# Patient Record
Sex: MALE | Race: WHITE | NOT HISPANIC OR LATINO | Employment: UNEMPLOYED | URBAN - METROPOLITAN AREA
[De-identification: names, ages, dates, MRNs, and addresses within clinical notes are randomized per-mention and may not be internally consistent; named-entity substitution may affect disease eponyms.]

---

## 2017-01-03 ENCOUNTER — HOSPITAL ENCOUNTER (OUTPATIENT)
Dept: RADIOLOGY | Facility: CLINIC | Age: 47
Discharge: HOME | End: 2017-01-03
Payer: COMMERCIAL

## 2017-01-03 ENCOUNTER — ALLSCRIPTS OFFICE VISIT (OUTPATIENT)
Dept: OTHER | Facility: OTHER | Age: 47
End: 2017-01-03

## 2017-01-03 DIAGNOSIS — S62.109A CLOSED FRACTURE OF CARPAL BONE: ICD-10-CM

## 2017-01-03 PROCEDURE — 73110 X-RAY EXAM OF WRIST: CPT

## 2017-02-14 ENCOUNTER — HOSPITAL ENCOUNTER (OUTPATIENT)
Dept: RADIOLOGY | Facility: CLINIC | Age: 47
Discharge: HOME | End: 2017-02-14
Payer: COMMERCIAL

## 2017-02-14 ENCOUNTER — APPOINTMENT (OUTPATIENT)
Dept: OCCUPATIONAL THERAPY | Facility: CLINIC | Age: 47
End: 2017-02-14
Payer: COMMERCIAL

## 2017-02-14 ENCOUNTER — ALLSCRIPTS OFFICE VISIT (OUTPATIENT)
Dept: OTHER | Facility: OTHER | Age: 47
End: 2017-02-14

## 2017-02-14 DIAGNOSIS — S52.513A: ICD-10-CM

## 2017-02-14 PROCEDURE — 73110 X-RAY EXAM OF WRIST: CPT

## 2017-02-14 PROCEDURE — L3906 WHO W/O JOINTS CF: HCPCS

## 2017-02-14 PROCEDURE — 97165 OT EVAL LOW COMPLEX 30 MIN: CPT

## 2017-02-16 ENCOUNTER — APPOINTMENT (OUTPATIENT)
Dept: OCCUPATIONAL THERAPY | Facility: CLINIC | Age: 47
End: 2017-02-16
Payer: COMMERCIAL

## 2017-02-16 DIAGNOSIS — S52.513A: ICD-10-CM

## 2017-02-16 PROCEDURE — 97140 MANUAL THERAPY 1/> REGIONS: CPT

## 2017-02-16 PROCEDURE — 97022 WHIRLPOOL THERAPY: CPT

## 2017-02-16 PROCEDURE — 97110 THERAPEUTIC EXERCISES: CPT

## 2017-02-20 ENCOUNTER — APPOINTMENT (OUTPATIENT)
Dept: OCCUPATIONAL THERAPY | Facility: CLINIC | Age: 47
End: 2017-02-20
Payer: COMMERCIAL

## 2017-02-20 PROCEDURE — 97022 WHIRLPOOL THERAPY: CPT

## 2017-02-20 PROCEDURE — 97140 MANUAL THERAPY 1/> REGIONS: CPT

## 2017-02-20 PROCEDURE — 97110 THERAPEUTIC EXERCISES: CPT

## 2017-02-22 ENCOUNTER — APPOINTMENT (OUTPATIENT)
Dept: OCCUPATIONAL THERAPY | Facility: CLINIC | Age: 47
End: 2017-02-22
Payer: COMMERCIAL

## 2017-02-22 PROCEDURE — 97110 THERAPEUTIC EXERCISES: CPT

## 2017-02-22 PROCEDURE — 97022 WHIRLPOOL THERAPY: CPT

## 2017-02-22 PROCEDURE — 97014 ELECTRIC STIMULATION THERAPY: CPT

## 2017-02-22 PROCEDURE — 97140 MANUAL THERAPY 1/> REGIONS: CPT

## 2017-02-27 ENCOUNTER — APPOINTMENT (OUTPATIENT)
Dept: OCCUPATIONAL THERAPY | Facility: CLINIC | Age: 47
End: 2017-02-27
Payer: COMMERCIAL

## 2017-02-27 PROCEDURE — 97014 ELECTRIC STIMULATION THERAPY: CPT

## 2017-02-27 PROCEDURE — 97022 WHIRLPOOL THERAPY: CPT

## 2017-02-27 PROCEDURE — 97110 THERAPEUTIC EXERCISES: CPT

## 2017-02-27 PROCEDURE — 97140 MANUAL THERAPY 1/> REGIONS: CPT

## 2017-03-01 ENCOUNTER — APPOINTMENT (OUTPATIENT)
Dept: OCCUPATIONAL THERAPY | Facility: CLINIC | Age: 47
End: 2017-03-01
Payer: COMMERCIAL

## 2017-03-01 PROCEDURE — 97014 ELECTRIC STIMULATION THERAPY: CPT

## 2017-03-01 PROCEDURE — 97110 THERAPEUTIC EXERCISES: CPT

## 2017-03-01 PROCEDURE — 97140 MANUAL THERAPY 1/> REGIONS: CPT

## 2017-03-06 ENCOUNTER — APPOINTMENT (OUTPATIENT)
Dept: OCCUPATIONAL THERAPY | Facility: CLINIC | Age: 47
End: 2017-03-06
Payer: COMMERCIAL

## 2017-03-06 PROCEDURE — 97140 MANUAL THERAPY 1/> REGIONS: CPT

## 2017-03-06 PROCEDURE — 97110 THERAPEUTIC EXERCISES: CPT

## 2017-03-08 ENCOUNTER — APPOINTMENT (OUTPATIENT)
Dept: OCCUPATIONAL THERAPY | Facility: CLINIC | Age: 47
End: 2017-03-08
Payer: COMMERCIAL

## 2017-03-08 PROCEDURE — 97140 MANUAL THERAPY 1/> REGIONS: CPT

## 2017-03-08 PROCEDURE — 97110 THERAPEUTIC EXERCISES: CPT

## 2017-03-08 PROCEDURE — 97014 ELECTRIC STIMULATION THERAPY: CPT

## 2017-03-13 ENCOUNTER — APPOINTMENT (OUTPATIENT)
Dept: OCCUPATIONAL THERAPY | Facility: CLINIC | Age: 47
End: 2017-03-13
Payer: COMMERCIAL

## 2017-03-14 ENCOUNTER — APPOINTMENT (OUTPATIENT)
Dept: OCCUPATIONAL THERAPY | Facility: CLINIC | Age: 47
End: 2017-03-14
Payer: COMMERCIAL

## 2017-03-15 ENCOUNTER — ALLSCRIPTS OFFICE VISIT (OUTPATIENT)
Dept: OTHER | Facility: OTHER | Age: 47
End: 2017-03-15

## 2017-03-15 ENCOUNTER — APPOINTMENT (OUTPATIENT)
Dept: OCCUPATIONAL THERAPY | Facility: CLINIC | Age: 47
End: 2017-03-15
Payer: COMMERCIAL

## 2017-03-15 ENCOUNTER — HOSPITAL ENCOUNTER (OUTPATIENT)
Dept: RADIOLOGY | Facility: CLINIC | Age: 47
Discharge: HOME | End: 2017-03-15
Payer: COMMERCIAL

## 2017-03-15 DIAGNOSIS — S52.513A: ICD-10-CM

## 2017-03-15 PROCEDURE — 97140 MANUAL THERAPY 1/> REGIONS: CPT

## 2017-03-15 PROCEDURE — 73110 X-RAY EXAM OF WRIST: CPT

## 2017-03-15 PROCEDURE — 97014 ELECTRIC STIMULATION THERAPY: CPT

## 2017-03-15 PROCEDURE — 97110 THERAPEUTIC EXERCISES: CPT

## 2017-03-20 ENCOUNTER — APPOINTMENT (OUTPATIENT)
Dept: OCCUPATIONAL THERAPY | Facility: CLINIC | Age: 47
End: 2017-03-20
Payer: COMMERCIAL

## 2017-03-20 PROCEDURE — 97140 MANUAL THERAPY 1/> REGIONS: CPT

## 2017-03-20 PROCEDURE — 97110 THERAPEUTIC EXERCISES: CPT

## 2017-03-20 PROCEDURE — 97035 APP MDLTY 1+ULTRASOUND EA 15: CPT

## 2017-03-22 ENCOUNTER — APPOINTMENT (OUTPATIENT)
Dept: OCCUPATIONAL THERAPY | Facility: CLINIC | Age: 47
End: 2017-03-22
Payer: COMMERCIAL

## 2017-03-22 PROCEDURE — 97140 MANUAL THERAPY 1/> REGIONS: CPT

## 2017-03-22 PROCEDURE — 97110 THERAPEUTIC EXERCISES: CPT

## 2017-03-27 ENCOUNTER — APPOINTMENT (OUTPATIENT)
Dept: OCCUPATIONAL THERAPY | Facility: CLINIC | Age: 47
End: 2017-03-27
Payer: COMMERCIAL

## 2018-01-12 VITALS
SYSTOLIC BLOOD PRESSURE: 117 MMHG | HEIGHT: 72 IN | BODY MASS INDEX: 23.43 KG/M2 | WEIGHT: 173 LBS | DIASTOLIC BLOOD PRESSURE: 76 MMHG | HEART RATE: 97 BPM

## 2018-01-14 VITALS
DIASTOLIC BLOOD PRESSURE: 84 MMHG | HEIGHT: 72 IN | SYSTOLIC BLOOD PRESSURE: 123 MMHG | WEIGHT: 173 LBS | BODY MASS INDEX: 23.43 KG/M2 | HEART RATE: 74 BPM

## 2018-01-15 VITALS
DIASTOLIC BLOOD PRESSURE: 94 MMHG | HEIGHT: 72 IN | SYSTOLIC BLOOD PRESSURE: 142 MMHG | BODY MASS INDEX: 23.43 KG/M2 | WEIGHT: 173 LBS

## 2018-01-16 ENCOUNTER — OFFICE VISIT (OUTPATIENT)
Dept: URGENT CARE | Facility: MEDICAL CENTER | Age: 48
End: 2018-01-16
Payer: COMMERCIAL

## 2018-01-16 PROCEDURE — G0382 LEV 3 HOSP TYPE B ED VISIT: HCPCS

## 2018-01-16 NOTE — RESULT NOTES
Verified Results  (1) COMPREHENSIVE METABOLIC PANEL 20QXT3214 01:51WC Mine Romo   REPORT COMMENT:  FASTING:YES     Test Name Result Flag Reference   GLUCOSE 90 mg/dL  65-99   Fasting reference interval   UREA NITROGEN (BUN) 15 mg/dL  7-25   CREATININE 1 11 mg/dL  0 60-1 35   eGFR NON-AFR  AMERICAN 81 mL/min/1 73m2  > OR = 60   eGFR AFRICAN AMERICAN 94 mL/min/1 73m2  > OR = 60   BUN/CREATININE RATIO   4-86   NOT APPLICABLE (calc)   SODIUM 142 mmol/L  135-146   POTASSIUM 4 0 mmol/L  3 5-5 3   CHLORIDE 107 mmol/L     CARBON DIOXIDE 25 mmol/L  20-31   CALCIUM 9 9 mg/dL  8 6-10 3   PROTEIN, TOTAL 7 3 g/dL  6 1-8 1   ALBUMIN 5 0 g/dL  3 6-5 1   GLOBULIN 2 3 g/dL (calc)  1 9-3 7   ALBUMIN/GLOBULIN RATIO 2 2 (calc)  1 0-2 5   BILIRUBIN, TOTAL 0 8 mg/dL  0 2-1 2   ALKALINE PHOSPHATASE 80 U/L     AST 23 U/L  10-40   ALT 36 U/L  9-46   GLUCOSE 90 mg/dL  65-99   Fasting reference interval   UREA NITROGEN (BUN) 15 mg/dL  7-25   CREATININE 1 11 mg/dL  0 60-1 35   eGFR NON-AFR   AMERICAN 81 mL/min/1 73m2  > OR = 60   eGFR AFRICAN AMERICAN 94 mL/min/1 73m2  > OR = 60   BUN/CREATININE RATIO   3-97   NOT APPLICABLE (calc)   SODIUM 142 mmol/L  135-146   POTASSIUM 4 0 mmol/L  3 5-5 3   CHLORIDE 107 mmol/L     CARBON DIOXIDE 25 mmol/L  20-31   CALCIUM 9 9 mg/dL  8 6-10 3   PROTEIN, TOTAL 7 3 g/dL  6 1-8 1   ALBUMIN 5 0 g/dL  3 6-5 1   GLOBULIN 2 3 g/dL (calc)  1 9-3 7   ALBUMIN/GLOBULIN RATIO 2 2 (calc)  1 0-2 5   BILIRUBIN, TOTAL 0 8 mg/dL  0 2-1 2   ALKALINE PHOSPHATASE 80 U/L     AST 23 U/L  10-40   ALT 36 U/L  9-46

## 2018-01-16 NOTE — PROGRESS NOTES
Assessment    1  Encounter for preventive health examination (V70 0) (Z00 00)    Discussion/Summary  Advice and education were given regarding nutrition, aerobic exercise and tobacco cessation  Chief Complaint  MM  Review Labs  Chantix      History of Present Illness  HM, Adult Male: The patient is being seen for a health maintenance evaluation  General Health: The patient's health since the last visit is described as good  Lifestyle:  He consumes a diverse and healthy diet  He does not have any weight concerns  He does not exercise regularly  He does not use tobacco    Reproductive health:  the patient is sexually active  Screening: cancer screening reviewed and current  metabolic screening reviewed and current  risk screening reviewed and current  Review of Systems    Constitutional: No fever or chills, feels well, no tiredness, no recent weight gain or weight loss  Cardiovascular: No complaints of slow heart rate, no fast heart rate, no chest pain, no palpitations, no leg claudication, no lower extremity  Respiratory: No complaints of shortness of breath, no wheezing, no cough, no SOB on exertion, no orthopnea or PND  Gastrointestinal: No complaints of abdominal pain, no constipation, no nausea or vomiting, no diarrhea or bloody stools  Musculoskeletal: No complaints of arthralgia, no myalgias, no joint swelling or stiffness, no limb pain or swelling  Active Problems    1  Need for lipid screening (V77 91) (Z13 220)   2  Smoking trying to quit (305 1) (Z72 0)    Family History  Mother    · Family history of Seasonal allergies    Social History    · Smoking trying to quit (305 1) (Z72 0)    Current Meds   1  Amoxicillin 500 MG Oral Capsule; Therapy: 70DCC1278 to (Last Rx:25Mar2011)  Requested for: 25Mar2011 Ordered   2  Chantix Continuing Month Jameson 1 MG Oral Tablet; TAKE 1 TABLET TWICE DAILY;    Therapy: 49EPL2362 to (01 72 64 30 83)  Requested for: 33BYC0245; Last Rx: 31AAU0980 Ordered   3  Chantix Starting Month Jameson 0 5 MG X 11 & 1 MG X 42 Oral Tablet; TAKE ONE 0 5MG   TABLET DAILY ON DAYS 1-3, THEN ONE 0 5MG TABLET TWICE DAILY ON DAYS 4-7,   THEN ONE 1MG TABLET TWICE DAILY THEREAFTER; Therapy: 09SPR9923 to (Last Rx:17Aug2016)  Requested for: 17Aug2016; Status:   ACTIVE - Retrospective By Protocol Authorization Ordered   4  Ciprofloxacin HCl - 500 MG Oral Tablet; Therapy: 74WTV4196 to (Last Rx:06Gsf2034)  Requested for: 50Coi3577 Ordered   5  MetroNIDAZOLE 500 MG Oral Tablet; Therapy: 85FIM0993 to (Last Rx:30Afe7116)  Requested for: 04Rgx5305 Ordered   6  Oxycodone-Acetaminophen 5-325 MG Oral Tablet; Therapy: 19WZL6605 to (Last Rx:24Hoz3814)  Requested for: 22Dfy6579 Ordered   7  Tamiflu 75 MG Oral Capsule; Therapy: 51Crw7177 to (Last Rx:40Vwy3631)  Requested for: 02Phk7376 Ordered    Allergies    1  No Known Drug Allergies    Vitals   Recorded: 91DYK4125 94:30GG   Systolic 107   Diastolic 78   Heart Rate 76   Respiration 16   Height 6 ft 0 5 in   Weight 182 lb    BMI Calculated 24 34   BSA Calculated 2 06     Physical Exam    Constitutional   General appearance: No acute distress, well appearing and well nourished  Neck   Neck: Supple, symmetric, trachea midline, no masses  Thyroid: Normal, no thyromegaly  Pulmonary   Auscultation of lungs: Clear to auscultation  Cardiovascular   Auscultation of heart: Normal rate and rhythm, normal S1 and S2, no murmurs  Results/Data  (1) LIPID PANEL, FASTING 03Oct2016 02:31PM Kiki Cade     Test Name Result Flag Reference   CHOLESTEROL, TOTAL 208 mg/dL H 125-200   HDL CHOLESTEROL 76 mg/dL  > OR = 40   TRIGLICERIDES 95 mg/dL  <019   LDL-CHOLESTEROL 113 mg/dL (calc)  <130   Desirable range <100 mg/dL for patients with CHD or  diabetes and <70 mg/dL for diabetic patients with  known heart disease     CHOL/HDLC RATIO 2 7 (calc)  < OR = 5 0   NON HDL CHOLESTEROL 132 mg/dL (calc)     Target for non-HDL cholesterol is 30 mg/dL higher than   LDL cholesterol target  (1) COMPREHENSIVE METABOLIC PANEL 40RKG7927 62:41HJ Lenka Collins   REPORT COMMENT:  FASTING:YES     Test Name Result Flag Reference   GLUCOSE 90 mg/dL  65-99   Fasting reference interval   UREA NITROGEN (BUN) 15 mg/dL  7-25   CREATININE 1 11 mg/dL  0 60-1 35   eGFR NON-AFR   AMERICAN 81 mL/min/1 73m2  > OR = 60   eGFR AFRICAN AMERICAN 94 mL/min/1 73m2  > OR = 60   BUN/CREATININE RATIO   6-70   NOT APPLICABLE (calc)   SODIUM 142 mmol/L  135-146   POTASSIUM 4 0 mmol/L  3 5-5 3   CHLORIDE 107 mmol/L     CARBON DIOXIDE 25 mmol/L  20-31   CALCIUM 9 9 mg/dL  8 6-10 3   PROTEIN, TOTAL 7 3 g/dL  6 1-8 1   ALBUMIN 5 0 g/dL  3 6-5 1   GLOBULIN 2 3 g/dL (calc)  1 9-3 7   ALBUMIN/GLOBULIN RATIO 2 2 (calc)  1 0-2 5   BILIRUBIN, TOTAL 0 8 mg/dL  0 2-1 2   ALKALINE PHOSPHATASE 80 U/L     AST 23 U/L  10-40   ALT 36 U/L  9-46       Signatures   Electronically signed by : Crow Ha MD; Oct 10 2016  7:43AM EST                       (Author)

## 2018-01-17 NOTE — PROGRESS NOTES
Assessment   1  Blepharitis, left eye (373 00) (H01 006)    Plan   Blepharitis, left eye    · Doxycycline Hyclate 100 MG Oral Tablet; TAKE 1 TABLET DAILY AS DIRECTED   · Erythromycin 5 MG/GM Ophthalmic Ointment; Apply a small amount of ointment to    the affected eye 3X a day for 1 week   · Apply warm moist compresses to the affected area 3 times a day for 5 minutes ;    Status:Complete;   Done: 24DXC3516   · Avoid exposure to the sun ; Status:Complete;   Done: 85VPS7749   · Avoid wearing contact lenses for 2 days ; Status:Complete;   Done: 30ECT6650   · Avoid wearing contact lenses until your condition is better ; Status:Complete;   Done:    72OFR2444   · Clean your eyelids often to remove scales and crusts ; Status:Complete;   Done:    55IDQ2980   · How to apply eye ointment to the eyelashes ; Status:Complete;   Done: 24ARS4361   · Use a sun block product with an SPF of 15 or more ; Status:Complete;   Done:    69OZO1013   · Call [phone] if: The symptoms are not better in 7 days ; Status:Complete;   Done:    47NIB9951   · Call [phone] if: You are having pain in your eye ; Status:Complete;   Done:    17JGD7248   · Call [phone] if: Your eyesight becomes blurry or you have difficulty seeing ;    Status:Complete;   Done: 85UMA8615    Discussion/Summary   Discussion Summary:    Take medication as prescribed  up with opthalmology or ER if any worsening of visual symptoms lid hygiene and warm compress instructions  Medication Side Effects Reviewed: Possible side effects of new medications were reviewed with the patient/guardian today  Understands and agrees with treatment plan: The treatment plan was reviewed with the patient/guardian  The patient/guardian understands and agrees with the treatment plan    Counseling Documentation With Imm: The patient was counseled regarding instructions for management  Follow Up Instructions: Follow Up with your Primary Care Provider in 3-5 days      If your symptoms worsen, go to the nearest [facility] Emergency Department  Chief Complaint   1  Eye Pain  Chief Complaint Free Text Note Form: Left eye pain and swelling since awoke this am       History of Present Illness   HPI: 72-year-old male presents for evaluation of left eye periorbital redness and swelling  Patient states that he is having a burning sensation on the left eye and rates it as 6/10  He denies any contact lens use  He denies any foreign body sensation in the left eye  Denies any loss of visual acuity in left eye  Denies any photophobia in left eye  The swelling is located mostly on his left upper eyelid, and he has noticed some pus-like discharge on the upper eyelid margin that goes on to his eye  Patient denies any pain with extraocular movements  Patient denies any recent upper respiratory infections or sinus infections  Hospital Based Practices Required Assessment:      Pain Assessment      the patient states they have pain  The patient describes the pain as burning  (on a scale of 0 to 10, the patient rates the pain at 6 ) Reason DV Screen not done: not alone       Review of Systems   Focused-Male:      Constitutional: as noted in HPI       ENT: as noted in HPI  Respiratory: as noted in HPI  Gastrointestinal: as noted in HPI  ROS Reviewed:    ROS reviewed  Active Problems   1  Closed fracture of radial styloid (813 42) (S52 513A)   2  Compression of right ulnar nerve at multiple levels (354 2) (G56 21)   3  Need for lipid screening (V77 91) (Z13 220)   4  Smoking trying to quit (305 1) (Z72 0)    Past Medical History   Active Problems And Past Medical History Reviewed: The active problems and past medical history were reviewed and updated today  Family History   Mother    1  Family history of Seasonal allergies  Family History Reviewed: The family history was reviewed and updated today         Social History    · Current every day smoker (305 1) (F17 200) · Smoking trying to quit (305 1) (Z72 0)  Social History Reviewed: The social history was reviewed and updated today  The social history was reviewed and is unchanged  Surgical History   Surgical History Reviewed: The surgical history was reviewed and updated today  Current Meds    1  No Reported Medications Recorded  Medication List Reviewed: The medication list was reviewed and updated today  Allergies   1  No Known Drug Allergies    Vitals   Signs   Recorded: 59XWH7792 07:45PM   Temperature: 97 9 F  Heart Rate: 80  Respiration: 18  Systolic: 045  Diastolic: 85  Height: 6 ft   Weight: 170 lb   BMI Calculated: 23 06  BSA Calculated: 1 99  O2 Saturation: 96  Pain Scale: 6    Physical Exam        Constitutional      General appearance: No acute distress, well appearing and well nourished  Eyes      Conjunctiva and lids: Abnormal   Conjunctiva Findings: purulent discharge on the left, but-- no subconjunctival hemorrhages  Eye Lids: left upper eyelid swelling-- and-- left upper eyelid blepharitis, but-- normal bilaterally,-- no lower eyelid swelling,-- no lower eyelid blepharitis,-- no ptosis on the right,-- no purulent discharge from the right lacrimal gland,-- no ptosis on the left-- and-- no purulent discharge from the left lacrimal gland  Pupils and irises: Equal, round and reactive to light  -- No pain with EOM b/l  Ears, Nose, Mouth, and Throat      External inspection of ears and nose: Normal        Otoscopic examination: Tympanic membrance translucent with normal light reflex  Canals patent without erythema  Nasal mucosa, septum, and turbinates: Normal without edema or erythema  Oropharynx: Normal with no erythema, edema, exudate or lesions  Pulmonary      Respiratory effort: No increased work of breathing or signs of respiratory distress  Auscultation of lungs: Clear to auscultation         Cardiovascular      Auscultation of heart: Normal rate and rhythm, normal S1 and S2, without murmurs  Additional Exam:  No ttp of maxillar/frontal sinus b/l  Message   Return to work or school:    Gabriel Benitez is under my professional care  He was seen in my office on 01/16/2018    He is able to return to work on  01/18/2018          Sol Ball PA-C        Signatures    Electronically signed by : EB Fraga; Jan 16 2018  8:28PM EST                       (Author)     Electronically signed by : YAN Bardales ; Jan 16 2018  8:39PM EST

## 2018-01-18 ENCOUNTER — ALLSCRIPTS OFFICE VISIT (OUTPATIENT)
Dept: OTHER | Facility: OTHER | Age: 48
End: 2018-01-18

## 2018-01-20 NOTE — PROGRESS NOTES
Assessment   1  Periorbital cellulitis of left eye (682 0) (L03 213)    Plan   Periorbital cellulitis of left eye    · Amoxicillin-Pot Clavulanate 875-125 MG Oral Tablet; TAKE 1 TABLET EVERY 12    HOURS DAILY   · Ashkan Phelan MD, Ijeoma Brito (Ophthalmology) Co-Management  *  Status: Hold For -    Scheduling  Requested for: 92SYX6434  Care Summary provided  : Yes    Discussion/Summary      Stop Doxycycline and Erythromycin, start Augmentin   work note given Dr Ashkan hPelan Monday, call or return if worse  Possible side effects of new medications were reviewed with the patient/guardian today  The treatment plan was reviewed with the patient/guardian  The patient/guardian understands and agrees with the treatment plan      Chief Complaint   right eye redness and swelling x two days    History of Present Illness   HPI: right eye redness and swelling x two days    seen at Urgent Care 2 days ago- given Doxycycline PO and Erythromycin ointment for blepharitis- symptoms now worse  crusted shut this morning  minimal pain- only with looking in later top corner  no vision changes  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 exposure      Review of Systems        ENT: as noted in HPI  Cardiovascular: no complaints of slow or fast heart rate, no chest pain, no palpitations, no leg claudication or lower extremity edema  Respiratory: no complaints of shortness of breath, no wheezing or cough, no dyspnea on exertion, no orthopnea or PND  Gastrointestinal: no complaints of abdominal pain, no constipation, no nausea or vomiting, no diarrhea or bloody stools  Genitourinary: no complaints of dysuria or incontinence, no hesitancy, no nocturia, no genital lesion, no inadequacy of penile erection  Musculoskeletal: no complaints of arthralgia, no myalgia, no joint swelling or stiffness, no limb pain or swelling        Integumentary: no complaints of skin rash or lesion, no itching or dry skin, no skin wounds  Neurological: no complaints of headache, no confusion, no numbness or tingling, no dizziness or fainting  Active Problems   1  Blepharitis, left eye (373 00) (H01 006)   2  Closed fracture of radial styloid (813 42) (S52 513A)   3  Compression of right ulnar nerve at multiple levels (354 2) (G56 21)   4  Need for lipid screening (V77 91) (Z13 220)   5  Smoking trying to quit (305 1) (Z72 0)    Past Medical History   Active Problems And Past Medical History Reviewed: The active problems and past medical history were reviewed and updated today  Family History   Mother    1  Family history of Seasonal allergies  Family History    2  Denied: Family history of substance abuse   3  No family history of mental disorder  Family History Reviewed: The family history was reviewed and updated today  Social History    · Current every day smoker (305 1) (F17 200)   · Smoking trying to quit (305 1) (Z72 0)  The social history was reviewed and updated today  Surgical History   Surgical History Reviewed: The surgical history was reviewed and updated today  Current Meds    1  Doxycycline Hyclate 100 MG Oral Tablet; TAKE 1 TABLET DAILY AS DIRECTED; Therapy: 42SSW4407 to [phone])  Requested for: 85RGS6386; Last     Rx:16Jan2018 Ordered   2  Erythromycin 5 MG/GM Ophthalmic Ointment; Apply a small amount of ointment to the     affected eye 3X a day for 1 week; Therapy: 45CMK7600 to (RTYDZFIT:[id number])  Requested for: 45CKD4667; Last     Rx:16Jan2018 Ordered     The medication list was reviewed and updated today  Allergies   1   No Known Drug Allergies    Vitals    Recorded: 45BYQ9152 08:30AM   Heart Rate 82   Respiration 16   Systolic 424   Diastolic 70   Height 6 ft    Weight 170 lb    BMI Calculated 23 06   BSA Calculated 1 99   O2 Saturation 95     Physical Exam        Constitutional      General appearance: No acute distress, well appearing and well nourished  Eyes      Conjunctiva and lids: Abnormal   Conjunctiva Findings: watery discharge on the left-- and-- increased tearing on the left, but-- no hyperemia,-- no purulent discharge,-- no subconjunctival hemorrhages,-- normal right conjunctiva-- and-- no increased tearing on the right  Eye Lids: left upper eyelid swelling, but-- no right upper eyelid swelling,-- no right lower eyelid swelling,-- no left lower eyelid swelling,-- no left upper eyelid blepharitis-- and-- no left lower eyelid blepharitis  -- swelling of upper left lid with surround above and below left eye swelling, redness, tenderness  stained left eye done by Dr Puneet Slater, no fb, no abrasions, no signs of lesions  Ears, Nose, Mouth, and Throat      External inspection of ears and nose: Normal        Pulmonary      Respiratory effort: No increased work of breathing or signs of respiratory distress  Auscultation of lungs: Clear to auscultation, equal breath sounds bilaterally, no wheezes, no rales, no rhonci  Cardiovascular      Auscultation of heart: Normal rate and rhythm, normal S1 and S2, without murmurs         Musculoskeletal      Gait and station: Normal        Psychiatric      Orientation to person, place and time: Normal        Mood and affect: Normal           Signatures    Electronically signed by : Cristi Church; Jan 18 2018  9:47AM EST                       (Author)     Electronically signed by : Armida Le MD; Jan 19 2018  6:39AM EST

## 2018-01-23 VITALS
OXYGEN SATURATION: 95 % | HEIGHT: 72 IN | WEIGHT: 170 LBS | HEART RATE: 82 BPM | DIASTOLIC BLOOD PRESSURE: 70 MMHG | RESPIRATION RATE: 16 BRPM | SYSTOLIC BLOOD PRESSURE: 120 MMHG | BODY MASS INDEX: 23.03 KG/M2

## 2018-01-23 VITALS
WEIGHT: 170 LBS | HEIGHT: 72 IN | HEART RATE: 80 BPM | OXYGEN SATURATION: 96 % | SYSTOLIC BLOOD PRESSURE: 123 MMHG | TEMPERATURE: 97.9 F | BODY MASS INDEX: 23.03 KG/M2 | RESPIRATION RATE: 18 BRPM | DIASTOLIC BLOOD PRESSURE: 85 MMHG

## 2018-01-24 NOTE — MISCELLANEOUS
Message  Return to work or school:   Gabriel Benitez is under my professional care  He was seen in my office on 01/16/2018   He is able to return to work on  01/18/2018       Sol Ball PA-C        Signatures   Electronically signed by : EB Farga; Jan 16 2018  8:28PM EST                       (Author)    Electronically signed by : Sol Ball, Fransico Mitchell; Jan 17 2018  9:40AM EST                       (Author)

## 2018-11-23 ENCOUNTER — HOSPITAL ENCOUNTER (EMERGENCY)
Facility: HOSPITAL | Age: 48
Discharge: HOME | End: 2018-11-23
Payer: COMMERCIAL

## 2018-11-23 VITALS
WEIGHT: 180 LBS | HEIGHT: 72 IN | BODY MASS INDEX: 24.38 KG/M2 | RESPIRATION RATE: 18 BRPM | TEMPERATURE: 99.1 F | HEART RATE: 91 BPM | DIASTOLIC BLOOD PRESSURE: 77 MMHG | OXYGEN SATURATION: 96 % | SYSTOLIC BLOOD PRESSURE: 141 MMHG

## 2018-11-23 DIAGNOSIS — L02.31 ABSCESS, GLUTEAL, RIGHT: Primary | ICD-10-CM

## 2018-11-23 PROCEDURE — 96374 THER/PROPH/DIAG INJ IV PUSH: CPT

## 2018-11-23 PROCEDURE — 99283 EMERGENCY DEPT VISIT LOW MDM: CPT

## 2018-11-23 RX ORDER — LIDOCAINE HYDROCHLORIDE AND EPINEPHRINE 10; 10 MG/ML; UG/ML
10 INJECTION, SOLUTION INFILTRATION; PERINEURAL ONCE
Status: COMPLETED | OUTPATIENT
Start: 2018-11-23 | End: 2018-11-23

## 2018-11-23 RX ORDER — DOXYCYCLINE HYCLATE 100 MG/1
100 CAPSULE ORAL 2 TIMES DAILY
Qty: 14 CAPSULE | Refills: 0 | Status: SHIPPED | OUTPATIENT
Start: 2018-11-23 | End: 2018-11-30

## 2018-11-23 RX ORDER — FENTANYL CITRATE 50 UG/ML
75 INJECTION, SOLUTION INTRAMUSCULAR; INTRAVENOUS ONCE
Status: COMPLETED | OUTPATIENT
Start: 2018-11-23 | End: 2018-11-23

## 2018-11-23 RX ORDER — DOXYCYCLINE HYCLATE 100 MG/1
100 CAPSULE ORAL ONCE
Status: COMPLETED | OUTPATIENT
Start: 2018-11-23 | End: 2018-11-23

## 2018-11-23 RX ADMIN — DOXYCYCLINE HYCLATE 100 MG: 100 CAPSULE ORAL at 19:55

## 2018-11-23 RX ADMIN — FENTANYL CITRATE 75 MCG: 50 INJECTION, SOLUTION INTRAMUSCULAR; INTRAVENOUS at 19:31

## 2018-11-23 RX ADMIN — LIDOCAINE HYDROCHLORIDE,EPINEPHRINE BITARTRATE 10 ML: 10; .01 INJECTION, SOLUTION INFILTRATION; PERINEURAL at 19:34

## 2018-11-24 NOTE — DISCHARGE INSTRUCTIONS
Abscess   WHAT YOU NEED TO KNOW:   A warm compress may help your abscess drain  Your healthcare provider may make a cut in the abscess so it can drain  You may need surgery to remove an abscess that is on your hands or buttocks  DISCHARGE INSTRUCTIONS:   Return to the emergency department if:   · The area around your abscess becomes very painful, warm, or has red streaks  · You have a fever and chills  · Your heart is beating faster than usual      · You feel faint or confused  Contact your healthcare provider if:   · Your abscess gets bigger or does not get better  · Your abscess returns  · You have questions or concerns about your condition or care  Medicines: You may  need any of the following:  · Antibiotics  help treat a bacterial infection  · Acetaminophen  decreases pain and fever  It is available without a doctor's order  Ask how much to take and how often to take it  Follow directions  Acetaminophen can cause liver damage if not taken correctly  · NSAIDs , such as ibuprofen, help decrease swelling, pain, and fever  This medicine is available with or without a doctor's order  NSAIDs can cause stomach bleeding or kidney problems in certain people  If you take blood thinner medicine, always ask your healthcare provider if NSAIDs are safe for you  Always read the medicine label and follow directions  · Take your medicine as directed  Contact your healthcare provider if you think your medicine is not helping or if you have side effects  Tell him or her if you are allergic to any medicine  Keep a list of the medicines, vitamins, and herbs you take  Include the amounts, and when and why you take them  Bring the list or the pill bottles to follow-up visits  Carry your medicine list with you in case of an emergency  Self-care:   · Apply a warm compress to your abscess  This will help it open and drain  Wet a washcloth in warm, but not hot, water  Apply the compress for 10 minutes  Repeat this 4 times each day  Do not  press on an abscess or try to open it with a needle  You may push the bacteria deeper or into your blood  · Do not share your clothes, towels, or sheets with anyone  This can spread the infection to others  · Wash your hands often  This can help prevent the spread of germs  Use soap and water or an alcohol-based hand rub  Care for your wound after it is drained:   · Care for your wound as directed  If your healthcare provider says it is okay, carefully remove the bandage and gauze packing  You may need to soak the gauze to get it out of your wound  Clean your wound and the area around it as directed  Dry the area and put on new, clean bandages  Change your bandages when they get wet or dirty  · Ask your healthcare provider how to change the gauze in your wound  Keep track of how many pieces of gauze are placed inside the wound  Do not put too much packing in the wound  Do not pack the gauze too tightly in your wound  Follow up with your healthcare provider in 1 to 3 days: You may need to have your packing removed or your bandage changed  Write down your questions so you remember to ask them during your visits  © 2017 [employer]  Information is for End User's use only and may not be sold, redistributed or otherwise used for commercial purposes  All illustrations and images included in CareNotes® are the copyrighted property of [employer]  or [employer]  The above information is an  only  It is not intended as medical advice for individual conditions or treatments  Talk to your doctor, nurse or pharmacist before following any medical regimen to see if it is safe and effective for you

## 2018-11-24 NOTE — ED PROVIDER NOTES
History  Chief Complaint   Patient presents with    Abscess     pt presents to ER stating he has an abscess that he noticed 4 days ago on his butt cheek  patient states it has gotten bigger and the pain has gotten worse  58 yo male presents to the Emergency Department for evaluation of R gluteal abscess x 4 days  He has a hx of this, requiring operative washout  He denies associated fever, chills, sweats, CP, SOB, N/V, BRBPR, LUTS or diarrhea  No recent trauma to site  Denies known hx of MRSA  Denies hx of IVDU  None       History reviewed  No pertinent past medical history  Past Surgical History:   Procedure Laterality Date    ABSCESS DRAINAGE         History reviewed  No pertinent family history  I have reviewed and agree with the history as documented  Social History   Substance Use Topics    Smoking status: Never Smoker    Smokeless tobacco: Never Used    Alcohol use Yes      Comment: occasionally         Review of Systems   Constitutional: Negative for chills, diaphoresis, fatigue and fever  Respiratory: Negative for shortness of breath  Cardiovascular: Negative for chest pain  Gastrointestinal: Negative for abdominal pain, diarrhea, nausea, rectal pain and vomiting  Skin: Positive for color change  Allergic/Immunologic: Negative for immunocompromised state  Neurological: Negative for dizziness and light-headedness  Psychiatric/Behavioral: Negative for confusion  Physical Exam  Physical Exam   Constitutional: He is oriented to person, place, and time  He appears well-developed and well-nourished  No distress  HENT:   Head: Normocephalic and atraumatic  Eyes: Pupils are equal, round, and reactive to light  No scleral icterus  Neck: No JVD present  Cardiovascular: Normal rate and regular rhythm  Exam reveals no gallop and no friction rub  No murmur heard  Pulmonary/Chest: No respiratory distress  He has no wheezes  He has no rales     Genitourinary: Genitourinary Comments: 3cm x 4cm fluctuant abscess to R central gluteal cleft, no anal involvement  No encroachment on external sphincter, LISSA performed w/o gross blood in vault, no anal induration or tenderness   Lymphadenopathy: No inguinal adenopathy noted on the right or left side  Neurological: He is alert and oriented to person, place, and time  Skin: Skin is warm and dry  He is not diaphoretic  Vitals reviewed  Vital Signs  ED Triage Vitals [11/23/18 1907]   Temperature Pulse Respirations Blood Pressure SpO2   99 1 °F (37 3 °C) 91 18 141/77 96 %      Temp Source Heart Rate Source Patient Position - Orthostatic VS BP Location FiO2 (%)   Temporal Monitor Lying Right arm --      Pain Score       9           Vitals:    11/23/18 1907   BP: 141/77   Pulse: 91   Patient Position - Orthostatic VS: Lying       Visual Acuity      ED Medications  Medications   fentanyl citrate (PF) 100 MCG/2ML 75 mcg (75 mcg Intravenous Given 11/23/18 1931)   lidocaine-epinephrine (XYLOCAINE/EPINEPHRINE) 1 %-1:100,000 injection 10 mL (10 mL Infiltration Given 11/23/18 1934)   doxycycline hyclate (VIBRAMYCIN) capsule 100 mg (100 mg Oral Given 11/23/18 1955)       Diagnostic Studies  Results Reviewed     None                 No orders to display              Procedures  Incision/Drainage  Date/Time: 11/23/2018 7:50 PM  Performed by: Freida Ortega by: Blake Wolff     Patient location:  ED  Other Assisting Provider: Yes (comment)    Consent:     Consent obtained:  Verbal    Consent given by:  Patient    Risks discussed:  Bleeding, pain and infection    Alternatives discussed:  No treatment  Universal protocol:     Procedure explained and questions answered to patient or proxy's satisfaction: yes      Relevant documents present and verified: yes      Test results available and properly labeled: yes      Radiology Images displayed and confirmed    If images not available, report reviewed: yes      Required blood products, implants, devices, and special equipment available: yes      Site marked: n/a  Immediately prior to procedure a time out was called: yes      Patient identity confirmed:  Arm band  Location:     Type:  Abscess    Size:  4cm x 3 cm    Location:  Anogenital    Anogenital location:  Gluteal cleft  Pre-procedure details:     Skin preparation:  Chloraprep  Anesthesia (see MAR for exact dosages): Anesthesia method:  None  Procedure details:     Complexity:  Intermediate    Needle aspiration: no      Incision types:  Single straight    Scalpel blade:  11    Approach:  Open    Incision depth:  Subcutaneous    Wound management:  Probed and deloculated, irrigated with saline and extensive cleaning    Drainage:  Purulent    Drainage amount:  Copious    Wound treatment:  Wound left open    Packing materials:  None  Post-procedure details:     Patient tolerance of procedure: Tolerated well, no immediate complications           Phone Contacts  ED Phone Contact    ED Course                               MDM  Number of Diagnoses or Management Options  Abscess, gluteal, right: new and does not require workup  Diagnosis management comments: 58 yo male presents w/ gluteal abscess  I&D performed without difficulty  There is no internal anal/rectal involvement on exam  Pt is overall well appearing and afebrile, no signs of sepsis  Will be treated for purulent infection w/ doxycycline; he has no known hx of MRSA  F/U in ED, Urgent Care or PCP in 3 days for wound check          Amount and/or Complexity of Data Reviewed  Review and summarize past medical records: yes      CritCare Time    Disposition  Final diagnoses:   Abscess, gluteal, right     Time reflects when diagnosis was documented in both MDM as applicable and the Disposition within this note     Time User Action Codes Description Comment    11/23/2018  7:45 PM Edmund Arreguin Add [L02 31] Abscess, gluteal, right       ED Disposition     ED Disposition Condition Comment    Discharge  [facility] discharge to home/self care  Condition at discharge: Good        Follow-up Information     Follow up With Specialties Details Why Contact Info Additional [address] [facility] Emergency Department Emergency Medicine  If symptoms worsen, if you develop a fever, or you begin to vomit [address] [facility]  [address] [facility]  [phone] [address] ED, [address], [address], [address] In 3 days  [address]  [address] [place] [address]  [address] [facility]  [phone]             Discharge Medication List as of 11/23/2018  7:49 PM      START taking these medications    Details   doxycycline hyclate (VIBRAMYCIN) 100 mg capsule Take 1 capsule (100 mg total) by mouth 2 (two) times a day for 7 days, Starting Fri 11/23/2018, Until Fri 11/30/2018, Print           No discharge procedures on file      ED Provider  Electronically Signed by           Nemo Arboleda PA-C  11/23/18 2034

## 2018-11-24 NOTE — ED NOTES
Patient abscess drained of yellow/brownish malodorous pus   Patient tolerated procedure well,      Mina Carrera, DALIA  11/23/18 2040

## 2018-11-27 ENCOUNTER — VBI (OUTPATIENT)
Dept: ADMINISTRATIVE | Facility: OTHER | Age: 48
End: 2018-11-27

## 2018-11-27 NOTE — TELEPHONE ENCOUNTER
Elias Valdez    ED Visit Information     Ed visit date: 11/23/2018  Diagnosis Description: Abscess, gluteal, right  In Network? Yes [address]  Discharge status: Home  Discharged with meds ? Yes  Number of ED visits to date: 1  ED Severity:4     Outreach Information    Outreach successful: No 3  Date letter mailed: 11/30/2018  Date Finalized: 11/30/2018    Care Coordination    Follow up appointment with pcp: no None scheduled  Transportation issues ? NA    [employer] Base Outreach    Outreach type:  7 Day Outreach  Emergent necessity warranted by diagnosis:  No  [facility] PCP:  Yes  Transportation:  Friend/Family Transport  11/27/2018 01:29 PM Phone ([employer]) Dannielle Diallo (Self) [phone] (H)  Left Message - Requesting a call back    11/29/2018 09:54 AM Phone ([employer]) Dannielle Diallo (Self) [phone] (H)   Left Message - Requesting a call back    11/30/2018 03:58 PM Phone ([employer]) Dannielle Diallo (Self) [phone] (H)   Left Message - Requesting a call back    Unable to reach patient in regard to recent ED visit on 11/23 for Abscess, gluteal, right  3 attempts made to try and contact patient  Letter mailed

## 2019-03-22 ENCOUNTER — APPOINTMENT (EMERGENCY)
Dept: RADIOLOGY | Facility: HOSPITAL | Age: 49
End: 2019-03-22
Payer: COMMERCIAL

## 2019-03-22 ENCOUNTER — HOSPITAL ENCOUNTER (EMERGENCY)
Facility: HOSPITAL | Age: 49
Discharge: HOME | End: 2019-03-22
Attending: EMERGENCY MEDICINE | Admitting: EMERGENCY MEDICINE
Payer: COMMERCIAL

## 2019-03-22 VITALS
TEMPERATURE: 98 F | RESPIRATION RATE: 18 BRPM | WEIGHT: 170 LBS | DIASTOLIC BLOOD PRESSURE: 98 MMHG | HEIGHT: 72 IN | SYSTOLIC BLOOD PRESSURE: 166 MMHG | OXYGEN SATURATION: 95 % | BODY MASS INDEX: 23.03 KG/M2 | HEART RATE: 85 BPM

## 2019-03-22 DIAGNOSIS — S82.831A CLOSED AVULSION FRACTURE OF DISTAL END OF RIGHT FIBULA, INITIAL ENCOUNTER: Primary | ICD-10-CM

## 2019-03-22 PROCEDURE — 73610 X-RAY EXAM OF ANKLE: CPT

## 2019-03-22 PROCEDURE — 99283 EMERGENCY DEPT VISIT LOW MDM: CPT

## 2019-03-22 RX ORDER — HYDROCODONE BITARTRATE AND ACETAMINOPHEN 5; 325 MG/1; MG/1
1 TABLET ORAL EVERY 6 HOURS PRN
Qty: 12 TABLET | Refills: 0 | Status: SHIPPED | OUTPATIENT
Start: 2019-03-22 | End: 2019-03-25

## 2019-03-22 RX ORDER — HYDROCODONE BITARTRATE AND ACETAMINOPHEN 5; 325 MG/1; MG/1
1 TABLET ORAL ONCE
Status: COMPLETED | OUTPATIENT
Start: 2019-03-22 | End: 2019-03-22

## 2019-03-22 RX ADMIN — HYDROCODONE BITARTRATE AND ACETAMINOPHEN 1 TABLET: 5; 325 TABLET ORAL at 23:16

## 2019-03-23 NOTE — ED PROVIDER NOTES
History  Chief Complaint   Patient presents with    Foot Injury     patient states that a was inside a dumster while it was being lifted and knees got stuck on a lip inside a dumpster and while trying to remove his knee he then twisted R foot  This 74-year-old male complains of right ankle pain  He states while at work he was crouched in a bucket of a frontloader attempting to  a dumpster  His thighs got pinned underneath the dumpster  He twisted the right ankle while escaping  He has pain in the lateral aspect of the right ankle and denies any other injury          None       History reviewed  No pertinent past medical history  Past Surgical History:   Procedure Laterality Date    ABSCESS DRAINAGE         History reviewed  No pertinent family history  I have reviewed and agree with the history as documented  Social History     Tobacco Use    Smoking status: Never Smoker    Smokeless tobacco: Never Used   Substance Use Topics    Alcohol use: Yes     Comment: occasionally     Drug use: No        Review of Systems   Constitutional: Negative  HENT: Negative  Eyes: Negative  Respiratory: Negative  Cardiovascular: Negative  Gastrointestinal: Negative  Endocrine: Negative  Genitourinary: Negative  Musculoskeletal: Negative  See HPI   Skin: Negative  Allergic/Immunologic: Negative  Neurological: Negative  Hematological: Negative  Psychiatric/Behavioral: Negative  All other systems reviewed and are negative  Physical Exam  Physical Exam   Constitutional: He is oriented to person, place, and time  He appears well-developed and well-nourished  HENT:   Head: Normocephalic and atraumatic  Right Ear: External ear normal    Left Ear: External ear normal    Mouth/Throat: Oropharynx is clear and moist    Eyes: Pupils are equal, round, and reactive to light  Conjunctivae and EOM are normal    Neck: Normal range of motion  Neck supple   No JVD present  Cardiovascular: Normal rate, regular rhythm, normal heart sounds and intact distal pulses  No murmur heard  Pulmonary/Chest: Effort normal and breath sounds normal    Abdominal: Soft  Bowel sounds are normal  He exhibits no mass  There is no tenderness  There is no rebound and no guarding  Musculoskeletal: Normal range of motion  He exhibits no edema or tenderness  Right ankle tender and swollen over the lateral malleolus  Ligaments are stable  There is point tenderness at the distal tip of the right fibula  No foot tenderness deformity or swelling  His sensation is intact  Capillary refill is brisk  Tendon function is intact   Lymphadenopathy:     He has no cervical adenopathy  Neurological: He is alert and oriented to person, place, and time  He has normal reflexes  No cranial nerve deficit  Coordination normal    Skin: Skin is warm and dry  Capillary refill takes less than 2 seconds  No rash noted  Psychiatric: He has a normal mood and affect  His behavior is normal    Nursing note and vitals reviewed        Vital Signs  ED Triage Vitals   Temperature Pulse Respirations Blood Pressure SpO2   03/22/19 2118 03/22/19 2118 03/22/19 2118 03/22/19 2122 03/22/19 2118   98 °F (36 7 °C) 81 18 (!) 195/98 93 %      Temp Source Heart Rate Source Patient Position - Orthostatic VS BP Location FiO2 (%)   03/22/19 2118 03/22/19 2118 03/22/19 2118 03/22/19 2118 --   Tympanic Monitor Sitting Right arm       Pain Score       03/22/19 2118       Worst Possible Pain           Vitals:    03/22/19 2118 03/22/19 2122 03/22/19 2315   BP:  (!) 195/98 166/98   Pulse: 81  85   Patient Position - Orthostatic VS: Sitting           Visual Acuity  Visual Acuity      Most Recent Value   L Pupil Size (mm)  3   R Pupil Size (mm)  3          ED Medications  Medications   HYDROcodone-acetaminophen (NORCO) 5-325 mg per tablet 1 tablet (1 tablet Oral Given 3/22/19 2316)       Diagnostic Studies  Results Reviewed     None XR ankle 3+ views RIGHT   ED Interpretation by Mee Davis DO (03/22 9521)   Tiny avulsion off distal fibula                 Procedures  Procedures       Phone Contacts  ED Phone Contact    ED Course                               MDM  Number of Diagnoses or Management Options  Closed avulsion fracture of distal end of right fibula, initial encounter: new and requires workup     Amount and/or Complexity of Data Reviewed  Tests in the radiology section of CPT®: ordered and reviewed        Disposition  Final diagnoses:   Closed avulsion fracture of distal end of right fibula, initial encounter     Time reflects when diagnosis was documented in both MDM as applicable and the Disposition within this note     Time User Action Codes Description Comment    3/22/2019 11:17 PM Fabricio Pascale Add [C79 490G] Closed avulsion fracture of distal end of right fibula, initial encounter       ED Disposition     ED Disposition Condition Date/Time Comment    Discharge Stable Fri Mar 22, 2019 11:17  [facility] discharge to home/self care  Follow-up Information     Follow up With Specialties Details Why Contact Info Additional [address] [facility] Orthopedic Surgery Call in 1 day For follow up on fibula avulsion fracture [address] [facility]  [phone]  [address] [facility], [address], [place], [place], [address]          Discharge Medication List as of 3/22/2019 11:20 PM      START taking these medications    Details   HYDROcodone-acetaminophen (NORCO) 5-325 mg per tablet Take 1 tablet by mouth every 6 (six) hours as needed for pain for up to 3 daysMax Daily Amount: 4 tablets, Starting Fri 3/22/2019, Until Mon 3/25/2019, Normal           No discharge procedures on file      ED Provider  Electronically Signed by           Mee Davis DO  03/23/19 0846

## 2019-03-26 ENCOUNTER — OFFICE VISIT (OUTPATIENT)
Dept: OBGYN CLINIC | Facility: CLINIC | Age: 49
End: 2019-03-26
Payer: COMMERCIAL

## 2019-03-26 VITALS
BODY MASS INDEX: 23.03 KG/M2 | SYSTOLIC BLOOD PRESSURE: 146 MMHG | HEIGHT: 72 IN | WEIGHT: 170 LBS | DIASTOLIC BLOOD PRESSURE: 80 MMHG

## 2019-03-26 DIAGNOSIS — S93.491A SPRAIN OF ANTERIOR TALOFIBULAR LIGAMENT OF RIGHT ANKLE, INITIAL ENCOUNTER: Primary | ICD-10-CM

## 2019-03-26 PROCEDURE — 99213 OFFICE O/P EST LOW 20 MIN: CPT | Performed by: ORTHOPAEDIC SURGERY

## 2019-03-26 NOTE — ASSESSMENT & PLAN NOTE
Right ankle sprain  Reviewed radiographs and discussed treatment with the patient  Recommended ASO  Weightbearing as tolerated  Discussed ice and elevation  Gentle progression of activities  Follow up in 3-4 weeks  I am happy to release him as soon as he feels ready to return to work

## 2019-03-26 NOTE — LETTER
March 26, 2019     Patient: Claudia Mckeon   YOB: 1970   Date of Visit: 3/26/2019       To Whom it May Concern:    Vikki Lora is under my professional care  He was seen in my office on 3/26/2019  He may not return to work until released  If you have any questions or concerns, please don't hesitate to call           Sincerely,          Augustine Mayer MD        CC: No Recipients

## 2019-03-26 NOTE — PROGRESS NOTES
Assessment:     1  Sprain of anterior talofibular ligament of right ankle, initial encounter          Plan:     Problem List Items Addressed This Visit        Musculoskeletal and Integument    Sprain of anterior talofibular ligament of right ankle - Primary     Right ankle sprain  Reviewed radiographs and discussed treatment with the patient  Recommended ASO  Weightbearing as tolerated  Discussed ice and elevation  Gentle progression of activities  Follow up in 3-4 weeks  I am happy to release him as soon as he feels ready to return to work  Relevant Orders    Ankle Cude ankle/Ankle Brace           Patient ID: Tim Fernando is a 57 y o  male  Chief Complaint:  Right ankle injury    Subjective:  29-year-old male who rolled his ankle on March 22, 2019  He had his caught underneath a large trash bin when his ankle buckled  He usually works as a  and is on his feet the majority of the day  He was seen in the emergency department and given an air cast and crutches  He has been avoiding walking on it  He notes swelling that comes and goes  Diffuse discomfort  Denies numbness and tingling  Medical intake reviewed  Allergy:  No Known Allergies    Medications:  all current active meds have been reviewed    ROS:  Review of Systems   Musculoskeletal: Positive for arthralgias, joint swelling and myalgias  All other systems reviewed and are negative  Objective:  BP Readings from Last 1 Encounters:   03/26/19 146/80      Wt Readings from Last 1 Encounters:   03/26/19 77 1 kg (170 lb)        Exam:   Physical Exam  Right Ankle Exam     Tenderness   The patient is experiencing tenderness in the ATF and CF    Swelling: mild    Range of Motion   Dorsiflexion: abnormal   Plantar flexion: abnormal   Eversion: abnormal   Inversion: abnormal     Tests   Anterior drawer: negative  Varus tilt: negative    Other   Erythema: absent  Sensation: normal  Pulse: present Radiographs:  I have personally reviewed pertinent films in PACS and my interpretation is X-rays are negative for any fractures or dislocations  Mortise is well reduced

## 2019-04-01 ENCOUNTER — TELEPHONE (OUTPATIENT)
Dept: OBGYN CLINIC | Facility: HOSPITAL | Age: 49
End: 2019-04-01

## 2019-04-17 ENCOUNTER — TELEPHONE (OUTPATIENT)
Dept: OBGYN CLINIC | Facility: HOSPITAL | Age: 49
End: 2019-04-17

## 2019-09-21 ENCOUNTER — HOSPITAL ENCOUNTER (EMERGENCY)
Facility: HOSPITAL | Age: 49
Discharge: HOME | End: 2019-09-21
Attending: EMERGENCY MEDICINE | Admitting: EMERGENCY MEDICINE
Payer: COMMERCIAL

## 2019-09-21 ENCOUNTER — APPOINTMENT (EMERGENCY)
Dept: CT IMAGING | Facility: HOSPITAL | Age: 49
End: 2019-09-21
Payer: COMMERCIAL

## 2019-09-21 VITALS
BODY MASS INDEX: 24.52 KG/M2 | HEIGHT: 72 IN | SYSTOLIC BLOOD PRESSURE: 139 MMHG | OXYGEN SATURATION: 98 % | HEART RATE: 90 BPM | DIASTOLIC BLOOD PRESSURE: 88 MMHG | TEMPERATURE: 98.4 F | RESPIRATION RATE: 16 BRPM | WEIGHT: 181 LBS

## 2019-09-21 DIAGNOSIS — K61.0 PERIANAL ABSCESS: Primary | ICD-10-CM

## 2019-09-21 LAB
ALBUMIN SERPL BCP-MCNC: 3.3 G/DL (ref 3.5–5)
ALP SERPL-CCNC: 88 U/L (ref 46–116)
ALT SERPL W P-5'-P-CCNC: 41 U/L (ref 12–78)
ANION GAP SERPL CALCULATED.3IONS-SCNC: 6 MMOL/L (ref 4–13)
AST SERPL W P-5'-P-CCNC: 20 U/L (ref 5–45)
BASOPHILS # BLD AUTO: 0.03 THOUSANDS/ΜL (ref 0–0.1)
BASOPHILS NFR BLD AUTO: 0 % (ref 0–1)
BILIRUB SERPL-MCNC: 0.6 MG/DL (ref 0.2–1)
BUN SERPL-MCNC: 11 MG/DL (ref 5–25)
CALCIUM SERPL-MCNC: 8.8 MG/DL (ref 8.3–10.1)
CHLORIDE SERPL-SCNC: 108 MMOL/L (ref 100–108)
CO2 SERPL-SCNC: 29 MMOL/L (ref 21–32)
CREAT SERPL-MCNC: 0.97 MG/DL (ref 0.6–1.3)
EOSINOPHIL # BLD AUTO: 0.11 THOUSAND/ΜL (ref 0–0.61)
EOSINOPHIL NFR BLD AUTO: 1 % (ref 0–6)
ERYTHROCYTE [DISTWIDTH] IN BLOOD BY AUTOMATED COUNT: 12.4 % (ref 11.6–15.1)
GFR SERPL CREATININE-BSD FRML MDRD: 93 ML/MIN/1.73SQ M
GLUCOSE SERPL-MCNC: 130 MG/DL (ref 65–140)
HCT VFR BLD AUTO: 42.1 % (ref 36.5–49.3)
HGB BLD-MCNC: 14.2 G/DL (ref 12–17)
IMM GRANULOCYTES # BLD AUTO: 0.03 THOUSAND/UL (ref 0–0.2)
IMM GRANULOCYTES NFR BLD AUTO: 0 % (ref 0–2)
LACTATE SERPL-SCNC: 1.2 MMOL/L (ref 0.5–2)
LYMPHOCYTES # BLD AUTO: 1.38 THOUSANDS/ΜL (ref 0.6–4.47)
LYMPHOCYTES NFR BLD AUTO: 14 % (ref 14–44)
MCH RBC QN AUTO: 32.9 PG (ref 26.8–34.3)
MCHC RBC AUTO-ENTMCNC: 33.7 G/DL (ref 31.4–37.4)
MCV RBC AUTO: 98 FL (ref 82–98)
MONOCYTES # BLD AUTO: 0.87 THOUSAND/ΜL (ref 0.17–1.22)
MONOCYTES NFR BLD AUTO: 9 % (ref 4–12)
NEUTROPHILS # BLD AUTO: 7.81 THOUSANDS/ΜL (ref 1.85–7.62)
NEUTS SEG NFR BLD AUTO: 76 % (ref 43–75)
NRBC BLD AUTO-RTO: 0 /100 WBCS
PLATELET # BLD AUTO: 165 THOUSANDS/UL (ref 149–390)
PMV BLD AUTO: 9.4 FL (ref 8.9–12.7)
POTASSIUM SERPL-SCNC: 3.6 MMOL/L (ref 3.5–5.3)
PROT SERPL-MCNC: 6.5 G/DL (ref 6.4–8.2)
RBC # BLD AUTO: 4.31 MILLION/UL (ref 3.88–5.62)
SODIUM SERPL-SCNC: 143 MMOL/L (ref 136–145)
WBC # BLD AUTO: 10.23 THOUSAND/UL (ref 4.31–10.16)

## 2019-09-21 PROCEDURE — 99284 EMERGENCY DEPT VISIT MOD MDM: CPT

## 2019-09-21 PROCEDURE — 99284 EMERGENCY DEPT VISIT MOD MDM: CPT | Performed by: PHYSICIAN ASSISTANT

## 2019-09-21 PROCEDURE — 87205 SMEAR GRAM STAIN: CPT | Performed by: PHYSICIAN ASSISTANT

## 2019-09-21 PROCEDURE — 96374 THER/PROPH/DIAG INJ IV PUSH: CPT

## 2019-09-21 PROCEDURE — 83605 ASSAY OF LACTIC ACID: CPT | Performed by: PHYSICIAN ASSISTANT

## 2019-09-21 PROCEDURE — 36415 COLL VENOUS BLD VENIPUNCTURE: CPT | Performed by: PHYSICIAN ASSISTANT

## 2019-09-21 PROCEDURE — 74177 CT ABD & PELVIS W/CONTRAST: CPT

## 2019-09-21 PROCEDURE — 96375 TX/PRO/DX INJ NEW DRUG ADDON: CPT

## 2019-09-21 PROCEDURE — 46040 I&D ISCHIORCT&/PERIRCT ABSC: CPT | Performed by: PHYSICIAN ASSISTANT

## 2019-09-21 PROCEDURE — 96361 HYDRATE IV INFUSION ADD-ON: CPT

## 2019-09-21 PROCEDURE — 87070 CULTURE OTHR SPECIMN AEROBIC: CPT | Performed by: PHYSICIAN ASSISTANT

## 2019-09-21 PROCEDURE — 85025 COMPLETE CBC W/AUTO DIFF WBC: CPT | Performed by: PHYSICIAN ASSISTANT

## 2019-09-21 PROCEDURE — 87040 BLOOD CULTURE FOR BACTERIA: CPT | Performed by: PHYSICIAN ASSISTANT

## 2019-09-21 PROCEDURE — 80053 COMPREHEN METABOLIC PANEL: CPT | Performed by: PHYSICIAN ASSISTANT

## 2019-09-21 RX ORDER — LORAZEPAM 2 MG/ML
1 INJECTION INTRAMUSCULAR ONCE
Status: COMPLETED | OUTPATIENT
Start: 2019-09-21 | End: 2019-09-21

## 2019-09-21 RX ORDER — SULFAMETHOXAZOLE AND TRIMETHOPRIM 800; 160 MG/1; MG/1
1 TABLET ORAL ONCE
Status: COMPLETED | OUTPATIENT
Start: 2019-09-21 | End: 2019-09-21

## 2019-09-21 RX ORDER — LIDOCAINE HYDROCHLORIDE 10 MG/ML
10 INJECTION, SOLUTION EPIDURAL; INFILTRATION; INTRACAUDAL; PERINEURAL ONCE
Status: COMPLETED | OUTPATIENT
Start: 2019-09-21 | End: 2019-09-21

## 2019-09-21 RX ORDER — TRAMADOL HYDROCHLORIDE 50 MG/1
50 TABLET ORAL EVERY 6 HOURS PRN
Qty: 15 TABLET | Refills: 0 | Status: SHIPPED | OUTPATIENT
Start: 2019-09-21 | End: 2020-05-26 | Stop reason: ALTCHOICE

## 2019-09-21 RX ORDER — CEPHALEXIN 500 MG/1
500 CAPSULE ORAL 4 TIMES DAILY
Qty: 28 CAPSULE | Refills: 0 | Status: SHIPPED | OUTPATIENT
Start: 2019-09-21 | End: 2019-09-28

## 2019-09-21 RX ORDER — TRAMADOL HYDROCHLORIDE 50 MG/1
50 TABLET ORAL ONCE
Status: COMPLETED | OUTPATIENT
Start: 2019-09-21 | End: 2019-09-21

## 2019-09-21 RX ORDER — SULFAMETHOXAZOLE AND TRIMETHOPRIM 800; 160 MG/1; MG/1
1 TABLET ORAL 2 TIMES DAILY
Qty: 20 TABLET | Refills: 0 | Status: SHIPPED | OUTPATIENT
Start: 2019-09-21 | End: 2019-10-01

## 2019-09-21 RX ORDER — CEPHALEXIN 250 MG/1
500 CAPSULE ORAL ONCE
Status: COMPLETED | OUTPATIENT
Start: 2019-09-21 | End: 2019-09-21

## 2019-09-21 RX ORDER — KETOROLAC TROMETHAMINE 30 MG/ML
15 INJECTION, SOLUTION INTRAMUSCULAR; INTRAVENOUS ONCE
Status: COMPLETED | OUTPATIENT
Start: 2019-09-21 | End: 2019-09-21

## 2019-09-21 RX ADMIN — SODIUM CHLORIDE 1000 ML: 0.9 INJECTION, SOLUTION INTRAVENOUS at 16:26

## 2019-09-21 RX ADMIN — LORAZEPAM 1 MG: 2 INJECTION, SOLUTION INTRAMUSCULAR; INTRAVENOUS at 17:56

## 2019-09-21 RX ADMIN — TRAMADOL HYDROCHLORIDE 50 MG: 50 TABLET, FILM COATED ORAL at 18:38

## 2019-09-21 RX ADMIN — CEPHALEXIN 500 MG: 250 CAPSULE ORAL at 18:38

## 2019-09-21 RX ADMIN — LIDOCAINE HYDROCHLORIDE 10 ML: 10 INJECTION, SOLUTION EPIDURAL; INFILTRATION; INTRACAUDAL; PERINEURAL at 17:46

## 2019-09-21 RX ADMIN — IOHEXOL 100 ML: 350 INJECTION, SOLUTION INTRAVENOUS at 17:14

## 2019-09-21 RX ADMIN — KETOROLAC TROMETHAMINE 15 MG: 30 INJECTION, SOLUTION INTRAMUSCULAR at 16:36

## 2019-09-21 RX ADMIN — SULFAMETHOXAZOLE AND TRIMETHOPRIM 1 TABLET: 800; 160 TABLET ORAL at 18:37

## 2019-09-21 NOTE — DISCHARGE INSTRUCTIONS
Keep area dry for next 2 days, then return or follow up with family doctor for removal of packing  Take antibiotics as directed  Warm soaks when the packing is removed  Use chlorhexadine soap to body once a month and apply bacitracin to nose and groin daily

## 2019-09-22 NOTE — ED PROVIDER NOTES
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 that presents to the ED with pain and swelling to right buttocks  He denies fevers, chills  He denies h/o MRSA  He states he noticed pain and swelling last night, but it worsened today  He normally has 3 bowel movements a day, but has not had one today  History provided by:  Patient  Abscess   Location:  Pelvis  Pelvic abscess location:  R buttock  Size:  3cm  Abscess quality: fluctuance, induration, painful and redness    Duration:  2 days  Progression:  Worsening  Pain details:     Quality:  Pressure    Severity:  Moderate    Duration:  2 days    Timing:  Constant    Progression:  Worsening  Chronicity:  New  Context: not diabetes, not immunosuppression and not skin injury    Relieved by:  Nothing  Worsened by:  Nothing  Ineffective treatments:  None tried  Associated symptoms: no fever, no nausea and no vomiting    Risk factors: prior abscess    Risk factors: no hx of MRSA        None       History reviewed  No pertinent past medical history  Past Surgical History:   Procedure Laterality Date    ABSCESS DRAINAGE         History reviewed  No pertinent family history  I have reviewed and agree with the history as documented  Social History     Tobacco Use    Smoking status: Never Smoker    Smokeless tobacco: Never Used   Substance Use Topics    Alcohol use: Yes     Comment: occasionally     Drug use: No        Review of Systems   Constitutional: Negative for chills and fever  Gastrointestinal: Negative for nausea and vomiting  Skin: Positive for color change  Neurological: Negative for dizziness, weakness and numbness  All other systems reviewed and are negative        Physical Exam  Physical Exam Constitutional: He appears well-developed and well-nourished  He is cooperative  He does not appear ill  No distress  HENT:   Head: Normocephalic and atraumatic  Nose: Nose normal    Mouth/Throat: Oropharynx is clear and moist    Eyes: Conjunctivae are normal    Neck: Normal range of motion  Cardiovascular: Regular rhythm and normal heart sounds  Tachycardia present  Pulmonary/Chest: Effort normal and breath sounds normal    Genitourinary: Rectal exam shows tenderness  Genitourinary Comments: Abscess extends into the anus upon exam     Neurological: He is alert  Skin: Skin is warm and dry  He is not diaphoretic  There is erythema (right buttocks  )  No pallor  Nursing note and vitals reviewed        Vital Signs  ED Triage Vitals   Temperature Pulse Respirations Blood Pressure SpO2   09/21/19 1602 09/21/19 1602 09/21/19 1602 09/21/19 1602 09/21/19 1602   98 4 °F (36 9 °C) 94 20 140/94 98 %      Temp src Heart Rate Source Patient Position - Orthostatic VS BP Location FiO2 (%)   -- 09/21/19 1846 09/21/19 1846 09/21/19 1846 --    Monitor Lying Right arm       Pain Score       09/21/19 1602       Worst Possible Pain           Vitals:    09/21/19 1602 09/21/19 1846   BP: 140/94 139/88   Pulse: 94 90   Patient Position - Orthostatic VS:  Lying         Visual Acuity      ED Medications  Medications   sodium chloride 0 9 % bolus 1,000 mL (0 mL Intravenous Stopped 9/21/19 1834)   ketorolac (TORADOL) injection 15 mg (15 mg Intravenous Given 9/21/19 1636)   iohexol (OMNIPAQUE) 350 MG/ML injection (MULTI-DOSE) 100 mL (100 mL Intravenous Given 9/21/19 1714)   lidocaine (PF) (XYLOCAINE-MPF) 1 % injection 10 mL (10 mL Infiltration Given by Other 9/21/19 1746)   LORazepam (ATIVAN) 2 mg/mL injection 1 mg (1 mg Intravenous Given 9/21/19 1756)   cephalexin (KEFLEX) capsule 500 mg (500 mg Oral Given 9/21/19 1838)   sulfamethoxazole-trimethoprim (BACTRIM DS) 800-160 mg per tablet 1 tablet (1 tablet Oral Given 9/21/19 1837)   traMADol (ULTRAM) tablet 50 mg (50 mg Oral Given 9/21/19 1838)       Diagnostic Studies  Results Reviewed     Procedure Component Value Units Date/Time    Wound culture and Gram stain [[id number]] Collected:  09/21/19 1828    Lab Status: In process Specimen:  Wound from Buttock Updated:  09/21/19 1932    Lactic acid, plasma [[id number]]  (Normal) Collected:  09/21/19 1625    Lab Status:  Final result Specimen:  Blood from Arm, Right Updated:  09/21/19 1655     LACTIC ACID 1 2 mmol/L     Narrative:       Result may be elevated if tourniquet was used during collection      Comprehensive metabolic panel [[id number]]  (Abnormal) Collected:  09/21/19 1625    Lab Status:  Final result Specimen:  Blood from Arm, Right Updated:  09/21/19 1654     Sodium 143 mmol/L      Potassium 3 6 mmol/L      Chloride 108 mmol/L      CO2 29 mmol/L      ANION GAP 6 mmol/L      BUN 11 mg/dL      Creatinine 0 97 mg/dL      Glucose 130 mg/dL      Calcium 8 8 mg/dL      AST 20 U/L      ALT 41 U/L      Alkaline Phosphatase 88 U/L      Total Protein 6 5 g/dL      Albumin 3 3 g/dL      Total Bilirubin 0 60 mg/dL      eGFR 93 ml/min/1 73sq m     Narrative:       [place] guidelines for Chronic Kidney Disease (CKD):     Stage 1 with normal or high GFR (GFR > 90 mL/min/1 73 square meters)    Stage 2 Mild CKD (GFR = 60-89 mL/min/1 73 square meters)    Stage 3A Moderate CKD (GFR = 45-59 mL/min/1 73 square meters)    Stage 3B Moderate CKD (GFR = 30-44 mL/min/1 73 square meters)    Stage 4 Severe CKD (GFR = 15-29 mL/min/1 73 square meters)    Stage 5 End Stage CKD (GFR <15 mL/min/1 73 square meters)  Note: GFR calculation is accurate only with a steady state creatinine    CBC and differential [[id number]]  (Abnormal) Collected:  09/21/19 1625    Lab Status:  Final result Specimen:  Blood from Arm, Right Updated:  09/21/19 1638     WBC 10 23 Thousand/uL      RBC 4 31 Million/uL      Hemoglobin 14 2 g/dL      Hematocrit 42 1 %      MCV 98 fL      MCH 32 9 pg      MCHC 33 7 g/dL      RDW 12 4 %      MPV 9 4 fL      Platelets 484 Thousands/uL      nRBC 0 /100 WBCs      Neutrophils Relative 76 %      Immat GRANS % 0 %      Lymphocytes Relative 14 %      Monocytes Relative 9 %      Eosinophils Relative 1 %      Basophils Relative 0 %      Neutrophils Absolute 7 81 Thousands/µL      Immature Grans Absolute 0 03 Thousand/uL      Lymphocytes Absolute 1 38 Thousands/µL      Monocytes Absolute 0 87 Thousand/µL      Eosinophils Absolute 0 11 Thousand/µL      Basophils Absolute 0 03 Thousands/µL     Blood culture #2 [[id number]] Collected:  09/21/19 1625    Lab Status: In process Specimen:  Blood from Arm, Right Updated:  09/21/19 1634    Blood culture #1 [[id number]] Collected:  09/21/19 1625    Lab Status: In process Specimen:  Blood from Hand, Right Updated:  09/21/19 1634                 CT abdomen pelvis with contrast   Final Result by Yessy Patel MD (09/21 1734)         1   2 7 cm right side perianal abscess with adjacent inflammatory changes  2   Mild hepatic steatosis  Workstation performed: [id number]                    Procedures  Incision and drain  Date/Time: 9/21/2019 6:10 PM  Performed by: Abigail Arthur PA-C  Authorized by: Abigail Arthur PA-C     Patient location:  ED  Other Assisting Provider: No    Consent:     Consent obtained:  Verbal    Consent given by:  Patient    Risks discussed:  Incomplete drainage, pain and bleeding    Alternatives discussed:  Delayed treatment  Universal protocol:     Patient identity confirmed:  Verbally with patient  Location:     Type:  Abscess    Size:  3cm    Location:  Anogenital    Anogenital location:  Perianal  Pre-procedure details:     Skin preparation:  Betadine  Sedation:     Sedation type: Anxiolysis  Anesthesia (see MAR for exact dosages):      Anesthesia method:  Local infiltration    Local anesthetic:  Lidocaine 1% w/o epi  Procedure details: Complexity:  Simple    Incision types:  Stab incision    Scalpel blade:  11    Wound management:  Probed and deloculated    Drainage:  Purulent    Drainage amount:  Copious    Wound treatment:  Packing placed    Packing materials:  1/4 in gauze    Amount 1/4":  4  Post-procedure details:     Patient tolerance of procedure: Tolerated well, no immediate complications           ED Course                               MDM  Number of Diagnoses or Management Options  Perianal abscess: new and requires workup  Diagnosis management comments: Patient with pain, redness, tenderness to buttocks, concern for perirectal abscess, will further evaluate with CT scan  Amount and/or Complexity of Data Reviewed  Clinical lab tests: ordered and reviewed  Tests in the radiology section of CPT®: ordered and reviewed    Patient Progress  Patient progress: stable      Disposition  Final diagnoses:   Perianal abscess     Time reflects when diagnosis was documented in both MDM as applicable and the Disposition within this note     Time User Action Codes Description Comment    9/21/2019  6:26 PM Otis Huggins Rod [K61 0] Perianal abscess       ED Disposition     ED Disposition Condition Date/Time Comment    Discharge Stable Sat Sep 21, 2019  6:26  [facility] discharge to home/self care              Follow-up Information     Follow up With Specialties Details Why Contact Info    Taylor Rico MD Family Medicine In 2 days for packing removal [id number] [facility] [address]  [phone]            Discharge Medication List as of 9/21/2019  6:29 PM      START taking these medications    Details   cephalexin (KEFLEX) 500 mg capsule Take 1 capsule (500 mg total) by mouth 4 (four) times a day for 7 days, Starting Sat 9/21/2019, Until Sat 9/28/2019, Normal      sulfamethoxazole-trimethoprim (BACTRIM DS) 800-160 mg per tablet Take 1 tablet by mouth 2 (two) times a day for 10 days smx-tmp DS (BACTRIM) 800-160 mg tabs (1tab q12 D10), Starting Sat 9/21/2019, Until Tue 10/1/2019, Normal      traMADol (ULTRAM) 50 mg tablet Take 1 tablet (50 mg total) by mouth every 6 (six) hours as needed for moderate pain, Starting Sat 9/21/2019, Normal           No discharge procedures on file      ED Provider  Electronically Signed by           Ethel Jacobo PA-C  09/21/19 8424

## 2019-09-23 ENCOUNTER — OFFICE VISIT (OUTPATIENT)
Dept: URGENT CARE | Facility: CLINIC | Age: 49
End: 2019-09-23
Payer: COMMERCIAL

## 2019-09-23 VITALS
TEMPERATURE: 99.6 F | BODY MASS INDEX: 25 KG/M2 | RESPIRATION RATE: 16 BRPM | HEIGHT: 72 IN | SYSTOLIC BLOOD PRESSURE: 137 MMHG | DIASTOLIC BLOOD PRESSURE: 76 MMHG | WEIGHT: 184.6 LBS | HEART RATE: 93 BPM

## 2019-09-23 DIAGNOSIS — Z51.89 WOUND CHECK, ABSCESS: Primary | ICD-10-CM

## 2019-09-23 LAB
BACTERIA WND AEROBE CULT: ABNORMAL
GRAM STN SPEC: ABNORMAL
GRAM STN SPEC: ABNORMAL

## 2019-09-23 PROCEDURE — 99203 OFFICE O/P NEW LOW 30 MIN: CPT | Performed by: PHYSICIAN ASSISTANT

## 2019-09-23 NOTE — PROGRESS NOTES
NAME: Deedee Brito is a 57 y o  male  : 1970    MRN: [id number]      Assessment and Plan   Wound check, abscess [Z51 89]  1  Wound check, abscess      Wound care  Advised patient to continue course of Keflex and Bactrim  Advised patient to continue use tramadol as needed for pain  May use OTC Tylenol for pain  Keep area clean and covered change dressing daily  Educated patient that wound needs to be repacked every 48 hours  Advised patient to follow up with General surgery for consult  Discussed plans and visit summary with patient  Patient verbalized understanding, all questions answered and patient in agreement  Educated patient that if signs and symptoms get worse go to ER  Stacey Corral was seen today for wound check  Diagnoses and all orders for this visit:    Wound check, abscess    Other orders  -     Incision and drain  -     Cancel: Strapping application  -     OMT        Patient Instructions   There are no Patient Instructions on file for this visit  Proceed to ER if symptoms worsen  Chief Complaint     Chief Complaint   Patient presents with    Wound Check     Cyst removal on Saturday from left buttocks area  Pt is here to have repacking  O2 is 100% RA         History of Present Illness     57Year old Pt presents wound check  Patient reports he was seen at the ER on Saturday for abscess on right buttocks that was drained and packed  Pt rates pain 3/10  Patient reports he was provided with Keflex, Bactrim and tramadol  Patient admits to taking Keflex and Bactrim as noted  Patient reports wife change wound daily  Admits to some drainage from the area  Denies any skin rashes  Denies itching  Denies any raised skin lesions or masses  Denies fever or chills  Denies any other redness or swelling  Denies streaking redness  Denies history of MRSA  Review of Systems   Review of Systems   Constitutional: Negative  Respiratory: Negative      Cardiovascular: Negative  Skin: Positive for wound (Right buttocks area)  Current Medications       Current Outpatient Medications:     traMADol (ULTRAM) 50 mg tablet, Take 1 tablet (50 mg total) by mouth every 6 (six) hours as needed for moderate pain, Disp: 15 tablet, Rfl: 0    Current Allergies     Allergies as of 09/23/2019    (No Known Allergies)              No past medical history on file  Past Surgical History:   Procedure Laterality Date    ABSCESS DRAINAGE         No family history on file  Medications have been verified  The following portions of the patient's history were reviewed and updated as appropriate: allergies, current medications, past family history, past medical history, past social history, past surgical history and problem list     Objective   /76 (BP Location: Left arm, Patient Position: Sitting, Cuff Size: Standard)   Pulse 93   Temp 99 6 °F (37 6 °C) (Tympanic)   Resp 16   Ht 6' (1 829 m)   Wt 83 7 kg (184 lb 9 6 oz)   BMI 25 04 kg/m²    Incision and drain  Date/Time: 9/23/2019 9:37 PM  Performed by: Tommie Uribe PA-C  Authorized by: Tommie Uribe PA-C     Patient location:  Bedside (Wound check/ Repacking)  Other Assisting Provider: No    Consent:     Consent obtained:  Verbal    Consent given by:  Patient    Risks discussed:  Bleeding, damage to other organs, infection, incomplete drainage and pain    Alternatives discussed:  Referral and alternative treatment  Universal protocol:     Procedure explained and questions answered to patient or proxy's satisfaction: yes      Site/side marked: yes      Immediately prior to procedure a time out was called: yes      Patient identity confirmed:  Verbally with patient  Location:     Type:  Abscess    Size:  2    Location:  Anogenital    Anogenital location:  Perirectal  Pre-procedure details:     Skin preparation:  Betadine  Anesthesia (see MAR for exact dosages):      Anesthesia method:  None  Procedure details: Complexity:  Intermediate    Wound management:  Irrigated with saline    Wound treatment:  Packing placed    Packing materials:  1/4 in iodoform gauze  Post-procedure details:     Patient tolerance of procedure: Tolerated well, no immediate complications  Comments:      Patient refuse anesthesia  20cm 1/4 Iodoform gauze removed, packed with  18cm 1/4 Iodoform gauze placed  Physical Exam     Physical Exam   Cardiovascular: Normal rate, regular rhythm, normal heart sounds and intact distal pulses  Exam reveals no gallop and no friction rub  No murmur heard  Pulmonary/Chest: Effort normal and breath sounds normal  No stridor  No respiratory distress  He has no wheezes  He has no rales  He exhibits no tenderness  Genitourinary: Rectum normal          Nursing note and vitals reviewed        Maty Andrew PA-C

## 2019-09-25 ENCOUNTER — CONSULT (OUTPATIENT)
Dept: SURGERY | Facility: CLINIC | Age: 49
End: 2019-09-25
Payer: COMMERCIAL

## 2019-09-25 VITALS
TEMPERATURE: 98.6 F | HEIGHT: 72 IN | WEIGHT: 181.4 LBS | BODY MASS INDEX: 24.57 KG/M2 | DIASTOLIC BLOOD PRESSURE: 84 MMHG | SYSTOLIC BLOOD PRESSURE: 130 MMHG

## 2019-09-25 DIAGNOSIS — K61.0 PERIANAL ABSCESS: Primary | ICD-10-CM

## 2019-09-25 PROCEDURE — 99202 OFFICE O/P NEW SF 15 MIN: CPT | Performed by: SURGERY

## 2019-09-25 NOTE — ASSESSMENT & PLAN NOTE
Currently everything appears to be healing well  He tells me this is his 3rd episode  I have asked that he follow up with a colorectal surgery to further evaluate this area  At this time, I told the keep a dressing on an as long as it is draining  Soap and water would be good to cleanse it every day  See us back as needed    Will put a referral in to Colorectal surgery

## 2019-09-25 NOTE — PROGRESS NOTES
Office Visit - General Surgery  Oracio Galvez MRN: [id number]  Encounter: [id number]    Assessment and Plan    Problem List Items Addressed This Visit        Other    Perianal abscess - Primary     Currently everything appears to be healing well  He tells me this is his 3rd episode  I have asked that he follow up with a colorectal surgery to further evaluate this area  At this time, I told the keep a dressing on an as long as it is draining  Soap and water would be good to cleanse it every day  See us back as needed  Will put a referral in to Colorectal surgery         Relevant Orders    Ambulatory referral to Colorectal Surgery          Chief Complaint:  Oracio Galvez is a 57 y o  male who presents for Advice Only (perianal abscess/ packing)    Subjective  70-year-old male who was recently seen in the emergency room and had an abscess drained  At turns out this is a perianal abscess and this is the 3rd time he has had 1 drained  The packing had been replaced in the fallen out yesterday  He is having a drainage no real pain at this time  Past Medical History  No past medical history on file  Past Surgical History  Past Surgical History:   Procedure Laterality Date    ABSCESS DRAINAGE         Family History  No family history on file      Social History  Social History     Socioeconomic History    Marital status: /Civil Union     Spouse name: None    Number of children: None    Years of education: None    Highest education level: None   Occupational History    None   Social Needs    Financial resource strain: None    Food insecurity:     Worry: None     Inability: None    Transportation needs:     Medical: None     Non-medical: None   Tobacco Use    Smoking status: Never Smoker    Smokeless tobacco: Never Used   Substance and Sexual Activity    Alcohol use: Yes     Comment: occasionally     Drug use: No    Sexual activity: None   Lifestyle    Physical activity:     Days per week: None     Minutes per session: None    Stress: None   Relationships    Social connections:     Talks on phone: None     Gets together: None     Attends Hinduism service: None     Active member of club or organization: None     Attends meetings of clubs or organizations: None     Relationship status: None    Intimate partner violence:     Fear of current or ex partner: None     Emotionally abused: None     Physically abused: None     Forced sexual activity: None   Other Topics Concern    None   Social History Narrative    None        Medications  Current Outpatient Medications on File Prior to Visit   Medication Sig Dispense Refill    cephalexin (KEFLEX) 500 mg capsule Take 1 capsule (500 mg total) by mouth 4 (four) times a day for 7 days 28 capsule 0    sulfamethoxazole-trimethoprim (BACTRIM DS) 800-160 mg per tablet Take 1 tablet by mouth 2 (two) times a day for 10 days smx-tmp DS (BACTRIM) 800-160 mg tabs (1tab q12 D10) 20 tablet 0    traMADol (ULTRAM) 50 mg tablet Take 1 tablet (50 mg total) by mouth every 6 (six) hours as needed for moderate pain 15 tablet 0     No current facility-administered medications on file prior to visit  Allergies  No Known Allergies    Review of Systems    Objective  Vitals:    09/25/19 1009   BP: 130/84   Temp: 98 6 °F (37 °C)       Physical Exam   Perianal on the left side is this small open wound about 0 3 by 0 9 cm  Minimal redness minimal firmness no drainage

## 2019-09-26 LAB
BACTERIA BLD CULT: NORMAL
BACTERIA BLD CULT: NORMAL

## 2019-10-24 ENCOUNTER — IMMUNIZATIONS (OUTPATIENT)
Dept: FAMILY MEDICINE CLINIC | Facility: CLINIC | Age: 49
End: 2019-10-24
Payer: COMMERCIAL

## 2019-10-24 DIAGNOSIS — Z23 NEED FOR VACCINATION: Primary | ICD-10-CM

## 2019-10-24 PROCEDURE — 90471 IMMUNIZATION ADMIN: CPT

## 2019-10-24 PROCEDURE — 90686 IIV4 VACC NO PRSV 0.5 ML IM: CPT

## 2020-03-04 ENCOUNTER — TELEPHONE (OUTPATIENT)
Dept: FAMILY MEDICINE CLINIC | Facility: CLINIC | Age: 50
End: 2020-03-04

## 2020-03-04 NOTE — LETTER
[address] [address] [place] [address]    Dear Dung Nicole  Records from Insurance indicate that you are a patient of Richard Chandler MD, with [facility], \[facility] [place] [facility]\"" [facility]  Please call the office to establish care and schedule your annual physical today at [phone]  If you are seeing a primary care provider other than the one assigned to you by your insurance, you can simply call the number on the back of your insurance card to change your primary care physician with your insurance company  We thank you for choosing [address] for your healthcare needs      Sincerely,     [facility]

## 2020-04-15 NOTE — TELEPHONE ENCOUNTER
04/15/20 5:40 PM     Thank you for your request  Your request has been received and reviewed  After careful review it was noted that the patient has been seen by the listed PCP and/or in the office within the past three years (saw provider in 2018)  Per protocol guidelines, this patient should remain on the patient panel  Please refer to [employer] tip sheet [id number] to review PCP Field Guidelines as defined by Leadership       Thank you  Rodrigue Barahona

## 2020-05-20 ENCOUNTER — TELEMEDICINE (OUTPATIENT)
Dept: FAMILY MEDICINE CLINIC | Facility: CLINIC | Age: 50
End: 2020-05-20
Payer: COMMERCIAL

## 2020-05-20 DIAGNOSIS — E53.8 B12 DEFICIENCY: ICD-10-CM

## 2020-05-20 DIAGNOSIS — E74.39 GLUCOSE INTOLERANCE: ICD-10-CM

## 2020-05-20 DIAGNOSIS — R60.9 EDEMA, UNSPECIFIED TYPE: Primary | ICD-10-CM

## 2020-05-20 DIAGNOSIS — M79.2 PERIPHERAL NEURALGIA: ICD-10-CM

## 2020-05-20 PROCEDURE — 99214 OFFICE O/P EST MOD 30 MIN: CPT | Performed by: FAMILY MEDICINE

## 2020-05-20 RX ORDER — FUROSEMIDE 20 MG/1
20 TABLET ORAL DAILY
Qty: 90 TABLET | Refills: 3 | Status: SHIPPED | OUTPATIENT
Start: 2020-05-20 | End: 2020-05-26 | Stop reason: ALTCHOICE

## 2020-05-21 LAB
ALBUMIN SERPL-MCNC: 4.3 G/DL (ref 3.6–5.1)
ALBUMIN/GLOB SERPL: 2 (CALC) (ref 1–2.5)
ALP SERPL-CCNC: 103 U/L (ref 36–130)
ALT SERPL-CCNC: 33 U/L (ref 9–46)
AST SERPL-CCNC: 20 U/L (ref 10–40)
BILIRUB SERPL-MCNC: 0.5 MG/DL (ref 0.2–1.2)
BUN SERPL-MCNC: 15 MG/DL (ref 7–25)
BUN/CREAT SERPL: ABNORMAL (CALC) (ref 6–22)
CALCIUM SERPL-MCNC: 9.3 MG/DL (ref 8.6–10.3)
CHLORIDE SERPL-SCNC: 108 MMOL/L (ref 98–110)
CO2 SERPL-SCNC: 28 MMOL/L (ref 20–32)
CREAT SERPL-MCNC: 0.89 MG/DL (ref 0.6–1.35)
EST. AVERAGE GLUCOSE BLD GHB EST-MCNC: 103 (CALC)
EST. AVERAGE GLUCOSE BLD GHB EST-SCNC: 5.7 (CALC)
GLOBULIN SER CALC-MCNC: 2.2 G/DL (CALC) (ref 1.9–3.7)
GLUCOSE SERPL-MCNC: 142 MG/DL (ref 65–99)
HBA1C MFR BLD: 5.2 % OF TOTAL HGB
POTASSIUM SERPL-SCNC: 4.1 MMOL/L (ref 3.5–5.3)
PROT SERPL-MCNC: 6.5 G/DL (ref 6.1–8.1)
SL AMB EGFR AFRICAN AMERICAN: 118 ML/MIN/1.73M2
SL AMB EGFR NON AFRICAN AMERICAN: 102 ML/MIN/1.73M2
SODIUM SERPL-SCNC: 142 MMOL/L (ref 135–146)
TSH SERPL-ACNC: 1.2 MIU/L (ref 0.4–4.5)
VIT B12 SERPL-MCNC: 440 PG/ML (ref 200–1100)

## 2020-05-26 ENCOUNTER — TELEPHONE (OUTPATIENT)
Dept: NEUROLOGY | Facility: CLINIC | Age: 50
End: 2020-05-26

## 2020-05-26 ENCOUNTER — TELEPHONE (OUTPATIENT)
Dept: FAMILY MEDICINE CLINIC | Facility: CLINIC | Age: 50
End: 2020-05-26

## 2020-05-26 DIAGNOSIS — M79.2 PERIPHERAL NEURALGIA: Primary | ICD-10-CM

## 2020-05-27 ENCOUNTER — TELEPHONE (OUTPATIENT)
Dept: NEUROLOGY | Facility: CLINIC | Age: 50
End: 2020-05-27

## 2020-05-27 ENCOUNTER — CONSULT (OUTPATIENT)
Dept: NEUROLOGY | Facility: CLINIC | Age: 50
End: 2020-05-27
Payer: COMMERCIAL

## 2020-05-27 VITALS
SYSTOLIC BLOOD PRESSURE: 156 MMHG | WEIGHT: 193 LBS | HEIGHT: 72 IN | DIASTOLIC BLOOD PRESSURE: 83 MMHG | HEART RATE: 94 BPM | BODY MASS INDEX: 26.14 KG/M2 | TEMPERATURE: 98.3 F

## 2020-05-27 DIAGNOSIS — M79.2 PERIPHERAL NEURALGIA: ICD-10-CM

## 2020-05-27 DIAGNOSIS — M54.42 ACUTE BILATERAL LOW BACK PAIN WITH BILATERAL SCIATICA: ICD-10-CM

## 2020-05-27 DIAGNOSIS — R60.0 LOWER EXTREMITY EDEMA: ICD-10-CM

## 2020-05-27 DIAGNOSIS — M54.41 ACUTE BILATERAL LOW BACK PAIN WITH BILATERAL SCIATICA: ICD-10-CM

## 2020-05-27 DIAGNOSIS — G62.9 NEUROPATHY: Primary | ICD-10-CM

## 2020-05-27 PROCEDURE — 3008F BODY MASS INDEX DOCD: CPT | Performed by: PSYCHIATRY & NEUROLOGY

## 2020-05-27 PROCEDURE — 1036F TOBACCO NON-USER: CPT | Performed by: PSYCHIATRY & NEUROLOGY

## 2020-05-27 PROCEDURE — 99244 OFF/OP CNSLTJ NEW/EST MOD 40: CPT | Performed by: PSYCHIATRY & NEUROLOGY

## 2020-05-29 ENCOUNTER — TELEMEDICINE (OUTPATIENT)
Dept: VASCULAR SURGERY | Facility: CLINIC | Age: 50
End: 2020-05-29
Payer: COMMERCIAL

## 2020-05-29 ENCOUNTER — TELEPHONE (OUTPATIENT)
Dept: PAIN MEDICINE | Facility: CLINIC | Age: 50
End: 2020-05-29

## 2020-05-29 VITALS — BODY MASS INDEX: 26.14 KG/M2 | TEMPERATURE: 98.5 F | WEIGHT: 193 LBS | HEIGHT: 72 IN

## 2020-05-29 DIAGNOSIS — G62.9 NEUROPATHY: Primary | ICD-10-CM

## 2020-05-29 DIAGNOSIS — R60.0 BILATERAL LOWER EXTREMITY EDEMA: ICD-10-CM

## 2020-05-29 DIAGNOSIS — R60.0 LOWER EXTREMITY EDEMA: ICD-10-CM

## 2020-05-29 DIAGNOSIS — M54.41 ACUTE BILATERAL LOW BACK PAIN WITH BILATERAL SCIATICA: ICD-10-CM

## 2020-05-29 DIAGNOSIS — M54.42 ACUTE BILATERAL LOW BACK PAIN WITH BILATERAL SCIATICA: ICD-10-CM

## 2020-05-29 PROCEDURE — 99242 OFF/OP CONSLTJ NEW/EST SF 20: CPT | Performed by: PHYSICIAN ASSISTANT

## 2020-06-01 ENCOUNTER — TELEPHONE (OUTPATIENT)
Dept: NEUROLOGY | Facility: CLINIC | Age: 50
End: 2020-06-01

## 2020-06-02 LAB
ALBUMIN SERPL ELPH-MCNC: 4 G/DL (ref 3.8–4.8)
ALBUMIN SERPL-MCNC: 4.1 G/DL (ref 3.6–5.1)
ALBUMIN/GLOB SERPL: 1.9 (CALC) (ref 1–2.5)
ALP SERPL-CCNC: 86 U/L (ref 36–130)
ALPHA1 GLOB SERPL ELPH-MCNC: 0.3 G/DL (ref 0.2–0.3)
ALPHA2 GLOB SERPL ELPH-MCNC: 0.6 G/DL (ref 0.5–0.9)
ALT SERPL-CCNC: 35 U/L (ref 9–46)
AST SERPL-CCNC: 23 U/L (ref 10–40)
B BURGDOR AB SER IA-ACNC: <0.9 INDEX
BETA1 GLOB SERPL ELPH-MCNC: 0.4 G/DL (ref 0.4–0.6)
BETA2 GLOB SERPL ELPH-MCNC: 0.3 G/DL (ref 0.2–0.5)
BILIRUB SERPL-MCNC: 0.6 MG/DL (ref 0.2–1.2)
BUN SERPL-MCNC: 13 MG/DL (ref 7–25)
BUN/CREAT SERPL: ABNORMAL (CALC) (ref 6–22)
CALCIUM SERPL-MCNC: 9.1 MG/DL (ref 8.6–10.3)
CHLORIDE SERPL-SCNC: 107 MMOL/L (ref 98–110)
CO2 SERPL-SCNC: 27 MMOL/L (ref 20–32)
CREAT SERPL-MCNC: 0.87 MG/DL (ref 0.6–1.35)
ENA SS-A AB SER IA-ACNC: NORMAL AI
ENA SS-B AB SER IA-ACNC: NORMAL AI
EST. AVERAGE GLUCOSE BLD GHB EST-MCNC: 100 (CALC)
EST. AVERAGE GLUCOSE BLD GHB EST-SCNC: 5.5 (CALC)
GAMMA GLOB SERPL ELPH-MCNC: 0.8 G/DL (ref 0.8–1.7)
GLOBULIN SER CALC-MCNC: 2.2 G/DL (CALC) (ref 1.9–3.7)
GLUCOSE SERPL-MCNC: 101 MG/DL (ref 65–99)
HBA1C MFR BLD: 5.1 % OF TOTAL HGB
METHYLMALONATE SERPL-SCNC: 152 NMOL/L (ref 87–318)
POTASSIUM SERPL-SCNC: 4.1 MMOL/L (ref 3.5–5.3)
PROT PATTERN SERPL ELPH-IMP: NORMAL
PROT SERPL-MCNC: 6.3 G/DL (ref 6.1–8.1)
PROT SERPL-MCNC: 6.3 G/DL (ref 6.1–8.1)
SL AMB EGFR AFRICAN AMERICAN: 119 ML/MIN/1.73M2
SL AMB EGFR NON AFRICAN AMERICAN: 103 ML/MIN/1.73M2
SODIUM SERPL-SCNC: 142 MMOL/L (ref 135–146)
VIT B6 SERPL-MCNC: 48.2 NG/ML (ref 2.1–21.7)

## 2020-06-03 ENCOUNTER — HOSPITAL ENCOUNTER (OUTPATIENT)
Dept: MRI IMAGING | Facility: HOSPITAL | Age: 50
Discharge: HOME | End: 2020-06-03
Attending: PSYCHIATRY & NEUROLOGY
Payer: COMMERCIAL

## 2020-06-03 ENCOUNTER — HOSPITAL ENCOUNTER (OUTPATIENT)
Dept: MRI IMAGING | Facility: HOSPITAL | Age: 50
End: 2020-06-03
Attending: PSYCHIATRY & NEUROLOGY
Payer: COMMERCIAL

## 2020-06-03 DIAGNOSIS — G62.9 NEUROPATHY: ICD-10-CM

## 2020-06-03 PROCEDURE — 72158 MRI LUMBAR SPINE W/O & W/DYE: CPT

## 2020-06-03 PROCEDURE — A9585 GADOBUTROL INJECTION: HCPCS | Performed by: PSYCHIATRY & NEUROLOGY

## 2020-06-03 RX ADMIN — GADOBUTROL 8 ML: 604.72 INJECTION INTRAVENOUS at 08:31

## 2020-06-05 ENCOUNTER — TELEPHONE (OUTPATIENT)
Dept: NEUROLOGY | Facility: CLINIC | Age: 50
End: 2020-06-05

## 2020-07-07 ENCOUNTER — CONSULT (OUTPATIENT)
Dept: PAIN MEDICINE | Facility: CLINIC | Age: 50
End: 2020-07-07
Payer: OTHER MISCELLANEOUS

## 2020-07-07 VITALS
WEIGHT: 195 LBS | HEIGHT: 72 IN | HEART RATE: 73 BPM | SYSTOLIC BLOOD PRESSURE: 128 MMHG | BODY MASS INDEX: 26.41 KG/M2 | TEMPERATURE: 98.2 F | DIASTOLIC BLOOD PRESSURE: 80 MMHG

## 2020-07-07 DIAGNOSIS — M79.671 FOOT PAIN, BILATERAL: ICD-10-CM

## 2020-07-07 DIAGNOSIS — M54.42 CHRONIC BILATERAL LOW BACK PAIN WITH BILATERAL SCIATICA: ICD-10-CM

## 2020-07-07 DIAGNOSIS — G62.9 NEUROPATHY: ICD-10-CM

## 2020-07-07 DIAGNOSIS — G89.29 CHRONIC BILATERAL LOW BACK PAIN WITH BILATERAL SCIATICA: ICD-10-CM

## 2020-07-07 DIAGNOSIS — M54.41 CHRONIC BILATERAL LOW BACK PAIN WITH BILATERAL SCIATICA: ICD-10-CM

## 2020-07-07 DIAGNOSIS — M47.816 LUMBAR SPONDYLOSIS: ICD-10-CM

## 2020-07-07 DIAGNOSIS — G89.4 CHRONIC PAIN SYNDROME: Primary | ICD-10-CM

## 2020-07-07 DIAGNOSIS — M79.672 FOOT PAIN, BILATERAL: ICD-10-CM

## 2020-07-07 DIAGNOSIS — M96.1 LUMBAR POST-LAMINECTOMY SYNDROME: ICD-10-CM

## 2020-07-07 PROCEDURE — 99243 OFF/OP CNSLTJ NEW/EST LOW 30: CPT | Performed by: NURSE PRACTITIONER

## 2020-07-07 RX ORDER — DULOXETIN HYDROCHLORIDE 30 MG/1
CAPSULE, DELAYED RELEASE ORAL
Qty: 60 CAPSULE | Refills: 1 | Status: SHIPPED | OUTPATIENT
Start: 2020-07-07 | End: 2020-08-12 | Stop reason: ALTCHOICE

## 2020-07-07 NOTE — PROGRESS NOTES
Assessment:  1  Chronic pain syndrome    2  Chronic bilateral low back pain with bilateral sciatica    3  Foot pain, bilateral    4  Neuropathy    5  Lumbar post-laminectomy syndrome    6  Lumbar spondylosis        Plan:  While the patient was in the office today, I did have a thorough conversation with the patient regarding their chronic pain syndrome, symptoms, medication regimen, and treatment plan  I discussed with the patient at this point time I do feel that the bilateral lower extremity EMG would be helpful in understanding the cause of his pain and hopefully he can get the EMG completed prior to October  The patient was agreeable and verbalized an understanding  With regards to treatment options, I explained to the patient at this point because I feel there is definitely significant neuropathic component to his pain symptoms and he has previously tried and failed gabapentin and Lyrica, I feel that a different neuropathic medications such as Cymbalta may be helpful  The patient denied being prescribed any anti-depressant and/or psychiatric medications  I reviewed with the patient that it may take 3-4 weeks for the medication's effects to be noticed and that it should never be abruptly stopped  Possible side effects include but are not limited to; vertigo, lethargy, nausea, worsening depression/anxiety, and confusion  I advised the patient to call our office if they experience any side effects  The patient verbalized an understanding  I also briefly discuss the possibility of considering spinal cord stimulation or DRG stimulation in the future, however, because the patient is not out of surgery even a year at this point and he would like to take some time to read about the stimulator, we will hold off on any prior authorization process and just allow the patient to review the material and think about this as a possible treatment option for now      The patient is schedule follow-up office visit in 6-7 weeks and at that point time will re-evaluate his medication regimen and treatment plan options  The patient was agreeable and verbalized an understanding  The above plan of care was reviewed and agreed upon by Dr Clayton Schreiber  History of Present Illness: The patient is a 54 y o  male who presents for consultation in regards to Back Pain and Foot Pain  Symptoms have been present for over a year  Symptoms began following an injury at work  The patient reports that he was working for a [employer] and was unloading his truck when he was pulling 1 of the cart out of the truck and the cart got away from him and twisted his upper body  He reports then there was a long process and still he could finally have his surgery as he tried multiple injections, months of physical therapy, multiple medications, until finally he had the L5 and S1 fusion on December 11, 2019 with Dr Gely Crow  The patient reports that unfortunately at this point he has not seen any significant improvement or relief in his back pain as a result of the surgery and since the injury he has also started with bilateral foot pain, numbness, burning, and edema  Most recently he has followed up with neurology and is scheduled for bilateral lower extremity EMG but could not get an appointment until October but is on the cancellation list     Pain is reported to be 8 on the numeric rating scale  Symptoms are felt constantly and worst in the morning, afternoon, evening, nighttime  Symptoms are characterized as burning, shooting, numbing and tingling  Symptoms are associated with bilateral leg weakness  Aggravating factors include walking and exercise  Relieving factors include: None  No change in symptoms with kneeling, lying down, standing, bending, leaning forward, leaning bckward, sitting, relaxation, coughing/sneezing and bowel movements  Treatments that have been helpful include: None   surgery , prior injections including epidural steroid and facet joint injections  , physical therapy, chiropractic manipulation, home exercise and heat/ice have provided no relief  Medications to relieve symptoms include: None curently  The patient reports that in the past he has been on at least 300-400 mg daily of Lyrica for at least 2 months as well as a therapeutic dose of gabapentin for several months, without any relief or improvement in his pain symptoms  He is also previously been prescribed tramadol, hydrocodone, and oxycodone with minimal to no relief as well  Review of Systems:    Review of Systems   Constitutional: Negative for fever and unexpected weight change  HENT: Negative for trouble swallowing  Eyes: Negative for visual disturbance  Respiratory: Negative for shortness of breath and wheezing  Cardiovascular: Negative for chest pain and palpitations  Gastrointestinal: Negative for constipation, diarrhea, nausea and vomiting  Endocrine: Negative for cold intolerance, heat intolerance and polydipsia  Genitourinary: Negative for difficulty urinating and frequency  Musculoskeletal: Negative for arthralgias, gait problem, joint swelling and myalgias  Skin: Negative for rash  Neurological: Negative for dizziness, seizures, syncope, weakness and headaches  Hematological: Does not bruise/bleed easily  Psychiatric/Behavioral: Negative for dysphoric mood  All other systems reviewed and are negative  History reviewed  No pertinent past medical history  Past Surgical History:   Procedure Laterality Date    ABSCESS DRAINAGE      BACK SURGERY  2019       History reviewed  No pertinent family history      Social History     Occupational History    Not on file   Tobacco Use    Smoking status: Former Smoker     Packs/day: 0 50     Start date: 2019    Smokeless tobacco: Never Used   Substance and Sexual Activity    Alcohol use: Yes     Comment: occasionally     Drug use: No    Sexual activity: Not on file         Current Outpatient Medications:     DULoxetine (CYMBALTA) 30 mg delayed release capsule, Take 1 PO HS every other night x 1 week, then 1 PO HS x 2 weeks, then 2 PO HS , Disp: 60 capsule, Rfl: 1    No Known Allergies    Physical Exam:    /80 (BP Location: Left arm, Patient Position: Sitting, Cuff Size: Standard)   Pulse 73   Temp 98 2 °F (36 8 °C)   Ht 6' (1 829 m)   Wt 88 5 kg (195 lb)   BMI 26 45 kg/m²     Constitutional: normal, well developed, well nourished, alert, in no distress and non-toxic and no overt pain behavior  Eyes: anicteric  HEENT: grossly intact  Neck: supple, symmetric, trachea midline and no masses   Pulmonary:even and unlabored  Cardiovascular:No edema or pitting edema present  Skin:Normal without rashes or lesions and well hydrated  Psychiatric:Mood and affect appropriate  Neurologic:Cranial Nerves II-XII grossly intact  Musculoskeletal:The patient's gait is slightly antalgic, painful, but steady without the use of any assistive devices       Lumbar Spine Exam    Appearance:  Normal lordosis  Palpation/Tenderness:  left lumbar paraspinal tenderness  right lumbar paraspinal tenderness  left sacroiliac joint tenderness  right sacroiliac joint tenderness  Sensory:  dimished light touch sensation, location: In the bilateral S1 dermatome distribution  Range of Motion:  Flexion:  Severely limited  with pain  Extension:  Severely limited  with pain  Rotation - Left:  Severely limited  with pain  Rotation - Right:  Severely limited  with pain  Motor Strength:  Left hip flexion:  4/5  Left hip extension:  4/5  Right hip flexion:  4/5  Right hip extension:  4/5  Left knee flexion:  4/5  Left knee extension:  4/5  Right knee flexion:  4/5  Right knee extension:  4/5  Left foot dorsiflexion:  4/5  Left foot plantar flexion:  4/5  Right foot dorsiflexion:  4/5  Right foot plantar flexion:  4/5  Reflexes:  Left Patellar:  1+   Right Patellar:  1+   Left Achilles:  absent   Right Achilles:  absent   Special Tests:  Left Straight Leg Test:  positive  Right Straight Leg Test:  positive  Left Eloy's Maneuver:  positive  Right Eloy's Maneuver:  positive  Left Gaenslen's Test:  positive  Right Gaenslen's Test:  positive      Imaging  MRI LUMBAR SPINE WITH AND WITHOUT CONTRAST 6/3/2020     INDICATION: G62 9: Polyneuropathy, unspecified  persistent low back pain and bilateral leg pain (primarily right-side)  s/p lumbar sx Dec 2019     COMPARISON:  7/25/2019 outside MRI     TECHNIQUE:  Sagittal T1, sagittal T2, sagittal inversion recovery, axial T1 and axial T2, coronal T2  Sagittal and axial T1 postcontrast      IV Contrast:  8 mL of Gadobutrol injection (SINGLE-DOSE)      IMAGE QUALITY:  Diagnostic     FINDINGS:     VERTEBRAL BODIES:  There are 5 lumbar type vertebral bodies  Normal alignment of the lumbar spine  No spondylolysis or spondylolisthesis  No scoliosis  No compression fracture  Interval artifact at the L5-S1 intervertebral disc space indicative of   interval surgery at this level from the prior MRI  Scattered spondylotic changes elsewhere noted  No compression abnormality or bone marrow edema  No focally cyst is marrow lesions     SACRUM:  Normal signal within the sacrum  No evidence of insufficiency or stress fracture      DISTAL CORD AND CONUS:  Normal size and signal within the distal cord and conus  Conus medullaris terminates at the L2 level      PARASPINAL SOFT TISSUES:  Paraspinal soft tissues are unremarkable      LOWER THORACIC DISC SPACES:  Normal disc height and signal   No disc herniation, canal stenosis or foraminal narrowing      LUMBAR DISC SPACES:     L1-L2:  Tiny left paracentral disc herniation, protrusion type  No significant central canal or neural foraminal narrowing  This level is similar to the prior study      L2-L3:  There is bilateral facet hypertrophy  There is mild right neural foraminal narrowing    Central canal and left neural foramen patent  This level is similar to the prior study      L3-L4:  There is a right neural foraminal disc herniation, protrusion type  Mild right neural foraminal narrowing  Central canal and left neural foramen patent  This level is similar to the prior study      L4-L5:  Small right neural foraminal disc herniation, protrusion type  Mild right neural foraminal narrowing  Central canal and left neural foramen patent  This level is similar to the prior study      L5-S1:  Intervertebral cage noted  No evidence of recurrent or residual disc herniation      POSTCONTRAST IMAGING:  No abnormal enhancement      IMPRESSION:        1  Interval postoperative changes status post discectomy at L5-S1 with intervertebral cage    No evidence of recurrent or residual disc herniation at L5-S1   2   Mild scattered spondylotic changes elsewhere are stable         Workstation performed: [id number]

## 2020-07-07 NOTE — PATIENT INSTRUCTIONS
Duloxetine (By mouth)   Duloxetine (doo-LOX-e-teen)  Treats depression, anxiety, diabetic peripheral neuropathy, fibromyalgia, and chronic muscle or bone pain  This medicine is an SSNRI  Brand Name(s): Cymbalta, DermacinRx Jadyn Valdivia   There may be other brand names for this medicine  When This Medicine Should Not Be Used: This medicine is not right for everyone  Do not use it if you had an allergic reaction to duloxetine  How to Use This Medicine:   Capsule, Delayed Release Capsule  · Take your medicine as directed  Your dose may need to be changed several times to find what works best for you  · Delayed-release capsule: Swallow the capsule whole  Do not crush, chew, break, or open it  · This medicine should come with a Medication Guide  Ask your pharmacist for a copy if you do not have one  · Missed dose: Take a dose as soon as you remember  If it is almost time for your next dose, wait until then and take a regular dose  Do not take extra medicine to make up for a missed dose  · Store the medicine in a closed container at room temperature, away from heat, moisture, and direct light  Drugs and Foods to Avoid:   Ask your doctor or pharmacist before using any other medicine, including over-the-counter medicines, vitamins, and herbal products  · Do not take duloxetine if you have used an MAO inhibitor (MAOI) within the past 14 days  Do not start taking an MAO inhibitor within 5 days of stopping duloxetine  · Some medicines can affect how duloxetine works   Tell your doctor if you are using any of the following:  ¨ Buspirone, cimetidine, ciprofloxacin, enoxacin, fentanyl, lithium, Marta's wort, theophylline, tramadol, tryptophan, or warfarin  ¨ Amphetamines  ¨ Blood pressure medicine  ¨ Diuretic (water pill)  ¨ Medicine for heart rhythm problems (including flecainide, propafenone, quinidine)  ¨ Medicine to treat migraine headaches (including triptans)  ¨ NSAID pain or arthritis medicine (including aspirin, celecoxib, diclofenac, ibuprofen, naproxen)  ¨ Other medicine to treat depression or mood disorders (including amitriptyline, desipramine, fluoxetine, imipramine, nortriptyline, paroxetine)  ¨ Phenothiazine medicine (including thioridazine)  · Tell your doctor if you use anything else that makes you sleepy  Some examples are allergy medicine, narcotic pain medicine, and alcohol  · Do not drink alcohol while you are using this medicine  Warnings While Using This Medicine:   · Tell your doctor if you are pregnant or breastfeeding, or if you have kidney disease, liver disease, diabetes, digestion problems, glaucoma, heart disease, high or low blood pressure, or problems with urination  Tell your doctor if you smoke or you have a history of seizures, or drug or alcohol addiction  · This medicine may cause the following problems:   ¨ Serious liver problems  ¨ Serotonin syndrome (more likely when used with certain other medicines)  ¨ Increased risk of bleeding problems  ¨ Serious skin reactions  ¨ Low sodium levels in the blood  · This medicine can increase thoughts of suicide  Tell your doctor right away if you start to feel depressed and have thoughts about hurting yourself  · This medicine can cause changes in your blood pressure  This may make you dizzy or drowsy  Do not drive or do anything that could be dangerous until you know how this medicine affects you  Stand up slowly to avoid falls  · Do not stop using this medicine suddenly  Your doctor will need to slowly decrease your dose before you stop it completely  · Your doctor will check your progress and the effects of this medicine at regular visits  Keep all appointments  · Keep all medicine out of the reach of children  Never share your medicine with anyone    Possible Side Effects While Using This Medicine:   Call your doctor right away if you notice any of these side effects:  · Allergic reaction: Itching or hives, swelling in your face or hands, swelling or tingling in your mouth or throat, chest tightness, trouble breathing  · Anxiety, restlessness, fever, fast heartbeat, sweating, muscle spasms, diarrhea, seeing or hearing things that are not there  · Blistering, peeling, red skin rash  · Confusion, weakness, muscle twitching  · Dark urine or pale stools, nausea, vomiting, loss of appetite, stomach pain, yellow skin or eyes  · Decrease in how much or how often you urinate  · Eye pain, vision changes, seeing halos around lights  · Feeling more energetic than usual  · Lightheadedness, dizziness, or fainting  · Unusual moods or behaviors, worsening depression, thoughts about hurting yourself, trouble sleeping  · Unusual bleeding or bruising  If you notice these less serious side effects, talk with your doctor:   · Decrease in appetite or weight  · Dry mouth, constipation, mild nausea  · Unusual drowsiness, sleepiness, or tiredness  If you notice other side effects that you think are caused by this medicine, tell your doctor  Call your doctor for medical advice about side effects  You may report side effects to FDA at 2-864-FDA-2666  © 2017 [employer] Information is for End User's use only and may not be sold, redistributed or otherwise used for commercial purposes  The above information is an  only  It is not intended as medical advice for individual conditions or treatments  Talk to your doctor, nurse or pharmacist before following any medical regimen to see if it is safe and effective for you

## 2020-07-10 ENCOUNTER — OFFICE VISIT (OUTPATIENT)
Dept: NEUROLOGY | Facility: CLINIC | Age: 50
End: 2020-07-10
Payer: OTHER MISCELLANEOUS

## 2020-07-10 ENCOUNTER — TELEPHONE (OUTPATIENT)
Dept: NEUROLOGY | Facility: CLINIC | Age: 50
End: 2020-07-10

## 2020-07-10 VITALS
TEMPERATURE: 97.5 F | HEIGHT: 72 IN | BODY MASS INDEX: 26.19 KG/M2 | WEIGHT: 193.4 LBS | HEART RATE: 101 BPM | SYSTOLIC BLOOD PRESSURE: 138 MMHG | DIASTOLIC BLOOD PRESSURE: 84 MMHG | RESPIRATION RATE: 16 BRPM

## 2020-07-10 DIAGNOSIS — G89.29 CHRONIC BILATERAL LOW BACK PAIN WITH BILATERAL SCIATICA: Primary | ICD-10-CM

## 2020-07-10 DIAGNOSIS — M54.42 CHRONIC BILATERAL LOW BACK PAIN WITH BILATERAL SCIATICA: Primary | ICD-10-CM

## 2020-07-10 DIAGNOSIS — M54.41 CHRONIC BILATERAL LOW BACK PAIN WITH BILATERAL SCIATICA: Primary | ICD-10-CM

## 2020-07-10 DIAGNOSIS — M96.1 LUMBAR POST-LAMINECTOMY SYNDROME: ICD-10-CM

## 2020-07-10 DIAGNOSIS — R60.0 BILATERAL LOWER EXTREMITY EDEMA: ICD-10-CM

## 2020-07-10 DIAGNOSIS — G62.9 NEUROPATHY: ICD-10-CM

## 2020-07-10 DIAGNOSIS — M47.816 LUMBAR SPONDYLOSIS: ICD-10-CM

## 2020-07-10 PROCEDURE — 3008F BODY MASS INDEX DOCD: CPT | Performed by: PSYCHIATRY & NEUROLOGY

## 2020-07-10 PROCEDURE — 1036F TOBACCO NON-USER: CPT | Performed by: PSYCHIATRY & NEUROLOGY

## 2020-07-10 PROCEDURE — 99214 OFFICE O/P EST MOD 30 MIN: CPT | Performed by: PSYCHIATRY & NEUROLOGY

## 2020-07-10 NOTE — ASSESSMENT & PLAN NOTE
Pt here for neuro follow up  Pt last seen in may  Pt notes still with numbness of feet  No falls or trips  Biggest limitations for adl is pain persistent in back  Pt had updated neuropathy labs and lyme and sjogrens neg  Vit 12 and folate normal  Pt with elevated vit b6, ok to take less supplement  awatiting emg of the lowers to be done as ordered at last appt but still pending  Pain mx also agrees with need for emg to help with localization of sxs  No new nocturnal sxs  Pt now on cymbalta from pain mx just started 2 days ago

## 2020-07-10 NOTE — PROGRESS NOTES
Patient ID: Marjan Multani is a 54 y o  male  Assessment/Plan:    Neuropathy  Pt here for neuro follow up  Pt last seen in may  Pt notes still with numbness of feet  No falls or trips  Biggest limitations for adl is pain persistent in back  Pt had updated neuropathy labs and lyme and sjogrens neg  Vit 12 and folate normal  Pt with elevated vit b6, ok to take less supplement  awatiting emg of the lowers to be done as ordered at last appt but still pending  Pain mx also agrees with need for emg to help with localization of sxs  No new nocturnal sxs  Pt now on cymbalta from pain mx just started 2 days ago    Lumbar spondylosis  Pt s/p L5/S1 back surgery in dec 2019  Pt notes ever since still with int low back pain  Pt is revisiting with ortho surgeon next week in person  Pt had updated mri lumbar spine done since our last visit  Mri lumbar done on 6/3/20 comp to 7/2018 study with interval post op changes s/p discetomy at L5/S1 with intervertebral cage  No evidence of recurrent or residual disc herniation at L5/S1  Pt with right mild neural foraminal narrowing at L2/3, L3/4, L4/5  Study rev with pt and pt will also review with pain mx and surgeon  Pt now on cymbalta  Pain mx also awaiting emg of the lowers    Bilateral lower extremity edema  Pt seen by vascular  Pt to follow up with pcp       Diagnoses and all orders for this visit:    Chronic bilateral low back pain with bilateral sciatica  -     MRI thoracic spine without contrast; Future    Neuropathy  -     MRI thoracic spine without contrast; Future    Lumbar post-laminectomy syndrome  -     MRI thoracic spine without contrast; Future    Lumbar spondylosis    Bilateral lower extremity edema           Subjective:    HPI    Pt is a 53 yo m with pmh of low back pain and recent lumbar surgery in dec 2019 with [facility] and dr Ruben Lopez    pt last seen by me on 5/27/20   per my last note "Pt notes prior work up included mri lumbar from July 2019 and emg of lowers from nov 2019  I was able to locate these studies and staff also helped with calling   emg of the lowers from nov 2019 reports findings indicative of trent[place] L5/S1 radiculopathies  The right and left peroneal conductions noted as normal, the right and left sural conductions noted as normal   The right and left tibial showed slowed conductions on right at 38 and left at 36   occ fibs in lower lumbar spine musculature  Pts mri l spine done on July 2019 diffuse mod ddd and focal right foraminal disc protrusion at L3/4 causing mild right foraminal stenosis  Mod facet arthropathy at L4/5 with mil trenton foraminal stenosis  Mod facet artrhopathy and annular disc bulge at L5/S1 causing moderate t[place]on foraminal stenosis  Small right paracentral dic protrusion is noted without nerve root compression  Rev with pt at appt  Pt had labs by pcp with normal vit b12 od 440  Pt notes pain in the back persists  Pt notes pain radiates into both ant thighs to the knees  He also has independent sxs including swelling of the lower ext and burning and numbness of the feet trenton  Pt notes these sxs about 3-4 weeks after surgery  Pt denies any feet sxs prior to surgery"    Kept above paragraph due to complexities of pt case  Overall pt about the same since last visit  No falls or trips  No fever or chills  No loc  No sz  No change in vision  No change in bowel or bladder  No change in vision  No diplopia  No change in speech or swallowing  Pt notes due to back pain, wife is helping him get to appts  Pt with limited time in car due to pain  Pt had updated mri lumbar spine in June 2020 and comp to July 25 2019  Study rev in detail with pt at appt  Pt with interval post op changes s/p discetomy at L5/S1  No recurrent or residual disc herniation at L5/S1  Pt with multi level spondylotic changes at L2/3 on right, L3/4 on right and L4/5 on right to mild degree  Pt to follow up with ortho next week   Pt notes paresthesias and burning of the lower ext  Pt also seen by pain mx  Pt now on cymbalta as of appt on 7/7/20  Pt tolerating med so far  Pt did follow up with vascular as well for swellling of feet  No intervention needed per pt  Pt had updated labs with normal vit b12, elevated vit B6, neg lyme  Neg sjogrens and normal spep  hba1c normal   Pt with nl vit b12 of 440  Pt has not gotten mri t spine done to date to ensure no cord pathology above level of surgery  Pt also has not gotten emg of the lower done due to diff with getting appt  Staff trying to also help assist with move up or cancellation appt  Pt denies any fever or chills  No cp or sob  Pt remains aware of cdc recomemdnations in setting of covid  The following portions of the patient's history were reviewed and updated as appropriate: allergies, current medications, past family history, past medical history, past social history, past surgical history and problem list and med rec and ros rev  Objective:    Blood pressure 138/84, pulse 101, temperature 97 5 °F (36 4 °C), temperature source Tympanic, resp  rate 16, height 6' (1 829 m), weight 87 7 kg (193 lb 6 4 oz)  Physical Exam   Constitutional: He appears well-developed and well-nourished  Eyes: Pupils are equal, round, and reactive to light  Lids are normal    Cardiovascular: Intact distal pulses  Neurological: He has normal strength  Reflex Scores:       Tricep reflexes are 2+ on the right side and 2+ on the left side  Bicep reflexes are 2+ on the right side and 2+ on the left side  Brachioradialis reflexes are 2+ on the right side and 2+ on the left side  Patellar reflexes are 2+ on the right side and 2+ on the left side  Achilles reflexes are 1+ on the right side and 1+ on the left side  Psychiatric: His speech is normal        Neurological Exam  Mental Status  Awake, alert and oriented to person, place and time  Recent and remote memory are intact   Speech is normal  Language is fluent with no aphasia  Attention and concentration are normal  Fund of knowledge is appropriate for level of education  Cranial Nerves  CN II: Visual acuity is normal  Visual fields full to confrontation  CN III, IV, VI: Extraocular movements intact bilaterally  Normal lids and orbits bilaterally  Pupils equal round and reactive to light bilaterally  CN V: Facial sensation is normal   CN VII: Full and symmetric facial movement  CN VIII: Hearing is normal   CN IX, X: Palate elevates symmetrically  Normal gag reflex  CN XI: Shoulder shrug strength is normal   CN XII: Tongue midline without atrophy or fasciculations  Motor  Normal muscle bulk throughout  Normal muscle tone  No abnormal involuntary movements  Strength is 5/5 throughout all four extremities  Limited lower ext strength testing due to pain in back  Sensory  Dec vib trenton lowers  Reflexes                                           Right                      Left  Brachioradialis                    2+                         2+  Biceps                                 2+                         2+  Triceps                                2+                         2+  Finger flex                           2+                         2+  Hamstring                            2+                         2+  Patellar                                2+                         2+  Achilles                                1+                         1+  Plantar                           Downgoing                Downgoing    Coordination  Right: Finger-to-nose normal  Rapid alternating movement normal   Left: Finger-to-nose normal  Rapid alternating movement normal     Gait  Casual gait: Wide stance  ROS:    Review of Systems   Constitutional: Negative  Negative for appetite change and fever  HENT: Negative  Negative for hearing loss, tinnitus, trouble swallowing and voice change  Eyes: Negative  Negative for photophobia and pain  Respiratory: Negative  Negative for shortness of breath  Cardiovascular: Negative  Negative for palpitations  Gastrointestinal: Negative  Negative for nausea and vomiting  Endocrine: Negative  Negative for cold intolerance  Genitourinary: Negative  Negative for dysuria, frequency and urgency  Musculoskeletal: Positive for back pain  Negative for myalgias and neck pain  Skin: Negative  Negative for rash  Allergic/Immunologic: Negative  Neurological: Positive for weakness (legs) and numbness (foot)  Negative for dizziness, tremors, seizures, syncope, facial asymmetry, speech difficulty, light-headedness and headaches  Right foot swelling, burning sensation on both feet   Hematological: Negative  Does not bruise/bleed easily  Psychiatric/Behavioral: Positive for sleep disturbance  Negative for confusion and hallucinations

## 2020-07-10 NOTE — ASSESSMENT & PLAN NOTE
Pt s/p L5/S1 back surgery in dec 2019  Pt notes ever since still with int low back pain  Pt is revisiting with ortho surgeon next week in person  Pt had updated mri lumbar spine done since our last visit  Mri lumbar done on 6/3/20 comp to 7/2018 study with interval post op changes s/p discetomy at L5/S1 with intervertebral cage  No evidence of recurrent or residual disc herniation at L5/S1  Pt with right mild neural foraminal narrowing at L2/3, L3/4, L4/5    Study rev with pt and pt will also review with pain mx and surgeon  Pt now on cymbalta  Pain mx also awaiting emg of the lowers

## 2020-07-10 NOTE — TELEPHONE ENCOUNTER
Patient is scheduled in October with you for an EMG in [facility]  He is willing to come here to [facility] or [facility] if you get any cancellations earlier  Dr Baez Pro would really like the EMG done earlier if at all possible

## 2020-07-27 ENCOUNTER — HOSPITAL ENCOUNTER (OUTPATIENT)
Dept: MRI IMAGING | Facility: HOSPITAL | Age: 50
Discharge: HOME | End: 2020-07-27
Attending: PSYCHIATRY & NEUROLOGY
Payer: OTHER MISCELLANEOUS

## 2020-07-27 DIAGNOSIS — M54.41 CHRONIC BILATERAL LOW BACK PAIN WITH BILATERAL SCIATICA: ICD-10-CM

## 2020-07-27 DIAGNOSIS — G62.9 NEUROPATHY: ICD-10-CM

## 2020-07-27 DIAGNOSIS — M54.42 CHRONIC BILATERAL LOW BACK PAIN WITH BILATERAL SCIATICA: ICD-10-CM

## 2020-07-27 DIAGNOSIS — G89.29 CHRONIC BILATERAL LOW BACK PAIN WITH BILATERAL SCIATICA: ICD-10-CM

## 2020-07-27 DIAGNOSIS — M96.1 LUMBAR POST-LAMINECTOMY SYNDROME: ICD-10-CM

## 2020-07-27 PROCEDURE — 72146 MRI CHEST SPINE W/O DYE: CPT

## 2020-08-12 ENCOUNTER — OFFICE VISIT (OUTPATIENT)
Dept: FAMILY MEDICINE CLINIC | Facility: CLINIC | Age: 50
End: 2020-08-12
Payer: COMMERCIAL

## 2020-08-12 VITALS
WEIGHT: 188.8 LBS | HEIGHT: 72 IN | HEART RATE: 116 BPM | DIASTOLIC BLOOD PRESSURE: 74 MMHG | SYSTOLIC BLOOD PRESSURE: 126 MMHG | OXYGEN SATURATION: 96 % | BODY MASS INDEX: 25.57 KG/M2 | TEMPERATURE: 97.4 F

## 2020-08-12 DIAGNOSIS — Z00.00 WELLNESS EXAMINATION: ICD-10-CM

## 2020-08-12 DIAGNOSIS — M54.42 CHRONIC BILATERAL LOW BACK PAIN WITH BILATERAL SCIATICA: Primary | ICD-10-CM

## 2020-08-12 DIAGNOSIS — M96.1 LUMBAR POST-LAMINECTOMY SYNDROME: ICD-10-CM

## 2020-08-12 DIAGNOSIS — G62.9 NEUROPATHY: ICD-10-CM

## 2020-08-12 DIAGNOSIS — G89.29 CHRONIC BILATERAL LOW BACK PAIN WITH BILATERAL SCIATICA: Primary | ICD-10-CM

## 2020-08-12 DIAGNOSIS — G89.4 CHRONIC PAIN SYNDROME: ICD-10-CM

## 2020-08-12 DIAGNOSIS — M54.41 CHRONIC BILATERAL LOW BACK PAIN WITH BILATERAL SCIATICA: Primary | ICD-10-CM

## 2020-08-12 PROCEDURE — 1036F TOBACCO NON-USER: CPT | Performed by: FAMILY MEDICINE

## 2020-08-12 PROCEDURE — 3008F BODY MASS INDEX DOCD: CPT | Performed by: FAMILY MEDICINE

## 2020-08-12 PROCEDURE — 3725F SCREEN DEPRESSION PERFORMED: CPT | Performed by: FAMILY MEDICINE

## 2020-08-12 PROCEDURE — 99396 PREV VISIT EST AGE 40-64: CPT | Performed by: FAMILY MEDICINE

## 2020-08-12 PROCEDURE — 99214 OFFICE O/P EST MOD 30 MIN: CPT | Performed by: FAMILY MEDICINE

## 2020-08-12 RX ORDER — DULOXETIN HYDROCHLORIDE 30 MG/1
30 CAPSULE, DELAYED RELEASE ORAL 2 TIMES DAILY
COMMUNITY
End: 2020-08-31

## 2020-08-12 RX ORDER — PREDNISONE 20 MG/1
TABLET ORAL
Qty: 15 TABLET | Refills: 0 | Status: ON HOLD | OUTPATIENT
Start: 2020-08-12 | End: 2020-08-25

## 2020-08-13 NOTE — PROGRESS NOTES
HPI:  Arik Seymour is a 54 y o  male here for his yearly health maintenance exam    Patient Active Problem List   Diagnosis    Sprain of anterior talofibular ligament of right ankle    Perianal abscess    Neuropathy    Chronic bilateral low back pain with bilateral sciatica    Bilateral lower extremity edema    Chronic pain syndrome    Lumbar post-laminectomy syndrome    Lumbar spondylosis     No past medical history on file  1  Advanced Directive: n     2  Durable Power of  for Healthcare: n     3  Social History:           Drug and alcohol History: n                  4  Immunizations up to date: y                 Lifestyle:                           Healthy Diet:y                          Alcohol Use:n                          Tobacco Use:n                          Regular exercise: y                          Weight concerns:n                               5  Over the past 2 weeks, how often have you been bothered by the following:              Little interest or pleasure in doing thingsn              Felling down, depressed or hopeless:n       Current Outpatient Medications   Medication Sig Dispense Refill    DULoxetine (CYMBALTA) 30 mg delayed release capsule Take 30 mg by mouth 2 (two) times a day      predniSONE 20 mg tablet TAKE 1 TABLET BID X 5 DAYS THEN TAKE 1 TABLET QD FOR 5 DAYS 15 tablet 0     No current facility-administered medications for this visit  No Known Allergies  Immunization History   Administered Date(s) Administered    Influenza, injectable, quadrivalent, preservative free 0 5 mL 10/24/2019       Patient Care Team:  Iman Clay MD as PCP - General (Family Medicine)  MD Estephanie Cornell PA-C as Nurse Practitioner (Vascular Surgery)    Review of Systems   Constitutional: Negative for fatigue, fever and unexpected weight change  HENT: Negative for congestion, sinus pressure and sore throat  Eyes: Negative for visual disturbance  Respiratory: Negative for shortness of breath and wheezing  Cardiovascular: Negative for chest pain and palpitations  Gastrointestinal: Negative for abdominal pain, diarrhea, nausea and vomiting  Physical Exam :  Physical Exam  Constitutional:       General: He is not in acute distress  Appearance: He is well-developed  He is not diaphoretic  HENT:      Right Ear: Tympanic membrane, ear canal and external ear normal  Tympanic membrane is not injected  Left Ear: Tympanic membrane, ear canal and external ear normal  Tympanic membrane is not injected  Nose: Nose normal       Mouth/Throat:      Pharynx: Uvula midline  Eyes:      Conjunctiva/sclera: Conjunctivae normal       Pupils: Pupils are equal, round, and reactive to light  Neck:      Musculoskeletal: Normal range of motion and neck supple  Thyroid: No thyromegaly  Cardiovascular:      Rate and Rhythm: Normal rate and regular rhythm  Heart sounds: Normal heart sounds  No murmur  Pulmonary:      Effort: Pulmonary effort is normal  No respiratory distress  Breath sounds: Normal breath sounds  No wheezing  Lymphadenopathy:      Cervical: No cervical adenopathy  Assessment and Plan:  1  Chronic bilateral low back pain with bilateral sciatica     2  Neuropathy     3  Chronic pain syndrome     4  Lumbar post-laminectomy syndrome  predniSONE 20 mg tablet   5   Wellness examination         Health Maintenance Due   Topic Date Due    BMI: Followup Plan  10/13/1990    Annual Physical  10/13/1990    DTaP,Tdap,and Td Vaccines (1 - Tdap) 10/13/1993    Influenza Vaccine  07/01/2020

## 2020-08-13 NOTE — PROGRESS NOTES
[facility]        NAME: Pippa Campos is a 54 y o  male  : 1970    MRN: [id number]  DATE: 2020  TIME: 7:19 AM    Assessment and Plan   Chronic bilateral low back pain with bilateral sciatica [M54 42, M54 41, G89 29]  1  Chronic bilateral low back pain with bilateral sciatica     2  Neuropathy     3  Chronic pain syndrome     4  Lumbar post-laminectomy syndrome  predniSONE 20 mg tablet   5  Wellness examination           Patient Instructions     Patient Instructions   Due to severe pain s/p surg  pt not cleared to work in any capacity--evlal by neuro and pain MD continues--25min disc          Chief Complaint     Chief Complaint   Patient presents with    Follow-up     back surgery    Annual Exam     HM         History of Present Illness       F/u failed back surg--daily pain cont--incapacitating--not able to work      Review of Systems   Review of Systems   Constitutional: Negative for fatigue, fever and unexpected weight change  HENT: Negative for congestion, sinus pain and sore throat  Eyes: Negative for visual disturbance  Respiratory: Negative for shortness of breath and wheezing  Cardiovascular: Negative for chest pain and palpitations  Gastrointestinal: Negative for abdominal pain, nausea and vomiting  Musculoskeletal: Positive for back pain and gait problem  Negative for arthralgias and myalgias  Neurological: Negative for syncope, weakness and numbness  Psychiatric/Behavioral: Negative  Negative for confusion, dysphoric mood and suicidal ideas           Current Medications       Current Outpatient Medications:     DULoxetine (CYMBALTA) 30 mg delayed release capsule, Take 30 mg by mouth 2 (two) times a day, Disp: , Rfl:     predniSONE 20 mg tablet, TAKE 1 TABLET BID X 5 DAYS THEN TAKE 1 TABLET QD FOR 5 DAYS, Disp: 15 tablet, Rfl: 0    Current Allergies     Allergies as of 2020    (No Known Allergies)            The following portions of the patient's history were reviewed and updated as appropriate: allergies, current medications, past family history, past medical history, past social history, past surgical history and problem list      No past medical history on file  Past Surgical History:   Procedure Laterality Date    ABSCESS DRAINAGE      BACK SURGERY  2019       No family history on file  Medications have been verified  Objective   /74   Pulse (!) 116   Temp (!) 97 4 °F (36 3 °C) (Temporal)   Ht 6' (1 829 m)   Wt 85 6 kg (188 lb 12 8 oz)   SpO2 96%   BMI 25 61 kg/m²        Physical Exam     Physical Exam  Constitutional:       Appearance: He is well-developed  HENT:      Right Ear: Ear canal normal  Tympanic membrane is not injected  Left Ear: Ear canal normal  Tympanic membrane is not injected  Nose: Nose normal    Eyes:      General:         Right eye: No discharge  Left eye: No discharge  Conjunctiva/sclera: Conjunctivae normal       Pupils: Pupils are equal, round, and reactive to light  Neck:      Musculoskeletal: Normal range of motion and neck supple  Thyroid: No thyromegaly  Cardiovascular:      Rate and Rhythm: Normal rate and regular rhythm  Heart sounds: Normal heart sounds  No murmur  Pulmonary:      Effort: Pulmonary effort is normal  No respiratory distress  Breath sounds: Normal breath sounds  No wheezing  Abdominal:      General: Bowel sounds are normal  There is no distension  Palpations: Abdomen is soft  Tenderness: There is no abdominal tenderness  Musculoskeletal: Normal range of motion  Lymphadenopathy:      Cervical: No cervical adenopathy  Skin:     General: Skin is warm and dry  Neurological:      Mental Status: He is alert and oriented to person, place, and time  He is not disoriented  Sensory: No sensory deficit  Gait: Gait normal       Deep Tendon Reflexes: Reflexes are normal and symmetric  Psychiatric:         Speech: Speech normal          Behavior: Behavior normal          Thought Content:  Thought content normal          Judgment: Judgment normal      lumbar exam deferred due to pain--unch from recent specialist exams

## 2020-08-13 NOTE — PATIENT INSTRUCTIONS
Due to severe pain s/p surg 12/19 pt not cleared to work in any capacity--evlal by neuro and pain MD continues--25min disc

## 2020-08-17 ENCOUNTER — HOSPITAL ENCOUNTER (OUTPATIENT)
Dept: NEUROLOGY | Facility: CLINIC | Age: 50
Discharge: HOME | End: 2020-08-17
Attending: PSYCHIATRY & NEUROLOGY
Payer: COMMERCIAL

## 2020-08-17 DIAGNOSIS — M54.42 ACUTE BILATERAL LOW BACK PAIN WITH BILATERAL SCIATICA: ICD-10-CM

## 2020-08-17 DIAGNOSIS — G62.9 NEUROPATHY: ICD-10-CM

## 2020-08-17 DIAGNOSIS — M54.41 ACUTE BILATERAL LOW BACK PAIN WITH BILATERAL SCIATICA: ICD-10-CM

## 2020-08-17 PROCEDURE — 95886 MUSC TEST DONE W/N TEST COMP: CPT | Performed by: PSYCHIATRY & NEUROLOGY

## 2020-08-17 PROCEDURE — 95910 NRV CNDJ TEST 7-8 STUDIES: CPT | Performed by: PSYCHIATRY & NEUROLOGY

## 2020-08-18 ENCOUNTER — TELEPHONE (OUTPATIENT)
Dept: NEUROLOGY | Facility: CLINIC | Age: 50
End: 2020-08-18

## 2020-08-18 NOTE — TELEPHONE ENCOUNTER
----- Message from Ren Self MD sent at 8/17/2020  5:17 PM EDT -----  Let pt know emg of the lowers with evidence of chronic left L5 and right S1 radiculopathy   Please see if pt interestd in pain mx

## 2020-08-18 NOTE — TELEPHONE ENCOUNTER
Patient came to [facility] office to sign ASTRID  Signed copy scanned into chart and EMG results printed for patient

## 2020-08-18 NOTE — TELEPHONE ENCOUNTER
Pt called back asking for a copy of emg results  Advised that he can call medical records or come to one of the offices to sign goran and we can print results for pt    Pt will go to [facility] office to sign goran

## 2020-08-24 ENCOUNTER — HOSPITAL ENCOUNTER (EMERGENCY)
Facility: HOSPITAL | Age: 50
Discharge: HOME | DRG: 392 | End: 2020-08-25
Attending: EMERGENCY MEDICINE | Admitting: EMERGENCY MEDICINE
Payer: COMMERCIAL

## 2020-08-24 VITALS
DIASTOLIC BLOOD PRESSURE: 78 MMHG | TEMPERATURE: 97.7 F | BODY MASS INDEX: 25.5 KG/M2 | OXYGEN SATURATION: 98 % | RESPIRATION RATE: 18 BRPM | HEART RATE: 100 BPM | WEIGHT: 188 LBS | SYSTOLIC BLOOD PRESSURE: 140 MMHG

## 2020-08-24 DIAGNOSIS — D72.829 LEUKOCYTOSIS: ICD-10-CM

## 2020-08-24 DIAGNOSIS — R11.0 NAUSEA: Primary | ICD-10-CM

## 2020-08-24 DIAGNOSIS — R19.7 DIARRHEA: ICD-10-CM

## 2020-08-24 LAB
ALBUMIN SERPL BCP-MCNC: 3.9 G/DL (ref 3.5–5)
ALP SERPL-CCNC: 108 U/L (ref 46–116)
ALT SERPL W P-5'-P-CCNC: 64 U/L (ref 12–78)
ANION GAP SERPL CALCULATED.3IONS-SCNC: 10 MMOL/L (ref 4–13)
AST SERPL W P-5'-P-CCNC: 19 U/L (ref 5–45)
BASOPHILS # BLD AUTO: 0.07 THOUSANDS/ΜL (ref 0–0.1)
BASOPHILS NFR BLD AUTO: 0 % (ref 0–1)
BILIRUB SERPL-MCNC: 0.8 MG/DL (ref 0.2–1)
BUN SERPL-MCNC: 13 MG/DL (ref 5–25)
CALCIUM SERPL-MCNC: 8.5 MG/DL (ref 8.3–10.1)
CHLORIDE SERPL-SCNC: 104 MMOL/L (ref 100–108)
CO2 SERPL-SCNC: 25 MMOL/L (ref 21–32)
CREAT SERPL-MCNC: 1.12 MG/DL (ref 0.6–1.3)
EOSINOPHIL # BLD AUTO: 0.25 THOUSAND/ΜL (ref 0–0.61)
EOSINOPHIL NFR BLD AUTO: 1 % (ref 0–6)
ERYTHROCYTE [DISTWIDTH] IN BLOOD BY AUTOMATED COUNT: 12.4 % (ref 11.6–15.1)
GFR SERPL CREATININE-BSD FRML MDRD: 78 ML/MIN/1.73SQ M
GLUCOSE SERPL-MCNC: 108 MG/DL (ref 65–140)
HCT VFR BLD AUTO: 53.7 % (ref 36.5–49.3)
HGB BLD-MCNC: 18.9 G/DL (ref 12–17)
IMM GRANULOCYTES # BLD AUTO: 0.09 THOUSAND/UL (ref 0–0.2)
IMM GRANULOCYTES NFR BLD AUTO: 1 % (ref 0–2)
LIPASE SERPL-CCNC: 98 U/L (ref 73–393)
LYMPHOCYTES # BLD AUTO: 2.13 THOUSANDS/ΜL (ref 0.6–4.47)
LYMPHOCYTES NFR BLD AUTO: 12 % (ref 14–44)
MCH RBC QN AUTO: 34.1 PG (ref 26.8–34.3)
MCHC RBC AUTO-ENTMCNC: 35.2 G/DL (ref 31.4–37.4)
MCV RBC AUTO: 97 FL (ref 82–98)
MONOCYTES # BLD AUTO: 1.3 THOUSAND/ΜL (ref 0.17–1.22)
MONOCYTES NFR BLD AUTO: 7 % (ref 4–12)
NEUTROPHILS # BLD AUTO: 13.61 THOUSANDS/ΜL (ref 1.85–7.62)
NEUTS SEG NFR BLD AUTO: 79 % (ref 43–75)
NRBC BLD AUTO-RTO: 0 /100 WBCS
PLATELET # BLD AUTO: 225 THOUSANDS/UL (ref 149–390)
PMV BLD AUTO: 9.4 FL (ref 8.9–12.7)
POTASSIUM SERPL-SCNC: 3.6 MMOL/L (ref 3.5–5.3)
PROT SERPL-MCNC: 7.4 G/DL (ref 6.4–8.2)
RBC # BLD AUTO: 5.54 MILLION/UL (ref 3.88–5.62)
SODIUM SERPL-SCNC: 139 MMOL/L (ref 136–145)
WBC # BLD AUTO: 17.45 THOUSAND/UL (ref 4.31–10.16)

## 2020-08-24 PROCEDURE — 96361 HYDRATE IV INFUSION ADD-ON: CPT

## 2020-08-24 PROCEDURE — 96360 HYDRATION IV INFUSION INIT: CPT

## 2020-08-24 PROCEDURE — 36415 COLL VENOUS BLD VENIPUNCTURE: CPT | Performed by: EMERGENCY MEDICINE

## 2020-08-24 PROCEDURE — 83690 ASSAY OF LIPASE: CPT | Performed by: EMERGENCY MEDICINE

## 2020-08-24 PROCEDURE — 99284 EMERGENCY DEPT VISIT MOD MDM: CPT | Performed by: EMERGENCY MEDICINE

## 2020-08-24 PROCEDURE — 80053 COMPREHEN METABOLIC PANEL: CPT | Performed by: EMERGENCY MEDICINE

## 2020-08-24 PROCEDURE — 85025 COMPLETE CBC W/AUTO DIFF WBC: CPT | Performed by: EMERGENCY MEDICINE

## 2020-08-24 PROCEDURE — 99284 EMERGENCY DEPT VISIT MOD MDM: CPT

## 2020-08-24 RX ADMIN — SODIUM CHLORIDE 2000 ML: 0.9 INJECTION, SOLUTION INTRAVENOUS at 22:07

## 2020-08-25 ENCOUNTER — APPOINTMENT (EMERGENCY)
Dept: CT IMAGING | Facility: HOSPITAL | Age: 50
DRG: 392 | End: 2020-08-25
Payer: COMMERCIAL

## 2020-08-25 ENCOUNTER — HOSPITAL ENCOUNTER (INPATIENT)
Facility: HOSPITAL | Age: 50
LOS: 2 days | Discharge: HOME | DRG: 392 | End: 2020-08-27
Attending: EMERGENCY MEDICINE | Admitting: INTERNAL MEDICINE
Payer: COMMERCIAL

## 2020-08-25 DIAGNOSIS — A08.4 VIRAL ENTERITIS: ICD-10-CM

## 2020-08-25 DIAGNOSIS — K52.9 ILEITIS: Primary | ICD-10-CM

## 2020-08-25 DIAGNOSIS — F17.200 SMOKING: ICD-10-CM

## 2020-08-25 DIAGNOSIS — K50.013 CROHN'S DISEASE OF ILEUM WITH FISTULA (HCC): ICD-10-CM

## 2020-08-25 DIAGNOSIS — K60.3 PERIANAL FISTULA: ICD-10-CM

## 2020-08-25 PROBLEM — Z72.0 TOBACCO USE: Chronic | Status: ACTIVE | Noted: 2020-08-25

## 2020-08-25 PROBLEM — K60.30 PERIANAL FISTULA DUE TO CROHN'S DISEASE (HCC): Status: ACTIVE | Noted: 2020-08-25

## 2020-08-25 PROBLEM — Q74.2: Status: ACTIVE | Noted: 2020-08-25

## 2020-08-25 PROBLEM — K50.913 PERIANAL FISTULA DUE TO CROHN'S DISEASE (HCC): Status: ACTIVE | Noted: 2020-08-25

## 2020-08-25 LAB
ALBUMIN SERPL BCP-MCNC: 3.6 G/DL (ref 3.5–5)
ALP SERPL-CCNC: 92 U/L (ref 46–116)
ALT SERPL W P-5'-P-CCNC: 48 U/L (ref 12–78)
ANION GAP SERPL CALCULATED.3IONS-SCNC: 9 MMOL/L (ref 4–13)
AST SERPL W P-5'-P-CCNC: 18 U/L (ref 5–45)
BASOPHILS # BLD AUTO: 0.05 THOUSANDS/ΜL (ref 0–0.1)
BASOPHILS NFR BLD AUTO: 0 % (ref 0–1)
BILIRUB SERPL-MCNC: 0.7 MG/DL (ref 0.2–1)
BUN SERPL-MCNC: 8 MG/DL (ref 5–25)
CALCIUM SERPL-MCNC: 8.5 MG/DL (ref 8.3–10.1)
CHLORIDE SERPL-SCNC: 103 MMOL/L (ref 100–108)
CO2 SERPL-SCNC: 27 MMOL/L (ref 21–32)
CREAT SERPL-MCNC: 1.05 MG/DL (ref 0.6–1.3)
EOSINOPHIL # BLD AUTO: 0.37 THOUSAND/ΜL (ref 0–0.61)
EOSINOPHIL NFR BLD AUTO: 2 % (ref 0–6)
ERYTHROCYTE [DISTWIDTH] IN BLOOD BY AUTOMATED COUNT: 12.6 % (ref 11.6–15.1)
GFR SERPL CREATININE-BSD FRML MDRD: 84 ML/MIN/1.73SQ M
GLUCOSE SERPL-MCNC: 114 MG/DL (ref 65–140)
HCT VFR BLD AUTO: 51.5 % (ref 36.5–49.3)
HGB BLD-MCNC: 17.8 G/DL (ref 12–17)
IMM GRANULOCYTES # BLD AUTO: 0.06 THOUSAND/UL (ref 0–0.2)
IMM GRANULOCYTES NFR BLD AUTO: 0 % (ref 0–2)
LIPASE SERPL-CCNC: 111 U/L (ref 73–393)
LYMPHOCYTES # BLD AUTO: 2.74 THOUSANDS/ΜL (ref 0.6–4.47)
LYMPHOCYTES NFR BLD AUTO: 18 % (ref 14–44)
MCH RBC QN AUTO: 33.8 PG (ref 26.8–34.3)
MCHC RBC AUTO-ENTMCNC: 34.6 G/DL (ref 31.4–37.4)
MCV RBC AUTO: 98 FL (ref 82–98)
MONOCYTES # BLD AUTO: 1.19 THOUSAND/ΜL (ref 0.17–1.22)
MONOCYTES NFR BLD AUTO: 8 % (ref 4–12)
NEUTROPHILS # BLD AUTO: 11.18 THOUSANDS/ΜL (ref 1.85–7.62)
NEUTS SEG NFR BLD AUTO: 72 % (ref 43–75)
NRBC BLD AUTO-RTO: 0 /100 WBCS
PLATELET # BLD AUTO: 198 THOUSANDS/UL (ref 149–390)
PMV BLD AUTO: 9.2 FL (ref 8.9–12.7)
POTASSIUM SERPL-SCNC: 3.4 MMOL/L (ref 3.5–5.3)
PROT SERPL-MCNC: 6.8 G/DL (ref 6.4–8.2)
RBC # BLD AUTO: 5.27 MILLION/UL (ref 3.88–5.62)
SARS-COV-2 RNA RESP QL NAA+PROBE: NEGATIVE
SODIUM SERPL-SCNC: 139 MMOL/L (ref 136–145)
WBC # BLD AUTO: 15.59 THOUSAND/UL (ref 4.31–10.16)

## 2020-08-25 PROCEDURE — 96374 THER/PROPH/DIAG INJ IV PUSH: CPT

## 2020-08-25 PROCEDURE — 96375 TX/PRO/DX INJ NEW DRUG ADDON: CPT

## 2020-08-25 PROCEDURE — 85025 COMPLETE CBC W/AUTO DIFF WBC: CPT | Performed by: PHYSICIAN ASSISTANT

## 2020-08-25 PROCEDURE — 87635 SARS-COV-2 COVID-19 AMP PRB: CPT | Performed by: INTERNAL MEDICINE

## 2020-08-25 PROCEDURE — 96361 HYDRATE IV INFUSION ADD-ON: CPT

## 2020-08-25 PROCEDURE — 36415 COLL VENOUS BLD VENIPUNCTURE: CPT | Performed by: PHYSICIAN ASSISTANT

## 2020-08-25 PROCEDURE — G1004 CDSM NDSC: HCPCS

## 2020-08-25 PROCEDURE — 99233 SBSQ HOSP IP/OBS HIGH 50: CPT | Performed by: INTERNAL MEDICINE

## 2020-08-25 PROCEDURE — 80053 COMPREHEN METABOLIC PANEL: CPT | Performed by: PHYSICIAN ASSISTANT

## 2020-08-25 PROCEDURE — 99254 IP/OBS CNSLTJ NEW/EST MOD 60: CPT | Performed by: INTERNAL MEDICINE

## 2020-08-25 PROCEDURE — 99285 EMERGENCY DEPT VISIT HI MDM: CPT

## 2020-08-25 PROCEDURE — 99285 EMERGENCY DEPT VISIT HI MDM: CPT | Performed by: PHYSICIAN ASSISTANT

## 2020-08-25 PROCEDURE — 99222 1ST HOSP IP/OBS MODERATE 55: CPT | Performed by: INTERNAL MEDICINE

## 2020-08-25 PROCEDURE — 83690 ASSAY OF LIPASE: CPT | Performed by: PHYSICIAN ASSISTANT

## 2020-08-25 PROCEDURE — 74177 CT ABD & PELVIS W/CONTRAST: CPT

## 2020-08-25 RX ORDER — CEFTRIAXONE 1 G/50ML
1000 INJECTION, SOLUTION INTRAVENOUS ONCE
Status: DISCONTINUED | OUTPATIENT
Start: 2020-08-25 | End: 2020-08-25 | Stop reason: SDUPTHER

## 2020-08-25 RX ORDER — NICOTINE 21 MG/24HR
14 PATCH, TRANSDERMAL 24 HOURS TRANSDERMAL DAILY
Status: DISCONTINUED | OUTPATIENT
Start: 2020-08-26 | End: 2020-08-27 | Stop reason: HOSPADM

## 2020-08-25 RX ORDER — FENTANYL CITRATE 50 UG/ML
50 INJECTION, SOLUTION INTRAMUSCULAR; INTRAVENOUS ONCE
Status: COMPLETED | OUTPATIENT
Start: 2020-08-25 | End: 2020-08-25

## 2020-08-25 RX ORDER — PANTOPRAZOLE SODIUM 40 MG/1
40 TABLET, DELAYED RELEASE ORAL DAILY
Status: DISCONTINUED | OUTPATIENT
Start: 2020-08-26 | End: 2020-08-27 | Stop reason: HOSPADM

## 2020-08-25 RX ORDER — KETOROLAC TROMETHAMINE 30 MG/ML
15 INJECTION, SOLUTION INTRAMUSCULAR; INTRAVENOUS EVERY 6 HOURS PRN
Status: DISCONTINUED | OUTPATIENT
Start: 2020-08-25 | End: 2020-08-27 | Stop reason: HOSPADM

## 2020-08-25 RX ORDER — CEFTRIAXONE 1 G/50ML
1000 INJECTION, SOLUTION INTRAVENOUS EVERY 24 HOURS
Status: DISCONTINUED | OUTPATIENT
Start: 2020-08-25 | End: 2020-08-27 | Stop reason: HOSPADM

## 2020-08-25 RX ORDER — KETOROLAC TROMETHAMINE 30 MG/ML
15 INJECTION, SOLUTION INTRAMUSCULAR; INTRAVENOUS ONCE
Status: COMPLETED | OUTPATIENT
Start: 2020-08-25 | End: 2020-08-25

## 2020-08-25 RX ORDER — MORPHINE SULFATE 4 MG/ML
4 INJECTION, SOLUTION INTRAMUSCULAR; INTRAVENOUS ONCE
Status: COMPLETED | OUTPATIENT
Start: 2020-08-25 | End: 2020-08-25

## 2020-08-25 RX ORDER — DULOXETIN HYDROCHLORIDE 30 MG/1
30 CAPSULE, DELAYED RELEASE ORAL 2 TIMES DAILY
Status: DISCONTINUED | OUTPATIENT
Start: 2020-08-25 | End: 2020-08-27 | Stop reason: HOSPADM

## 2020-08-25 RX ORDER — SODIUM CHLORIDE 9 MG/ML
75 INJECTION, SOLUTION INTRAVENOUS CONTINUOUS
Status: DISCONTINUED | OUTPATIENT
Start: 2020-08-25 | End: 2020-08-27 | Stop reason: HOSPADM

## 2020-08-25 RX ORDER — NICOTINE 21 MG/24HR
1 PATCH, TRANSDERMAL 24 HOURS TRANSDERMAL DAILY
Status: DISCONTINUED | OUTPATIENT
Start: 2020-08-26 | End: 2020-08-25 | Stop reason: SDUPTHER

## 2020-08-25 RX ORDER — HYDROMORPHONE HCL/PF 1 MG/ML
1 SYRINGE (ML) INJECTION ONCE
Status: DISCONTINUED | OUTPATIENT
Start: 2020-08-25 | End: 2020-08-25

## 2020-08-25 RX ADMIN — MORPHINE SULFATE 4 MG: 4 INJECTION INTRAVENOUS at 21:45

## 2020-08-25 RX ADMIN — CEFTRIAXONE 1000 MG: 1 INJECTION, SOLUTION INTRAVENOUS at 18:49

## 2020-08-25 RX ADMIN — KETOROLAC TROMETHAMINE 15 MG: 30 INJECTION, SOLUTION INTRAMUSCULAR at 16:35

## 2020-08-25 RX ADMIN — SODIUM CHLORIDE 125 ML/HR: 0.9 INJECTION, SOLUTION INTRAVENOUS at 18:49

## 2020-08-25 RX ADMIN — IOHEXOL 100 ML: 350 INJECTION, SOLUTION INTRAVENOUS at 15:51

## 2020-08-25 RX ADMIN — KETOROLAC TROMETHAMINE 15 MG: 30 INJECTION, SOLUTION INTRAMUSCULAR; INTRAVENOUS at 20:40

## 2020-08-25 RX ADMIN — FENTANYL CITRATE 50 MCG: 50 INJECTION, SOLUTION INTRAMUSCULAR; INTRAVENOUS at 15:28

## 2020-08-25 RX ADMIN — SODIUM CHLORIDE 1000 ML: 0.9 INJECTION, SOLUTION INTRAVENOUS at 15:28

## 2020-08-25 RX ADMIN — METRONIDAZOLE 500 MG: 500 SOLUTION INTRAVENOUS at 17:20

## 2020-08-25 RX ADMIN — DULOXETINE 30 MG: 30 CAPSULE, DELAYED RELEASE ORAL at 18:49

## 2020-08-25 NOTE — H&P (VIEW-ONLY)
Consultation - GI   Danielito Camejo 54 y o  male MRN: [id number]  Unit/Bed#: -01 Encounter: [id number]    Consults  ASSESSMENT and PLAN    1  Abdominal pain with diarrhea  CT scan with severe ileitis  Infectious enterocolitis? Crohn's disease? Atypical COVID infection? Ischemia? 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    Chief Complaint   Patient presents with    Abdominal Pain     patient presents to the ED with c/o generalized abdominal pain since yesterday, states was seen yesterday in the ED and returns today with worsening pain      Physician Requesting Consult: Lady Wright, DO    HPI Danielito Camejo is a 54y o  year old male who presents with severe abdominal pain and diarrhea for 3 days  He was relatively healthy from a GI standpoint although had issues with perirectal abscess requiring incision and drainage in 2019  He reports that he moves his bowels regularly until about 3 days ago when he started with diarrhea  He reports his stools are watery with small flecks  He reports moving his bowels 5 to 6 times a day  He denies any rectal bleeding or melena  He reports increasing diffuse severe abdominal pain  He reports nausea without vomiting  Denies any dysphagia, odynophagia, early satiety  He denies any obvious fevers or chills  He reports up to 3 days ago was feeling completely healthy  He denies any recent travel or exposure to anybody sick  He denies any antibiotic use    He denies ever being told he had inflammatory bowel disease  He denies any family history of IBD    Historical Information   History reviewed  No pertinent past medical history  Past Surgical History:   Procedure Laterality Date    ABSCESS DRAINAGE      BACK SURGERY  2019     Social History   Social History     Substance and Sexual Activity   Alcohol Use Yes    Frequency: Monthly or less    Drinks per session: 1 or 2    Comment: occasionally      Social History     Substance and Sexual Activity   Drug Use No     Social History     Tobacco Use   Smoking Status Current Every Day Smoker    Packs/day: 0 50    Start date: 2019   Smokeless Tobacco Never Used     History reviewed  No pertinent family history  Meds/Allergies   Current Facility-Administered Medications   Medication Dose Route Frequency    cefTRIAXone (ROCEPHIN) IVPB (premix) 1,000 mg 50 mL  1,000 mg Intravenous Q24H    DULoxetine (CYMBALTA) delayed release capsule 30 mg  30 mg Oral BID    [START ON 8/26/2020] enoxaparin (LOVENOX) subcutaneous injection 40 mg  40 mg Subcutaneous Daily    ketorolac (TORADOL) injection 15 mg  15 mg Intravenous Q6H PRN    [START ON 8/26/2020] metroNIDAZOLE (FLAGYL) IVPB (premix) 500 mg 100 mL  500 mg Intravenous Q8H    [START ON 8/26/2020] nicotine (NICODERM CQ) 14 mg/24hr TD 24 hr patch 14 mg  14 mg Transdermal Daily    [START ON 8/26/2020] pantoprazole (PROTONIX) EC tablet 40 mg  40 mg Oral Daily    sodium chloride 0 9 % infusion  125 mL/hr Intravenous Continuous     Medications Prior to Admission   Medication    DULoxetine (CYMBALTA) 30 mg delayed release capsule       No Known Allergies    PHYSICALEXAM  Blood pressure 140/82, pulse 78, temperature 98 5 °F (36 9 °C), temperature source Oral, resp  rate 18, height 6' (1 829 m), weight 86 5 kg (190 lb 12 8 oz), SpO2 96 %  Body mass index is 25 88 kg/m²  General Appearance: NAD, cooperative, alert  Eyes: Anicteric, PERRLA, EOMI  ENT:  Normocephalic, atraumatic, normal mucosa      Neck:  Supple, symmetrical, trachea midline  Resp:  Clear to auscultation bilaterally; no rales, rhonchi or wheezing; respirations unlabored   CV:  S1 S2, Regular rate and rhythm; no murmur, rub, or gallop  GI:  Soft, mildly distended, mild diffuse abdominal tenderness without rebound or guarding; normal bowel sounds; no masses, no organomegaly   Rectal:  Scarred right buttocks cheek but no obvious for draining fistulous  Musculoskeletal: No cyanosis, clubbing or edema  Normal ROM  Skin:  No jaundice, rashes, or lesions   Heme/Lymph: No palpable cervical lymphadenopathy  Psych: Normal affect, good eye contact  Neuro: No gross deficits, AAOx3    Lab Results   Component Value Date    GLUCOSE 110 02/20/2015    CALCIUM 8 5 08/25/2020     10/03/2016    K 3 4 (L) 08/25/2020    CO2 27 08/25/2020     08/25/2020    BUN 8 08/25/2020    CREATININE 1 05 08/25/2020     Lab Results   Component Value Date    WBC 15 59 (H) 08/25/2020    HGB 17 8 (H) 08/25/2020    HCT 51 5 (H) 08/25/2020    MCV 98 08/25/2020     08/25/2020     Lab Results   Component Value Date    ALT 48 08/25/2020    AST 18 08/25/2020    ALKPHOS 92 08/25/2020    BILITOT 0 8 10/03/2016     No results found for: AMYLASE  Lab Results   Component Value Date    LIPASE 111 08/25/2020     No results found for: IRON, TIBC, FERRITIN  Lab Results   Component Value Date    INR 0 96 02/20/2015       Imaging Studies: I have personally reviewed pertinent reports  EKG, Pathology, and Other Studies: I have personally reviewed pertinent reports  REVIEW OF SYSTEMS:    CONSTITUTIONAL: Denies any fever, chills, rigors, and weight loss  HEENT: No earache or tinnitus  Denies hearing loss or visual disturbances  CARDIOVASCULAR: No chest pain or palpitations  RESPIRATORY: Denies any cough, hemoptysis, shortness of breath or dyspnea on exertion  GASTROINTESTINAL: As noted in the History of Present Illness  GENITOURINARY: No problems with urination   Denies any hematuria or dysuria  NEUROLOGIC: No dizziness or vertigo, denies headaches  MUSCULOSKELETAL: Denies any muscle or joint pain  SKIN: Denies skin rashes or itching  ENDOCRINE: Denies excessive thirst  Denies intolerance to heat or cold  PSYCHOSOCIAL: Denies depression or anxiety  Denies any recent memory loss

## 2020-08-25 NOTE — ED PROVIDER NOTES
History  Chief Complaint   Patient presents with    Abdominal Pain     patient presents to the ED with c/o generalized abdominal pain since yesterday, states was seen yesterday in the ED and returns today with worsening pain      54year-old otherwise healthy male presents emergency department for evaluation of worsening generalized abdominal pain  The patient was evaluated in this emergency department yesterday for similar symptoms, states that since yesterday his diarrhea has improved, although his pain is now rated 10/10, poorly localized, nonradiating, worse when standing upright  Denies associated fever, chills, sweats, nausea, or vomiting  No associated chest pain, shortness of breath, or lower urinary tract symptoms  Yesterday he had basic labs which revealed mild leukocytosis, no imaging was conducted  Patient has not taken any medications today for pain control  Prior abdominal surgery:  Lumbar surgery via abdominal access    On exam, patient is visibly uncomfortable, mildly tachycardic, otherwise well appearing in no significant distress  He does have global wheezes on lung exam however this is likely related to his chronic smoking, denies shortness of breath  Abdomen is mildly distended and diffusely tender, nonrigid, non peritoneal   Pain is poorly localized  Radial pulses are 2+ and symmetric  No peripheral edema  A/P:  Generalized abdominal pain  Given worsening pain will plan to obtain CT for further assessment  Will obtain basic labs including lipase, IV fentanyl for pain          Prior to Admission Medications   Prescriptions Last Dose Informant Patient Reported? Taking? DULoxetine (CYMBALTA) 30 mg delayed release capsule   Yes No   Sig: Take 30 mg by mouth 2 (two) times a day   predniSONE 20 mg tablet   No No   Sig: TAKE 1 TABLET BID X 5 DAYS THEN TAKE 1 TABLET QD FOR 5 DAYS      Facility-Administered Medications: None       History reviewed   No pertinent past medical history  Past Surgical History:   Procedure Laterality Date    ABSCESS DRAINAGE      BACK SURGERY  2019       History reviewed  No pertinent family history  I have reviewed and agree with the history as documented  E-Cigarette/Vaping    E-Cigarette Use Never User      E-Cigarette/Vaping Substances    Nicotine No     THC No     CBD No     Flavoring No     Other No     Unknown No      Social History     Tobacco Use    Smoking status: Current Every Day Smoker     Packs/day: 0 50     Start date: 2019    Smokeless tobacco: Never Used   Substance Use Topics    Alcohol use: Yes     Comment: occasionally     Drug use: No       Review of Systems   Constitutional: Negative for chills, diaphoresis and fever  Eyes: Negative for visual disturbance  Respiratory: Negative for cough and shortness of breath  Cardiovascular: Negative for chest pain and palpitations  Gastrointestinal: Positive for abdominal pain and diarrhea (improved)  Negative for nausea and vomiting  Genitourinary: Negative for dysuria, flank pain and frequency  Musculoskeletal: Negative for arthralgias and myalgias  Skin: Negative for color change, rash and wound  Allergic/Immunologic: Negative for immunocompromised state  Neurological: Negative for dizziness and light-headedness  Hematological: Does not bruise/bleed easily  Psychiatric/Behavioral: Negative for confusion  The patient is not nervous/anxious  Physical Exam  Physical Exam  Vitals signs reviewed  Constitutional:       General: He is not in acute distress  Appearance: He is well-developed  He is not diaphoretic  HENT:      Head: Normocephalic and atraumatic  Mouth/Throat:      Mouth: Mucous membranes are moist    Eyes:      General: No scleral icterus  Pupils: Pupils are equal, round, and reactive to light  Neck:      Vascular: No JVD  Cardiovascular:      Rate and Rhythm: Normal rate and regular rhythm  Heart sounds:  No murmur  No friction rub  No gallop  Pulmonary:      Effort: No respiratory distress  Breath sounds: No wheezing or rales  Abdominal:      Tenderness: There is generalized abdominal tenderness  There is guarding (voluntary)  Negative signs include Johnson's sign and McBurney's sign  Hernia: A hernia is present  Hernia is present in the umbilical area (soft, non incarcerated)  Skin:     General: Skin is warm and dry  Capillary Refill: Capillary refill takes less than 2 seconds  Neurological:      Mental Status: He is alert and oriented to person, place, and time     Psychiatric:         Mood and Affect: Mood normal          Behavior: Behavior normal          Vital Signs  ED Triage Vitals [08/25/20 1456]   Temperature Pulse Respirations Blood Pressure SpO2   97 6 °F (36 4 °C) (!) 106 20 152/91 96 %      Temp Source Heart Rate Source Patient Position - Orthostatic VS BP Location FiO2 (%)   Temporal Monitor Sitting Left arm --      Pain Score       Worst Possible Pain           Vitals:    08/25/20 1456   BP: 152/91   Pulse: (!) 106   Patient Position - Orthostatic VS: Sitting         Visual Acuity      ED Medications  Medications   cefTRIAXone (ROCEPHIN) IVPB (premix) 1,000 mg 50 mL (has no administration in time range)   metroNIDAZOLE (FLAGYL) IVPB (premix) 500 mg 100 mL (500 mg Intravenous New Bag 8/25/20 1720)   sodium chloride 0 9 % bolus 1,000 mL (1,000 mL Intravenous New Bag 8/25/20 1528)   fentanyl citrate (PF) 100 MCG/2ML 50 mcg (50 mcg Intravenous Given 8/25/20 1528)   iohexol (OMNIPAQUE) 350 MG/ML injection (MULTI-DOSE) 100 mL (100 mL Intravenous Given 8/25/20 1551)   ketorolac (TORADOL) injection 15 mg (15 mg Intravenous Given 8/25/20 1635)       Diagnostic Studies  Results Reviewed     Procedure Component Value Units Date/Time    Comprehensive metabolic panel [[id number]]  (Abnormal) Collected:  08/25/20 1522    Lab Status:  Final result Specimen:  Blood from Arm, Right Updated:  08/25/20 1548     Sodium 139 mmol/L      Potassium 3 4 mmol/L      Chloride 103 mmol/L      CO2 27 mmol/L      ANION GAP 9 mmol/L      BUN 8 mg/dL      Creatinine 1 05 mg/dL      Glucose 114 mg/dL      Calcium 8 5 mg/dL      AST 18 U/L      ALT 48 U/L      Alkaline Phosphatase 92 U/L      Total Protein 6 8 g/dL      Albumin 3 6 g/dL      Total Bilirubin 0 70 mg/dL      eGFR 84 ml/min/1 73sq m     Narrative:       National Kidney Disease Foundation guidelines for Chronic Kidney Disease (CKD):     Stage 1 with normal or high GFR (GFR > 90 mL/min/1 73 square meters)    Stage 2 Mild CKD (GFR = 60-89 mL/min/1 73 square meters)    Stage 3A Moderate CKD (GFR = 45-59 mL/min/1 73 square meters)    Stage 3B Moderate CKD (GFR = 30-44 mL/min/1 73 square meters)    Stage 4 Severe CKD (GFR = 15-29 mL/min/1 73 square meters)    Stage 5 End Stage CKD (GFR <15 mL/min/1 73 square meters)  Note: GFR calculation is accurate only with a steady state creatinine    Lipase [[id number]]  (Normal) Collected:  08/25/20 1522    Lab Status:  Final result Specimen:  Blood from Arm, Right Updated:  08/25/20 1548     Lipase 111 u/L     CBC and differential [[id number]]  (Abnormal) Collected:  08/25/20 1522    Lab Status:  Final result Specimen:  Blood from Arm, Right Updated:  08/25/20 1527     WBC 15 59 Thousand/uL      RBC 5 27 Million/uL      Hemoglobin 17 8 g/dL      Hematocrit 51 5 %      MCV 98 fL      MCH 33 8 pg      MCHC 34 6 g/dL      RDW 12 6 %      MPV 9 2 fL      Platelets 355 Thousands/uL      nRBC 0 /100 WBCs      Neutrophils Relative 72 %      Immat GRANS % 0 %      Lymphocytes Relative 18 %      Monocytes Relative 8 %      Eosinophils Relative 2 %      Basophils Relative 0 %      Neutrophils Absolute 11 18 Thousands/µL      Immature Grans Absolute 0 06 Thousand/uL      Lymphocytes Absolute 2 74 Thousands/µL      Monocytes Absolute 1 19 Thousand/µL      Eosinophils Absolute 0 37 Thousand/µL      Basophils Absolute 0 05 Thousands/µL CT abdomen pelvis with contrast   Final Result by Malathi Domínguez MD (08/25 1617)      Severe long segment acute ileitis  Diagnostic considerations include a very long segment of active inflammatory bowel disease such as Crohn's disease or severe infectious ileitis  Linear tract of soft tissue density extending from the left renal margin towards the skin surface of the left buttocks suspicious for perianal fistula  Osseous fusion of sacroiliac joints  The presence of perianal fistula and fusion of sacroiliac    joints would support a diagnosis of chronic Crohn's disease  Hepatomegaly and hepatic steatosis  This examination was marked "immediate notification" in Epic in order to begin the standard process by which the radiology reading room liaison alerts the referring practitioner  Workstation performed: [id number]                    Procedures  Procedures         ED Course  ED Course as of Aug 25 1736   Tue Aug 25, 2020   1626 TigerText sent to Dr Laron Cannon with GI for consultation regarding medical management  Plan to admit for further management      1708 D/W Case with GI on call Dr Greg Mabry, advised that we hold steroids at this time due to presence of perianal fistula  Start IV abx and admit for consultation in the AM          US AUDIT      Most Recent Value   Initial Alcohol Screen: US AUDIT-C    1  How often do you have a drink containing alcohol? 1 Filed at: 08/25/2020 1457   2  How many drinks containing alcohol do you have on a typical day you are drinking? 0 Filed at: 08/25/2020 1457   3a  Male UNDER 65: How often do you have five or more drinks on one occasion? 0 Filed at: 08/25/2020 1457   3b  FEMALE Any Age, or MALE 65+: How often do you have 4 or more drinks on one occassion? 0 Filed at: 08/25/2020 1457   Audit-C Score  1 Filed at: 08/25/2020 1457                  MADHAVI/DAST-10      Most Recent Value   How many times in the past year have you       Used an illegal drug or used a prescription medication for non-medical reasons? 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  Diagnosis management comments: 61-year-old male presents with intractable abdominal pain found to have acute long segment ileitis associated with sacroiliitis and perianal fistula  The case was discussed with GI on-call who recommended IV antibiotics and withholding steroids at this time  Patient will be admitted to medicine for further management       Amount and/or Complexity of Data Reviewed  Clinical lab tests: ordered and reviewed  Tests in the radiology section of CPT®: ordered and reviewed  Tests in the medicine section of CPT®: ordered and reviewed  Review and summarize past medical records: yes  Discuss the patient with other providers: yes  Independent visualization of images, tracings, or specimens: yes          Disposition  Final diagnoses:   Ileitis   Perianal fistula     Time reflects when diagnosis was documented in both MDM as applicable and the Disposition within this note     Time User Action Codes Description Comment    8/25/2020  4:27 PM Lilian Leong Add [K52 9] Ileitis     8/25/2020  4:58 PM Lilian Leong Add [K60 3] Perianal fistula       ED Disposition     ED Disposition Condition Date/Time Comment    Admit Stable Tue Aug 25, 2020  4:58 PM Case was discussed with Dr Kathe Vogel and the patient's admission status was agreed to be Admission Status: inpatient status to the service of Dr Kathe Vogel   Follow-up Information    None         Patient's Medications   Discharge Prescriptions    No medications on file     No discharge procedures on file      PDMP Review     None          ED Provider  Electronically Signed by           Jhon Plunkett PA-C  08/25/20 6046

## 2020-08-25 NOTE — ASSESSMENT & PLAN NOTE
Had onset of pain in past 24 hours with worsening today prompting to come to ER- no prior known hx of crohns disease  CT shows perianal fistula and Ileitis    IV antibioitcs  Day #1 Started on rocephin/ flagyl in er  Consult GI   start on clear liquids  As tolerated

## 2020-08-25 NOTE — ED PROVIDER NOTES
History  Chief Complaint   Patient presents with    Abdominal Pain     pain since this morning  loose stools 6-7x since this morning, took pepto around 8m       72-year-old male presents for evaluation of numerous loose stools with abdominal cramping beginning this morning  The patient states that he has constant nonbloody diarrhea  The patient took Pepto-Bismol without improvement  The patient has had some mild nausea without vomiting  He denies fevers or chills  He denies any trouble with urination  Patient denies any sick contacts or questionable food ingestions  Patient denies any recent travel  The patient denies history of GI problems  None       History reviewed  No pertinent past medical history  Past Surgical History:   Procedure Laterality Date    ABSCESS DRAINAGE      BACK SURGERY  2019       History reviewed  No pertinent family history  I have reviewed and agree with the history as documented  E-Cigarette/Vaping    E-Cigarette Use Never User      E-Cigarette/Vaping Substances    Nicotine No     THC No     CBD No     Flavoring No     Other No     Unknown No      Social History     Tobacco Use    Smoking status: Current Every Day Smoker     Packs/day: 0 50     Start date: 2019    Smokeless tobacco: Never Used   Substance Use Topics    Alcohol use: Yes     Frequency: Monthly or less     Drinks per session: 1 or 2     Comment: occasionally     Drug use: No       Review of Systems   Constitutional: Negative for chills, fatigue and fever  HENT: Negative for sore throat  Respiratory: Negative for cough, chest tightness and shortness of breath  Cardiovascular: Negative for chest pain and palpitations  Gastrointestinal: Positive for abdominal pain, diarrhea and nausea  Negative for constipation and vomiting  Genitourinary: Negative for difficulty urinating and dysuria  Musculoskeletal: Negative for back pain  Skin: Negative for rash     Neurological: Negative for dizziness, seizures, syncope, weakness and headaches  All other systems reviewed and are negative  Physical Exam  Physical Exam  Vitals signs and nursing note reviewed  Constitutional:       General: He is not in acute distress  Appearance: He is well-developed  HENT:      Head: Normocephalic and atraumatic  Right Ear: External ear normal       Left Ear: External ear normal       Mouth/Throat:      Pharynx: No oropharyngeal exudate  Eyes:      General: No scleral icterus  Pupils: Pupils are equal, round, and reactive to light  Neck:      Musculoskeletal: Normal range of motion and neck supple  Cardiovascular:      Rate and Rhythm: Normal rate and regular rhythm  Heart sounds: Normal heart sounds  Pulmonary:      Effort: Pulmonary effort is normal  No respiratory distress  Breath sounds: Normal breath sounds  Abdominal:      General: Bowel sounds are normal       Palpations: Abdomen is soft  Tenderness: There is no abdominal tenderness  There is no guarding or rebound  Musculoskeletal: Normal range of motion  Skin:     General: Skin is warm and dry  Findings: No rash  Neurological:      Mental Status: He is alert and oriented to person, place, and time           Vital Signs  ED Triage Vitals [08/24/20 2149]   Temperature Pulse Respirations Blood Pressure SpO2   97 7 °F (36 5 °C) (!) 125 21 147/81 96 %      Temp Source Heart Rate Source Patient Position - Orthostatic VS BP Location FiO2 (%)   Temporal Monitor Lying Left arm --      Pain Score       Worst Possible Pain           Vitals:    08/24/20 2149 08/24/20 2207 08/24/20 2354   BP: 147/81  140/78   Pulse: (!) 125 (!) 110 100   Patient Position - Orthostatic VS: Lying  Lying         Visual Acuity      ED Medications  Medications   sodium chloride 0 9 % bolus 2,000 mL (0 mL Intravenous Stopped 8/24/20 2354)       Diagnostic Studies  Results Reviewed     Procedure Component Value Units Date/Time Comprehensive metabolic panel [[id number]] Collected:  08/24/20 2204    Lab Status:  Final result Specimen:  Blood from Arm, Right Updated:  08/24/20 2325     Sodium 139 mmol/L      Potassium 3 6 mmol/L      Chloride 104 mmol/L      CO2 25 mmol/L      ANION GAP 10 mmol/L      BUN 13 mg/dL      Creatinine 1 12 mg/dL      Glucose 108 mg/dL      Calcium 8 5 mg/dL      AST 19 U/L      ALT 64 U/L      Alkaline Phosphatase 108 U/L      Total Protein 7 4 g/dL      Albumin 3 9 g/dL      Total Bilirubin 0 80 mg/dL      eGFR 78 ml/min/1 73sq m     Narrative:       National Kidney Disease Foundation guidelines for Chronic Kidney Disease (CKD):     Stage 1 with normal or high GFR (GFR > 90 mL/min/1 73 square meters)    Stage 2 Mild CKD (GFR = 60-89 mL/min/1 73 square meters)    Stage 3A Moderate CKD (GFR = 45-59 mL/min/1 73 square meters)    Stage 3B Moderate CKD (GFR = 30-44 mL/min/1 73 square meters)    Stage 4 Severe CKD (GFR = 15-29 mL/min/1 73 square meters)    Stage 5 End Stage CKD (GFR <15 mL/min/1 73 square meters)  Note: GFR calculation is accurate only with a steady state creatinine    Lipase [[id number]]  (Normal) Collected:  08/24/20 2204    Lab Status:  Final result Specimen:  Blood from Arm, Right Updated:  08/24/20 2325     Lipase 98 u/L     CBC and differential [[id number]]  (Abnormal) Collected:  08/24/20 2204    Lab Status:  Final result Specimen:  Blood from Arm, Right Updated:  08/24/20 2305     WBC 17 45 Thousand/uL      RBC 5 54 Million/uL      Hemoglobin 18 9 g/dL      Hematocrit 53 7 %      MCV 97 fL      MCH 34 1 pg      MCHC 35 2 g/dL      RDW 12 4 %      MPV 9 4 fL      Platelets 479 Thousands/uL      nRBC 0 /100 WBCs      Neutrophils Relative 79 %      Immat GRANS % 1 %      Lymphocytes Relative 12 %      Monocytes Relative 7 %      Eosinophils Relative 1 %      Basophils Relative 0 %      Neutrophils Absolute 13 61 Thousands/µL      Immature Grans Absolute 0 09 Thousand/uL      Lymphocytes Absolute 2 13 Thousands/µL      Monocytes Absolute 1 30 Thousand/µL      Eosinophils Absolute 0 25 Thousand/µL      Basophils Absolute 0 07 Thousands/µL                  No orders to display              Procedures  Procedures         ED Course  ED Course as of Aug 31 1229   Mon Aug 24, 2020   2319 Patient improving upon re-evaluation  2347 Patient improved upon re-evaluation  The patient does not have any tenderness on exam   The plan is for clear liquid diet and advancing as tolerated  Return precautions including fevers, vomiting, worsening abdominal pain or bloody stools were discussed patient verbalized understanding of these discharge instructions and warnings  US AUDIT      Most Recent Value   Initial Alcohol Screen: US AUDIT-C    1  How often do you have a drink containing alcohol? 1 Filed at: 08/24/2020 2151   2  How many drinks containing alcohol do you have on a typical day you are drinking? 0 Filed at: 08/24/2020 2151   3a  Male UNDER 65: How often do you have five or more drinks on one occasion? 0 Filed at: 08/24/2020 2151   3b  FEMALE Any Age, or MALE 65+: How often do you have 4 or more drinks on one occassion? 0 Filed at: 08/24/2020 2151   Audit-C Score  1 Filed at: 08/24/2020 2151                  MADHAVI/DAST-10      Most Recent Value   How many times in the past year have you    Used an illegal drug or used a prescription medication for non-medical reasons?   Never Filed at: 08/24/2020 2151                                MDM  Number of Diagnoses or Management Options  Diarrhea:   Leukocytosis:   Nausea:   Diagnosis management comments: DDx: viral syndrome, diverticulitis, obstruction, enteritis, other       Amount and/or Complexity of Data Reviewed  Clinical lab tests: reviewed          Disposition  Final diagnoses:   Nausea   Diarrhea   Leukocytosis     Time reflects when diagnosis was documented in both MDM as applicable and the Disposition within this note     Time User Action Codes Description Comment    8/24/2020 11:50 PM Dianah Pellant Add [R11 0] Nausea     8/24/2020 11:50 PM Dianah Pellant Add [R19 7] Diarrhea     8/24/2020 11:50 PM Dianah Pellant Add [K17 426] Leukocytosis       ED Disposition     ED Disposition Condition Date/Time Comment    Discharge Stable Mon Aug 24, 2020 11:50  [facility] discharge to home/self care  Follow-up Information     Follow up With Specialties Details Why Contact Info Additional Information    Calli Pavon MD Family Medicine Schedule an appointment as soon as possible for a visit in 3 days For further evaluation, if not improved [id number] [facility] [id number]  [address] [facility] Emergency Department Emergency Medicine Go to  If symptoms worsen [address] [address]  [phone]  ED, [address] [facility], [address]          Discharge Medication List as of 8/24/2020 11:51 PM      CONTINUE these medications which have NOT CHANGED    Details   DULoxetine (CYMBALTA) 30 mg delayed release capsule Take 30 mg by mouth 2 (two) times a day, Historical Med      predniSONE 20 mg tablet TAKE 1 TABLET BID X 5 DAYS THEN TAKE 1 TABLET QD FOR 5 DAYS, Print           No discharge procedures on file      PDMP Review       Value Time User    PDMP Reviewed  Yes 8/27/2020 11:46 AM Ronna Sevilla DO          ED Provider  Electronically Signed by           Danielle Steen DO  08/31/20 7925

## 2020-08-25 NOTE — PLAN OF CARE
Problem: PAIN - ADULT  Goal: Verbalizes/displays adequate comfort level or baseline comfort level  Description: Interventions:  - Encourage patient to monitor pain and request assistance  - Assess pain using appropriate pain scale  - Administer analgesics based on type and severity of pain and evaluate response  - Implement non-pharmacological measures as appropriate and evaluate response  - Consider cultural and social influences on pain and pain management  - Notify physician/advanced practitioner if interventions unsuccessful or patient reports new pain  Outcome: Progressing     Problem: INFECTION - ADULT  Goal: Absence or prevention of progression during hospitalization  Description: INTERVENTIONS:  - Assess and monitor for signs and symptoms of infection  - Monitor lab/diagnostic results  - Monitor all insertion sites, i e  indwelling lines, tubes, and drains  - Monitor endotracheal if appropriate and nasal secretions for changes in amount and color  - [place] appropriate cooling/warming therapies per order  - Administer medications as ordered  - Instruct and encourage patient and family to use good hand hygiene technique  - Identify and instruct in appropriate isolation precautions for identified infection/condition  Outcome: Progressing  Goal: Absence of fever/infection during neutropenic period  Description: INTERVENTIONS:  - Monitor WBC    Outcome: Progressing     Problem: SAFETY ADULT  Goal: Patient will remain free of falls  Description: INTERVENTIONS:  - Assess patient frequently for physical needs  -  Identify cognitive and physical deficits and behaviors that affect risk of falls    -  [place] fall precautions as indicated by assessment   - Educate patient/family on patient safety including physical limitations  - Instruct patient to call for assistance with activity based on assessment  - Modify environment to reduce risk of injury  - Consider OT/PT consult to assist with strengthening/mobility  Outcome: Progressing  Goal: Maintain or return to baseline ADL function  Description: INTERVENTIONS:  -  Assess patient's ability to carry out ADLs; assess patient's baseline for ADL function and identify physical deficits which impact ability to perform ADLs (bathing, care of mouth/teeth, toileting, grooming, dressing, etc )  - Assess/evaluate cause of self-care deficits   - Assess range of motion  - Assess patient's mobility; develop plan if impaired  - Assess patient's need for assistive devices and provide as appropriate  - Encourage maximum independence but intervene and supervise when necessary  - Involve family in performance of ADLs  - Assess for home care needs following discharge   - Consider OT consult to assist with ADL evaluation and planning for discharge  - Provide patient education as appropriate  Outcome: Progressing  Goal: Maintain or return mobility status to optimal level  Description: INTERVENTIONS:  - Assess patient's baseline mobility status (ambulation, transfers, stairs, etc )    - Identify cognitive and physical deficits and behaviors that affect mobility  - Identify mobility aids required to assist with transfers and/or ambulation (gait belt, sit-to-stand, lift, walker, cane, etc )  - [place] fall precautions as indicated by assessment  - Record patient progress and toleration of activity level on Mobility SBAR; progress patient to next Phase/Stage  - Instruct patient to call for assistance with activity based on assessment  - Consider rehabilitation consult to assist with strengthening/weightbearing, etc   Outcome: Progressing     Problem: DISCHARGE PLANNING  Goal: Discharge to home or other facility with appropriate resources  Description: INTERVENTIONS:  - Identify barriers to discharge w/patient and caregiver  - Arrange for needed discharge resources and transportation as appropriate  - Identify discharge learning needs (meds, wound care, etc )  - Arrange for interpretive services to assist at discharge as needed  - Refer to Case Management Department for coordinating discharge planning if the patient needs post-hospital services based on physician/advanced practitioner order or complex needs related to functional status, cognitive ability, or social support system  Outcome: Progressing     Problem: Knowledge Deficit  Goal: Patient/family/caregiver demonstrates understanding of disease process, treatment plan, medications, and discharge instructions  Description: Complete learning assessment and assess knowledge base    Interventions:  - Provide teaching at level of understanding  - Provide teaching via preferred learning methods  Outcome: Progressing     Problem: GASTROINTESTINAL - ADULT  Goal: Minimal or absence of nausea and/or vomiting  Description: INTERVENTIONS:  - Administer IV fluids if ordered to ensure adequate hydration  - Maintain NPO status until nausea and vomiting are resolved  - Nasogastric tube if ordered  - Administer ordered antiemetic medications as needed  - Provide nonpharmacologic comfort measures as appropriate  - Advance diet as tolerated, if ordered  - Consider nutrition services referral to assist patient with adequate nutrition and appropriate food choices  Outcome: Progressing  Goal: Maintains or returns to baseline bowel function  Description: INTERVENTIONS:  - Assess bowel function  - Encourage oral fluids to ensure adequate hydration  - Administer IV fluids if ordered to ensure adequate hydration  - Administer ordered medications as needed  - Encourage mobilization and activity  - Consider nutritional services referral to assist patient with adequate nutrition and appropriate food choices  Outcome: Progressing  Goal: Maintains adequate nutritional intake  Description: INTERVENTIONS:  - Monitor percentage of each meal consumed  - Identify factors contributing to decreased intake, treat as appropriate  - Assist with meals as needed  - Monitor I&O, weight, and lab values if indicated  - Obtain nutrition services referral as needed  Outcome: Progressing

## 2020-08-25 NOTE — H&P
H&P- Claudia Pacheco 1970, 54 y o  male MRN: [id number]    Unit/Bed#: -01 Encounter: [id number]    Primary Care Provider: Bharath Muse MD   Date and time admitted to hospital: 2020  3:15 PM        Crohn's disease of ileum with fistula [facility])  Assessment & Plan  Had onset of pain in past 24 hours with worsening today prompting to come to ER- no prior known hx of crohns disease  CT shows perianal fistula and Ileitis  IV antibioitcs  Day #1 Started on rocephin/ flagyl in er  Consult GI   start on clear liquids  As tolerated    Lumbar post-laminectomy syndrome  Assessment & Plan  Patient had surgery  In 2019- still having significant distal   Lower ext pain    Tobacco use  Assessment & Plan  Will use nicotine patch prn   encouraged in cessation    Congenital fusion of sacroiliac joint  Assessment & Plan  Noted on ct    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 ([address] )  Assessment & Plan  Gi consult   will consult surgery nonurgently to follow        VTE Prophylaxis: ordered    Code Status: as above    Anticipated Length of Stay: as above    Justification for Hospital Stay: see assessment and plan        Chief Complaint:  Abdominal pain    History of Present Illness:    Claudia Pacheco is a 54 y o  male who presents with above chief compaint  Patient had approximately 24 hours of increasing pain the lower abdominal region  He has had loose stools and some nausea but no vomiting  He came to the ER due to the severity of his pain and CT scan shows evidence ileitis as well as probable perianal fistula  He also was noted to have sacroiliac fusion on the CT scan  He denies any melena or hematochezia  He denies a personal history of Crohn's disease or any family history of inflammatory bowel disease or rheumatologic disorders  Review of Systems:    Review of Systems   Constitutional: Negative for fatigue and fever  HENT: Negative for congestion, postnasal drip and sinus pressure  Respiratory: Negative for apnea and shortness of breath  Cardiovascular: Negative for chest pain and palpitations  Gastrointestinal: Positive for abdominal distention, abdominal pain and diarrhea  Negative for constipation  Genitourinary: Negative for difficulty urinating  Neurological: Negative for dizziness and light-headedness  All other systems reviewed and are negative  Past Medical and Surgical History:     History reviewed  No pertinent past medical history  Past Surgical History:   Procedure Laterality Date    ABSCESS DRAINAGE      BACK SURGERY  2019       Meds/Allergies:    Prior to Admission Medications   Prescriptions Last Dose Informant Patient Reported? Taking? DULoxetine (CYMBALTA) 30 mg delayed release capsule   Yes No   Sig: Take 30 mg by mouth 2 (two) times a day   predniSONE 20 mg tablet   No No   Sig: TAKE 1 TABLET BID X 5 DAYS THEN TAKE 1 TABLET QD FOR 5 DAYS      Facility-Administered Medications: None       Allergies: No Known Allergies    Social History:     Social History     Substance and Sexual Activity   Alcohol Use Yes    Comment: occasionally      Social History     Tobacco Use   Smoking Status Current Every Day Smoker    Packs/day: 0 50    Start date: 2019   Smokeless Tobacco Never Used     Social History     Substance and Sexual Activity   Drug Use No       Family History:    History reviewed  No pertinent family history  Physical Exam:     Vitals:   Blood Pressure: 134/98 (08/25/20 1720)  Pulse: 92 (08/25/20 1720)  Temperature: 98 5 °F (36 9 °C) (08/25/20 1720)  Temp Source: Oral (08/25/20 1720)  Respirations: 18 (08/25/20 1720)  Height: 6' (182 9 cm) (08/25/20 1456)  Weight - Scale: 85 3 kg (188 lb) (08/25/20 1456)  SpO2: 97 % (08/25/20 1720)    Physical Exam  Vitals signs and nursing note reviewed  Constitutional:       Appearance: Normal appearance  HENT:      Head: Normocephalic  Eyes:      General: No scleral icterus          Right eye: No discharge  Left eye: No discharge  Pupils: Pupils are equal, round, and reactive to light  Neck:      Musculoskeletal: No neck rigidity  Cardiovascular:      Rate and Rhythm: Normal rate and regular rhythm  Heart sounds: No murmur  Pulmonary:      Effort: No respiratory distress  Breath sounds: No stridor  No rhonchi  Abdominal:      Tenderness: There is abdominal tenderness  There is guarding  Comments: Mild generalized tenderness as well as increase tenderness in the lower quadrants  Some mild fullness with some guarding   Lymphadenopathy:      Cervical: No cervical adenopathy  Neurological:      General: No focal deficit present  Mental Status: He is alert  Additional Data:     Lab Results: I personally reviewed them    Results from last 7 days   Lab Units 08/25/20  1522   WBC Thousand/uL 15 59*   HEMOGLOBIN g/dL 17 8*   HEMATOCRIT % 51 5*   PLATELETS Thousands/uL 198   NEUTROS PCT % 72   LYMPHS PCT % 18   MONOS PCT % 8   EOS PCT % 2     Results from last 7 days   Lab Units 08/25/20  1522   POTASSIUM mmol/L 3 4*   CHLORIDE mmol/L 103   CO2 mmol/L 27   BUN mg/dL 8   CREATININE mg/dL 1 05   CALCIUM mg/dL 8 5   ALK PHOS U/L 92   ALT U/L 48   AST U/L 18           Imaging: I personally reviewed them    CT abdomen pelvis with contrast   Final Result by Ingrid Dillon MD (08/25 1617)      Severe long segment acute ileitis  Diagnostic considerations include a very long segment of active inflammatory bowel disease such as Crohn's disease or severe infectious ileitis  Linear tract of soft tissue density extending from the left renal margin towards the skin surface of the left buttocks suspicious for perianal fistula  Osseous fusion of sacroiliac joints  The presence of perianal fistula and fusion of sacroiliac    joints would support a diagnosis of chronic Crohn's disease  Hepatomegaly and hepatic steatosis        This examination was marked "immediate notification" in Epic in order to begin the standard process by which the radiology reading room liaison alerts the referring practitioner  Workstation performed: [id number]             EKG : I personally reviewed      Gus Silveira DO    ** Please Note: This note has been constructed using a voice recognition system   **

## 2020-08-25 NOTE — CONSULTS
Consultation - GI   Sedrick Huffman 54 y o  male MRN: [id number]  Unit/Bed#: -01 Encounter: [id number]    Consults  ASSESSMENT and PLAN    1  Abdominal pain with diarrhea  CT scan with severe ileitis  Infectious enterocolitis? Crohn's disease? Atypical COVID infection? Ischemia? 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    Chief Complaint   Patient presents with    Abdominal Pain     patient presents to the ED with c/o generalized abdominal pain since yesterday, states was seen yesterday in the ED and returns today with worsening pain      Physician Requesting Consult: DO Jerica    HPI Sedrick Huffman is a 54y o  year old male who presents with severe abdominal pain and diarrhea for 3 days  He was relatively healthy from a GI standpoint although had issues with perirectal abscess requiring incision and drainage in 2019  He reports that he moves his bowels regularly until about 3 days ago when he started with diarrhea  He reports his stools are watery with small flecks  He reports moving his bowels 5 to 6 times a day  He denies any rectal bleeding or melena  He reports increasing diffuse severe abdominal pain  He reports nausea without vomiting  Denies any dysphagia, odynophagia, early satiety  He denies any obvious fevers or chills  He reports up to 3 days ago was feeling completely healthy  He denies any recent travel or exposure to anybody sick  He denies any antibiotic use    He denies ever being told he had inflammatory bowel disease  He denies any family history of IBD    Historical Information   History reviewed  No pertinent past medical history  Past Surgical History:   Procedure Laterality Date    ABSCESS DRAINAGE      BACK SURGERY  2019     Social History   Social History     Substance and Sexual Activity   Alcohol Use Yes    Frequency: Monthly or less    Drinks per session: 1 or 2    Comment: occasionally      Social History     Substance and Sexual Activity   Drug Use No     Social History     Tobacco Use   Smoking Status Current Every Day Smoker    Packs/day: 0 50    Start date: 2019   Smokeless Tobacco Never Used     History reviewed  No pertinent family history  Meds/Allergies   Current Facility-Administered Medications   Medication Dose Route Frequency    cefTRIAXone (ROCEPHIN) IVPB (premix) 1,000 mg 50 mL  1,000 mg Intravenous Q24H    DULoxetine (CYMBALTA) delayed release capsule 30 mg  30 mg Oral BID    [START ON 8/26/2020] enoxaparin (LOVENOX) subcutaneous injection 40 mg  40 mg Subcutaneous Daily    ketorolac (TORADOL) injection 15 mg  15 mg Intravenous Q6H PRN    [START ON 8/26/2020] metroNIDAZOLE (FLAGYL) IVPB (premix) 500 mg 100 mL  500 mg Intravenous Q8H    [START ON 8/26/2020] nicotine (NICODERM CQ) 14 mg/24hr TD 24 hr patch 14 mg  14 mg Transdermal Daily    [START ON 8/26/2020] pantoprazole (PROTONIX) EC tablet 40 mg  40 mg Oral Daily    sodium chloride 0 9 % infusion  125 mL/hr Intravenous Continuous     Medications Prior to Admission   Medication    DULoxetine (CYMBALTA) 30 mg delayed release capsule       No Known Allergies    PHYSICALEXAM  Blood pressure 140/82, pulse 78, temperature 98 5 °F (36 9 °C), temperature source Oral, resp  rate 18, height 6' (1 829 m), weight 86 5 kg (190 lb 12 8 oz), SpO2 96 %  Body mass index is 25 88 kg/m²  General Appearance: NAD, cooperative, alert  Eyes: Anicteric, PERRLA, EOMI  ENT:  Normocephalic, atraumatic, normal mucosa      Neck:  Supple, symmetrical, trachea midline  Resp:  Clear to auscultation bilaterally; no rales, rhonchi or wheezing; respirations unlabored   CV:  S1 S2, Regular rate and rhythm; no murmur, rub, or gallop  GI:  Soft, mildly distended, mild diffuse abdominal tenderness without rebound or guarding; normal bowel sounds; no masses, no organomegaly   Rectal:  Scarred right buttocks cheek but no obvious for draining fistulous  Musculoskeletal: No cyanosis, clubbing or edema  Normal ROM  Skin:  No jaundice, rashes, or lesions   Heme/Lymph: No palpable cervical lymphadenopathy  Psych: Normal affect, good eye contact  Neuro: No gross deficits, AAOx3    Lab Results   Component Value Date    GLUCOSE 110 02/20/2015    CALCIUM 8 5 08/25/2020     10/03/2016    K 3 4 (L) 08/25/2020    CO2 27 08/25/2020     08/25/2020    BUN 8 08/25/2020    CREATININE 1 05 08/25/2020     Lab Results   Component Value Date    WBC 15 59 (H) 08/25/2020    HGB 17 8 (H) 08/25/2020    HCT 51 5 (H) 08/25/2020    MCV 98 08/25/2020     08/25/2020     Lab Results   Component Value Date    ALT 48 08/25/2020    AST 18 08/25/2020    ALKPHOS 92 08/25/2020    BILITOT 0 8 10/03/2016     No results found for: AMYLASE  Lab Results   Component Value Date    LIPASE 111 08/25/2020     No results found for: IRON, TIBC, FERRITIN  Lab Results   Component Value Date    INR 0 96 02/20/2015       Imaging Studies: I have personally reviewed pertinent reports  EKG, Pathology, and Other Studies: I have personally reviewed pertinent reports  REVIEW OF SYSTEMS:    CONSTITUTIONAL: Denies any fever, chills, rigors, and weight loss  HEENT: No earache or tinnitus  Denies hearing loss or visual disturbances  CARDIOVASCULAR: No chest pain or palpitations  RESPIRATORY: Denies any cough, hemoptysis, shortness of breath or dyspnea on exertion  GASTROINTESTINAL: As noted in the History of Present Illness  GENITOURINARY: No problems with urination   Denies any hematuria or dysuria  NEUROLOGIC: No dizziness or vertigo, denies headaches  MUSCULOSKELETAL: Denies any muscle or joint pain  SKIN: Denies skin rashes or itching  ENDOCRINE: Denies excessive thirst  Denies intolerance to heat or cold  PSYCHOSOCIAL: Denies depression or anxiety  Denies any recent memory loss

## 2020-08-26 ENCOUNTER — TELEPHONE (OUTPATIENT)
Dept: ADMINISTRATIVE | Facility: OTHER | Age: 50
End: 2020-08-26

## 2020-08-26 LAB
ALBUMIN SERPL BCP-MCNC: 2.9 G/DL (ref 3.5–5)
ALP SERPL-CCNC: 75 U/L (ref 46–116)
ALT SERPL W P-5'-P-CCNC: 40 U/L (ref 12–78)
ANION GAP SERPL CALCULATED.3IONS-SCNC: 6 MMOL/L (ref 4–13)
AST SERPL W P-5'-P-CCNC: 20 U/L (ref 5–45)
BILIRUB SERPL-MCNC: 0.6 MG/DL (ref 0.2–1)
BUN SERPL-MCNC: 6 MG/DL (ref 5–25)
CALCIUM SERPL-MCNC: 7.5 MG/DL (ref 8.3–10.1)
CHLORIDE SERPL-SCNC: 109 MMOL/L (ref 100–108)
CO2 SERPL-SCNC: 24 MMOL/L (ref 21–32)
CREAT SERPL-MCNC: 0.82 MG/DL (ref 0.6–1.3)
ERYTHROCYTE [DISTWIDTH] IN BLOOD BY AUTOMATED COUNT: 12.6 % (ref 11.6–15.1)
ERYTHROCYTE [SEDIMENTATION RATE] IN BLOOD: 3 MM/HOUR (ref 0–14)
GFR SERPL CREATININE-BSD FRML MDRD: 105 ML/MIN/1.73SQ M
GLUCOSE SERPL-MCNC: 101 MG/DL (ref 65–140)
HCT VFR BLD AUTO: 44.8 % (ref 36.5–49.3)
HGB BLD-MCNC: 15.5 G/DL (ref 12–17)
MCH RBC QN AUTO: 34.1 PG (ref 26.8–34.3)
MCHC RBC AUTO-ENTMCNC: 34.6 G/DL (ref 31.4–37.4)
MCV RBC AUTO: 99 FL (ref 82–98)
PLATELET # BLD AUTO: 146 THOUSANDS/UL (ref 149–390)
PMV BLD AUTO: 9.5 FL (ref 8.9–12.7)
POTASSIUM SERPL-SCNC: 3.8 MMOL/L (ref 3.5–5.3)
PROT SERPL-MCNC: 5.7 G/DL (ref 6.4–8.2)
RBC # BLD AUTO: 4.54 MILLION/UL (ref 3.88–5.62)
SODIUM SERPL-SCNC: 139 MMOL/L (ref 136–145)
WBC # BLD AUTO: 8.67 THOUSAND/UL (ref 4.31–10.16)

## 2020-08-26 PROCEDURE — 85652 RBC SED RATE AUTOMATED: CPT | Performed by: INTERNAL MEDICINE

## 2020-08-26 PROCEDURE — 0DBK8ZX EXCISION OF ASCENDING COLON, VIA NATURAL OR ARTIFICIAL OPENING ENDOSCOPIC, DIAGNOSTIC: ICD-10-PCS | Performed by: INTERNAL MEDICINE

## 2020-08-26 PROCEDURE — 87505 NFCT AGENT DETECTION GI: CPT | Performed by: INTERNAL MEDICINE

## 2020-08-26 PROCEDURE — 0DBP8ZX EXCISION OF RECTUM, VIA NATURAL OR ARTIFICIAL OPENING ENDOSCOPIC, DIAGNOSTIC: ICD-10-PCS | Performed by: INTERNAL MEDICINE

## 2020-08-26 PROCEDURE — 87046 STOOL CULTR AEROBIC BACT EA: CPT | Performed by: INTERNAL MEDICINE

## 2020-08-26 PROCEDURE — 85027 COMPLETE CBC AUTOMATED: CPT | Performed by: INTERNAL MEDICINE

## 2020-08-26 PROCEDURE — 99232 SBSQ HOSP IP/OBS MODERATE 35: CPT | Performed by: INTERNAL MEDICINE

## 2020-08-26 PROCEDURE — 99254 IP/OBS CNSLTJ NEW/EST MOD 60: CPT | Performed by: SURGERY

## 2020-08-26 PROCEDURE — 0DBB8ZX EXCISION OF ILEUM, VIA NATURAL OR ARTIFICIAL OPENING ENDOSCOPIC, DIAGNOSTIC: ICD-10-PCS | Performed by: INTERNAL MEDICINE

## 2020-08-26 PROCEDURE — 80053 COMPREHEN METABOLIC PANEL: CPT | Performed by: INTERNAL MEDICINE

## 2020-08-26 RX ADMIN — METRONIDAZOLE 500 MG: 500 INJECTION, SOLUTION INTRAVENOUS at 17:29

## 2020-08-26 RX ADMIN — DULOXETINE 30 MG: 30 CAPSULE, DELAYED RELEASE ORAL at 17:29

## 2020-08-26 RX ADMIN — METRONIDAZOLE 500 MG: 500 INJECTION, SOLUTION INTRAVENOUS at 02:36

## 2020-08-26 RX ADMIN — PANTOPRAZOLE SODIUM 40 MG: 40 TABLET, DELAYED RELEASE ORAL at 05:31

## 2020-08-26 RX ADMIN — KETOROLAC TROMETHAMINE 15 MG: 30 INJECTION, SOLUTION INTRAMUSCULAR; INTRAVENOUS at 20:08

## 2020-08-26 RX ADMIN — CEFTRIAXONE 1000 MG: 1 INJECTION, SOLUTION INTRAVENOUS at 18:11

## 2020-08-26 RX ADMIN — DULOXETINE 30 MG: 30 CAPSULE, DELAYED RELEASE ORAL at 08:23

## 2020-08-26 RX ADMIN — METRONIDAZOLE 500 MG: 500 INJECTION, SOLUTION INTRAVENOUS at 08:30

## 2020-08-26 RX ADMIN — ENOXAPARIN SODIUM 40 MG: 100 INJECTION SUBCUTANEOUS at 08:23

## 2020-08-26 RX ADMIN — KETOROLAC TROMETHAMINE 15 MG: 30 INJECTION, SOLUTION INTRAMUSCULAR; INTRAVENOUS at 13:27

## 2020-08-26 RX ADMIN — KETOROLAC TROMETHAMINE 15 MG: 30 INJECTION, SOLUTION INTRAMUSCULAR; INTRAVENOUS at 03:46

## 2020-08-26 RX ADMIN — SODIUM CHLORIDE 75 ML/HR: 0.9 INJECTION, SOLUTION INTRAVENOUS at 13:22

## 2020-08-26 RX ADMIN — POLYETHYLENE GLYCOL 3350, SODIUM SULFATE ANHYDROUS, SODIUM BICARBONATE, SODIUM CHLORIDE, POTASSIUM CHLORIDE 4000 ML: 236; 22.74; 6.74; 5.86; 2.97 POWDER, FOR SOLUTION ORAL at 16:25

## 2020-08-26 NOTE — QUICK NOTE
Discussed with surgery and patient's wife  Upon further questioning the patient actually moves his bowels 5 to 6 times a day  Therefore he probably has some component of chronic GI issues  Discussed with patient  Will proceed with colonoscopy tomorrow to completely exclude inflammatory bowel disease    The patient is agreeable

## 2020-08-26 NOTE — UTILIZATION REVIEW
Initial Clinical Review    Admission: Date/Time/Statement:   Admission Orders (From admission, onward)     Ordered        08/25/20 1657  Inpatient Admission (expected length of stay for this patient Order details is greater than two midnights)  Once                   Orders Placed This Encounter   Procedures    Inpatient Admission (expected length of stay for this patient Order details is greater than two midnights)     Standing Status:   Standing     Number of Occurrences:   1     Order Specific Question:   Admitting Physician     Answer:   Job Shields [[id number]]     Order Specific Question:   Level of Care     Answer:   Med Surg [[id number]]     Order Specific Question:   Estimated length of stay     Answer:   More than 2 Midnights     Order Specific Question:   Certification     Answer:   I certify that inpatient services are medically necessary for this patient for a duration of greater than two midnights  See H&P and MD Progress Notes for additional information about the patient's course of treatment  ED Arrival Information     Expected Arrival Acuity Means of Arrival Escorted By Service Admission Type    - 8/25/2020 14:12 Urgent Walk-In Self General Medicine Urgent    Arrival Complaint    abd pain        Chief Complaint   Patient presents with    Abdominal Pain     patient presents to the ED with c/o generalized abdominal pain since yesterday, states was seen yesterday in the ED and returns today with worsening pain      Assessment/Plan: 54year old male, presented to the ED @ [facility] from home via walk in  Admitted as Inpatient due to Abdominal Pain  Patient had approximately 24 hours of increasing pain the lower abdominal region  He has had loose stools and some nausea but no vomiting  He came to the ER due to the severity of his pain and CT scan shows evidence ileitis as well as probable perianal fistula  He also was noted to have sacroiliac fusion on the CT scan   + for abd distention  Diarrhea  Consult GI  IV Abx  Start on clear liquids, advance diet as tolerated  08/25/2020  Consult GI:  Abd pain with diarrhea  Clear liquid diet  IV abx  Check stool studies  If no improvement and stool studies negative will need colonoscopy to evaluate for inflammatory bowel disease  08/26/2020  Consult General Surgery:  No indication for surgical intervention at this time  ED Triage Vitals [08/25/20 1456]   Temperature Pulse Respirations Blood Pressure SpO2   97 6 °F (36 4 °C) (!) 106 20 152/91 96 %      Temp Source Heart Rate Source Patient Position - Orthostatic VS BP Location FiO2 (%)   Temporal Monitor Sitting Left arm --      Pain Score       Worst Possible Pain          Wt Readings from Last 1 Encounters:   08/26/20 86 5 kg (190 lb 11 2 oz)     Additional Vital Signs:   Date/Time   Temp   Pulse   Resp   BP   MAP (mmHg)   SpO2   O2 Device   Patient Position - Orthostatic VS    08/26/20 0733   97 5 °F (36 4 °C)   83   19   147/82      96 %   None (Room air)   Sitting    08/25/20 2204   98 9 °F (37 2 °C)   90   20   137/79   100   100 %   None (Room air)   Lying    08/25/20 1856      78      140/82      96 %   None (Room air)   Lying - Orthostatic VS    08/25/20 1855      103      144/87      96 %   None (Room air)   Sitting - Orthostatic VS    08/25/20 1854      94      137/88      97 %   None (Room air)   Standing - Orthostatic VS    08/25/20 1720   98 5 °F (36 9 °C)   92   18   134/98   113   97 %   None (Room air)   Sitting     08/25/2020 @ 1607  CT abd/pel:  Severe long segment acute ileitis   Diagnostic considerations include a very long segment of active inflammatory bowel disease such as Crohn's disease or severe infectious ileitis     Linear tract of soft tissue density extending from the left renal margin towards the skin surface of the left buttocks suspicious for perianal fistula   Osseous fusion of sacroiliac joints   The presence of perianal fistula and fusion of sacroiliac joints would support a diagnosis of chronic Crohn's disease  Hepatomegaly and hepatic steatosis       Pertinent Labs/Diagnostic Test Results:   Results from last 7 days   Lab Units 08/25/20  1940   SARS-COV-2  Negative     Results from last 7 days   Lab Units 08/26/20  0528 08/25/20  1522 08/24/20  2204   WBC Thousand/uL 8 67 15 59* 17 45*   HEMOGLOBIN g/dL 15 5 17 8* 18 9*   HEMATOCRIT % 44 8 51 5* 53 7*   PLATELETS Thousands/uL 146* 198 225   NEUTROS ABS Thousands/µL  --  11 18* 13 61*     Results from last 7 days   Lab Units 08/26/20  0528 08/25/20  1522 08/24/20  2204   SODIUM mmol/L 139 139 139   POTASSIUM mmol/L 3 8 3 4* 3 6   CHLORIDE mmol/L 109* 103 104   CO2 mmol/L 24 27 25   ANION GAP mmol/L 6 9 10   BUN mg/dL 6 8 13   CREATININE mg/dL 0 82 1 05 1 12   EGFR ml/min/1 73sq m 105 84 78   CALCIUM mg/dL 7 5* 8 5 8 5     Results from last 7 days   Lab Units 08/26/20  0528 08/25/20  1522 08/24/20  2204   AST U/L 20 18 19   ALT U/L 40 48 64   ALK PHOS U/L 75 92 108   TOTAL PROTEIN g/dL 5 7* 6 8 7 4   ALBUMIN g/dL 2 9* 3 6 3 9   TOTAL BILIRUBIN mg/dL 0 60 0 70 0 80     Results from last 7 days   Lab Units 08/26/20  0528 08/25/20  1522 08/24/20  2204   GLUCOSE RANDOM mg/dL 101 114 108     Results from last 7 days   Lab Units 08/25/20  1522 08/24/20  2204   LIPASE u/L 111 98     Results from last 7 days   Lab Units 08/26/20  0758   SED RATE mm/hour 3     ED Treatment:   Medication Administration from 08/25/2020 1412 to 08/25/2020 1742       Date/Time Order Dose Route Action     08/25/2020 1528 sodium chloride 0 9 % bolus 1,000 mL 1,000 mL Intravenous New Bag     08/25/2020 1528 fentanyl citrate (PF) 100 MCG/2ML 50 mcg 50 mcg Intravenous Given     08/25/2020 1551 iohexol (OMNIPAQUE) 350 MG/ML injection (MULTI-DOSE) 100 mL 100 mL Intravenous Given     08/25/2020 1635 ketorolac (TORADOL) injection 15 mg 15 mg Intravenous Given     08/25/2020 1720 metroNIDAZOLE (FLAGYL) IVPB (premix) 500 mg 100 mL 500 mg Intravenous New Bag        History reviewed  No pertinent past medical history  Present on Admission:   Crohn's disease of ileum with fistula ([facility] [address] )   Lumbar post-laminectomy syndrome   Tobacco use   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    Admitting Diagnosis: 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]  Ileitis [K52 9]  Abdominal pain [R10 9]  Age/Sex: 54 y o  male  Admission Orders:  Scheduled Medications:  cefTRIAXone, 1,000 mg, Intravenous, Q24H  DULoxetine, 30 mg, Oral, BID  enoxaparin, 40 mg, Subcutaneous, Daily  metroNIDAZOLE, 500 mg, Intravenous, Q8H  nicotine, 14 mg, Transdermal, Daily  pantoprazole, 40 mg, Oral, Daily    Continuous IV Infusions:  sodium chloride, 75 mL/hr, Intravenous, Continuous    PRN Meds:  ketorolac, 15 mg, Intravenous, Q6H PRN    Daily weight / I&O  IP CONSULT TO ACUTE CARE SURGERY  IP CONSULT TO GASTROENTEROLOGY    Network Utilization Review Department  [email or web address]  ATTENTION: Please call with any questions or concerns to [phone] and carefully listen to the prompts so that you are directed to the right person  All voicemails are confidential   [place] all requests for admission clinical reviews, approved or denied determinations and any other requests to dedicated fax number below belonging to the campus where the patient is receiving treatment   List of dedicated fax numbers for the Facilities:  FACILITY NAME UR FAX NUMBER   ADMISSION DENIALS (Administrative/Medical Necessity) [phone]   [address] (Maternity/NICU/Pediatrics) [phone]   [facility]ines [phone]   [facility] [phone]   [facility] [phone]   [address] [phone]   [facility] [address] [facility] [address]  [place] [phone]

## 2020-08-26 NOTE — TELEPHONE ENCOUNTER
[facility]    ED Visit Information     Ed visit date: 8/24/20  Diagnosis Description: nausea & diarrhea  In Network? Yes [place]  Discharge status: Admitted  Discharged with meds ? NA  Number of ED visits to date: 2  ED Severity:NA     Outreach Information    Outreach successful: N/A  Date letter mailed:NA  Date Finalized:8/26/20    Care Coordination      Transportation issues ?  No    Value Consolidated [place]

## 2020-08-26 NOTE — CONSULTS
Consultation - General Surgery   Marjan Multani 54 y o  male MRN: [id number]  Unit/Bed#: -01 Encounter: [id number]    Assessment/Plan   Acute Ileitis:  -CTAP shows long acute segment ileitis, perianal fistula, osseous fusion of sacroiliac joint  -CT findings suggest Crohn's disease  -denies any known previous history of Crohn's disease or IBD, does admit to chronic diarrhea  -stool studies pending, follow-up  -patient remains afebrile without leukocytosis, no elevated ESR  -patient feeling better today  Much less abdominal pain  Passing flatus and having loose bowel movements, no suggestion of bowel obstruction  -okay to advance diet  -GI on board  Continue workup  R/O crohn's  Will need colonoscopy  -continue IV antibiotics  -No indication for surgical intervention    Partial small-bowel obstruction  -likely related to inflammation from above  -patient's abdomen is benign, he is passing flatus and having bowel movements  -no indication of bowel obstruction, okay to advance diet  -no surgical intervention    Anal fistula:  -History of perirectal abscess in 2019 s/p I&D   -CT shows perianal fistula extending from left renal margin to left buttocks  -Patient denies symptoms related to the fistula at this time  -Will defer to [place]-rectal, patient has seen colorectal surgery in the past for I&D    Tobacco abuse:  -Encourage cessation  -Nicotine patch in place      History of Present Illness     HPI:  Marjan Multani is a 54 y o  male with a history of perianal abscess and chronic diarrhea who presented to the ED complaining of generalized abdominal pain and diarrhea for three days  Patient was feeling completely healthy up until 72 hours ago at which point he had increasing pain, frequent loose stools, and nausea  He denies any fever, chills, weight changes, vomiting, chest pain, shortness of breath, recent sick contacts, recent travel, recent antibiotic use, or urinary symptoms   Patient has a history of perirectal abscess in 2019 that resolved following I&D  He did see colorectal surgery at that time  He has not been seen by GI as an outpatient and has never received a diagnosis of IBD  Inpatient consult to Acute Care Surgery  Consult performed by: Kenney Salas PA-C  Consult ordered by: Geovani Ríos DO        Review of Systems   Constitutional: Negative  Negative for appetite change, chills, fever and unexpected weight change  HENT: Negative  Eyes: Negative  Respiratory: Negative  Negative for cough, chest tightness and shortness of breath  Cardiovascular: Negative  Negative for chest pain and leg swelling  Gastrointestinal: Positive for abdominal pain and diarrhea  Negative for blood in stool, nausea, rectal pain and vomiting  Endocrine: Negative  Genitourinary: Negative  Negative for difficulty urinating, dysuria, flank pain and frequency  Musculoskeletal: Negative  Negative for myalgias  Skin: Negative  Negative for rash  Allergic/Immunologic: Negative  Neurological: Negative for dizziness, syncope and light-headedness  Hematological: Negative  Does not bruise/bleed easily  Psychiatric/Behavioral: Negative  All other systems reviewed and are negative  Historical Information   History reviewed  No pertinent past medical history    Past Surgical History:   Procedure Laterality Date    ABSCESS DRAINAGE      BACK SURGERY  2019     Social History   Social History     Substance and Sexual Activity   Alcohol Use Yes    Frequency: Monthly or less    Drinks per session: 1 or 2    Comment: occasionally      Social History     Substance and Sexual Activity   Drug Use No     E-Cigarette/Vaping    E-Cigarette Use Never User      E-Cigarette/Vaping Substances    Nicotine No     THC No     CBD No     Flavoring No     Other No     Unknown No      Social History     Tobacco Use   Smoking Status Current Every Day Smoker    Packs/day: 0 50    Start date: 2019 Smokeless Tobacco Never Used     Family History: non-contributory    Meds/Allergies   all current active meds have been reviewed  No Known Allergies    Objective   First Vitals:   Blood Pressure: 152/91 (08/25/20 1456)  Pulse: (!) 106 (08/25/20 1456)  Temperature: 97 6 °F (36 4 °C) (08/25/20 1456)  Temp Source: Temporal (08/25/20 1456)  Respirations: 20 (08/25/20 1456)  Height: 6' (182 9 cm) (08/25/20 1456)  Weight - Scale: 85 3 kg (188 lb) (08/25/20 1456)  SpO2: 96 % (08/25/20 1456)    Current Vitals:   Blood Pressure: 147/82 (08/26/20 0733)  Pulse: 83 (08/26/20 0733)  Temperature: 97 5 °F (36 4 °C) (08/26/20 0733)  Temp Source: Oral (08/26/20 0733)  Respirations: 19 (08/26/20 0733)  Height: 6' (182 9 cm) (08/25/20 1720)  Weight - Scale: 86 5 kg (190 lb 11 2 oz) (08/26/20 0600)  SpO2: 96 % (08/26/20 0733)    No intake or output data in the 24 hours ending 08/26/20 0923    Invasive Devices     Peripheral Intravenous Line            Peripheral IV 08/25/20 Right Antecubital less than 1 day                Physical Exam  Constitutional:       General: He is not in acute distress  Appearance: He is well-developed  He is not diaphoretic  HENT:      Head: Normocephalic and atraumatic  Mouth/Throat:      Pharynx: No oropharyngeal exudate  Eyes:      General: No scleral icterus  Right eye: No discharge  Left eye: No discharge  Neck:      Musculoskeletal: Normal range of motion and neck supple  Thyroid: No thyromegaly  Vascular: No JVD  Trachea: No tracheal deviation  Comments: Trachea midline    Cardiovascular:      Rate and Rhythm: Normal rate and regular rhythm  Heart sounds: Normal heart sounds  No murmur  Pulmonary:      Effort: Pulmonary effort is normal  No respiratory distress  Breath sounds: Wheezing present  Comments: Expiratory wheezing  Abdominal:      General: Bowel sounds are increased  Palpations: Abdomen is soft        Comments: Mild tenderness right abdomen, mild distension, no rebound or guarding, active bowel sounds  Well-healed transverse surgical scar in the hypogastric region  Musculoskeletal: Normal range of motion  General: No deformity  Skin:     General: Skin is warm and dry  Findings: No rash  Neurological:      Mental Status: He is alert and oriented to person, place, and time  Comments: No focal deficits   Psychiatric:         Behavior: Behavior normal          Lab Results:   CBC:   Lab Results   Component Value Date    WBC 8 67 08/26/2020    HGB 15 5 08/26/2020    HCT 44 8 08/26/2020    MCV 99 (H) 08/26/2020     (L) 08/26/2020    MCH 34 1 08/26/2020    MCHC 34 6 08/26/2020    RDW 12 6 08/26/2020    MPV 9 5 08/26/2020    NRBC 0 08/25/2020   , CMP:   Lab Results   Component Value Date    SODIUM 139 08/26/2020    K 3 8 08/26/2020     (H) 08/26/2020    CO2 24 08/26/2020    BUN 6 08/26/2020    CREATININE 0 82 08/26/2020    CALCIUM 7 5 (L) 08/26/2020    AST 20 08/26/2020    ALT 40 08/26/2020    ALKPHOS 75 08/26/2020    EGFR 105 08/26/2020   , Urinalysis: No results found for: Ferd Hurry, SPECGRAV, PHUR, LEUKOCYTESUR, NITRITE, PROTEINUA, GLUCOSEU, KETONESU, BILIRUBINUR, BLOODU  Imaging: I have personally reviewed pertinent reports  CT A/P:  IMPRESSION:     Severe long segment acute ileitis  Diagnostic considerations include a very long segment of active inflammatory bowel disease such as Crohn's disease or severe infectious ileitis      Linear tract of soft tissue density extending from the left renal margin towards the skin surface of the left buttocks suspicious for perianal fistula  Osseous fusion of sacroiliac joints    The presence of perianal fistula and fusion of sacroiliac   joints would support a diagnosis of chronic Crohn's disease      Hepatomegaly and hepatic steatosis      This examination was marked "immediate notification" in Epic in order to begin the standard process by which the radiology reading room liaison alerts the referring practitioner  EKG, Pathology, and Other Studies: I have personally reviewed pertinent reports        oTrito Duncan PA-C

## 2020-08-26 NOTE — PROGRESS NOTES
Progress note - Gastroenterology   Nik Greenberg 54 y o  male MRN: [id number]  Unit/Bed#: -01 Encounter: [id number]    ASSESSMENT and PLAN       1  Abdominal pain with diarrhea  CT scan with severe ileitis  Infectious enterocolitis? Crohn's disease? Atypical COVID infection? Ischemia? Although the history of perianal fistula and ileitis put Crohn's disease in the differential, the time course of only having GI symptoms for 3 days would be a little atypical   CBC/ESR normal   COVID negative  Stool studies pending    - continue Rocephin/Flagyl  - stool study sent this morning  - advanced diet to low residue  -  follow exam  - hopefully discharge tomorrow if continues to improve  - if no improvement and stool studies negative will need colonoscopy to evaluate for inflammatory bowel disease  Should have outpatient colonoscopy in 2 months if this acute illness resolves     2  History of perirectal abscess  S/P I&D 2019  Questionable perianal fistula seen on CT scan    Chief Complaint   Patient presents with    Abdominal Pain     patient presents to the ED with c/o generalized abdominal pain since yesterday, states was seen yesterday in the ED and returns today with worsening pain        SUBJECTIVE/HPI   Took a dose of morphine last night but has not had any abdominal pain since  Diarrhea improving    Hungry    /82 (BP Location: Left arm)   Pulse 83   Temp 97 5 °F (36 4 °C) (Oral)   Resp 19   Ht 6' (1 829 m)   Wt 86 5 kg (190 lb 11 2 oz)   SpO2 96%   BMI 25 86 kg/m²     PHYSICALEXAM  General appearance: alert, appears stated age and cooperative  Eyes: PERLLA, EOMI, no icterus   Head: Normocephalic, without obvious abnormality, atraumatic  Lungs: clear to auscultation bilaterally  Heart: regular rate and rhythm, S1, S2 normal, no murmur, click, rub or gallop  Abdomen: soft, non-tender; slightly distended, bowel sounds normal; no masses,  no organomegaly  Extremities: extremities normal, atraumatic, no cyanosis or edema  Neurologic: Grossly normal    Lab Results   Component Value Date    GLUCOSE 110 02/20/2015    CALCIUM 7 5 (L) 08/26/2020     10/03/2016    K 3 8 08/26/2020    CO2 24 08/26/2020     (H) 08/26/2020    BUN 6 08/26/2020    CREATININE 0 82 08/26/2020     Lab Results   Component Value Date    WBC 8 67 08/26/2020    HGB 15 5 08/26/2020    HCT 44 8 08/26/2020    MCV 99 (H) 08/26/2020     (L) 08/26/2020     Lab Results   Component Value Date    ALT 40 08/26/2020    AST 20 08/26/2020    ALKPHOS 75 08/26/2020    BILITOT 0 8 10/03/2016     No results found for: AMYLASE  Lab Results   Component Value Date    LIPASE 111 08/25/2020     No results found for: IRON, TIBC, FERRITIN  Lab Results   Component Value Date    INR 0 96 02/20/2015

## 2020-08-26 NOTE — PLAN OF CARE
Problem: PAIN - ADULT  Goal: Verbalizes/displays adequate comfort level or baseline comfort level  Description: Interventions:  - Encourage patient to monitor pain and request assistance  - Assess pain using appropriate pain scale  - Administer analgesics based on type and severity of pain and evaluate response  - Implement non-pharmacological measures as appropriate and evaluate response  - Consider cultural and social influences on pain and pain management  - Notify physician/advanced practitioner if interventions unsuccessful or patient reports new pain  Outcome: Progressing     Problem: INFECTION - ADULT  Goal: Absence or prevention of progression during hospitalization  Description: INTERVENTIONS:  - Assess and monitor for signs and symptoms of infection  - Monitor lab/diagnostic results  - Monitor all insertion sites, i e  indwelling lines, tubes, and drains  - Monitor endotracheal if appropriate and nasal secretions for changes in amount and color  - [place] appropriate cooling/warming therapies per order  - Administer medications as ordered  - Instruct and encourage patient and family to use good hand hygiene technique  - Identify and instruct in appropriate isolation precautions for identified infection/condition  Outcome: Progressing  Goal: Absence of fever/infection during neutropenic period  Description: INTERVENTIONS:  - Monitor WBC    Outcome: Progressing     Problem: SAFETY ADULT  Goal: Patient will remain free of falls  Description: INTERVENTIONS:  - Assess patient frequently for physical needs  -  Identify cognitive and physical deficits and behaviors that affect risk of falls    -  [place] fall precautions as indicated by assessment   - Educate patient/family on patient safety including physical limitations  - Instruct patient to call for assistance with activity based on assessment  - Modify environment to reduce risk of injury  - Consider OT/PT consult to assist with strengthening/mobility  Outcome: Progressing  Goal: Maintain or return to baseline ADL function  Description: INTERVENTIONS:  -  Assess patient's ability to carry out ADLs; assess patient's baseline for ADL function and identify physical deficits which impact ability to perform ADLs (bathing, care of mouth/teeth, toileting, grooming, dressing, etc )  - Assess/evaluate cause of self-care deficits   - Assess range of motion  - Assess patient's mobility; develop plan if impaired  - Assess patient's need for assistive devices and provide as appropriate  - Encourage maximum independence but intervene and supervise when necessary  - Involve family in performance of ADLs  - Assess for home care needs following discharge   - Consider OT consult to assist with ADL evaluation and planning for discharge  - Provide patient education as appropriate  Outcome: Progressing  Goal: Maintain or return mobility status to optimal level  Description: INTERVENTIONS:  - Assess patient's baseline mobility status (ambulation, transfers, stairs, etc )    - Identify cognitive and physical deficits and behaviors that affect mobility  - Identify mobility aids required to assist with transfers and/or ambulation (gait belt, sit-to-stand, lift, walker, cane, etc )  - [place] fall precautions as indicated by assessment  - Record patient progress and toleration of activity level on Mobility SBAR; progress patient to next Phase/Stage  - Instruct patient to call for assistance with activity based on assessment  - Consider rehabilitation consult to assist with strengthening/weightbearing, etc   Outcome: Progressing     Problem: DISCHARGE PLANNING  Goal: Discharge to home or other facility with appropriate resources  Description: INTERVENTIONS:  - Identify barriers to discharge w/patient and caregiver  - Arrange for needed discharge resources and transportation as appropriate  - Identify discharge learning needs (meds, wound care, etc )  - Arrange for interpretive services to assist at discharge as needed  - Refer to Case Management Department for coordinating discharge planning if the patient needs post-hospital services based on physician/advanced practitioner order or complex needs related to functional status, cognitive ability, or social support system  Outcome: Progressing     Problem: Knowledge Deficit  Goal: Patient/family/caregiver demonstrates understanding of disease process, treatment plan, medications, and discharge instructions  Description: Complete learning assessment and assess knowledge base    Interventions:  - Provide teaching at level of understanding  - Provide teaching via preferred learning methods  Outcome: Progressing     Problem: GASTROINTESTINAL - ADULT  Goal: Minimal or absence of nausea and/or vomiting  Description: INTERVENTIONS:  - Administer IV fluids if ordered to ensure adequate hydration  - Maintain NPO status until nausea and vomiting are resolved  - Nasogastric tube if ordered  - Administer ordered antiemetic medications as needed  - Provide nonpharmacologic comfort measures as appropriate  - Advance diet as tolerated, if ordered  - Consider nutrition services referral to assist patient with adequate nutrition and appropriate food choices  Outcome: Progressing  Goal: Maintains or returns to baseline bowel function  Description: INTERVENTIONS:  - Assess bowel function  - Encourage oral fluids to ensure adequate hydration  - Administer IV fluids if ordered to ensure adequate hydration  - Administer ordered medications as needed  - Encourage mobilization and activity  - Consider nutritional services referral to assist patient with adequate nutrition and appropriate food choices  Outcome: Progressing  Goal: Maintains adequate nutritional intake  Description: INTERVENTIONS:  - Monitor percentage of each meal consumed  - Identify factors contributing to decreased intake, treat as appropriate  - Assist with meals as needed  - Monitor I&O, weight, and lab values if indicated  - Obtain nutrition services referral as needed  Outcome: Progressing

## 2020-08-26 NOTE — ASSESSMENT & PLAN NOTE
Presumed Crohn's disease-GI input appreciated in differential is infectious etiologies versus inflammatory   Had abdominal pain and loose stools in the 24 hours prior to admission with worsening on August 25, 2020 prompting to come to ER- no prior known hx of crohns disease  CT shows perianal fistula and Ileitis    IV antibioitcs  Day #2- Started on rocephin/ flagyl in er  GI follow   staredt on clear liquids-will decrease IV fluid rate and diet as per GI team

## 2020-08-26 NOTE — PROGRESS NOTES
Progress Note - Kiet Leyva 1970, 54 y o  male MRN: [id number]    Unit/Bed#: -01 Encounter: [id number]    Primary Care Provider: Dhruv Borden MD   Date and time admitted to hospital: 2020  3:15 PM        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 [facility])  Assessment & Plan  Gi consult   will consult surgery nonurgently to follow    * Crohn's disease of ileum with fistula [facility])  Assessment & Plan  Had onset of pain in past 24 hours with worsening on 2020 prompting to come to ER- no prior known hx of crohns disease  CT shows perianal fistula and Ileitis  IV antibioitcs  Day #2- Started on rocephin/ flagyl in er  GI follow   staredt on clear liquids-will decrease IV fluid rate    Lumbar post-laminectomy syndrome  Assessment & Plan  Patient had surgery  In - having ongoing lower extremity pain    Tobacco use  Assessment & Plan  Will use nicotine patch prn   encouraged in cessation        VTE Prophylaxis: in place    Patient Centered Rounds: I rounded with patient's nurse    Current Length of Stay: 1 day(s)    Current Patient Status: Inpatient    Certification Statement: Pt requires additional inpatient hospital stay due to: see assessment and plan    Diet is being increased and hopefully if more stable possible discharge tomorrow with outpatient colonoscopy in 2 months    Subjective:   Less pain but still has some bloating today in the lower abdomen  Denies any bloody stools    All other ROS are negative    Objective:     Vitals:   Temp (24hrs), Av 1 °F (36 7 °C), Min:97 5 °F (36 4 °C), Max:98 9 °F (37 2 °C)    Temp:  [97 5 °F (36 4 °C)-98 9 °F (37 2 °C)] 97 5 °F (36 4 °C)  HR:  [] 83  Resp:  [18-20] 19  BP: (134-152)/(79-98) 147/82  SpO2:  [96 %-100 %] 96 %  Body mass index is 25 86 kg/m²       Input and Output Summary (last 24 hours):     No intake or output data in the 24 hours ending 20 1045    Physical Exam:     Physical Exam  Vitals signs and nursing note reviewed  Constitutional:       General: He is not in acute distress  Appearance: He is ill-appearing  HENT:      Nose: Nose normal  No congestion or rhinorrhea  Eyes:      General:         Right eye: No discharge  Left eye: No discharge  Pupils: Pupils are equal, round, and reactive to light  Cardiovascular:      Rate and Rhythm: Normal rate and regular rhythm  Pulmonary:      Effort: No respiratory distress  Breath sounds: No wheezing  Abdominal:      General: There is distension  Tenderness: There is no guarding or rebound  Musculoskeletal:         General: No swelling or deformity  Skin:     Coloration: Skin is not jaundiced  Findings: No erythema or rash  Neurological:      Mental Status: He is alert  Psychiatric:         Mood and Affect: Mood normal              I personally reviewed labs and imaging reports for today  Last 24 Hours Medication List:   Current Facility-Administered Medications   Medication Dose Route Frequency Provider Last Rate    cefTRIAXone  1,000 mg Intravenous Q24H Cammie Fly, DO 1,000 mg (08/25/20 1849)    DULoxetine  30 mg Oral BID Pratik Del Valle DO      enoxaparin  40 mg Subcutaneous Daily Cammie Fly, DO      ketorolac  15 mg Intravenous Q6H PRN Pratik Del Valle DO      metroNIDAZOLE  500 mg Intravenous Q8H Cammie Fly,  mg (08/26/20 0830)    nicotine  14 mg Transdermal Daily Cammie Fly, DO      pantoprazole  40 mg Oral Daily Pratik Del Valle DO      sodium chloride  75 mL/hr Intravenous Continuous Fabricio Elizabeth MD 75 mL/hr (08/26/20 0857)          Today, Patient Was Seen By: Pratik Del Valle DO    ** Please Note: Dictation voice to text software may have been used in the creation of this document   **

## 2020-08-26 NOTE — CASE MANAGEMENT
LOS: 1 day  Pt is not a documented bundle  Pt is not a 30 day readmission  Unplanned readmission score is 8 and green  Met with Pt  Pt presents AA&Ox3  Pt's wife at bedside  Discussed role of , discharge planning, identifying help at home and discharge preference  Pt reports he lives [place] his wife and 2 children in [place], 1 de[place]  Pt reports he is independent with adls and ambulation  Pt reports he uses [facility] in [facility], has prescription plan and is able to afford medications  Pt reports his wife is POA but does not have living will  Pt denies hx of VNA and SNF  Pt denies hx of mental health and drug and alcohol treatment  Pt reports his wife will help him at home if needed  Pt reports his wife or son will transport him home and declines discharge services  CM to follow

## 2020-08-27 ENCOUNTER — ANESTHESIA (INPATIENT)
Dept: GASTROENTEROLOGY | Facility: HOSPITAL | Age: 50
DRG: 392 | End: 2020-08-27
Payer: COMMERCIAL

## 2020-08-27 ENCOUNTER — APPOINTMENT (INPATIENT)
Dept: GASTROENTEROLOGY | Facility: HOSPITAL | Age: 50
DRG: 392 | End: 2020-08-27
Attending: INTERNAL MEDICINE
Payer: COMMERCIAL

## 2020-08-27 ENCOUNTER — ANESTHESIA EVENT (INPATIENT)
Dept: GASTROENTEROLOGY | Facility: HOSPITAL | Age: 50
DRG: 392 | End: 2020-08-27
Payer: COMMERCIAL

## 2020-08-27 VITALS
DIASTOLIC BLOOD PRESSURE: 89 MMHG | HEIGHT: 72 IN | SYSTOLIC BLOOD PRESSURE: 169 MMHG | TEMPERATURE: 97.5 F | BODY MASS INDEX: 25.83 KG/M2 | WEIGHT: 190.7 LBS | RESPIRATION RATE: 18 BRPM | HEART RATE: 64 BPM | OXYGEN SATURATION: 95 %

## 2020-08-27 PROBLEM — IMO0001 SMOKING: Status: ACTIVE | Noted: 2020-08-25

## 2020-08-27 PROBLEM — K52.9 ILEITIS: Status: ACTIVE | Noted: 2020-08-27

## 2020-08-27 PROBLEM — A08.4 VIRAL ENTERITIS: Status: ACTIVE | Noted: 2020-08-27

## 2020-08-27 PROBLEM — F17.200 SMOKING: Status: ACTIVE | Noted: 2020-08-25

## 2020-08-27 LAB
CAMPYLOBACTER DNA SPEC NAA+PROBE: NORMAL
SALMONELLA DNA SPEC QL NAA+PROBE: NORMAL
SHIGA TOXIN STX GENE SPEC NAA+PROBE: NORMAL
SHIGELLA DNA SPEC QL NAA+PROBE: NORMAL

## 2020-08-27 PROCEDURE — 88305 TISSUE EXAM BY PATHOLOGIST: CPT | Performed by: PATHOLOGY

## 2020-08-27 PROCEDURE — 45380 COLONOSCOPY AND BIOPSY: CPT | Performed by: INTERNAL MEDICINE

## 2020-08-27 PROCEDURE — 45385 COLONOSCOPY W/LESION REMOVAL: CPT | Performed by: INTERNAL MEDICINE

## 2020-08-27 RX ORDER — MEPERIDINE HYDROCHLORIDE 25 MG/ML
12.5 INJECTION INTRAMUSCULAR; INTRAVENOUS; SUBCUTANEOUS
Status: DISCONTINUED | OUTPATIENT
Start: 2020-08-27 | End: 2020-08-27 | Stop reason: HOSPADM

## 2020-08-27 RX ORDER — METOCLOPRAMIDE HYDROCHLORIDE 5 MG/ML
10 INJECTION INTRAMUSCULAR; INTRAVENOUS ONCE AS NEEDED
Status: DISCONTINUED | OUTPATIENT
Start: 2020-08-27 | End: 2020-08-27 | Stop reason: HOSPADM

## 2020-08-27 RX ORDER — CEFUROXIME AXETIL 500 MG/1
500 TABLET ORAL EVERY 12 HOURS SCHEDULED
Qty: 10 TABLET | Refills: 0 | Status: SHIPPED | OUTPATIENT
Start: 2020-08-27 | End: 2020-09-01

## 2020-08-27 RX ORDER — HYDRALAZINE HYDROCHLORIDE 20 MG/ML
5 INJECTION INTRAMUSCULAR; INTRAVENOUS
Status: DISCONTINUED | OUTPATIENT
Start: 2020-08-27 | End: 2020-08-27 | Stop reason: HOSPADM

## 2020-08-27 RX ORDER — PROPOFOL 10 MG/ML
INJECTION, EMULSION INTRAVENOUS AS NEEDED
Status: DISCONTINUED | OUTPATIENT
Start: 2020-08-27 | End: 2020-08-27

## 2020-08-27 RX ORDER — LABETALOL 20 MG/4 ML (5 MG/ML) INTRAVENOUS SYRINGE
5
Status: DISCONTINUED | OUTPATIENT
Start: 2020-08-27 | End: 2020-08-27 | Stop reason: HOSPADM

## 2020-08-27 RX ORDER — FENTANYL CITRATE/PF 50 MCG/ML
12.5 SYRINGE (ML) INJECTION
Status: DISCONTINUED | OUTPATIENT
Start: 2020-08-27 | End: 2020-08-27 | Stop reason: HOSPADM

## 2020-08-27 RX ORDER — METRONIDAZOLE 500 MG/1
500 TABLET ORAL EVERY 8 HOURS SCHEDULED
Qty: 15 TABLET | Refills: 0 | Status: SHIPPED | OUTPATIENT
Start: 2020-08-27 | End: 2020-09-01

## 2020-08-27 RX ORDER — NICOTINE 21 MG/24HR
1 PATCH, TRANSDERMAL 24 HOURS TRANSDERMAL DAILY
Qty: 28 PATCH | Refills: 0 | Status: SHIPPED | OUTPATIENT
Start: 2020-08-28 | End: 2020-08-31

## 2020-08-27 RX ORDER — PROMETHAZINE HYDROCHLORIDE 25 MG/ML
6.25 INJECTION, SOLUTION INTRAMUSCULAR; INTRAVENOUS ONCE AS NEEDED
Status: DISCONTINUED | OUTPATIENT
Start: 2020-08-27 | End: 2020-08-27 | Stop reason: HOSPADM

## 2020-08-27 RX ORDER — SODIUM CHLORIDE 9 MG/ML
INJECTION, SOLUTION INTRAVENOUS CONTINUOUS PRN
Status: DISCONTINUED | OUTPATIENT
Start: 2020-08-27 | End: 2020-08-27

## 2020-08-27 RX ORDER — PANTOPRAZOLE SODIUM 40 MG/1
40 TABLET, DELAYED RELEASE ORAL DAILY
Qty: 30 TABLET | Refills: 1 | Status: SHIPPED | OUTPATIENT
Start: 2020-08-28 | End: 2020-09-29

## 2020-08-27 RX ORDER — ONDANSETRON 2 MG/ML
4 INJECTION INTRAMUSCULAR; INTRAVENOUS ONCE AS NEEDED
Status: DISCONTINUED | OUTPATIENT
Start: 2020-08-27 | End: 2020-08-27 | Stop reason: HOSPADM

## 2020-08-27 RX ADMIN — METRONIDAZOLE 500 MG: 500 INJECTION, SOLUTION INTRAVENOUS at 02:01

## 2020-08-27 RX ADMIN — PROPOFOL 30 MG: 10 INJECTION, EMULSION INTRAVENOUS at 09:05

## 2020-08-27 RX ADMIN — PROPOFOL 30 MG: 10 INJECTION, EMULSION INTRAVENOUS at 09:04

## 2020-08-27 RX ADMIN — PROPOFOL 25 MG: 10 INJECTION, EMULSION INTRAVENOUS at 08:57

## 2020-08-27 RX ADMIN — PROPOFOL 20 MG: 10 INJECTION, EMULSION INTRAVENOUS at 09:11

## 2020-08-27 RX ADMIN — DULOXETINE 30 MG: 30 CAPSULE, DELAYED RELEASE ORAL at 10:11

## 2020-08-27 RX ADMIN — METRONIDAZOLE 500 MG: 500 INJECTION, SOLUTION INTRAVENOUS at 10:08

## 2020-08-27 RX ADMIN — PROPOFOL 30 MG: 10 INJECTION, EMULSION INTRAVENOUS at 09:02

## 2020-08-27 RX ADMIN — PROPOFOL 25 MG: 10 INJECTION, EMULSION INTRAVENOUS at 09:00

## 2020-08-27 RX ADMIN — SODIUM CHLORIDE 75 ML/HR: 0.9 INJECTION, SOLUTION INTRAVENOUS at 04:58

## 2020-08-27 RX ADMIN — PROPOFOL 30 MG: 10 INJECTION, EMULSION INTRAVENOUS at 09:08

## 2020-08-27 RX ADMIN — PROPOFOL 20 MG: 10 INJECTION, EMULSION INTRAVENOUS at 09:12

## 2020-08-27 RX ADMIN — PROPOFOL 30 MG: 10 INJECTION, EMULSION INTRAVENOUS at 09:10

## 2020-08-27 RX ADMIN — PROPOFOL 25 MG: 10 INJECTION, EMULSION INTRAVENOUS at 09:01

## 2020-08-27 RX ADMIN — PROPOFOL 40 MG: 10 INJECTION, EMULSION INTRAVENOUS at 09:14

## 2020-08-27 RX ADMIN — PROPOFOL 100 MG: 10 INJECTION, EMULSION INTRAVENOUS at 08:54

## 2020-08-27 NOTE — ANESTHESIA PREPROCEDURE EVALUATION
Procedure:  COLONOSCOPY    Relevant Problems   ANESTHESIA (within normal limits)      CARDIO (within normal limits)      ENDO (within normal limits)      GI/HEPATIC (within normal limits)      /RENAL (within normal limits)      GYN (within normal limits)      HEMATOLOGY (within normal limits)      MUSCULOSKELETAL   (+) Acute bilateral low back pain with bilateral sciatica   (+) Chronic bilateral low back pain with bilateral sciatica   (+) Lumbar spondylosis      NEURO/PSYCH   (+) Chronic pain syndrome      PULMONARY   (+) Smoking      Other   (+) Crohn's disease of ileum with fistula (HCC)   (+) 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 (HCC)        Physical Exam    Airway    Mallampati score: II  TM Distance: >3 FB  Neck ROM: full     Dental   No notable dental hx     Cardiovascular  Rhythm: regular, Rate: normal, Cardiovascular exam normal    Pulmonary  Pulmonary exam normal Breath sounds clear to auscultation,     Other Findings        Anesthesia Plan  ASA Score- 2     Anesthesia Type- IV sedation with anesthesia with ASA Monitors  Additional Monitors:   Airway Plan:           Plan Factors-Exercise tolerance (METS): >4 METS  Chart reviewed  Existing labs reviewed  Patient summary reviewed  Patient is a current smoker  Patient instructed to abstain from smoking on day of procedure  Patient did not smoke on day of surgery  Induction-     Postoperative Plan-     Informed Consent- Anesthetic plan and risks discussed with patient  I personally reviewed this patient with the CRNA  Discussed and agreed on the Anesthesia Plan with the CRNA  César Hunter

## 2020-08-27 NOTE — NURSING NOTE
Per MD orders patient discharged to home with self care  Discharge instructions reviewed with pt, pt verbalized understanding  BP slightly elevated, and MD Fly was informed  Mostly likely a result from anxiety as the pt wants to be discharged

## 2020-08-27 NOTE — INTERVAL H&P NOTE
H&P reviewed  After examining the patient I find no changes in the patients condition since the H&P had been written      Vitals:    08/27/20 0757   BP: 149/81   Pulse: 61   Resp: 18   Temp: (!) 97 2 °F (36 2 °C)   SpO2: 97%

## 2020-08-27 NOTE — PLAN OF CARE
Problem: PAIN - ADULT  Goal: Verbalizes/displays adequate comfort level or baseline comfort level  Description: Interventions:  - Encourage patient to monitor pain and request assistance  - Assess pain using appropriate pain scale  - Administer analgesics based on type and severity of pain and evaluate response  - Implement non-pharmacological measures as appropriate and evaluate response  - Consider cultural and social influences on pain and pain management  - Notify physician/advanced practitioner if interventions unsuccessful or patient reports new pain  Outcome: Adequate for Discharge     Problem: INFECTION - ADULT  Goal: Absence or prevention of progression during hospitalization  Description: INTERVENTIONS:  - Assess and monitor for signs and symptoms of infection  - Monitor lab/diagnostic results  - Monitor all insertion sites, i e  indwelling lines, tubes, and drains  - Monitor endotracheal if appropriate and nasal secretions for changes in amount and color  - [place] appropriate cooling/warming therapies per order  - Administer medications as ordered  - Instruct and encourage patient and family to use good hand hygiene technique  - Identify and instruct in appropriate isolation precautions for identified infection/condition  Outcome: Adequate for Discharge  Goal: Absence of fever/infection during neutropenic period  Description: INTERVENTIONS:  - Monitor WBC    Outcome: Adequate for Discharge     Problem: SAFETY ADULT  Goal: Patient will remain free of falls  Description: INTERVENTIONS:  - Assess patient frequently for physical needs  -  Identify cognitive and physical deficits and behaviors that affect risk of falls    -  [place] fall precautions as indicated by assessment   - Educate patient/family on patient safety including physical limitations  - Instruct patient to call for assistance with activity based on assessment  - Modify environment to reduce risk of injury  - Consider OT/PT consult to assist with strengthening/mobility  Outcome: Adequate for Discharge  Goal: Maintain or return to baseline ADL function  Description: INTERVENTIONS:  -  Assess patient's ability to carry out ADLs; assess patient's baseline for ADL function and identify physical deficits which impact ability to perform ADLs (bathing, care of mouth/teeth, toileting, grooming, dressing, etc )  - Assess/evaluate cause of self-care deficits   - Assess range of motion  - Assess patient's mobility; develop plan if impaired  - Assess patient's need for assistive devices and provide as appropriate  - Encourage maximum independence but intervene and supervise when necessary  - Involve family in performance of ADLs  - Assess for home care needs following discharge   - Consider OT consult to assist with ADL evaluation and planning for discharge  - Provide patient education as appropriate  Outcome: Adequate for Discharge  Goal: Maintain or return mobility status to optimal level  Description: INTERVENTIONS:  - Assess patient's baseline mobility status (ambulation, transfers, stairs, etc )    - Identify cognitive and physical deficits and behaviors that affect mobility  - Identify mobility aids required to assist with transfers and/or ambulation (gait belt, sit-to-stand, lift, walker, cane, etc )  - [place] fall precautions as indicated by assessment  - Record patient progress and toleration of activity level on Mobility SBAR; progress patient to next Phase/Stage  - Instruct patient to call for assistance with activity based on assessment  - Consider rehabilitation consult to assist with strengthening/weightbearing, etc   Outcome: Adequate for Discharge     Problem: DISCHARGE PLANNING  Goal: Discharge to home or other facility with appropriate resources  Description: INTERVENTIONS:  - Identify barriers to discharge w/patient and caregiver  - Arrange for needed discharge resources and transportation as appropriate  - Identify discharge learning needs (meds, wound care, etc )  - Arrange for interpretive services to assist at discharge as needed  - Refer to Case Management Department for coordinating discharge planning if the patient needs post-hospital services based on physician/advanced practitioner order or complex needs related to functional status, cognitive ability, or social support system  Outcome: Adequate for Discharge     Problem: Knowledge Deficit  Goal: Patient/family/caregiver demonstrates understanding of disease process, treatment plan, medications, and discharge instructions  Description: Complete learning assessment and assess knowledge base    Interventions:  - Provide teaching at level of understanding  - Provide teaching via preferred learning methods  Outcome: Adequate for Discharge     Problem: GASTROINTESTINAL - ADULT  Goal: Minimal or absence of nausea and/or vomiting  Description: INTERVENTIONS:  - Administer IV fluids if ordered to ensure adequate hydration  - Maintain NPO status until nausea and vomiting are resolved  - Nasogastric tube if ordered  - Administer ordered antiemetic medications as needed  - Provide nonpharmacologic comfort measures as appropriate  - Advance diet as tolerated, if ordered  - Consider nutrition services referral to assist patient with adequate nutrition and appropriate food choices  Outcome: Adequate for Discharge  Goal: Maintains or returns to baseline bowel function  Description: INTERVENTIONS:  - Assess bowel function  - Encourage oral fluids to ensure adequate hydration  - Administer IV fluids if ordered to ensure adequate hydration  - Administer ordered medications as needed  - Encourage mobilization and activity  - Consider nutritional services referral to assist patient with adequate nutrition and appropriate food choices  Outcome: Adequate for Discharge  Goal: Maintains adequate nutritional intake  Description: INTERVENTIONS:  - Monitor percentage of each meal consumed  - Identify factors contributing to decreased intake, treat as appropriate  - Assist with meals as needed  - Monitor I&O, weight, and lab values if indicated  - Obtain nutrition services referral as needed  Outcome: Adequate for Discharge

## 2020-08-27 NOTE — QUICK NOTE
Results of colonoscopy reviewed with Dr Rohan Gomez and patient  Okay with discharge today  Complete 5 days of antibiotics  Dr Rohan Gomez will call with biopsy results  Follow up in the office with me in 1 month

## 2020-08-27 NOTE — ANESTHESIA POSTPROCEDURE EVALUATION
Post-Op Assessment Note    CV Status:  Stable  Pain Score: 0    Pain management: adequate     Mental Status:  Alert and awake   Hydration Status:  Euvolemic   PONV Controlled:  Controlled   Airway Patency:  Patent      Post Op Vitals Reviewed: Yes      Staff: CRNA         No complications documented      /86 (08/27/20 0922)    Temp     Pulse 78 (08/27/20 0922)   Resp 16 (08/27/20 0922)    SpO2 94 % (08/27/20 0922)

## 2020-08-28 LAB — YERSINIA STL CULT: NORMAL

## 2020-08-28 NOTE — UTILIZATION REVIEW
Notification of Discharge  This is a Notification of Discharge from our facility [address]  Please be advised that this patient has been discharge from our facility  Below you will find the admission and discharge date and time including the patients disposition  PRESENTATION DATE: 8/25/2020  3:15 PM  OBS ADMISSION DATE:   IP ADMISSION DATE: 8/25/20 1658   DISCHARGE DATE: 8/27/2020 12:09 PM  DISPOSITION: Home/Self Care Home/Self Care   Admission Orders listed below:  Admission Orders (From admission, onward)     Ordered        08/25/20 1657  Inpatient Admission (expected length of stay for this patient Order details is greater than two midnights)  Once                   Please contact the UR Department if additional information is required to close this patient's authorization/case  [facility]  Main: [phone] x carefully listen to the prompts  All voicemails are confidential   [email or web address]  Send all requests for admission clinical reviews, approved or denied determinations and any other requests to dedicated fax number below belonging to the campus where the patient is receiving treatment   List of dedicated fax numbers:  [address] DENIALS (Administrative/Medical Necessity) [phone]   [address] [place] [address] (Maternity/NICU/Pediatrics) [phone]   Blank Flatten [phone]   Yaakov Weldon [phone]   Hilaria Lua [phone]   [address] [phone]   [facility]  [phone]   [address] [facility] [address] [facility]  [address] [facility] [address] [facility] [phone]

## 2020-08-31 ENCOUNTER — OFFICE VISIT (OUTPATIENT)
Dept: PAIN MEDICINE | Facility: CLINIC | Age: 50
End: 2020-08-31
Payer: COMMERCIAL

## 2020-08-31 VITALS
WEIGHT: 192 LBS | HEART RATE: 87 BPM | TEMPERATURE: 98.7 F | BODY MASS INDEX: 26.01 KG/M2 | SYSTOLIC BLOOD PRESSURE: 142 MMHG | HEIGHT: 72 IN | DIASTOLIC BLOOD PRESSURE: 90 MMHG

## 2020-08-31 DIAGNOSIS — M47.816 LUMBAR SPONDYLOSIS: ICD-10-CM

## 2020-08-31 DIAGNOSIS — G89.4 CHRONIC PAIN SYNDROME: Primary | ICD-10-CM

## 2020-08-31 DIAGNOSIS — M96.1 LUMBAR POST-LAMINECTOMY SYNDROME: ICD-10-CM

## 2020-08-31 DIAGNOSIS — G89.29 CHRONIC BILATERAL LOW BACK PAIN WITH BILATERAL SCIATICA: ICD-10-CM

## 2020-08-31 DIAGNOSIS — M79.673 CHRONIC FOOT PAIN, UNSPECIFIED LATERALITY: ICD-10-CM

## 2020-08-31 DIAGNOSIS — M79.18 MYOFASCIAL PAIN SYNDROME: ICD-10-CM

## 2020-08-31 DIAGNOSIS — G62.9 NEUROPATHY: ICD-10-CM

## 2020-08-31 DIAGNOSIS — M54.16 LUMBAR RADICULOPATHY: ICD-10-CM

## 2020-08-31 DIAGNOSIS — M54.41 CHRONIC BILATERAL LOW BACK PAIN WITH BILATERAL SCIATICA: ICD-10-CM

## 2020-08-31 DIAGNOSIS — G89.29 CHRONIC FOOT PAIN, UNSPECIFIED LATERALITY: ICD-10-CM

## 2020-08-31 DIAGNOSIS — M54.42 CHRONIC BILATERAL LOW BACK PAIN WITH BILATERAL SCIATICA: ICD-10-CM

## 2020-08-31 PROCEDURE — 99214 OFFICE O/P EST MOD 30 MIN: CPT | Performed by: NURSE PRACTITIONER

## 2020-08-31 PROCEDURE — 3008F BODY MASS INDEX DOCD: CPT | Performed by: FAMILY MEDICINE

## 2020-08-31 PROCEDURE — 1111F DSCHRG MED/CURRENT MED MERGE: CPT | Performed by: NURSE PRACTITIONER

## 2020-08-31 RX ORDER — NORTRIPTYLINE HYDROCHLORIDE 25 MG/1
CAPSULE ORAL
Qty: 90 CAPSULE | Refills: 1 | Status: SHIPPED | OUTPATIENT
Start: 2020-08-31 | End: 2020-10-26

## 2020-08-31 RX ORDER — CYCLOBENZAPRINE HCL 10 MG
10 TABLET ORAL 3 TIMES DAILY PRN
Qty: 50 TABLET | Refills: 1 | Status: SHIPPED | OUTPATIENT
Start: 2020-08-31 | End: 2020-10-26

## 2020-08-31 NOTE — PROGRESS NOTES
Assessment:  1  Chronic pain syndrome    2  Chronic bilateral low back pain with bilateral sciatica    3  Chronic foot pain, unspecified laterality    4  Lumbar spondylosis    5  Myofascial pain syndrome    6  Neuropathy    7  Lumbar post-laminectomy syndrome        Plan:  While the patient was in the office today, I did have a thorough conversation with the patient regarding their chronic pain syndrome, symptoms, medication regimen, and treatment plan  I discussed with the patient that at this point time since he has previously had injections and surgery without any significant relief or improvement in his pain symptoms, for now, we will continue to try to find a medication regimen that provide at least manageable and tolerable relief of his pain  In the meantime, we will have him titrate off of the Cymbalta as discussed and on to nortriptyline since he is on a therapeutic dose without even minimal improvement  Will start him on nortriptyline 25 mg and work him up to 75 mg over a few weeks and see how he does  I advised the patient that if they experience any side effects or issues with the changes in their medication regiment, they should give our office a call to discuss  I also advised the patient not to drive or operate machinery until they see how the changes in the medication regimen affects them  The patient was agreeable and verbalized an understanding  To try to help his pain at nighttime and to help him sleep better we will put him on cyclobenzaprine 10 mg, 1-2 pills at bedtime since he reports trying melatonin, Benadryl, and other over-the-counter sleep aids without relief  I advised the patient that if they experience any side effects or issues with the changes in their medication regiment, they should give our office a call to discuss  I also advised the patient not to drive or operate machinery until they see how the changes in the medication regimen affects them   The patient was agreeable and verbalized an understanding  I also discussed with the patient that I still feel that spinal cord stimulation may be a treatment option that we need to consider in the future and encouraged him to look through the [employer] stimulator booklet between now and his next office visit and if he decides he would like to start the PRE authorization process, he should call our office so we can discuss that at his next office visit  The patient was agreeable and verbalized an understanding  The patient will follow-up in 8 weeks for medication prescription refill and reevaluation  The patient was advised to contact the office should their symptoms worsen in the interim  The patient was agreeable and verbalized an understanding  History of Present Illness: The patient is a 54 y o  male last seen on 7/7/2020 who presents for a follow up office visit in regards to chronic pain syndrome secondary to lumbar post-laminectomy syndrome with radiculopathy as well as lumbar stenosis and myofascial pain  The patient currently reports that since his last office visit he has not seen any change or improvement in his pain symptoms despite the fact that he did start the Cymbalta and is currently on 60 mg a day  The patient reports that although he does not note any side effects from the Cymbalta, he has not noted even minimal improvement or relief  The patient reports he is still having difficulty sleeping at nighttime and just feels that he needs to get a good night's sleep  Since his last office visit he did proceed with the bilateral lower extremity EMG as we ordered and it did show evidence of a chronic L5 and S1, mild lower extremity radiculopathy  However, was negative for any significant peripheral neuropathy  He presents today to discuss his medication regimen and treatment plan options  Current pain medications includes:  Cymbalta 60 mg daily    The patient reports that this regimen is providing no pain relief  The patient is reporting no side effects from this pain medication regimen  I have personally reviewed and/or updated the patient's past medical history, past surgical history, family history, social history, current medications, allergies, and vital signs today  Review of Systems:    Review of Systems   Respiratory: Negative for shortness of breath  Cardiovascular: Negative for chest pain  Gastrointestinal: Negative for constipation, diarrhea, nausea and vomiting  Musculoskeletal: Positive for gait problem and joint swelling (joint stiffness)  Negative for arthralgias and myalgias  Skin: Negative for rash  Neurological: Positive for weakness  Negative for dizziness and seizures  All other systems reviewed and are negative  History reviewed  No pertinent past medical history  Past Surgical History:   Procedure Laterality Date    ABSCESS DRAINAGE      BACK SURGERY  2019       History reviewed  No pertinent family history      Social History     Occupational History    Not on file   Tobacco Use    Smoking status: Current Every Day Smoker     Packs/day: 0 50     Start date: 2019    Smokeless tobacco: Never Used   Substance and Sexual Activity    Alcohol use: Yes     Frequency: Monthly or less     Drinks per session: 1 or 2     Comment: occasionally     Drug use: No    Sexual activity: Not on file         Current Outpatient Medications:     cefuroxime (CEFTIN) 500 mg tablet, Take 1 tablet (500 mg total) by mouth every 12 (twelve) hours for 5 days, Disp: 10 tablet, Rfl: 0    metroNIDAZOLE (FLAGYL) 500 mg tablet, Take 1 tablet (500 mg total) by mouth every 8 (eight) hours for 5 days, Disp: 15 tablet, Rfl: 0    pantoprazole (PROTONIX) 40 mg tablet, Take 1 tablet (40 mg total) by mouth daily, Disp: 30 tablet, Rfl: 1    cyclobenzaprine (FLEXERIL) 10 mg tablet, Take 1 tablet (10 mg total) by mouth 3 (three) times a day as needed for muscle spasms (Take 1-2 HS PRN ), Disp: 50 tablet, Rfl: 1    nortriptyline (PAMELOR) 25 mg capsule, Take 1 PO HS x 10 days, then 2 PO HS x 10 days, then 3 PO HS , Disp: 90 capsule, Rfl: 1    No Known Allergies    Physical Exam:    /90 (BP Location: Left arm, Patient Position: Sitting, Cuff Size: Standard)   Pulse 87   Temp 98 7 °F (37 1 °C)   Ht 6' (1 829 m)   Wt 87 1 kg (192 lb)   BMI 26 04 kg/m²     Constitutional:normal, well developed, well nourished, alert, in no distress and non-toxic and no overt pain behavior  Eyes:anicteric  HEENT:grossly intact  Neck:supple, symmetric, trachea midline and no masses   Pulmonary:even and unlabored  Cardiovascular:No edema or pitting edema present  Skin:Normal without rashes or lesions and well hydrated  Psychiatric:Mood and affect appropriate  Neurologic:Cranial Nerves II-XII grossly intact  Musculoskeletal:The patient's gait is slightly antalgic, painful, but steady without the use of any assistive devices  Imaging  No orders to display         No orders of the defined types were placed in this encounter

## 2020-08-31 NOTE — PATIENT INSTRUCTIONS
Cymbalta: Take 1 pill daily x 5 days, then 1 pill every other day x 6 days, then STOP  Then START Nortriptyline  Nortriptyline (By mouth)   Nortriptyline (nor-TRIP-ti-vickey)  Treats depression  This medicine is a TCA  Brand Name(s): Pamelor   There may be other brand names for this medicine  When This Medicine Should Not Be Used: This medicine is not right for everyone  Do not use it if you had an allergic reaction to nortriptyline or similar medicines, or you had a recent heart attack  How to Use This Medicine:   Capsule, Liquid  · Take your medicine as directed  Your dose may need to be changed several times to find what works best for you  · Measure the oral liquid medicine with a marked measuring spoon, oral syringe, or medicine cup  · This medicine should come with a Medication Guide  Ask your pharmacist for a copy if you do not have one  · Missed dose: Take a dose as soon as you remember  If it is almost time for your next dose, wait until then and take a regular dose  Do not take extra medicine to make up for a missed dose  · Store the medicine in a closed container at room temperature, away from heat, moisture, and direct light  Drugs and Foods to Avoid:   Ask your doctor or pharmacist before using any other medicine, including over-the-counter medicines, vitamins, and herbal products  · Do not use this medicine and an MAO inhibitor (MAOI) within 14 days of each other    · Tell your doctor if you are using the following:   ¨ Buspirone, chlorpropamide, cimetidine, fentanyl, guanethidine, lithium, reserpine, Marta's wort, tramadol, tryptophan  ¨ Thyroid medicine, a phenothiazine medicine (such as chlorpromazine, perphenazine, promethazine, prochlorperazine, thioridazine), medicine for heart rhythm problems (propafenone, flecainide), triptan medicine to treat migraine headaches  · Alcohol, narcotic pain relievers, or sleeping pills may cause you to feel more lightheaded, dizzy, or faint when used with this medicine  Tell your doctor if you drink alcohol or use pain relievers or sleeping pills  Warnings While Using This Medicine:   · Tell your doctor if you are pregnant or breastfeeding, or if you have heart disease, heart rhythm problems, glaucoma, depression, an overactive thyroid, trouble urinating, or a history of seizures  · For some children, teenagers, and young adults, this medicine may increase mental or emotional problems  This may lead to thoughts of suicide and violence  Talk with your doctor right away if you have any thoughts or behavior changes that concern you  Tell your doctor if you or anyone in your family has a history of bipolar disorder or suicide attempts  · This medicine may cause serotonin syndrome, especially if you take it with certain other medicines  · This medicine may make you dizzy or drowsy  Do not drive or do anything that could be dangerous until you know how this medicine affects you  · Do not stop using this medicine suddenly  Your doctor will need to slowly decrease your dose before you stop it completely  · Tell any doctor or dentist who treats you that you are using this medicine  You may need to stop using this medicine several days before you have surgery or medical tests  · Keep all medicine out of the reach of children  Never share your medicine with anyone    Possible Side Effects While Using This Medicine:   Call your doctor right away if you notice any of these side effects:  · Allergic reaction: Itching or hives, swelling in your face or hands, swelling or tingling in your mouth or throat, chest tightness, trouble breathing  · Anxiety, restlessness, fever, sweating, muscle spasms, twitching, nausea, vomiting, diarrhea, seeing or hearing things that are not there  · Change in how much or how often you urinate, problems urinating  · Chest pain or fast, pounding, or uneven heartbeat  · Eye pain, vision changes, seeing halos around lights  · Seizures or tremors  · Thoughts of hurting yourself or others, unusual behavior  If you notice other side effects that you think are caused by this medicine, tell your doctor  Call your doctor for medical advice about side effects  You may report side effects to FDA at 7-414-FDA-6616  © 2017 [employer] Information is for End User's use only and may not be sold, redistributed or otherwise used for commercial purposes  The above information is an  only  It is not intended as medical advice for individual conditions or treatments  Talk to your doctor, nurse or pharmacist before following any medical regimen to see if it is safe and effective for you

## 2020-09-01 PROBLEM — M54.16 LUMBAR RADICULOPATHY: Status: ACTIVE | Noted: 2020-09-01

## 2020-09-08 ENCOUNTER — TELEPHONE (OUTPATIENT)
Dept: GASTROENTEROLOGY | Facility: CLINIC | Age: 50
End: 2020-09-08

## 2020-09-09 ENCOUNTER — IMMUNIZATIONS (OUTPATIENT)
Dept: FAMILY MEDICINE CLINIC | Facility: CLINIC | Age: 50
End: 2020-09-09
Payer: COMMERCIAL

## 2020-09-09 DIAGNOSIS — Z23 NEED FOR VACCINATION: Primary | ICD-10-CM

## 2020-09-09 PROCEDURE — 90686 IIV4 VACC NO PRSV 0.5 ML IM: CPT

## 2020-09-09 PROCEDURE — 90471 IMMUNIZATION ADMIN: CPT

## 2020-09-09 NOTE — TELEPHONE ENCOUNTER
Pt called for path results  I noted Dr Kings Khanna will call when reviewing pathology/confirmed CB # above

## 2020-09-22 ENCOUNTER — OFFICE VISIT (OUTPATIENT)
Dept: URGENT CARE | Facility: CLINIC | Age: 50
End: 2020-09-22
Payer: COMMERCIAL

## 2020-09-22 VITALS
RESPIRATION RATE: 20 BRPM | TEMPERATURE: 101.3 F | OXYGEN SATURATION: 94 % | HEART RATE: 113 BPM | WEIGHT: 192 LBS | HEIGHT: 72 IN | BODY MASS INDEX: 26.01 KG/M2

## 2020-09-22 DIAGNOSIS — J01.00 ACUTE NON-RECURRENT MAXILLARY SINUSITIS: Primary | ICD-10-CM

## 2020-09-22 DIAGNOSIS — Z11.59 SCREENING EXAMINATION FOR OTHER ARTHROPOD-BORNE VIRAL DISEASES: ICD-10-CM

## 2020-09-22 PROCEDURE — U0003 INFECTIOUS AGENT DETECTION BY NUCLEIC ACID (DNA OR RNA); SEVERE ACUTE RESPIRATORY SYNDROME CORONAVIRUS 2 (SARS-COV-2) (CORONAVIRUS DISEASE [COVID-19]), AMPLIFIED PROBE TECHNIQUE, MAKING USE OF HIGH THROUGHPUT TECHNOLOGIES AS DESCRIBED BY CMS-2020-01-R: HCPCS | Performed by: FAMILY MEDICINE

## 2020-09-22 PROCEDURE — 99213 OFFICE O/P EST LOW 20 MIN: CPT | Performed by: FAMILY MEDICINE

## 2020-09-22 RX ORDER — AZITHROMYCIN 250 MG/1
TABLET, FILM COATED ORAL
Qty: 6 TABLET | Refills: 0 | Status: SHIPPED | OUTPATIENT
Start: 2020-09-22 | End: 2020-09-26

## 2020-09-22 NOTE — PROGRESS NOTES
[facility]        NAME: Tristian Flores is a 54 y o  male  : 1970    MRN: [id number]  DATE: 2020  TIME: 2:16 PM    Assessment and Plan   Acute non-recurrent maxillary sinusitis [J01 00]  1  Acute non-recurrent maxillary sinusitis  azithromycin (ZITHROMAX) 250 mg tablet   2  Screening examination for other arthropod-borne viral diseases  Novel Coronavirus (COVID-19), PCR [facility]    azithromycin (ZITHROMAX) 250 mg tablet         Patient Instructions       Follow up with PCP in 3-5 days  Proceed to  ER if symptoms worsen  Chief Complaint     Chief Complaint   Patient presents with    Headache     started today    Sinusitis     started today         History of Present Illness       Patient is a 61-year-old male presenting today for evaluation congestion and sinus pressure  Reports symptoms began this morning  He also reports no onset of fevers and chills  Denies any chest pain or shortness of breath  Denies any nausea or vomiting  Review of Systems   Review of Systems   Constitutional: Positive for chills, fatigue and fever  HENT: Positive for congestion and rhinorrhea  Eyes: Negative  Respiratory: Negative for cough and shortness of breath  Cardiovascular: Negative for chest pain and palpitations  Gastrointestinal: Negative for diarrhea, nausea and vomiting  Genitourinary: Negative  Musculoskeletal: Positive for arthralgias  Neurological: Negative for headaches           Current Medications       Current Outpatient Medications:     cyclobenzaprine (FLEXERIL) 10 mg tablet, Take 1 tablet (10 mg total) by mouth 3 (three) times a day as needed for muscle spasms (Take 1-2 HS PRN ), Disp: 50 tablet, Rfl: 1    nortriptyline (PAMELOR) 25 mg capsule, Take 1 PO HS x 10 days, then 2 PO HS x 10 days, then 3 PO HS , Disp: 90 capsule, Rfl: 1    pantoprazole (PROTONIX) 40 mg tablet, Take 1 tablet (40 mg total) by mouth daily, Disp: 30 tablet, Rfl: 1   azithromycin (ZITHROMAX) 250 mg tablet, Take 2 tablets today then 1 tablet daily x 4 days, Disp: 6 tablet, Rfl: 0    Current Allergies     Allergies as of 09/22/2020    (No Known Allergies)            The following portions of the patient's history were reviewed and updated as appropriate: allergies, current medications, past family history, past medical history, past social history, past surgical history and problem list      No past medical history on file  Past Surgical History:   Procedure Laterality Date    ABSCESS DRAINAGE      BACK SURGERY  2019       No family history on file  Medications have been verified  Objective   Pulse (!) 113   Temp (!) 101 3 °F (38 5 °C)   Resp 20   Ht 6' (1 829 m)   Wt 87 1 kg (192 lb)   SpO2 94%   BMI 26 04 kg/m²        Physical Exam     Physical Exam  HENT:      Nose: Congestion and rhinorrhea present  Eyes:      Pupils: Pupils are equal, round, and reactive to light  Neck:      Musculoskeletal: Normal range of motion  Cardiovascular:      Rate and Rhythm: Tachycardia present  Pulmonary:      Effort: Pulmonary effort is normal    Abdominal:      General: Abdomen is flat  Palpations: Abdomen is soft  Musculoskeletal: Normal range of motion

## 2020-09-23 LAB — SARS-COV-2 RNA SPEC QL NAA+PROBE: NOT DETECTED

## 2020-09-29 ENCOUNTER — OFFICE VISIT (OUTPATIENT)
Dept: GASTROENTEROLOGY | Facility: CLINIC | Age: 50
End: 2020-09-29
Payer: COMMERCIAL

## 2020-09-29 VITALS
BODY MASS INDEX: 26.01 KG/M2 | HEIGHT: 72 IN | SYSTOLIC BLOOD PRESSURE: 140 MMHG | DIASTOLIC BLOOD PRESSURE: 96 MMHG | WEIGHT: 192 LBS

## 2020-09-29 DIAGNOSIS — K60.3 PERIANAL FISTULA: ICD-10-CM

## 2020-09-29 DIAGNOSIS — K21.9 GASTROESOPHAGEAL REFLUX DISEASE WITHOUT ESOPHAGITIS: ICD-10-CM

## 2020-09-29 DIAGNOSIS — K50.018 TERMINAL ILEITIS WITH OTHER COMPLICATION (HCC): Primary | ICD-10-CM

## 2020-09-29 DIAGNOSIS — Z86.010 HISTORY OF COLON POLYPS: ICD-10-CM

## 2020-09-29 DIAGNOSIS — K52.9 ILEITIS: ICD-10-CM

## 2020-09-29 DIAGNOSIS — A08.4 VIRAL ENTERITIS: ICD-10-CM

## 2020-09-29 DIAGNOSIS — K50.913 PERIANAL FISTULA DUE TO CROHN'S DISEASE (HCC): ICD-10-CM

## 2020-09-29 PROBLEM — Z86.0100 HISTORY OF COLON POLYPS: Status: ACTIVE | Noted: 2020-09-29

## 2020-09-29 PROBLEM — K50.00 TERMINAL ILEITIS (HCC): Status: ACTIVE | Noted: 2020-08-27

## 2020-09-29 PROCEDURE — 99214 OFFICE O/P EST MOD 30 MIN: CPT | Performed by: INTERNAL MEDICINE

## 2020-09-29 NOTE — PROGRESS NOTES
[facility] - Outpatient Follow-up Note  Pippa Campos 54 y o  male MRN: [id number]  Encounter: [id number]    ASSESSMENT AND PLAN:      1  Terminal ileitis with other complication [facility])  [id number] male here today for hospital follow-up  Was admitted for abdominal pain  CT showed long segment of terminal ileitis  Colonoscopy with evidence of inflammation in the TI, although the biopsies were overall unremarkable  Colon biopsies negative for microscopic colitis  Given his history of perianal fistulas, and long history of frequent diarrhea, concerning for Crohn's disease     - MRI enterography w wo; Future  - if MRE is completely normal, consider trying something like Questran or Imodium, in trying something like dairy free diet  - if MR CORY is consistent with Crohn's, we will need to start some biologics  2  Perianal fistula    3  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 ([address] )    4  Gastroesophageal reflux disease without esophagitis  Had some reflux during the hospitalization and was placed some pantoprazole  Currently feeling fine  Would like to come off  Stop pantoprazole  Monitor symptoms clinically  5  History of colon polyps  Colonoscopy during the hospitalization did show 15 mm adenoma  It was poor prep  We will need to repeat the colonoscopy within the year  Patient and wife made aware to alert first-degree family members to get colonoscopies starting at age 40  Followup Appointment:  6 weeks  ______________________________________________________________________    Chief Complaint   Patient presents with    Follow-up     scope     HPI:  82-year-old male here today for hospital follow-up  Was admitted with abdominal pain  CT showed long segment terminal ileitis  Overall, the pain is better  However, patient with chronic diarrhea  This was going on for years  About 6-7 loose bowel movements a day  Never any bleeding  No weight loss    No fevers or chills  No nausea or vomiting  Worse with eating  Historical Information   Past Medical History:   Diagnosis Date    Back pain     Colon polyp     GERD (gastroesophageal reflux disease)     Perianal fistula     Terminal ileitis ([address] )      Past Surgical History:   Procedure Laterality Date    ABSCESS DRAINAGE      BACK SURGERY  2019    COLONOSCOPY      HERNIA REPAIR       Social History     Substance and Sexual Activity   Alcohol Use Yes    Frequency: Monthly or less    Drinks per session: 1 or 2    Comment: occasionally      Social History     Substance and Sexual Activity   Drug Use No     Social History     Tobacco Use   Smoking Status Current Every Day Smoker    Packs/day: 0 50    Types: Cigarettes    Start date: 2019   Smokeless Tobacco Never Used     Family History   Problem Relation Age of Onset    Colon cancer Neg Hx     Colon polyps Neg Hx     Inflammatory bowel disease Neg Hx          Current Outpatient Medications:     cyclobenzaprine (FLEXERIL) 10 mg tablet    nortriptyline (PAMELOR) 25 mg capsule  No Known Allergies  Reviewed medications and allergies and updated as indicated    PHYSICAL EXAM:    Blood pressure 140/96, height 6' (1 829 m), weight 87 1 kg (192 lb)  Body mass index is 26 04 kg/m²  General Appearance: NAD, cooperative, alert  Eyes: Anicteric, PERRLA, EOMI  ENT:  Normocephalic, atraumatic, normal mucosa  Neck:  Supple, symmetrical, trachea midline  Resp:  Clear to auscultation bilaterally; no rales, rhonchi or wheezing; respirations unlabored   CV:  S1 S2, Regular rate and rhythm; no murmur, rub, or gallop  GI:  Soft, non-tender, non-distended; normal bowel sounds; no masses, no organomegaly   Rectal: Deferred  Musculoskeletal: No cyanosis, clubbing or edema  Normal ROM    Skin:  No jaundice, rashes, or lesions   Heme/Lymph: No palpable cervical lymphadenopathy  Psych: Normal affect, good eye contact  Neuro: No gross deficits, AAOx3    Lab Results:   Lab Results   Component Value Date    WBC 8 67 08/26/2020    HGB 15 5 08/26/2020    HCT 44 8 08/26/2020    MCV 99 (H) 08/26/2020     (L) 08/26/2020     Lab Results   Component Value Date     10/03/2016    K 3 8 08/26/2020     (H) 08/26/2020    CO2 24 08/26/2020    ANIONGAP 23 (H) 02/20/2015    BUN 6 08/26/2020    CREATININE 0 82 08/26/2020    GLUCOSE 110 02/20/2015    CALCIUM 7 5 (L) 08/26/2020    AST 20 08/26/2020    ALT 40 08/26/2020    ALKPHOS 75 08/26/2020    PROT 7 3 10/03/2016    BILITOT 0 8 10/03/2016    EGFR 105 08/26/2020     No results found for: IRON, TIBC, FERRITIN  Lab Results   Component Value Date    LIPASE 111 08/25/2020       Radiology Results:   No results found

## 2020-09-29 NOTE — LETTER
September 29, 2020     Jhoan Johnson MD  [facility]    Patient: Nik Greenberg   YOB: 1970   Date of Visit: 9/29/2020       Dear Dr Lindsay Del Rosario: Thank you for referring Jose Manuel Garzon to me for evaluation  Below are my notes for this consultation  If you have questions, please do not hesitate to call me  I look forward to following your patient along with you  Sincerely,        Nicholas Kovacs MD        CC: No Recipients  Nicholas Kovacs MD  9/29/2020  2:49 PM  Sign when Signing Visit  [facility] - Outpatient Follow-up Note  Nik Greenberg 54 y o  male MRN: [id number]  Encounter: [id number]    ASSESSMENT AND PLAN:      1  Terminal ileitis with other complication [facility])  [id number] male here today for hospital follow-up  Was admitted for abdominal pain  CT showed long segment of terminal ileitis  Colonoscopy with evidence of inflammation in the TI, although the biopsies were overall unremarkable  Colon biopsies negative for microscopic colitis  Given his history of perianal fistulas, and long history of frequent diarrhea, concerning for Crohn's disease     - MRI enterography w wo; Future  - if MRE is completely normal, consider trying something like Questran or Imodium, in trying something like dairy free diet  - if MR RAY is consistent with Crohn's, we will need to start some biologics  2  Perianal fistula    3  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 ([facility] [address] )    4  Gastroesophageal reflux disease without esophagitis  Had some reflux during the hospitalization and was placed some pantoprazole  Currently feeling fine  Would like to come off  Stop pantoprazole  Monitor symptoms clinically  5  History of colon polyps  Colonoscopy during the hospitalization did show 15 mm adenoma  It was poor prep  We will need to repeat the colonoscopy within the year    Patient and wife made aware to alert first-degree family members to get colonoscopies starting at age 40  Followup Appointment:  6 weeks  ______________________________________________________________________    Chief Complaint   Patient presents with    Follow-up     scope     HPI:  68-year-old male here today for hospital follow-up  Was admitted with abdominal pain  CT showed long segment terminal ileitis  Overall, the pain is better  However, patient with chronic diarrhea  This was going on for years  About 6-7 loose bowel movements a day  Never any bleeding  No weight loss  No fevers or chills  No nausea or vomiting  Worse with eating  Historical Information   Past Medical History:   Diagnosis Date    Back pain     Colon polyp     GERD (gastroesophageal reflux disease)     Perianal fistula     Terminal ileitis ([address] )      Past Surgical History:   Procedure Laterality Date    ABSCESS DRAINAGE      BACK SURGERY  2019    COLONOSCOPY      HERNIA REPAIR       Social History     Substance and Sexual Activity   Alcohol Use Yes    Frequency: Monthly or less    Drinks per session: 1 or 2    Comment: occasionally      Social History     Substance and Sexual Activity   Drug Use No     Social History     Tobacco Use   Smoking Status Current Every Day Smoker    Packs/day: 0 50    Types: Cigarettes    Start date: 2019   Smokeless Tobacco Never Used     Family History   Problem Relation Age of Onset    Colon cancer Neg Hx     Colon polyps Neg Hx     Inflammatory bowel disease Neg Hx          Current Outpatient Medications:     cyclobenzaprine (FLEXERIL) 10 mg tablet    nortriptyline (PAMELOR) 25 mg capsule  No Known Allergies  Reviewed medications and allergies and updated as indicated    PHYSICAL EXAM:    Blood pressure 140/96, height 6' (1 829 m), weight 87 1 kg (192 lb)  Body mass index is 26 04 kg/m²  General Appearance: NAD, cooperative, alert  Eyes: Anicteric, PERRLA, EOMI  ENT:  Normocephalic, atraumatic, normal mucosa      Neck: Supple, symmetrical, trachea midline  Resp:  Clear to auscultation bilaterally; no rales, rhonchi or wheezing; respirations unlabored   CV:  S1 S2, Regular rate and rhythm; no murmur, rub, or gallop  GI:  Soft, non-tender, non-distended; normal bowel sounds; no masses, no organomegaly   Rectal: Deferred  Musculoskeletal: No cyanosis, clubbing or edema  Normal ROM  Skin:  No jaundice, rashes, or lesions   Heme/Lymph: No palpable cervical lymphadenopathy  Psych: Normal affect, good eye contact  Neuro: No gross deficits, AAOx3    Lab Results:   Lab Results   Component Value Date    WBC 8 67 08/26/2020    HGB 15 5 08/26/2020    HCT 44 8 08/26/2020    MCV 99 (H) 08/26/2020     (L) 08/26/2020     Lab Results   Component Value Date     10/03/2016    K 3 8 08/26/2020     (H) 08/26/2020    CO2 24 08/26/2020    ANIONGAP 23 (H) 02/20/2015    BUN 6 08/26/2020    CREATININE 0 82 08/26/2020    GLUCOSE 110 02/20/2015    CALCIUM 7 5 (L) 08/26/2020    AST 20 08/26/2020    ALT 40 08/26/2020    ALKPHOS 75 08/26/2020    PROT 7 3 10/03/2016    BILITOT 0 8 10/03/2016    EGFR 105 08/26/2020     No results found for: IRON, TIBC, FERRITIN  Lab Results   Component Value Date    LIPASE 111 08/25/2020       Radiology Results:   No results found

## 2020-10-09 ENCOUNTER — HOSPITAL ENCOUNTER (OUTPATIENT)
Dept: MRI IMAGING | Facility: HOSPITAL | Age: 50
Discharge: HOME | End: 2020-10-09
Attending: INTERNAL MEDICINE
Payer: COMMERCIAL

## 2020-10-09 DIAGNOSIS — K50.913 PERIANAL FISTULA DUE TO CROHN'S DISEASE (HCC): ICD-10-CM

## 2020-10-09 DIAGNOSIS — K50.018 TERMINAL ILEITIS WITH OTHER COMPLICATION (HCC): ICD-10-CM

## 2020-10-09 PROCEDURE — A9585 GADOBUTROL INJECTION: HCPCS | Performed by: FAMILY MEDICINE

## 2020-10-09 PROCEDURE — 72197 MRI PELVIS W/O & W/DYE: CPT

## 2020-10-09 PROCEDURE — 74183 MRI ABD W/O CNTR FLWD CNTR: CPT

## 2020-10-09 PROCEDURE — G1004 CDSM NDSC: HCPCS

## 2020-10-09 RX ADMIN — GLUCAGON HYDROCHLORIDE 1 MG: KIT at 09:29

## 2020-10-09 RX ADMIN — GADOBUTROL 8.5 ML: 604.72 INJECTION INTRAVENOUS at 09:20

## 2020-10-12 ENCOUNTER — TELEPHONE (OUTPATIENT)
Dept: GASTROENTEROLOGY | Facility: CLINIC | Age: 50
End: 2020-10-12

## 2020-10-12 ENCOUNTER — TELEPHONE (OUTPATIENT)
Dept: PAIN MEDICINE | Facility: CLINIC | Age: 50
End: 2020-10-12

## 2020-10-26 ENCOUNTER — OFFICE VISIT (OUTPATIENT)
Dept: PAIN MEDICINE | Facility: CLINIC | Age: 50
End: 2020-10-26
Payer: OTHER MISCELLANEOUS

## 2020-10-26 VITALS
BODY MASS INDEX: 26.68 KG/M2 | SYSTOLIC BLOOD PRESSURE: 142 MMHG | HEIGHT: 72 IN | DIASTOLIC BLOOD PRESSURE: 90 MMHG | HEART RATE: 86 BPM | TEMPERATURE: 98.4 F | WEIGHT: 197 LBS

## 2020-10-26 DIAGNOSIS — M54.16 LUMBAR RADICULOPATHY: ICD-10-CM

## 2020-10-26 DIAGNOSIS — G89.29 CHRONIC FOOT PAIN, UNSPECIFIED LATERALITY: ICD-10-CM

## 2020-10-26 DIAGNOSIS — M54.42 CHRONIC BILATERAL LOW BACK PAIN WITH BILATERAL SCIATICA: ICD-10-CM

## 2020-10-26 DIAGNOSIS — M96.1 LUMBAR POST-LAMINECTOMY SYNDROME: ICD-10-CM

## 2020-10-26 DIAGNOSIS — G89.29 CHRONIC BILATERAL LOW BACK PAIN WITH BILATERAL SCIATICA: ICD-10-CM

## 2020-10-26 DIAGNOSIS — M54.41 CHRONIC BILATERAL LOW BACK PAIN WITH BILATERAL SCIATICA: ICD-10-CM

## 2020-10-26 DIAGNOSIS — M79.18 MYOFASCIAL PAIN SYNDROME: ICD-10-CM

## 2020-10-26 DIAGNOSIS — G89.4 CHRONIC PAIN SYNDROME: Primary | ICD-10-CM

## 2020-10-26 DIAGNOSIS — M47.816 LUMBAR SPONDYLOSIS: ICD-10-CM

## 2020-10-26 DIAGNOSIS — M79.673 CHRONIC FOOT PAIN, UNSPECIFIED LATERALITY: ICD-10-CM

## 2020-10-26 PROCEDURE — 99214 OFFICE O/P EST MOD 30 MIN: CPT | Performed by: NURSE PRACTITIONER

## 2020-10-26 RX ORDER — TIZANIDINE 4 MG/1
TABLET ORAL
Qty: 45 TABLET | Refills: 1 | Status: SHIPPED | OUTPATIENT
Start: 2020-10-26 | End: 2020-11-24 | Stop reason: SDUPTHER

## 2020-10-27 ENCOUNTER — TELEPHONE (OUTPATIENT)
Dept: RADIOLOGY | Facility: CLINIC | Age: 50
End: 2020-10-27

## 2020-11-10 ENCOUNTER — TELEMEDICINE (OUTPATIENT)
Dept: GASTROENTEROLOGY | Facility: CLINIC | Age: 50
End: 2020-11-10
Payer: COMMERCIAL

## 2020-11-10 VITALS — BODY MASS INDEX: 25.73 KG/M2 | HEIGHT: 72 IN | WEIGHT: 190 LBS

## 2020-11-10 DIAGNOSIS — K59.1 FUNCTIONAL DIARRHEA: ICD-10-CM

## 2020-11-10 DIAGNOSIS — Z86.010 HISTORY OF COLON POLYPS: ICD-10-CM

## 2020-11-10 DIAGNOSIS — K60.3 PERIANAL FISTULA: ICD-10-CM

## 2020-11-10 DIAGNOSIS — K50.913 PERIANAL FISTULA DUE TO CROHN'S DISEASE (HCC): Primary | ICD-10-CM

## 2020-11-10 PROCEDURE — 99214 OFFICE O/P EST MOD 30 MIN: CPT | Performed by: INTERNAL MEDICINE

## 2020-11-10 PROCEDURE — 3008F BODY MASS INDEX DOCD: CPT | Performed by: INTERNAL MEDICINE

## 2020-11-10 RX ORDER — CHOLESTYRAMINE 4 G/9G
1 POWDER, FOR SUSPENSION ORAL
Qty: 90 PACKET | Refills: 2 | Status: SHIPPED | OUTPATIENT
Start: 2020-11-10 | End: 2021-01-05 | Stop reason: SDUPTHER

## 2020-11-12 ENCOUNTER — CLINICAL SUPPORT (OUTPATIENT)
Dept: FAMILY MEDICINE CLINIC | Facility: CLINIC | Age: 50
End: 2020-11-12
Payer: COMMERCIAL

## 2020-11-12 DIAGNOSIS — Z23 NEED FOR SHINGLES VACCINE: Primary | ICD-10-CM

## 2020-11-12 DIAGNOSIS — Z23 NEED FOR VACCINATION AGAINST STREPTOCOCCUS PNEUMONIAE USING PNEUMOCOCCAL CONJUGATE VACCINE 13: ICD-10-CM

## 2020-11-12 DIAGNOSIS — Z23 NEED FOR PNEUMOCOCCAL VACCINE: ICD-10-CM

## 2020-11-12 LAB
25(OH)D3 SERPL-MCNC: 14 NG/ML (ref 30–100)
ALBUMIN SERPL-MCNC: 4.4 G/DL (ref 3.6–5.1)
ALBUMIN/GLOB SERPL: 2.1 (CALC) (ref 1–2.5)
ALP SERPL-CCNC: 74 U/L (ref 36–130)
ALT SERPL-CCNC: 41 U/L (ref 9–46)
AST SERPL-CCNC: 25 U/L (ref 10–40)
BASOPHILS # BLD AUTO: 23 CELLS/UL (ref 0–200)
BASOPHILS NFR BLD AUTO: 0.3 %
BILIRUB SERPL-MCNC: 0.6 MG/DL (ref 0.2–1.2)
BUN SERPL-MCNC: 10 MG/DL (ref 7–25)
BUN/CREAT SERPL: NORMAL (CALC) (ref 6–22)
CALCIUM SERPL-MCNC: 9.6 MG/DL (ref 8.6–10.3)
CHLORIDE SERPL-SCNC: 108 MMOL/L (ref 98–110)
CO2 SERPL-SCNC: 27 MMOL/L (ref 20–32)
CREAT SERPL-MCNC: 0.81 MG/DL (ref 0.6–1.35)
CRP SERPL-MCNC: 4.7 MG/L
EOSINOPHIL # BLD AUTO: 233 CELLS/UL (ref 15–500)
EOSINOPHIL NFR BLD AUTO: 3.1 %
ERYTHROCYTE [DISTWIDTH] IN BLOOD BY AUTOMATED COUNT: 12.1 % (ref 11–15)
ERYTHROCYTE [SEDIMENTATION RATE] IN BLOOD BY WESTERGREN METHOD: 6 MM/H
GLOBULIN SER CALC-MCNC: 2.1 G/DL (CALC) (ref 1.9–3.7)
GLUCOSE SERPL-MCNC: 95 MG/DL (ref 65–99)
HAV AB SER QL IA: NORMAL
HBV CORE AB SERPL QL IA: NORMAL
HBV SURFACE AB SERPL IA-ACNC: <5 MIU/ML
HBV SURFACE AG SERPL QL IA: NORMAL
HCT VFR BLD AUTO: 49.6 % (ref 38.5–50)
HGB BLD-MCNC: 16.7 G/DL (ref 13.2–17.1)
LYMPHOCYTES # BLD AUTO: 1800 CELLS/UL (ref 850–3900)
LYMPHOCYTES NFR BLD AUTO: 24 %
MCH RBC QN AUTO: 33.1 PG (ref 27–33)
MCHC RBC AUTO-ENTMCNC: 33.7 G/DL (ref 32–36)
MCV RBC AUTO: 98.2 FL (ref 80–100)
MEV IGG SER IA-ACNC: <13.5 AU/ML
MONOCYTES # BLD AUTO: 480 CELLS/UL (ref 200–950)
MONOCYTES NFR BLD AUTO: 6.4 %
MUV IGG SER IA-ACNC: <9 AU/ML
NEUTROPHILS # BLD AUTO: 4965 CELLS/UL (ref 1500–7800)
NEUTROPHILS NFR BLD AUTO: 66.2 %
PLATELET # BLD AUTO: 206 THOUSAND/UL (ref 140–400)
PMV BLD REES-ECKER: 9.7 FL (ref 7.5–12.5)
POTASSIUM SERPL-SCNC: 3.9 MMOL/L (ref 3.5–5.3)
PROT SERPL-MCNC: 6.5 G/DL (ref 6.1–8.1)
RBC # BLD AUTO: 5.05 MILLION/UL (ref 4.2–5.8)
RUBV IGG SERPL IA-ACNC: 0.94 INDEX
SL AMB EGFR AFRICAN AMERICAN: 122 ML/MIN/1.73M2
SL AMB EGFR NON AFRICAN AMERICAN: 105 ML/MIN/1.73M2
SODIUM SERPL-SCNC: 142 MMOL/L (ref 135–146)
VZV IGG SER IA-ACNC: 595.3 INDEX
WBC # BLD AUTO: 7.5 THOUSAND/UL (ref 3.8–10.8)

## 2020-11-12 PROCEDURE — 90750 HZV VACC RECOMBINANT IM: CPT

## 2020-11-12 PROCEDURE — 90472 IMMUNIZATION ADMIN EACH ADD: CPT

## 2020-11-12 PROCEDURE — 96372 THER/PROPH/DIAG INJ SC/IM: CPT

## 2020-11-12 PROCEDURE — 90670 PCV13 VACCINE IM: CPT

## 2020-11-12 PROCEDURE — 90471 IMMUNIZATION ADMIN: CPT

## 2020-11-13 LAB
M TB IFN-G CD4+ BCKGRND COR BLD-ACNC: 0.01 IU/ML
M TB IFN-G CD4+ BCKGRND COR BLD-ACNC: 0.01 IU/ML
M TB IFN-G CD4+ T-CELLS BLD-ACNC: 0.04 IU/ML
M TB TUBERC IFN-G BLD QL: NEGATIVE
M TB TUBERC IGNF/MITOGEN IGNF CONTROL: >10 IU/ML

## 2020-11-17 ENCOUNTER — TELEPHONE (OUTPATIENT)
Dept: GASTROENTEROLOGY | Facility: CLINIC | Age: 50
End: 2020-11-17

## 2020-11-17 DIAGNOSIS — K50.913 PERIANAL FISTULA DUE TO CROHN'S DISEASE (HCC): Primary | ICD-10-CM

## 2020-11-18 ENCOUNTER — CLINICAL SUPPORT (OUTPATIENT)
Dept: FAMILY MEDICINE CLINIC | Facility: CLINIC | Age: 50
End: 2020-11-18
Payer: COMMERCIAL

## 2020-11-18 DIAGNOSIS — Z23 NEED FOR MMR VACCINE: Primary | ICD-10-CM

## 2020-11-18 DIAGNOSIS — Z23 NEED FOR HEPATITIS B VACCINATION: ICD-10-CM

## 2020-11-18 LAB — CALPROTECTIN STL-MCNT: 18 MCG/G

## 2020-11-18 PROCEDURE — 90707 MMR VACCINE SC: CPT

## 2020-11-18 PROCEDURE — 90746 HEPB VACCINE 3 DOSE ADULT IM: CPT

## 2020-11-18 PROCEDURE — 90472 IMMUNIZATION ADMIN EACH ADD: CPT

## 2020-11-18 PROCEDURE — 90471 IMMUNIZATION ADMIN: CPT

## 2020-11-22 ENCOUNTER — NURSE TRIAGE (OUTPATIENT)
Dept: OTHER | Facility: OTHER | Age: 50
End: 2020-11-22

## 2020-11-22 DIAGNOSIS — Z20.828 EXPOSURE TO SARS-ASSOCIATED CORONAVIRUS: ICD-10-CM

## 2020-11-22 DIAGNOSIS — Z20.828 EXPOSURE TO SARS-ASSOCIATED CORONAVIRUS: Primary | ICD-10-CM

## 2020-11-22 PROCEDURE — U0003 INFECTIOUS AGENT DETECTION BY NUCLEIC ACID (DNA OR RNA); SEVERE ACUTE RESPIRATORY SYNDROME CORONAVIRUS 2 (SARS-COV-2) (CORONAVIRUS DISEASE [COVID-19]), AMPLIFIED PROBE TECHNIQUE, MAKING USE OF HIGH THROUGHPUT TECHNOLOGIES AS DESCRIBED BY CMS-2020-01-R: HCPCS | Performed by: FAMILY MEDICINE

## 2020-11-23 ENCOUNTER — TELEPHONE (OUTPATIENT)
Dept: GASTROENTEROLOGY | Facility: CLINIC | Age: 50
End: 2020-11-23

## 2020-11-24 ENCOUNTER — TELEPHONE (OUTPATIENT)
Dept: PAIN MEDICINE | Facility: CLINIC | Age: 50
End: 2020-11-24

## 2020-11-24 DIAGNOSIS — G89.4 CHRONIC PAIN SYNDROME: ICD-10-CM

## 2020-11-24 DIAGNOSIS — M79.18 MYOFASCIAL PAIN SYNDROME: ICD-10-CM

## 2020-11-24 LAB — SARS-COV-2 RNA SPEC QL NAA+PROBE: NOT DETECTED

## 2020-11-24 RX ORDER — TIZANIDINE 4 MG/1
TABLET ORAL
Qty: 105 TABLET | Refills: 2 | Status: SHIPPED | OUTPATIENT
Start: 2020-11-24 | End: 2021-02-04 | Stop reason: SDUPTHER

## 2020-11-25 ENCOUNTER — TELEPHONE (OUTPATIENT)
Dept: FAMILY MEDICINE CLINIC | Facility: CLINIC | Age: 50
End: 2020-11-25

## 2020-12-01 ENCOUNTER — TELEPHONE (OUTPATIENT)
Dept: PAIN MEDICINE | Facility: CLINIC | Age: 50
End: 2020-12-01

## 2020-12-01 ENCOUNTER — APPOINTMENT (OUTPATIENT)
Dept: RADIOLOGY | Facility: CLINIC | Age: 50
End: 2020-12-01
Payer: COMMERCIAL

## 2020-12-01 DIAGNOSIS — G89.4 CHRONIC PAIN SYNDROME: ICD-10-CM

## 2020-12-01 DIAGNOSIS — G89.4 CHRONIC PAIN SYNDROME: Primary | ICD-10-CM

## 2020-12-01 PROCEDURE — 72100 X-RAY EXAM L-S SPINE 2/3 VWS: CPT

## 2020-12-01 PROCEDURE — 72070 X-RAY EXAM THORAC SPINE 2VWS: CPT

## 2020-12-22 ENCOUNTER — TELEPHONE (OUTPATIENT)
Dept: RADIOLOGY | Facility: CLINIC | Age: 50
End: 2020-12-22

## 2020-12-22 DIAGNOSIS — Z79.899 ENCOUNTER FOR LONG-TERM (CURRENT) USE OF OTHER MEDICATIONS: ICD-10-CM

## 2020-12-22 DIAGNOSIS — M96.1 POST LAMINECTOMY SYNDROME: ICD-10-CM

## 2020-12-22 DIAGNOSIS — M54.42 LEFT-SIDED LOW BACK PAIN WITH SCIATICA, SCIATICA LATERALITY UNSPECIFIED, UNSPECIFIED CHRONICITY: ICD-10-CM

## 2020-12-22 DIAGNOSIS — M54.41 LOW BACK PAIN WITH RIGHT-SIDED SCIATICA, UNSPECIFIED BACK PAIN LATERALITY, UNSPECIFIED CHRONICITY: ICD-10-CM

## 2020-12-22 DIAGNOSIS — M47.816 LUMBAR SPONDYLOSIS: ICD-10-CM

## 2020-12-22 DIAGNOSIS — G89.4 CHRONIC PAIN SYNDROME: Primary | ICD-10-CM

## 2020-12-22 DIAGNOSIS — M54.16 LUMBAR RADICULOPATHY: ICD-10-CM

## 2020-12-22 DIAGNOSIS — Z79.01 CHRONIC ANTICOAGULATION: ICD-10-CM

## 2020-12-22 NOTE — TELEPHONE ENCOUNTER
*please try to get me on phone [phone]*  Pt is scheduled for SCS trial on 01/04/21  He did not think his insurance was changing in January so we scheduled with [place] authorization  He is now saying that he will have [address]  His wife is the subscriber and called the ins company, they are supposed to call to speak with me  If they are able to verify whether [facility] required or not, or process the auth before his plan is active, we can keep the appt  Pt is aware that [employer] needs new ins info as well before proceeding and that we may have to postpone the procedure if we dont have auth in time

## 2020-12-23 ENCOUNTER — OFFICE VISIT (OUTPATIENT)
Dept: PAIN MEDICINE | Facility: CLINIC | Age: 50
End: 2020-12-23
Payer: COMMERCIAL

## 2020-12-23 ENCOUNTER — LAB (OUTPATIENT)
Dept: LAB | Facility: HOSPITAL | Age: 50
End: 2020-12-23
Attending: ANESTHESIOLOGY
Payer: COMMERCIAL

## 2020-12-23 ENCOUNTER — TRANSCRIBE ORDERS (OUTPATIENT)
Dept: ADMINISTRATIVE | Facility: HOSPITAL | Age: 50
End: 2020-12-23

## 2020-12-23 ENCOUNTER — APPOINTMENT (OUTPATIENT)
Dept: RADIOLOGY | Facility: CLINIC | Age: 50
End: 2020-12-23
Payer: COMMERCIAL

## 2020-12-23 VITALS
WEIGHT: 194.6 LBS | HEART RATE: 92 BPM | HEIGHT: 72 IN | TEMPERATURE: 97.5 F | SYSTOLIC BLOOD PRESSURE: 138 MMHG | DIASTOLIC BLOOD PRESSURE: 90 MMHG | BODY MASS INDEX: 26.36 KG/M2

## 2020-12-23 DIAGNOSIS — Z79.01 CHRONIC ANTICOAGULATION: ICD-10-CM

## 2020-12-23 DIAGNOSIS — M47.816 LUMBAR SPONDYLOSIS: ICD-10-CM

## 2020-12-23 DIAGNOSIS — G89.4 CHRONIC PAIN SYNDROME: Primary | ICD-10-CM

## 2020-12-23 DIAGNOSIS — M54.42 LEFT-SIDED LOW BACK PAIN WITH SCIATICA, SCIATICA LATERALITY UNSPECIFIED, UNSPECIFIED CHRONICITY: ICD-10-CM

## 2020-12-23 DIAGNOSIS — G89.4 CHRONIC PAIN SYNDROME: ICD-10-CM

## 2020-12-23 DIAGNOSIS — G89.29 CHRONIC RIGHT SHOULDER PAIN: Primary | ICD-10-CM

## 2020-12-23 DIAGNOSIS — M25.511 CHRONIC RIGHT SHOULDER PAIN: ICD-10-CM

## 2020-12-23 DIAGNOSIS — M54.16 LUMBAR RADICULOPATHY: ICD-10-CM

## 2020-12-23 DIAGNOSIS — M25.511 CHRONIC RIGHT SHOULDER PAIN: Primary | ICD-10-CM

## 2020-12-23 DIAGNOSIS — Z79.899 ENCOUNTER FOR LONG-TERM (CURRENT) USE OF OTHER MEDICATIONS: ICD-10-CM

## 2020-12-23 DIAGNOSIS — M96.1 POST LAMINECTOMY SYNDROME: ICD-10-CM

## 2020-12-23 DIAGNOSIS — M96.1 LUMBAR POST-LAMINECTOMY SYNDROME: ICD-10-CM

## 2020-12-23 DIAGNOSIS — M54.41 LOW BACK PAIN WITH RIGHT-SIDED SCIATICA, UNSPECIFIED BACK PAIN LATERALITY, UNSPECIFIED CHRONICITY: ICD-10-CM

## 2020-12-23 DIAGNOSIS — G89.29 CHRONIC RIGHT SHOULDER PAIN: ICD-10-CM

## 2020-12-23 PROBLEM — Z72.0 TOBACCO USE: Status: ACTIVE | Noted: 2019-10-31

## 2020-12-23 PROBLEM — M79.89 RIGHT LEG SWELLING: Status: ACTIVE | Noted: 2020-07-24

## 2020-12-23 PROBLEM — G56.21: Status: ACTIVE | Noted: 2017-03-15

## 2020-12-23 PROBLEM — R20.0 NUMBNESS AND TINGLING OF BOTH LOWER EXTREMITIES: Status: ACTIVE | Noted: 2020-01-23

## 2020-12-23 PROBLEM — M51.379 DEGENERATIVE DISC DISEASE AT L5-S1 LEVEL: Status: ACTIVE | Noted: 2019-11-26

## 2020-12-23 PROBLEM — L03.213 PERIORBITAL CELLULITIS OF LEFT EYE: Status: ACTIVE | Noted: 2018-01-18

## 2020-12-23 PROBLEM — Z98.1 HISTORY OF LUMBAR FUSION: Status: ACTIVE | Noted: 2019-12-26

## 2020-12-23 PROBLEM — S52.513A: Status: ACTIVE | Noted: 2017-01-03

## 2020-12-23 PROBLEM — R20.2 NUMBNESS AND TINGLING OF BOTH LOWER EXTREMITIES: Status: ACTIVE | Noted: 2020-01-23

## 2020-12-23 PROBLEM — M51.37 DEGENERATIVE DISC DISEASE AT L5-S1 LEVEL: Status: ACTIVE | Noted: 2019-11-26

## 2020-12-23 PROBLEM — H01.006 BLEPHARITIS, LEFT EYE: Status: ACTIVE | Noted: 2018-01-16

## 2020-12-23 LAB
ALBUMIN SERPL BCP-MCNC: 4 G/DL (ref 3.5–5)
ALP SERPL-CCNC: 88 U/L (ref 46–116)
ALT SERPL W P-5'-P-CCNC: 64 U/L (ref 12–78)
ANION GAP SERPL CALCULATED.3IONS-SCNC: 4 MMOL/L (ref 4–13)
APTT PPP: 34 SECONDS (ref 23–37)
AST SERPL W P-5'-P-CCNC: 30 U/L (ref 5–45)
BASOPHILS # BLD AUTO: 0.02 THOUSANDS/ΜL (ref 0–0.1)
BASOPHILS NFR BLD AUTO: 0 % (ref 0–1)
BILIRUB SERPL-MCNC: 0.54 MG/DL (ref 0.2–1)
BUN SERPL-MCNC: 11 MG/DL (ref 5–25)
CALCIUM SERPL-MCNC: 9.6 MG/DL (ref 8.3–10.1)
CHLORIDE SERPL-SCNC: 107 MMOL/L (ref 100–108)
CO2 SERPL-SCNC: 28 MMOL/L (ref 21–32)
CREAT SERPL-MCNC: 0.98 MG/DL (ref 0.6–1.3)
EOSINOPHIL # BLD AUTO: 0.13 THOUSAND/ΜL (ref 0–0.61)
EOSINOPHIL NFR BLD AUTO: 2 % (ref 0–6)
ERYTHROCYTE [DISTWIDTH] IN BLOOD BY AUTOMATED COUNT: 12.3 % (ref 11.6–15.1)
EST. AVERAGE GLUCOSE BLD GHB EST-MCNC: 103 MG/DL
GFR SERPL CREATININE-BSD FRML MDRD: 91 ML/MIN/1.73SQ M
GLUCOSE SERPL-MCNC: 130 MG/DL (ref 65–140)
HBA1C MFR BLD: 5.2 %
HCT VFR BLD AUTO: 49.2 % (ref 36.5–49.3)
HGB BLD-MCNC: 16.7 G/DL (ref 12–17)
IMM GRANULOCYTES # BLD AUTO: 0.03 THOUSAND/UL (ref 0–0.2)
IMM GRANULOCYTES NFR BLD AUTO: 0 % (ref 0–2)
INR PPP: 0.94 (ref 0.84–1.19)
LYMPHOCYTES # BLD AUTO: 1.94 THOUSANDS/ΜL (ref 0.6–4.47)
LYMPHOCYTES NFR BLD AUTO: 26 % (ref 14–44)
MCH RBC QN AUTO: 33.3 PG (ref 26.8–34.3)
MCHC RBC AUTO-ENTMCNC: 33.9 G/DL (ref 31.4–37.4)
MCV RBC AUTO: 98 FL (ref 82–98)
MONOCYTES # BLD AUTO: 0.58 THOUSAND/ΜL (ref 0.17–1.22)
MONOCYTES NFR BLD AUTO: 8 % (ref 4–12)
NEUTROPHILS # BLD AUTO: 4.78 THOUSANDS/ΜL (ref 1.85–7.62)
NEUTS SEG NFR BLD AUTO: 64 % (ref 43–75)
NRBC BLD AUTO-RTO: 0 /100 WBCS
PLATELET # BLD AUTO: 168 THOUSANDS/UL (ref 149–390)
PMV BLD AUTO: 9.8 FL (ref 8.9–12.7)
POTASSIUM SERPL-SCNC: 4.1 MMOL/L (ref 3.5–5.3)
PROT SERPL-MCNC: 7.2 G/DL (ref 6.4–8.2)
PROTHROMBIN TIME: 12.6 SECONDS (ref 11.6–14.5)
RBC # BLD AUTO: 5.01 MILLION/UL (ref 3.88–5.62)
SODIUM SERPL-SCNC: 139 MMOL/L (ref 136–145)
WBC # BLD AUTO: 7.48 THOUSAND/UL (ref 4.31–10.16)

## 2020-12-23 PROCEDURE — 73030 X-RAY EXAM OF SHOULDER: CPT

## 2020-12-23 PROCEDURE — 85610 PROTHROMBIN TIME: CPT

## 2020-12-23 PROCEDURE — 99214 OFFICE O/P EST MOD 30 MIN: CPT | Performed by: ANESTHESIOLOGY

## 2020-12-23 PROCEDURE — 80053 COMPREHEN METABOLIC PANEL: CPT

## 2020-12-23 PROCEDURE — 85025 COMPLETE CBC W/AUTO DIFF WBC: CPT

## 2020-12-23 PROCEDURE — 85730 THROMBOPLASTIN TIME PARTIAL: CPT

## 2020-12-23 PROCEDURE — 83036 HEMOGLOBIN GLYCOSYLATED A1C: CPT

## 2020-12-23 PROCEDURE — 36415 COLL VENOUS BLD VENIPUNCTURE: CPT

## 2020-12-23 PROCEDURE — 3008F BODY MASS INDEX DOCD: CPT | Performed by: ANESTHESIOLOGY

## 2020-12-27 ENCOUNTER — HOSPITAL ENCOUNTER (EMERGENCY)
Facility: HOSPITAL | Age: 50
Discharge: HOME | End: 2020-12-27
Attending: EMERGENCY MEDICINE | Admitting: EMERGENCY MEDICINE
Payer: COMMERCIAL

## 2020-12-27 ENCOUNTER — APPOINTMENT (EMERGENCY)
Dept: RADIOLOGY | Facility: HOSPITAL | Age: 50
End: 2020-12-27
Payer: COMMERCIAL

## 2020-12-27 VITALS
OXYGEN SATURATION: 95 % | RESPIRATION RATE: 18 BRPM | HEART RATE: 89 BPM | BODY MASS INDEX: 26.01 KG/M2 | SYSTOLIC BLOOD PRESSURE: 145 MMHG | HEIGHT: 72 IN | WEIGHT: 192 LBS | DIASTOLIC BLOOD PRESSURE: 89 MMHG | TEMPERATURE: 97.8 F

## 2020-12-27 DIAGNOSIS — L03.032 CELLULITIS OF GREAT TOE, LEFT: Primary | ICD-10-CM

## 2020-12-27 PROCEDURE — 99284 EMERGENCY DEPT VISIT MOD MDM: CPT | Performed by: PHYSICIAN ASSISTANT

## 2020-12-27 PROCEDURE — 73630 X-RAY EXAM OF FOOT: CPT

## 2020-12-27 PROCEDURE — 99283 EMERGENCY DEPT VISIT LOW MDM: CPT

## 2020-12-27 RX ORDER — TRAMADOL HYDROCHLORIDE 50 MG/1
50 TABLET ORAL ONCE
Status: COMPLETED | OUTPATIENT
Start: 2020-12-27 | End: 2020-12-27

## 2020-12-27 RX ORDER — CEPHALEXIN 500 MG/1
500 CAPSULE ORAL EVERY 6 HOURS SCHEDULED
Qty: 28 CAPSULE | Refills: 0 | Status: SHIPPED | OUTPATIENT
Start: 2020-12-27 | End: 2021-01-03

## 2020-12-27 RX ORDER — SULFAMETHOXAZOLE AND TRIMETHOPRIM 800; 160 MG/1; MG/1
1 TABLET ORAL ONCE
Status: COMPLETED | OUTPATIENT
Start: 2020-12-27 | End: 2020-12-27

## 2020-12-27 RX ORDER — SULFAMETHOXAZOLE AND TRIMETHOPRIM 800; 160 MG/1; MG/1
1 TABLET ORAL 2 TIMES DAILY
Qty: 20 TABLET | Refills: 0 | Status: SHIPPED | OUTPATIENT
Start: 2020-12-27 | End: 2020-12-27 | Stop reason: SDUPTHER

## 2020-12-27 RX ORDER — TRAMADOL HYDROCHLORIDE 50 MG/1
50 TABLET ORAL EVERY 6 HOURS PRN
Qty: 15 TABLET | Refills: 0 | Status: SHIPPED | OUTPATIENT
Start: 2020-12-27 | End: 2021-01-05

## 2020-12-27 RX ORDER — CEPHALEXIN 250 MG/1
500 CAPSULE ORAL ONCE
Status: COMPLETED | OUTPATIENT
Start: 2020-12-27 | End: 2020-12-27

## 2020-12-27 RX ORDER — SULFAMETHOXAZOLE AND TRIMETHOPRIM 800; 160 MG/1; MG/1
1 TABLET ORAL 2 TIMES DAILY
Qty: 20 TABLET | Refills: 0 | Status: SHIPPED | OUTPATIENT
Start: 2020-12-27 | End: 2021-01-06

## 2020-12-27 RX ADMIN — SULFAMETHOXAZOLE AND TRIMETHOPRIM 1 TABLET: 800; 160 TABLET ORAL at 15:25

## 2020-12-27 RX ADMIN — CEPHALEXIN 500 MG: 250 CAPSULE ORAL at 15:24

## 2020-12-27 RX ADMIN — TRAMADOL HYDROCHLORIDE 50 MG: 50 TABLET, FILM COATED ORAL at 15:24

## 2020-12-28 ENCOUNTER — TELEPHONE (OUTPATIENT)
Dept: PAIN MEDICINE | Facility: CLINIC | Age: 50
End: 2020-12-28

## 2020-12-31 ENCOUNTER — TELEPHONE (OUTPATIENT)
Dept: GASTROENTEROLOGY | Facility: CLINIC | Age: 50
End: 2020-12-31

## 2020-12-31 DIAGNOSIS — K50.913 PERIANAL FISTULA DUE TO CROHN'S DISEASE (HCC): Primary | ICD-10-CM

## 2021-01-05 ENCOUNTER — TELEMEDICINE (OUTPATIENT)
Dept: GASTROENTEROLOGY | Facility: CLINIC | Age: 51
End: 2021-01-05
Payer: COMMERCIAL

## 2021-01-05 VITALS — BODY MASS INDEX: 25.73 KG/M2 | HEIGHT: 72 IN | WEIGHT: 190 LBS

## 2021-01-05 DIAGNOSIS — K50.913 PERIANAL FISTULA DUE TO CROHN'S DISEASE (HCC): Primary | ICD-10-CM

## 2021-01-05 DIAGNOSIS — E55.9 VITAMIN D DEFICIENCY: ICD-10-CM

## 2021-01-05 DIAGNOSIS — Z86.010 HISTORY OF COLON POLYPS: ICD-10-CM

## 2021-01-05 DIAGNOSIS — K59.1 FUNCTIONAL DIARRHEA: ICD-10-CM

## 2021-01-05 DIAGNOSIS — Z79.899 MEDICATION MANAGEMENT: ICD-10-CM

## 2021-01-05 PROCEDURE — 99214 OFFICE O/P EST MOD 30 MIN: CPT | Performed by: INTERNAL MEDICINE

## 2021-01-05 RX ORDER — CHOLESTYRAMINE 4 G/9G
1 POWDER, FOR SUSPENSION ORAL
Qty: 90 PACKET | Refills: 2 | Status: SHIPPED | OUTPATIENT
Start: 2021-01-05 | End: 2021-02-25

## 2021-01-05 RX ORDER — MELATONIN
2000 DAILY
Qty: 60 TABLET | Refills: 2 | Status: SHIPPED | OUTPATIENT
Start: 2021-01-05 | End: 2021-06-08 | Stop reason: SDUPTHER

## 2021-01-05 RX ORDER — CEPHALEXIN 500 MG/1
500 CAPSULE ORAL EVERY 12 HOURS SCHEDULED
COMMUNITY
End: 2021-01-25 | Stop reason: ALTCHOICE

## 2021-01-05 NOTE — PROGRESS NOTES
Virtual Regular Visit    Problem List Items Addressed This Visit        Digestive    Functional diarrhea    Relevant Medications    cholestyramine (QUESTRAN) 4 g packet       Other    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 ([address] ) - Primary    Relevant Medications    Adalimumab 80 MG/0 8ML PNKT    Other Relevant Orders    ADALIMUMAB CONCENTRATION AND ANTI-ADALIMUMAB AB    Comprehensive metabolic panel    CBC and differential    C-reactive protein    Sedimentation rate, automated    History of colon polyps    Vitamin D deficiency    Relevant Medications    cholecalciferol (VITAMIN D3) 1,000 units tablet    Other Relevant Orders    Vitamin D 25 hydroxy            Chief Complaint   Patient presents with    Folow up-ulcerative colitis    Vomiting, nausea, on and off diarrhea    Virtual Regular Visit        Encounter provider Yesy Camacho MD    Provider located at [address] [place] [address]  [address] [place] [id number] [phone]      Recent Visits  Date Type Provider Dept   12/31/20 Telephone Joycelyn Crockett   Showing recent visits within past 7 days and meeting all other requirements     Today's Visits  Date Type Provider Dept   01/05/21 Roni White MD [facility]   Showing today's visits and meeting all other requirements     Future Appointments  No visits were found meeting these conditions  Showing future appointments within next 150 days and meeting all other requirements        The patient was identified by name and date of birth  Diana Phillip was informed that this is a telemedicine visit and that the visit is being conducted through [facility] [place] and patient was informed that this is not a secure, HIPAA-compliant platform  He agrees to proceed  My office door was closed  No one else was in the room  He acknowledged consent and understanding of privacy and security of the video platform   The patient has agreed to participate and understands they can discontinue the visit at any time  Patient is aware this is a billable service  Lucas Ac is a 52 y o  male with newly diagnosed Crohn's here today for follow-up  Humira was started on November 17, 2020  No significant side effects  Overall diarrhea is getting better  Denies any rashes or joint pains  No fever chills  Moving his bowels about 1 to 3 times a day  No significant abdominal pains, nausea vomiting  Did have 1 episode of vomiting after eating eggs but no further issues since  Recently was placed on antibiotics for a foot infection  ASSESSMENT AND PLAN:      1  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 ([facility] [address] )  Some small-bowel disease, normal colon noted on colonoscopy over the summer 2020  Started on Humira  - continue Adalimumab 80 MG/0 8ML PNKT; Citrate Free Starter Kit: Inject (2) 80 mg/0 8ml Pens (160 mg) under the skin on Day 1, then (1) 80mg/0 8ml PEN on day 15  Dispense: 1 each; Refill: 0    - check labs in 5 months prior to office visit in 6 months:   - ADALIMUMAB CONCENTRATION AND  ANTI-ADALIMUMAB AB; Future   - Comprehensive metabolic panel; Future   - CBC and differential; Future   - C-reactive protein; Future   - Sedimentation rate, automated; Future    - If symptoms are improving at this point, would recommend getting an EGD and a small bowel capsule to assess for any other sites of Crohns   - Repeat colonoscopy and ileoscopy in summer of 2021  - Dermatology annual exam recommended    2  Vitamin D deficiency  Low vit D level  Start some supplementation  - cholecalciferol (VITAMIN D3) 1,000 units tablet; Take 2 tablets (2,000 Units total) by mouth daily  Dispense: 60 tablet; Refill: 2  - Recheck Vitamin D 25 hydroxy levels in 5 months    3  Functional diarrhea    - cholestyramine (QUESTRAN) 4 g packet;  Take 1 packet (4 g total) by mouth 3 (three) times a day with meals  Dispense: 90 packet; Refill: 2    4  History of colon polyps  Repeat in 8/2021      Followup Appointment: 6 months  ______________________________________________________________________      Historical Information   Past Medical History:   Diagnosis Date    Back pain     Colon polyp     GERD (gastroesophageal reflux disease)     Perianal fistula     Terminal ileitis ([address] )      Past Surgical History:   Procedure Laterality Date    ABSCESS DRAINAGE      BACK SURGERY  2019    COLONOSCOPY      HERNIA REPAIR       Social History     Substance and Sexual Activity   Alcohol Use Yes    Frequency: Monthly or less    Drinks per session: 1 or 2    Comment: occasionally      Social History     Substance and Sexual Activity   Drug Use No     Social History     Tobacco Use   Smoking Status Current Every Day Smoker    Packs/day: 0 50    Types: Cigarettes    Start date: 2019   Smokeless Tobacco Never Used     Family History   Problem Relation Age of Onset    Colon cancer Neg Hx     Colon polyps Neg Hx     Inflammatory bowel disease Neg Hx        Meds/Allergies       Current Outpatient Medications:     Adalimumab 80 MG/0 8ML PNKT    cephalexin (KEFLEX) 500 mg capsule    cholestyramine (QUESTRAN) 4 g packet    sulfamethoxazole-trimethoprim (BACTRIM DS) 800-160 mg per tablet    tiZANidine (ZANAFLEX) 4 mg tablet    cholecalciferol (VITAMIN D3) 1,000 units tablet    No Known Allergies    PHYSICAL EXAM:    Height 6' (1 829 m), weight 86 2 kg (190 lb)  Body mass index is 25 77 kg/m²  Appearance and vitals taken from home devices    General Appearance:   Alert, cooperative, no distress   HEENT:  Normocephalic, atraumatic, anicteric  Neck supple, symmetrical, trachea midline  Lungs:   Equal chest rise and unlabored breathing, normal effort, no coughing  Cardiovascular:   No visualized JVD or lower extremity edema  Abdomen:   No abdominal distension  Skin:   No jaundice, rashes, or lesions      Musculoskeletal:   Normal range of motion visualized  10 feet gait without difficulty and without assistive device  Psych:  Normal affect and normal insight  Neuro:  Alert and appropriate  Ox3         Lab Results:   Lab Results   Component Value Date    WBC 7 48 12/23/2020    HGB 16 7 12/23/2020    HCT 49 2 12/23/2020    MCV 98 12/23/2020     12/23/2020     Lab Results   Component Value Date     10/03/2016    K 4 1 12/23/2020     12/23/2020    CO2 28 12/23/2020    ANIONGAP 23 (H) 02/20/2015    BUN 11 12/23/2020    CREATININE 0 98 12/23/2020    GLUCOSE 110 02/20/2015    CALCIUM 9 6 12/23/2020    AST 30 12/23/2020    ALT 64 12/23/2020    ALKPHOS 88 12/23/2020    PROT 7 3 10/03/2016    BILITOT 0 8 10/03/2016    EGFR 91 12/23/2020     No results found for: IRON, TIBC, FERRITIN  Lab Results   Component Value Date    LIPASE 111 08/25/2020       REVIEW OF SYSTEMS:    CONSTITUTIONAL: Denies any fever, chills, rigors, and weight loss  HEENT: No earache or tinnitus  Denies hearing loss or visual disturbances  CARDIOVASCULAR: No chest pain or palpitations  RESPIRATORY: Denies any cough, hemoptysis, shortness of breath or dyspnea on exertion  GASTROINTESTINAL: As noted in the History of Present Illness  GENITOURINARY: No problems with urination  Denies any hematuria or dysuria  NEUROLOGIC: No dizziness or vertigo, denies headaches  MUSCULOSKELETAL: Denies any muscle or joint pain  SKIN: Denies skin rashes or itching  ENDOCRINE: Denies excessive thirst  Denies intolerance to heat or cold  PSYCHOSOCIAL: Denies depression or anxiety  Denies any recent memory loss  I spent 30 minutes directly with the patient during this visit      Nayan Earl acknowledges that he has consented to an online visit or consultation   He understands that the online visit is based solely on information provided by him, and that, in the absence of a face-to-face physical evaluation by the physician, the diagnosis he receives is both limited and provisional in terms of accuracy and completeness  This is not intended to replace a full medical face-to-face evaluation by the physician  Josie Car understands and accepts these terms

## 2021-01-05 NOTE — LETTER
January 5, 2021     Betzy Nguyen MD  [facility]    Patient: Josie Car   YOB: 1970   Date of Visit: 1/5/2021       Dear Dr Merlin Collier: Thank you for referring César Zayas to me for evaluation  Below are my notes for this consultation  If you have questions, please do not hesitate to call me  I look forward to following your patient along with you           Sincerely,        Danna Up MD        CC: No Recipients  Danna Up MD  1/5/2021  4:11 PM  Sign when Signing Visit    Virtual Regular Visit    Problem List Items Addressed This Visit        Digestive    Functional diarrhea    Relevant Medications    cholestyramine (QUESTRAN) 4 g packet       Other    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 ([address] ) - Primary    Relevant Medications    Adalimumab 80 MG/0 8ML PNKT    Other Relevant Orders    ADALIMUMAB CONCENTRATION AND ANTI-ADALIMUMAB AB    Comprehensive metabolic panel    CBC and differential    C-reactive protein    Sedimentation rate, automated    History of colon polyps    Vitamin D deficiency    Relevant Medications    cholecalciferol (VITAMIN D3) 1,000 units tablet    Other Relevant Orders    Vitamin D 25 hydroxy            Chief Complaint   Patient presents with    Folow up-ulcerative colitis    Vomiting, nausea, on and off diarrhea    Virtual Regular Visit        Encounter provider Danna Up MD    Provider located at [address] [place] [address]  [address] [place], [place] [id number] [phone]      Recent Visits  Date Type Provider Dept   12/31/20 Telephone Joycelyn Herring   Showing recent visits within past 7 days and meeting all other requirements     Today's Visits  Date Type Provider Dept   01/05/21 Paola Langley MD [facility]   Showing today's visits and meeting all other requirements     Future Appointments  No visits were found meeting these conditions  Showing future appointments within next 150 days and meeting all other requirements        The patient was identified by name and date of birth  Grace Desouza was informed that this is a telemedicine visit and that the visit is being conducted through [facility] [place] and patient was informed that this is not a secure, HIPAA-compliant platform  He agrees to proceed  My office door was closed  No one else was in the room  He acknowledged consent and understanding of privacy and security of the video platform  The patient has agreed to participate and understands they can discontinue the visit at any time  Patient is aware this is a billable service  Walter Wallace is a 52 y o  male with newly diagnosed Crohn's here today for follow-up  Humira was started on November 17, 2020  No significant side effects  Overall diarrhea is getting better  Denies any rashes or joint pains  No fever chills  Moving his bowels about 1 to 3 times a day  No significant abdominal pains, nausea vomiting  Did have 1 episode of vomiting after eating eggs but no further issues since  Recently was placed on antibiotics for a foot infection  ASSESSMENT AND PLAN:      1  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 ([facility] [address] )  Some small-bowel disease, normal colon noted on colonoscopy over the summer 2020  Started on Humira  - continue Adalimumab 80 MG/0 8ML PNKT; Citrate Free Starter Kit: Inject (2) 80 mg/0 8ml Pens (160 mg) under the skin on Day 1, then (1) 80mg/0 8ml PEN on day 15  Dispense: 1 each; Refill: 0    - check labs in 5 months prior to office visit in 6 months:   - ADALIMUMAB CONCENTRATION AND  ANTI-ADALIMUMAB AB; Future   - Comprehensive metabolic panel; Future   - CBC and differential; Future   - C-reactive protein; Future   - Sedimentation rate, automated;  Future    - If symptoms are improving at this point, would recommend getting an EGD and a small bowel capsule to assess for any other sites of Crohns   - Repeat colonoscopy and ileoscopy in summer of 2021  - Dermatology annual exam recommended    2  Vitamin D deficiency  Low vit D level  Start some supplementation  - cholecalciferol (VITAMIN D3) 1,000 units tablet; Take 2 tablets (2,000 Units total) by mouth daily  Dispense: 60 tablet; Refill: 2  - Recheck Vitamin D 25 hydroxy levels in 5 months    3  Functional diarrhea    - cholestyramine (QUESTRAN) 4 g packet; Take 1 packet (4 g total) by mouth 3 (three) times a day with meals  Dispense: 90 packet; Refill: 2    4   History of colon polyps  Repeat in 8/2021      Followup Appointment: 6 months  ______________________________________________________________________      Historical Information   Past Medical History:   Diagnosis Date    Back pain     Colon polyp     GERD (gastroesophageal reflux disease)     Perianal fistula     Terminal ileitis ([address] )      Past Surgical History:   Procedure Laterality Date    ABSCESS DRAINAGE      BACK SURGERY  2019    COLONOSCOPY      HERNIA REPAIR       Social History     Substance and Sexual Activity   Alcohol Use Yes    Frequency: Monthly or less    Drinks per session: 1 or 2    Comment: occasionally      Social History     Substance and Sexual Activity   Drug Use No     Social History     Tobacco Use   Smoking Status Current Every Day Smoker    Packs/day: 0 50    Types: Cigarettes    Start date: 2019   Smokeless Tobacco Never Used     Family History   Problem Relation Age of Onset    Colon cancer Neg Hx     Colon polyps Neg Hx     Inflammatory bowel disease Neg Hx        Meds/Allergies       Current Outpatient Medications:     Adalimumab 80 MG/0 8ML PNKT    cephalexin (KEFLEX) 500 mg capsule    cholestyramine (QUESTRAN) 4 g packet    sulfamethoxazole-trimethoprim (BACTRIM DS) 800-160 mg per tablet    tiZANidine (ZANAFLEX) 4 mg tablet    cholecalciferol (VITAMIN D3) 1,000 units tablet    No Known Allergies    PHYSICAL EXAM:    Height 6' (1 829 m), weight 86 2 kg (190 lb)  Body mass index is 25 77 kg/m²  Appearance and vitals taken from home devices    General Appearance:   Alert, cooperative, no distress   HEENT:  Normocephalic, atraumatic, anicteric  Neck supple, symmetrical, trachea midline  Lungs:   Equal chest rise and unlabored breathing, normal effort, no coughing  Cardiovascular:   No visualized JVD or lower extremity edema  Abdomen:   No abdominal distension  Skin:   No jaundice, rashes, or lesions  Musculoskeletal:   Normal range of motion visualized  10 feet gait without difficulty and without assistive device  Psych:  Normal affect and normal insight  Neuro:  Alert and appropriate  Ox3         Lab Results:   Lab Results   Component Value Date    WBC 7 48 12/23/2020    HGB 16 7 12/23/2020    HCT 49 2 12/23/2020    MCV 98 12/23/2020     12/23/2020     Lab Results   Component Value Date     10/03/2016    K 4 1 12/23/2020     12/23/2020    CO2 28 12/23/2020    ANIONGAP 23 (H) 02/20/2015    BUN 11 12/23/2020    CREATININE 0 98 12/23/2020    GLUCOSE 110 02/20/2015    CALCIUM 9 6 12/23/2020    AST 30 12/23/2020    ALT 64 12/23/2020    ALKPHOS 88 12/23/2020    PROT 7 3 10/03/2016    BILITOT 0 8 10/03/2016    EGFR 91 12/23/2020     No results found for: IRON, TIBC, FERRITIN  Lab Results   Component Value Date    LIPASE 111 08/25/2020       REVIEW OF SYSTEMS:    CONSTITUTIONAL: Denies any fever, chills, rigors, and weight loss  HEENT: No earache or tinnitus  Denies hearing loss or visual disturbances  CARDIOVASCULAR: No chest pain or palpitations  RESPIRATORY: Denies any cough, hemoptysis, shortness of breath or dyspnea on exertion  GASTROINTESTINAL: As noted in the History of Present Illness  GENITOURINARY: No problems with urination  Denies any hematuria or dysuria  NEUROLOGIC: No dizziness or vertigo, denies headaches     MUSCULOSKELETAL: Denies any muscle or joint pain  SKIN: Denies skin rashes or itching  ENDOCRINE: Denies excessive thirst  Denies intolerance to heat or cold  PSYCHOSOCIAL: Denies depression or anxiety  Denies any recent memory loss  I spent 30 minutes directly with the patient during this visit      Nayan Earl acknowledges that he has consented to an online visit or consultation  He understands that the online visit is based solely on information provided by him, and that, in the absence of a face-to-face physical evaluation by the physician, the diagnosis he receives is both limited and provisional in terms of accuracy and completeness  This is not intended to replace a full medical face-to-face evaluation by the physician  Randy Ken understands and accepts these terms

## 2021-01-07 ENCOUNTER — TRANSCRIBE ORDERS (OUTPATIENT)
Dept: ADMINISTRATIVE | Facility: HOSPITAL | Age: 51
End: 2021-01-07

## 2021-01-07 ENCOUNTER — LAB (OUTPATIENT)
Dept: LAB | Facility: HOSPITAL | Age: 51
End: 2021-01-07
Attending: ANESTHESIOLOGY
Payer: COMMERCIAL

## 2021-01-07 DIAGNOSIS — Z79.01 CHRONIC ANTICOAGULATION: ICD-10-CM

## 2021-01-07 DIAGNOSIS — Z79.899 ENCOUNTER FOR LONG-TERM (CURRENT) USE OF OTHER MEDICATIONS: ICD-10-CM

## 2021-01-07 DIAGNOSIS — Z79.899 PHARMACOLOGIC THERAPY: ICD-10-CM

## 2021-01-07 DIAGNOSIS — G89.4 PAIN SYNDROME, CHRONIC: ICD-10-CM

## 2021-01-07 DIAGNOSIS — G89.4 PAIN SYNDROME, CHRONIC: Primary | ICD-10-CM

## 2021-01-07 DIAGNOSIS — G89.4 CHRONIC PAIN SYNDROME: Primary | ICD-10-CM

## 2021-01-07 LAB
BASOPHILS # BLD AUTO: 0.03 THOUSANDS/ΜL (ref 0–0.1)
BASOPHILS NFR BLD AUTO: 0 % (ref 0–1)
EOSINOPHIL # BLD AUTO: 0.18 THOUSAND/ΜL (ref 0–0.61)
EOSINOPHIL NFR BLD AUTO: 3 % (ref 0–6)
ERYTHROCYTE [DISTWIDTH] IN BLOOD BY AUTOMATED COUNT: 12.3 % (ref 11.6–15.1)
HCT VFR BLD AUTO: 49.1 % (ref 36.5–49.3)
HGB BLD-MCNC: 16.2 G/DL (ref 12–17)
IMM GRANULOCYTES # BLD AUTO: 0.02 THOUSAND/UL (ref 0–0.2)
IMM GRANULOCYTES NFR BLD AUTO: 0 % (ref 0–2)
LYMPHOCYTES # BLD AUTO: 1.73 THOUSANDS/ΜL (ref 0.6–4.47)
LYMPHOCYTES NFR BLD AUTO: 25 % (ref 14–44)
MCH RBC QN AUTO: 32.7 PG (ref 26.8–34.3)
MCHC RBC AUTO-ENTMCNC: 33 G/DL (ref 31.4–37.4)
MCV RBC AUTO: 99 FL (ref 82–98)
MONOCYTES # BLD AUTO: 0.54 THOUSAND/ΜL (ref 0.17–1.22)
MONOCYTES NFR BLD AUTO: 8 % (ref 4–12)
NEUTROPHILS # BLD AUTO: 4.55 THOUSANDS/ΜL (ref 1.85–7.62)
NEUTS SEG NFR BLD AUTO: 64 % (ref 43–75)
NRBC BLD AUTO-RTO: 0 /100 WBCS
PLATELET # BLD AUTO: 190 THOUSANDS/UL (ref 149–390)
PMV BLD AUTO: 10.2 FL (ref 8.9–12.7)
RBC # BLD AUTO: 4.95 MILLION/UL (ref 3.88–5.62)
WBC # BLD AUTO: 7.05 THOUSAND/UL (ref 4.31–10.16)

## 2021-01-07 PROCEDURE — 85025 COMPLETE CBC W/AUTO DIFF WBC: CPT

## 2021-01-07 PROCEDURE — 36415 COLL VENOUS BLD VENIPUNCTURE: CPT

## 2021-01-08 RX ORDER — ADALIMUMAB 40MG/0.4ML
KIT SUBCUTANEOUS
Qty: 2 EACH | Refills: 12 | Status: SHIPPED | OUTPATIENT
Start: 2021-01-08 | End: 2021-08-23

## 2021-01-13 ENCOUNTER — TELEPHONE (OUTPATIENT)
Dept: GASTROENTEROLOGY | Facility: CLINIC | Age: 51
End: 2021-01-13

## 2021-01-13 NOTE — TELEPHONE ENCOUNTER
Robinson Dewitt from [employer] needs dx info for DOX 11/12/2020 for calprotectin stool  # [phone]  No form to complete/requests call back to review ICD10 info

## 2021-01-13 NOTE — TELEPHONE ENCOUNTER
I called Eufemia Kramer at number listed, no identifying information  I left message that I was returning a call from [employer] and to call back  I reviewed order and there was no dx listed   Use functional diarrhea K59 1 for coverage and perianal fistual due to Crohn's disease K50 422

## 2021-01-20 ENCOUNTER — TELEPHONE (OUTPATIENT)
Dept: GASTROENTEROLOGY | Facility: CLINIC | Age: 51
End: 2021-01-20

## 2021-01-20 NOTE — TELEPHONE ENCOUNTER
Pt states [facility] wanted him to get Covid shot  Pt called [facility] and they won't give it to him yet; has ques how we can expedite his getting the vaccine? CB# [phone]

## 2021-01-20 NOTE — TELEPHONE ENCOUNTER
I contacted patient and instructed him to go to [place] [address]  site and he can preregister for vaccine  We cannot expedite it for him  I did tel him if they needed information from our office we could provide it

## 2021-01-22 ENCOUNTER — TELEPHONE (OUTPATIENT)
Dept: FAMILY MEDICINE CLINIC | Facility: CLINIC | Age: 51
End: 2021-01-22

## 2021-01-25 ENCOUNTER — HOSPITAL ENCOUNTER (OUTPATIENT)
Dept: RADIOLOGY | Facility: HOSPITAL | Age: 51
Discharge: HOME | End: 2021-01-25

## 2021-01-25 ENCOUNTER — APPOINTMENT (OUTPATIENT)
Dept: RADIOLOGY | Facility: CLINIC | Age: 51
End: 2021-01-25
Payer: COMMERCIAL

## 2021-01-25 ENCOUNTER — HOSPITAL ENCOUNTER (OUTPATIENT)
Dept: RADIOLOGY | Facility: CLINIC | Age: 51
Discharge: HOME | End: 2021-01-25
Attending: ANESTHESIOLOGY
Payer: COMMERCIAL

## 2021-01-25 ENCOUNTER — TELEPHONE (OUTPATIENT)
Dept: RADIOLOGY | Facility: CLINIC | Age: 51
End: 2021-01-25

## 2021-01-25 VITALS
SYSTOLIC BLOOD PRESSURE: 128 MMHG | TEMPERATURE: 97.7 F | OXYGEN SATURATION: 94 % | RESPIRATION RATE: 20 BRPM | DIASTOLIC BLOOD PRESSURE: 85 MMHG | HEART RATE: 93 BPM

## 2021-01-25 DIAGNOSIS — M25.512 CHRONIC LEFT SHOULDER PAIN: ICD-10-CM

## 2021-01-25 DIAGNOSIS — M54.16 LUMBAR RADICULOPATHY: ICD-10-CM

## 2021-01-25 DIAGNOSIS — G89.4 CHRONIC PAIN SYNDROME: Primary | ICD-10-CM

## 2021-01-25 DIAGNOSIS — M54.41 LOW BACK PAIN WITH RIGHT-SIDED SCIATICA: ICD-10-CM

## 2021-01-25 DIAGNOSIS — G89.29 CHRONIC LEFT SHOULDER PAIN: ICD-10-CM

## 2021-01-25 DIAGNOSIS — M47.816 LUMBAR SPONDYLOSIS: ICD-10-CM

## 2021-01-25 DIAGNOSIS — G89.4 CHRONIC PAIN SYNDROME: ICD-10-CM

## 2021-01-25 DIAGNOSIS — M54.42 LOW BACK PAIN WITH LEFT-SIDED SCIATICA: ICD-10-CM

## 2021-01-25 DIAGNOSIS — M96.1 POST LAMINECTOMY SYNDROME: ICD-10-CM

## 2021-01-25 PROCEDURE — C1897 LEAD, NEUROSTIM TEST KIT: HCPCS

## 2021-01-25 PROCEDURE — 72072 X-RAY EXAM THORAC SPINE 3VWS: CPT

## 2021-01-25 PROCEDURE — C1787 PATIENT PROGR, NEUROSTIM: HCPCS

## 2021-01-25 PROCEDURE — 73030 X-RAY EXAM OF SHOULDER: CPT

## 2021-01-25 PROCEDURE — 63650 IMPLANT NEUROELECTRODES: CPT | Performed by: ANESTHESIOLOGY

## 2021-01-25 PROCEDURE — 96374 THER/PROPH/DIAG INJ IV PUSH: CPT

## 2021-01-25 RX ORDER — CEPHALEXIN 500 MG/1
500 CAPSULE ORAL EVERY 6 HOURS SCHEDULED
Qty: 28 CAPSULE | Refills: 0 | Status: SHIPPED | OUTPATIENT
Start: 2021-01-25 | End: 2021-02-01

## 2021-01-25 RX ADMIN — LIDOCAINE HYDROCHLORIDE 5 ML: 10; .005 INJECTION, SOLUTION EPIDURAL; INFILTRATION; INTRACAUDAL; PERINEURAL at 11:02

## 2021-01-25 NOTE — H&P
History of Present Illness: The patient is a 52 y o  male who presents with complaints of low back and leg pain      Patient Active Problem List   Diagnosis    Sprain of anterior talofibular ligament of right ankle    Perianal abscess    Neuropathy    Chronic bilateral low back pain with bilateral sciatica    Bilateral lower extremity edema    Chronic pain syndrome    Lumbar post-laminectomy syndrome    Lumbar spondylosis    Smoking    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 ([facility] [address] [facility]    Congenital fusion of sacroiliac joint    Terminal ileitis ([facility] [facility]    Viral enteritis    Chronic foot pain    Lumbar radiculopathy    Acute non-recurrent maxillary sinusitis    Screening examination for other arthropod-borne viral diseases    GERD (gastroesophageal reflux disease)    History of colon polyps    Perianal fistula    Functional diarrhea    Blepharitis, left eye    Closed fracture of radial styloid    Compression of right ulnar nerve at multiple levels    Degenerative disc disease at L5-S1 level    History of lumbar fusion    Numbness and tingling of both lower extremities    Periorbital cellulitis of left eye    Right leg swelling    Tobacco use    Vitamin D deficiency       Past Medical History:   Diagnosis Date    Back pain     Colon polyp     GERD (gastroesophageal reflux disease)     Perianal fistula     Terminal ileitis (HCC)        Past Surgical History:   Procedure Laterality Date    ABSCESS DRAINAGE      BACK SURGERY  2019    COLONOSCOPY      HERNIA REPAIR           Current Outpatient Medications:     Adalimumab (Humira Pen) 40 MG/0 4ML PNKT, Citrate Free Maintenance Dose: Inject (1) 40mg/0 4ml under the skin every other week, Disp: 2 each, Rfl: 12    cephalexin (KEFLEX) 500 mg capsule, Take 500 mg by mouth every 12 (twelve) hours, Disp: , Rfl:     cholecalciferol (VITAMIN D3) 1,000 units tablet, Take 2 tablets (2,000 Units total) by mouth daily, Disp: 60 tablet, Rfl: 2   cholestyramine (QUESTRAN) 4 g packet, Take 1 packet (4 g total) by mouth 3 (three) times a day with meals, Disp: 90 packet, Rfl: 2    tiZANidine (ZANAFLEX) 4 mg tablet, Take 1 PO BID PRN for pain/spasms during the day and 2 PO HS , Disp: 105 tablet, Rfl: 2    No Known Allergies    Physical Exam:   General: Awake, Alert, Oriented x 3, Mood and affect appropriate  Respiratory: Respirations even and unlabored  Cardiovascular: Peripheral pulses intact; no edema  Musculoskeletal Exam:  Decreased range of motion lumbar spine    ASA Score: II         Assessment:   1  Chronic pain syndrome    2  Lumbar radiculopathy    3  Low back pain with right-sided sciatica    4  Low back pain with left-sided sciatica    5  Lumbar spondylosis    6   Post laminectomy syndrome        Plan: [employer]BBOTT SPINAL CORD STIMULATOR

## 2021-01-25 NOTE — DISCHARGE INSTRUCTIONS
SPINE AND PAIN: SPINAL CORD STIMULATION/PERIPHERAL NERVE STIMULATION TRIAL/ PERMANENT IMPLANT DISCHARGE INSTRUCTIONS    ACTIVITY RESTRICTIONS DURING TRIAL PERIOD  · Do not drive with the SCS "on"  · Do not bend or twist at the waist   · Do not lift anything that weighs over 5 pounds  · When you lie down, "log roll" (keep spine aligned) to change positions  Do not sleep on your stomach  · Limit stair climbing and strenuous activity  CARE OF THE INJECTION SITE  · CHECK THE DRESSING DAILY  It should intact  · Notify [facility] if you have any of the following: redness, drainage, swelling, chills or fever over 100°F   · Do not change dressing, only reinforce edges if necessary  · Keep the dressing dry  Do not shower, (sponge baths only) until evaluated in office  SPECIAL INSTRUCTIONS  · Take your temperature daily  · Follow-up for lead removal scheduled for ***  · The  box is expensive  DO NOT drop or get wet  · Do not pull on the cable or leads  Do not disconnect the cable and lead  · Do activities that normally cause you to have pain and try different  settings  · Obtain post procedure x-ray shortly after procedure if ordered by physician  MEDICATIONS  · Continue to take all routine medications  · Our office may have instructed you to hold some medications  · You may resume _______________________________________________  · Take antiobiotic as prescribed:_____________________________________  If you have any problems specifically related to your procedure, please call our office at [phone]  Problems not related to your procedure should be directed at your primary care physician  Contact the company representative with any questions regarding settings or operation of the external  box

## 2021-01-25 NOTE — TELEPHONE ENCOUNTER
Patient is S/P a ABBOTT SCS trial c/ SL on 1/25/21  Lead pull scheduled for 1/29/21  Please call on 1/26/21 post OPRO   Thanks

## 2021-01-26 ENCOUNTER — OFFICE VISIT (OUTPATIENT)
Dept: FAMILY MEDICINE CLINIC | Facility: CLINIC | Age: 51
End: 2021-01-26
Payer: COMMERCIAL

## 2021-01-26 VITALS
HEIGHT: 72 IN | HEART RATE: 93 BPM | SYSTOLIC BLOOD PRESSURE: 126 MMHG | OXYGEN SATURATION: 95 % | DIASTOLIC BLOOD PRESSURE: 84 MMHG | WEIGHT: 197 LBS | TEMPERATURE: 96.7 F | BODY MASS INDEX: 26.68 KG/M2

## 2021-01-26 DIAGNOSIS — M96.1 LUMBAR POST-LAMINECTOMY SYNDROME: Primary | ICD-10-CM

## 2021-01-26 DIAGNOSIS — K50.018 TERMINAL ILEITIS WITH OTHER COMPLICATION (HCC): ICD-10-CM

## 2021-01-26 PROCEDURE — 99214 OFFICE O/P EST MOD 30 MIN: CPT | Performed by: FAMILY MEDICINE

## 2021-01-26 NOTE — TELEPHONE ENCOUNTER
Patient called returning the nurses call  Please be advise thank you        Please call patient back @ [phone]

## 2021-01-26 NOTE — PROGRESS NOTES
[facility]        NAME: Maricruz Monroy is a 52 y o  male  : 1970    MRN: [id number]  DATE: 2021  TIME: 8:17 AM    Assessment and Plan   Lumbar post-laminectomy syndrome [M96 1]  1  Lumbar post-laminectomy syndrome     2  Terminal ileitis with other complication [facility])           Patient Instructions     Patient Instructions   Good candidate Med Marij for Crohns/failed back surg          Chief Complaint   No chief complaint on file  History of Present Illness       F/ assessed med cond      Review of Systems   Review of Systems   Constitutional: Negative for fatigue, fever and unexpected weight change  HENT: Negative for congestion, sinus pain and sore throat  Eyes: Negative for visual disturbance  Respiratory: Negative for shortness of breath and wheezing  Cardiovascular: Negative for chest pain and palpitations  Gastrointestinal: Negative for abdominal pain, nausea and vomiting  Musculoskeletal: Negative  Negative for arthralgias and myalgias  Neurological: Negative for syncope, weakness and numbness  Psychiatric/Behavioral: Negative  Negative for confusion, dysphoric mood and suicidal ideas           Current Medications       Current Outpatient Medications:     Adalimumab (Humira Pen) 40 MG/0 4ML PNKT, Citrate Free Maintenance Dose: Inject (1) 40mg/0 4ml under the skin every other week, Disp: 2 each, Rfl: 12    cephalexin (KEFLEX) 500 mg capsule, Take 1 capsule (500 mg total) by mouth every 6 (six) hours for 7 days, Disp: 28 capsule, Rfl: 0    cholecalciferol (VITAMIN D3) 1,000 units tablet, Take 2 tablets (2,000 Units total) by mouth daily, Disp: 60 tablet, Rfl: 2    cholestyramine (QUESTRAN) 4 g packet, Take 1 packet (4 g total) by mouth 3 (three) times a day with meals, Disp: 90 packet, Rfl: 2    tiZANidine (ZANAFLEX) 4 mg tablet, Take 1 PO BID PRN for pain/spasms during the day and 2 PO HS , Disp: 105 tablet, Rfl: 2  No current facility-administered medications for this visit  Current Allergies     Allergies as of 01/26/2021    (No Known Allergies)            The following portions of the patient's history were reviewed and updated as appropriate: allergies, current medications, past family history, past medical history, past social history, past surgical history and problem list      Past Medical History:   Diagnosis Date    Back pain     Colon polyp     GERD (gastroesophageal reflux disease)     Perianal fistula     Terminal ileitis ([address] )        Past Surgical History:   Procedure Laterality Date    ABSCESS DRAINAGE      BACK SURGERY  2019    COLONOSCOPY      HERNIA REPAIR         Family History   Problem Relation Age of Onset    Colon cancer Neg Hx     Colon polyps Neg Hx     Inflammatory bowel disease Neg Hx          Medications have been verified  Objective   /84   Pulse 93   Temp (!) 96 7 °F (35 9 °C) (Tympanic)   Ht 6' (1 829 m)   Wt 89 4 kg (197 lb)   SpO2 95%   BMI 26 72 kg/m²        Physical Exam     Physical Exam  Constitutional:       Appearance: He is well-developed  HENT:      Right Ear: Ear canal normal  Tympanic membrane is not injected  Left Ear: Ear canal normal  Tympanic membrane is not injected  Nose: Nose normal    Eyes:      General:         Right eye: No discharge  Left eye: No discharge  Conjunctiva/sclera: Conjunctivae normal       Pupils: Pupils are equal, round, and reactive to light  Neck:      Musculoskeletal: Normal range of motion and neck supple  Thyroid: No thyromegaly  Cardiovascular:      Rate and Rhythm: Normal rate and regular rhythm  Heart sounds: Normal heart sounds  No murmur  Pulmonary:      Effort: Pulmonary effort is normal  No respiratory distress  Breath sounds: Normal breath sounds  No wheezing  Abdominal:      General: Bowel sounds are normal  There is no distension  Palpations: Abdomen is soft  Tenderness: There is no abdominal tenderness  Musculoskeletal: Normal range of motion  Lymphadenopathy:      Cervical: No cervical adenopathy  Skin:     General: Skin is warm and dry  Neurological:      Mental Status: He is alert and oriented to person, place, and time  He is not disoriented  Sensory: No sensory deficit  Gait: Gait normal       Deep Tendon Reflexes: Reflexes are normal and symmetric  Psychiatric:         Speech: Speech normal          Behavior: Behavior normal          Thought Content:  Thought content normal          Judgment: Judgment normal

## 2021-01-26 NOTE — TELEPHONE ENCOUNTER
S/w pt, stated that he has pain covering his entire back - pt stated that his pain is not focused at the procedure site  No call yet from [employer]  Pt stated that he did see Dr Delvin Flores this morning who said that the dressing / site appears to be "fine"  Pt stated that he felt good when he left [facility] yesterday, confirmed no strenuous activity; bending / lifting / twisting  Pain came on gradually  No relief w/ muscle relaxers  Pt is unable to sleep  Pt confirmed abx, has started them and will take them as directed  Advised pt, limit bending / lifting / twisting to preserve the integrity of the scs lead as well as the dressing  Do not try to replace the dressing, ok to reinforce if necessary  Please call the office if the dressing becomes loose, soiled or if there is any pooling of fluid or bright red blood  Advised pt to take his temp daily and cb if s/s infection present; redness, drainage, swelling at the site or fever 100+  Please contact [employer] rep w/ questions re: the device / pain, call this office will questions re: dressing / procedure site or medication  Advised pt to fu w/ [employer] representative  This office will reach out as well  Will make SL aware and cb  Pt verbalized understanding and appreciation

## 2021-01-29 ENCOUNTER — TELEPHONE (OUTPATIENT)
Dept: PAIN MEDICINE | Facility: CLINIC | Age: 51
End: 2021-01-29

## 2021-01-29 ENCOUNTER — OFFICE VISIT (OUTPATIENT)
Dept: PAIN MEDICINE | Facility: CLINIC | Age: 51
End: 2021-01-29

## 2021-01-29 VITALS
TEMPERATURE: 98.6 F | HEART RATE: 80 BPM | DIASTOLIC BLOOD PRESSURE: 88 MMHG | WEIGHT: 196.4 LBS | HEIGHT: 72 IN | SYSTOLIC BLOOD PRESSURE: 132 MMHG | BODY MASS INDEX: 26.6 KG/M2

## 2021-01-29 DIAGNOSIS — G89.29 CHRONIC FOOT PAIN, UNSPECIFIED LATERALITY: ICD-10-CM

## 2021-01-29 DIAGNOSIS — G89.4 CHRONIC PAIN SYNDROME: Primary | ICD-10-CM

## 2021-01-29 DIAGNOSIS — M79.673 CHRONIC FOOT PAIN, UNSPECIFIED LATERALITY: ICD-10-CM

## 2021-01-29 DIAGNOSIS — M54.16 LUMBAR RADICULOPATHY: ICD-10-CM

## 2021-01-29 PROCEDURE — 3008F BODY MASS INDEX DOCD: CPT | Performed by: FAMILY MEDICINE

## 2021-01-29 PROCEDURE — 99024 POSTOP FOLLOW-UP VISIT: CPT | Performed by: ANESTHESIOLOGY

## 2021-01-29 NOTE — TELEPHONE ENCOUNTER
----- Message from Grisel Hall DO sent at 1/29/2021 10:07 AM EST -----  Left shoulder x-ray: Moderate acromioclavicular and mild glenohumeral joint osteoarthritis is present        If interested can not have orthopedic evaluation or we can schedule a shoulder injection under fluoroscopic guidance

## 2021-01-29 NOTE — TELEPHONE ENCOUNTER
S/w pt, advised of above  Pt stated that he would like to proceed w/ an injection  Advised pt, will make SL aware  Anticipate a cb from spa surg coord to schedule the injection as discussed  Pt verbalized understanding and appreciation

## 2021-01-29 NOTE — PROGRESS NOTES
Assessment  1  Chronic pain syndrome    2  Lumbar radiculopathy    3  Chronic foot pain, unspecified laterality        Plan     patient will reports approximately 45% reduction symptoms  However he feels that this is enough to make a significant difference in his life as it has increased his daily living activities,   Improved function, decreased pain  He would like for an evaluation with Neurosurgery to see if it is appropriate to move forward with a permanent spinal cord stimulator I stated he should give it time and will set him up for neuro surgical evaluation  My impressions and treatment recommendations were discussed in detail with the patient who verbalized understanding and had no further questions  Discharge instructions were provided  I personally saw and examined the patient and I agree with the above discussed plan of care  This note is created using dictation transcription  It may contain typographical errors, grammatical errors, improperly dictated words, background noise and other errors  Orders Placed This Encounter   Procedures    Ambulatory referral to Neurosurgery     Standing Status:   Future     Standing Expiration Date:   1/29/2022     Referral Priority:   Routine     Referral Type:   Consult - AMB     Referral Reason:   Specialty Services Required     Referred to Provider:   Emilie Pimentel MD     Requested Specialty:   Neurosurgery     Number of Visits Requested:   1     Expiration Date:   1/29/2022         History of Present Illness    Earle Curling is a 52 y o  male   Who presents for spinal cord stimulator remover after trial   Overall he feels that he has a successful trial looking for discussing permanent implantation  He presented with bilateral foot pain he was able to map his area of pain overall he reports reports 45% reduction symptoms  He states he slept better than he has in years and has walked better than he has in years      I have personally reviewed and/or updated the patient's past medical history, past surgical history, family history, social history, current medications, allergies, and vital signs today  Review of Systems   Constitutional: Negative for chills and fever  HENT: Negative for ear pain and sore throat  Eyes: Negative for pain and visual disturbance  Respiratory: Negative for cough and shortness of breath  Cardiovascular: Negative for chest pain and palpitations  Gastrointestinal: Negative for abdominal pain and vomiting  Genitourinary: Negative for dysuria and hematuria  Musculoskeletal: Positive for gait problem  Negative for arthralgias and back pain  Skin: Negative for color change and rash  Neurological: Negative for seizures and syncope  All other systems reviewed and are negative           Pain Assessment Measures   Numeric Rating Scale    Oswestry Disability Index Score/Neck Disability Index Score 58   PROMIS-29   - Physical Function 9   - Anxiety 4   - Depression 4   - Fatigue 7   - Sleep Disturbance 12   - Ability to Participate in Social Roles And Activities 8   - Pain Interference 16       Patient Active Problem List   Diagnosis    Sprain of anterior talofibular ligament of right ankle    Perianal abscess    Neuropathy    Chronic bilateral low back pain with bilateral sciatica    Bilateral lower extremity edema    Chronic pain syndrome    Lumbar post-laminectomy syndrome    Lumbar spondylosis    Smoking    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 (HCC)    Congenital fusion of sacroiliac joint    Terminal ileitis (HCC)    Viral enteritis    Chronic foot pain    Lumbar radiculopathy    Acute non-recurrent maxillary sinusitis    Screening examination for other arthropod-borne viral diseases    GERD (gastroesophageal reflux disease)    History of colon polyps    Perianal fistula    Functional diarrhea    Blepharitis, left eye    Closed fracture of radial styloid    Compression of right ulnar nerve at multiple levels    Degenerative disc disease at L5-S1 level    History of lumbar fusion    Numbness and tingling of both lower extremities    Periorbital cellulitis of left eye    Right leg swelling    Tobacco use    Vitamin D deficiency       Past Medical History:   Diagnosis Date    Back pain     Colon polyp     GERD (gastroesophageal reflux disease)     Perianal fistula     Terminal ileitis ([address] )        Past Surgical History:   Procedure Laterality Date    ABSCESS DRAINAGE      BACK SURGERY  2019    COLONOSCOPY      HERNIA REPAIR         Family History   Problem Relation Age of Onset    Colon cancer Neg Hx     Colon polyps Neg Hx     Inflammatory bowel disease Neg Hx        Social History     Occupational History    Not on file   Tobacco Use    Smoking status: Current Every Day Smoker     Packs/day: 0 50     Types: Cigarettes     Start date: 2019    Smokeless tobacco: Never Used   Substance and Sexual Activity    Alcohol use: Yes     Frequency: Monthly or less     Drinks per session: 1 or 2     Comment: occasionally     Drug use: No    Sexual activity: Not on file       Current Outpatient Medications on File Prior to Visit   Medication Sig    Adalimumab (Humira Pen) 40 MG/0 4ML PNKT Citrate Free Maintenance Dose: Inject (1) 40mg/0 4ml under the skin every other week    cephalexin (KEFLEX) 500 mg capsule Take 1 capsule (500 mg total) by mouth every 6 (six) hours for 7 days    cholecalciferol (VITAMIN D3) 1,000 units tablet Take 2 tablets (2,000 Units total) by mouth daily    cholestyramine (QUESTRAN) 4 g packet Take 1 packet (4 g total) by mouth 3 (three) times a day with meals    tiZANidine (ZANAFLEX) 4 mg tablet Take 1 PO BID PRN for pain/spasms during the day and 2 PO HS  No current facility-administered medications on file prior to visit          No Known Allergies    Physical Exam    /88 (BP Location: Left arm, Patient Position: Standing, Cuff Size: Standard)   Pulse 80   Temp 98 6 °F (37 °C)   Ht 6' (1 829 m)   Wt 89 1 kg (196 lb 6 4 oz)   BMI 26 64 kg/m²     Constitutional: normal, well developed, well nourished, alert, in no distress and non-toxic and no overt pain behavior  Eyes: anicteric  HEENT: grossly intact  Neck: supple, symmetric, trachea midline and no masses   Pulmonary:even and unlabored  Cardiovascular:No edema or pitting edema present  Skin:Normal without rashes or lesions and well hydrated,   No signs of infection the lumbar sacral spine  Psychiatric:Mood and affect appropriate  Neurologic:Cranial Nerves II-XII grossly intact  Musculoskeletal:normal      Removal Percutaneous Epidural Neurostimulator Electrode    Procedure: Removal Percutaneous Epidural Neurostimulator Electrode      Medical Necessity: The stimulator that was placed was a trial lead and must be removed prior to permanent implantation  Findings and Procedure: The risks of the procedure were explained to the patient  The stimulator device was set to "off"  The dressing over the entrance site for the percutaneous neurostimulator electrode was carefully removed  Steri-strips securing the catheter were removed from the patients back  The exit sites were carefully inspected for evidence of infection  No evidence of cutaneous infection was found  The catheter was then slowly withdrawn holding the end most proximal to skin  The catheter was inspected and was removed intact  The skin area at the exit site was cleaned with alcohol and a small dressing applied  Instructions were given to maintain the dressing for an additional 48 hours  He was instructed finished his course of antibiotics      Condition on discharge: stable

## 2021-02-04 DIAGNOSIS — M19.011 ARTHRITIS OF RIGHT SHOULDER REGION: ICD-10-CM

## 2021-02-04 DIAGNOSIS — G89.4 CHRONIC PAIN SYNDROME: ICD-10-CM

## 2021-02-04 DIAGNOSIS — M25.511 RIGHT SHOULDER PAIN: Primary | ICD-10-CM

## 2021-02-04 DIAGNOSIS — M25.512 LEFT SHOULDER PAIN: ICD-10-CM

## 2021-02-04 DIAGNOSIS — M19.012 ARTHRITIS OF SHOULDER REGION, LEFT: ICD-10-CM

## 2021-02-04 DIAGNOSIS — M79.18 MYOFASCIAL PAIN SYNDROME: ICD-10-CM

## 2021-02-04 RX ORDER — TIZANIDINE 4 MG/1
TABLET ORAL
Qty: 105 TABLET | Refills: 2 | Status: SHIPPED | OUTPATIENT
Start: 2021-02-04 | End: 2021-02-23

## 2021-02-04 NOTE — TELEPHONE ENCOUNTER
Okay to schedule right with diagnosis codes right shoulder arthritis and right shoulder pain and then two weeks later the left

## 2021-02-04 NOTE — TELEPHONE ENCOUNTER
Spoke with pt to schedule for injection and he states that the right side hurts more and would lije to get that side done first and then get he would like to do the left side if possible    While on the phone pt asked if you would send in some tizanidine to the pharm on file        Pt is scheduled for shoulder injection 2/5/21

## 2021-02-05 ENCOUNTER — HOSPITAL ENCOUNTER (OUTPATIENT)
Dept: RADIOLOGY | Facility: CLINIC | Age: 51
Discharge: HOME | End: 2021-02-05
Attending: ANESTHESIOLOGY
Payer: COMMERCIAL

## 2021-02-05 VITALS
HEART RATE: 90 BPM | RESPIRATION RATE: 18 BRPM | DIASTOLIC BLOOD PRESSURE: 82 MMHG | SYSTOLIC BLOOD PRESSURE: 113 MMHG | TEMPERATURE: 97.9 F | OXYGEN SATURATION: 92 %

## 2021-02-05 DIAGNOSIS — M25.511 RIGHT SHOULDER PAIN: ICD-10-CM

## 2021-02-05 DIAGNOSIS — M19.011 ARTHRITIS OF RIGHT SHOULDER REGION: ICD-10-CM

## 2021-02-05 PROCEDURE — 77002 NEEDLE LOCALIZATION BY XRAY: CPT

## 2021-02-05 PROCEDURE — 77002 NEEDLE LOCALIZATION BY XRAY: CPT | Performed by: ANESTHESIOLOGY

## 2021-02-05 PROCEDURE — 20610 DRAIN/INJ JOINT/BURSA W/O US: CPT | Performed by: ANESTHESIOLOGY

## 2021-02-05 RX ORDER — METHYLPREDNISOLONE ACETATE 80 MG/ML
80 INJECTION, SUSPENSION INTRA-ARTICULAR; INTRALESIONAL; INTRAMUSCULAR; PARENTERAL; SOFT TISSUE ONCE
Status: COMPLETED | OUTPATIENT
Start: 2021-02-05 | End: 2021-02-05

## 2021-02-05 RX ORDER — LIDOCAINE HYDROCHLORIDE 10 MG/ML
5 INJECTION, SOLUTION EPIDURAL; INFILTRATION; INTRACAUDAL; PERINEURAL ONCE
Status: COMPLETED | OUTPATIENT
Start: 2021-02-05 | End: 2021-02-05

## 2021-02-05 RX ADMIN — LIDOCAINE HYDROCHLORIDE 3 ML: 20 INJECTION, SOLUTION EPIDURAL; INFILTRATION; INTRACAUDAL at 14:41

## 2021-02-05 RX ADMIN — LIDOCAINE HYDROCHLORIDE 3 ML: 10 INJECTION, SOLUTION EPIDURAL; INFILTRATION; INTRACAUDAL; PERINEURAL at 14:40

## 2021-02-05 RX ADMIN — METHYLPREDNISOLONE ACETATE 80 MG: 80 INJECTION, SUSPENSION INTRA-ARTICULAR; INTRALESIONAL; INTRAMUSCULAR; SOFT TISSUE at 14:41

## 2021-02-05 RX ADMIN — IOHEXOL 1 ML: 300 INJECTION, SOLUTION INTRAVENOUS at 14:40

## 2021-02-05 NOTE — DISCHARGE INSTRUCTIONS
Steroid Joint Injection   WHAT YOU NEED TO KNOW:   A steroid joint injection is a procedure to inject steroid medicine into a joint  Steroid medicine decreases pain and inflammation  The injection may also contain an anesthetic (numbing medicine) to decrease pain  It may be done to treat conditions such as arthritis, gout, or carpal tunnel syndrome  The injections may be given in your knee, ankle, shoulder, elbow, wrist, ankle or sacroiliac joint  1  Do not apply heat to any area that is numb  If you have discomfort or soreness at the injection site, you may apply ice today, 20 minutes on and 20 minutes off  Tomorrow you may use ice or warm, moist heat  Do not apply ice or heat directly to the skin  2  You may have an increase or change in the discomfort for 36-48 hours after your treatment  Apply ice and continue with any pain medicine you have been prescribed  3  Do not do anything strenuous today  You may shower, but no tub baths or hot tubs today  You may resume your normal activities tomorrow, but do not overdo it  Resume normal activities slowly when you are feeling better  4  If you experience redness, drainage or swelling at the injection site, or if you develop a fever above 100 degrees, please call [facility] at [phone] or go to the Emergency Room  5  Continue to take all routine medicines prescribed by your primary care physician unless otherwise instructed by our staff  Most blood thinners should be started again according to your regularly scheduled dosing  If you have any questions, please give our office a call  If you have a problem specifically related to your procedure, please call our office at [phone]  Problems not related to your procedure should be directed to your primary care physician

## 2021-02-05 NOTE — H&P
History of Present Illness: The patient is a 52 y o  male who presents with complaints of  Shoulder pain      Patient Active Problem List   Diagnosis    Sprain of anterior talofibular ligament of right ankle    Perianal abscess    Neuropathy    Chronic bilateral low back pain with bilateral sciatica    Bilateral lower extremity edema    Chronic pain syndrome    Lumbar post-laminectomy syndrome    Lumbar spondylosis    Smoking    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 ([facility] [address] [facility]    Congenital fusion of sacroiliac joint    Terminal ileitis ([facility] [address] [facility]    Viral enteritis    Chronic foot pain    Lumbar radiculopathy    Acute non-recurrent maxillary sinusitis    Screening examination for other arthropod-borne viral diseases    GERD (gastroesophageal reflux disease)    History of colon polyps    Perianal fistula    Functional diarrhea    Blepharitis, left eye    Closed fracture of radial styloid    Compression of right ulnar nerve at multiple levels    Degenerative disc disease at L5-S1 level    History of lumbar fusion    Numbness and tingling of both lower extremities    Periorbital cellulitis of left eye    Right leg swelling    Tobacco use    Vitamin D deficiency    Arthritis of right shoulder region    Chronic right shoulder pain       Past Medical History:   Diagnosis Date    Back pain     Colon polyp     GERD (gastroesophageal reflux disease)     Perianal fistula     Terminal ileitis (HCC)        Past Surgical History:   Procedure Laterality Date    ABSCESS DRAINAGE      BACK SURGERY  2019    COLONOSCOPY      HERNIA REPAIR           Current Outpatient Medications:     Adalimumab (Humira Pen) 40 MG/0 4ML PNKT, Citrate Free Maintenance Dose: Inject (1) 40mg/0 4ml under the skin every other week, Disp: 2 each, Rfl: 12    cholecalciferol (VITAMIN D3) 1,000 units tablet, Take 2 tablets (2,000 Units total) by mouth daily, Disp: 60 tablet, Rfl: 2    cholestyramine (QUESTRAN) 4 g packet, Take 1 packet (4 g total) by mouth 3 (three) times a day with meals, Disp: 90 packet, Rfl: 2    tiZANidine (ZANAFLEX) 4 mg tablet, Take 1 PO BID PRN for pain/spasms during the day and 2 PO HS , Disp: 105 tablet, Rfl: 2    Current Facility-Administered Medications:     iohexol (OMNIPAQUE) 300 mg/mL injection 50 mL, 50 mL, Intra-articular, Once, Aldo Butt DO    lidocaine (PF) (XYLOCAINE-MPF) 1 % injection 5 mL, 5 mL, Other, Once, Aldo Butt DO    lidocaine (PF) (XYLOCAINE-MPF) 2 % injection 5 mL, 5 mL, Intra-articular, Once, Aldo Butt DO    methylPREDNISolone acetate (DEPO-MEDROL) injection 80 mg, 80 mg, Intra-articular, Once, Aldo Butt DO    No Known Allergies    Physical Exam:   General: Awake, Alert, Oriented x 3, Mood and affect appropriate  Respiratory: Respirations even and unlabored  Cardiovascular: Peripheral pulses intact; no edema  Musculoskeletal Exam:   Decreased range of motion right shoulder    ASA Score: II         Assessment:   1  Right shoulder pain    2   Arthritis of right shoulder region        Plan: Right Shoulder Injection

## 2021-02-08 ENCOUNTER — TELEPHONE (OUTPATIENT)
Dept: PAIN MEDICINE | Facility: CLINIC | Age: 51
End: 2021-02-08

## 2021-02-08 ENCOUNTER — APPOINTMENT (OUTPATIENT)
Dept: RADIOLOGY | Facility: CLINIC | Age: 51
End: 2021-02-08
Payer: COMMERCIAL

## 2021-02-08 ENCOUNTER — OFFICE VISIT (OUTPATIENT)
Dept: URGENT CARE | Facility: CLINIC | Age: 51
End: 2021-02-08
Payer: COMMERCIAL

## 2021-02-08 VITALS
HEART RATE: 91 BPM | DIASTOLIC BLOOD PRESSURE: 88 MMHG | RESPIRATION RATE: 16 BRPM | OXYGEN SATURATION: 98 % | BODY MASS INDEX: 27.09 KG/M2 | WEIGHT: 200 LBS | TEMPERATURE: 97.3 F | SYSTOLIC BLOOD PRESSURE: 138 MMHG | HEIGHT: 72 IN

## 2021-02-08 DIAGNOSIS — M25.511 ACUTE PAIN OF RIGHT SHOULDER: ICD-10-CM

## 2021-02-08 DIAGNOSIS — M25.511 ACUTE PAIN OF RIGHT SHOULDER: Primary | ICD-10-CM

## 2021-02-08 PROCEDURE — 99213 OFFICE O/P EST LOW 20 MIN: CPT | Performed by: PHYSICIAN ASSISTANT

## 2021-02-08 PROCEDURE — 73030 X-RAY EXAM OF SHOULDER: CPT

## 2021-02-08 NOTE — TELEPHONE ENCOUNTER
S/w pt, stated that he fell on ice 2x on Friday after his R shoulder injection, landing on his R shoulder  Pt stated that he cannot lift his R arm straight up or out  Advised pt to to go to urgent care for evaluation  Pt verbalized understanding and appreciation       SL Aware

## 2021-02-08 NOTE — TELEPHONE ENCOUNTER
Pt called stating he fell twice over the weekend and landed on the same side he had his injection on with Dr Suzanne Damon on Friday (right shoulder)    Pt stated his pain level is 10/10 when he moves his arm     Please advise  Pt can be reached at [phone]

## 2021-02-08 NOTE — PROGRESS NOTES
NAME: Leora Gonzalez is a 52 y o  male  : 1970    MRN: [id number]      Assessment and Plan   Acute pain of right shoulder [M25 511]  1  Acute pain of right shoulder  XR shoulder 2+ vw right       X-ray right shoulder:  No acute fractures     discussed with patient he should follow up with his pain doctor as we are unable to provide MRIs here  Could have a ligament tear or injury  Ice, heat and ibuprofen and follow-up  He acknowledges      Patient Instructions     Patient Instructions   Ice to the area  Light stretching  Ibuprofen  F/u with pain doctor      Proceed to ER if symptoms worsen  Chief Complaint     Chief Complaint   Patient presents with    Shoulder Pain     Dwight Grain friday twice on ice  Landed right on shoulder  Decreased ROM  Had burning sensation which went away  Dr Delroy Saunders recommended xray at urgent care  History of Present Illness    Patient with history of arthritis to his right shoulder presents complaining of right shoulder pain x3 days  States on Friday he received a cortisone injection to his right shoulder and later in the day fell twice landing both times on his right shoulder  Denies hitting head or loss of consciousness  States since then he has been having increased pain to the area and difficulty moving his right shoulder  Denies any numbness or tingling to the fingers  States he spoke to his pain doctor who suggested an x-ray  Took ibuprofen 2 times but has not applied any ice or heat  Review of Systems   Review of Systems   Constitutional: Negative for chills and fever     Musculoskeletal:        Right shoulder pain         Current Medications       Current Outpatient Medications:     Adalimumab (Humira Pen) 40 MG/0 4ML PNKT, Citrate Free Maintenance Dose: Inject (1) 40mg/0 4ml under the skin every other week, Disp: 2 each, Rfl: 12    cholecalciferol (VITAMIN D3) 1,000 units tablet, Take 2 tablets (2,000 Units total) by mouth daily, Disp: 60 tablet, Rfl: 2    cholestyramine (QUESTRAN) 4 g packet, Take 1 packet (4 g total) by mouth 3 (three) times a day with meals, Disp: 90 packet, Rfl: 2    tiZANidine (ZANAFLEX) 4 mg tablet, Take 1 PO BID PRN for pain/spasms during the day and 2 PO HS , Disp: 105 tablet, Rfl: 2    Current Allergies     Allergies as of 02/08/2021    (No Known Allergies)              Past Medical History:   Diagnosis Date    Back pain     Colon polyp     GERD (gastroesophageal reflux disease)     Perianal fistula     Terminal ileitis ([address] )        Past Surgical History:   Procedure Laterality Date    ABSCESS DRAINAGE      BACK SURGERY  2019    COLONOSCOPY      HERNIA REPAIR         Family History   Problem Relation Age of Onset    Colon cancer Neg Hx     Colon polyps Neg Hx     Inflammatory bowel disease Neg Hx          Medications have been verified  The following portions of the patient's history were reviewed and updated as appropriate: allergies, current medications, past family history, past medical history, past social history, past surgical history and problem list     Objective   /88   Pulse 91   Temp (!) 97 3 °F (36 3 °C)   Resp 16   Ht 6' (1 829 m)   Wt 90 7 kg (200 lb)   SpO2 98%   BMI 27 12 kg/m²      Physical Exam     Physical Exam  Vitals signs and nursing note reviewed  Constitutional:       General: He is not in acute distress  Appearance: Normal appearance  He is not ill-appearing, toxic-appearing or diaphoretic  Musculoskeletal:      Comments: Right shoulder:   No tenderness to palpation anywhere in the shoulder or upper arm  Decreased range of motion in all directions due to pain  4/5 strength against resistance in all directions with some pain, worst on flexion  And abduction  5/5  strength bilaterally without pain  Radial pulses 2+ bilaterally  Sensation is intact to entire  Upper extremity  Neurological:      Mental Status: He is alert

## 2021-02-12 ENCOUNTER — TELEPHONE (OUTPATIENT)
Dept: PAIN MEDICINE | Facility: CLINIC | Age: 51
End: 2021-02-12

## 2021-02-22 NOTE — TELEPHONE ENCOUNTER
S/w pt, confirmed R shoulder injection on 2/5  Pt did fall days later, x-rays at urgent care confirmed no fx  Pt stated that the pain has been increasing, no relief w/ injection, unable to sleep at this point as the pain is constant  Pt stated that he has not seen ortho and would need a referral if SL feels that is the next step  Advised pt, will d/w Dr Antonieta Aldridge and cb to advise

## 2021-02-23 ENCOUNTER — OFFICE VISIT (OUTPATIENT)
Dept: PAIN MEDICINE | Facility: CLINIC | Age: 51
End: 2021-02-23
Payer: COMMERCIAL

## 2021-02-23 ENCOUNTER — TELEPHONE (OUTPATIENT)
Dept: PAIN MEDICINE | Facility: CLINIC | Age: 51
End: 2021-02-23

## 2021-02-23 VITALS
TEMPERATURE: 97.9 F | HEART RATE: 68 BPM | HEIGHT: 72 IN | DIASTOLIC BLOOD PRESSURE: 80 MMHG | WEIGHT: 198 LBS | SYSTOLIC BLOOD PRESSURE: 134 MMHG | BODY MASS INDEX: 26.82 KG/M2

## 2021-02-23 DIAGNOSIS — G89.29 CHRONIC RIGHT SHOULDER PAIN: ICD-10-CM

## 2021-02-23 DIAGNOSIS — M25.511 CHRONIC RIGHT SHOULDER PAIN: ICD-10-CM

## 2021-02-23 DIAGNOSIS — M19.011 PRIMARY OSTEOARTHRITIS OF RIGHT SHOULDER: ICD-10-CM

## 2021-02-23 DIAGNOSIS — M54.16 LUMBAR RADICULOPATHY: ICD-10-CM

## 2021-02-23 DIAGNOSIS — G89.29 CHRONIC BILATERAL LOW BACK PAIN WITH BILATERAL SCIATICA: ICD-10-CM

## 2021-02-23 DIAGNOSIS — M51.37 DEGENERATIVE DISC DISEASE AT L5-S1 LEVEL: ICD-10-CM

## 2021-02-23 DIAGNOSIS — M54.42 CHRONIC BILATERAL LOW BACK PAIN WITH BILATERAL SCIATICA: ICD-10-CM

## 2021-02-23 DIAGNOSIS — M96.1 LUMBAR POST-LAMINECTOMY SYNDROME: ICD-10-CM

## 2021-02-23 DIAGNOSIS — M47.816 LUMBAR SPONDYLOSIS: ICD-10-CM

## 2021-02-23 DIAGNOSIS — M54.41 CHRONIC BILATERAL LOW BACK PAIN WITH BILATERAL SCIATICA: ICD-10-CM

## 2021-02-23 DIAGNOSIS — M79.18 MYOFASCIAL PAIN SYNDROME: ICD-10-CM

## 2021-02-23 DIAGNOSIS — G89.4 CHRONIC PAIN SYNDROME: Primary | ICD-10-CM

## 2021-02-23 PROCEDURE — 99214 OFFICE O/P EST MOD 30 MIN: CPT | Performed by: NURSE PRACTITIONER

## 2021-02-23 RX ORDER — BACLOFEN 10 MG/1
TABLET ORAL
Qty: 45 TABLET | Refills: 1 | Status: SHIPPED | OUTPATIENT
Start: 2021-02-23 | End: 2021-03-29 | Stop reason: ALTCHOICE

## 2021-02-23 NOTE — TELEPHONE ENCOUNTER
Patient   [phone]  AMALIA Mcdowell    Please follow back up with patient  He said that he spoke with his , and confirmed that they never received anything   From our office, pt will wait to hear back from you thank you

## 2021-02-23 NOTE — PATIENT INSTRUCTIONS
Baclofen (By mouth)   Baclofen (MARIAH-arley-fen)  Treats muscle spasms  Brand Name(s): Ozobax   There may be other brand names for this medicine  When This Medicine Should Not Be Used: This medicine is not right for everyone  Do not use it if you had an allergic reaction to baclofen  How to Use This Medicine:   Liquid, Tablet  · Take your medicine as directed  Your dose may need to be changed several times to find what works best for you  · Oral liquid: Measure the oral liquid medicine with a marked measuring spoon, oral syringe, or medicine cup  · Missed dose: Take a dose as soon as you remember  If it is almost time for your next dose, wait until then and take a regular dose  Do not take extra medicine to make up for a missed dose  ·   ? Oral liquid: Store in the refrigerator  Do not freeze  ? Tablets: Store the medicine in a closed container at room temperature, away from heat, moisture, and direct light  Drugs and Foods to Avoid:   Ask your doctor or pharmacist before using any other medicine, including over-the-counter medicines, vitamins, and herbal products  · Do not drink alcohol while you are using this medicine  · Tell your doctor if you use anything else that makes you sleepy  Some examples are allergy medicine, narcotic pain medicine, and alcohol  Warnings While Using This Medicine:   · Tell your doctor if you are pregnant or breastfeeding, or if you have kidney disease, diabetes, epilepsy, mental illness, ovarian cyst, posture or balance problems, a recent stroke, or history of autonomic dysreflexia  · This medicine may make you dizzy or drowsy  Avoid driving, using machines, or doing anything else that could be dangerous if you are not alert  · Do not stop using this medicine suddenly  Your doctor will need to slowly decrease your dose before you stop it completely  · Your doctor will check your progress and the effects of this medicine at regular visits  Keep all appointments    · Keep all medicine out of the reach of children  Never share your medicine with anyone  Possible Side Effects While Using This Medicine:   Call your doctor right away if you notice any of these side effects:  · Allergic reaction: Itching or hives, swelling in your face or hands, swelling or tingling in your mouth or throat, chest tightness, trouble breathing  · Lightheadedness, dizziness, fainting  · Seizures  · Muscles weakness, trouble breathing, trouble seeing  · Increase in how much or how often you urinate  If you notice these less serious side effects, talk with your doctor:   · Confusion, headache, trouble sleeping  · Constipation, nausea  · Drowsiness, dizziness, or weakness  If you notice other side effects that you think are caused by this medicine, tell your doctor  Call your doctor for medical advice about side effects  You may report side effects to FDA at 9-840-FDA-1462  © Copyright [address] Information is for End User's use only and may not be sold, redistributed or otherwise used for commercial purposes  The above information is an  only  It is not intended as medical advice for individual conditions or treatments  Talk to your doctor, nurse or pharmacist before following any medical regimen to see if it is safe and effective for you

## 2021-02-23 NOTE — PROGRESS NOTES
Assessment:  1  Chronic pain syndrome    2  Chronic right shoulder pain    3  Chronic bilateral low back pain with bilateral sciatica    4  Lumbar radiculopathy    5  Primary osteoarthritis of right shoulder    6  Myofascial pain syndrome    7  Lumbar post-laminectomy syndrome    8  Lumbar spondylosis    9  Degenerative disc disease at L5-S1 level        Plan:    While the patient was in the office today, I did have a thorough conversation with the patient regarding their chronic pain syndrome, symptoms, medication regimen, and treatment plan  I discussed with the patient with regards to his right shoulder pain, I feel it is in his best interest to follow-up with Dr Joan Da Silva for further evaluation and any other treatment plan options  The patient was agreeable and verbalized an understanding  I discussed with the patient that with regards to chronic low back and leg pain, he should proceed with the spinal cord stimulator consultation with Dr Majo Sterling as scheduled later on this week  The patient was agreeable and verbalized an understanding  With regards to medication options and trying to give him something to help him sleep better at nighttime, we will have him discontinue the tizanidine and we can try baclofen 10 mg, 1-2 pills at bedtime  I advised the patient that if they experience any side effects or issues with the changes in their medication regiment, they should give our office a call to discuss  I also advised the patient not to drive or operate machinery until they see how the changes in the medication regimen affects them  The patient was agreeable and verbalized an understanding  I discussed with the patient that at this point time he can followup with our office on an as-needed basis  I did review the patient that if his pain symptoms should change, worsen, and/or if he would experience any new symptoms he would like to be evaluated for, he should give our office a call  The patient was agreeable and verbalized an understanding  History of Present Illness: The patient is a 52 y o  male last seen on 2/5/2021 who presents for a follow up office visit in regards to Chronic pain syndrome secondary to  Lumbar post-laminectomy syndrome with spondylosis, degenerative disc disease as well as primary osteoarthritis of the right shoulder  The patient currently reports   That since his last office visit he has noted continued right shoulder pain despite proceeding with a right shoulder injection on February 5, 2021 with Dr Sandeep Monroy  The patient does not feel there has been any kind of improvement or relief of his shoulder pain as result of the injection  He also continues with his chronic low back and leg pain and did proceed with the spinal cord stimulator trial where the patient noted 45% relief and improvement of his overall back and leg pain and at this point is proceeding with a consultation with Dr Melvin Rodriguez on February 25, 2021, to discuss the possibility of a permanent thoracic lead spinal cord stimulator implantation  Patient reports that because of the shoulder pain and his chronic low back and lidocaine he continues to have issues sleeping at nighttime despite trying the tizanidine as he has also previously tried and failed cyclobenzaprine  He presents today to discuss his medication regimen and treatment plan  Current pain medications includes:   Tizanidine 4 mg b i d  p r n  for pain and spasms  The patient reports that this regimen is providing  no pain relief  The patient is reporting no side effects from this pain medication regimen  I have personally reviewed and/or updated the patient's past medical history, past surgical history, family history, social history, current medications, allergies, and vital signs today  Review of Systems:    Review of Systems   Respiratory: Negative for shortness of breath  Cardiovascular: Negative for chest pain  Gastrointestinal: Negative for constipation, diarrhea, nausea and vomiting  Musculoskeletal: Negative for arthralgias, gait problem, joint swelling and myalgias  Skin: Negative for rash  Neurological: Negative for dizziness, seizures and weakness  All other systems reviewed and are negative  Past Medical History:   Diagnosis Date    Back pain     Colon polyp     GERD (gastroesophageal reflux disease)     Perianal fistula     Terminal ileitis ([address] )        Past Surgical History:   Procedure Laterality Date    ABSCESS DRAINAGE      BACK SURGERY  2019    COLONOSCOPY      HERNIA REPAIR         Family History   Problem Relation Age of Onset    Colon cancer Neg Hx     Colon polyps Neg Hx     Inflammatory bowel disease Neg Hx        Social History     Occupational History    Not on file   Tobacco Use    Smoking status: Current Every Day Smoker     Packs/day: 0 50     Types: Cigarettes     Start date: 2019    Smokeless tobacco: Never Used   Substance and Sexual Activity    Alcohol use: Yes     Frequency: Monthly or less     Drinks per session: 1 or 2     Comment: occasionally     Drug use: No    Sexual activity: Not on file         Current Outpatient Medications:     Adalimumab (Humira Pen) 40 MG/0 4ML PNKT, Citrate Free Maintenance Dose: Inject (1) 40mg/0 4ml under the skin every other week, Disp: 2 each, Rfl: 12    cholecalciferol (VITAMIN D3) 1,000 units tablet, Take 2 tablets (2,000 Units total) by mouth daily, Disp: 60 tablet, Rfl: 2    cholestyramine (QUESTRAN) 4 g packet, Take 1 packet (4 g total) by mouth 3 (three) times a day with meals, Disp: 90 packet, Rfl: 2    baclofen 10 mg tablet, Take 1-2 PO HS PRN for pain/spasms  , Disp: 45 tablet, Rfl: 1    No Known Allergies    Physical Exam:    /80 (BP Location: Left arm, Patient Position: Sitting, Cuff Size: Standard)   Pulse 68   Temp 97 9 °F (36 6 °C)   Ht 6' (1 829 m)   Wt 89 8 kg (198 lb)   BMI 26 85 kg/m² Constitutional:normal, well developed, well nourished, alert, in no distress and non-toxic and no overt pain behavior  Eyes:anicteric  HEENT:grossly intact  Neck:supple, symmetric, trachea midline and no masses   Pulmonary:even and unlabored  Cardiovascular:No edema or pitting edema present  Skin:Normal without rashes or lesions and well hydrated  Psychiatric:Mood and affect appropriate  Neurologic:Cranial Nerves II-XII grossly intact  Musculoskeletal:The patient's gait is slightly antalgic, but steady without the use of any assistive devices        Imaging  No orders to display         Orders Placed This Encounter   Procedures    Ambulatory referral to Orthopedic Surgery

## 2021-02-24 ENCOUNTER — TRANSCRIBE ORDERS (OUTPATIENT)
Dept: RADIOLOGY | Facility: CLINIC | Age: 51
End: 2021-02-24

## 2021-02-25 ENCOUNTER — CONSULT (OUTPATIENT)
Dept: NEUROSURGERY | Facility: CLINIC | Age: 51
End: 2021-02-25
Payer: COMMERCIAL

## 2021-02-25 VITALS
WEIGHT: 198 LBS | RESPIRATION RATE: 16 BRPM | BODY MASS INDEX: 26.82 KG/M2 | HEIGHT: 72 IN | SYSTOLIC BLOOD PRESSURE: 140 MMHG | TEMPERATURE: 99.1 F | HEART RATE: 89 BPM | DIASTOLIC BLOOD PRESSURE: 88 MMHG

## 2021-02-25 DIAGNOSIS — G89.29 CHRONIC FOOT PAIN, UNSPECIFIED LATERALITY: ICD-10-CM

## 2021-02-25 DIAGNOSIS — M54.16 LUMBAR RADICULOPATHY: ICD-10-CM

## 2021-02-25 DIAGNOSIS — M79.673 CHRONIC FOOT PAIN, UNSPECIFIED LATERALITY: ICD-10-CM

## 2021-02-25 DIAGNOSIS — G89.4 CHRONIC PAIN SYNDROME: Primary | ICD-10-CM

## 2021-02-25 PROCEDURE — 99204 OFFICE O/P NEW MOD 45 MIN: CPT | Performed by: NEUROLOGICAL SURGERY

## 2021-02-25 RX ORDER — CHLORHEXIDINE GLUCONATE 0.12 MG/ML
15 RINSE ORAL ONCE
Status: CANCELLED | OUTPATIENT
Start: 2021-03-26 | End: 2021-02-25

## 2021-02-25 RX ORDER — CEFAZOLIN SODIUM 2 G/50ML
2000 SOLUTION INTRAVENOUS ONCE
Status: CANCELLED | OUTPATIENT
Start: 2021-03-26 | End: 2021-02-25

## 2021-02-25 NOTE — PROGRESS NOTES
Assessment/Plan:    No problem-specific Assessment & Plan notes found for this encounter  Patient is stable  Symptoms, as detailed in HPI, continue to significantly impact of patient's quality of life in daily activities  After carefully considering presentation, investigations, functional status and co-morbidities, the risk/benefit profile of surgical intervention is favorable  History, physical examination and diagnostic tests were reviewed and questions answered  Diagnosis, care plan and treatment options were discussed  The patient understand instructions and will follow up as directed  Patient with low back and bilateral feet pain  He had an abbott stimulator trial with between 45% relief of pain but much improvement in sleep and mobility  I recommended a percutaneous implantation  Expected postoperative course, including activity restrictions, expected pain and postoperative medication were reviewed  Patient provided verbal consent to surgical procedure and signed consent form: Yes    We also discussed the risks and benefits of the procedure the risks being including but not limited too: infection (~2%), neurologic injury (<1%), new pain, revisions surgery, failure to relieve pain, hardware isues  The benefits including relief of pain as in trial  The patient stated understanding of the risks and benefits and agreed to proceed          Diagnoses and all orders for this visit:    Chronic pain syndrome  -     Ambulatory referral to Neurosurgery  -     Case request operating room: [address] [place] [address], [facility]; Standing  -     Case request operating room: INSERTION THORACIC DORSAL COLUMN SPINAL CORD STIMULATOR PERCUTANEOUS W IMPLANTABLE PULSE GENERATOR, RIGHT    Lumbar radiculopathy  -     Ambulatory referral to Neurosurgery  -     Case request operating room: [address] PERCUTANEOUS W IMPLANTABLE PULSE GENERATOR, RIGHT; Standing  -     Case request operating room: INSERTION THORACIC DORSAL COLUMN SPINAL CORD STIMULATOR PERCUTANEOUS W IMPLANTABLE PULSE GENERATOR, RIGHT    Chronic foot pain, unspecified laterality  -     Ambulatory referral to Neurosurgery    Other orders  -     Diet NPO; Sips with meds; Standing  -     Nursing Communication [facility] Interventions Implemented; Standing  -     Nursing Communication CHG bath, have staff wash entire body (neck down) per pre-op bathing protocol  Routine, evening prior to, and day of surgery ; Standing  -     Nursing Communication Swab both nares with Povidone-Iodine solution, EXCLUDE if patient has shellfish/Iodine allergy  Routine, day of surgery, on call to OR; Standing  -     chlorhexidine (PERIDEX) 0 12 % oral rinse 15 mL  -     Void on call to OR; Standing  -     Insert peripheral IV; Standing  -     ceFAZolin (ANCEF) IVPB (premix in dextrose) 2,000 mg 50 mL        I have spent 60 minutes with Patient  today in which greater than 50% of this time was spent in counseling/coordination of care regarding Diagnostic results, Prognosis, Risks and benefits of tx options, Intructions for management, Patient and family education, Importance of tx compliance, Risk factor reductions and Impressions  Subjective:      Patient ID: Yusef Christine is a 52 y o  male  Patient is a 52year old male with symptoms of low back and bilateral feel pain  Pain is severe, and burning in quality  Pain distribution is low back and bilateral feet  Pain is worse with standing and sleep  Pain is improved by recent stimulator  The pain has been present for several years  The patient underwent a successful abbott trial with greater than 45 % relief of pain  They reported improvement in sleep  They are ready to proceed with surgery             The following portions of the patient's history were reviewed and updated as appropriate:   He  has a past medical history of Back pain, Colon polyp, GERD (gastroesophageal reflux disease), Perianal fistula, and Terminal ileitis ([facility] [address] )  He   Patient Active Problem List    Diagnosis Date Noted    Myofascial pain syndrome 02/23/2021    Arthritis of right shoulder region     Chronic right shoulder pain     Vitamin D deficiency 01/05/2021    Functional diarrhea 11/10/2020    History of colon polyps 09/29/2020    GERD (gastroesophageal reflux disease)     Perianal fistula     Acute non-recurrent maxillary sinusitis 09/22/2020    Screening examination for other arthropod-borne viral diseases 09/22/2020    Lumbar radiculopathy 09/01/2020    Chronic foot pain 08/31/2020    Terminal ileitis ([facility] [address] ) 08/27/2020    Viral enteritis 08/27/2020    Smoking 08/25/2020    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 ([facility] [address] ) 08/25/2020    Congenital fusion of sacroiliac joint 08/25/2020    Right leg swelling 07/24/2020    Chronic pain syndrome 07/07/2020    Lumbar post-laminectomy syndrome 07/07/2020    Lumbar spondylosis 07/07/2020    Neuropathy 05/27/2020    Chronic bilateral low back pain with bilateral sciatica 05/27/2020    Bilateral lower extremity edema 05/27/2020    Numbness and tingling of both lower extremities 01/23/2020    History of lumbar fusion 12/26/2019    Degenerative disc disease at L5-S1 level 11/26/2019    Tobacco use 10/31/2019    Perianal abscess 09/25/2019    Sprain of anterior talofibular ligament of right ankle 03/26/2019    Periorbital cellulitis of left eye 01/18/2018    Blepharitis, left eye 01/16/2018    Compression of right ulnar nerve at multiple levels 03/15/2017    Closed fracture of radial styloid 01/03/2017     He  has a past surgical history that includes Abscess drainage; Back surgery (2019); Colonoscopy; and Hernia repair  His family history is not on file  He  reports that he quit smoking about 3 weeks ago  His smoking use included cigarettes  He started smoking about 2 years ago  He smoked 0 50 packs per day  He has never used smokeless tobacco  He reports current alcohol use  He reports that he does not use drugs  Current Outpatient Medications   Medication Sig Dispense Refill    Adalimumab (Humira Pen) 40 MG/0 4ML PNKT Citrate Free Maintenance Dose: Inject (1) 40mg/0 4ml under the skin every other week 2 each 12    baclofen 10 mg tablet Take 1-2 PO HS PRN for pain/spasms  45 tablet 1    cholecalciferol (VITAMIN D3) 1,000 units tablet Take 2 tablets (2,000 Units total) by mouth daily 60 tablet 2     No current facility-administered medications for this visit  Current Outpatient Medications on File Prior to Visit   Medication Sig    Adalimumab (Humira Pen) 40 MG/0 4ML PNKT Citrate Free Maintenance Dose: Inject (1) 40mg/0 4ml under the skin every other week    baclofen 10 mg tablet Take 1-2 PO HS PRN for pain/spasms   cholecalciferol (VITAMIN D3) 1,000 units tablet Take 2 tablets (2,000 Units total) by mouth daily    [DISCONTINUED] cholestyramine (QUESTRAN) 4 g packet Take 1 packet (4 g total) by mouth 3 (three) times a day with meals     No current facility-administered medications on file prior to visit  He has No Known Allergies       Review of Systems   Constitutional: Negative  HENT: Negative  Eyes: Negative  Respiratory: Negative  Cardiovascular: Negative  Gastrointestinal: Negative  Endocrine: Negative  Genitourinary: Negative  Musculoskeletal: Positive for back pain (LBP across hips, neurapothy in feet)  Hx of lumbar fusion   Skin: Negative  Allergic/Immunologic: Negative  Neurological: Positive for numbness (bi/feet, left is worse)  Hematological: Negative  Psychiatric/Behavioral: Negative          I have personally reviewed all aspects of the review of systems as documented    Objective:      /88 (BP Location: Right arm)   Pulse 89   Temp 99 1 °F (37 3 °C) (Tympanic)   Resp 16   Ht 6' (1 829 m)   Wt 89 8 kg (198 lb)   BMI 26 85 kg/m²          Physical Exam  Constitutional:       General: He is not in acute distress  Appearance: Normal appearance  He is normal weight  He is not ill-appearing  HENT:      Head: Normocephalic and atraumatic  Eyes:      General:         Right eye: No discharge  Left eye: No discharge  Extraocular Movements: Extraocular movements intact  Conjunctiva/sclera: Conjunctivae normal       Pupils: Pupils are equal, round, and reactive to light  Cardiovascular:      Rate and Rhythm: Normal rate and regular rhythm  Pulmonary:      Effort: Pulmonary effort is normal  No respiratory distress  Musculoskeletal: Normal range of motion  Neurological:      General: No focal deficit present  Mental Status: He is alert and oriented to person, place, and time  Mental status is at baseline  Cranial Nerves: Cranial nerves are intact  Sensory: Sensation is intact  Motor: Motor function is intact  Psychiatric:         Mood and Affect: Mood normal          Thought Content:  Thought content normal

## 2021-03-03 ENCOUNTER — TELEPHONE (OUTPATIENT)
Dept: NEUROSURGERY | Facility: CLINIC | Age: 51
End: 2021-03-03

## 2021-03-03 NOTE — TELEPHONE ENCOUNTER
Returned call to patient he questions if he can take a trip he has planned starting April 1-9 2021   Explained in detail activity restrictions and rational comply with the BLTs, no repetitive movement of trunk or upper extremities , pushing or pulling movements  Activity restrictions in effect 6 weeks after surgery  3/26/2020 Procedure: INSERTION THORACIC DORSAL COLUMN SPINAL CORD STIMULATOR PERCUTANEOUS W IMPLANTABLE PULSE GENERATOR, RIGHT (Right Spine Thoracic)    Record review reveals patient treating with and immunomodulator, Humira , for treatment of Chron's disease he is advised to contact Dr Joe Bunch, GI prescribing practitioner , inform he is for surgery, the recommendation is to hold medication for 7 days preoperatively and 14 days after --do not restart until 2 week postop visit  Although literature review recommends hold 14/14 pre and post op    Per patient Humira dose due 3/13 and 3/27    He agrees to call Dr Joe Bunch and is appreciative of call

## 2021-03-03 NOTE — TELEPHONE ENCOUNTER
Patient called nurseline questioning if he can hold off on getting his pre-surgery bloodwork d/t his surgery not being approved by insurance yet  Returned call to patient and explained he can hold off on the bloodwork until his surgery is approved  Patient appreciative of call back

## 2021-03-10 DIAGNOSIS — Z23 ENCOUNTER FOR IMMUNIZATION: ICD-10-CM

## 2021-03-12 ENCOUNTER — APPOINTMENT (OUTPATIENT)
Dept: LAB | Facility: HOSPITAL | Age: 51
End: 2021-03-12
Attending: NEUROLOGICAL SURGERY
Payer: COMMERCIAL

## 2021-03-12 ENCOUNTER — OFFICE VISIT (OUTPATIENT)
Dept: OBGYN CLINIC | Facility: CLINIC | Age: 51
End: 2021-03-12
Payer: COMMERCIAL

## 2021-03-12 ENCOUNTER — TELEPHONE (OUTPATIENT)
Dept: GASTROENTEROLOGY | Facility: CLINIC | Age: 51
End: 2021-03-12

## 2021-03-12 ENCOUNTER — TRANSCRIBE ORDERS (OUTPATIENT)
Dept: ADMINISTRATIVE | Facility: HOSPITAL | Age: 51
End: 2021-03-12

## 2021-03-12 VITALS
HEIGHT: 72 IN | WEIGHT: 198 LBS | BODY MASS INDEX: 26.82 KG/M2 | SYSTOLIC BLOOD PRESSURE: 118 MMHG | TEMPERATURE: 98.7 F | DIASTOLIC BLOOD PRESSURE: 82 MMHG

## 2021-03-12 DIAGNOSIS — G89.29 CHRONIC RIGHT SHOULDER PAIN: ICD-10-CM

## 2021-03-12 DIAGNOSIS — M54.16 LUMBAR RADICULOPATHY: ICD-10-CM

## 2021-03-12 DIAGNOSIS — M19.011 PRIMARY OSTEOARTHRITIS OF RIGHT SHOULDER: ICD-10-CM

## 2021-03-12 DIAGNOSIS — M25.511 CHRONIC RIGHT SHOULDER PAIN: ICD-10-CM

## 2021-03-12 DIAGNOSIS — M79.673 CHRONIC FOOT PAIN, UNSPECIFIED LATERALITY: ICD-10-CM

## 2021-03-12 DIAGNOSIS — G89.4 CHRONIC PAIN SYNDROME: ICD-10-CM

## 2021-03-12 DIAGNOSIS — M75.01 ADHESIVE CAPSULITIS OF RIGHT SHOULDER: Primary | ICD-10-CM

## 2021-03-12 DIAGNOSIS — M75.02 ADHESIVE CAPSULITIS OF LEFT SHOULDER: ICD-10-CM

## 2021-03-12 DIAGNOSIS — Z01.818 PREOPERATIVE TESTING: ICD-10-CM

## 2021-03-12 DIAGNOSIS — G89.29 CHRONIC FOOT PAIN, UNSPECIFIED LATERALITY: ICD-10-CM

## 2021-03-12 DIAGNOSIS — Z01.818 PREOPERATIVE TESTING: Primary | ICD-10-CM

## 2021-03-12 LAB
ALBUMIN SERPL BCP-MCNC: 4.4 G/DL (ref 3.5–5)
ALP SERPL-CCNC: 92 U/L (ref 46–116)
ALT SERPL W P-5'-P-CCNC: 56 U/L (ref 12–78)
ANION GAP SERPL CALCULATED.3IONS-SCNC: 5 MMOL/L (ref 4–13)
APTT PPP: 33 SECONDS (ref 23–37)
AST SERPL W P-5'-P-CCNC: 27 U/L (ref 5–45)
BASOPHILS # BLD AUTO: 0.01 THOUSANDS/ΜL (ref 0–0.1)
BASOPHILS NFR BLD AUTO: 0 % (ref 0–1)
BILIRUB SERPL-MCNC: 0.88 MG/DL (ref 0.2–1)
BILIRUB UR QL STRIP: NEGATIVE
BUN SERPL-MCNC: 15 MG/DL (ref 5–25)
CALCIUM SERPL-MCNC: 9.2 MG/DL (ref 8.3–10.1)
CHLORIDE SERPL-SCNC: 110 MMOL/L (ref 100–108)
CLARITY UR: CLEAR
CO2 SERPL-SCNC: 27 MMOL/L (ref 21–32)
COLOR UR: YELLOW
CREAT SERPL-MCNC: 1.01 MG/DL (ref 0.6–1.3)
EOSINOPHIL # BLD AUTO: 0.21 THOUSAND/ΜL (ref 0–0.61)
EOSINOPHIL NFR BLD AUTO: 3 % (ref 0–6)
ERYTHROCYTE [DISTWIDTH] IN BLOOD BY AUTOMATED COUNT: 12.4 % (ref 11.6–15.1)
EST. AVERAGE GLUCOSE BLD GHB EST-MCNC: 103 MG/DL
GFR SERPL CREATININE-BSD FRML MDRD: 88 ML/MIN/1.73SQ M
GLUCOSE SERPL-MCNC: 110 MG/DL (ref 65–140)
GLUCOSE UR STRIP-MCNC: NEGATIVE MG/DL
HBA1C MFR BLD: 5.2 %
HCT VFR BLD AUTO: 49 % (ref 36.5–49.3)
HGB BLD-MCNC: 16.8 G/DL (ref 12–17)
HGB UR QL STRIP.AUTO: NEGATIVE
IMM GRANULOCYTES # BLD AUTO: 0.04 THOUSAND/UL (ref 0–0.2)
IMM GRANULOCYTES NFR BLD AUTO: 1 % (ref 0–2)
INR PPP: 0.92 (ref 0.84–1.19)
KETONES UR STRIP-MCNC: NEGATIVE MG/DL
LEUKOCYTE ESTERASE UR QL STRIP: NEGATIVE
LYMPHOCYTES # BLD AUTO: 1.72 THOUSANDS/ΜL (ref 0.6–4.47)
LYMPHOCYTES NFR BLD AUTO: 27 % (ref 14–44)
MCH RBC QN AUTO: 33.4 PG (ref 26.8–34.3)
MCHC RBC AUTO-ENTMCNC: 34.3 G/DL (ref 31.4–37.4)
MCV RBC AUTO: 97 FL (ref 82–98)
MONOCYTES # BLD AUTO: 0.51 THOUSAND/ΜL (ref 0.17–1.22)
MONOCYTES NFR BLD AUTO: 8 % (ref 4–12)
NEUTROPHILS # BLD AUTO: 3.96 THOUSANDS/ΜL (ref 1.85–7.62)
NEUTS SEG NFR BLD AUTO: 61 % (ref 43–75)
NITRITE UR QL STRIP: NEGATIVE
NRBC BLD AUTO-RTO: 0 /100 WBCS
PH UR STRIP.AUTO: 6 [PH]
PLATELET # BLD AUTO: 191 THOUSANDS/UL (ref 149–390)
PMV BLD AUTO: 10 FL (ref 8.9–12.7)
POTASSIUM SERPL-SCNC: 4.2 MMOL/L (ref 3.5–5.3)
PROT SERPL-MCNC: 7.4 G/DL (ref 6.4–8.2)
PROT UR STRIP-MCNC: NEGATIVE MG/DL
PROTHROMBIN TIME: 12.4 SECONDS (ref 11.6–14.5)
RBC # BLD AUTO: 5.03 MILLION/UL (ref 3.88–5.62)
SODIUM SERPL-SCNC: 142 MMOL/L (ref 136–145)
SP GR UR STRIP.AUTO: 1.02 (ref 1–1.03)
UROBILINOGEN UR QL STRIP.AUTO: 0.2 E.U./DL
WBC # BLD AUTO: 6.45 THOUSAND/UL (ref 4.31–10.16)

## 2021-03-12 PROCEDURE — 85025 COMPLETE CBC W/AUTO DIFF WBC: CPT

## 2021-03-12 PROCEDURE — 99214 OFFICE O/P EST MOD 30 MIN: CPT | Performed by: ORTHOPAEDIC SURGERY

## 2021-03-12 PROCEDURE — 81003 URINALYSIS AUTO W/O SCOPE: CPT | Performed by: NEUROLOGICAL SURGERY

## 2021-03-12 PROCEDURE — 85730 THROMBOPLASTIN TIME PARTIAL: CPT

## 2021-03-12 PROCEDURE — 36415 COLL VENOUS BLD VENIPUNCTURE: CPT

## 2021-03-12 PROCEDURE — 80053 COMPREHEN METABOLIC PANEL: CPT

## 2021-03-12 PROCEDURE — 83036 HEMOGLOBIN GLYCOSYLATED A1C: CPT

## 2021-03-12 PROCEDURE — 85610 PROTHROMBIN TIME: CPT

## 2021-03-12 PROCEDURE — 20610 DRAIN/INJ JOINT/BURSA W/O US: CPT | Performed by: ORTHOPAEDIC SURGERY

## 2021-03-12 RX ORDER — METHYLPREDNISOLONE ACETATE 40 MG/ML
1 INJECTION, SUSPENSION INTRA-ARTICULAR; INTRALESIONAL; INTRAMUSCULAR; SOFT TISSUE
Status: COMPLETED | OUTPATIENT
Start: 2021-03-12 | End: 2021-03-12

## 2021-03-12 RX ORDER — LIDOCAINE HYDROCHLORIDE 10 MG/ML
3 INJECTION, SOLUTION INFILTRATION; PERINEURAL
Status: COMPLETED | OUTPATIENT
Start: 2021-03-12 | End: 2021-03-12

## 2021-03-12 RX ADMIN — METHYLPREDNISOLONE ACETATE 1 ML: 40 INJECTION, SUSPENSION INTRA-ARTICULAR; INTRALESIONAL; INTRAMUSCULAR; SOFT TISSUE at 09:27

## 2021-03-12 RX ADMIN — LIDOCAINE HYDROCHLORIDE 3 ML: 10 INJECTION, SOLUTION INFILTRATION; PERINEURAL at 09:27

## 2021-03-12 NOTE — PROGRESS NOTES
Ortho Sports Medicine Shoulder New Patient Visit     Assesment:   52 y o  male bilateral shoulder ahesive capsulitis with right worse than left    Plan:    Conservative treatment:    Ice to shoulder 1-2 times daily, for 20 minutes at a time  PT for ROM and strengthening to shoulder, rotator cuff, scapular stabilizers  Imaging: All imaging from today was reviewed by myself and explained to the patient  Injection:    The risks and benefits of the injection (which include but are not limited to: infection, bleeding,damage to nerve/artery, need for further intervention), as well as the risks and benefits of all alternative treatments were explained and understood  The patient elected to proceed with injection  The procedure was done with aseptic technique, and the patient tolerated the procedure well with no complications  A corticosteroid injection of the glenohumeral joint was performed  The patient should take 1-2 days off of activity, with gradual return to activity as tolerated  Ice to the shoulder 1-2 times daily for 20 minutes, for next 24-48 hrs  Surgery:     No surgery is recommended at this point, continue with conservative treatment plan as noted  Follow up:    Return in about 6 weeks (around 4/23/2021) for Recheck  Chief Complaint   Patient presents with    Right Shoulder - Pain       History of Present Illness: The patient is a 52 y o , left hand dominant male whose occupation is unknown, referred to me by Dr Romulo Chaudhari , seen in clinic for consultation of right shoulder pain  The patient denies a history of diabetes  The patient denies a history of thyroid disorder  Pain is located anterior, posterior, lateral   The patient rates the pain as a 7/10  The pain has been present for 1 months  The patient sustained an injury 1 month ago  The mechanism of injury was a fall onto ice  The pain is characterized as dull, achy  The pain is present at all times        Pain is improved by rest, ice, NSAIDS and injection  Pain is aggravated by overhead activity, reaching back and rotation  Symptoms include clicking, catching and popping  The patient denies weakness  The patient denies numbness and tingling  The patient has tried rest, ice, NSAIDS and injection  Patient has had 2 injections from Dr Cadence Cunningham for the shoulder  Shoulder Surgical History:  None    Past Medical, Social and Family History:  Past Medical History:   Diagnosis Date    Back pain     Colon polyp     GERD (gastroesophageal reflux disease)     Perianal fistula     Terminal ileitis ([facility] [address] )      Past Surgical History:   Procedure Laterality Date    ABSCESS DRAINAGE      BACK SURGERY  2019    COLONOSCOPY      HERNIA REPAIR       No Known Allergies  Current Outpatient Medications on File Prior to Visit   Medication Sig Dispense Refill    Adalimumab (Humira Pen) 40 MG/0 4ML PNKT Citrate Free Maintenance Dose: Inject (1) 40mg/0 4ml under the skin every other week 2 each 12    baclofen 10 mg tablet Take 1-2 PO HS PRN for pain/spasms  45 tablet 1    cholecalciferol (VITAMIN D3) 1,000 units tablet Take 2 tablets (2,000 Units total) by mouth daily 60 tablet 2     No current facility-administered medications on file prior to visit        Social History     Socioeconomic History    Marital status: /Civil Union     Spouse name: Not on file    Number of children: Not on file    Years of education: Not on file    Highest education level: Not on file   Occupational History    Not on file   Social Needs    Financial resource strain: Not on file    Food insecurity     Worry: Not on file     Inability: Not on file    Transportation needs     Medical: Not on file     Non-medical: Not on file   Tobacco Use    Smoking status: Former Smoker     Packs/day: 0 50     Types: Cigarettes     Start date:      Quit date: 2021     Years since quittin 0    Smokeless tobacco: Never Used   Substance and Sexual Activity    Alcohol use: Yes     Frequency: Monthly or less     Drinks per session: 1 or 2     Comment: occasionally     Drug use: No    Sexual activity: Not on file   Lifestyle    Physical activity     Days per week: Not on file     Minutes per session: Not on file    Stress: Not on file   Relationships    Social connections     Talks on phone: Not on file     Gets together: Not on file     Attends Restorationism service: Not on file     Active member of club or organization: Not on file     Attends meetings of clubs or organizations: Not on file     Relationship status: Not on file    Intimate partner violence     Fear of current or ex partner: Not on file     Emotionally abused: Not on file     Physically abused: Not on file     Forced sexual activity: Not on file   Other Topics Concern    Not on file   Social History Narrative    Not on file         I have reviewed the past medical, surgical, social and family history, medications and allergies as documented in the EMR  Review of systems: ROS is negative other than that noted in the HPI  Constitutional: Negative for fatigue and fever  HENT: Negative for sore throat  Respiratory: Negative for shortness of breath  Cardiovascular: Negative for chest pain  Gastrointestinal: Negative for abdominal pain  Endocrine: Negative for cold intolerance and heat intolerance  Genitourinary: Negative for flank pain  Musculoskeletal: Negative for back pain  Skin: Negative for rash  Allergic/Immunologic: Negative for immunocompromised state  Neurological: Negative for dizziness  Psychiatric/Behavioral: Negative for agitation  Physical Exam:    Blood pressure 118/82, temperature 98 7 °F (37 1 °C), height 6' (1 829 m), weight 89 8 kg (198 lb)      General/Constitutional: NAD, well developed, well nourished  HENT: Normocephalic, atraumatic  CV: Intact distal pulses, regular rate  Resp: No respiratory distress or labored breathing  Lymphatic: No lymphadenopathy palpated  Neuro: Alert and Oriented x 3, no focal deficits  Psych: Normal mood, normal affect, normal judgement, normal behavior  Skin: Warm, dry, no rashes, no erythema      Shoulder focused exam:       RIGHT LEFT    Scapula Atrophy Negative Negative     Winging Negative Negative     Protraction Negative Negative    Rotator cuff SS 5/5 5/5     IS 5/5 5/5     SubS 5/5 5/5    ROM FF 90 active and passive    115 active & passive     ER0 30 active and passive 20 active and passive     IRb Iliac crest    Iliac crest    TTP: AC Negative Negative     Biceps Negative Negative     Coracoid Negative Negative    Special Tests: O'Briens Negative Negative     Polo-shear Negative Negative     Cross body Adduction Negative Negative     Speeds  Negative Negative     Henrique's Negative Negative     Whipple Negative Negative       Neer Negative Negative     Carr Negative Negative    Instability: Apprehension & relocation not tested not tested     Load & shift not tested not tested    Other: Crank Negative Negative             Large joint arthrocentesis: R glenohumeral  Universal Protocol:  Consent given by: patient and parent  Time out: Immediately prior to procedure a "time out" was called to verify the correct patient, procedure, equipment, support staff and site/side marked as required  Patient understanding: patient states understanding of the procedure being performed  Patient consent: the patient's understanding of the procedure matches consent given  Procedure consent: procedure consent matches procedure scheduled  Site marked: the operative site was marked  Radiology Images displayed and confirmed   If images not available, report reviewed: imaging studies available    Supporting Documentation  Indications: pain and joint swelling   Procedure Details  Location: shoulder - R glenohumeral  Needle size: 22 G  Ultrasound guidance: no  Approach: posterolateral  Medications administered: 3 mL lidocaine 1 %; 1 mL methylPREDNISolone acetate 40 mg/mL    Patient tolerance: patient tolerated the procedure well with no immediate complications  Dressing:  Sterile dressing applied            UE NV Exam: +2 Radial pulses bilaterally  Sensation intact to light touch C5-T1 bilaterally, Radial/median/ulnar nerve motor intact      Bilateral elbow, wrist, and and forearm ROM full, painless with passive ROM, no ttp or crepitance throughout extremities below shoulder joint    Cervical ROM is full without pain, numbness or tingling      Shoulder Imaging    X-rays of the right shoulder were reviewed, which demonstrate Mild AC and GH joint osteoarthritis  I have reviewed the radiology report and agree with their impression        Scribe Attestation    I,:  Ky Washington am acting as a scribe while in the presence of the attending physician :       I,:  Darnell Castanon, DO personally performed the services described in this documentation    as scribed in my presence :

## 2021-03-12 NOTE — TELEPHONE ENCOUNTER
Pt states he has surg on the 26th for stimulator  He is due for Humira tomorrow then on the 27th; cannot take for 14 days after surgery; needs direction  # [phone]

## 2021-03-12 NOTE — TELEPHONE ENCOUNTER
Patient called questioning if he can take his Humira shot today  Returned call to patient and explained per DESTINY MCKEON note patient is able to take his shot up to 7 days prior to surgery but must wait 14 days after surgery      Patient appreciative of call back

## 2021-03-15 NOTE — TELEPHONE ENCOUNTER
Dr Inés Jarrett,  Patient was told to contact our office regarding his Humira dosing  He is scheduled for a Procedure 3/26/21 and it is advised that he HOLD his HUMIRA  For 7 days pre-op and for 14 days post-op  Procedure Scheduled: INSERTION THORACIC DORSAL COLUMN SPINAL CORD STIMULATOR PERCUTANEOUS W IMPLANTABLE PULSE GENERATOR, RIGHT (Right Spine Thoracic)      I spoke with Daria Alvarez to confirm that he did administer his Humira this past Saturday 3/13/21  That is technically 13 days prior to procedure  DO you have any further recommendations in regards to the hold?  He will be able to administer his Humira either Friday April 9th or Saturday April 10th (if he wants to stick to a Saturday schedule)

## 2021-03-16 ENCOUNTER — OFFICE VISIT (OUTPATIENT)
Dept: FAMILY MEDICINE CLINIC | Facility: CLINIC | Age: 51
End: 2021-03-16
Payer: COMMERCIAL

## 2021-03-16 VITALS
TEMPERATURE: 99.1 F | SYSTOLIC BLOOD PRESSURE: 132 MMHG | OXYGEN SATURATION: 93 % | HEART RATE: 88 BPM | DIASTOLIC BLOOD PRESSURE: 84 MMHG | BODY MASS INDEX: 26.71 KG/M2 | HEIGHT: 72 IN | WEIGHT: 197.2 LBS

## 2021-03-16 DIAGNOSIS — Z01.818 PRE-OP EXAMINATION: ICD-10-CM

## 2021-03-16 DIAGNOSIS — M54.16 LUMBAR RADICULOPATHY: ICD-10-CM

## 2021-03-16 DIAGNOSIS — K50.018 TERMINAL ILEITIS WITH OTHER COMPLICATION (HCC): ICD-10-CM

## 2021-03-16 DIAGNOSIS — M51.37 DEGENERATIVE DISC DISEASE AT L5-S1 LEVEL: Primary | ICD-10-CM

## 2021-03-16 DIAGNOSIS — F17.200 SMOKER: ICD-10-CM

## 2021-03-16 PROCEDURE — 99214 OFFICE O/P EST MOD 30 MIN: CPT | Performed by: FAMILY MEDICINE

## 2021-03-16 PROCEDURE — 3008F BODY MASS INDEX DOCD: CPT | Performed by: FAMILY MEDICINE

## 2021-03-16 PROCEDURE — 3725F SCREEN DEPRESSION PERFORMED: CPT | Performed by: FAMILY MEDICINE

## 2021-03-16 PROCEDURE — 1036F TOBACCO NON-USER: CPT | Performed by: FAMILY MEDICINE

## 2021-03-16 RX ORDER — NICOTINE 21 MG/24HR
1 PATCH, TRANSDERMAL 24 HOURS TRANSDERMAL EVERY 24 HOURS
Qty: 28 PATCH | Refills: 5 | Status: SHIPPED | OUTPATIENT
Start: 2021-03-16 | End: 2021-08-02

## 2021-03-16 NOTE — PROGRESS NOTES
[facility]        NAME: Guillermina Johnson is a 52 y o  male  : 1970    MRN: [id number]  DATE: 2021  TIME: 1:41 PM    Assessment and Plan   Degenerative disc disease at L5-S1 level [M51 36]  1  Degenerative disc disease at L5-S1 level     2  Pre-op examination     3  Lumbar radiculopathy     4  Terminal ileitis with other complication [facility])           Patient Instructions     Patient Instructions   Clear for stated procedure--labs NL          Chief Complaint     Chief Complaint   Patient presents with    Pre-op Exam     back         History of Present Illness       Pre-op lumbar stimulator      Review of Systems   Review of Systems   Constitutional: Negative for fatigue, fever and unexpected weight change  HENT: Negative for congestion, sinus pain and sore throat  Eyes: Negative for visual disturbance  Respiratory: Negative for shortness of breath and wheezing  Cardiovascular: Negative for chest pain and palpitations  Gastrointestinal: Negative for abdominal pain, nausea and vomiting  Musculoskeletal: Negative  Negative for arthralgias and myalgias  Neurological: Negative for syncope, weakness and numbness  Psychiatric/Behavioral: Negative  Negative for confusion, dysphoric mood and suicidal ideas  Current Medications       Current Outpatient Medications:     Adalimumab (Humira Pen) 40 MG/0 4ML PNKT, Citrate Free Maintenance Dose: Inject (1) 40mg/0 4ml under the skin every other week, Disp: 2 each, Rfl: 12    baclofen 10 mg tablet, Take 1-2 PO HS PRN for pain/spasms  , Disp: 45 tablet, Rfl: 1    cholecalciferol (VITAMIN D3) 1,000 units tablet, Take 2 tablets (2,000 Units total) by mouth daily, Disp: 60 tablet, Rfl: 2    Current Allergies     Allergies as of 2021    (No Known Allergies)            The following portions of the patient's history were reviewed and updated as appropriate: allergies, current medications, past family history, past medical history, past social history, past surgical history and problem list      Past Medical History:   Diagnosis Date    Back pain     Colon polyp     GERD (gastroesophageal reflux disease)     Perianal fistula     Terminal ileitis ([address] )        Past Surgical History:   Procedure Laterality Date    ABSCESS DRAINAGE      BACK SURGERY  2019    COLONOSCOPY      HERNIA REPAIR         Family History   Problem Relation Age of Onset    Colon cancer Neg Hx     Colon polyps Neg Hx     Inflammatory bowel disease Neg Hx          Medications have been verified          Objective   /84   Pulse 88   Temp 99 1 °F (37 3 °C) (Temporal)   Ht 6' (1 829 m)   Wt 89 4 kg (197 lb 3 2 oz)   SpO2 93%   BMI 26 75 kg/m²        Physical Exam     Physical Exam

## 2021-03-23 ENCOUNTER — ANESTHESIA EVENT (OUTPATIENT)
Dept: PERIOP | Facility: HOSPITAL | Age: 51
End: 2021-03-23
Payer: COMMERCIAL

## 2021-03-25 ENCOUNTER — DOCUMENTATION (OUTPATIENT)
Dept: NEUROSURGERY | Facility: CLINIC | Age: 51
End: 2021-03-25

## 2021-03-25 ENCOUNTER — TELEPHONE (OUTPATIENT)
Dept: NEUROSURGERY | Facility: CLINIC | Age: 51
End: 2021-03-25

## 2021-03-25 DIAGNOSIS — Z79.2 PROPHYLACTIC ANTIBIOTIC: ICD-10-CM

## 2021-03-25 DIAGNOSIS — G89.18 POSTOPERATIVE PAIN: ICD-10-CM

## 2021-03-25 DIAGNOSIS — Z98.890 POSTOPERATIVE STATE: Primary | ICD-10-CM

## 2021-03-25 RX ORDER — CEPHALEXIN 500 MG/1
500 CAPSULE ORAL EVERY 6 HOURS SCHEDULED
Qty: 12 CAPSULE | Refills: 0 | Status: SHIPPED | OUTPATIENT
Start: 2021-03-25 | End: 2021-03-28

## 2021-03-25 RX ORDER — OXYCODONE HYDROCHLORIDE 5 MG/1
5 TABLET ORAL EVERY 4 HOURS PRN
Qty: 30 TABLET | Refills: 0 | Status: SHIPPED | OUTPATIENT
Start: 2021-03-25 | End: 2021-03-29 | Stop reason: SDUPTHER

## 2021-03-25 NOTE — TELEPHONE ENCOUNTER
Pre operative call day prior surgery scheduled in the AM w/ Dr Hector Coronel, RIGHT (Right Spine Thoracic)    Discussion/Review       Allergies ---Reviewed      Hold medications --- Reviewed  Humira continue hold for 14 days after sx     NPO after MN, night prior surgery ---Reviewed as instructed by ASU nurse     Medication (s) instructed by healthcare provider to take the morning of surgery w/ sip of water 4 OZ discussed: as instructed by ASU nurse     Post operative antibiotic electronic transmission to pharmacy:  Cephalexin     PDMP site reviewed accessed and reviewed scheduled drug list printed and scanned into record     Pain management script: oxycodone add acetaminophen 500 mg po on every 4 or 2 every 8 hours MDD 3000 mg   Has order or Baclofen --may need to take 10 mg po every 8 hours prn spasm     Pre- operative shower protocol reviewed; Clarify instructions as per protocol, third chlorhexidine shower tonight before surgery, then use KEVIN wipes as per packaging instructions, Use a clean towel and wash cloth starting tonight and continue nightly until seen 2 weeks post-operative visit for incision check removal  Change bed linens tonight and continue at least 1-2 times weekly  Informed will receive a telephone call tonight from a hospital representative with time to report on surgery day:  0615    Informed will receive a f/u call within in 24 -48 hours post-op to assess recovery reinforce instructions, and to answer any questions      Monday    Follow-up appointments reviewed: documented in discharge paper work 2 week      6 week     4/12/2021 8:00 AM (Arrive by 7:45 AM) Nita Kapadia, [facility]     5/10/2021 8:00 AM (Arrive by 7:45 AM) [facility]       Patient verbalized understanding information provided /discussed         Call product rep 1-2 days weeks prior postop appointment with reminder of your appointment

## 2021-03-26 ENCOUNTER — APPOINTMENT (OUTPATIENT)
Dept: RADIOLOGY | Facility: HOSPITAL | Age: 51
End: 2021-03-26
Payer: COMMERCIAL

## 2021-03-26 ENCOUNTER — HOSPITAL ENCOUNTER (OUTPATIENT)
Facility: HOSPITAL | Age: 51
Setting detail: OUTPATIENT SURGERY
Discharge: HOME | End: 2021-03-26
Attending: NEUROLOGICAL SURGERY | Admitting: NEUROLOGICAL SURGERY
Payer: COMMERCIAL

## 2021-03-26 ENCOUNTER — ANESTHESIA (OUTPATIENT)
Dept: PERIOP | Facility: HOSPITAL | Age: 51
End: 2021-03-26
Payer: COMMERCIAL

## 2021-03-26 VITALS
HEART RATE: 70 BPM | OXYGEN SATURATION: 95 % | RESPIRATION RATE: 16 BRPM | TEMPERATURE: 97.6 F | DIASTOLIC BLOOD PRESSURE: 74 MMHG | SYSTOLIC BLOOD PRESSURE: 111 MMHG

## 2021-03-26 PROCEDURE — C1778 LEAD, NEUROSTIMULATOR: HCPCS | Performed by: NEUROLOGICAL SURGERY

## 2021-03-26 PROCEDURE — 63685 INS/RPLC SPI NPG/RCVR POCKET: CPT | Performed by: NEUROLOGICAL SURGERY

## 2021-03-26 PROCEDURE — 95972 ALYS CPLX SP/PN NPGT W/PRGRM: CPT | Performed by: NEUROLOGICAL SURGERY

## 2021-03-26 PROCEDURE — C1787 PATIENT PROGR, NEUROSTIM: HCPCS | Performed by: NEUROLOGICAL SURGERY

## 2021-03-26 PROCEDURE — C1767 GENERATOR, NEURO NON-RECHARG: HCPCS | Performed by: NEUROLOGICAL SURGERY

## 2021-03-26 PROCEDURE — 63650 IMPLANT NEUROELECTRODES: CPT | Performed by: NEUROLOGICAL SURGERY

## 2021-03-26 PROCEDURE — 72070 X-RAY EXAM THORAC SPINE 2VWS: CPT

## 2021-03-26 PROCEDURE — 63685 INS/RPLC SPI NPG/RCVR POCKET: CPT | Performed by: PHYSICIAN ASSISTANT

## 2021-03-26 DEVICE — IMPLANTABLE DEVICE
Type: IMPLANTABLE DEVICE | Site: SPINE THORACIC | Status: FUNCTIONAL
Brand: SWIFT-LOCK™

## 2021-03-26 DEVICE — LEAD KIT, 60CM LENGTH
Type: IMPLANTABLE DEVICE | Site: SPINE THORACIC | Status: FUNCTIONAL
Brand: OCTRODE™

## 2021-03-26 DEVICE — IMPLANTABLE PULSE GENERATOR
Type: IMPLANTABLE DEVICE | Site: SPINE THORACIC | Status: FUNCTIONAL
Brand: PROCLAIM™

## 2021-03-26 RX ORDER — OXYCODONE HYDROCHLORIDE 5 MG/1
5 TABLET ORAL EVERY 4 HOURS PRN
Status: COMPLETED | OUTPATIENT
Start: 2021-03-26 | End: 2021-03-26

## 2021-03-26 RX ORDER — FENTANYL CITRATE 50 UG/ML
INJECTION, SOLUTION INTRAMUSCULAR; INTRAVENOUS AS NEEDED
Status: DISCONTINUED | OUTPATIENT
Start: 2021-03-26 | End: 2021-03-26

## 2021-03-26 RX ORDER — HYDROMORPHONE HCL/PF 1 MG/ML
0.5 SYRINGE (ML) INJECTION
Status: DISCONTINUED | OUTPATIENT
Start: 2021-03-26 | End: 2021-03-26 | Stop reason: HOSPADM

## 2021-03-26 RX ORDER — CEFAZOLIN SODIUM 2 G/50ML
2000 SOLUTION INTRAVENOUS ONCE
Status: COMPLETED | OUTPATIENT
Start: 2021-03-26 | End: 2021-03-26

## 2021-03-26 RX ORDER — LIDOCAINE HYDROCHLORIDE AND EPINEPHRINE 10; 10 MG/ML; UG/ML
INJECTION, SOLUTION INFILTRATION; PERINEURAL AS NEEDED
Status: DISCONTINUED | OUTPATIENT
Start: 2021-03-26 | End: 2021-03-26 | Stop reason: HOSPADM

## 2021-03-26 RX ORDER — DEXAMETHASONE SODIUM PHOSPHATE 10 MG/ML
INJECTION, SOLUTION INTRAMUSCULAR; INTRAVENOUS AS NEEDED
Status: DISCONTINUED | OUTPATIENT
Start: 2021-03-26 | End: 2021-03-26

## 2021-03-26 RX ORDER — PROPOFOL 10 MG/ML
INJECTION, EMULSION INTRAVENOUS AS NEEDED
Status: DISCONTINUED | OUTPATIENT
Start: 2021-03-26 | End: 2021-03-26

## 2021-03-26 RX ORDER — CHLORHEXIDINE GLUCONATE 0.12 MG/ML
15 RINSE ORAL ONCE
Status: COMPLETED | OUTPATIENT
Start: 2021-03-26 | End: 2021-03-26

## 2021-03-26 RX ORDER — SODIUM CHLORIDE, SODIUM LACTATE, POTASSIUM CHLORIDE, CALCIUM CHLORIDE 600; 310; 30; 20 MG/100ML; MG/100ML; MG/100ML; MG/100ML
INJECTION, SOLUTION INTRAVENOUS CONTINUOUS PRN
Status: DISCONTINUED | OUTPATIENT
Start: 2021-03-26 | End: 2021-03-26

## 2021-03-26 RX ORDER — ONDANSETRON 2 MG/ML
INJECTION INTRAMUSCULAR; INTRAVENOUS AS NEEDED
Status: DISCONTINUED | OUTPATIENT
Start: 2021-03-26 | End: 2021-03-26

## 2021-03-26 RX ORDER — MIDAZOLAM HYDROCHLORIDE 2 MG/2ML
INJECTION, SOLUTION INTRAMUSCULAR; INTRAVENOUS AS NEEDED
Status: DISCONTINUED | OUTPATIENT
Start: 2021-03-26 | End: 2021-03-26

## 2021-03-26 RX ORDER — GLYCOPYRROLATE 0.2 MG/ML
INJECTION INTRAMUSCULAR; INTRAVENOUS AS NEEDED
Status: DISCONTINUED | OUTPATIENT
Start: 2021-03-26 | End: 2021-03-26

## 2021-03-26 RX ORDER — FENTANYL CITRATE/PF 50 MCG/ML
25 SYRINGE (ML) INJECTION
Status: DISCONTINUED | OUTPATIENT
Start: 2021-03-26 | End: 2021-03-26 | Stop reason: HOSPADM

## 2021-03-26 RX ORDER — BUPIVACAINE HYDROCHLORIDE 2.5 MG/ML
INJECTION, SOLUTION EPIDURAL; INFILTRATION; INTRACAUDAL AS NEEDED
Status: DISCONTINUED | OUTPATIENT
Start: 2021-03-26 | End: 2021-03-26 | Stop reason: HOSPADM

## 2021-03-26 RX ORDER — PROPOFOL 10 MG/ML
INJECTION, EMULSION INTRAVENOUS CONTINUOUS PRN
Status: DISCONTINUED | OUTPATIENT
Start: 2021-03-26 | End: 2021-03-26

## 2021-03-26 RX ORDER — ONDANSETRON 2 MG/ML
4 INJECTION INTRAMUSCULAR; INTRAVENOUS ONCE
Status: DISCONTINUED | OUTPATIENT
Start: 2021-03-26 | End: 2021-03-26 | Stop reason: HOSPADM

## 2021-03-26 RX ORDER — LABETALOL 20 MG/4 ML (5 MG/ML) INTRAVENOUS SYRINGE
AS NEEDED
Status: DISCONTINUED | OUTPATIENT
Start: 2021-03-26 | End: 2021-03-26

## 2021-03-26 RX ADMIN — LABETALOL 20 MG/4 ML (5 MG/ML) INTRAVENOUS SYRINGE 5 MG: at 08:28

## 2021-03-26 RX ADMIN — FENTANYL CITRATE 100 MCG: 50 INJECTION, SOLUTION INTRAMUSCULAR; INTRAVENOUS at 08:06

## 2021-03-26 RX ADMIN — CHLORHEXIDINE GLUCONATE 0.12% ORAL RINSE 15 ML: 1.2 LIQUID ORAL at 06:36

## 2021-03-26 RX ADMIN — PROPOFOL 200 MG: 10 INJECTION, EMULSION INTRAVENOUS at 08:06

## 2021-03-26 RX ADMIN — FENTANYL CITRATE 50 MCG: 50 INJECTION, SOLUTION INTRAMUSCULAR; INTRAVENOUS at 08:24

## 2021-03-26 RX ADMIN — GLYCOPYRROLATE 0.2 MG: 0.2 INJECTION, SOLUTION INTRAMUSCULAR; INTRAVENOUS at 08:01

## 2021-03-26 RX ADMIN — PROPOFOL 100 MCG/KG/MIN: 10 INJECTION, EMULSION INTRAVENOUS at 08:11

## 2021-03-26 RX ADMIN — CEFAZOLIN SODIUM 2000 MG: 2 SOLUTION INTRAVENOUS at 08:03

## 2021-03-26 RX ADMIN — FENTANYL CITRATE 25 MCG: 50 INJECTION, SOLUTION INTRAMUSCULAR; INTRAVENOUS at 09:39

## 2021-03-26 RX ADMIN — FENTANYL CITRATE 25 MCG: 50 INJECTION, SOLUTION INTRAMUSCULAR; INTRAVENOUS at 09:45

## 2021-03-26 RX ADMIN — LABETALOL 20 MG/4 ML (5 MG/ML) INTRAVENOUS SYRINGE 5 MG: at 08:10

## 2021-03-26 RX ADMIN — OXYCODONE HYDROCHLORIDE 5 MG: 5 TABLET ORAL at 10:16

## 2021-03-26 RX ADMIN — DEXAMETHASONE SODIUM PHOSPHATE 8 MG: 10 INJECTION, SOLUTION INTRAMUSCULAR; INTRAVENOUS at 08:40

## 2021-03-26 RX ADMIN — MIDAZOLAM 2 MG: 1 INJECTION INTRAMUSCULAR; INTRAVENOUS at 08:01

## 2021-03-26 RX ADMIN — FENTANYL CITRATE 50 MCG: 50 INJECTION, SOLUTION INTRAMUSCULAR; INTRAVENOUS at 08:13

## 2021-03-26 RX ADMIN — SODIUM CHLORIDE, SODIUM LACTATE, POTASSIUM CHLORIDE, AND CALCIUM CHLORIDE: .6; .31; .03; .02 INJECTION, SOLUTION INTRAVENOUS at 06:51

## 2021-03-26 RX ADMIN — ONDANSETRON 4 MG: 2 INJECTION INTRAMUSCULAR; INTRAVENOUS at 09:00

## 2021-03-26 NOTE — ANESTHESIA PREPROCEDURE EVALUATION
Procedure:  INSERTION THORACIC DORSAL COLUMN SPINAL CORD STIMULATOR PERCUTANEOUS W IMPLANTABLE PULSE GENERATOR, RIGHT (Right Spine Thoracic)    Relevant Problems   GI/HEPATIC   (+) GERD (gastroesophageal reflux disease)      MUSCULOSKELETAL   (+) Arthritis of right shoulder region   (+) Chronic bilateral low back pain with bilateral sciatica   (+) Degenerative disc disease at L5-S1 level   (+) Lumbar spondylosis   (+) Myofascial pain syndrome      NEURO/PSYCH   (+) Chronic pain syndrome   (+) Chronic right shoulder pain   (+) History of colon polyps   (+) Myofascial pain syndrome   (+) Numbness and tingling of both lower extremities      PULMONARY   (+) Smoking        Physical Exam    Airway    Mallampati score: II  TM Distance: >3 FB       Dental       Cardiovascular  Cardiovascular exam normal    Pulmonary  Pulmonary exam normal     Other Findings        Anesthesia Plan  ASA Score- 2     Anesthesia Type- general with ASA Monitors  Additional Monitors:   Airway Plan: ETT  Plan Factors-    Chart reviewed  Patient summary reviewed  Patient is not a current smoker  Induction- intravenous  Postoperative Plan- Plan for postoperative opioid use  Informed Consent- Anesthetic plan and risks discussed with patient  I personally reviewed this patient with the CRNA  Discussed and agreed on the Anesthesia Plan with the CRNA  Fabrice Argueta

## 2021-03-26 NOTE — ANESTHESIA POSTPROCEDURE EVALUATION
Post-Op Assessment Note    CV Status:  Stable  Pain Score: 0    Pain management: adequate     Mental Status:  Alert and awake   Hydration Status:  Euvolemic   PONV Controlled:  Controlled   Airway Patency:  Patent      Post Op Vitals Reviewed: Yes      Staff: with CRNAs, Anesthesiologist         No complications documented      BP (P) 117/80 (03/26/21 0919)    Temp (P) 97 7 °F (36 5 °C) (03/26/21 0919)    Pulse (P) 83 (03/26/21 0919)   Resp (P) 18 (03/26/21 0919)    SpO2 (P) 98 % (03/26/21 0919)

## 2021-03-27 ENCOUNTER — TELEPHONE (OUTPATIENT)
Dept: OTHER | Facility: OTHER | Age: 51
End: 2021-03-27

## 2021-03-27 ENCOUNTER — TELEPHONE (OUTPATIENT)
Dept: OTHER | Facility: HOSPITAL | Age: 51
End: 2021-03-27

## 2021-03-28 NOTE — TELEPHONE ENCOUNTER
Received Tiger text at 8:39 p m  patient just had surgery yesterday and his horizontal incision is red, tender, bleeding and patient is in a lot of pain  Return call and spoke with wife and patient  Patient's wife was very concerned about his horizontal incision  Wife sent me a picture of his incision it appears clean, dry, intact with ecchymosis  Wife does report it was bleeding a small amount  Incision is tender to touch  Patient is a lot of pain  He denies any radiation of his back pain but states it is sharp/throbbing in nature and comes and goes back and forth interval increase and decrease  Patient has no new numbness or tingling but reports chronic neuropathy  He denies any weakness or bowel or bladder issues  Patient states he is taking oxycodone, Tyleno, and baclofen for his pain  As well as cephalexin  Pain was manageable until 2-3 hours ago  Patient states he takes baclofen at night  Spoke with patient he is able to take baclofen 10 mg every 8 hours as needed for muscle spasms  He denies any headaches, dizziness, blurry vision, chest pain, shortness of breath, abdominal pain, nausea, vomiting, diarrhea, no problems with bowel or bladder, no new weakness or numbness/ tingling  Patient made aware if he develops severe worsening back pain, leg pain, paresthesias, bowel or bladder issues to seek care sooner  Patient made aware Shameka will be in contact with you on Monday to see how you are doing  Patient made aware to contact Neurosurgery with any further questions or concerns

## 2021-03-28 NOTE — TELEPHONE ENCOUNTER
Pt had surgery yesterday 3/26/2021 and his horizontal incision is now red, tender, bleeding and the pt is in extreme pain  Told pt to call back in 20-30 mins if on call provider does not call back

## 2021-03-29 ENCOUNTER — TELEPHONE (OUTPATIENT)
Dept: NEUROSURGERY | Facility: CLINIC | Age: 51
End: 2021-03-29

## 2021-03-29 ENCOUNTER — TELEMEDICINE (OUTPATIENT)
Dept: NEUROSURGERY | Facility: CLINIC | Age: 51
End: 2021-03-29

## 2021-03-29 DIAGNOSIS — Z98.890 POSTOPERATIVE STATE: Primary | ICD-10-CM

## 2021-03-29 DIAGNOSIS — K59.03 DRUG-INDUCED CONSTIPATION: ICD-10-CM

## 2021-03-29 DIAGNOSIS — Z98.890 STATUS POST SURGERY: Primary | ICD-10-CM

## 2021-03-29 DIAGNOSIS — M62.830 SPASM OF THORACIC BACK MUSCLE: ICD-10-CM

## 2021-03-29 DIAGNOSIS — G89.18 POSTOPERATIVE PAIN: ICD-10-CM

## 2021-03-29 PROCEDURE — 99024 POSTOP FOLLOW-UP VISIT: CPT

## 2021-03-29 RX ORDER — DOCUSATE SODIUM 250 MG
250 CAPSULE ORAL 2 TIMES DAILY
Qty: 60 CAPSULE | Refills: 2 | Status: SHIPPED | OUTPATIENT
Start: 2021-03-29 | End: 2021-06-08

## 2021-03-29 RX ORDER — TIZANIDINE 4 MG/1
6 TABLET ORAL EVERY 8 HOURS PRN
Qty: 60 TABLET | Refills: 2 | Status: SHIPPED | OUTPATIENT
Start: 2021-03-29 | End: 2021-05-26

## 2021-03-29 RX ORDER — SENNA PLUS 8.6 MG/1
2 TABLET ORAL 2 TIMES DAILY
Qty: 60 TABLET | Refills: 2 | Status: SHIPPED | OUTPATIENT
Start: 2021-03-29 | End: 2021-06-08

## 2021-03-29 RX ORDER — OXYCODONE HYDROCHLORIDE 5 MG/1
TABLET ORAL
Qty: 30 TABLET | Refills: 0 | Status: SHIPPED | OUTPATIENT
Start: 2021-03-29 | End: 2021-04-12 | Stop reason: SDUPTHER

## 2021-03-29 NOTE — TELEPHONE ENCOUNTER
Reports he was taking baclofen 2 10 mg tablets every 6 hour with no relief of thoracic muscle spasms wrapping around into his chest    Incorrect use explained risk ---  Had severe muscle spasms described Saturday while playing X box  Today he is out riding in care with a friend  Taking oxycodone 2 tabs every 6 hours adding 1000 mg of acetaminophen with each dose total of 4000 mg per day of acetaminophen ---explained MDD is 300 mg 6 tablets , can medicate with one tylenol every 4  Hours or 2 every 8 hours      Severity of muscle spasm and postoperative pain in operative sites remain 10/10   Has a known history of muscle spasm preoperatively  Bowels finally moved today  PLAN;  · Dc baclofen order tizanidine thoracic muscle spasm  · Reorder opiate every 6 hours  Add acetaminophen 2 tabs with 3 doses per day MDD 3000 mg  · Bowel hygiene program reviewed in detail  Senokot and docusate ordered    · reorder opiate

## 2021-03-29 NOTE — PROGRESS NOTES
Virtual Brief Visit    Assessment/Plan:    Problem List Items Addressed This Visit     None      Visit Diagnoses     Status post surgery    -  Primary                Reason for visit is   Chief Complaint   Patient presents with    Virtual Brief Visit        Encounter provider Ly Shabazz RN    Provider located at [address] [place] [address] [address] [place] [address]    Recent Visits  No visits were found meeting these conditions  Showing recent visits within past 7 days and meeting all other requirements     Today's Visits  Date Type Provider Dept   03/29/21 Telemedicine Ly Shabazz RN [facility] [place]   Showing today's visits and meeting all other requirements     Future Appointments  No visits were found meeting these conditions  Showing future appointments within next 150 days and meeting all other requirements        After connecting through telephone, the patient was identified by name and date of birth  Rhezan Fret was informed that this is a telemedicine visit and that the visit is being conducted through telephone  My office door was closed  No one else was in the room  He acknowledged consent and understanding of privacy and security of the platform  The patient has agreed to participate and understands he can discontinue the visit at any time  Patient is aware this is a billable service  Nick Morales is a 52 y o  male s/p: INSERTION THORACIC DORSAL COLUMN SPINAL CORD STIMULATOR PERCUTANEOUS W IMPLANTABLE PULSE GENERATOR, RIGHT  Spoke with patient to see how he is doing after surgery  Patient reports he is doing okay overall and denies any fevers  He does report that his wife noted some redness around the incisions but she sent a photo to Jono Samuels over the weekend who stated that it was healing nicely    Patient is able to ambulate around the house and complete ADLs with assistance secondary to pain  Educated the patient about the importance of preventing blood clots and provided measures how to prevent them  He said that he has needed to "double up on all of his medications" because the pain has been so bad  He said it started on Saturday and he was just playing x-box when it came on  Describes it as pain across his back but down into his right hip as well  Patient has moved his bowels since the surgery  Encouraged patient to take an over the counter stool softener, if he is taking narcotic pain medication  Encouraged fiber intake and fluids  Reviewed incision care with the patient  Advised that  he may take a shower and gently wash the surgical sites with soap and water  Use clean wash cloth, towels, and clothing  Do not submerge in water until cleared by the surgeon  Do not apply any creams, ointments, or lotions to the site  Patient is aware to call the office if any redness, swelling, drainage, dehiscence of incision, or fever >100 F occurs  Patient is aware to call the office if any concerns or questions may arise  Reminded patient of his upcoming appointments with the date/time/location  He is aware that [facility] will be in touch with him regarding his pain medication regimen  Patient was appreciative for the call  Past Medical History:   Diagnosis Date    Back pain     Colon polyp     GERD (gastroesophageal reflux disease)     Perianal fistula     Terminal ileitis ([address] )        Past Surgical History:   Procedure Laterality Date    ABSCESS DRAINAGE      BACK SURGERY  2019    COLONOSCOPY      HERNIA REPAIR      umbilical hernia       Current Outpatient Medications   Medication Sig Dispense Refill    Adalimumab (Humira Pen) 40 MG/0 4ML PNKT Citrate Free Maintenance Dose: Inject (1) 40mg/0 4ml under the skin every other week 2 each 12    baclofen 10 mg tablet Take 1-2 PO HS PRN for pain/spasms  (Patient taking differently: Take 1-2 PO HS PRN for pain/spasms   Takes at bedtime) 45 tablet 1    cholecalciferol (VITAMIN D3) 1,000 units tablet Take 2 tablets (2,000 Units total) by mouth daily 60 tablet 2    nicotine (NICODERM CQ) 14 mg/24hr TD 24 hr patch Place 1 patch on the skin every 24 hours 28 patch 5    oxyCODONE (ROXICODONE) 5 mg immediate release tablet Take 1 tablet (5 mg total) by mouth every 4 (four) hours as needed for moderate pain or 2 tablets every 6 hours as needed severe pain, start 3/26 after surgeryMax Daily Amount: 30 mg 30 tablet 0     No current facility-administered medications for this visit  No Known Allergies    Review of Systems    There were no vitals filed for this visit  VIRTUAL VISIT DISCLAIMER    Yusef Christine acknowledges that he has consented to an online visit or consultation  He understands that the online visit is based solely on information provided by him, and that, in the absence of a face-to-face physical evaluation by the physician, the diagnosis he receives is both limited and provisional in terms of accuracy and completeness  This is not intended to replace a full medical face-to-face evaluation by the physician  Yusef Crhistine understands and accepts these terms

## 2021-03-29 NOTE — Clinical Note
I am sure you're already aware from Dinora Renner, but wanted to let you know that I spoke with patient as well

## 2021-03-30 ENCOUNTER — TELEPHONE (OUTPATIENT)
Dept: GASTROENTEROLOGY | Facility: CLINIC | Age: 51
End: 2021-03-30

## 2021-03-30 NOTE — TELEPHONE ENCOUNTER
Lab orders cancelled for [facility] and re-entered for [facility]  Left a message for this pt that was changed and would recommend he have the labs drawn 2-3 weeks prior to his visit as one of the tests can take a longer amount of time

## 2021-03-30 NOTE — TELEPHONE ENCOUNTER
Pt called to question when his appt was  I did not see appt sched'd/transf'd call to   Pt sched'd appt 6/08; said he had lab work ordered to [address] but cannot go there/will either go to [facility] or  - prefers ; requests orders changed  He will go for labs 1st of June unless nurse thinks he should go sooner, in that case pls call Pt [phone]

## 2021-03-31 ENCOUNTER — TELEPHONE (OUTPATIENT)
Dept: FAMILY MEDICINE CLINIC | Facility: CLINIC | Age: 51
End: 2021-03-31

## 2021-03-31 DIAGNOSIS — M19.90 ARTHRITIS: Primary | ICD-10-CM

## 2021-03-31 NOTE — TELEPHONE ENCOUNTER
Dr Wilda Friedman, Rheum is requiring labs and xray on hands prior to visit to see if its ortho or rheum problem  [facility]    TW to review

## 2021-04-01 ENCOUNTER — HOSPITAL ENCOUNTER (OUTPATIENT)
Dept: RADIOLOGY | Facility: HOSPITAL | Age: 51
Discharge: HOME | End: 2021-04-01
Payer: COMMERCIAL

## 2021-04-01 ENCOUNTER — LAB (OUTPATIENT)
Dept: LAB | Facility: HOSPITAL | Age: 51
End: 2021-04-01
Payer: COMMERCIAL

## 2021-04-01 DIAGNOSIS — M19.90 ARTHRITIS: ICD-10-CM

## 2021-04-01 LAB
BASOPHILS # BLD AUTO: 0.01 THOUSANDS/ΜL (ref 0–0.1)
BASOPHILS NFR BLD AUTO: 0 % (ref 0–1)
CRP SERPL QL: 13.7 MG/L
EOSINOPHIL # BLD AUTO: 0.18 THOUSAND/ΜL (ref 0–0.61)
EOSINOPHIL NFR BLD AUTO: 2 % (ref 0–6)
ERYTHROCYTE [DISTWIDTH] IN BLOOD BY AUTOMATED COUNT: 12.3 % (ref 11.6–15.1)
HCT VFR BLD AUTO: 48.9 % (ref 36.5–49.3)
HGB BLD-MCNC: 16.4 G/DL (ref 12–17)
IMM GRANULOCYTES # BLD AUTO: 0.02 THOUSAND/UL (ref 0–0.2)
IMM GRANULOCYTES NFR BLD AUTO: 0 % (ref 0–2)
LYMPHOCYTES # BLD AUTO: 1.51 THOUSANDS/ΜL (ref 0.6–4.47)
LYMPHOCYTES NFR BLD AUTO: 18 % (ref 14–44)
MCH RBC QN AUTO: 33.4 PG (ref 26.8–34.3)
MCHC RBC AUTO-ENTMCNC: 33.5 G/DL (ref 31.4–37.4)
MCV RBC AUTO: 100 FL (ref 82–98)
MONOCYTES # BLD AUTO: 0.71 THOUSAND/ΜL (ref 0.17–1.22)
MONOCYTES NFR BLD AUTO: 9 % (ref 4–12)
NEUTROPHILS # BLD AUTO: 5.82 THOUSANDS/ΜL (ref 1.85–7.62)
NEUTS SEG NFR BLD AUTO: 71 % (ref 43–75)
NRBC BLD AUTO-RTO: 0 /100 WBCS
PLATELET # BLD AUTO: 205 THOUSANDS/UL (ref 149–390)
PMV BLD AUTO: 9.9 FL (ref 8.9–12.7)
RBC # BLD AUTO: 4.91 MILLION/UL (ref 3.88–5.62)
RHEUMATOID FACT SER QL LA: NEGATIVE
WBC # BLD AUTO: 8.25 THOUSAND/UL (ref 4.31–10.16)

## 2021-04-01 PROCEDURE — 36415 COLL VENOUS BLD VENIPUNCTURE: CPT

## 2021-04-01 PROCEDURE — 86430 RHEUMATOID FACTOR TEST QUAL: CPT

## 2021-04-01 PROCEDURE — 86038 ANTINUCLEAR ANTIBODIES: CPT

## 2021-04-01 PROCEDURE — 86200 CCP ANTIBODY: CPT

## 2021-04-01 PROCEDURE — 86140 C-REACTIVE PROTEIN: CPT

## 2021-04-01 PROCEDURE — 73130 X-RAY EXAM OF HAND: CPT

## 2021-04-01 PROCEDURE — 85025 COMPLETE CBC W/AUTO DIFF WBC: CPT

## 2021-04-02 ENCOUNTER — TELEPHONE (OUTPATIENT)
Dept: FAMILY MEDICINE CLINIC | Facility: CLINIC | Age: 51
End: 2021-04-02

## 2021-04-02 DIAGNOSIS — M19.90 ARTHRITIS: Primary | ICD-10-CM

## 2021-04-02 LAB — RYE IGE QN: NEGATIVE

## 2021-04-05 LAB — CCP AB SER IA-ACNC: 0.6

## 2021-04-06 ENCOUNTER — TELEPHONE (OUTPATIENT)
Dept: FAMILY MEDICINE CLINIC | Facility: CLINIC | Age: 51
End: 2021-04-06

## 2021-04-06 NOTE — TELEPHONE ENCOUNTER
Pc pt would like recent BW & xray results faxed to Dr Rosemary Sotelo for upcoming appt      Fax# [phone]      --Printed and faxed

## 2021-04-12 ENCOUNTER — TELEPHONE (OUTPATIENT)
Dept: NEUROSURGERY | Facility: CLINIC | Age: 51
End: 2021-04-12

## 2021-04-12 ENCOUNTER — OFFICE VISIT (OUTPATIENT)
Dept: NEUROSURGERY | Facility: CLINIC | Age: 51
End: 2021-04-12

## 2021-04-12 VITALS
HEIGHT: 72 IN | BODY MASS INDEX: 25.73 KG/M2 | SYSTOLIC BLOOD PRESSURE: 121 MMHG | DIASTOLIC BLOOD PRESSURE: 84 MMHG | TEMPERATURE: 97.3 F | WEIGHT: 190 LBS | RESPIRATION RATE: 16 BRPM | HEART RATE: 80 BPM

## 2021-04-12 DIAGNOSIS — Z98.890 POSTOPERATIVE STATE: ICD-10-CM

## 2021-04-12 DIAGNOSIS — G89.4 CHRONIC PAIN SYNDROME: Primary | ICD-10-CM

## 2021-04-12 DIAGNOSIS — Z96.89 STATUS POST INSERTION OF SPINAL CORD STIMULATOR: ICD-10-CM

## 2021-04-12 DIAGNOSIS — G89.18 POSTOPERATIVE PAIN AFTER SPINAL SURGERY: Primary | ICD-10-CM

## 2021-04-12 DIAGNOSIS — G89.18 POSTOPERATIVE PAIN: ICD-10-CM

## 2021-04-12 PROCEDURE — 3008F BODY MASS INDEX DOCD: CPT | Performed by: FAMILY MEDICINE

## 2021-04-12 PROCEDURE — 99024 POSTOP FOLLOW-UP VISIT: CPT | Performed by: NURSE PRACTITIONER

## 2021-04-12 RX ORDER — OXYCODONE HYDROCHLORIDE 5 MG/1
5 TABLET ORAL EVERY 8 HOURS PRN
Qty: 12 TABLET | Refills: 0 | Status: SHIPPED | OUTPATIENT
Start: 2021-04-13 | End: 2021-05-06 | Stop reason: ALTCHOICE

## 2021-04-12 NOTE — TELEPHONE ENCOUNTER
F/u patient has a prescription question request oxycodone refill need to talk with patient and explain opiate prescribing  Reports having upper back pain around incision when ever he is sitting down or standing up has sensation of something pushing out against his back trying to get out  8-9/10  Also complains of right buttock pain , has significant bruising and swelling assessed during visit earlier today  8-9/10  Complained ofg area during visit       Explained opioid prescribing policy  strict adherence not to prescribe opiate after 2 weeks postop  Explained will prescribe small amount low dosage , he can add acetaminophen 500 mg or 1000 with each dose  After this script need to transition to acetaminophen  He verbalized understanding and agreement with [rishabh

## 2021-04-12 NOTE — PATIENT INSTRUCTIONS
Instructions for continued care at 2 weeks postop thru 6 weeks postop  · At 2 weeks postop can resume restricted medications such as ASA, products containing ASA, NSAID, fish oils, and OTC products or as previously directed  · Resume driving 2 week postoperatively  · Continue to observe incisions for redness, drainage, swelling, dehiscence, increased pain, fever >/= 101, warmth to touch incision or skin surrounding, if occur call or report to office immediately for reassessment  · Explained monocryl reasonable sutures can take up to 90 days to reabsorb  · Do not remove glue adherent to incision lines covering scabs will eventually disappear  · Continue showers using clean towel and wash cloth with OTC antibacterial/liquid body wash, pat dry after showering continue protocol for an additional 2 weeks  · Do not apply lotions, creams, powder, or ointments to surgical incisions  · Activity as tolerated, no bending, lifting  greater than 10 lbs, turning, stretching, no pulling, pushing  ambulation as tolerated  · Refrain from strenuous activity, bending, and  twisting back  · No Immersion in water such as swimming, hot tub, or tub bath  · If  there is any significant change condition  call and/or return to the office immediately for reassessment  · Met with product rep for device programing and teaching, refer to attached session report  · Explained chronic pain relief may not be immediate after reprogramming can take  Up to 72 hours to feel effect   · Contact Rep immediately if there is any change in efficacy or concern over SCS system     · Contact office if unable to reach Rep or if the reps   intervention does  not resolve issue  ·

## 2021-04-12 NOTE — PROGRESS NOTES
Assessment/Plan:    Status post insertion of spinal cord stimulator  · As addressed in HPI  · 2 weeks status post surgery presents for after care Has lumbar and right flank incisions with Monocryl suture closure for skin, continues with overlying surgical glue  Incisions are c/d/i without erythema (lumbar), dehiscence, drainage or s/s of infection, healing well with approximated wound edges  Cut knots on lateral ends of Monocryl sutures, she tolerated procedure well  · Right flank incision is very edematous as well as surrounding tissue, remains tender to touch with resolving ecchymosis  · Has ABBOTT SCS with a non-rechargable generator , SCS activated on surgery day at a low setting, is not releiving pain  · Met with [facility] for programing and teaching , pending attachment of report settings  Frequency: 40 Hz R L  Pulse Width: 200 ?s  Perception: 6 40 mA  Comfort: 7 20 mA  Maximum: 8 00 mA  Step Size: 0 10 mA  Total Steps: 82  Impedance: - -    · Continues with muscle spasm low back bilateral and buttocks treating with tizanidine   · Restarted Humira this past Saturday      Plan  · Instructions for continued care documented in AVS and reviewed in detail     Chronic pain syndrome  · As addressed in HPI  · As addressed in status post spinal cord stimulator implant      PLAN  · As addressed in status post spinal cord stimulator implant         Subjective: 2 week postop visit     Patient ID: Jazmine Stokes is a 52 y o  male   HPI     Has a longstanding history of chronic pain affecting his low back and bilateral lower extremity,  Left greater than right  Has history of lumbar surgery now with postlaminectomy syndrome, has spondylosis , and lumbar degenerative disc disease  Has bilateral neuropathic pain  associated with burning and tingling in feet     He failed conservative treatment of injections and therapy  He underwent a spinal cord stimulator trial with DIPESH Nascimento, achieved about 45 % - 50 % efficacy  He consulted with Dr Doug Parikh 3/25/2021  after assessment and review decision made to proceeded with surgery  Surgery was scheduled for 3/26/2021 INSERTION THORACIC DORSAL COLUMN SPINAL CORD STIMULATOR PERCUTANEOUS W IMPLANTABLE PULSE GENERATOR, RIGHT (Right Spine Thoracic)  Impressions and treatment recommendations were discussed in detail with the patient who verbalized understanding, all questions were answered and contact information was given in the event future questions arise  REVIEW OF SYSTEMS  Review of Systems   Constitutional: Negative  HENT: Negative  Eyes: Negative  Respiratory: Negative  Cardiovascular: Negative  Gastrointestinal: Negative  Endocrine: Negative  Genitourinary: Negative  Musculoskeletal: Positive for back pain (LBP across hips, neurapothy in feet ) and gait problem  Hx of lumbar fusion   Skin: Positive for wound (surgical incisions)  Allergic/Immunologic: Negative  Neurological: Positive for numbness (bi/feet, left is worse  )  Hematological: Negative  Psychiatric/Behavioral: Positive for sleep disturbance           Meds/Allergies     Current Outpatient Medications   Medication Sig Dispense Refill    Adalimumab (Humira Pen) 40 MG/0 4ML PNKT Citrate Free Maintenance Dose: Inject (1) 40mg/0 4ml under the skin every other week 2 each 12    cholecalciferol (VITAMIN D3) 1,000 units tablet Take 2 tablets (2,000 Units total) by mouth daily 60 tablet 2    nicotine (NICODERM CQ) 14 mg/24hr TD 24 hr patch Place 1 patch on the skin every 24 hours 28 patch 5    tiZANidine (ZANAFLEX) 4 mg tablet Take 1 5 tablets (6 mg total) by mouth every 8 (eight) hours as needed for muscle spasms 60 tablet 2    docusate sodium (COLACE) 250 MG capsule Take 1 capsule (250 mg total) by mouth 2 (two) times a day (Patient not taking: Reported on 4/12/2021) 60 capsule 2    oxyCODONE (ROXICODONE) 5 mg immediate release tablet Take 1 tablet (5 mg total) by mouth every 8 (eight) hours as needed for moderate painMax Daily Amount: 15 mg 12 tablet 0    senna (SENOKOT) 8 6 MG tablet Take 2 tablets (17 2 mg total) by mouth 2 (two) times a day (Patient not taking: Reported on 2021) 60 tablet 2     No current facility-administered medications for this visit          No Known Allergies    PAST HISTORY    Past Medical History:   Diagnosis Date    Back pain     Colon polyp     [address]'s [address] [address]     GERD (gastroesophageal reflux disease)     Perianal fistula     Terminal ileitis ([facility] [address] )        Past Surgical History:   Procedure Laterality Date    ABSCESS DRAINAGE      BACK SURGERY      COLONOSCOPY      HERNIA REPAIR      umbilical hernia    MO PERCUT IMPLNT NEUROELECT,EPIDURAL Right 3/26/2021    Procedure: INSERTION THORACIC DORSAL COLUMN SPINAL CORD STIMULATOR PERCUTANEOUS W IMPLANTABLE PULSE GENERATOR, RIGHT;  Surgeon: Trevon Barahona MD;  Location:  MAIN OR;  Service: Neurosurgery       Social History     Tobacco Use    Smoking status: Former Smoker     Packs/day: 0 50     Types: Cigarettes     Start date:      Quit date: 2021     Years since quittin 1    Smokeless tobacco: Never Used    Tobacco comment: Currently is using nicotine patches - quit 21   Substance Use Topics    Alcohol use: Yes     Frequency: 2-3 times a week     Drinks per session: 1 or 2     Comment: "Couple of beers within the week"    Drug use: No     Comment: Denies       Family History   Problem Relation Age of Onset    Heart disease Father     Heart attack Father     Colon cancer Neg Hx     Colon polyps Neg Hx     Inflammatory bowel disease Neg Hx        The following portions of the patient's history were reviewed and updated as appropriate: allergies, current medications, past family history, past medical history, past social history, past surgical history and problem list       EXAM    Vitals:Blood pressure 121/84, pulse 80, temperature (!) 97 3 °F (36 3 °C), temperature source Tympanic, resp  rate 16, height 6' (1 829 m), weight 86 2 kg (190 lb)  ,Body mass index is 25 77 kg/m²  Physical Exam  Vitals signs and nursing note reviewed  Exam conducted with a chaperone present (wife)  Constitutional:       General: He is not in acute distress  Appearance: Normal appearance  He is normal weight  He is not ill-appearing or toxic-appearing  Eyes:      General: No scleral icterus  Right eye: No discharge  Left eye: No discharge  Conjunctiva/sclera: Conjunctivae normal    Pulmonary:      Effort: Pulmonary effort is normal  No respiratory distress  Musculoskeletal:      Comments: Swelling right buttock IPG site , painful erythemic     Neurological:      General: No focal deficit present  Mental Status: He is alert and oriented to person, place, and time  Gait: Gait is intact  Psychiatric:         Mood and Affect: Mood normal          Behavior: Behavior normal          Neurologic Exam     Mental Status   Oriented to person, place, and time  Level of consciousness: alert    Motor Exam     Strength   Right quadriceps: 5/5  Left quadriceps: 5/5  Right hamstrin/5  Left hamstrin/5  Right anterior tibial: 5/5  Left anterior tibial: 5/5  Right gastroc: 5/5  Left gastroc: 5/5    Gait, Coordination, and Reflexes     Gait  Gait: normal      Imaging Studies  Xr Spine Thoracic 2 Vw    Result Date: 3/31/2021  Narrative: C-ARM - thoracic spine INDICATION: insertion thoracic spinal cord stimulator   Procedure guidance  COMPARISON:  2021 TECHNIQUE: FLUOROSCOPY TIME:   56 4 seconds 2 FLUOROSCOPIC IMAGES FINDINGS: Fluoroscopic guidance provided during  placement of thoracic dorsal column stimulator  Osseous and soft tissue detail limited by technique  Impression: Fluoroscopic guidance provided for procedure guidance  Please refer to the separate procedure notes for additional details    Workstation performed: [id number] I have personally reviewed pertinent reports     and I have personally reviewed pertinent films in PACS

## 2021-04-13 PROBLEM — Z96.89 STATUS POST INSERTION OF SPINAL CORD STIMULATOR: Status: ACTIVE | Noted: 2021-04-13

## 2021-04-13 NOTE — ASSESSMENT & PLAN NOTE
· As addressed in HPI  · As addressed in status post spinal cord stimulator implant      PLAN  · As addressed in status post spinal cord stimulator implant

## 2021-04-13 NOTE — ASSESSMENT & PLAN NOTE
· As addressed in HPI  · 2 weeks status post surgery presents for after care Has lumbar and right flank incisions with Monocryl suture closure for skin, continues with overlying surgical glue  Incisions are c/d/i without erythema (lumbar), dehiscence, drainage or s/s of infection, healing well with approximated wound edges  Cut knots on lateral ends of Monocryl sutures, she tolerated procedure well  · Right flank incision is very edematous as well as surrounding tissue, remains tender to touch with resolving ecchymosis  · Has ABBOTT SCS with a non-rechargable generator , SCS activated on surgery day at a low setting, is not releiving pain  · Met with [facility] for programing and teaching , pending attachment of report settings     Frequency: 40 Hz R L  Pulse Width: 200 ?s  Perception: 6 40 mA  Comfort: 7 20 mA  Maximum: 8 00 mA  Step Size: 0 10 mA  Total Steps: 82  Impedance: - -    · Continues with muscle spasm low back bilateral and buttocks treating with tizanidine   · Restarted Humira this past Saturday      Plan  · Instructions for continued care documented in AVS and reviewed in detail

## 2021-05-06 ENCOUNTER — OFFICE VISIT (OUTPATIENT)
Dept: NEUROSURGERY | Facility: CLINIC | Age: 51
End: 2021-05-06

## 2021-05-06 VITALS
HEIGHT: 72 IN | SYSTOLIC BLOOD PRESSURE: 130 MMHG | RESPIRATION RATE: 16 BRPM | WEIGHT: 190 LBS | BODY MASS INDEX: 25.73 KG/M2 | TEMPERATURE: 97.8 F | DIASTOLIC BLOOD PRESSURE: 88 MMHG | HEART RATE: 87 BPM

## 2021-05-06 DIAGNOSIS — G89.4 CHRONIC PAIN SYNDROME: Primary | ICD-10-CM

## 2021-05-06 PROCEDURE — 99024 POSTOP FOLLOW-UP VISIT: CPT | Performed by: NEUROLOGICAL SURGERY

## 2021-05-06 NOTE — PROGRESS NOTES
Assessment/Plan:    No problem-specific Assessment & Plan notes found for this encounter  6 weeks postop from scs implantation, still has complaints  Diagnoses and all orders for this visit:    Chronic pain syndrome          Subjective:      Patient ID: Kip Zuleta is a 52 y o  male  HPI    6 weeks postop from scs implantation has complaints still  But otherwise no major issues with the stimulator    The following portions of the patient's history were reviewed and updated as appropriate: allergies, current medications, past family history, past medical history, past social history, past surgical history and problem list     Review of Systems   Constitutional: Negative  HENT: Negative  Eyes: Negative  Respiratory: Negative  Cardiovascular: Negative  Gastrointestinal: Negative  Endocrine: Negative  Genitourinary: Negative  Musculoskeletal: Positive for back pain (LBP across hips, neurapothy in feet ) and gait problem  Hx of lumbar fusion   Skin: Positive for wound  Allergic/Immunologic: Negative  Neurological: Positive for numbness (bi/feet, left is worse  )  Hematological: Negative  Psychiatric/Behavioral: Positive for sleep disturbance  I have personally reviewed all aspects of the review of systems as documented    Objective:      /88 (BP Location: Left arm)   Pulse 87   Temp 97 8 °F (36 6 °C) (Tympanic)   Resp 16   Ht 6' (1 829 m)   Wt 86 2 kg (190 lb)   BMI 25 77 kg/m²          Physical Exam  Constitutional:       Appearance: Normal appearance  He is normal weight  Cardiovascular:      Rate and Rhythm: Normal rate  Neurological:      General: No focal deficit present  Mental Status: He is alert and oriented to person, place, and time  Mental status is at baseline         incision well healed

## 2021-05-11 ENCOUNTER — EVALUATION (OUTPATIENT)
Dept: PHYSICAL THERAPY | Facility: CLINIC | Age: 51
End: 2021-05-11
Payer: COMMERCIAL

## 2021-05-11 DIAGNOSIS — M19.011 PRIMARY OSTEOARTHRITIS OF RIGHT SHOULDER: ICD-10-CM

## 2021-05-11 DIAGNOSIS — M75.01 ADHESIVE CAPSULITIS OF RIGHT SHOULDER: Primary | ICD-10-CM

## 2021-05-11 DIAGNOSIS — M75.02 ADHESIVE CAPSULITIS OF LEFT SHOULDER: ICD-10-CM

## 2021-05-11 PROCEDURE — 97110 THERAPEUTIC EXERCISES: CPT | Performed by: PHYSICAL THERAPIST

## 2021-05-11 PROCEDURE — 97162 PT EVAL MOD COMPLEX 30 MIN: CPT | Performed by: PHYSICAL THERAPIST

## 2021-05-11 NOTE — PROGRESS NOTES
PT Evaluation     Today's date: 2021  Patient name: Francine Iraheta  : 1970  MRN: [id number]  Referring provider: Jaleesa Lora DO  Dx: No diagnosis found  Assessment  Assessment details: Francine Iraheta is a 52 y o  male presenting to outpatient physical therapy with chief complaints of chronic (B) shoulder pain  Patient describes insidious onset of (B) shoulder pain with recent falls landing on (R) side  Patient displays with abnormal posture, (B) shoulder AROM restrictions, (R) shoulder PROM = AROM, (B) shoulder strength deficits, (+) RC testing, and functional restrictions  Patient's symptoms are multifactorial in nature with a primary movement diagnosis of (B) shoulder hypomobility resulting in pathoanatomical signs and symptoms consistent with Adhesive capsulitis of right shoulder  (primary encounter diagnosis), Adhesive capsulitis of left shoulder, Primary osteoarthritis of right shoulder resulting in limitations in his ability to reach overhead, exercise as desired, and perform housework  Based upon examination results it has been recommended that the patient follow up with a hand surgeon to evaluate for Dupuytrens contracture  Please contact me if you have any questions ([phone])  Thank you for the opportunity to share in the care of this patient  Impairments: abnormal coordination, abnormal muscle tone, abnormal or restricted ROM, abnormal movement, activity intolerance, impaired physical strength, lacks appropriate home exercise program, pain with function and poor posture     Symptom irritability: highUnderstanding of Dx/Px/POC: good   Prognosis: fair  Prognosis details: Positive prognostic indicators include positive attitude toward recovery, motivated to improve    Negative prognostic indicators include chronicity of symptoms, high symptom irritability, fear avoidance, catastrophizing, low self-efficacy, anxiety, poor understanding of condition, poor expectations  Goals  STG to be met in 3 weeks:  - Increase (R) Shoulder AROM: Flexion to 110 degrees, ABD to 100 degrees, Functional ER to C5, Functional IR to L5   - Increase (B) UE strength by 1/2 MMT grade  - I with HEP   - Patient will be able to reach overhead  LTG to be met in 6 weeks:  - Increase (R) Shoulder AROM: Flexion to 120 degrees, ABD to 120 degrees, Functional ER to C7, Functional IR to L2   - Increase (B) UE strength to 4-4+/5 all planes  - I with updated HEP   - Patient will be able to perform basic housework  - Patient will be able to perform regular exercise program         Plan  Plan details: Prognosis above is given PT services 2x/week tapering to 1x/week over the next 6 weeks and home program adherence  Patient would benefit from: skilled physical therapy  Planned modality interventions: cryotherapy and thermotherapy: hydrocollator packs  Planned therapy interventions: joint mobilization, activity modification, manual therapy, motor coordination training, neuromuscular re-education, body mechanics training, patient education, postural training, strengthening, stretching, therapeutic activities, therapeutic exercise, functional ROM exercises and home exercise program  Plan of Care beginning date: 5/11/2021  Plan of Care expiration date: 6/22/2021  Treatment plan discussed with: patient and PTA        Subjective Evaluation    History of Present Illness  Mechanism of injury: At Evaluation (May 11, 2021): Patient reports insidious onset of (R) shoulder pain in March  He saw Dr Brooke Juarez - had an x-ray and cortisone injection was performed  He reports later that day he fell twice slipping on ice  He returned to see Dr Brooke Juarez - repeat x-ray was performed  PT was recommended - delayed secondary to spinal surgery for stimulator        Red Flag Screen:  Patient denies recent fever, headache, dizziness, nausea, vomiting, or vision changes   Patient reports weakness with gripping and (B) hand numbness and tingling       Greatest concern: lack of use of (R) arm    Quality of life: good    Pain  Current pain ratin  At best pain ratin  At worst pain rating: 10  Location: (R) Shoulder   Quality: dull ache, sharp and radiating    Social Support  Lives with: spouse    Employment status: not working  Hand dominance: left      Diagnostic Tests  X-ray: normal  Treatments  Previous treatment: injection treatment  Patient Goals  Patient goal: Patient Specific Functional Scale: reach overhead without increased pain 0/10, return to basic housework 3/10, return to regular exercise program 0/10; Total 3/30        Objective     Static Posture     Head  Forward  Shoulders  Rounded  Comments  Apparent flexion contracture (B) 5th and 4th flexor tendons - consistent with Dupuytren's contracture      Postural Observations  Seated posture: fair  Standing posture: fair        Palpation     Additional Palpation Details  TTP (R)>(L) anterior shoulder     Cervical/Thoracic Screen   Cervical range of motion within normal limits with the following exceptions: WNL - no reproduction of shoulder pain     Neurological Testing     Reflexes   Left   Biceps (C5/C6): normal (2+)  Brachioradialis (C6): normal (2+)  Triceps (C7): normal (2+)  Storm's reflex: negative    Right   Biceps (C5/C6): normal (2+)  Brachioradialis (C6): normal (2+)  Triceps (C7): normal (2+)  Storm's reflex: negative    Active Range of Motion   Left Shoulder   Flexion: 120 degrees   Extension: 50 degrees   Abduction: 80 degrees   External rotation BTH: T2   Internal rotation BTB: L1     Right Shoulder   Flexion: 85 degrees with pain  Extension: 50 degrees with pain  Abduction: 70 degrees with pain  External rotation BTH: Active external rotation behind the head: Unable to reach ear   with pain  Internal rotation BTB: sacrum with pain    Passive Range of Motion   Left Shoulder   Flexion: 125 degrees   Abduction: 95 degrees   External rotation 0°: 25 degrees   External rotation 45°: 45 degrees     Right Shoulder   Flexion: 85 degrees with pain  Abduction: 70 degrees with pain  External rotation 0°: 10 degrees with pain  External rotation 45°: 15 degrees with pain    Joint Play     Right Shoulder  Hypomobile in the anterior capsule, posterior capsule and inferior capsule  Strength/Myotome Testing     Left Shoulder     Planes of Motion   Flexion: 4   Extension: 4+   Abduction: 4   External rotation at 0°: 4+   Internal rotation at 0°: 4+     Isolated Muscles   Biceps: 4+   Triceps: 4+     Right Shoulder     Planes of Motion   Flexion: 4-   Extension: 4+   Abduction: 3+   External rotation at 0°: 4+   Internal rotation at 0°: 4+     Isolated Muscles   Biceps: 4+   Triceps: 4+     Additional Strength Details  Resistance applied in available range     Tests     Left Shoulder   Negative empty can, full can and Hawkin's  Right Shoulder   Positive empty can, full can and Hawkin's  Daily Treatment Diary     DX: (R) shoulder adhesive capsulitis, (R) Shoulder OA, (L) shoulder adhesive capsulitis  EPOC: 6/22/21  Precautions: NA  CO-MORBIDITIES: L5/S1 fusion, spinal cord stimulator   HEP ACCESS CODE: [id number]    Stage: chronic   Stability of Symptoms:  At Evaluation: stable - unchanged   Global Rating of Change:   Symptom Irritability Level: high   Primary Movement Diagnosis: (B) shoulder hypomobility   Patient Specific Functional Scale:   5/11/21: reach overhead without increased pain 0/10, return to basic housework 3/10, return to regular exercise program 0/10;  Total 3/30  FOTO Prediction: 69 by visit 13  FOTO Progress: 43 at evaluation   Greatest Concern: lack of use of (R) arm  Current Activity Recommendations: added to HEP (5/11)  Current Educational Needs: progressions      Manual          (B) Shoulder Mobs         (B) Shoulder PROM                                        Exercise Diary  HEP         Therapeutic Exercise Patricia Stretches  Flex  Scaption  ABD                    Table Slides  Flex  Scaption 5/11         Cane ER Stretch 5/11         Pendulums  5/11                   Shoulder Isometrics                    Standing Shoulder Flex           Standing Shoulder Scaption          Standing Shoulder ABD                    Neuromuscular Reeducation                    TB Sh' Row          TB Sh' Ext          TB Triceps          TB Sh' ER          TB Sh' IR                                                            Therapeutic Activity                              Gait Training                        Modalities

## 2021-05-17 ENCOUNTER — OFFICE VISIT (OUTPATIENT)
Dept: PHYSICAL THERAPY | Facility: CLINIC | Age: 51
End: 2021-05-17
Payer: COMMERCIAL

## 2021-05-17 DIAGNOSIS — M75.02 ADHESIVE CAPSULITIS OF LEFT SHOULDER: ICD-10-CM

## 2021-05-17 DIAGNOSIS — M75.01 ADHESIVE CAPSULITIS OF RIGHT SHOULDER: Primary | ICD-10-CM

## 2021-05-17 PROCEDURE — 97112 NEUROMUSCULAR REEDUCATION: CPT

## 2021-05-17 PROCEDURE — 97140 MANUAL THERAPY 1/> REGIONS: CPT

## 2021-05-17 PROCEDURE — 97110 THERAPEUTIC EXERCISES: CPT

## 2021-05-17 NOTE — PROGRESS NOTES
Daily Note     Today's date: 2021  Patient name: Sedrick Huffman  : 1970  MRN: [id number]  Referring provider: Phoebe Trammell DO  Dx:   Encounter Diagnosis     ICD-10-CM    1  Adhesive capsulitis of right shoulder  M75 01    2  Adhesive capsulitis of left shoulder  M75 02                   Subjective: Pt  Reports his back has been really sore lately and hasn't been doing much so he didn't get to his shoulder exercises as much as he'd like  He reports no changed in symptoms admittedly due to not performing the exercises given much  Objective: See treatment diary below      Assessment:   Stage: chronic   Stability of Symptoms:  At Evaluation: stable - unchanged   Global Rating of Change:   Symptom Irritability Level: high   Primary Movement Diagnosis: (B) shoulder hypomobility   Patient Specific Functional Scale:   21: reach overhead without increased pain 010, return to basic housework 3/10, return to regular exercise program 0/10; Total 3/30  FOTO Prediction: 69 by visit 13  FOTO Progress: 43 at evaluation   Greatest Concern: lack of use of (R) arm  Current Activity Recommendations: added to HEP ()  Current Educational Needs: progressions    Initiated PT POC today  Pt  Is significantly restricted in all planes of motion and very painful  He was able to tolerate the TE's but very sore post  With manually therapy minimal motion was gained due to empty end feel and pain prior to tightness felt  JA PT performed shoulder mobs  Tolerated treatment fair  Patient demonstrated fatigue post treatment and would benefit from continued PT  Encouraged pt  To be doing his stretches several times per day and mostly important 2x more times today after therapy  Plan: Continue per plan of care  Progress treatment as tolerated         Daily Treatment Diary     DX: (R) shoulder adhesive capsulitis, (R) Shoulder OA, (L) shoulder adhesive capsulitis  EPOC: 21  Precautions: NA  CO-MORBIDITIES: L5/S1 fusion, spinal cord stimulator   HEP ACCESS CODE: [id number]      Manual  5/17        (B) Shoulder Mobs JA PT        (B) Shoulder PROM 8 min                                       Exercise Diary  HEP 5/17        Therapeutic Exercise                     Patricia Stretches  Flex  Scaption  ABD  2'/2'/2'                  Table Slides  Flex  Scaption 5/11 x20 ea        Cane ER Stretch 5/11 20"x3        Pendulums  5/11 x20 ea                  Shoulder Isometrics                    Standing Shoulder Flex   x10 ea        Standing Shoulder Scaption  x10 ea        Standing Shoulder ABD                    Neuromuscular Reeducation                    TB Sh' Row  OTB  x10        TB Sh' Ext  OTB  x10        TB Triceps          TB Sh' ER          TB Sh' IR                                                            Therapeutic Activity                              Gait Training                        Modalities

## 2021-05-18 ENCOUNTER — LAB (OUTPATIENT)
Dept: LAB | Facility: HOSPITAL | Age: 51
End: 2021-05-18
Attending: INTERNAL MEDICINE
Payer: COMMERCIAL

## 2021-05-18 DIAGNOSIS — K50.913 PERIANAL FISTULA DUE TO CROHN'S DISEASE (HCC): ICD-10-CM

## 2021-05-18 DIAGNOSIS — Z86.010 HISTORY OF COLON POLYPS: ICD-10-CM

## 2021-05-18 DIAGNOSIS — E55.9 VITAMIN D DEFICIENCY: ICD-10-CM

## 2021-05-18 DIAGNOSIS — K59.1 FUNCTIONAL DIARRHEA: ICD-10-CM

## 2021-05-18 LAB
25(OH)D3 SERPL-MCNC: 29.5 NG/ML (ref 30–100)
ALBUMIN SERPL BCP-MCNC: 4.2 G/DL (ref 3.5–5)
ALP SERPL-CCNC: 95 U/L (ref 46–116)
ALT SERPL W P-5'-P-CCNC: 41 U/L (ref 12–78)
ANION GAP SERPL CALCULATED.3IONS-SCNC: 5 MMOL/L (ref 4–13)
AST SERPL W P-5'-P-CCNC: 20 U/L (ref 5–45)
BASOPHILS # BLD AUTO: 0.03 THOUSANDS/ΜL (ref 0–0.1)
BASOPHILS NFR BLD AUTO: 0 % (ref 0–1)
BILIRUB SERPL-MCNC: 0.64 MG/DL (ref 0.2–1)
BUN SERPL-MCNC: 9 MG/DL (ref 5–25)
CALCIUM SERPL-MCNC: 9.7 MG/DL (ref 8.3–10.1)
CHLORIDE SERPL-SCNC: 108 MMOL/L (ref 100–108)
CO2 SERPL-SCNC: 28 MMOL/L (ref 21–32)
CREAT SERPL-MCNC: 0.89 MG/DL (ref 0.6–1.3)
CRP SERPL QL: 3.6 MG/L
EOSINOPHIL # BLD AUTO: 0.31 THOUSAND/ΜL (ref 0–0.61)
EOSINOPHIL NFR BLD AUTO: 4 % (ref 0–6)
ERYTHROCYTE [DISTWIDTH] IN BLOOD BY AUTOMATED COUNT: 12.4 % (ref 11.6–15.1)
ERYTHROCYTE [SEDIMENTATION RATE] IN BLOOD: 5 MM/HOUR (ref 0–14)
GFR SERPL CREATININE-BSD FRML MDRD: 101 ML/MIN/1.73SQ M
GLUCOSE P FAST SERPL-MCNC: 85 MG/DL (ref 65–99)
HCT VFR BLD AUTO: 53.6 % (ref 36.5–49.3)
HGB BLD-MCNC: 17.9 G/DL (ref 12–17)
IMM GRANULOCYTES # BLD AUTO: 0.01 THOUSAND/UL (ref 0–0.2)
IMM GRANULOCYTES NFR BLD AUTO: 0 % (ref 0–2)
LYMPHOCYTES # BLD AUTO: 2.2 THOUSANDS/ΜL (ref 0.6–4.47)
LYMPHOCYTES NFR BLD AUTO: 26 % (ref 14–44)
MCH RBC QN AUTO: 32.8 PG (ref 26.8–34.3)
MCHC RBC AUTO-ENTMCNC: 33.4 G/DL (ref 31.4–37.4)
MCV RBC AUTO: 98 FL (ref 82–98)
MONOCYTES # BLD AUTO: 0.64 THOUSAND/ΜL (ref 0.17–1.22)
MONOCYTES NFR BLD AUTO: 8 % (ref 4–12)
NEUTROPHILS # BLD AUTO: 5.2 THOUSANDS/ΜL (ref 1.85–7.62)
NEUTS SEG NFR BLD AUTO: 62 % (ref 43–75)
NRBC BLD AUTO-RTO: 0 /100 WBCS
PLATELET # BLD AUTO: 194 THOUSANDS/UL (ref 149–390)
PMV BLD AUTO: 10 FL (ref 8.9–12.7)
POTASSIUM SERPL-SCNC: 4 MMOL/L (ref 3.5–5.3)
PROT SERPL-MCNC: 7.5 G/DL (ref 6.4–8.2)
RBC # BLD AUTO: 5.46 MILLION/UL (ref 3.88–5.62)
SODIUM SERPL-SCNC: 141 MMOL/L (ref 136–145)
WBC # BLD AUTO: 8.39 THOUSAND/UL (ref 4.31–10.16)

## 2021-05-18 PROCEDURE — 80053 COMPREHEN METABOLIC PANEL: CPT

## 2021-05-18 PROCEDURE — 85652 RBC SED RATE AUTOMATED: CPT

## 2021-05-18 PROCEDURE — 85025 COMPLETE CBC W/AUTO DIFF WBC: CPT

## 2021-05-18 PROCEDURE — 86140 C-REACTIVE PROTEIN: CPT

## 2021-05-18 PROCEDURE — 36415 COLL VENOUS BLD VENIPUNCTURE: CPT

## 2021-05-18 PROCEDURE — 82306 VITAMIN D 25 HYDROXY: CPT

## 2021-05-19 ENCOUNTER — OFFICE VISIT (OUTPATIENT)
Dept: PHYSICAL THERAPY | Facility: CLINIC | Age: 51
End: 2021-05-19
Payer: COMMERCIAL

## 2021-05-19 DIAGNOSIS — M75.02 ADHESIVE CAPSULITIS OF LEFT SHOULDER: ICD-10-CM

## 2021-05-19 DIAGNOSIS — M19.011 PRIMARY OSTEOARTHRITIS OF RIGHT SHOULDER: ICD-10-CM

## 2021-05-19 DIAGNOSIS — M75.01 ADHESIVE CAPSULITIS OF RIGHT SHOULDER: Primary | ICD-10-CM

## 2021-05-19 PROCEDURE — 97110 THERAPEUTIC EXERCISES: CPT | Performed by: PHYSICAL THERAPIST

## 2021-05-19 PROCEDURE — 97140 MANUAL THERAPY 1/> REGIONS: CPT | Performed by: PHYSICAL THERAPIST

## 2021-05-19 PROCEDURE — 97112 NEUROMUSCULAR REEDUCATION: CPT | Performed by: PHYSICAL THERAPIST

## 2021-05-19 NOTE — PROGRESS NOTES
Daily Note     Today's date: 2021  Patient name: Tim Fernando  : 1970  MRN: [id number]  Referring provider: Tracey Sabillon DO  Dx:   Encounter Diagnosis     ICD-10-CM    1  Adhesive capsulitis of right shoulder  M75 01    2  Adhesive capsulitis of left shoulder  M75 02    3  Primary osteoarthritis of right shoulder  M19 011                   Subjective: Patient reports slight increase in pain following his LV  He reports increased pain was not long lasting  He reports performing his HEP yesterday 1x secondary to increased pain  Objective: See treatment diary below      Assessment:   Stage: chronic   Stability of Symptoms:  At Evaluation: stable - unchanged   Global Rating of Change:   Symptom Irritability Level: high   Primary Movement Diagnosis: (B) shoulder hypomobility   Patient Specific Functional Scale:   21: reach overhead without increased pain 010, return to basic housework 3/10, return to regular exercise program 0/10; Total 3/30  FOTO Prediction: 69 by visit 13  FOTO Progress: 43 at evaluation   Greatest Concern: lack of use of (R) arm  Current Activity Recommendations: added to HEP (); purchase shoulder pulleys, perform HEP 3x a day - explore if symptoms improve if painful at the start ()  Current Educational Needs: progressions    Patient continues to have significant (B) shoulder mobility restrictions - (L) shoulder is progressing faster than (R)  He had good understanding of exercise rationale and has a good plan to perform HEP more frequently when painful  He will continue to benefit from skilled PT to guide progression of ROM and strength gains to improve tolerance to daily housework and exercise  Plan: Continue with POC - monitor tolerance to treatment  Monitor HEP performance and adjustments suggested            Daily Treatment Diary     DX: (R) shoulder adhesive capsulitis, (R) Shoulder OA, (L) shoulder adhesive capsulitis  EPOC: 6/22/21  Precautions: NA  CO-MORBIDITIES: L5/S1 fusion, spinal cord stimulator   HEP ACCESS CODE: [id number]      Manual  5/17 5/19       (B) Shoulder Mobs JA PT Post, Inf  Gr 2/3  7 min       (B) Shoulder PROM 8 min 8 min                                      Exercise Diary  HEP 5/17 5/19       Therapeutic Exercise                     Patricia Stretches  Flex  Scaption  ABD  2'/2'/2' 2'/2'/2'                 Table Slides  Flex  Scaption 5/11 x20 ea 20x ea       Cane ER Stretch 5/11 20"x3 30"x3       Pendulums  5/11 x20 ea 20x ea                 Shoulder Isometrics                    Standing Shoulder Flex   x10 ea        Standing Shoulder Scaption  x10 ea        Standing Shoulder ABD                    Neuromuscular Reeducation                    TB Sh' Row  OTB  x10 GTB  20x        TB Sh' Ext  OTB  x10 GTB  20x       TB Triceps          TB Sh' ER          TB Sh' IR                                                            Therapeutic Activity                              Gait Training                        Modalities

## 2021-05-24 ENCOUNTER — OFFICE VISIT (OUTPATIENT)
Dept: PHYSICAL THERAPY | Facility: CLINIC | Age: 51
End: 2021-05-24
Payer: COMMERCIAL

## 2021-05-24 DIAGNOSIS — M75.01 ADHESIVE CAPSULITIS OF RIGHT SHOULDER: Primary | ICD-10-CM

## 2021-05-24 DIAGNOSIS — M19.011 PRIMARY OSTEOARTHRITIS OF RIGHT SHOULDER: ICD-10-CM

## 2021-05-24 DIAGNOSIS — M75.02 ADHESIVE CAPSULITIS OF LEFT SHOULDER: ICD-10-CM

## 2021-05-24 PROCEDURE — 97110 THERAPEUTIC EXERCISES: CPT | Performed by: PHYSICAL THERAPIST

## 2021-05-24 PROCEDURE — 97140 MANUAL THERAPY 1/> REGIONS: CPT | Performed by: PHYSICAL THERAPIST

## 2021-05-24 PROCEDURE — 97112 NEUROMUSCULAR REEDUCATION: CPT | Performed by: PHYSICAL THERAPIST

## 2021-05-24 NOTE — PROGRESS NOTES
Daily Note     Today's date: 2021  Patient name: Sedrick Huffman  : 1970  MRN: [id number]  Referring provider: Phoebe Trammell DO  Dx:   Encounter Diagnosis     ICD-10-CM    1  Adhesive capsulitis of right shoulder  M75 01    2  Adhesive capsulitis of left shoulder  M75 02    3  Primary osteoarthritis of right shoulder  M19 011                   Subjective:  Patient reports continued pain in R>L shoulder with inability to reach [facility] or behind back with R arm  Objective:  See treatment diary below      Assessment:   Stage: chronic   Stability of Symptoms:  At Evaluation: stable - unchanged   Global Rating of Change:   Symptom Irritability Level: high   Primary Movement Diagnosis: (B) shoulder hypomobility   Patient Specific Functional Scale:   21: reach overhead without increased pain 010, return to basic housework 3/10, return to regular exercise program 0/10; Total 3/30  FOTO Prediction: 69 by visit 13  FOTO Progress: 43 at evaluation   Greatest Concern: lack of use of (R) arm  Current Activity Recommendations: added to HEP (); purchase shoulder pulleys, perform HEP 3x a day - explore if symptoms improve if painful at the start ()  Added strap stretch behind back for R shoulder IR on 21  Current Educational Needs: progressions    Patient continues to have significant (B) shoulder mobility restrictions - (L) shoulder is progressing faster than (R)  He had good understanding of exercise rationale and has a good plan to perform HEP more frequently when painful  He will continue to benefit from skilled PT to guide progression of ROM and strength gains to improve tolerance to daily housework and exercise  Pt had significant pain with R GH jt mobs today, but min increases in ROM post manual tx  He needed some cues for proper form with TB rows  Plan:  Continue to add jt mobs / stretching to increase functional R shoulder ROM           Daily Treatment Diary     DX: (R) shoulder adhesive capsulitis, (R) Shoulder OA, (L) shoulder adhesive capsulitis  EPOC: 6/22/21  Precautions: NA  CO-MORBIDITIES: L5/S1 fusion, spinal cord stimulator   HEP ACCESS CODE: [id number]      Manual  5/17 5/19 5/24      (B) Shoulder Mobs JA PT Post, Inf  Gr 2/3  7 min Grade 3 6'R, 2' L      (B) Shoulder PROM 8 min 8 min 8'                                     Exercise Diary  HEP 5/17 5/19 5/24      Therapeutic Exercise                     Patricia Stretches  Flex  Scaption  ABD  2'/2'/2' 2'/2'/2' 2'/2'/2'                Table Slides  Flex  Scaption 5/11 x20 ea 20x ea 20 ea      Cane ER Stretch 5/11 20"x3 30"x3 30" 3      Pendulums  5/11 x20 ea 20x ea 2# 20 ea      Strap stretch into R shoulder IR behind back 5/24   10" 5      Shoulder Isometrics                    Standing Shoulder Flex   x10 ea        Standing Shoulder Scaption  x10 ea        Standing Shoulder ABD                    Neuromuscular Reeducation                    TB Sh' Row  OTB  x10 GTB  20x  BTB 20      TB Sh' Ext  OTB  x10 GTB  20x BTB 20      TB Triceps          TB Sh' ER    GTB 20      TB Sh' IR                                                            Therapeutic Activity                              Gait Training                        Modalities

## 2021-05-26 ENCOUNTER — OFFICE VISIT (OUTPATIENT)
Dept: PHYSICAL THERAPY | Facility: CLINIC | Age: 51
End: 2021-05-26
Payer: COMMERCIAL

## 2021-05-26 ENCOUNTER — OFFICE VISIT (OUTPATIENT)
Dept: FAMILY MEDICINE CLINIC | Facility: CLINIC | Age: 51
End: 2021-05-26
Payer: COMMERCIAL

## 2021-05-26 VITALS
HEIGHT: 72 IN | BODY MASS INDEX: 25.65 KG/M2 | OXYGEN SATURATION: 96 % | HEART RATE: 80 BPM | WEIGHT: 189.4 LBS | SYSTOLIC BLOOD PRESSURE: 156 MMHG | DIASTOLIC BLOOD PRESSURE: 93 MMHG

## 2021-05-26 DIAGNOSIS — M75.02 ADHESIVE CAPSULITIS OF LEFT SHOULDER: ICD-10-CM

## 2021-05-26 DIAGNOSIS — M19.011 PRIMARY OSTEOARTHRITIS OF RIGHT SHOULDER: ICD-10-CM

## 2021-05-26 DIAGNOSIS — M54.50 CHRONIC BILATERAL LOW BACK PAIN WITHOUT SCIATICA: Primary | ICD-10-CM

## 2021-05-26 DIAGNOSIS — M75.01 ADHESIVE CAPSULITIS OF RIGHT SHOULDER: Primary | ICD-10-CM

## 2021-05-26 DIAGNOSIS — G89.29 CHRONIC BILATERAL LOW BACK PAIN WITHOUT SCIATICA: Primary | ICD-10-CM

## 2021-05-26 PROCEDURE — 97110 THERAPEUTIC EXERCISES: CPT | Performed by: PHYSICAL THERAPIST

## 2021-05-26 PROCEDURE — 97112 NEUROMUSCULAR REEDUCATION: CPT | Performed by: PHYSICAL THERAPIST

## 2021-05-26 PROCEDURE — 99212 OFFICE O/P EST SF 10 MIN: CPT | Performed by: NURSE PRACTITIONER

## 2021-05-26 PROCEDURE — 3725F SCREEN DEPRESSION PERFORMED: CPT | Performed by: NURSE PRACTITIONER

## 2021-05-26 PROCEDURE — 97140 MANUAL THERAPY 1/> REGIONS: CPT | Performed by: PHYSICAL THERAPIST

## 2021-05-26 RX ORDER — CYCLOBENZAPRINE HCL 10 MG
10 TABLET ORAL 3 TIMES DAILY PRN
Qty: 30 TABLET | Refills: 0 | Status: SHIPPED | OUTPATIENT
Start: 2021-05-26 | End: 2021-08-02

## 2021-05-26 RX ORDER — NAPROXEN 500 MG/1
500 TABLET ORAL 2 TIMES DAILY WITH MEALS
Qty: 60 TABLET | Refills: 0 | Status: SHIPPED | OUTPATIENT
Start: 2021-05-26 | End: 2021-08-02 | Stop reason: SDUPTHER

## 2021-05-26 NOTE — PROGRESS NOTES
Daily Note     Today's date: 2021  Patient name: Kalyan Chan  : 1970  MRN: [id number]  Referring provider: Sonia Newton DO  Dx:   Encounter Diagnosis     ICD-10-CM    1  Adhesive capsulitis of right shoulder  M75 01    2  Adhesive capsulitis of left shoulder  M75 02    3  Primary osteoarthritis of right shoulder  M19 011                   Subjective:  Patient reports he has been performing his stretching HEP regularly - pulley stretches are going well  He reports having increased pain lasting longer than 2 hours following functional IR stretch  He reports receiving EMG report - normal nerve function  Objective:  See treatment diary below      Assessment:   Stage: chronic   Stability of Symptoms:  At Evaluation: stable - unchanged   Global Rating of Change:   Symptom Irritability Level: high   Primary Movement Diagnosis: (B) shoulder hypomobility   Patient Specific Functional Scale:   21: reach overhead without increased pain 0/10, return to basic housework 3/10, return to regular exercise program 0/10; Total 3/30  FOTO Prediction: 69 by visit 13  FOTO Progress: 43 at evaluation   Greatest Concern: lack of use of (R) arm  Current Activity Recommendations: added to HEP (); purchase shoulder pulleys, perform HEP 3x a day - explore if symptoms improve if painful at the start ()  Added strap stretch behind back for R shoulder IR on 21  Current Educational Needs: progressions    Patient continues to have significant (B) shoulder ROM restrictions - improved (R) shoulder flexion was noted today however abduction, ER, and IR remain very limited and minimal progress has been seen  He demonstrated good form with pulley stretches and has been performing regularly at home  Functional IR stretch form was modified to improve tolerance  He continues to require cuing for TB exercise form    He will continue to benefit from skilled PT to guide progression of ROM and strength gains to improve tolerance to daily housework and exercise  Plan:  Continue with POC - continue joint mobilization and manual stretching to improve functional mobility            Daily Treatment Diary     DX: (R) shoulder adhesive capsulitis, (R) Shoulder OA, (L) shoulder adhesive capsulitis  EPOC: 6/22/21  Precautions: NA  CO-MORBIDITIES: L5/S1 fusion, spinal cord stimulator   HEP ACCESS CODE: [id number]      Manual  5/17 5/19 5/24 5/26     (B) Shoulder Mobs JA PT Post, Inf  Gr 2/3  7 min Grade 3 6'R, 2' L Post, Inf  Gr 3  8 min     (B) Shoulder PROM 8 min 8 min 8' 7 min                                    Exercise Diary  HEP 5/17 5/19 5/24 5/26     Therapeutic Exercise                     Patricia Stretches  Flex  Scaption  ABD  2'/2'/2' 2'/2'/2' 2'/2'/2' 2'/2'/2'               Table Slides  Flex  Scaption 5/11 x20 ea 20x ea 20 ea 20x ea     Cane ER Stretch 5/11 20"x3 30"x3 30" 3 30"x3     Pendulums  5/11 x20 ea 20x ea 2# 20 ea 3# 20x ea     Strap stretch into R shoulder IR behind back 5/24   10" 5 15"x5     Shoulder Isometrics                    Standing Shoulder Flex   x10 ea        Standing Shoulder Scaption  x10 ea        Standing Shoulder ABD                    Neuromuscular Reeducation                    TB Sh' Row  OTB  x10 GTB  20x  BTB 20 BTB  20x     TB Sh' Ext  OTB  x10 GTB  20x BTB 20 BTB  20x     TB Triceps          TB Sh' ER    GTB 20 GTB  20x     TB Sh' IR                                                            Therapeutic Activity                              Gait Training                        Modalities

## 2021-05-26 NOTE — PROGRESS NOTES
[facility]        NAME: Tristian Flores is a 52 y o  male  : 1970    MRN: [id number]  DATE: May 26, 2021  TIME: 11:24 AM    Assessment and Plan   Chronic bilateral low back pain without sciatica [M54 5, G89 29]  1  Chronic bilateral low back pain without sciatica  cyclobenzaprine (FLEXERIL) 10 mg tablet    naproxen (NAPROSYN) 500 mg tablet         Patient Instructions     Patient Instructions   Flexeril as ordered for back pain/spasm  Naproxen as ordered for back pain  Call and schedule appointment with Neurosurgery regarding back stimulator/pain  Schedule appt  For annual physical which is due 2021  Call with any questions/concerns  Chief Complaint     Chief Complaint   Patient presents with    Back Pain    Shoulder Pain         History of Present Illness       C/o chronic low back pain  Had surgery on L5 S1   stimulator place in March, having no relief  Takes Ibuprofen occasionally for pain with little to no relief  Does not have a f/up scheduled with neurosurgery  Has b/l numbness in feet  Review of Systems   Review of Systems   Constitutional: Positive for activity change (due to pain) and fatigue  Negative for fever  Respiratory: Negative for cough, chest tightness and shortness of breath  Cardiovascular: Negative for chest pain, palpitations and leg swelling  Gastrointestinal: Negative for abdominal pain and nausea  Musculoskeletal: Positive for arthralgias, back pain and myalgias  Neurological: Positive for weakness (low back) and numbness (b/l feet)  Psychiatric/Behavioral: Negative  Negative for dysphoric mood           Current Medications       Current Outpatient Medications:     Adalimumab (Humira Pen) 40 MG/0 4ML PNKT, Citrate Free Maintenance Dose: Inject (1) 40mg/0 4ml under the skin every other week, Disp: 2 each, Rfl: 12    cholecalciferol (VITAMIN D3) 1,000 units tablet, Take 2 tablets (2,000 Units total) by mouth daily, Disp: 60 tablet, Rfl: 2    nicotine (NICODERM CQ) 14 mg/24hr TD 24 hr patch, Place 1 patch on the skin every 24 hours, Disp: 28 patch, Rfl: 5    cyclobenzaprine (FLEXERIL) 10 mg tablet, Take 1 tablet (10 mg total) by mouth 3 (three) times a day as needed for muscle spasms, Disp: 30 tablet, Rfl: 0    docusate sodium (COLACE) 250 MG capsule, Take 1 capsule (250 mg total) by mouth 2 (two) times a day (Patient not taking: Reported on 4/12/2021), Disp: 60 capsule, Rfl: 2    naproxen (NAPROSYN) 500 mg tablet, Take 1 tablet (500 mg total) by mouth 2 (two) times a day with meals, Disp: 60 tablet, Rfl: 0    senna (SENOKOT) 8 6 MG tablet, Take 2 tablets (17 2 mg total) by mouth 2 (two) times a day (Patient not taking: Reported on 4/12/2021), Disp: 60 tablet, Rfl: 2    Current Allergies     Allergies as of 05/26/2021    (No Known Allergies)            The following portions of the patient's history were reviewed and updated as appropriate: allergies, current medications, past family history, past medical history, past social history, past surgical history and problem list      Past Medical History:   Diagnosis Date    Back pain     Colon polyp     Crohn's disease ([address] )     GERD (gastroesophageal reflux disease)     Perianal fistula     Terminal ileitis ([address] )        Past Surgical History:   Procedure Laterality Date    ABSCESS DRAINAGE      BACK SURGERY  2019    COLONOSCOPY      HERNIA REPAIR      umbilical hernia    [place] PERCUT IMPLNT NEUROELECT,EPIDURAL Right 3/26/2021    Procedure: INSERTION THORACIC DORSAL COLUMN SPINAL CORD STIMULATOR PERCUTANEOUS W IMPLANTABLE PULSE GENERATOR, RIGHT;  Surgeon: Allison Kehr, MD;  Location:  MAIN OR;  Service: Neurosurgery       Family History   Problem Relation Age of Onset    Heart disease Father     Heart attack Father     Colon cancer Neg Hx     Colon polyps Neg Hx     Inflammatory bowel disease Neg Hx          Medications have been verified          Objective   BP 156/93 (BP Location: Left arm, Patient Position: Sitting, Cuff Size: Large)   Pulse 80   Ht 6' (1 829 m)   Wt 85 9 kg (189 lb 6 4 oz)   SpO2 96%   BMI 25 69 kg/m²        Physical Exam     Physical Exam  Vitals signs and nursing note reviewed  Constitutional:       General: He is in acute distress (due to back pain)  Appearance: He is well-developed  He is not ill-appearing or diaphoretic  Neck:      Musculoskeletal: Normal range of motion  Thyroid: No thyromegaly  Trachea: No tracheal deviation  Cardiovascular:      Rate and Rhythm: Normal rate and regular rhythm  Heart sounds: Normal heart sounds  No murmur  No gallop  Pulmonary:      Effort: Pulmonary effort is normal  No respiratory distress  Breath sounds: Normal breath sounds  No wheezing  Musculoskeletal: Normal range of motion  General: Tenderness (low back) present  No deformity  Skin:     General: Skin is warm and dry  Coloration: Skin is not pale  Findings: No erythema  Neurological:      Mental Status: He is alert and oriented to person, place, and time  Psychiatric:         Mood and Affect: Mood normal          Speech: Speech normal          Behavior: Behavior normal          Thought Content: Thought content normal          Judgment: Judgment normal          BMI Counseling: Body mass index is 25 69 kg/m²  The BMI is above normal  Nutrition recommendations include decreasing portion sizes             PHQ-9 Depression Screening    PHQ-9:   Frequency of the following problems over the past two weeks:      Little interest or pleasure in doing things: 0 - not at all  Feeling down, depressed, or hopeless: 0 - not at all  PHQ-2 Score: 0

## 2021-05-26 NOTE — PATIENT INSTRUCTIONS
Flexeril as ordered for back pain/spasm  Naproxen as ordered for back pain  Call and schedule appointment with Neurosurgery regarding back stimulator/pain  Schedule appt  For annual physical which is due 8/12/2021  Call with any questions/concerns

## 2021-06-01 ENCOUNTER — OFFICE VISIT (OUTPATIENT)
Dept: PHYSICAL THERAPY | Facility: CLINIC | Age: 51
End: 2021-06-01
Payer: COMMERCIAL

## 2021-06-01 DIAGNOSIS — M75.02 ADHESIVE CAPSULITIS OF LEFT SHOULDER: ICD-10-CM

## 2021-06-01 DIAGNOSIS — M75.01 ADHESIVE CAPSULITIS OF RIGHT SHOULDER: Primary | ICD-10-CM

## 2021-06-01 DIAGNOSIS — M19.011 PRIMARY OSTEOARTHRITIS OF RIGHT SHOULDER: ICD-10-CM

## 2021-06-01 PROCEDURE — 97140 MANUAL THERAPY 1/> REGIONS: CPT | Performed by: PHYSICAL THERAPIST

## 2021-06-01 PROCEDURE — 97110 THERAPEUTIC EXERCISES: CPT | Performed by: PHYSICAL THERAPIST

## 2021-06-01 PROCEDURE — 97112 NEUROMUSCULAR REEDUCATION: CPT | Performed by: PHYSICAL THERAPIST

## 2021-06-01 NOTE — PROGRESS NOTES
Daily Note     Today's date: 2021  Patient name: Kip Zuleta  : 1970  MRN: [id number]  Referring provider: Mendel Garrison, DO  Dx:   Encounter Diagnosis     ICD-10-CM    1  Adhesive capsulitis of right shoulder  M75 01    2  Adhesive capsulitis of left shoulder  M75 02    3  Primary osteoarthritis of right shoulder  M19 011                   Subjective:  Patient reports he continues to have significant shoulder mobility restrictions  He has been stretching 2x per day  He reports no adverse reaction to his LV  Objective:  See treatment diary below      Assessment:   Stage: chronic   Stability of Symptoms:  At Evaluation: stable - unchanged   Global Rating of Change:   Symptom Irritability Level: high   Primary Movement Diagnosis: (B) shoulder hypomobility   Patient Specific Functional Scale:   21: reach overhead without increased pain 010, return to basic housework 3/10, return to regular exercise program 0/10; Total 3/30  FOTO Prediction: 69 by visit 13  FOTO Progress: 43 at evaluation   Greatest Concern: lack of use of (R) arm  Current Activity Recommendations: added to HEP (); purchase shoulder pulleys, perform HEP 3x a day - explore if symptoms improve if painful at the start ()  Added strap stretch behind back for R shoulder IR (); Current Educational Needs: progressions    Patient demonstrated slight (R) shoulder ROM improvement following manual treatment and significant (L) shoulder ROM improvement  He continues to have very slow progress with the (R) shoulder  He had good form with all exercises today  He will continue to benefit from skilled PT to guide progression of ROM and strength gains to improve tolerance to daily housework and exercise  Plan:  Continue with POC - continue joint mobilization and manual stretching to improve functional mobility            Daily Treatment Diary     DX: (R) shoulder adhesive capsulitis, (R) Shoulder OA, (L) shoulder adhesive capsulitis  EPOC: 6/22/21  Precautions: NA  CO-MORBIDITIES: L5/S1 fusion, spinal cord stimulator   HEP ACCESS CODE: [id number]      Manual  5/19 5/24 5/26 6/1     (B) Shoulder Mobs Post, Inf  Gr 2/3  7 min Grade 3 6'R, 2' L Post, Inf  Gr 3  8 min Post, Inf  Gr 3   8 min     (B) Shoulder PROM 8 min 8' 7 min 7 min                                    Exercise Diary  HEP 5/19 5/24 5/26 6/1     Therapeutic Exercise                     Patricia Stretches  Flex  Scaption  ABD  2'/2'/2' 2'/2'/2' 2'/2'/2' 2'/2'/2'               Table Slides  Flex  Scaption 5/11 20x ea 20 ea 20x ea 20x ea     Cane ER Stretch 5/11 30"x3 30" 3 30"x3 30"x3     Pendulums  5/11 20x ea 2# 20 ea 3# 20x ea 3# 20x ea     Strap stretch into R shoulder IR behind back 5/24  10" 5 15"x5 15"x5     Shoulder Isometrics                    Standing Shoulder Flex           Standing Shoulder Scaption          Standing Shoulder ABD                    Neuromuscular Reeducation                    TB Sh' Row  GTB  20x  BTB 20 BTB  20x BTB  20x     TB Sh' Ext  GTB  20x BTB 20 BTB  20x BTB  20x     TB Triceps          TB Sh' ER   GTB 20 GTB  20x GTB  20x     TB Sh' IR                                                            Therapeutic Activity                              Gait Training                        Modalities

## 2021-06-03 ENCOUNTER — OFFICE VISIT (OUTPATIENT)
Dept: PHYSICAL THERAPY | Facility: CLINIC | Age: 51
End: 2021-06-03
Payer: COMMERCIAL

## 2021-06-03 DIAGNOSIS — M75.01 ADHESIVE CAPSULITIS OF RIGHT SHOULDER: Primary | ICD-10-CM

## 2021-06-03 DIAGNOSIS — M75.02 ADHESIVE CAPSULITIS OF LEFT SHOULDER: ICD-10-CM

## 2021-06-03 PROCEDURE — 97140 MANUAL THERAPY 1/> REGIONS: CPT

## 2021-06-03 PROCEDURE — 97110 THERAPEUTIC EXERCISES: CPT

## 2021-06-03 NOTE — PROGRESS NOTES
Daily Note     Today's date: 6/3/2021  Patient name: Adonis Churchill  : 1970  MRN: [id number]  Referring provider: Kenzie Argueta DO  Dx:   Encounter Diagnosis     ICD-10-CM    1  Adhesive capsulitis of right shoulder  M75 01    2  Adhesive capsulitis of left shoulder  M75 02                   Subjective:  Patient reports he has been sore after each visit and his back has been more sore  He also reports he has increased to doing his HEP 3x per day and has seen some slight improvement with that  Objective:  See treatment diary below      Assessment:   Stage: chronic   Stability of Symptoms:  At Evaluation: stable - unchanged   Global Rating of Change:   Symptom Irritability Level: high   Primary Movement Diagnosis: (B) shoulder hypomobility   Patient Specific Functional Scale:   21: reach overhead without increased pain 010, return to basic housework 3/10, return to regular exercise program 0/10; Total 3/30  FOTO Prediction: 69 by visit 13  FOTO Progress: 43 at evaluation   Greatest Concern: lack of use of (R) arm  Current Activity Recommendations: added to HEP (); purchase shoulder pulleys, perform HEP 3x a day - explore if symptoms improve if painful at the start ()  Added strap stretch behind back for R shoulder IR (); Current Educational Needs: progressions    Patient continues to benefit from extensive manual therapy with PT mobs and stretching to continue to improve his ROM  He shows slight improvement on R side while L side is beginning to move well  R side still very painful with stretching, L minimal discomfort  Continues to tolerate the strengthening TE's well  Would continue to benefit from skilled therapy  Plan:  Continue with POC - continue joint mobilization and manual stretching to improve functional mobility            Daily Treatment Diary     DX: (R) shoulder adhesive capsulitis, (R) Shoulder OA, (L) shoulder adhesive capsulitis  EPOC: 21  Precautions: NA  CO-MORBIDITIES: L5/S1 fusion, spinal cord stimulator   HEP ACCESS CODE: [id number]      Manual  5/19 5/24 5/26 6/1 6/3    (B) Shoulder Mobs Post, Inf  Gr 2/3  7 min Grade 3 6'R, 2' L Post, Inf  Gr 3  8 min Post, Inf  Gr 3   8 min Post, Inf  Gr 3   ME PT    (B) Shoulder PROM 8 min 8' 7 min 7 min 7 min                                   Exercise Diary  HEP 5/19 5/24 5/26 6/1 6/3    Therapeutic Exercise                     Patricia Stretches  Flex  Scaption  ABD  2'/2'/2' 2'/2'/2' 2'/2'/2' 2'/2'/2' 2'/2'/2'              Table Slides  Flex  Scaption 5/11 20x ea 20 ea 20x ea 20x ea 20x ea    Cane ER Stretch 5/11 30"x3 30" 3 30"x3 30"x3 30"x3 ea    Pendulums  5/11 20x ea 2# 20 ea 3# 20x ea 3# 20x ea 3# 20x ea    Strap stretch into R shoulder IR behind back 5/24  10" 5 15"x5 15"x5     Shoulder Isometrics                    Standing Shoulder Flex           Standing Shoulder Scaption          Standing Shoulder ABD                    Neuromuscular Reeducation                    TB Sh' Row  GTB  20x  BTB 20 BTB  20x BTB  20x BTB  20x    TB Sh' Ext  GTB  20x BTB 20 BTB  20x BTB  20x BTB  20x    TB Triceps          TB Sh' ER   GTB 20 GTB  20x GTB  20x GTB  20x    TB Sh' IR                                                            Therapeutic Activity                              Gait Training                        Modalities

## 2021-06-07 ENCOUNTER — APPOINTMENT (OUTPATIENT)
Dept: PHYSICAL THERAPY | Facility: CLINIC | Age: 51
End: 2021-06-07
Payer: COMMERCIAL

## 2021-06-08 ENCOUNTER — TELEMEDICINE (OUTPATIENT)
Dept: GASTROENTEROLOGY | Facility: CLINIC | Age: 51
End: 2021-06-08
Payer: COMMERCIAL

## 2021-06-08 VITALS — HEIGHT: 72 IN | WEIGHT: 190 LBS | BODY MASS INDEX: 25.73 KG/M2

## 2021-06-08 DIAGNOSIS — K59.1 FUNCTIONAL DIARRHEA: ICD-10-CM

## 2021-06-08 DIAGNOSIS — K50.913 PERIANAL FISTULA DUE TO CROHN'S DISEASE (HCC): Primary | ICD-10-CM

## 2021-06-08 DIAGNOSIS — E55.9 VITAMIN D DEFICIENCY: ICD-10-CM

## 2021-06-08 DIAGNOSIS — Z86.010 HISTORY OF COLON POLYPS: ICD-10-CM

## 2021-06-08 PROCEDURE — 99214 OFFICE O/P EST MOD 30 MIN: CPT | Performed by: INTERNAL MEDICINE

## 2021-06-08 PROCEDURE — 3008F BODY MASS INDEX DOCD: CPT | Performed by: INTERNAL MEDICINE

## 2021-06-08 PROCEDURE — 1036F TOBACCO NON-USER: CPT | Performed by: INTERNAL MEDICINE

## 2021-06-08 RX ORDER — MELATONIN
2000 DAILY
Qty: 60 TABLET | Refills: 2 | Status: SHIPPED | OUTPATIENT
Start: 2021-06-08 | End: 2021-10-25

## 2021-06-08 RX ORDER — CHOLESTYRAMINE 4 G/9G
1 POWDER, FOR SUSPENSION ORAL 2 TIMES DAILY WITH MEALS
COMMUNITY
End: 2022-01-13 | Stop reason: SDUPTHER

## 2021-06-08 NOTE — PROGRESS NOTES
Virtual Regular Visit      Assessment/Plan:    Problem List Items Addressed This Visit        Digestive    Functional diarrhea       Other    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 ([facility] [address] [address] ) - Primary    Relevant Orders    ADALIMUMAB CONCENTRATION AND ANTI-ADALIMUMAB AB    Ambulatory referral to Dermatology    History of colon polyps    Vitamin D deficiency    Relevant Medications    cholecalciferol (VITAMIN D3) 1,000 units tablet               Reason for visit is   Chief Complaint   Patient presents with    Follow-up     Crohns; lab results    Virtual Regular Visit        Encounter provider Nicholas Kovacs MD    Provider located at [address] [address] [phone]      Recent Visits  No visits were found meeting these conditions  Showing recent visits within past 7 days and meeting all other requirements     Today's Visits  Date Type Provider Dept   06/08/21 Telemedicine Nicholas Kovacs MD [facility]   Showing today's visits and meeting all other requirements     Future Appointments  No visits were found meeting these conditions  Showing future appointments within next 150 days and meeting all other requirements        The patient was identified by name and date of birth  Nik Greenberg was informed that this is a telemedicine visit and that the visit is being conducted through [address] [place] [address] Now and patient was informed that this is a secure, HIPAA-compliant platform  He agrees to proceed     My office door was closed  No one else was in the room  He acknowledged consent and understanding of privacy and security of the video platform  The patient has agreed to participate and understands they can discontinue the visit at any time  Patient is aware this is a billable service  Emilie Ramirez is a 52 y o  male who presents today for follow-up    Patient was started on Humira November 17, 2020   No significant side effects  Overall diarrhea is better, although he is continuing to take the Questran 1 to 2 times a day  He admits that his bowel movements can be looser when he has dietary indiscretion, which his wife who is also by his side today admits to  Denies any rashes or joint pains  No fevers or chills  Moves his bowels 1-3 sometimes 4 times a day  Currently on vacation in [place] so admits he has had better bowel movements  No abdominal pain, nausea vomiting  No significant secretions from his perianal fistula  ASSESSMENT AND PLAN:      1  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 ([facility] [address] )  Crohn's disease, mainly ileal disease with perianal fistula  Colon looks okay  Currently on Humira and doing well  Will check drug levels  - recent labs with improvement on his inflammatory panel  Will repeat blood work in 6 months, December 2021     - ADALIMUMAB CONCENTRATION AND ANTI-ADALIMUMAB AB; Future    - Ambulatory referral to Dermatology; Future  - repeat colonoscopy with terminal ileum intubation in August of 2021, pending Humira drug levels  If the drug levels are still low, would advise increasing the Humira prior to rechecking the colonoscopy  2  Functional diarrhea  Continue Questran b i d , monitor dietary stimulants like dairy and gluten    3  Vitamin D deficiency  Continue vitamin-D supplementation  Recent blood work shows improvement in vitamin-D levels  - cholecalciferol (VITAMIN D3) 1,000 units tablet; Take 2 tablets (2,000 Units total) by mouth daily  Dispense: 60 tablet; Refill: 2    4   History of colon polyps  Repeat August 2021      Followup Appointment:  2 months for colonoscopy  ______________________________________________________________________      Historical Information   Past Medical History:   Diagnosis Date    Back pain     Colon polyp     Crohn's disease ([facility] )     GERD (gastroesophageal reflux disease)     Perianal fistula     Terminal ileitis [facility])      Past Surgical History:   Procedure Laterality Date    ABSCESS DRAINAGE      BACK SURGERY      COLONOSCOPY      HERNIA REPAIR      umbilical hernia    [place] PERCUT IMPLNT NEUROELECT,EPIDURAL Right 3/26/2021    Procedure: INSERTION THORACIC DORSAL COLUMN SPINAL CORD STIMULATOR PERCUTANEOUS W IMPLANTABLE PULSE GENERATOR, RIGHT;  Surgeon: Eyal Cat MD;  Location:  MAIN OR;  Service: Neurosurgery    SPINAL STIMULATOR PLACEMENT  2021     Social History     Substance and Sexual Activity   Alcohol Use Yes    Frequency: 2-3 times a week    Drinks per session: 1 or 2    Comment: "Couple of beers within the week"     Social History     Substance and Sexual Activity   Drug Use No    Comment: Denies     Social History     Tobacco Use   Smoking Status Former Smoker    Packs/day: 0 50    Types: Cigarettes    Start date:     Quit date: 2021    Years since quittin 3   Smokeless Tobacco Never Used   Tobacco Comment    Currently is using nicotine patches - quit 21     Family History   Problem Relation Age of Onset    Heart disease Father     Heart attack Father     Colon cancer Neg Hx     Colon polyps Neg Hx     Inflammatory bowel disease Neg Hx        Meds/Allergies       Current Outpatient Medications:     Adalimumab (Humira Pen) 40 MG/0 4ML PNKT    cholestyramine (QUESTRAN) 4 g packet    cyclobenzaprine (FLEXERIL) 10 mg tablet    naproxen (NAPROSYN) 500 mg tablet    nicotine (NICODERM CQ) 14 mg/24hr TD 24 hr patch    cholecalciferol (VITAMIN D3) 1,000 units tablet    No Known Allergies    PHYSICAL EXAM:    Height 6' (1 829 m), weight 86 2 kg (190 lb)  Body mass index is 25 77 kg/m²  Appearance and vitals taken from home devices    General Appearance:   Alert, cooperative, no distress   HEENT:  Normocephalic, atraumatic, anicteric  Neck supple, symmetrical, trachea midline  Lungs:   Equal chest rise and unlabored breathing, normal effort, no coughing     Cardiovascular: No visualized JVD or lower extremity edema  Abdomen:   No abdominal distension  Skin:   No jaundice, rashes, or lesions  Musculoskeletal:   Normal range of motion visualized  10 feet gait without difficulty and without assistive device  Psych:  Normal affect and normal insight  Neuro:  Alert and appropriate  Ox3         Lab Results:   Lab Results   Component Value Date    WBC 8 39 05/18/2021    HGB 17 9 (H) 05/18/2021    HCT 53 6 (H) 05/18/2021    MCV 98 05/18/2021     05/18/2021     Lab Results   Component Value Date     10/03/2016    K 4 0 05/18/2021     05/18/2021    CO2 28 05/18/2021    ANIONGAP 23 (H) 02/20/2015    BUN 9 05/18/2021    CREATININE 0 89 05/18/2021    GLUCOSE 110 02/20/2015    GLUF 85 05/18/2021    CALCIUM 9 7 05/18/2021    AST 20 05/18/2021    ALT 41 05/18/2021    ALKPHOS 95 05/18/2021    PROT 7 3 10/03/2016    BILITOT 0 8 10/03/2016    EGFR 101 05/18/2021     No results found for: IRON, TIBC, FERRITIN  Lab Results   Component Value Date    LIPASE 111 08/25/2020       Radiology Results:   No results found  REVIEW OF SYSTEMS:    CONSTITUTIONAL: Denies any fever, chills, rigors, and weight loss  HEENT: No earache or tinnitus  Denies hearing loss or visual disturbances  CARDIOVASCULAR: No chest pain or palpitations  RESPIRATORY: Denies any cough, hemoptysis, shortness of breath or dyspnea on exertion  GASTROINTESTINAL: As noted in the History of Present Illness  GENITOURINARY: No problems with urination  Denies any hematuria or dysuria  NEUROLOGIC: No dizziness or vertigo, denies headaches  MUSCULOSKELETAL: Denies any muscle or joint pain  SKIN: Denies skin rashes or itching  ENDOCRINE: Denies excessive thirst  Denies intolerance to heat or cold  PSYCHOSOCIAL: Denies depression or anxiety  Denies any recent memory loss         I spent 25 minutes directly with the patient during this visit      Nayan Earl acknowledges that he has consented to an online visit or consultation  He understands that the online visit is based solely on information provided by him, and that, in the absence of a face-to-face physical evaluation by the physician, the diagnosis he receives is both limited and provisional in terms of accuracy and completeness  This is not intended to replace a full medical face-to-face evaluation by the physician  Miesha Vieyra understands and accepts these terms

## 2021-06-08 NOTE — Clinical Note
Please forward me the Humira drug levels once available  Patient will get it drawn this week  Pending this, will schedule the colonoscopy

## 2021-06-10 ENCOUNTER — LAB (OUTPATIENT)
Dept: LAB | Facility: HOSPITAL | Age: 51
End: 2021-06-10
Attending: INTERNAL MEDICINE
Payer: COMMERCIAL

## 2021-06-10 ENCOUNTER — TELEPHONE (OUTPATIENT)
Dept: GASTROENTEROLOGY | Facility: CLINIC | Age: 51
End: 2021-06-10

## 2021-06-10 ENCOUNTER — OFFICE VISIT (OUTPATIENT)
Dept: PHYSICAL THERAPY | Facility: CLINIC | Age: 51
End: 2021-06-10
Payer: COMMERCIAL

## 2021-06-10 DIAGNOSIS — M19.011 PRIMARY OSTEOARTHRITIS OF RIGHT SHOULDER: ICD-10-CM

## 2021-06-10 DIAGNOSIS — K50.913 PERIANAL FISTULA DUE TO CROHN'S DISEASE (HCC): ICD-10-CM

## 2021-06-10 DIAGNOSIS — M75.02 ADHESIVE CAPSULITIS OF LEFT SHOULDER: ICD-10-CM

## 2021-06-10 DIAGNOSIS — M75.01 ADHESIVE CAPSULITIS OF RIGHT SHOULDER: Primary | ICD-10-CM

## 2021-06-10 PROCEDURE — 82397 CHEMILUMINESCENT ASSAY: CPT

## 2021-06-10 PROCEDURE — 97110 THERAPEUTIC EXERCISES: CPT

## 2021-06-10 PROCEDURE — 97112 NEUROMUSCULAR REEDUCATION: CPT

## 2021-06-10 PROCEDURE — 80145 DRUG ASSAY ADALIMUMAB: CPT

## 2021-06-10 PROCEDURE — 36415 COLL VENOUS BLD VENIPUNCTURE: CPT

## 2021-06-10 NOTE — TELEPHONE ENCOUNTER
----- Message from Becca Bansal MD sent at 6/8/2021  5:00 PM EDT -----  Please forward me the Humira drug levels once available  Patient will get it drawn this week  Pending this, will schedule the colonoscopy

## 2021-06-10 NOTE — PROGRESS NOTES
Daily Note     Today's date: 6/10/2021  Patient name: Elias Valdez  : 1970  MRN: [id number]  Referring provider: Abebe Fox DO  Dx:   Encounter Diagnosis     ICD-10-CM    1  Adhesive capsulitis of right shoulder  M75 01    2  Adhesive capsulitis of left shoulder  M75 02    3  Primary osteoarthritis of right shoulder  M19 011                   Subjective: Pt reports his L shldr has been "doing fine" and continues to have most pain/discomfort with his R shldr   Notes symptoms of R shldr have not got any worse, but have not improved much either  Admits that while he was in [place] for the past week, that he only performed is HEP 1x/day  Objective: See treatment diary below      Assessment:   Stage: chronic   Stability of Symptoms:  At Evaluation: stable - unchanged   Global Rating of Change:   Symptom Irritability Level: high   Primary Movement Diagnosis: (B) shoulder hypomobility   Patient Specific Functional Scale:   21: reach overhead without increased pain 0/10, return to basic housework 3/10, return to regular exercise program 0/10; Total 3/30  FOTO Prediction: 69 by visit 13  FOTO Progress: 43 at evaluation   Greatest Concern: lack of use of (R) arm  Current Activity Recommendations: added to HEP (); purchase shoulder pulleys, perform HEP 3x a day - explore if symptoms improve if painful at the start ()  Added strap stretch behind back for R shoulder IR (); Current Educational Needs: progressions      Tolerated treatment well  Continued with program as outlined below  Visible limitations in available PROM of both shldr; R more restricted than L  Pain/discomfort at end range R shldr flex/abd/ER with during manual stretching  Occasional verbal cues required for proper form/technique with assigned exercise throughout treatment    Patient would benefit from continued PT to further improve strength, decrease pain, an increase available ROM, in effort to maximize overall function  Plan: Continue per plan of care  Continue joint mobilization and manual stretching to improve functional mobility         Daily Treatment Diary     DX: (R) shoulder adhesive capsulitis, (R) Shoulder OA, (L) shoulder adhesive capsulitis  EPOC: 6/22/21  Precautions: NA  CO-MORBIDITIES: L5/S1 fusion, spinal cord stimulator   HEP ACCESS CODE: [id number]      Manual  5/19 5/24 5/26 6/1 6/3 6/10   (B) Shoulder Mobs Post, Inf  Gr 2/3  7 min Grade 3 6'R, 2' L Post, Inf  Gr 3  8 min Post, Inf  Gr 3   8 min Post, Inf  Gr 3   ME PT R shldr Post, Inf  Gr 3   5 min  ME PT   (B) Shoulder PROM 8 min 8' 7 min 7 min 7 min 8 min                                  Exercise Diary  HEP 5/19 5/24 5/26 6/1 6/3 6/10   Therapeutic Exercise                     Patricia Stretches  Flex  Scaption  ABD  2'/2'/2' 2'/2'/2' 2'/2'/2' 2'/2'/2' 2'/2'/2' 2'/2'/2'             Table Slides  Flex  Scaption 5/11 20x ea 20 ea 20x ea 20x ea 20x ea +abd  20x ea   Cane ER Stretch 5/11 30"x3 30" 3 30"x3 30"x3 30"x3 ea 30"x3 ea   Pendulums  5/11 20x ea 2# 20 ea 3# 20x ea 3# 20x ea 3# 20x ea 3# 20x ea   Strap stretch into R shoulder IR behind back 5/24  10" 5 15"x5 15"x5  15"x5   Shoulder Isometrics                    Standing Shoulder Flex           Standing Shoulder Scaption          Standing Shoulder ABD                    Neuromuscular Reeducation                    TB Sh' Row  GTB  20x  BTB 20 BTB  20x BTB  20x BTB  20x BTB  20x   TB Sh' Ext  GTB  20x BTB 20 BTB  20x BTB  20x BTB  20x BTB  20x   TB Triceps          TB Sh' ER   GTB 20 GTB  20x GTB  20x GTB  20x GTB  20x   TB Sh' IR                                                            Therapeutic Activity                              Gait Training                        Modalities

## 2021-06-14 ENCOUNTER — EVALUATION (OUTPATIENT)
Dept: PHYSICAL THERAPY | Facility: CLINIC | Age: 51
End: 2021-06-14
Payer: COMMERCIAL

## 2021-06-14 DIAGNOSIS — M19.011 PRIMARY OSTEOARTHRITIS OF RIGHT SHOULDER: ICD-10-CM

## 2021-06-14 DIAGNOSIS — M75.01 ADHESIVE CAPSULITIS OF RIGHT SHOULDER: Primary | ICD-10-CM

## 2021-06-14 DIAGNOSIS — M75.02 ADHESIVE CAPSULITIS OF LEFT SHOULDER: ICD-10-CM

## 2021-06-14 PROCEDURE — 97140 MANUAL THERAPY 1/> REGIONS: CPT | Performed by: PHYSICAL THERAPIST

## 2021-06-14 PROCEDURE — 97110 THERAPEUTIC EXERCISES: CPT | Performed by: PHYSICAL THERAPIST

## 2021-06-14 NOTE — PROGRESS NOTES
PT Evaluation     Today's date: 2021  Patient name: Tim Fernando  : 1970  MRN: [id number]  Referring provider: Tracey Sabillon DO  Dx:   Encounter Diagnosis     ICD-10-CM    1  Adhesive capsulitis of right shoulder  M75 01    2  Adhesive capsulitis of left shoulder  M75 02    3  Primary osteoarthritis of right shoulder  M19 011                   Assessment  Assessment details: Patient has attended 9 PT treatment sessions from 21 to 21  Since initial evaluation patient displays with objective improvements with (L) shoulder pain levels, strength, ROM, and function and slight improvements with (R) shoulder ROM  Patient has achieved a 1 point increase on the Global Rating of Change scale  Patient demonstrates a 2 point improvement on the Patient Specific Functional Scale and a 19 point improvement on FOTO indicating slight progress towards his goals  Patient continues to have significant ROM restrictions although there are some objective improvements  At this time he has requested to transition to an I HEP and check back in with PT in 6 weeks  Please contact me if you have any questions ([phone])  Thank you for the opportunity to share in the care of this patient       Impairments: abnormal coordination, abnormal muscle tone, abnormal or restricted ROM, abnormal movement, activity intolerance, impaired physical strength, lacks appropriate home exercise program, pain with function and poor posture     Symptom irritability: highUnderstanding of Dx/Px/POC: good   Prognosis: fair  Prognosis details: Positive prognostic indicators include positive attitude toward recovery, motivated to improve  Negative prognostic indicators include chronicity of symptoms, high symptom irritability, fear avoidance, catastrophizing, low self-efficacy, anxiety, poor understanding of condition, poor expectations       Goals  STG to be met in 3 weeks:  - Increase (R) Shoulder AROM: Flexion to 110 degrees, ABD to 100 degrees, Functional ER to C5, Functional IR to L5  - in progress  - Increase (B) UE strength by 1/2 MMT grade  - in progress  - I with HEP  - met  - Patient will be able to reach overhead  - in progress  LTG to be met in 6 weeks:  - Increase (R) Shoulder AROM: Flexion to 120 degrees, ABD to 120 degrees, Functional ER to C7, Functional IR to L2   - Increase (B) UE strength to 4-4+/5 all planes  - I with updated HEP  - met  - Patient will be able to perform basic housework  - Patient will be able to perform regular exercise program         Plan  Plan details: DC to I HEP at this time    Patient would benefit from: skilled physical therapy  Planned modality interventions: cryotherapy and thermotherapy: hydrocollator packs  Planned therapy interventions: joint mobilization, activity modification, manual therapy, motor coordination training, neuromuscular re-education, body mechanics training, patient education, postural training, strengthening, stretching, therapeutic activities, therapeutic exercise, functional ROM exercises and home exercise program  Treatment plan discussed with: patient and PTA        Subjective Evaluation    History of Present Illness  Mechanism of injury: At Evaluation (May 11, 2021): Patient reports insidious onset of (R) shoulder pain in March  He saw Dr Suzi Gamino - had an x-ray and cortisone injection was performed  He reports later that day he fell twice slipping on ice  He returned to see Dr Suzi Gamino - repeat x-ray was performed  PT was recommended - delayed secondary to spinal surgery for stimulator        Red Flag Screen:  Patient denies recent fever, headache, dizziness, nausea, vomiting, or vision changes   Patient reports weakness with gripping and (B) hand numbness and tingling       Greatest concern: lack of use of (R) arm    (6/14/21): Patient reports since starting PT patient sees slight improvement in (R) shoulder mobility, significant improvement with (L) shoulder strength, mobility, and function  Global Rating of Change: +1  He reports his (L) shoulder is back to normal   He reports his (R) shoulder continues to be very painful and limited with mobility  He continues to perform his HEP regularly  Quality of life: good    Pain  Current pain ratin  At best pain ratin  At worst pain rating: 10  Location: (R) Shoulder   Quality: dull ache, sharp and radiating    Social Support  Lives with: spouse    Employment status: not working  Hand dominance: left      Diagnostic Tests  X-ray: normal  Treatments  Previous treatment: injection treatment  Patient Goals  Patient goal: Patient Specific Functional Scale: reach overhead without increased pain 2/10, return to basic housework 3/10, return to regular exercise program 0/10; Total         Objective     Static Posture     Head  Forward  Shoulders  Rounded  Comments  Apparent flexion contracture (B) 5th and 4th flexor tendons - consistent with Dupuytren's contracture      Postural Observations  Seated posture: fair  Standing posture: fair        Palpation     Additional Palpation Details  TTP (R)>(L) anterior shoulder     Cervical/Thoracic Screen   Cervical range of motion within normal limits with the following exceptions:  WNL - no reproduction of shoulder pain     Neurological Testing     Reflexes   Left   Biceps (C5/C6): normal (2+)  Brachioradialis (C6): normal (2+)  Triceps (C7): normal (2+)  Storm's reflex: negative    Right   Biceps (C5/C6): normal (2+)  Brachioradialis (C6): normal (2+)  Triceps (C7): normal (2+)  Storm's reflex: negative    Active Range of Motion   Left Shoulder   Flexion: 135 degrees   Extension: 50 degrees   Abduction: 105 degrees   External rotation BTH: T2   Internal rotation BTB: L1     Right Shoulder   Flexion: 95 degrees with pain  Extension: 50 degrees with pain  Abduction: 85 degrees with pain  External rotation BTH: C5 with pain  Internal rotation BTB: sacrum with pain    Passive Range of Motion     Right Shoulder   Flexion: 105 degrees with pain  Abduction: 95 degrees with pain  External rotation 0°: 25 degrees with pain  External rotation 45°: 45 degrees with pain    Joint Play     Right Shoulder  Hypomobile in the anterior capsule, posterior capsule and inferior capsule  Strength/Myotome Testing     Left Shoulder     Planes of Motion   Flexion: 4   Extension: 4+   Abduction: 4   External rotation at 0°: 4+   Internal rotation at 0°: 4+     Isolated Muscles   Biceps: 4+   Triceps: 4+     Right Shoulder     Planes of Motion   Flexion: 4-   Extension: 4+   Abduction: 4-   External rotation at 0°: 4+   Internal rotation at 0°: 4+     Isolated Muscles   Biceps: 4+   Triceps: 4+     Additional Strength Details  Resistance applied in available range     Tests     Left Shoulder   Negative empty can, full can and Hawkin's  Right Shoulder   Positive empty can, full can and Hawkin's  Daily Treatment Diary     DX: (R) shoulder adhesive capsulitis, (R) Shoulder OA, (L) shoulder adhesive capsulitis  EPOC: 6/22/21  Precautions: NA  CO-MORBIDITIES: L5/S1 fusion, spinal cord stimulator   HEP ACCESS CODE: [id number]    Stage: chronic   Stability of Symptoms:  At Evaluation: stable - unchanged   Global Rating of Change:   Symptom Irritability Level: high   Primary Movement Diagnosis: (B) shoulder hypomobility   Patient Specific Functional Scale:   5/11/21: reach overhead without increased pain 0/10, return to basic housework 3/10, return to regular exercise program 0/10; Total 3/30  6/14/21: reach overhead without increased pain 2/10, return to basic housework 3/10, return to regular exercise program 0/10;  Total 5/30  FOTO Prediction: 69 by visit 13  FOTO Progress: 43 at evaluation; 6/14/21:   Greatest Concern: lack of use of (R) arm  Current Activity Recommendations: added to HEP (5/11); purchase shoulder pulleys, perform HEP 3x a day - explore if symptoms improve if painful at the start (5/19)  Added strap stretch behind back for R shoulder IR (5/24);    Current Educational Needs: progressions      Manual  6/1 6/3 6/10 6/14     (B) Shoulder Mobs Post, Inf  Gr 3   8 min Post, Inf  Gr 3   ME PT R shldr Post, Inf  Gr 3   5 min  ME PT (R) Sh' Post, Inf  Gr 3  5 min     (B) Shoulder PROM 7 min 7 min 8 min 3 min                                    Exercise Diary  HEP 6/1 6/3 6/10 6/14     Therapeutic Exercise                     Patricia Stretches  Flex  Scaption  ABD  2'/2'/2' 2'/2'/2' 2'/2'/2'                Table Slides  Flex  Scaption 5/11 20x ea 20x ea +abd  20x ea      Cane ER Stretch 5/11 30"x3 30"x3 ea 30"x3 ea      Pendulums  5/11 3# 20x ea 3# 20x ea 3# 20x ea      Strap stretch into R shoulder IR behind back 5/24 15"x5  15"x5      Shoulder Isometrics                    Standing Shoulder Flex           Standing Shoulder Scaption          Standing Shoulder ABD                    Neuromuscular Reeducation                    TB Sh' Row  BTB  20x BTB  20x BTB  20x      TB Sh' Ext  BTB  20x BTB  20x BTB  20x      TB Triceps          TB Sh' ER  GTB  20x GTB  20x GTB  20x      TB Sh' IR                                                            Therapeutic Activity                              Gait Training                        Modalities

## 2021-06-18 LAB
ADALIMUMAB AB SERPL-MCNC: 658 NG/ML
ADALIMUMAB SERPL-MCNC: 0.7 UG/ML

## 2021-06-18 NOTE — TELEPHONE ENCOUNTER
If low, I wanted to increase the Humira dosing  However, at this point, I can address when I get back

## 2021-06-18 NOTE — TELEPHONE ENCOUNTER
Dr Willi Carey - if this comes back while you are gone - any recommendations or just tell the patient you will advise upon your return?

## 2021-06-22 ENCOUNTER — TELEPHONE (OUTPATIENT)
Dept: OBGYN CLINIC | Facility: HOSPITAL | Age: 51
End: 2021-06-22

## 2021-06-22 NOTE — TELEPHONE ENCOUNTER
Please add this patient on to Jessi's schedule this Friday, what ever time slot the patient would like   Thank you

## 2021-06-22 NOTE — TELEPHONE ENCOUNTER
Patient sees Dr Laurita Claudio    Patient called stating that after 6 weeks of PT, his right shoulder is still in a lot of pain  Patient is calling to find out if the doctor would consider giving the patient a cortisone injection      Call back # [phone]

## 2021-06-22 NOTE — TELEPHONE ENCOUNTER
Called patient to schedule  Dr Candie Zhou soonest appointment is for 7/15  Patient is looking for a sooner appointment than that  Would it be okay to schedule with Dr Saul Lambert in [place]?     Call back # [phone]

## 2021-06-23 NOTE — TELEPHONE ENCOUNTER
Pt advised results are here and Dr Rosetta Calvo is away but he should hear something next week on her return

## 2021-06-25 ENCOUNTER — OFFICE VISIT (OUTPATIENT)
Dept: OBGYN CLINIC | Facility: CLINIC | Age: 51
End: 2021-06-25
Payer: COMMERCIAL

## 2021-06-25 VITALS
WEIGHT: 190 LBS | HEART RATE: 93 BPM | DIASTOLIC BLOOD PRESSURE: 81 MMHG | SYSTOLIC BLOOD PRESSURE: 132 MMHG | BODY MASS INDEX: 25.73 KG/M2 | HEIGHT: 72 IN

## 2021-06-25 DIAGNOSIS — M75.01 ADHESIVE CAPSULITIS OF RIGHT SHOULDER: Primary | ICD-10-CM

## 2021-06-25 PROCEDURE — 20610 DRAIN/INJ JOINT/BURSA W/O US: CPT | Performed by: ORTHOPAEDIC SURGERY

## 2021-06-25 PROCEDURE — 99213 OFFICE O/P EST LOW 20 MIN: CPT | Performed by: ORTHOPAEDIC SURGERY

## 2021-06-25 RX ORDER — METHYLPREDNISOLONE ACETATE 40 MG/ML
1 INJECTION, SUSPENSION INTRA-ARTICULAR; INTRALESIONAL; INTRAMUSCULAR; SOFT TISSUE
Status: COMPLETED | OUTPATIENT
Start: 2021-06-25 | End: 2021-06-25

## 2021-06-25 RX ORDER — LIDOCAINE HYDROCHLORIDE 10 MG/ML
3 INJECTION, SOLUTION INFILTRATION; PERINEURAL
Status: COMPLETED | OUTPATIENT
Start: 2021-06-25 | End: 2021-06-25

## 2021-06-25 RX ADMIN — METHYLPREDNISOLONE ACETATE 1 ML: 40 INJECTION, SUSPENSION INTRA-ARTICULAR; INTRALESIONAL; INTRAMUSCULAR; SOFT TISSUE at 11:24

## 2021-06-25 RX ADMIN — LIDOCAINE HYDROCHLORIDE 3 ML: 10 INJECTION, SOLUTION INFILTRATION; PERINEURAL at 11:24

## 2021-06-25 NOTE — PROGRESS NOTES
Ortho Sports Medicine Shoulder Follow Up Visit     Assesment:    52 y o  male right shoulder ahesive capsulitis    Plan:    Conservative treatment:    Ice to shoulder 1-2 times daily, for 20 minutes at a time  PT for ROM and strengthening to shoulder, rotator cuff, scapular stabilizers  Imaging: All imaging from today was reviewed by myself and explained to the patient  Injection:    A corticosteroid injection of the glenohumeral joint was performed  The patient should take 1-2 days off of activity, with gradual return to activity as tolerated  Ice to the shoulder 1-2 times daily for 20 minutes, for next 24-48 hrs  Surgery:     No surgery is recommended at this point, continue with conservative treatment plan as noted  Follow up:    Return if symptoms worsen or fail to improve  No improvement then CT arthrogram to r/o rotator cuff pathology      Chief Complaint   Patient presents with    Right Shoulder - Pain         History of Present Illness: The patient is returns for follow up of  Right shoulder  Since the prior visit, He reports  minimalimprovement  he notes lack of improvement of range of motion   pain is 4/10  Worse at endpoints of range of motion  He denies any numbness or tingling  Denies other areas complaints       Shoulder Surgical History:  None    Past Medical, Social and Family History:  Past Medical History:   Diagnosis Date    Back pain     Colon polyp     Crohn's disease ([facility] [address] [facility]     GERD (gastroesophageal reflux disease)     Perianal fistula     Terminal ileitis ([facility] [address] )      Past Surgical History:   Procedure Laterality Date    ABSCESS DRAINAGE      BACK SURGERY  2019    COLONOSCOPY      HERNIA REPAIR      umbilical hernia    ID PERCUT IMPLNT NEUROELECT,EPIDURAL Right 3/26/2021    Procedure: INSERTION THORACIC DORSAL COLUMN SPINAL CORD STIMULATOR PERCUTANEOUS W IMPLANTABLE PULSE GENERATOR, RIGHT;  Surgeon: Saintclair Sports, MD;  Location:  MAIN OR;  Service: Neurosurgery    SPINAL STIMULATOR PLACEMENT  2021     No Known Allergies  Current Outpatient Medications on File Prior to Visit   Medication Sig Dispense Refill    Adalimumab (Humira Pen) 40 MG/0 4ML PNKT Citrate Free Maintenance Dose: Inject (1) 40mg/0 4ml under the skin every other week 2 each 12    cholecalciferol (VITAMIN D3) 1,000 units tablet Take 2 tablets (2,000 Units total) by mouth daily 60 tablet 2    cholestyramine (QUESTRAN) 4 g packet Take 1 packet by mouth 2 (two) times a day with meals      naproxen (NAPROSYN) 500 mg tablet Take 1 tablet (500 mg total) by mouth 2 (two) times a day with meals 60 tablet 0    cyclobenzaprine (FLEXERIL) 10 mg tablet Take 1 tablet (10 mg total) by mouth 3 (three) times a day as needed for muscle spasms (Patient not taking: Reported on 2021) 30 tablet 0    nicotine (NICODERM CQ) 14 mg/24hr TD 24 hr patch Place 1 patch on the skin every 24 hours (Patient not taking: Reported on 2021) 28 patch 5     No current facility-administered medications on file prior to visit       Social History     Socioeconomic History    Marital status: /Civil Union     Spouse name: Not on file    Number of children: Not on file    Years of education: Not on file    Highest education level: Not on file   Occupational History    Not on file   Tobacco Use    Smoking status: Former Smoker     Packs/day: 0 50     Types: Cigarettes     Start date:      Quit date: 2021     Years since quittin 3    Smokeless tobacco: Never Used    Tobacco comment: Currently is using nicotine patches - quit 21   Vaping Use    Vaping Use: Never used   Substance and Sexual Activity    Alcohol use: Yes     Comment: "Couple of beers within the week"    Drug use: No     Comment: Denies    Sexual activity: Not on file   Other Topics Concern    Not on file   Social History Narrative    Not on file     Social Determinants of Health     Financial Resource Strain:    Marcelina Thornton Difficulty of Paying Living Expenses:    Food Insecurity:     Worried About Running Out of Food in the Last Year:     [address] in the Last Year:    Transportation Needs:     Lack of Transportation (Medical):  Lack of Transportation (Non-Medical):    Physical Activity:     Days of Exercise per Week:     Minutes of Exercise per Session:    Stress:     Feeling of Stress :    Social Connections:     Frequency of Communication with Friends and Family:     Frequency of Social Gatherings with Friends and Family:     Attends Religion Services:     Active Member of Clubs or Organizations:     Attends Club or Organization Meetings:     Marital Status:    Intimate Partner Violence:     Fear of Current or Ex-Partner:     Emotionally Abused:     Physically Abused:     Sexually Abused:        I have reviewed the past medical, surgical, social and family history, medications and allergies as documented in the EMR  Review of systems: ROS is negative other than that noted in the HPI  Constitutional: Negative for fatigue and fever  Physical Exam:    Blood pressure 132/81, pulse 93, height 6' (1 829 m), weight 86 2 kg (190 lb)      General/Constitutional: NAD, well developed, well nourished  HENT: Normocephalic, atraumatic  CV: Intact distal pulses, regular rate  Resp: No respiratory distress or labored breathing  Lymphatic: No lymphadenopathy palpated  Neuro: Alert and Oriented x 3, no focal deficits  Psych: Normal mood, normal affect, normal judgement, normal behavior  Skin: Warm, dry, no rashes, no erythema      Shoulder focused exam:       RIGHT LEFT    Scapula Atrophy Negative Negative     Winging Negative Negative     Protraction Negative Negative    Rotator cuff SS 5/5 5/5     IS 5/5 5/5     SubS 5/5 5/5    ROM FF   Ninety active and passive    170     ER0  45  Active and passive 60                         TTP: AC Negative Negative     Biceps Negative Negative     Coracoid Negative Negative Special Tests: O'Briens Negative Negative     Polo-shear Negative Negative     Cross body Adduction Negative Negative     Speeds  Negative Negative     Henrique's Negative Negative     Whipple Negative Negative       Neer Negative Negative     Carr Negative Negative    Instability: Apprehension & relocation not tested not tested     Load & shift not tested not tested    Other: Crank Negative Negative               UE NV Exam: +2 Radial pulses bilaterally  Sensation intact to light touch C5-T1 bilaterally, Radial/median/ulnar nerve motor intact    Cervical ROM is full without pain, numbness or tingling      Shoulder Imaging    No imaging was performed today      Large joint arthrocentesis: R glenohumeral  Universal Protocol:  Consent given by: patient  Time out: Immediately prior to procedure a "time out" was called to verify the correct patient, procedure, equipment, support staff and site/side marked as required    Timeout called at: 6/25/2021 11:23 AM   Patient understanding: patient states understanding of the procedure being performed  Site marked: the operative site was marked  Patient identity confirmed: verbally with patient    Supporting Documentation  Indications: pain   Procedure Details  Location: shoulder - R glenohumeral  Preparation: Patient was prepped and draped in the usual sterile fashion  Needle size: 22 G  Ultrasound guidance: no  Approach: anterior  Medications administered: 3 mL lidocaine 1 %; 1 mL methylPREDNISolone acetate 40 mg/mL    Patient tolerance: patient tolerated the procedure well with no immediate complications  Dressing:  Sterile dressing applied

## 2021-06-28 NOTE — TELEPHONE ENCOUNTER
Pt left message he needs to order his Humira and needs to know if his dosing will change based on his labs?

## 2021-06-29 ENCOUNTER — TELEPHONE (OUTPATIENT)
Dept: GASTROENTEROLOGY | Facility: CLINIC | Age: 51
End: 2021-06-29

## 2021-06-29 NOTE — TELEPHONE ENCOUNTER
Drug level is low and antibody level is high  This is concerning that his body is not optimally utilizing the Humira  However, since he is clinically improved otherwise, I'd like for him to continue on the current dose for now, and schedule the colonoscopy  Pending colonoscopy findings, I may switch the Humira to Stelara with azathioprine 50mg daily  But he'll need an OFV and a follow up at that point to review everything  Thanks

## 2021-06-30 NOTE — TELEPHONE ENCOUNTER
Patient advised as per Dr Kulwinder Mcclain  Will route message to procedure scheduling to get colonoscopy set up

## 2021-07-02 ENCOUNTER — OFFICE VISIT (OUTPATIENT)
Dept: OBGYN CLINIC | Facility: HOSPITAL | Age: 51
End: 2021-07-02
Payer: COMMERCIAL

## 2021-07-02 ENCOUNTER — NURSE TRIAGE (OUTPATIENT)
Dept: PHYSICAL THERAPY | Facility: OTHER | Age: 51
End: 2021-07-02

## 2021-07-02 VITALS
SYSTOLIC BLOOD PRESSURE: 135 MMHG | HEART RATE: 89 BPM | DIASTOLIC BLOOD PRESSURE: 89 MMHG | HEIGHT: 73 IN | WEIGHT: 189.2 LBS | BODY MASS INDEX: 25.08 KG/M2

## 2021-07-02 DIAGNOSIS — M54.2 ACUTE NECK PAIN: Primary | ICD-10-CM

## 2021-07-02 DIAGNOSIS — M54.12 CERVICAL RADICULOPATHY: ICD-10-CM

## 2021-07-02 DIAGNOSIS — M72.0 DUPUYTREN'S CONTRACTURE OF BOTH HANDS: Primary | ICD-10-CM

## 2021-07-02 PROCEDURE — 1036F TOBACCO NON-USER: CPT | Performed by: ORTHOPAEDIC SURGERY

## 2021-07-02 PROCEDURE — 99204 OFFICE O/P NEW MOD 45 MIN: CPT | Performed by: ORTHOPAEDIC SURGERY

## 2021-07-02 PROCEDURE — 3008F BODY MASS INDEX DOCD: CPT | Performed by: ORTHOPAEDIC SURGERY

## 2021-07-02 NOTE — PROGRESS NOTES
ASSESSMENT/PLAN:    Assessment:   Dupuytrens Disease of the  bilateral  hand with right hand long, ring and small finger contractures and left hand ring and small finger contractures  Cervical radiculopathy     Plan: It was discussed with the patient that he is not a candidate for Xiaflex at this time  Surgical intervention would be recommended  However we did discuss prior tries in his cervical radiculopathy at this time  A referral was placed in the patient's chart for [facility]  We will see him back as needed for his bilateral hand Dupuytren's  Follow Up:  PRN    To Do Next Visit:       General Discussions:     Dupuytren's Disease: The anatomy and physiology of Dupuytren's disease were discussed with the patient today in the office  Increased scar formation within the interval between the skin volarly and the flexor tendons dorsally can result in pit formation, nodular formation, or eventual cord formation  These pathologic cords can cause contracture at either the metacarpophalangeal joint, proximal interphalangeal joint, or both  As the cords progress towards the proximal interphalangeal joint, the neurovascular structures of the finger may become involved within the disease process  While this is a genetic condition, variable penetrance occurs within family trees  Conservative treatment options including therapy to maintain joint mobility and a tabletop test were discussed  Other treatments include Xiaflex and possible surgical intervention  Operative Discussions:       _____________________________________________________  CHIEF COMPLAINT:  Chief Complaint   Patient presents with    Left Hand - Numbness    Right Hand - Numbness, Tingling         SUBJECTIVE:  Rose Posada is a 52 y o  male who presents with Pain  Severe  Intermittant  Burning and Numbness to the bilateral hand  This started  2 year(s) ago as Insidious    Patient is unable to tie his shoes or drawstrings on his shorts  He is also unable to grab objects such as cups and bowls and plates  Patient states that his right hand is worse than left  The pain is waking him from sleep at night  Patient describes a positive flick sign and reports rubbing his hands for relief  Patient also states that he has periscapular spasms  He did go through 6 weeks of physical therapy for a right frozen shoulder without relief of his symptoms  Radiation: Yes to the  bilateral hands  Previous Treatments: bracing with only partial relief  Associated symptoms: No Complaints  Handedness: left  Work status: currently not working  Patient was a        PAST MEDICAL HISTORY:  Past Medical History:   Diagnosis Date    Back pain     Colon polyp     [facility] [address]     GERD (gastroesophageal reflux disease)     Perianal fistula     Terminal ileitis ([facility] [address]        PAST SURGICAL HISTORY:  Past Surgical History:   Procedure Laterality Date    ABSCESS DRAINAGE      BACK SURGERY      COLONOSCOPY      HERNIA REPAIR      umbilical hernia    [place] PERCUT IMPLNT NEUROELECT,EPIDURAL Right 3/26/2021    Procedure: INSERTION THORACIC DORSAL COLUMN SPINAL CORD STIMULATOR PERCUTANEOUS W IMPLANTABLE PULSE GENERATOR, RIGHT;  Surgeon: Jose Lopez MD;  Location:  MAIN OR;  Service: Neurosurgery    SPINAL STIMULATOR PLACEMENT  2021       FAMILY HISTORY:  Family History   Problem Relation Age of Onset    Heart disease Father     Heart attack Father     Colon cancer Neg Hx     Colon polyps Neg Hx     Inflammatory bowel disease Neg Hx        SOCIAL HISTORY:  Social History     Tobacco Use    Smoking status: Former Smoker     Packs/day: 0 50     Types: Cigarettes     Start date:      Quit date: 2021     Years since quittin 4    Smokeless tobacco: Never Used    Tobacco comment: Currently is using nicotine patches - quit 21   Vaping Use    Vaping Use: Never used   Substance Use Topics    Alcohol use: Yes     Comment: "Couple of beers within the week"    Drug use: No     Comment: Denies       MEDICATIONS:    Current Outpatient Medications:     Adalimumab (Humira Pen) 40 MG/0 4ML PNKT, Citrate Free Maintenance Dose: Inject (1) 40mg/0 4ml under the skin every other week, Disp: 2 each, Rfl: 12    cholecalciferol (VITAMIN D3) 1,000 units tablet, Take 2 tablets (2,000 Units total) by mouth daily, Disp: 60 tablet, Rfl: 2    cholestyramine (QUESTRAN) 4 g packet, Take 1 packet by mouth 2 (two) times a day with meals, Disp: , Rfl:     naproxen (NAPROSYN) 500 mg tablet, Take 1 tablet (500 mg total) by mouth 2 (two) times a day with meals, Disp: 60 tablet, Rfl: 0    cyclobenzaprine (FLEXERIL) 10 mg tablet, Take 1 tablet (10 mg total) by mouth 3 (three) times a day as needed for muscle spasms (Patient not taking: Reported on 6/25/2021), Disp: 30 tablet, Rfl: 0    nicotine (NICODERM CQ) 14 mg/24hr TD 24 hr patch, Place 1 patch on the skin every 24 hours (Patient not taking: Reported on 6/25/2021), Disp: 28 patch, Rfl: 5    ALLERGIES:  No Known Allergies    REVIEW OF SYSTEMS:  Pertinent items are noted in HPI      LABS:  HgA1c:   Lab Results   Component Value Date    HGBA1C 5 2 03/12/2021     BMP:   Lab Results   Component Value Date    GLUCOSE 110 02/20/2015    CALCIUM 9 7 05/18/2021     10/03/2016    K 4 0 05/18/2021    CO2 28 05/18/2021     05/18/2021    BUN 9 05/18/2021    CREATININE 0 89 05/18/2021         _____________________________________________________  PHYSICAL EXAMINATION:  Vital signs: /89   Pulse 89   Ht 6' 0 75" (1 848 m)   Wt 85 8 kg (189 lb 3 2 oz)   BMI 25 13 kg/m²   General: well developed and well nourished, alert, oriented times 3 and appears comfortable  Psychiatric: Normal  HEENT: Trachea Midline, No torticollis  Cardiovascular: No discernable arrhythmia  Pulmonary: No wheezing or stridor  Abdomen: No rebound or guarding  Extremities: No peripheral edema  Skin: No Erythema  Neurovascular: Pulses Intact    MUSCULOSKELETAL EXAMINATION:  LEFT SIDE:  Cervical Spine:  Negative Spurling's and Negative Lhermitte's deltoid 5/5, biceps 4+/5, triceps 5/5, wrist flexion 5/5, wrist extension 5/5, full elbow and wrist ROM, AIN 4+/5, intrinsic 2/5, apb 4/5, negative hoffmans, no hyperreflexia, no rigidity or cogwheeling   Dupuytrens to ring and small finger  Central cords to ring and small finger with MP jt contracture to ring finger of 10 degrees, small finger MP jt contracture of 30 degrees     RIGHT SIDE:  Negative Spurling's and Negative Lhermitte's  deltoid 5/5, biceps 4/5, triceps 4+/5, wrist flexion 4+/5, wrist extension 5/5, full elbow and wrist ROM, AIN 4+/5, intrinsic 2/5, apb 4/5, biceps reflex normal, negative hoffmans, no hyperreflexia,no rigidity or cogwheeling, periscapular pain  Dupuytrens to long, ring and small finger  Central cords to long ring and small finger with MP jt contracture to long finger of 10 degrees, ring 30 degrees and small finger 30 degrees  There is also a radial spiral cord to small finger with a PIP jt contracture of 45 degrees       _____________________________________________________  STUDIES REVIEWED:  EMG: done on 5/25/2921 demonstrates a normal exam        PROCEDURES PERFORMED:  Procedures  No Procedures performed today   Scribe Attestation    I,:  Ayad Bourne am acting as a scribe while in the presence of the attending physician :       I,:  Leonardo Lowe MD personally performed the services described in this documentation    as scribed in my presence :

## 2021-07-06 ENCOUNTER — TELEPHONE (OUTPATIENT)
Dept: PHYSICAL THERAPY | Facility: OTHER | Age: 51
End: 2021-07-06

## 2021-07-06 ENCOUNTER — TELEPHONE (OUTPATIENT)
Dept: OBGYN CLINIC | Facility: HOSPITAL | Age: 51
End: 2021-07-06

## 2021-07-06 NOTE — TELEPHONE ENCOUNTER
Spoke to patient  He stated he needs to be seen for his neck and shoulder  Advised that he should see [facility] for that  Advised we can see him back for Dupuytren's  He stated he will call back when he is ready to see us again for that

## 2021-07-06 NOTE — TELEPHONE ENCOUNTER
We are happy to see him for the Dupuytren's disease  It looks like he does not want to see spine and pain for the neck at this time

## 2021-07-06 NOTE — TELEPHONE ENCOUNTER
Patient has been to PT and been to Pain management before  Patient was referred to [facility] by Dr Ronna Newman  He isn't understanding why they're sending him back when Dr Ronna Newman wanted him to see a spine specialist   Aurelio Newman is out of the office this week      Callback

## 2021-07-06 NOTE — TELEPHONE ENCOUNTER
Nurse returned pts call from earlier today  Discussed pts current needs and he does not wish to schedule with [facility] for his neck  Triage began last Friday was finished/addended  Referral entered for [facility] and contact info given to him as well    Please see Triage note for [facility] program

## 2021-07-22 ENCOUNTER — TELEPHONE (OUTPATIENT)
Dept: OBGYN CLINIC | Facility: CLINIC | Age: 51
End: 2021-07-22

## 2021-07-22 NOTE — TELEPHONE ENCOUNTER
Spoke to patient  He stated he got no relief form the CSI given at his last appointment  He reports that he is taking tylenol currently but it is not helping  Patient is scheduled on 8/12 with [facility] for his neck and shoulder pain  Advised that if he is being seen by that physician this may be who he needs to wait and see for further eval as the injection did not help  He is seeing them on 8/12  Patient advised that he should limit tylenol to 1000 mg Q8h, no more than 3000 mg in 24 hrs  May take advil OR aleve as directed on the bottle as long as he is able, denies taking naproxen currently  Advised ice/heat 20 mins on 20 mins off and rest when needed  Please advise if you would like to see him in [facility] before he sees [facility]   Thanks

## 2021-07-22 NOTE — TELEPHONE ENCOUNTER
I agree with your assessment  Please advise that we would like the patient to follow-up with spine and pain in that if he does not have improvement we would like to see him back in the office   Thank you

## 2021-07-22 NOTE — TELEPHONE ENCOUNTER
Patient sees Dr Bowen for his R shoulder  He is calling in requesting a call back  He states he received a CSI at his last visit and is still experiencing a lot of pain  He would like to know what can he do?        Please advise,     Flako#: [phone]

## 2021-08-02 ENCOUNTER — OFFICE VISIT (OUTPATIENT)
Dept: FAMILY MEDICINE CLINIC | Facility: CLINIC | Age: 51
End: 2021-08-02
Payer: COMMERCIAL

## 2021-08-02 VITALS
DIASTOLIC BLOOD PRESSURE: 78 MMHG | BODY MASS INDEX: 25.45 KG/M2 | SYSTOLIC BLOOD PRESSURE: 128 MMHG | OXYGEN SATURATION: 99 % | HEIGHT: 73 IN | HEART RATE: 69 BPM | RESPIRATION RATE: 16 BRPM | WEIGHT: 192 LBS

## 2021-08-02 DIAGNOSIS — M51.37 DEGENERATIVE DISC DISEASE AT L5-S1 LEVEL: Primary | ICD-10-CM

## 2021-08-02 DIAGNOSIS — M54.50 CHRONIC BILATERAL LOW BACK PAIN WITHOUT SCIATICA: ICD-10-CM

## 2021-08-02 DIAGNOSIS — G89.4 CHRONIC PAIN SYNDROME: ICD-10-CM

## 2021-08-02 DIAGNOSIS — F51.01 PRIMARY INSOMNIA: ICD-10-CM

## 2021-08-02 DIAGNOSIS — G89.29 CHRONIC BILATERAL LOW BACK PAIN WITHOUT SCIATICA: ICD-10-CM

## 2021-08-02 PROCEDURE — 99214 OFFICE O/P EST MOD 30 MIN: CPT | Performed by: FAMILY MEDICINE

## 2021-08-02 PROCEDURE — 3008F BODY MASS INDEX DOCD: CPT | Performed by: FAMILY MEDICINE

## 2021-08-02 PROCEDURE — 1036F TOBACCO NON-USER: CPT | Performed by: FAMILY MEDICINE

## 2021-08-02 RX ORDER — NAPROXEN 500 MG/1
500 TABLET ORAL 2 TIMES DAILY WITH MEALS
Qty: 60 TABLET | Refills: 5 | Status: SHIPPED | OUTPATIENT
Start: 2021-08-02 | End: 2022-06-06 | Stop reason: SDUPTHER

## 2021-08-02 RX ORDER — AMITRIPTYLINE HYDROCHLORIDE 25 MG/1
TABLET, FILM COATED ORAL
Qty: 60 TABLET | Refills: 5 | Status: SHIPPED | OUTPATIENT
Start: 2021-08-02 | End: 2021-11-04 | Stop reason: SDUPTHER

## 2021-08-02 NOTE — PROGRESS NOTES
[facility]        NAME: Erendira Caldera is a 52 y o  male  : 1970    MRN: [id number]  DATE: 2021  TIME: 8:07 AM    Assessment and Plan   Degenerative disc disease at L5-S1 level [M51 36]  1  Degenerative disc disease at L5-S1 level     2  Chronic pain syndrome     3  Chronic bilateral low back pain without sciatica           Patient Instructions     Patient Instructions   Trial seroquel for sleep if elavil not effective          Chief Complaint     Chief Complaint   Patient presents with    Follow-up     back/foot pain         History of Present Illness       C/o incr lumbar pain--see work restrictions      Review of Systems   Review of Systems   Constitutional: Negative for fatigue, fever and unexpected weight change  HENT: Negative for congestion, sinus pain and sore throat  Eyes: Negative for visual disturbance  Respiratory: Negative for shortness of breath and wheezing  Cardiovascular: Negative for chest pain and palpitations  Gastrointestinal: Negative for abdominal pain, nausea and vomiting  Musculoskeletal: Negative  Negative for arthralgias and myalgias  Neurological: Negative for syncope, weakness and numbness  Psychiatric/Behavioral: Negative  Negative for confusion, dysphoric mood and suicidal ideas           Current Medications       Current Outpatient Medications:     Adalimumab (Humira Pen) 40 MG/0 4ML PNKT, Citrate Free Maintenance Dose: Inject (1) 40mg/0 4ml under the skin every other week, Disp: 2 each, Rfl: 12    cholecalciferol (VITAMIN D3) 1,000 units tablet, Take 2 tablets (2,000 Units total) by mouth daily, Disp: 60 tablet, Rfl: 2    cholestyramine (QUESTRAN) 4 g packet, Take 1 packet by mouth 2 (two) times a day with meals, Disp: , Rfl:     naproxen (NAPROSYN) 500 mg tablet, Take 1 tablet (500 mg total) by mouth 2 (two) times a day with meals, Disp: 60 tablet, Rfl: 0    Current Allergies     Allergies as of 2021    (No Known Allergies)            The following portions of the patient's history were reviewed and updated as appropriate: allergies, current medications, past family history, past medical history, past social history, past surgical history and problem list      Past Medical History:   Diagnosis Date    Back pain     Colon polyp     Crohn's disease ([address]     GERD (gastroesophageal reflux disease)     Perianal fistula     Terminal ileitis ([address]        Past Surgical History:   Procedure Laterality Date    ABSCESS DRAINAGE      BACK SURGERY  2019    COLONOSCOPY      HERNIA REPAIR      umbilical hernia    SD PERCUT IMPLNT NEUROELECT,EPIDURAL Right 3/26/2021    Procedure: INSERTION THORACIC DORSAL COLUMN SPINAL CORD STIMULATOR PERCUTANEOUS W IMPLANTABLE PULSE GENERATOR, RIGHT;  Surgeon: Faheem Orourke MD;  Location:  MAIN OR;  Service: Neurosurgery    SPINAL STIMULATOR PLACEMENT  03/2021       Family History   Problem Relation Age of Onset    Heart disease Father     Heart attack Father     Colon cancer Neg Hx     Colon polyps Neg Hx     Inflammatory bowel disease Neg Hx          Medications have been verified  Objective   /78   Pulse 69   Resp 16   Ht 6' 0 75" (1 848 m)   Wt 87 1 kg (192 lb)   SpO2 99%   BMI 25 51 kg/m²        Physical Exam     Physical Exam  Constitutional:       Appearance: He is well-developed  HENT:      Right Ear: Ear canal normal  Tympanic membrane is not injected  Left Ear: Ear canal normal  Tympanic membrane is not injected  Nose: Nose normal    Eyes:      General:         Right eye: No discharge  Left eye: No discharge  Conjunctiva/sclera: Conjunctivae normal       Pupils: Pupils are equal, round, and reactive to light  Neck:      Thyroid: No thyromegaly  Cardiovascular:      Rate and Rhythm: Normal rate and regular rhythm  Heart sounds: Normal heart sounds  No murmur heard       Pulmonary:      Effort: Pulmonary effort is normal  No respiratory distress  Breath sounds: Normal breath sounds  No wheezing  Abdominal:      General: Bowel sounds are normal  There is no distension  Palpations: Abdomen is soft  Tenderness: There is no abdominal tenderness  Musculoskeletal:         General: Normal range of motion  Cervical back: Normal range of motion and neck supple  Lymphadenopathy:      Cervical: No cervical adenopathy  Skin:     General: Skin is warm and dry  Neurological:      Mental Status: He is alert and oriented to person, place, and time  He is not disoriented  Sensory: No sensory deficit  Gait: Gait normal       Deep Tendon Reflexes: Reflexes are normal and symmetric  Psychiatric:         Speech: Speech normal          Behavior: Behavior normal          Thought Content:  Thought content normal          Judgment: Judgment normal

## 2021-08-04 ENCOUNTER — TELEPHONE (OUTPATIENT)
Dept: GASTROENTEROLOGY | Facility: CLINIC | Age: 51
End: 2021-08-04

## 2021-08-04 VITALS — WEIGHT: 192 LBS | BODY MASS INDEX: 26.01 KG/M2 | HEIGHT: 72 IN

## 2021-08-12 ENCOUNTER — OFFICE VISIT (OUTPATIENT)
Dept: PAIN MEDICINE | Facility: CLINIC | Age: 51
End: 2021-08-12
Payer: COMMERCIAL

## 2021-08-12 ENCOUNTER — APPOINTMENT (OUTPATIENT)
Dept: RADIOLOGY | Facility: CLINIC | Age: 51
End: 2021-08-12
Payer: COMMERCIAL

## 2021-08-12 VITALS
HEART RATE: 88 BPM | DIASTOLIC BLOOD PRESSURE: 78 MMHG | WEIGHT: 191 LBS | SYSTOLIC BLOOD PRESSURE: 132 MMHG | HEIGHT: 72 IN | BODY MASS INDEX: 25.87 KG/M2 | TEMPERATURE: 98.6 F

## 2021-08-12 DIAGNOSIS — M79.18 MYOFASCIAL PAIN SYNDROME: ICD-10-CM

## 2021-08-12 DIAGNOSIS — G89.29 CHRONIC RIGHT SHOULDER PAIN: ICD-10-CM

## 2021-08-12 DIAGNOSIS — G89.4 CHRONIC PAIN SYNDROME: Primary | ICD-10-CM

## 2021-08-12 DIAGNOSIS — R20.2 NUMBNESS AND TINGLING IN RIGHT HAND: ICD-10-CM

## 2021-08-12 DIAGNOSIS — R20.0 NUMBNESS AND TINGLING IN RIGHT HAND: ICD-10-CM

## 2021-08-12 DIAGNOSIS — M25.511 CHRONIC RIGHT SHOULDER PAIN: ICD-10-CM

## 2021-08-12 DIAGNOSIS — G89.4 CHRONIC PAIN SYNDROME: ICD-10-CM

## 2021-08-12 DIAGNOSIS — M54.2 NECK PAIN: ICD-10-CM

## 2021-08-12 PROCEDURE — 99214 OFFICE O/P EST MOD 30 MIN: CPT | Performed by: NURSE PRACTITIONER

## 2021-08-12 PROCEDURE — 72050 X-RAY EXAM NECK SPINE 4/5VWS: CPT

## 2021-08-12 NOTE — PROGRESS NOTES
Assessment:  1  Chronic pain syndrome    2  Neck pain    3  Chronic right shoulder pain    4  Numbness and tingling in right hand    5  Myofascial pain syndrome        Plan:    While the patient was in the office today, I did have a thorough conversation with the patient regarding their chronic pain syndrome, symptoms, medication regimen, and treatment plan  I discussed with the patient that at this point in  Time since he has tried and failed several weeks of physical therapy for his neck and right shoulder, I do feel that it would be beneficial to proceed with cervical spine x-rays and an CT scan without contrast  because of his stimulator implant, to evaluate for any possible cervical etiology that could explain his neck pain and upper extremity radicular symptoms and weakness  I advised the patient once we have the results of the x-rays and CT Scan, our office will give him a call to review results and discuss the next step in his treatment plan which may include a cervical epidural steroid injection with Dr Torito Lopes  The patient was agreeable and verbalized an understanding  I encouraged the patient continue to work with the thoracic lead spinal cord stimulator and the [employer] medical team for reprogramming and remind the patient that it may take a full year after the implantation for him to get the programming to a point where he feels it is consistently managing his pain symptoms and that he is already noting improvement  The patient was agreeable and verbalized an understanding  I discussed with the patient that at this point time he can followup with our office on an as-needed basis  I did review the patient that if his pain symptoms should change, worsen, and/or if he would experience any new symptoms he would like to be evaluated for, he should give our office a call  The patient was agreeable and verbalized an understanding  History of Present Illness: The patient is a 52 y o  male last seen on 2/23/2021 who presents for a follow up office visit in regards to Chronic pain syndrome secondary to myofascial pain with numbness and tingling in the right arm and hand and continued right shoulder pain  The patient currently reports that since his last office visit he did proceed with his thoracic lead permanent [employer] spinal cord stimulator implantation and although he does feel there is improvement, he continues to work with the [employer] with regards to reprogramming as he has not been able to capture any relief in his foot pain but has noted improvement in his back and lower extremity radicular symptoms  However, the patient reports that since the back and leg pain has improved, he has noticed worsening neck, right shoulder, and right upper extremity radicular pain and symptoms which was originally thought to be adhesive capsulitis, however, after physical therapy from May 11, 2021 to June 14, 2021, there has not been any improvement in his pain symptoms and he has even had a right shoulder injection without any improvement  At this point he has been referred back to our office as they feel his pain is most likely coming from his cervical spine and he presents today for re-evaluation and treatment plan options  The patient denies any recent trauma or injury that brought on the pain  I have personally reviewed and/or updated the patient's past medical history, past surgical history, family history, social history, current medications, allergies, and vital signs today  Review of Systems:    Review of Systems   Respiratory: Negative for shortness of breath  Cardiovascular: Negative for chest pain  Gastrointestinal: Negative for constipation, diarrhea, nausea and vomiting  Musculoskeletal: Negative for arthralgias, gait problem, joint swelling and myalgias  Skin: Negative for rash  Neurological: Negative for dizziness, seizures and weakness     All other systems reviewed and are negative          Past Medical History:   Diagnosis Date    Back pain     Colon polyp     [facility] [address]     GERD (gastroesophageal reflux disease)     Perianal fistula     Terminal ileitis ([facility] [address] [facility]        Past Surgical History:   Procedure Laterality Date    ABSCESS DRAINAGE      BACK SURGERY      COLONOSCOPY      HERNIA REPAIR      umbilical hernia    [place] PERCUT IMPLNT NEUROELECT,EPIDURAL Right 3/26/2021    Procedure: INSERTION THORACIC DORSAL COLUMN SPINAL CORD STIMULATOR PERCUTANEOUS W IMPLANTABLE PULSE GENERATOR, RIGHT;  Surgeon: Wolfgang López MD;  Location: [facility];  Service: Neurosurgery    SPINAL STIMULATOR PLACEMENT  2021       Family History   Problem Relation Age of Onset    Heart disease Father     Heart attack Father     Colon cancer Neg Hx     Colon polyps Neg Hx     Inflammatory bowel disease Neg Hx        Social History     Occupational History    Not on file   Tobacco Use    Smoking status: Former Smoker     Packs/day: 0 50     Types: Cigarettes     Start date:      Quit date: 2021     Years since quittin 5    Smokeless tobacco: Never Used    Tobacco comment: Currently is using nicotine patches - quit 21   Vaping Use    Vaping Use: Never used   Substance and Sexual Activity    Alcohol use: Yes     Comment: "Couple of beers within the week"    Drug use: No     Comment: Denies    Sexual activity: Not on file         Current Outpatient Medications:     Adalimumab (Humira Pen) 40 MG/0 4ML PNKT, Citrate Free Maintenance Dose: Inject (1) 40mg/0 4ml under the skin every other week, Disp: 2 each, Rfl: 12    amitriptyline (ELAVIL) 25 mg tablet, 1-2 HS, Disp: 60 tablet, Rfl: 5    cholecalciferol (VITAMIN D3) 1,000 units tablet, Take 2 tablets (2,000 Units total) by mouth daily, Disp: 60 tablet, Rfl: 2    cholestyramine (QUESTRAN) 4 g packet, Take 1 packet by mouth 2 (two) times a day with meals, Disp: , Rfl:     naproxen (NAPROSYN) 500 mg tablet, Take 1 tablet (500 mg total) by mouth 2 (two) times a day with meals, Disp: 60 tablet, Rfl: 5    No Known Allergies    Physical Exam:    /78 (BP Location: Left arm, Patient Position: Sitting, Cuff Size: Standard)   Pulse 88   Temp 98 6 °F (37 °C)   Ht 6' (1 829 m)   Wt 86 6 kg (191 lb)   BMI 25 90 kg/m²     Constitutional:normal, well developed, well nourished, alert, in no distress and non-toxic and no overt pain behavior  Eyes:anicteric  HEENT:grossly intact  Neck:supple, symmetric, trachea midline and no masses   Pulmonary:even and unlabored  Cardiovascular:No edema or pitting edema present  Skin:Normal without rashes or lesions and well hydrated  Psychiatric:Mood and affect appropriate  Neurologic:Cranial Nerves II-XII grossly intact  Musculoskeletal:The patient's gait is slightly antalgic, but steady without the use of any assistive devices  Cervical Spine Exam    Appearance:  Normal lordosis  Palpation/Tenderness:  right cervical paraspinal tenderness  right trapezium tenderness  Sensory:  no sensory deficits noted  Range of Motion:  Flexion:   Moderately limited  with pain  Extension:  Minimally limited  without pain  Rotation - Left:  Minimally limited  without pain  Rotation - Right:  Minimally limited  without pain  Motor Strength:  Left Arm Flexion  5/5  Left Arm Extension  5/5  Right Arm Flexion  4/5  Right Arm Extension  4/5  Left Wrist Flexion  5/5  Left Wrist Extension  5/5  Left Finger Abduction  5/5  Right Finger Abduction  4/5  Left Pincer Grasp  5/5  Right Pincer Grasp  4/5  Left    5/5  Right   4/5  Reflexes:  Left Biceps:  1+   Right Biceps:  absent   Left Brachioradialis:  absent   Right Brachioradialis:  absent   Left Triceps:  1+   Right Triceps:  absent           Imaging  CT cervical spine without contrast    (Results Pending)         Orders Placed This Encounter   Procedures    X-ray cervical spine complete 4 or 5 vw    CT cervical spine without contrast

## 2021-08-13 ENCOUNTER — ANESTHESIA EVENT (OUTPATIENT)
Dept: GASTROENTEROLOGY | Facility: AMBULATORY SURGERY CENTER | Age: 51
End: 2021-08-13

## 2021-08-13 ENCOUNTER — HOSPITAL ENCOUNTER (OUTPATIENT)
Dept: GASTROENTEROLOGY | Facility: AMBULATORY SURGERY CENTER | Age: 51
Discharge: HOME | End: 2021-08-13
Payer: COMMERCIAL

## 2021-08-13 ENCOUNTER — ANESTHESIA (OUTPATIENT)
Dept: GASTROENTEROLOGY | Facility: AMBULATORY SURGERY CENTER | Age: 51
End: 2021-08-13

## 2021-08-13 VITALS
TEMPERATURE: 98.9 F | SYSTOLIC BLOOD PRESSURE: 134 MMHG | DIASTOLIC BLOOD PRESSURE: 90 MMHG | HEART RATE: 86 BPM | RESPIRATION RATE: 18 BRPM | OXYGEN SATURATION: 96 %

## 2021-08-13 DIAGNOSIS — K50.919 CROHN'S DISEASE WITH COMPLICATION, UNSPECIFIED GASTROINTESTINAL TRACT LOCATION (HCC): ICD-10-CM

## 2021-08-13 PROBLEM — F17.200 SMOKING: Status: RESOLVED | Noted: 2020-08-25 | Resolved: 2021-08-13

## 2021-08-13 PROBLEM — IMO0001 SMOKING: Status: RESOLVED | Noted: 2020-08-25 | Resolved: 2021-08-13

## 2021-08-13 PROCEDURE — 88305 TISSUE EXAM BY PATHOLOGIST: CPT | Performed by: PATHOLOGY

## 2021-08-13 PROCEDURE — 45380 COLONOSCOPY AND BIOPSY: CPT | Performed by: INTERNAL MEDICINE

## 2021-08-13 RX ORDER — LIDOCAINE HYDROCHLORIDE 10 MG/ML
INJECTION, SOLUTION EPIDURAL; INFILTRATION; INTRACAUDAL; PERINEURAL AS NEEDED
Status: DISCONTINUED | OUTPATIENT
Start: 2021-08-13 | End: 2021-08-13

## 2021-08-13 RX ORDER — PROPOFOL 10 MG/ML
INJECTION, EMULSION INTRAVENOUS AS NEEDED
Status: DISCONTINUED | OUTPATIENT
Start: 2021-08-13 | End: 2021-08-13

## 2021-08-13 RX ORDER — SODIUM CHLORIDE, SODIUM LACTATE, POTASSIUM CHLORIDE, CALCIUM CHLORIDE 600; 310; 30; 20 MG/100ML; MG/100ML; MG/100ML; MG/100ML
50 INJECTION, SOLUTION INTRAVENOUS CONTINUOUS
Status: DISCONTINUED | OUTPATIENT
Start: 2021-08-13 | End: 2021-08-17 | Stop reason: HOSPADM

## 2021-08-13 RX ADMIN — PROPOFOL 20 MG: 10 INJECTION, EMULSION INTRAVENOUS at 10:58

## 2021-08-13 RX ADMIN — LIDOCAINE HYDROCHLORIDE 50 MG: 10 INJECTION, SOLUTION EPIDURAL; INFILTRATION; INTRACAUDAL; PERINEURAL at 10:45

## 2021-08-13 RX ADMIN — PROPOFOL 20 MG: 10 INJECTION, EMULSION INTRAVENOUS at 10:51

## 2021-08-13 RX ADMIN — PROPOFOL 20 MG: 10 INJECTION, EMULSION INTRAVENOUS at 11:00

## 2021-08-13 RX ADMIN — PROPOFOL 100 MG: 10 INJECTION, EMULSION INTRAVENOUS at 10:45

## 2021-08-13 RX ADMIN — SODIUM CHLORIDE, SODIUM LACTATE, POTASSIUM CHLORIDE, CALCIUM CHLORIDE 50 ML/HR: 600; 310; 30; 20 INJECTION, SOLUTION INTRAVENOUS at 10:10

## 2021-08-13 RX ADMIN — PROPOFOL 20 MG: 10 INJECTION, EMULSION INTRAVENOUS at 10:55

## 2021-08-13 RX ADMIN — PROPOFOL 50 MG: 10 INJECTION, EMULSION INTRAVENOUS at 10:48

## 2021-08-13 NOTE — ANESTHESIA PREPROCEDURE EVALUATION
Procedure:  COLONOSCOPY    Relevant Problems   ANESTHESIA (within normal limits)      CARDIO (within normal limits)      GI/HEPATIC   (+) GERD (gastroesophageal reflux disease)      NEURO/PSYCH   (+) Chronic pain syndrome (s/p spinal cord stimulator)      PULMONARY   (-) Sleep apnea   (-) Smoking (quit 2/21)   (-) URI (upper respiratory infection)      Other   (+) Crohn's disease ([address] )        Physical Exam    Airway    Mallampati score: I  TM Distance: >3 FB  Neck ROM: full     Dental   No notable dental hx     Cardiovascular      Pulmonary      Other Findings        Anesthesia Plan  ASA Score- 2     Anesthesia Type- IV sedation with anesthesia with ASA Monitors  Additional Monitors:   Airway Plan:           Plan Factors-Exercise tolerance (METS): >4 METS  Chart reviewed  Existing labs reviewed  Patient summary reviewed  Patient is not a current smoker  Induction- intravenous  Postoperative Plan-     Informed Consent- Anesthetic plan and risks discussed with patient  I personally reviewed this patient with the CRNA  Discussed and agreed on the Anesthesia Plan with the CRNA  Kami Montilla

## 2021-08-13 NOTE — DISCHARGE INSTRUCTIONS
Colonoscopy   WHAT YOU NEED TO KNOW:   A colonoscopy is a procedure to examine the inside of your colon (intestine) with a scope  Polyps or tissue growths may have been removed during your colonoscopy  It is normal to feel bloated and to have some abdominal discomfort  You should be passing gas  If you have hemorrhoids or you had polyps removed, you may have a small amount of bleeding  DISCHARGE INSTRUCTIONS:   Seek care immediately if:    You have sudden, severe abdominal pain   You have problems swallowing   You have a large amount of black, sticky bowel movements or blood in your bowel movements   You have sudden trouble breathing   You feel weak, lightheaded, or faint or your heart beats faster than normal for you  Contact your healthcare provider if:    You have a fever and chills   You have nausea or are vomiting   Your abdomen is bloated or feels full and hard   You have abdominal pain   You have black, sticky bowel movements or blood in your bowel movements   You have not had a bowel movement for 3 days after your procedure   You have rash or hives   You have questions or concerns about your procedure  Activity:    Do not lift, strain, or run for 24 hours after your procedure   Rest after your procedure  You have been given medicine to relax you  Do not drive or make important decisions until the day after your procedure  Return to your normal activity as directed   Relieve gas and discomfort from bloating by lying on your right side with a heating pad on your abdomen  You may need to take short walks to help the gas move out  Eat small meals until bloating is relieved  Follow up with your healthcare provider as directed: Write down your questions so you remember to ask them during your visits  If you take a blood thinner, please review the specific instructions from your endoscopist about when you should resume it   These can be found in the Recommendation and Your Medication list sections of this After Visit Summary  Hemorrhoids   WHAT YOU NEED TO KNOW:   What are hemorrhoids? Hemorrhoids are swollen blood vessels inside your rectum (internal hemorrhoids) or on your anus (external hemorrhoids)  Sometimes a hemorrhoid may prolapse  This means it extends out of your anus  What increases my risk for hemorrhoids? · Pregnancy or obesity    · Straining or sitting for a long time during bowel movements    · Liver disease    · Weak muscles around the anus caused by older age, rectal surgery, or anal intercourse    · A lack of physical activity    · Chronic diarrhea or constipation    · A low-fiber diet    What are the signs and symptoms of hemorrhoids? · Pain or itching around your anus or inside your rectum    · Swelling or bumps around your anus    · Bright red blood in your bowel movement, on the toilet paper, or in the toilet bowl    · Tissue bulging out of your anus (prolapsed hemorrhoids)    · Incontinence (poor control over urine or bowel movements)    How are hemorrhoids diagnosed? Your healthcare provider will ask about your symptoms, the foods you eat, and your bowel movements  He or she will examine your anus for external hemorrhoids  You may need the following:  · A digital rectal exam  is a test to check for hemorrhoids  Your healthcare provider will put a gloved finger inside your anus to feel for the hemorrhoids  · An anoscopy  is a test that uses a scope (small tube with a light and camera on the end) to look at your hemorrhoids  How are hemorrhoids treated? Treatment will depend on your symptoms  You may need any of the following:  · Medicines  can help decrease pain and swelling, and soften your bowel movement  The medicine may be a pill, pad, cream, or ointment  · Procedures  may be used to shrink or remove your hemorrhoid  Examples include rubber-band ligation, sclerotherapy, and photocoagulation   These procedures may be done in your healthcare provider's office  Ask your healthcare provider for more information about these procedures  · Surgery  may be needed to shrink or remove your hemorrhoids  How can I manage my symptoms? · Apply ice on your anus for 15 to 20 minutes every hour or as directed  Use an ice pack, or put crushed ice in a plastic bag  Cover it with a towel before you apply it to your anus  Ice helps prevent tissue damage and decreases swelling and pain  · Take a sitz bath  Fill a bathtub with 4 to 6 inches of warm water  You may also use a sitz bath pan that fits inside a toilet bowl  Sit in the sitz bath for 15 minutes  Do this 3 times a day, and after each bowel movement  The warm water can help decrease pain and swelling  · Keep your anal area clean  Gently wash the area with warm water daily  Soap may irritate the area  After a bowel movement, wipe with moist towelettes or wet toilet paper  Dry toilet paper can irritate the area  How can I help prevent hemorrhoids? · Do not strain to have a bowel movement  Do not sit on the toilet too long  These actions can increase pressure on the tissues in your rectum and anus  · Drink plenty of liquids  Liquids can help prevent constipation  Ask how much liquid to drink each day and which liquids are best for you  · Eat a variety of high-fiber foods  Examples include fruits, vegetables, and whole grains  Ask your healthcare provider how much fiber you need each day  You may need to take a fiber supplement  · Exercise as directed  Exercise, such as walking, may make it easier to have a bowel movement  Ask your healthcare provider to help you create an exercise plan  · Do not have anal sex  Anal sex can weaken the skin around your rectum and anus  · Avoid heavy lifting  This can cause straining and increase your risk for another hemorrhoid  When should I seek immediate care?    · You have severe pain in your rectum or around your anus  · You have severe pain in your abdomen and you are vomiting  · You have bleeding from your anus that soaks through your underwear  When should I contact my healthcare provider? · You have frequent and painful bowel movements  · Your hemorrhoid looks or feels more swollen than usual      · You do not have a bowel movement for 2 days or more  · You see or feel tissue coming through your anus  · You have questions or concerns about your condition or care  CARE AGREEMENT:   You have the right to help plan your care  Learn about your health condition and how it may be treated  Discuss treatment options with your healthcare providers to decide what care you want to receive  You always have the right to refuse treatment  The above information is an  only  It is not intended as medical advice for individual conditions or treatments  Talk to your doctor, nurse or pharmacist before following any medical regimen to see if it is safe and effective for you  © Copyright [employer] 2021 Information is for End User's use only and may not be sold, redistributed or otherwise used for commercial purposes  All illustrations and images included in CareNotes® are the copyrighted property of [employer] M [employer]  or Kim Velázquez   Diverticulosis   WHAT YOU NEED TO KNOW:   What is diverticulosis? Diverticulosis is a condition that causes small pockets called diverticula to form in your intestine  These pockets make it difficult for bowel movements to pass through your digestive system  What causes diverticulosis? Diverticula form when muscles have to work hard to move bowel movements through the intestine  The force causes bulges to form at weak areas in the intestine  This may happen if you eat foods that are low in fiber  Fiber helps give your bowel movements more bulk so they are larger and easier to move through your colon   The following may increase your risk of diverticulosis:  · A history of constipation    · Age 36 or older    · Obesity    · Lack of exercise    What are the signs and symptoms of diverticulosis? Diverticulosis usually does not cause any signs or symptoms  It may cause any of the following in some people:  · Pain or discomfort in your lower abdomen    · Abdominal bloating    · Constipation or diarrhea    How is diverticulosis diagnosed? Your healthcare provider will examine you and ask about your bowel movements, diet, and symptoms  He or she will also ask about any medical conditions you have or medicines you take  You may need any of the following:  · Blood tests  may be done to check for signs of inflammation  · A barium enema  is an x-ray of your colon that may show diverticula  A tube is put into your anus, and a liquid called barium is put through the tube  Barium is used so that healthcare providers can see your colon more clearly  · Flexible sigmoidoscopy  is a test to look for any changes in your lower intestines and rectum  It may also show the cause of any bleeding or pain  A soft, bendable tube with a light on the end will be put into your anus  It will then be moved forward into your intestine  · A colonoscopy  is used to look at your whole colon  A scope (long bendable tube with a light on the end) is used to take pictures  This test may show diverticula  · A CT scan , or CAT scan, may show diverticula  You may be given contrast liquid before the scan  Tell the healthcare provider if you have ever had an allergic reaction to contrast liquid  How is diverticulosis managed? The goal of treatment is to manage any symptoms you have and prevent other problems such as diverticulitis  Diverticulitis is swelling or infection of the diverticula  Your healthcare provider may recommend any of the following:  · Eat a variety of high-fiber foods  High-fiber foods help you have regular bowel movements   High-fiber foods include cooked beans, fruits, vegetables, and some cereals  Most adults need 25 to 35 grams of fiber each day  Your healthcare provider may recommend that you have more  Ask your healthcare provider how much fiber you need  Increase fiber slowly  You may have abdominal discomfort, bloating, and gas if you add fiber to your diet too quickly  You may need to take a fiber supplement if you are not getting enough fiber from food  · Medicines  to soften your bowel movements may be given  You may also need medicines to treat symptoms such as bloating and pain  · Drink liquids as directed  You may need to drink 2 to 3 liters (8 to 12 cups) of liquids every day  Ask your healthcare provider how much liquid to drink each day and which liquids are best for you  · Apply heat  on your abdomen for 20 to 30 minutes every 2 hours for as many days as directed  Heat helps decrease pain and muscle spasms  How can I help prevent diverticulitis or other symptoms? The following may help decrease your risk for diverticulitis or symptoms, such as bleeding  Talk to your provider about these or other things you can do to prevent problems that may occur with diverticulosis  · Exercise regularly  Ask your healthcare provider about the best exercise plan for you  Exercise can help you have regular bowel movements  Get 30 minutes of exercise on most days of the week  · Maintain a healthy weight  Ask your healthcare provider how much you should weigh  Ask him or her to help you create a weight loss plan if you are overweight  · Do not smoke  Nicotine and other chemicals in cigarettes increase your risk for diverticulitis  Ask your healthcare provider for information if you currently smoke and need help to quit  E-cigarettes or smokeless tobacco still contain nicotine  Talk to your healthcare provider before you use these products  · Ask your healthcare provider if it is safe to take NSAIDs    NSAIDs may increase your risk of diverticulitis  When should I seek immediate care? · You have severe pain on the left side of your lower abdomen  · Your bowel movements are bright or dark red  When should I contact my healthcare provider? · You have a fever and chills  · You feel dizzy or lightheaded  · You have nausea, or you are vomiting  · You have a change in your bowel movements  · You have questions or concerns about your condition or care  CARE AGREEMENT:   You have the right to help plan your care  Learn about your health condition and how it may be treated  Discuss treatment options with your healthcare providers to decide what care you want to receive  You always have the right to refuse treatment  The above information is an  only  It is not intended as medical advice for individual conditions or treatments  Talk to your doctor, nurse or pharmacist before following any medical regimen to see if it is safe and effective for you  © Copyright [employer] 2021 Information is for End User's use only and may not be sold, redistributed or otherwise used for commercial purposes   All illustrations and images included in CareNotes® are the copyrighted property of A D A M , Inc  or [address] [place] [address]

## 2021-08-13 NOTE — H&P
History and Physical - [facility]  Keo Pollard 52 y o  male MRN: [id number]      HPI: Keo Pollard is a 52y o  year old male who presents for Crohn's disease  REVIEW OF SYSTEMS: Per the HPI, and otherwise unremarkable      Historical Information   Past Medical History:   Diagnosis Date    Back pain     Colon polyp     [facility]'s [facility] ([facility] [address] [facility]     GERD (gastroesophageal reflux disease)     Perianal fistula     Terminal ileitis ([facility] [address] )      Past Surgical History:   Procedure Laterality Date    ABSCESS DRAINAGE      BACK SURGERY      COLONOSCOPY      HERNIA REPAIR      umbilical hernia    DC PERCUT IMPLNT NEUROELECT,EPIDURAL Right 3/26/2021    Procedure: INSERTION THORACIC DORSAL COLUMN SPINAL CORD STIMULATOR PERCUTANEOUS W IMPLANTABLE PULSE GENERATOR, RIGHT;  Surgeon: Choco Gutiérrez MD;  Location:  MAIN OR;  Service: Neurosurgery    SPINAL STIMULATOR PLACEMENT  2021     Social History   Social History     Substance and Sexual Activity   Alcohol Use Yes    Comment: "Couple of beers within the week"     Social History     Substance and Sexual Activity   Drug Use No    Comment: Denies     Social History     Tobacco Use   Smoking Status Former Smoker    Packs/day: 0 50    Types: Cigarettes    Start date:     Quit date: 2021    Years since quittin 5   Smokeless Tobacco Never Used   Tobacco Comment    Currently is using nicotine patches - quit 21     Family History   Problem Relation Age of Onset    Heart disease Father     Heart attack Father     Colon cancer Neg Hx     Colon polyps Neg Hx     Inflammatory bowel disease Neg Hx        Meds/Allergies       Current Outpatient Medications:     Adalimumab (Humira Pen) 40 MG/0 4ML PNKT    amitriptyline (ELAVIL) 25 mg tablet    cholecalciferol (VITAMIN D3) 1,000 units tablet    cholestyramine (QUESTRAN) 4 g packet    naproxen (NAPROSYN) 500 mg tablet    Current Facility-Administered Medications:     lactated ringers infusion, 50 mL/hr, Intravenous, Continuous, 50 mL/hr at 08/13/21 1010    No Known Allergies    Objective     /93   Pulse 84   Temp 98 9 °F (37 2 °C) (Temporal)   Resp 15   SpO2 94%       PHYSICAL EXAM    Gen: NAD AAOx3  Head: Normocephalic, Atraumatic  CV: S1S2 RRR no m/r/g  CHEST: Clear b/l no c/r/w  ABD: soft, +BS NT/ND no masses  EXT: no edema      ASSESSMENT/PLAN:  This is a 52y o  year old male here for colonoscopy, and he is stable and optimized for his procedure

## 2021-08-18 NOTE — RESULT ENCOUNTER NOTE
Terminal ileum and left colon with inflammation consistent with crohns  Pt has follow up with me next week and we will review then  COLON RECALL 2 years

## 2021-08-19 ENCOUNTER — HOSPITAL ENCOUNTER (OUTPATIENT)
Dept: CT IMAGING | Facility: HOSPITAL | Age: 51
Discharge: HOME | End: 2021-08-19
Payer: COMMERCIAL

## 2021-08-19 ENCOUNTER — CONSULT (OUTPATIENT)
Dept: DERMATOLOGY | Facility: CLINIC | Age: 51
End: 2021-08-19
Payer: COMMERCIAL

## 2021-08-19 VITALS — BODY MASS INDEX: 25.87 KG/M2 | HEIGHT: 72 IN | TEMPERATURE: 97.7 F | WEIGHT: 191 LBS

## 2021-08-19 DIAGNOSIS — L70.0 ACNE VULGARIS: ICD-10-CM

## 2021-08-19 DIAGNOSIS — D22.60 MULTIPLE BENIGN NEVI OF UPPER EXTREMITY, LOWER EXTREMITY, AND TRUNK: Primary | ICD-10-CM

## 2021-08-19 DIAGNOSIS — R20.0 NUMBNESS AND TINGLING IN RIGHT HAND: ICD-10-CM

## 2021-08-19 DIAGNOSIS — G89.29 CHRONIC RIGHT SHOULDER PAIN: ICD-10-CM

## 2021-08-19 DIAGNOSIS — R20.2 NUMBNESS AND TINGLING IN RIGHT HAND: ICD-10-CM

## 2021-08-19 DIAGNOSIS — M79.18 MYOFASCIAL PAIN SYNDROME: ICD-10-CM

## 2021-08-19 DIAGNOSIS — M54.2 NECK PAIN: ICD-10-CM

## 2021-08-19 DIAGNOSIS — G89.4 CHRONIC PAIN SYNDROME: ICD-10-CM

## 2021-08-19 DIAGNOSIS — D22.5 MULTIPLE BENIGN NEVI OF UPPER EXTREMITY, LOWER EXTREMITY, AND TRUNK: Primary | ICD-10-CM

## 2021-08-19 DIAGNOSIS — M25.511 CHRONIC RIGHT SHOULDER PAIN: ICD-10-CM

## 2021-08-19 DIAGNOSIS — D22.70 MULTIPLE BENIGN NEVI OF UPPER EXTREMITY, LOWER EXTREMITY, AND TRUNK: Primary | ICD-10-CM

## 2021-08-19 PROCEDURE — 72125 CT NECK SPINE W/O DYE: CPT

## 2021-08-19 PROCEDURE — G1004 CDSM NDSC: HCPCS

## 2021-08-19 PROCEDURE — 99204 OFFICE O/P NEW MOD 45 MIN: CPT | Performed by: STUDENT IN AN ORGANIZED HEALTH CARE EDUCATION/TRAINING PROGRAM

## 2021-08-19 PROCEDURE — 3008F BODY MASS INDEX DOCD: CPT | Performed by: INTERNAL MEDICINE

## 2021-08-19 NOTE — PROGRESS NOTES
[facility] Note     Patient Name: Luis Naik  Encounter Date: 08/19/2021     Have you been cared for by a [facility] Dermatologist in the last 3 years and, if so, which one? No    · Have you traveled outside of the [address] [place] [address] in the past 3 months or outside of the [facility] area in the last 2 weeks? No     May we call your Preferred Phone number to discuss your specific medical information? Yes     May we leave a detailed message that includes your specific medical information? Yes      Today's Chief Concerns:   Concern #1:  Skin exam, patient on huirma for Crohn's   Concern #2:  Pigmentation on both lower legs    Past Medical History:  Have you personally ever had or currently have any of the following? · Skin cancer (such as Melanoma, Basal Cell Carcinoma, Squamous Cell Carcinoma? (If Yes, please provide more detail)- No  · Eczema: No  · Psoriasis: No  · HIV/AIDS: No  · Hepatitis B or C: No  · Tuberculosis: No  · Systemic Immunosuppression such as Diabetes, Biologic or Immunotherapy, Chemotherapy, Organ Transplantation, Bone Marrow Transplantation (If YES, please provide more detail): YES, patient on Humira  · Radiation Treatment (If YES, please provide more detail): No  · Any other major medical conditions/concerns? (If Yes, which types)- YES, Crohn's disease diagnosed about 1 year ago    Social History:     What is/was your primary occupation?  What are your hobbies/past-times? Family History:  Have any of your "first degree relatives" (parent, brother, sister, or child) had any of the following       · Skin cancer such as Melanoma or Merkel Cell Carcinoma or Pancreatic Cancer? No  · Eczema, Asthma, Hay Fever or Seasonal Allergies: No  · Psoriasis or Psoriatic Arthritis: No  · Do any other medical conditions seem to run in your family? If Yes, what condition and which relatives?   No    Current Medications:   (please update all dermatological medications before printing patient's AVS!)      Current Outpatient Medications:     amitriptyline (ELAVIL) 25 mg tablet, 1-2 HS, Disp: 60 tablet, Rfl: 5    cholecalciferol (VITAMIN D3) 1,000 units tablet, Take 2 tablets (2,000 Units total) by mouth daily, Disp: 60 tablet, Rfl: 2    cholestyramine (QUESTRAN) 4 g packet, Take 1 packet by mouth 2 (two) times a day with meals, Disp: , Rfl:     naproxen (NAPROSYN) 500 mg tablet, Take 1 tablet (500 mg total) by mouth 2 (two) times a day with meals, Disp: 60 tablet, Rfl: 5    Adalimumab (Humira Pen) 40 MG/0 4ML PNKT, Citrate Free Maintenance Dose: Inject (1) 40mg/0 4ml under the skin every other week, Disp: 2 each, Rfl: 12      Review of Systems:  Have you recently had or currently have any of the following? If YES, what are you doing for the problem? · Fever, chills or unintended weight loss: No  · Sudden loss or change in your vision: No  · Nausea, vomiting or blood in your stool: No  · Painful or swollen joints: No  · Wheezing or cough: No  · Changing mole or non-healing wound: No  · Nosebleeds: No  · Excessive sweating: No  · Easy or prolonged bleeding? No  · Over the last 2 weeks, how often have you been bothered by the following problems? · Taking little interest or pleasure in doing things: 1 - Not at All  · Feeling down, depressed, or hopeless: 1 - Not at All  · Rapid heartbeat with epinephrine:  No    · FEMALES ONLY:    · Are you pregnant or planning to become pregnant? N/A  · Are you currently or planning to be nursing or breast feeding? N/A    · Any known allergies? · No Known Allergies      Physical Exam:     Was a chaperone (Derm Clinical Assistant) present throughout the entire Physical Exam? Yes     Did the Dermatology Team specifically  the patient on the importance of a Full Skin Exam to be sure that nothing is missed clinically?  Yes}  o Did the patient ultimately request or accept a Full Skin Exam?  Yes  o Did the patient specifically refuse to have the areas "under-the-underwear" examined by the Dermatologist? No    CONSTITUTIONAL:   Vitals:    08/19/21 1041   Temp: 97 7 °F (36 5 °C)   TempSrc: Tympanic   Weight: 86 6 kg (191 lb)   Height: 6' (1 829 m)       PSYCH: Normal mood and affect  EYES: Normal conjunctiva  ENT: Normal lips and oral mucosa  CARDIOVASCULAR: No edema  RESPIRATORY: Normal respirations  HEME/LYMPH/IMMUNO:  No regional lymphadenopathy except as noted below in "ASSESSMENT AND PLAN BY DIAGNOSIS"    SKIN:  FULL ORGAN SYSTEM EXAM   Hair, Scalp, Ears, Face Normal except as noted below in Assessment   Neck, Cervical Chain Nodes Normal except as noted below in Assessment   Right Arm/Hand/Fingers Normal except as noted below in Assessment   Left Arm/Hand/Fingers Normal except as noted below in Assessment   Chest/Breasts/Axillae Viewed areas Normal except as noted below in Assessment   Abdomen, Umbilicus Normal except as noted below in Assessment   Back/Spine Normal except as noted below in Assessment   Groin/Genitalia/Buttocks Normal except as noted below in Assessment   Right Leg, Foot, Toes Normal except as noted below in Assessment   Left Leg, Foot, Toes Normal except as noted below in Assessment        Assessment and Plan by Diagnosis:    History of Present Condition:     Duration:  How long has this been an issue for you?    o  Years   Location Affected:  Where on the body is this affecting you?    o  Bilateral lower legs   Quality:  Is there any bleeding, pain, itch, burning/irritation, or redness associated with the skin lesion?    o  Denies   Severity:  Describe any bleeding, pain, itch, burning/irritation, or redness on a scale of 1 to 10 (with 10 being the worst)    o  1   Timing:  Does this condition seem to be there pretty constantly or do you notice it more at specific times throughout the day?    o  Constantly   Context:  Have you ever noticed that this condition seems to be associated with specific activities you do?    o  Denies   Modifying Factors:    o Anything that seems to make the condition worse?    -  Denies  o What have you tried to do to make the condition better? -  Denies   Associated Signs and Symptoms:  Does this skin lesion seem to be associated with any of the following:  o  SL AMB DERM SIGNS AND SYMPTOMS: Skin color changes         1  ACNE    Physical Exam:   Anatomic Location Affected:  Chest and back   Morphological Description:  Acne scars   Pertinent Positives:   Pertinent Negatives: Additional History of Present Condition:  Present for years    Assessment and Plan:  Based on a thorough discussion of this condition and the management approach to it (including a comprehensive discussion of the known risks, side effects and potential benefits of treatment), the patient (family) agrees to implement the following specific plan:   Recommend Neutragena clear pore wash in shower      2  MELANOCYTIC NEVI ("Moles")    Physical Exam:   Anatomic Location Affected:   Mostly on sun-exposed areas of the trunk and extremities   Morphological Description:  Scattered, 1-4mm round to ovoid, symmetrical-appearing, even bordered, skin colored to dark brown macules/papules, mostly in sun-exposed areas   Pertinent Positives:   Pertinent Negatives:      Assessment and Plan:  Based on a thorough discussion of this condition and the management approach to it (including a comprehensive discussion of the known risks, side effects and potential benefits of treatment), the patient (family) agrees to implement the following specific plan:   Observe for changes     Melanocytic Nevi  Melanocytic nevi ("moles") are tan or brown, raised or flat areas of the skin which have an increased number of melanocytes  Melanocytes are the cells in our body which make pigment and account for skin color      Some moles are present at birth (I e , "congenital nevi"), while others come up later in life (i e , "acquired nevi")  The sun can stimulate the body to make more moles  Sunburns are not the only thing that triggers more moles  Chronic sun exposure can do it too  Clinically distinguishing a healthy mole from melanoma may be difficult, even for experienced dermatologists  The "ABCDE's" of moles have been suggested as a means of helping to alert a person to a suspicious mole and the possible increased risk of melanoma  The suggestions for raising alert are as follows:    Asymmetry: Healthy moles tend to be symmetric, while melanomas are often asymmetric  Asymmetry means if you draw a line through the mole, the two halves do not match in color, size, shape, or surface texture  Asymmetry can be a result of rapid enlargement of a mole, the development of a raised area on a previously flat lesion, scaling, ulceration, bleeding or scabbing within the mole  Any mole that starts to demonstrate "asymmetry" should be examined promptly by a board certified dermatologist      Border: Healthy moles tend to have discrete, even borders  The border of a melanoma often blends into the normal skin and does not sharply delineate the mole from normal skin  Any mole that starts to demonstrate "uneven borders" should be examined promptly by a board certified dermatologist      Color: Healthy moles tend to be one color throughout  Melanomas tend to be made up of different colors ranging from dark black, blue, white, or red  Any mole that demonstrates a color change should be examined promptly by a board certified dermatologist      Diameter: Healthy moles tend to be smaller than 0 6 cm in size; an exception are "congenital nevi" that can be larger  Melanomas tend to grow and can often be greater than 0 6 cm (1/4 of an inch, or the size of a pencil eraser)  This is only a guideline, and many normal moles may be larger than 0 6 cm without being unhealthy    Any mole that starts to change in size (small to bigger or bigger to smaller) should be examined promptly by a board certified dermatologist      Evolving: Healthy moles tend to "stay the same "  Melanomas may often show signs of change or evolution such as a change in size, shape, color, or elevation  Any mole that starts to itch, bleed, crust, burn, hurt, or ulcerate or demonstrate a change or evolution should be examined promptly by a board certified dermatologist       Dysplastic Nevi  Dysplastic moles are moles that fit the ABCDE rules of melanoma but are not identified as melanomas when examined under the microscope  They may indicate an increased risk of melanoma in that person  If there is a family history of melanoma, most experts agree that the person may be at an increased risk for developing a melanoma  Experts still do not agree on what dysplastic moles mean in patients without a personal or family history of melanoma  Dysplastic moles are usually larger than common moles and have different colors within it with irregular borders  The appearance can be very similar to a melanoma  Biopsies of dysplastic moles may show abnormalities which are different from a regular mole  Melanoma  Malignant melanoma is a type of skin cancer that can be deadly if it spreads throughout the body  The incidence of melanoma in the [place] is growing faster than any other cancer  Melanoma usually grows near the surface of the skin for a period of time, and then begins to grow deeper into the skin  Once it grows deeper into the skin, the risk of spread to other organs greatly increases  Therefore, early detection and removal of a malignant melanoma may result in a better chance at a complete cure; removal after the tumor has spread may not be as effective, leading to worse clinical outcomes such as death  The true rate of nevus transformation into a melanoma is unknown   It has been estimated that the lifetime risk for any acquired melanocytic nevus on any 21year-old individual transforming into melanoma by age [de-identified] is 0 03% (1 in 3,164) for men and 0 009% (1 in 10,800) for women  The appearance of a "new mole" remains one of the most reliable methods for identifying a malignant melanoma  Occasionally, melanomas appear as rapidly growing, blue-black, dome-shaped bumps within a previous mole or previous area of normal skin  Other times, melanomas are suspected when a mole suddenly appears or changes  Itching, burning, or pain in a pigmented lesion should increase suspicion, but most patients with early melanoma have no skin discomfort whatsoever  Melanoma can occur anywhere on the skin, including areas that are difficult for self-examination  Many melanomas are first noticed by other family members  Suspicious-looking moles may be removed for microscopic examination  You may be able to prevent death from melanoma by doing two simple things:    1  Try to avoid unnecessary sun exposure and protect your skin when it is exposed to the sun  People who live near the equator, people who have intermittent exposures to large amounts of sun, and people who have had sunburns in childhood or adolescence have an increased risk for melanoma  Sun sense and vigilant sun protection may be keys to helping to prevent melanoma  We recommend wearing UPF-rated sun protective clothing and sunglasses whenever possible and applying a moisturizer-sunscreen combination product (SPF 50+) such as Neutrogena Daily Defense to sun exposed areas of skin at least three times a day  2  Have your moles regularly examined by a board certified dermatologist AND by yourself or a family member/friend at home  We recommend that you have your moles examined at least once a year by a board certified dermatologist   Use your birthday as an annual reminder to have your "Birthday Suit" (I e , your skin) examined; it is a nice birthday gift to yourself to know that your skin is healthy appearing! Additionally, at-home self examinations may be helpful for detecting a possible melanoma  Use the ABCDEs we discussed and check your moles once a month at home  Scribe Attestation    I,:  Prasad Toure MA am acting as a scribe while in the presence of the attending physician :       I,:  Deepali Juarez MD personally performed the services described in this documentation    as scribed in my presence  :

## 2021-08-19 NOTE — PATIENT INSTRUCTIONS
1 ACNE    Physical Exam:   Anatomic Location Affected:  Chest and back      Assessment and Plan:  Based on a thorough discussion of this condition and the management approach to it (including a comprehensive discussion of the known risks, side effects and potential benefits of treatment), the patient (family) agrees to implement the following specific plan:   Recommend Neutragena clear pore      2  MELANOCYTIC NEVI ("Moles")    Physical Exam:   Anatomic Location Affected:   Mostly on sun-exposed areas of the trunk and extremities   Morphological Description:  Scattered, 1-4mm round to ovoid, symmetrical-appearing, even bordered, skin colored to dark brown macules/papules, mostly in sun-exposed area    Assessment and Plan:  Based on a thorough discussion of this condition and the management approach to it (including a comprehensive discussion of the known risks, side effects and potential benefits of treatment), the patient (family) agrees to implement the following specific plan:   Observe for changes     Melanocytic Nevi  Melanocytic nevi ("moles") are tan or brown, raised or flat areas of the skin which have an increased number of melanocytes  Melanocytes are the cells in our body which make pigment and account for skin color  Some moles are present at birth (I e , "congenital nevi"), while others come up later in life (i e , "acquired nevi")  The sun can stimulate the body to make more moles  Sunburns are not the only thing that triggers more moles  Chronic sun exposure can do it too  Clinically distinguishing a healthy mole from melanoma may be difficult, even for experienced dermatologists  The "ABCDE's" of moles have been suggested as a means of helping to alert a person to a suspicious mole and the possible increased risk of melanoma  The suggestions for raising alert are as follows:    Asymmetry: Healthy moles tend to be symmetric, while melanomas are often asymmetric    Asymmetry means if you draw a line through the mole, the two halves do not match in color, size, shape, or surface texture  Asymmetry can be a result of rapid enlargement of a mole, the development of a raised area on a previously flat lesion, scaling, ulceration, bleeding or scabbing within the mole  Any mole that starts to demonstrate "asymmetry" should be examined promptly by a board certified dermatologist      Border: Healthy moles tend to have discrete, even borders  The border of a melanoma often blends into the normal skin and does not sharply delineate the mole from normal skin  Any mole that starts to demonstrate "uneven borders" should be examined promptly by a board certified dermatologist      Color: Healthy moles tend to be one color throughout  Melanomas tend to be made up of different colors ranging from dark black, blue, white, or red  Any mole that demonstrates a color change should be examined promptly by a board certified dermatologist      Diameter: Healthy moles tend to be smaller than 0 6 cm in size; an exception are "congenital nevi" that can be larger  Melanomas tend to grow and can often be greater than 0 6 cm (1/4 of an inch, or the size of a pencil eraser)  This is only a guideline, and many normal moles may be larger than 0 6 cm without being unhealthy  Any mole that starts to change in size (small to bigger or bigger to smaller) should be examined promptly by a board certified dermatologist      Evolving: Healthy moles tend to "stay the same "  Melanomas may often show signs of change or evolution such as a change in size, shape, color, or elevation  Any mole that starts to itch, bleed, crust, burn, hurt, or ulcerate or demonstrate a change or evolution should be examined promptly by a board certified dermatologist       Dysplastic Nevi  Dysplastic moles are moles that fit the ABCDE rules of melanoma but are not identified as melanomas when examined under the microscope    They may indicate an increased risk of melanoma in that person  If there is a family history of melanoma, most experts agree that the person may be at an increased risk for developing a melanoma  Experts still do not agree on what dysplastic moles mean in patients without a personal or family history of melanoma  Dysplastic moles are usually larger than common moles and have different colors within it with irregular borders  The appearance can be very similar to a melanoma  Biopsies of dysplastic moles may show abnormalities which are different from a regular mole  Melanoma  Malignant melanoma is a type of skin cancer that can be deadly if it spreads throughout the body  The incidence of melanoma in the [place] is growing faster than any other cancer  Melanoma usually grows near the surface of the skin for a period of time, and then begins to grow deeper into the skin  Once it grows deeper into the skin, the risk of spread to other organs greatly increases  Therefore, early detection and removal of a malignant melanoma may result in a better chance at a complete cure; removal after the tumor has spread may not be as effective, leading to worse clinical outcomes such as death  The true rate of nevus transformation into a melanoma is unknown  It has been estimated that the lifetime risk for any acquired melanocytic nevus on any 21year-old individual transforming into melanoma by age [de-identified] is 0 03% (1 in 3,164) for men and 0 009% (1 in 10,800) for women  The appearance of a "new mole" remains one of the most reliable methods for identifying a malignant melanoma  Occasionally, melanomas appear as rapidly growing, blue-black, dome-shaped bumps within a previous mole or previous area of normal skin  Other times, melanomas are suspected when a mole suddenly appears or changes  Itching, burning, or pain in a pigmented lesion should increase suspicion, but most patients with early melanoma have no skin discomfort whatsoever  Melanoma can occur anywhere on the skin, including areas that are difficult for self-examination  Many melanomas are first noticed by other family members  Suspicious-looking moles may be removed for microscopic examination  You may be able to prevent death from melanoma by doing two simple things:    1  Try to avoid unnecessary sun exposure and protect your skin when it is exposed to the sun  People who live near the equator, people who have intermittent exposures to large amounts of sun, and people who have had sunburns in childhood or adolescence have an increased risk for melanoma  Sun sense and vigilant sun protection may be keys to helping to prevent melanoma  We recommend wearing UPF-rated sun protective clothing and sunglasses whenever possible and applying a moisturizer-sunscreen combination product (SPF 50+) such as Neutrogena Daily Defense to sun exposed areas of skin at least three times a day  2  Have your moles regularly examined by a board certified dermatologist AND by yourself or a family member/friend at home  We recommend that you have your moles examined at least once a year by a board certified dermatologist   Use your birthday as an annual reminder to have your "Birthday Suit" (I e , your skin) examined; it is a nice birthday gift to yourself to know that your skin is healthy appearing! Additionally, at-home self examinations may be helpful for detecting a possible melanoma  Use the ABCDEs we discussed and check your moles once a month at home

## 2021-08-23 ENCOUNTER — TELEMEDICINE (OUTPATIENT)
Dept: GASTROENTEROLOGY | Facility: CLINIC | Age: 51
End: 2021-08-23
Payer: COMMERCIAL

## 2021-08-23 DIAGNOSIS — K50.919 CROHN'S DISEASE WITH COMPLICATION, UNSPECIFIED GASTROINTESTINAL TRACT LOCATION (HCC): ICD-10-CM

## 2021-08-23 DIAGNOSIS — E55.9 VITAMIN D DEFICIENCY: ICD-10-CM

## 2021-08-23 DIAGNOSIS — K50.913 PERIANAL FISTULA DUE TO CROHN'S DISEASE (HCC): Primary | ICD-10-CM

## 2021-08-23 DIAGNOSIS — K21.9 GASTROESOPHAGEAL REFLUX DISEASE WITHOUT ESOPHAGITIS: ICD-10-CM

## 2021-08-23 DIAGNOSIS — Z86.010 HISTORY OF COLON POLYPS: ICD-10-CM

## 2021-08-23 PROCEDURE — 1036F TOBACCO NON-USER: CPT | Performed by: INTERNAL MEDICINE

## 2021-08-23 PROCEDURE — 99214 OFFICE O/P EST MOD 30 MIN: CPT | Performed by: INTERNAL MEDICINE

## 2021-08-23 NOTE — PROGRESS NOTES
Virtual Regular Visit    Verification of patient location:    Patient is located in the following state in which I hold an active license [place]      Assessment/Plan:    Problem List Items Addressed This Visit        Digestive    GERD (gastroesophageal reflux disease)    Crohn's disease ([facility] )       Other    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 ([facility] ) - Primary    Relevant Orders    TPMT ENZYME ACTIVITY,BLOOD    Comprehensive metabolic panel    Quantiferon TB Gold Plus    CBC and differential    History of colon polyps    Vitamin D deficiency               Reason for visit is   Chief Complaint   Patient presents with    Follow up to colonoscopy    Virtual Regular Visit        Encounter provider Anatoly Nina MD    Provider located at [address] [address] [phone]      Recent Visits  No visits were found meeting these conditions  Showing recent visits within past 7 days and meeting all other requirements  Today's Visits  Date Type Provider Dept   08/23/21 Telemedicine Anatoly Nina MD [facility]   Showing today's visits and meeting all other requirements  Future Appointments  No visits were found meeting these conditions  Showing future appointments within next 150 days and meeting all other requirements       The patient was identified by name and date of birth  Martin Recinos was informed that this is a telemedicine visit and that the visit is being conducted through [address] [place] [address] Now and patient was informed that this is a secure, HIPAA-compliant platform  He agrees to proceed     My office door was closed  No one else was in the room  He acknowledged consent and understanding of privacy and security of the video platform  The patient has agreed to participate and understands they can discontinue the visit at any time  Patient is aware this is a billable service       Subjective  Martin Recinos is a 52 y o  male who presents today for follow-up  Biopsies reviewed  Still with terminal active ileitis and left-sided colitis  Antibodies high for Humira with limited drug level noted  Still clinically having multiple bowel movements a day, 5-6 loose explosive bowel movements  This is despite Questran b i d  No fevers or joint pains  No rashes  No bleeding  ASSESSMENT AND PLAN:      1  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 [facility])  57-year-old male with Crohn's here today for follow-up  Looks like he is no longer responding to Humira  Positive antibody levels, limited drug levels, positive biopsies with inflammation and clinically having symptoms  - stop Humira   - switch to Stelara with azathioprine 50mg daily after blood work   - continue Questran b i d  for now    - TPMT ENZYME ACTIVITY,BLOOD; Future  - Comprehensive metabolic panel; Future  - Quantiferon TB Gold Plus; Future  - CBC and differential; Future    2  Crohn's disease with complication, unspecified gastrointestinal tract location ([place] [address] )    3  Vitamin D deficiency  Continue vitamin-D supplementation, will recheck levels December 2021    4  Gastroesophageal reflux disease without esophagitis  Continue GERD lifestyle modification    5   History of colon polyps  Last colonoscopy in 08/13/2021, recall August 2023      Followup Appointment:  3 months  ______________________________________________________________________      Historical Information   Past Medical History:   Diagnosis Date    Back pain     Colon polyp     Crohn's disease ([facility] )     GERD (gastroesophageal reflux disease)     Perianal fistula     Terminal ileitis ([facility] )      Past Surgical History:   Procedure Laterality Date    ABSCESS DRAINAGE      BACK SURGERY  2019    COLONOSCOPY      HERNIA REPAIR      umbilical hernia    WA PERCUT IMPLNT NEUROELECT,EPIDURAL Right 3/26/2021    Procedure: INSERTION THORACIC DORSAL COLUMN SPINAL CORD STIMULATOR PERCUTANEOUS W IMPLANTABLE PULSE GENERATOR, RIGHT;  Surgeon: Wolfgang López MD;  Location: [facility] MAIN OR;  Service: Neurosurgery    SPINAL STIMULATOR PLACEMENT  2021     Social History     Substance and Sexual Activity   Alcohol Use Yes    Comment: "Couple of beers within the week"     Social History     Substance and Sexual Activity   Drug Use No    Comment: Denies     Social History     Tobacco Use   Smoking Status Former Smoker    Packs/day: 0 50    Types: Cigarettes    Start date:     Quit date: 2021    Years since quittin 5   Smokeless Tobacco Never Used   Tobacco Comment    Currently is using nicotine patches - quit 21     Family History   Problem Relation Age of Onset    Heart disease Father     Heart attack Father     Colon cancer Neg Hx     Colon polyps Neg Hx     Inflammatory bowel disease Neg Hx        Meds/Allergies       Current Outpatient Medications:     amitriptyline (ELAVIL) 25 mg tablet    cholecalciferol (VITAMIN D3) 1,000 units tablet    cholestyramine (QUESTRAN) 4 g packet    naproxen (NAPROSYN) 500 mg tablet    No Known Allergies    PHYSICAL EXAM:    There were no vitals taken for this visit  There is no height or weight on file to calculate BMI  Appearance and vitals taken from home devices    General Appearance:   Alert, cooperative, no distress   HEENT:  Normocephalic, atraumatic, anicteric  Neck supple, symmetrical, trachea midline  Lungs:   Equal chest rise and unlabored breathing, normal effort, no coughing  Cardiovascular:   No visualized JVD or lower extremity edema  Abdomen:   No abdominal distension  Skin:   No jaundice, rashes, or lesions  Musculoskeletal:   Normal range of motion visualized  10 feet gait without difficulty and without assistive device  Psych:  Normal affect and normal insight  Neuro:  Alert and appropriate   Ox3         Lab Results:   Lab Results   Component Value Date    WBC 8 39 2021    HGB 17 9 (H) 2021    HCT 53 6 (H) 05/18/2021    MCV 98 05/18/2021     05/18/2021     Lab Results   Component Value Date     10/03/2016    K 4 0 05/18/2021     05/18/2021    CO2 28 05/18/2021    ANIONGAP 23 (H) 02/20/2015    BUN 9 05/18/2021    CREATININE 0 89 05/18/2021    GLUCOSE 110 02/20/2015    GLUF 85 05/18/2021    CALCIUM 9 7 05/18/2021    AST 20 05/18/2021    ALT 41 05/18/2021    ALKPHOS 95 05/18/2021    PROT 7 3 10/03/2016    BILITOT 0 8 10/03/2016    EGFR 101 05/18/2021     No results found for: IRON, TIBC, FERRITIN  Lab Results   Component Value Date    LIPASE 111 08/25/2020       Radiology Results:   Colonoscopy    Result Date: 8/13/2021  Narrative: [address] [address] [phone] DATE OF SERVICE: 8/13/21 PHYSICIAN(S): Louretta Olszewski, MD - Attending Physician INDICATION: Crohn's disease with complication, unspecified gastrointestinal tract location [facility]) Colonoscopy performed for a diagnostic indication  POST-OP DIAGNOSIS: See the impression below  HISTORY: Prior colonoscopy: Less than 3 years ago  It is being repeated at an interval of less than 3 years because: This colonoscopy is being performed for a diagnostic indication BOWEL PREPARATION: Miralax/Dulcolax PREPROCEDURE: Informed consent was obtained for the procedure, including sedation  Risks including but not limited to bleeding, infection, perforation, adverse drug reaction and aspiration were explained in detail  Also explained about less than 100% sensitivity with the exam and other alternatives  The patient was placed in the left lateral decubitus position  DETAILS OF PROCEDURE: Patient was taken to the procedure room where a time out was performed to confirm correct patient and correct procedure   The patient underwent monitored anesthesia care, which was administered by an anesthesia professional  The patient's blood pressure, heart rate, level of consciousness, oxygen and respirations were monitored throughout the procedure  A digital rectal exam was performed  The scope was introduced through the anus and advanced to the terminal ileum  Retroflexion was performed in the rectum  The quality of bowel preparation was evaluated using the [facility] Bowel Preparation Scale with scores of: right colon = 2, transverse colon = 2, left colon = 2  The total BBPS score was 6  Bowel prep was adequate  The patient experienced no blood loss  The procedure was not difficult  The patient tolerated the procedure well  There were no apparent complications  ANESTHESIA INFORMATION: ASA: II Anesthesia Type: IV Sedation with Anesthesia MEDICATIONS: No administrations occurring from 1043 to 1103 on 08/13/21 FINDINGS: The terminal ileum and entire colon appeared normal  Performed random biopsy  Biopsies taken throughout to rule out chronic inflammation and flat dysplasia  Multiple mild scattered pancolonic diverticula Small, internal hemorrhoids EVENTS: Procedure Events Event Event Time ENDO CECUM REACHED 8/13/2021 10:50 AM ENDO SCOPE OUT TIME 8/13/2021 11:02 AM SPECIMENS: ID Type Source Tests Collected by Time Destination 1 : terminal ileum bx hx of crohn's Tissue Small Bowel, NOS TISSUE EXAM Royal Moser MD 8/13/2021 11:02 AM  2 : right colon bxs hx of crohn's r/o dysplasia Tissue Colon TISSUE EXAM Royal Moser MD 8/13/2021 11:03 AM  3 : transverse colon bxs hx of crohn's r/o dysplasia Tissue Large Intestine, Transverse Colon TISSUE EXAM Royal Moser MD 8/13/2021 11:04 AM  4 : left colon bxs hx of crohn's r/o dysplasia Tissue Colon TISSUE EXAM Royal Moser MD 8/13/2021 11:04 AM  EQUIPMENT: Colonoscope -     Impression: Normal terminal ileum, biopsies taken  Normal colon mucosa, biopsies taken  Scattered diverticulosis throughout  Small internal hemorrhoids  RECOMMENDATION: Repeat colonoscopy in 2 years due to inflammatory bowel disease  Resume home meds  Resume previous diet   Follow up with your primary care provider as previously scheduled  Follow up with me in the office in the next 4-6 weeks  We will call you with the biopsy results in 2 weeks  Neva Retana MD     CT cervical spine without contrast    Result Date: 8/20/2021  Narrative: CT CERVICAL SPINE - WITHOUT CONTRAST INDICATION:   Worsening neck and right arm pain, myofascial pain syndrome  COMPARISON:  X-ray cervical spine 8/12/2021 TECHNIQUE:  CT examination of the cervical spine was performed without intravenous contrast   Contiguous axial images were obtained  Sagittal and coronal reconstructions were performed  Radiation dose length product (DLP) for this visit:  428 34 mGy-cm   This examination, like all CT scans performed in the [facility], was performed utilizing techniques to minimize radiation dose exposure, including the use of iterative  reconstruction and automated exposure control  IMAGE QUALITY:  Diagnostic  FINDINGS: ALIGNMENT:  Normal alignment of the cervical spine  No subluxation  VERTEBRAL BODIES:  No fracture  There is a well-defined 6 mm focal sclerosis in the left lateral mass of C1 likely a bone island  DEGENERATIVE CHANGES: C2-3: Minimal central disc protrusion  No canal or foraminal stenosis  C3-4: Minimal central/left paracentral disc protrusion  Mild uncovertebral spurring and facet arthropathy  Mild canal narrowing  Mild left foraminal stenosis  C4-5: Mild disc bulge  Mild right uncovertebral spurring  Mild canal narrowing  Minimal right foraminal narrowing  C5-6: Mild disc bulge  Right ureteral left uncovertebral spurring  Mild canal narrowing  Moderate right foraminal stenosis  C6-7: Minimal disc bulge  No canal or foraminal stenosis  C7-T1: No significant disc bulge  No canal or foraminal stenosis  PREVERTEBRAL AND PARASPINAL SOFT TISSUES:  Unremarkable  PARTIALLY IMAGED SINUSES: Focal mucosal thickening versus retention cyst in the floor of left maxillary sinus    Mucosal thickening of right maxillary sinus  THORACIC INLET:  Pulmonary emphysema  Biapical scarring  Impression: 1  No acute osseous abnormality  2   Multilevel mild degenerative canal narrowing  Moderate right foraminal stenosis at C5-6  3   Pulmonary emphysema  Workstation performed: [id number]       REVIEW OF SYSTEMS:    CONSTITUTIONAL: Denies any fever, chills, rigors, and weight loss  HEENT: No earache or tinnitus  Denies hearing loss or visual disturbances  CARDIOVASCULAR: No chest pain or palpitations  RESPIRATORY: Denies any cough, hemoptysis, shortness of breath or dyspnea on exertion  GASTROINTESTINAL: As noted in the History of Present Illness  GENITOURINARY: No problems with urination  Denies any hematuria or dysuria  NEUROLOGIC: No dizziness or vertigo, denies headaches  MUSCULOSKELETAL: Denies any muscle or joint pain  SKIN: Denies skin rashes or itching  ENDOCRINE: Denies excessive thirst  Denies intolerance to heat or cold  PSYCHOSOCIAL: Denies depression or anxiety  Denies any recent memory loss  I spent 25 minutes directly with the patient during this visit    [address] [place] [address] Osteopathy verbally agrees to participate in [employer]  Pt is aware that [employer] could be limited without vital signs or the ability to perform a full hands-on physical exam  Benito Shetty understands he or the provider may request at any time to terminate the video visit and request the patient to seek care or treatment in person

## 2021-08-24 ENCOUNTER — LAB (OUTPATIENT)
Dept: LAB | Facility: HOSPITAL | Age: 51
End: 2021-08-24
Attending: INTERNAL MEDICINE
Payer: COMMERCIAL

## 2021-08-24 ENCOUNTER — TELEPHONE (OUTPATIENT)
Dept: GASTROENTEROLOGY | Facility: CLINIC | Age: 51
End: 2021-08-24

## 2021-08-24 DIAGNOSIS — K50.913 PERIANAL FISTULA DUE TO CROHN'S DISEASE (HCC): ICD-10-CM

## 2021-08-24 LAB
BASOPHILS # BLD AUTO: 0.04 THOUSANDS/ΜL (ref 0–0.1)
BASOPHILS NFR BLD AUTO: 0 % (ref 0–1)
EOSINOPHIL # BLD AUTO: 0.57 THOUSAND/ΜL (ref 0–0.61)
EOSINOPHIL NFR BLD AUTO: 6 % (ref 0–6)
ERYTHROCYTE [DISTWIDTH] IN BLOOD BY AUTOMATED COUNT: 12.9 % (ref 11.6–15.1)
HCT VFR BLD AUTO: 51.2 % (ref 36.5–49.3)
HGB BLD-MCNC: 17.5 G/DL (ref 12–17)
IMM GRANULOCYTES # BLD AUTO: 0.02 THOUSAND/UL (ref 0–0.2)
IMM GRANULOCYTES NFR BLD AUTO: 0 % (ref 0–2)
LYMPHOCYTES # BLD AUTO: 2.39 THOUSANDS/ΜL (ref 0.6–4.47)
LYMPHOCYTES NFR BLD AUTO: 25 % (ref 14–44)
MCH RBC QN AUTO: 33.5 PG (ref 26.8–34.3)
MCHC RBC AUTO-ENTMCNC: 34.2 G/DL (ref 31.4–37.4)
MCV RBC AUTO: 98 FL (ref 82–98)
MONOCYTES # BLD AUTO: 0.69 THOUSAND/ΜL (ref 0.17–1.22)
MONOCYTES NFR BLD AUTO: 7 % (ref 4–12)
NEUTROPHILS # BLD AUTO: 5.95 THOUSANDS/ΜL (ref 1.85–7.62)
NEUTS SEG NFR BLD AUTO: 62 % (ref 43–75)
NRBC BLD AUTO-RTO: 0 /100 WBCS
PLATELET # BLD AUTO: 213 THOUSANDS/UL (ref 149–390)
PMV BLD AUTO: 10 FL (ref 8.9–12.7)
RBC # BLD AUTO: 5.22 MILLION/UL (ref 3.88–5.62)
WBC # BLD AUTO: 9.66 THOUSAND/UL (ref 4.31–10.16)

## 2021-08-24 PROCEDURE — 36415 COLL VENOUS BLD VENIPUNCTURE: CPT

## 2021-08-24 PROCEDURE — 85025 COMPLETE CBC W/AUTO DIFF WBC: CPT

## 2021-09-02 ENCOUNTER — TELEPHONE (OUTPATIENT)
Dept: GASTROENTEROLOGY | Facility: CLINIC | Age: 51
End: 2021-09-02

## 2021-09-02 NOTE — TELEPHONE ENCOUNTER
Pt states he had blood work a few wks ago at [facility] like to review results  # [phone]

## 2021-09-02 NOTE — TELEPHONE ENCOUNTER
Called pt back regarding lab results (CBC)  He then inquired about starting Stelera and possibly another medication  Spoke with Ana Maria RN, she notes will investigate with his insurance and call pt back  Pt is aware    [phone]

## 2021-09-03 ENCOUNTER — HOSPITAL ENCOUNTER (OUTPATIENT)
Dept: RADIOLOGY | Facility: CLINIC | Age: 51
Discharge: HOME | End: 2021-09-03
Attending: ANESTHESIOLOGY | Admitting: ANESTHESIOLOGY
Payer: COMMERCIAL

## 2021-09-03 ENCOUNTER — LAB (OUTPATIENT)
Dept: LAB | Facility: HOSPITAL | Age: 51
End: 2021-09-03
Attending: INTERNAL MEDICINE
Payer: COMMERCIAL

## 2021-09-03 VITALS
OXYGEN SATURATION: 94 % | SYSTOLIC BLOOD PRESSURE: 161 MMHG | HEART RATE: 79 BPM | TEMPERATURE: 97.9 F | RESPIRATION RATE: 20 BRPM | DIASTOLIC BLOOD PRESSURE: 83 MMHG

## 2021-09-03 DIAGNOSIS — K50.913 PERIANAL FISTULA DUE TO CROHN'S DISEASE (HCC): ICD-10-CM

## 2021-09-03 DIAGNOSIS — M54.2 NECK PAIN: ICD-10-CM

## 2021-09-03 DIAGNOSIS — M54.12 CERVICAL RADICULOPATHY: ICD-10-CM

## 2021-09-03 DIAGNOSIS — M50.21 HERNIATED NUCLEUS PULPOSUS, C3-4: ICD-10-CM

## 2021-09-03 LAB
ALBUMIN SERPL BCP-MCNC: 3.8 G/DL (ref 3.5–5)
ALP SERPL-CCNC: 99 U/L (ref 46–116)
ALT SERPL W P-5'-P-CCNC: 41 U/L (ref 12–78)
ANION GAP SERPL CALCULATED.3IONS-SCNC: 7 MMOL/L (ref 4–13)
AST SERPL W P-5'-P-CCNC: 19 U/L (ref 5–45)
BILIRUB SERPL-MCNC: 0.55 MG/DL (ref 0.2–1)
BUN SERPL-MCNC: 16 MG/DL (ref 5–25)
CALCIUM SERPL-MCNC: 9 MG/DL (ref 8.3–10.1)
CHLORIDE SERPL-SCNC: 110 MMOL/L (ref 100–108)
CO2 SERPL-SCNC: 24 MMOL/L (ref 21–32)
CREAT SERPL-MCNC: 0.82 MG/DL (ref 0.6–1.3)
GFR SERPL CREATININE-BSD FRML MDRD: 105 ML/MIN/1.73SQ M
GLUCOSE P FAST SERPL-MCNC: 85 MG/DL (ref 65–99)
POTASSIUM SERPL-SCNC: 4 MMOL/L (ref 3.5–5.3)
PROT SERPL-MCNC: 7.1 G/DL (ref 6.4–8.2)
SODIUM SERPL-SCNC: 141 MMOL/L (ref 136–145)

## 2021-09-03 PROCEDURE — 82542 COL CHROMOTOGRAPHY QUAL/QUAN: CPT

## 2021-09-03 PROCEDURE — 80053 COMPREHEN METABOLIC PANEL: CPT

## 2021-09-03 PROCEDURE — 86480 TB TEST CELL IMMUN MEASURE: CPT

## 2021-09-03 PROCEDURE — 62321 NJX INTERLAMINAR CRV/THRC: CPT | Performed by: ANESTHESIOLOGY

## 2021-09-03 PROCEDURE — 36415 COLL VENOUS BLD VENIPUNCTURE: CPT

## 2021-09-03 RX ORDER — METHYLPREDNISOLONE ACETATE 80 MG/ML
80 INJECTION, SUSPENSION INTRA-ARTICULAR; INTRALESIONAL; INTRAMUSCULAR; PARENTERAL; SOFT TISSUE ONCE
Status: COMPLETED | OUTPATIENT
Start: 2021-09-03 | End: 2021-09-03

## 2021-09-03 RX ADMIN — IOHEXOL 1 ML: 300 INJECTION, SOLUTION INTRAVENOUS at 10:04

## 2021-09-03 RX ADMIN — METHYLPREDNISOLONE ACETATE 80 MG: 80 INJECTION, SUSPENSION INTRA-ARTICULAR; INTRALESIONAL; INTRAMUSCULAR; SOFT TISSUE at 10:04

## 2021-09-03 NOTE — DISCHARGE INSTRUCTIONS
Epidural Steroid Injection   WHAT YOU NEED TO KNOW:   An epidural steroid injection (BRAEDEN) is a procedure to inject steroid medicine into the epidural space  The epidural space is between your spinal cord and vertebrae  Steroids reduce inflammation and fluid buildup in your spine that may be causing pain  You may be given pain medicine along with the steroids  ACTIVITY  · Do not drive or operate machinery today  · No strenuous activity today - bending, lifting, etc   · You may resume normal activites starting tomorrow - start slowly and as tolerated  · You may shower today, but no tub baths or hot tubs  · You may have numbness for several hours from the local anesthetic  Please use caution and common sense, especially with weight-bearing activities  CARE OF THE INJECTION SITE  · If you have soreness or pain, apply ice to the area today (20 minutes on/20 minutes off)  · Starting tomorrow, you may use warm, moist heat or ice if needed  · You may have an increase or change in your discomfort for 36-48 hours after your treatment  · Apply ice and continue with any pain medication you have been prescribed  · Notify the [facility] if you have any of the following: redness, drainage, swelling, headache, stiff neck or fever above 100°F     SPECIAL INSTRUCTIONS  · Our office will contact you in approximately 7 days for a progress report  MEDICATIONS  · Continue to take all routine medications  · Our office may have instructed you to hold some medications  As no general anesthesia was used in today's procedure, you should not experience any side effects related to anesthesia  If you have a problem specifically related to your procedure, please call our office at [phone]  Problems not related to your procedure should be directed to your primary care physician

## 2021-09-03 NOTE — H&P
History of Present Illness: The patient is a 52 y o  male who presents with complaints of neck and arm pain      Patient Active Problem List   Diagnosis    Sprain of anterior talofibular ligament of right ankle    Perianal abscess    Neuropathy    Chronic bilateral low back pain with bilateral sciatica    Bilateral lower extremity edema    Chronic pain syndrome    Lumbar post-laminectomy syndrome    Lumbar spondylosis    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 ([address] )    Congenital fusion of sacroiliac joint    Terminal ileitis ([address]    Viral enteritis    Chronic foot pain    Lumbar radiculopathy    Acute non-recurrent maxillary sinusitis    Screening examination for other arthropod-borne viral diseases    GERD (gastroesophageal reflux disease)    History of colon polyps    Perianal fistula    Functional diarrhea    Blepharitis, left eye    Closed fracture of radial styloid    Compression of right ulnar nerve at multiple levels    Degenerative disc disease at L5-S1 level    History of lumbar fusion    Numbness and tingling of both lower extremities    Periorbital cellulitis of left eye    Right leg swelling    Tobacco use    Vitamin D deficiency    Arthritis of right shoulder region    Chronic right shoulder pain    Myofascial pain syndrome    Status post insertion of spinal cord stimulator    Crohn's disease ([address] )    Numbness and tingling in right hand    Neck pain    Cervical radiculopathy    Herniated nucleus pulposus, C3-4       Past Medical History:   Diagnosis Date    Back pain     Colon polyp     Crohn's disease ([address] )     GERD (gastroesophageal reflux disease)     Perianal fistula     Terminal ileitis ([address] )        Past Surgical History:   Procedure Laterality Date    ABSCESS DRAINAGE      BACK SURGERY  2019    COLONOSCOPY      HERNIA REPAIR      umbilical hernia    SC PERCUT IMPLNT NEUROELECT,EPIDURAL Right 3/26/2021    Procedure: INSERTION THORACIC DORSAL COLUMN SPINAL CORD STIMULATOR PERCUTANEOUS W IMPLANTABLE PULSE GENERATOR, RIGHT;  Surgeon: Lina Huggins MD;  Location:  MAIN OR;  Service: Neurosurgery    SPINAL STIMULATOR PLACEMENT  03/2021         Current Outpatient Medications:     amitriptyline (ELAVIL) 25 mg tablet, 1-2 HS, Disp: 60 tablet, Rfl: 5    cholecalciferol (VITAMIN D3) 1,000 units tablet, Take 2 tablets (2,000 Units total) by mouth daily, Disp: 60 tablet, Rfl: 2    cholestyramine (QUESTRAN) 4 g packet, Take 1 packet by mouth 2 (two) times a day with meals, Disp: , Rfl:     naproxen (NAPROSYN) 500 mg tablet, Take 1 tablet (500 mg total) by mouth 2 (two) times a day with meals, Disp: 60 tablet, Rfl: 5    Current Facility-Administered Medications:     iohexol (OMNIPAQUE) 300 mg/mL injection 50 mL, 50 mL, Epidural, Once, Aldo Butt,     methylPREDNISolone acetate (DEPO-MEDROL) injection 80 mg, 80 mg, Epidural, Once, Aldo Butt DO    No Known Allergies    Physical Exam:   General: Awake, Alert, Oriented x 3, Mood and affect appropriate  Respiratory: Respirations even and unlabored  Cardiovascular: Peripheral pulses intact; no edema  Musculoskeletal Exam:  Decreased range of motion cervical spine    ASA Score: II         Assessment:   1  Neck pain    2  Cervical radiculopathy    3   Herniated nucleus pulposus, C3-4        Plan: RENATO

## 2021-09-06 NOTE — RESULT NOTES
Verified Results  (1) LIPID PANEL, FASTING 03Oct2016 02:31PM Yoshi Lunch     Test Name Result Flag Reference   CHOLESTEROL, TOTAL 208 mg/dL H 125-200   HDL CHOLESTEROL 76 mg/dL  > OR = 40   TRIGLICERIDES 95 mg/dL  <490   LDL-CHOLESTEROL 113 mg/dL (calc)  <130   Desirable range <100 mg/dL for patients with CHD or  diabetes and <70 mg/dL for diabetic patients with  known heart disease  CHOL/HDLC RATIO 2 7 (calc)  < OR = 5 0   NON HDL CHOLESTEROL 132 mg/dL (calc)     Target for non-HDL cholesterol is 30 mg/dL higher than   LDL cholesterol target  CHOLESTEROL, TOTAL 208 mg/dL H 125-200   HDL CHOLESTEROL 76 mg/dL  > OR = 40   TRIGLICERIDES 95 mg/dL  <769   LDL-CHOLESTEROL 113 mg/dL (calc)  <130   Desirable range <100 mg/dL for patients with CHD or  diabetes and <70 mg/dL for diabetic patients with  known heart disease  CHOL/HDLC RATIO 2 7 (calc)  < OR = 5 0   NON HDL CHOLESTEROL 132 mg/dL (calc)     Target for non-HDL cholesterol is 30 mg/dL higher than   LDL cholesterol target 
Current every day smoker

## 2021-09-07 LAB
GAMMA INTERFERON BACKGROUND BLD IA-ACNC: 0.02 IU/ML
M TB IFN-G BLD-IMP: NEGATIVE
M TB IFN-G CD4+ BCKGRND COR BLD-ACNC: -0.01 IU/ML
M TB IFN-G CD4+ BCKGRND COR BLD-ACNC: 0 IU/ML
MITOGEN IGNF BCKGRD COR BLD-ACNC: >10 IU/ML

## 2021-09-07 NOTE — TELEPHONE ENCOUNTER
I spoke with patient 9/2/21  He went for labs ( Quant Tb Gold, TPMT & CMP 9/3/21) 2/3 labs still pending  One Time IV Stelara referral submitted to [facility] 9/7/21, awaiting approval from insurance for One time Stelara at [facility]  He prefers to have wife administer the self injection at home, I will submit another PA through [facility] once he has his One Time Infusion  Pending TPMT results he will be starting azathioprine 50 mg daily

## 2021-09-10 ENCOUNTER — TELEPHONE (OUTPATIENT)
Dept: PAIN MEDICINE | Facility: CLINIC | Age: 51
End: 2021-09-10

## 2021-09-10 NOTE — TELEPHONE ENCOUNTER
Pt reports 40% improvement and 3/10 pain level  I did advise that it can take 2 weeks for the full effects

## 2021-09-13 LAB
REF LAB TEST METHOD: NORMAL
TEST INTERPRETATION: NORMAL
TPMT RBC-CCNC: 11.2 UNITS/ML RBC

## 2021-09-17 NOTE — TELEPHONE ENCOUNTER
This case is still pending review  If we do not have a determination by today 9/17/2021  Paola Phelps from [employer] will reach out to her contact @ [facility] to see if she can get an update  This was submitted on 9/7 they have a 14 day turnaround time

## 2021-09-20 ENCOUNTER — TELEPHONE (OUTPATIENT)
Dept: GASTROENTEROLOGY | Facility: CLINIC | Age: 51
End: 2021-09-20

## 2021-09-20 DIAGNOSIS — Z79.899 LONG TERM CURRENT USE OF THERAPEUTIC DRUG: Primary | ICD-10-CM

## 2021-09-20 DIAGNOSIS — K50.919 CROHN'S DISEASE WITH COMPLICATION, UNSPECIFIED GASTROINTESTINAL TRACT LOCATION (HCC): ICD-10-CM

## 2021-09-20 NOTE — TELEPHONE ENCOUNTER
Ref Range & Units 9/3/21 10:34 AM   TPMT ACTIVITY Units/mL RBC 11 2    Comment: Reference Range:   Normal: 15 1 - 26 4   Heterozygous for low TPMT variant: 6 3 - 15 0   Homozygous for low TPMT variant: <6 3     Dr RHOADES [facility] please review lab results  What is your plan?

## 2021-09-20 NOTE — TELEPHONE ENCOUNTER
----- Message from Jeff Cabrera WooWho sent at 9/20/2021  3:51 PM EDT -----  Regarding: Prescription Question  Contact: [phone]  Wicho Olivas I got the approval could u tell me what other pill dr valero was going to put me on and the milligrams my insurance is going to change in December going to be medicare need to [facility] for my new insurance        Thanks [facility]

## 2021-09-20 NOTE — TELEPHONE ENCOUNTER
TPMT level is slightly low, so ok to proceed w plan    - stop Humira   - switch to Stelara with azathioprine 50mg daily  - continue Questran b i d  for now    We will check CMP/CBC weekly for first month after starting azathioprine

## 2021-09-21 RX ORDER — AZATHIOPRINE 50 MG/1
50 TABLET ORAL DAILY
Qty: 30 TABLET | Refills: 11 | Status: SHIPPED | OUTPATIENT
Start: 2021-09-21 | End: 2021-10-21

## 2021-09-21 NOTE — TELEPHONE ENCOUNTER
Stelara IV & SC dosing approved  Patient to have administered through [facility]  I emailed patient patient education regarding azathioprine, standing lab orders, locations/new times

## 2021-09-22 ENCOUNTER — LAB (OUTPATIENT)
Dept: LAB | Facility: HOSPITAL | Age: 51
End: 2021-09-22
Attending: INTERNAL MEDICINE
Payer: COMMERCIAL

## 2021-09-22 LAB
ALBUMIN SERPL BCP-MCNC: 3.8 G/DL (ref 3.5–5)
ALP SERPL-CCNC: 95 U/L (ref 46–116)
ALT SERPL W P-5'-P-CCNC: 60 U/L (ref 12–78)
ANION GAP SERPL CALCULATED.3IONS-SCNC: 5 MMOL/L (ref 4–13)
AST SERPL W P-5'-P-CCNC: 26 U/L (ref 5–45)
BASOPHILS # BLD AUTO: 0.03 THOUSANDS/ΜL (ref 0–0.1)
BASOPHILS NFR BLD AUTO: 0 % (ref 0–1)
BILIRUB SERPL-MCNC: 0.68 MG/DL (ref 0.2–1)
BUN SERPL-MCNC: 13 MG/DL (ref 5–25)
CALCIUM SERPL-MCNC: 9.3 MG/DL (ref 8.3–10.1)
CHLORIDE SERPL-SCNC: 110 MMOL/L (ref 100–108)
CO2 SERPL-SCNC: 27 MMOL/L (ref 21–32)
CREAT SERPL-MCNC: 0.9 MG/DL (ref 0.6–1.3)
EOSINOPHIL # BLD AUTO: 0.34 THOUSAND/ΜL (ref 0–0.61)
EOSINOPHIL NFR BLD AUTO: 4 % (ref 0–6)
ERYTHROCYTE [DISTWIDTH] IN BLOOD BY AUTOMATED COUNT: 12.8 % (ref 11.6–15.1)
GFR SERPL CREATININE-BSD FRML MDRD: 101 ML/MIN/1.73SQ M
GLUCOSE SERPL-MCNC: 112 MG/DL (ref 65–140)
HCT VFR BLD AUTO: 51.6 % (ref 36.5–49.3)
HGB BLD-MCNC: 17.5 G/DL (ref 12–17)
IMM GRANULOCYTES # BLD AUTO: 0.03 THOUSAND/UL (ref 0–0.2)
IMM GRANULOCYTES NFR BLD AUTO: 0 % (ref 0–2)
LYMPHOCYTES # BLD AUTO: 2.45 THOUSANDS/ΜL (ref 0.6–4.47)
LYMPHOCYTES NFR BLD AUTO: 26 % (ref 14–44)
MCH RBC QN AUTO: 33.6 PG (ref 26.8–34.3)
MCHC RBC AUTO-ENTMCNC: 33.9 G/DL (ref 31.4–37.4)
MCV RBC AUTO: 99 FL (ref 82–98)
MONOCYTES # BLD AUTO: 0.64 THOUSAND/ΜL (ref 0.17–1.22)
MONOCYTES NFR BLD AUTO: 7 % (ref 4–12)
NEUTROPHILS # BLD AUTO: 6.08 THOUSANDS/ΜL (ref 1.85–7.62)
NEUTS SEG NFR BLD AUTO: 63 % (ref 43–75)
NRBC BLD AUTO-RTO: 0 /100 WBCS
PLATELET # BLD AUTO: 186 THOUSANDS/UL (ref 149–390)
PMV BLD AUTO: 9.9 FL (ref 8.9–12.7)
POTASSIUM SERPL-SCNC: 3.7 MMOL/L (ref 3.5–5.3)
PROT SERPL-MCNC: 7.2 G/DL (ref 6.4–8.2)
RBC # BLD AUTO: 5.21 MILLION/UL (ref 3.88–5.62)
SODIUM SERPL-SCNC: 142 MMOL/L (ref 136–145)
WBC # BLD AUTO: 9.57 THOUSAND/UL (ref 4.31–10.16)

## 2021-09-22 PROCEDURE — 36415 COLL VENOUS BLD VENIPUNCTURE: CPT

## 2021-09-22 PROCEDURE — 85025 COMPLETE CBC W/AUTO DIFF WBC: CPT

## 2021-09-22 PROCEDURE — 80053 COMPREHEN METABOLIC PANEL: CPT

## 2021-09-22 NOTE — TELEPHONE ENCOUNTER
Stelara IV and SQ dose have been approved  He will be scheduling his first Infusion with [facility]

## 2021-09-27 ENCOUNTER — HOSPITAL ENCOUNTER (OUTPATIENT)
Dept: RADIOLOGY | Facility: CLINIC | Age: 51
Discharge: HOME | End: 2021-09-27
Attending: ANESTHESIOLOGY
Payer: COMMERCIAL

## 2021-09-27 VITALS
RESPIRATION RATE: 20 BRPM | TEMPERATURE: 97 F | OXYGEN SATURATION: 94 % | DIASTOLIC BLOOD PRESSURE: 101 MMHG | HEART RATE: 91 BPM | SYSTOLIC BLOOD PRESSURE: 153 MMHG

## 2021-09-27 DIAGNOSIS — M54.12 CERVICAL RADICULOPATHY: ICD-10-CM

## 2021-09-27 DIAGNOSIS — M50.21 HERNIATED NUCLEUS PULPOSUS, C3-4: ICD-10-CM

## 2021-09-27 DIAGNOSIS — M54.2 NECK PAIN: ICD-10-CM

## 2021-09-27 PROCEDURE — 62321 NJX INTERLAMINAR CRV/THRC: CPT | Performed by: ANESTHESIOLOGY

## 2021-09-27 RX ORDER — METHYLPREDNISOLONE ACETATE 80 MG/ML
80 INJECTION, SUSPENSION INTRA-ARTICULAR; INTRALESIONAL; INTRAMUSCULAR; PARENTERAL; SOFT TISSUE ONCE
Status: COMPLETED | OUTPATIENT
Start: 2021-09-27 | End: 2021-09-27

## 2021-09-27 RX ADMIN — IOHEXOL 1 ML: 300 INJECTION, SOLUTION INTRAVENOUS at 09:11

## 2021-09-27 RX ADMIN — METHYLPREDNISOLONE ACETATE 80 MG: 80 INJECTION, SUSPENSION INTRA-ARTICULAR; INTRALESIONAL; INTRAMUSCULAR; SOFT TISSUE at 09:11

## 2021-09-27 NOTE — DISCHARGE INSTRUCTIONS
Epidural Steroid Injection   WHAT YOU NEED TO KNOW:   An epidural steroid injection (BRAEDEN) is a procedure to inject steroid medicine into the epidural space  The epidural space is between your spinal cord and vertebrae  Steroids reduce inflammation and fluid buildup in your spine that may be causing pain  You may be given pain medicine along with the steroids  ACTIVITY  · Do not drive or operate machinery today  · No strenuous activity today - bending, lifting, etc   · You may resume normal activites starting tomorrow - start slowly and as tolerated  · You may shower today, but no tub baths or hot tubs  · You may have numbness for several hours from the local anesthetic  Please use caution and common sense, especially with weight-bearing activities  CARE OF THE INJECTION SITE  · If you have soreness or pain, apply ice to the area today (20 minutes on/20 minutes off)  · Starting tomorrow, you may use warm, moist heat or ice if needed  · You may have an increase or change in your discomfort for 36-48 hours after your treatment  · Apply ice and continue with any pain medication you have been prescribed  · Notify [facility] if you have any of the following: redness, drainage, swelling, headache, stiff neck or fever above 100°F     SPECIAL INSTRUCTIONS  · Our office will contact you in approximately 7 days for a progress report  MEDICATIONS  · Continue to take all routine medications  · Our office may have instructed you to hold some medications  As no general anesthesia was used in today's procedure, you should not experience any side effects related to anesthesia  If you have a problem specifically related to your procedure, please call our office at [phone]  Problems not related to your procedure should be directed to your primary care physician

## 2021-09-27 NOTE — H&P
History of Present Illness: The patient is a 52 y o  male who presents with complaints of neck and arm pain      Patient Active Problem List   Diagnosis    Sprain of anterior talofibular ligament of right ankle    Perianal abscess    Neuropathy    Chronic bilateral low back pain with bilateral sciatica    Bilateral lower extremity edema    Chronic pain syndrome    Lumbar post-laminectomy syndrome    Lumbar spondylosis    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 ([address] )    Congenital fusion of sacroiliac joint    Terminal ileitis ([address]    Viral enteritis    Chronic foot pain    Lumbar radiculopathy    Acute non-recurrent maxillary sinusitis    Screening examination for other arthropod-borne viral diseases    GERD (gastroesophageal reflux disease)    History of colon polyps    Perianal fistula    Functional diarrhea    Blepharitis, left eye    Closed fracture of radial styloid    Compression of right ulnar nerve at multiple levels    Degenerative disc disease at L5-S1 level    History of lumbar fusion    Numbness and tingling of both lower extremities    Periorbital cellulitis of left eye    Right leg swelling    Tobacco use    Vitamin D deficiency    Arthritis of right shoulder region    Chronic right shoulder pain    Myofascial pain syndrome    Status post insertion of spinal cord stimulator    Crohn's disease ([address] )    Numbness and tingling in right hand    Neck pain    Cervical radiculopathy    Herniated nucleus pulposus, C3-4       Past Medical History:   Diagnosis Date    Back pain     Colon polyp     Crohn's disease ([address] )     GERD (gastroesophageal reflux disease)     Perianal fistula     Terminal ileitis ([address] )        Past Surgical History:   Procedure Laterality Date    ABSCESS DRAINAGE      BACK SURGERY  2019    COLONOSCOPY      HERNIA REPAIR      umbilical hernia    WA PERCUT IMPLNT NEUROELECT,EPIDURAL Right 3/26/2021    Procedure: INSERTION THORACIC DORSAL COLUMN SPINAL CORD STIMULATOR PERCUTANEOUS W IMPLANTABLE PULSE GENERATOR, RIGHT;  Surgeon: Stefanie Fernandez MD;  Location:  MAIN OR;  Service: Neurosurgery    SPINAL STIMULATOR PLACEMENT  03/2021         Current Outpatient Medications:     amitriptyline (ELAVIL) 25 mg tablet, 1-2 HS, Disp: 60 tablet, Rfl: 5    azaTHIOprine (IMURAN) 50 mg tablet, Take 1 tablet (50 mg total) by mouth daily, Disp: 30 tablet, Rfl: 11    cholecalciferol (VITAMIN D3) 1,000 units tablet, Take 2 tablets (2,000 Units total) by mouth daily, Disp: 60 tablet, Rfl: 2    cholestyramine (QUESTRAN) 4 g packet, Take 1 packet by mouth 2 (two) times a day with meals, Disp: , Rfl:     naproxen (NAPROSYN) 500 mg tablet, Take 1 tablet (500 mg total) by mouth 2 (two) times a day with meals, Disp: 60 tablet, Rfl: 5    Current Facility-Administered Medications:     iohexol (OMNIPAQUE) 300 mg/mL injection 50 mL, 50 mL, Epidural, Once, Aldo Butt DO    methylPREDNISolone acetate (DEPO-MEDROL) injection 80 mg, 80 mg, Epidural, Once, Aldo Butt DO    No Known Allergies    Physical Exam:   General: Awake, Alert, Oriented x 3, Mood and affect appropriate  Respiratory: Respirations even and unlabored  Cardiovascular: Peripheral pulses intact; no edema  Musculoskeletal Exam:  Decreased range of motion cervical spine    ASA Score: II         Assessment:   1  Neck pain    2  Cervical radiculopathy    3   Herniated nucleus pulposus, C3-4        Plan: RENATO #2

## 2021-10-04 ENCOUNTER — TELEPHONE (OUTPATIENT)
Dept: PAIN MEDICINE | Facility: CLINIC | Age: 51
End: 2021-10-04

## 2021-10-13 ENCOUNTER — OFFICE VISIT (OUTPATIENT)
Dept: FAMILY MEDICINE CLINIC | Facility: CLINIC | Age: 51
End: 2021-10-13
Payer: COMMERCIAL

## 2021-10-13 VITALS
SYSTOLIC BLOOD PRESSURE: 138 MMHG | TEMPERATURE: 98.4 F | WEIGHT: 196 LBS | DIASTOLIC BLOOD PRESSURE: 88 MMHG | HEIGHT: 72 IN | BODY MASS INDEX: 26.55 KG/M2 | OXYGEN SATURATION: 94 % | HEART RATE: 103 BPM

## 2021-10-13 DIAGNOSIS — I10 PRIMARY HYPERTENSION: ICD-10-CM

## 2021-10-13 DIAGNOSIS — Z00.00 WELLNESS EXAMINATION: ICD-10-CM

## 2021-10-13 DIAGNOSIS — Z23 NEED FOR INFLUENZA VACCINATION: Primary | ICD-10-CM

## 2021-10-13 DIAGNOSIS — K50.919 CROHN'S DISEASE WITH COMPLICATION, UNSPECIFIED GASTROINTESTINAL TRACT LOCATION (HCC): ICD-10-CM

## 2021-10-13 PROCEDURE — 99214 OFFICE O/P EST MOD 30 MIN: CPT | Performed by: FAMILY MEDICINE

## 2021-10-13 PROCEDURE — 3725F SCREEN DEPRESSION PERFORMED: CPT | Performed by: FAMILY MEDICINE

## 2021-10-13 PROCEDURE — 99396 PREV VISIT EST AGE 40-64: CPT | Performed by: FAMILY MEDICINE

## 2021-10-13 PROCEDURE — 3008F BODY MASS INDEX DOCD: CPT | Performed by: FAMILY MEDICINE

## 2021-10-13 PROCEDURE — 90686 IIV4 VACC NO PRSV 0.5 ML IM: CPT

## 2021-10-13 PROCEDURE — 1036F TOBACCO NON-USER: CPT | Performed by: FAMILY MEDICINE

## 2021-10-13 PROCEDURE — 90471 IMMUNIZATION ADMIN: CPT

## 2021-10-13 RX ORDER — LISINOPRIL 20 MG/1
20 TABLET ORAL DAILY
Qty: 90 TABLET | Refills: 3 | Status: SHIPPED | OUTPATIENT
Start: 2021-10-13 | End: 2022-06-06 | Stop reason: SDUPTHER

## 2021-10-19 ENCOUNTER — TELEPHONE (OUTPATIENT)
Dept: FAMILY MEDICINE CLINIC | Facility: CLINIC | Age: 51
End: 2021-10-19

## 2021-10-23 ENCOUNTER — HOSPITAL ENCOUNTER (OUTPATIENT)
Facility: HOSPITAL | Age: 51
Setting detail: OBSERVATION
Discharge: HOME | End: 2021-10-25
Attending: EMERGENCY MEDICINE | Admitting: SURGERY
Payer: COMMERCIAL

## 2021-10-23 ENCOUNTER — APPOINTMENT (EMERGENCY)
Dept: CT IMAGING | Facility: HOSPITAL | Age: 51
End: 2021-10-23
Payer: COMMERCIAL

## 2021-10-23 DIAGNOSIS — K61.0 PERIANAL ABSCESS: Primary | ICD-10-CM

## 2021-10-23 LAB
ALBUMIN SERPL BCP-MCNC: 3.6 G/DL (ref 3.5–5)
ALP SERPL-CCNC: 110 U/L (ref 46–116)
ALT SERPL W P-5'-P-CCNC: 33 U/L (ref 12–78)
ANION GAP SERPL CALCULATED.3IONS-SCNC: 10 MMOL/L (ref 4–13)
AST SERPL W P-5'-P-CCNC: 14 U/L (ref 5–45)
BASOPHILS # BLD AUTO: 0.01 THOUSANDS/ΜL (ref 0–0.1)
BASOPHILS NFR BLD AUTO: 0 % (ref 0–1)
BILIRUB SERPL-MCNC: 0.4 MG/DL (ref 0.2–1)
BUN SERPL-MCNC: 11 MG/DL (ref 5–25)
CALCIUM SERPL-MCNC: 8.4 MG/DL (ref 8.3–10.1)
CHLORIDE SERPL-SCNC: 107 MMOL/L (ref 100–108)
CO2 SERPL-SCNC: 24 MMOL/L (ref 21–32)
CREAT SERPL-MCNC: 0.9 MG/DL (ref 0.6–1.3)
EOSINOPHIL # BLD AUTO: 0.26 THOUSAND/ΜL (ref 0–0.61)
EOSINOPHIL NFR BLD AUTO: 3 % (ref 0–6)
ERYTHROCYTE [DISTWIDTH] IN BLOOD BY AUTOMATED COUNT: 13 % (ref 11.6–15.1)
GFR SERPL CREATININE-BSD FRML MDRD: 100 ML/MIN/1.73SQ M
GLUCOSE SERPL-MCNC: 134 MG/DL (ref 65–140)
HCT VFR BLD AUTO: 44.5 % (ref 36.5–49.3)
HGB BLD-MCNC: 15.6 G/DL (ref 12–17)
IMM GRANULOCYTES # BLD AUTO: 0.01 THOUSAND/UL (ref 0–0.2)
IMM GRANULOCYTES NFR BLD AUTO: 0 % (ref 0–2)
LIPASE SERPL-CCNC: 100 U/L (ref 73–393)
LYMPHOCYTES # BLD AUTO: 1.73 THOUSANDS/ΜL (ref 0.6–4.47)
LYMPHOCYTES NFR BLD AUTO: 18 % (ref 14–44)
MCH RBC QN AUTO: 34.2 PG (ref 26.8–34.3)
MCHC RBC AUTO-ENTMCNC: 35.1 G/DL (ref 31.4–37.4)
MCV RBC AUTO: 98 FL (ref 82–98)
MONOCYTES # BLD AUTO: 0.73 THOUSAND/ΜL (ref 0.17–1.22)
MONOCYTES NFR BLD AUTO: 8 % (ref 4–12)
NEUTROPHILS # BLD AUTO: 6.87 THOUSANDS/ΜL (ref 1.85–7.62)
NEUTS SEG NFR BLD AUTO: 71 % (ref 43–75)
PLATELET # BLD AUTO: 186 THOUSANDS/UL (ref 149–390)
PMV BLD AUTO: 9.5 FL (ref 8.9–12.7)
POTASSIUM SERPL-SCNC: 3.9 MMOL/L (ref 3.5–5.3)
PROT SERPL-MCNC: 7 G/DL (ref 6.4–8.2)
RBC # BLD AUTO: 4.56 MILLION/UL (ref 3.88–5.62)
SODIUM SERPL-SCNC: 141 MMOL/L (ref 136–145)
WBC # BLD AUTO: 9.61 THOUSAND/UL (ref 4.31–10.16)

## 2021-10-23 PROCEDURE — 85025 COMPLETE CBC W/AUTO DIFF WBC: CPT | Performed by: EMERGENCY MEDICINE

## 2021-10-23 PROCEDURE — NC001 PR NO CHARGE: Performed by: SURGERY

## 2021-10-23 PROCEDURE — 99285 EMERGENCY DEPT VISIT HI MDM: CPT | Performed by: EMERGENCY MEDICINE

## 2021-10-23 PROCEDURE — 36415 COLL VENOUS BLD VENIPUNCTURE: CPT

## 2021-10-23 PROCEDURE — 74177 CT ABD & PELVIS W/CONTRAST: CPT

## 2021-10-23 PROCEDURE — 99222 1ST HOSP IP/OBS MODERATE 55: CPT | Performed by: SURGERY

## 2021-10-23 PROCEDURE — 80053 COMPREHEN METABOLIC PANEL: CPT | Performed by: EMERGENCY MEDICINE

## 2021-10-23 PROCEDURE — 83690 ASSAY OF LIPASE: CPT | Performed by: EMERGENCY MEDICINE

## 2021-10-23 PROCEDURE — 99285 EMERGENCY DEPT VISIT HI MDM: CPT

## 2021-10-23 RX ORDER — ONDANSETRON 2 MG/ML
4 INJECTION INTRAMUSCULAR; INTRAVENOUS EVERY 6 HOURS PRN
Status: DISCONTINUED | OUTPATIENT
Start: 2021-10-23 | End: 2021-10-25 | Stop reason: HOSPADM

## 2021-10-23 RX ORDER — MORPHINE SULFATE 4 MG/ML
4 INJECTION, SOLUTION INTRAMUSCULAR; INTRAVENOUS EVERY 4 HOURS PRN
Status: DISCONTINUED | OUTPATIENT
Start: 2021-10-23 | End: 2021-10-24

## 2021-10-23 RX ORDER — OXYCODONE HYDROCHLORIDE AND ACETAMINOPHEN 5; 325 MG/1; MG/1
1 TABLET ORAL EVERY 4 HOURS PRN
Status: DISCONTINUED | OUTPATIENT
Start: 2021-10-23 | End: 2021-10-24

## 2021-10-23 RX ORDER — LISINOPRIL 20 MG/1
20 TABLET ORAL DAILY
Status: DISCONTINUED | OUTPATIENT
Start: 2021-10-24 | End: 2021-10-25 | Stop reason: HOSPADM

## 2021-10-23 RX ORDER — HEPARIN SODIUM 5000 [USP'U]/ML
5000 INJECTION, SOLUTION INTRAVENOUS; SUBCUTANEOUS EVERY 8 HOURS SCHEDULED
Status: DISCONTINUED | OUTPATIENT
Start: 2021-10-23 | End: 2021-10-25 | Stop reason: HOSPADM

## 2021-10-23 RX ORDER — ACETAMINOPHEN 325 MG/1
975 TABLET ORAL ONCE
Status: COMPLETED | OUTPATIENT
Start: 2021-10-23 | End: 2021-10-23

## 2021-10-23 RX ORDER — MELATONIN
2000 DAILY
Status: DISCONTINUED | OUTPATIENT
Start: 2021-10-24 | End: 2021-10-25 | Stop reason: HOSPADM

## 2021-10-23 RX ORDER — LIDOCAINE HYDROCHLORIDE 20 MG/ML
1 JELLY TOPICAL ONCE
Status: COMPLETED | OUTPATIENT
Start: 2021-10-23 | End: 2021-10-23

## 2021-10-23 RX ORDER — DEXTROSE AND SODIUM CHLORIDE 5; .45 G/100ML; G/100ML
75 INJECTION, SOLUTION INTRAVENOUS CONTINUOUS
Status: DISCONTINUED | OUTPATIENT
Start: 2021-10-23 | End: 2021-10-25

## 2021-10-23 RX ADMIN — HEPARIN SODIUM 5000 UNITS: 5000 INJECTION INTRAVENOUS; SUBCUTANEOUS at 23:45

## 2021-10-23 RX ADMIN — ACETAMINOPHEN 975 MG: 325 TABLET, FILM COATED ORAL at 19:50

## 2021-10-23 RX ADMIN — LIDOCAINE HYDROCHLORIDE 1 APPLICATION: 20 JELLY TOPICAL at 19:56

## 2021-10-23 RX ADMIN — Medication 3.38 G: at 23:41

## 2021-10-23 RX ADMIN — IOHEXOL 100 ML: 350 INJECTION, SOLUTION INTRAVENOUS at 20:52

## 2021-10-24 ENCOUNTER — ANESTHESIA EVENT (OUTPATIENT)
Dept: PERIOP | Facility: HOSPITAL | Age: 51
End: 2021-10-24
Payer: COMMERCIAL

## 2021-10-24 ENCOUNTER — ANESTHESIA (OUTPATIENT)
Dept: PERIOP | Facility: HOSPITAL | Age: 51
End: 2021-10-24
Payer: COMMERCIAL

## 2021-10-24 LAB
ANION GAP SERPL CALCULATED.3IONS-SCNC: 5 MMOL/L (ref 4–13)
BUN SERPL-MCNC: 11 MG/DL (ref 5–25)
CALCIUM SERPL-MCNC: 7.8 MG/DL (ref 8.3–10.1)
CHLORIDE SERPL-SCNC: 110 MMOL/L (ref 100–108)
CO2 SERPL-SCNC: 29 MMOL/L (ref 21–32)
CREAT SERPL-MCNC: 0.84 MG/DL (ref 0.6–1.3)
ERYTHROCYTE [DISTWIDTH] IN BLOOD BY AUTOMATED COUNT: 12.6 % (ref 11.6–15.1)
GFR SERPL CREATININE-BSD FRML MDRD: 103 ML/MIN/1.73SQ M
GLUCOSE SERPL-MCNC: 117 MG/DL (ref 65–140)
HCT VFR BLD AUTO: 42.6 % (ref 36.5–49.3)
HGB BLD-MCNC: 14.2 G/DL (ref 12–17)
MCH RBC QN AUTO: 33.3 PG (ref 26.8–34.3)
MCHC RBC AUTO-ENTMCNC: 33.3 G/DL (ref 31.4–37.4)
MCV RBC AUTO: 100 FL (ref 82–98)
PLATELET # BLD AUTO: 173 THOUSANDS/UL (ref 149–390)
PMV BLD AUTO: 9.7 FL (ref 8.9–12.7)
POTASSIUM SERPL-SCNC: 3.9 MMOL/L (ref 3.5–5.3)
RBC # BLD AUTO: 4.27 MILLION/UL (ref 3.88–5.62)
SODIUM SERPL-SCNC: 144 MMOL/L (ref 136–145)
WBC # BLD AUTO: 8.56 THOUSAND/UL (ref 4.31–10.16)

## 2021-10-24 PROCEDURE — 88304 TISSUE EXAM BY PATHOLOGIST: CPT | Performed by: PATHOLOGY

## 2021-10-24 PROCEDURE — 80048 BASIC METABOLIC PNL TOTAL CA: CPT | Performed by: PHYSICIAN ASSISTANT

## 2021-10-24 PROCEDURE — 85027 COMPLETE CBC AUTOMATED: CPT | Performed by: PHYSICIAN ASSISTANT

## 2021-10-24 PROCEDURE — 87205 SMEAR GRAM STAIN: CPT | Performed by: SURGERY

## 2021-10-24 PROCEDURE — 36415 COLL VENOUS BLD VENIPUNCTURE: CPT | Performed by: PHYSICIAN ASSISTANT

## 2021-10-24 PROCEDURE — 11042 DBRDMT SUBQ TIS 1ST 20SQCM/<: CPT | Performed by: PHYSICIAN ASSISTANT

## 2021-10-24 PROCEDURE — 87185 SC STD ENZYME DETCJ PER NZM: CPT | Performed by: SURGERY

## 2021-10-24 PROCEDURE — 87076 CULTURE ANAEROBE IDENT EACH: CPT | Performed by: SURGERY

## 2021-10-24 PROCEDURE — 87070 CULTURE OTHR SPECIMN AEROBIC: CPT | Performed by: SURGERY

## 2021-10-24 PROCEDURE — 87075 CULTR BACTERIA EXCEPT BLOOD: CPT | Performed by: SURGERY

## 2021-10-24 PROCEDURE — 11042 DBRDMT SUBQ TIS 1ST 20SQCM/<: CPT | Performed by: SURGERY

## 2021-10-24 RX ORDER — DEXAMETHASONE SODIUM PHOSPHATE 10 MG/ML
INJECTION, SOLUTION INTRAMUSCULAR; INTRAVENOUS AS NEEDED
Status: DISCONTINUED | OUTPATIENT
Start: 2021-10-24 | End: 2021-10-24

## 2021-10-24 RX ORDER — HYDROMORPHONE HCL/PF 1 MG/ML
0.5 SYRINGE (ML) INJECTION
Status: DISCONTINUED | OUTPATIENT
Start: 2021-10-24 | End: 2021-10-24

## 2021-10-24 RX ORDER — MORPHINE SULFATE 4 MG/ML
4 INJECTION, SOLUTION INTRAMUSCULAR; INTRAVENOUS EVERY 4 HOURS PRN
Status: DISCONTINUED | OUTPATIENT
Start: 2021-10-24 | End: 2021-10-25 | Stop reason: HOSPADM

## 2021-10-24 RX ORDER — FENTANYL CITRATE 50 UG/ML
INJECTION, SOLUTION INTRAMUSCULAR; INTRAVENOUS AS NEEDED
Status: DISCONTINUED | OUTPATIENT
Start: 2021-10-24 | End: 2021-10-24

## 2021-10-24 RX ORDER — OXYCODONE HYDROCHLORIDE 5 MG/1
10 TABLET ORAL EVERY 4 HOURS PRN
Status: DISCONTINUED | OUTPATIENT
Start: 2021-10-24 | End: 2021-10-25 | Stop reason: HOSPADM

## 2021-10-24 RX ORDER — KETAMINE HYDROCHLORIDE 50 MG/ML
INJECTION, SOLUTION, CONCENTRATE INTRAMUSCULAR; INTRAVENOUS AS NEEDED
Status: DISCONTINUED | OUTPATIENT
Start: 2021-10-24 | End: 2021-10-24

## 2021-10-24 RX ORDER — LIDOCAINE HYDROCHLORIDE 10 MG/ML
INJECTION, SOLUTION EPIDURAL; INFILTRATION; INTRACAUDAL; PERINEURAL AS NEEDED
Status: DISCONTINUED | OUTPATIENT
Start: 2021-10-24 | End: 2021-10-24

## 2021-10-24 RX ORDER — FENTANYL CITRATE/PF 50 MCG/ML
25 SYRINGE (ML) INJECTION
Status: DISCONTINUED | OUTPATIENT
Start: 2021-10-24 | End: 2021-10-24

## 2021-10-24 RX ORDER — OXYCODONE HYDROCHLORIDE 5 MG/1
5 TABLET ORAL EVERY 4 HOURS PRN
Status: DISCONTINUED | OUTPATIENT
Start: 2021-10-24 | End: 2021-10-25 | Stop reason: HOSPADM

## 2021-10-24 RX ORDER — ONDANSETRON 2 MG/ML
INJECTION INTRAMUSCULAR; INTRAVENOUS AS NEEDED
Status: DISCONTINUED | OUTPATIENT
Start: 2021-10-24 | End: 2021-10-24

## 2021-10-24 RX ORDER — AZATHIOPRINE 50 MG/1
50 TABLET ORAL DAILY
COMMUNITY

## 2021-10-24 RX ORDER — MIDAZOLAM HYDROCHLORIDE 2 MG/2ML
INJECTION, SOLUTION INTRAMUSCULAR; INTRAVENOUS AS NEEDED
Status: DISCONTINUED | OUTPATIENT
Start: 2021-10-24 | End: 2021-10-24

## 2021-10-24 RX ORDER — PROPOFOL 10 MG/ML
INJECTION, EMULSION INTRAVENOUS AS NEEDED
Status: DISCONTINUED | OUTPATIENT
Start: 2021-10-24 | End: 2021-10-24

## 2021-10-24 RX ORDER — SODIUM CHLORIDE, SODIUM LACTATE, POTASSIUM CHLORIDE, CALCIUM CHLORIDE 600; 310; 30; 20 MG/100ML; MG/100ML; MG/100ML; MG/100ML
125 INJECTION, SOLUTION INTRAVENOUS CONTINUOUS
Status: DISCONTINUED | OUTPATIENT
Start: 2021-10-24 | End: 2021-10-25

## 2021-10-24 RX ORDER — LIDOCAINE HYDROCHLORIDE AND EPINEPHRINE 20; 5 MG/ML; UG/ML
INJECTION, SOLUTION EPIDURAL; INFILTRATION; INTRACAUDAL; PERINEURAL AS NEEDED
Status: DISCONTINUED | OUTPATIENT
Start: 2021-10-24 | End: 2021-10-24 | Stop reason: HOSPADM

## 2021-10-24 RX ORDER — ACETAMINOPHEN 325 MG/1
975 TABLET ORAL EVERY 6 HOURS SCHEDULED
Status: DISCONTINUED | OUTPATIENT
Start: 2021-10-24 | End: 2021-10-25 | Stop reason: HOSPADM

## 2021-10-24 RX ORDER — SODIUM CHLORIDE, SODIUM LACTATE, POTASSIUM CHLORIDE, CALCIUM CHLORIDE 600; 310; 30; 20 MG/100ML; MG/100ML; MG/100ML; MG/100ML
INJECTION, SOLUTION INTRAVENOUS CONTINUOUS PRN
Status: DISCONTINUED | OUTPATIENT
Start: 2021-10-24 | End: 2021-10-24

## 2021-10-24 RX ORDER — MAGNESIUM HYDROXIDE 1200 MG/15ML
LIQUID ORAL AS NEEDED
Status: DISCONTINUED | OUTPATIENT
Start: 2021-10-24 | End: 2021-10-24 | Stop reason: HOSPADM

## 2021-10-24 RX ADMIN — Medication 3.38 G: at 16:48

## 2021-10-24 RX ADMIN — ACETAMINOPHEN 975 MG: 325 TABLET, FILM COATED ORAL at 18:24

## 2021-10-24 RX ADMIN — SODIUM CHLORIDE, SODIUM LACTATE, POTASSIUM CHLORIDE, AND CALCIUM CHLORIDE 125 ML/HR: .6; .31; .03; .02 INJECTION, SOLUTION INTRAVENOUS at 18:10

## 2021-10-24 RX ADMIN — KETAMINE HYDROCHLORIDE 30 MG: 50 INJECTION, SOLUTION INTRAMUSCULAR; INTRAVENOUS at 16:48

## 2021-10-24 RX ADMIN — Medication 3.38 G: at 23:34

## 2021-10-24 RX ADMIN — HEPARIN SODIUM 5000 UNITS: 5000 INJECTION INTRAVENOUS; SUBCUTANEOUS at 06:05

## 2021-10-24 RX ADMIN — ONDANSETRON 4 MG: 2 INJECTION INTRAMUSCULAR; INTRAVENOUS at 17:23

## 2021-10-24 RX ADMIN — LIDOCAINE HYDROCHLORIDE 50 MG: 10 INJECTION, SOLUTION EPIDURAL; INFILTRATION; INTRACAUDAL; PERINEURAL at 16:48

## 2021-10-24 RX ADMIN — SODIUM CHLORIDE, SODIUM LACTATE, POTASSIUM CHLORIDE, AND CALCIUM CHLORIDE: .6; .31; .03; .02 INJECTION, SOLUTION INTRAVENOUS at 16:37

## 2021-10-24 RX ADMIN — Medication 3.38 G: at 11:48

## 2021-10-24 RX ADMIN — FENTANYL CITRATE 50 MCG: 50 INJECTION, SOLUTION INTRAMUSCULAR; INTRAVENOUS at 17:10

## 2021-10-24 RX ADMIN — FENTANYL CITRATE 50 MCG: 50 INJECTION, SOLUTION INTRAMUSCULAR; INTRAVENOUS at 17:05

## 2021-10-24 RX ADMIN — KETAMINE HYDROCHLORIDE 20 MG: 50 INJECTION, SOLUTION INTRAMUSCULAR; INTRAVENOUS at 17:02

## 2021-10-24 RX ADMIN — HEPARIN SODIUM 5000 UNITS: 5000 INJECTION INTRAVENOUS; SUBCUTANEOUS at 22:01

## 2021-10-24 RX ADMIN — Medication 2000 UNITS: at 08:33

## 2021-10-24 RX ADMIN — DEXTROSE AND SODIUM CHLORIDE 75 ML/HR: 5; .45 INJECTION, SOLUTION INTRAVENOUS at 00:56

## 2021-10-24 RX ADMIN — OXYCODONE HYDROCHLORIDE 10 MG: 5 TABLET ORAL at 22:02

## 2021-10-24 RX ADMIN — OXYCODONE HYDROCHLORIDE AND ACETAMINOPHEN 1 TABLET: 5; 325 TABLET ORAL at 00:54

## 2021-10-24 RX ADMIN — Medication 3.38 G: at 06:05

## 2021-10-24 RX ADMIN — DEXTROSE AND SODIUM CHLORIDE 75 ML/HR: 5; .45 INJECTION, SOLUTION INTRAVENOUS at 14:37

## 2021-10-24 RX ADMIN — MIDAZOLAM 2 MG: 1 INJECTION INTRAMUSCULAR; INTRAVENOUS at 16:48

## 2021-10-24 RX ADMIN — DEXAMETHASONE SODIUM PHOSPHATE 8 MG: 10 INJECTION, SOLUTION INTRAMUSCULAR; INTRAVENOUS at 16:48

## 2021-10-24 RX ADMIN — PROPOFOL 200 MG: 10 INJECTION, EMULSION INTRAVENOUS at 16:48

## 2021-10-24 RX ADMIN — LISINOPRIL 20 MG: 20 TABLET ORAL at 08:33

## 2021-10-25 ENCOUNTER — TELEPHONE (OUTPATIENT)
Dept: SURGERY | Facility: CLINIC | Age: 51
End: 2021-10-25

## 2021-10-25 ENCOUNTER — TELEPHONE (OUTPATIENT)
Dept: PAIN MEDICINE | Facility: CLINIC | Age: 51
End: 2021-10-25

## 2021-10-25 ENCOUNTER — TRANSITIONAL CARE MANAGEMENT (OUTPATIENT)
Dept: FAMILY MEDICINE CLINIC | Facility: CLINIC | Age: 51
End: 2021-10-25

## 2021-10-25 VITALS
WEIGHT: 196 LBS | RESPIRATION RATE: 18 BRPM | TEMPERATURE: 98 F | HEART RATE: 101 BPM | HEIGHT: 72 IN | OXYGEN SATURATION: 96 % | DIASTOLIC BLOOD PRESSURE: 78 MMHG | BODY MASS INDEX: 26.55 KG/M2 | SYSTOLIC BLOOD PRESSURE: 134 MMHG

## 2021-10-25 DIAGNOSIS — E55.9 VITAMIN D DEFICIENCY: ICD-10-CM

## 2021-10-25 LAB
ANION GAP SERPL CALCULATED.3IONS-SCNC: 8 MMOL/L (ref 4–13)
BASOPHILS # BLD AUTO: 0.01 THOUSANDS/ΜL (ref 0–0.1)
BASOPHILS NFR BLD AUTO: 0 % (ref 0–1)
BUN SERPL-MCNC: 8 MG/DL (ref 5–25)
CALCIUM SERPL-MCNC: 8.3 MG/DL (ref 8.3–10.1)
CHLORIDE SERPL-SCNC: 107 MMOL/L (ref 100–108)
CO2 SERPL-SCNC: 26 MMOL/L (ref 21–32)
CREAT SERPL-MCNC: 0.77 MG/DL (ref 0.6–1.3)
EOSINOPHIL # BLD AUTO: 0 THOUSAND/ΜL (ref 0–0.61)
EOSINOPHIL NFR BLD AUTO: 0 % (ref 0–6)
ERYTHROCYTE [DISTWIDTH] IN BLOOD BY AUTOMATED COUNT: 12.2 % (ref 11.6–15.1)
GFR SERPL CREATININE-BSD FRML MDRD: 107 ML/MIN/1.73SQ M
GLUCOSE P FAST SERPL-MCNC: 132 MG/DL (ref 65–99)
GLUCOSE SERPL-MCNC: 132 MG/DL (ref 65–140)
HCT VFR BLD AUTO: 42.8 % (ref 36.5–49.3)
HGB BLD-MCNC: 14.5 G/DL (ref 12–17)
IMM GRANULOCYTES # BLD AUTO: 0.04 THOUSAND/UL (ref 0–0.2)
IMM GRANULOCYTES NFR BLD AUTO: 0 % (ref 0–2)
LYMPHOCYTES # BLD AUTO: 0.9 THOUSANDS/ΜL (ref 0.6–4.47)
LYMPHOCYTES NFR BLD AUTO: 10 % (ref 14–44)
MCH RBC QN AUTO: 33 PG (ref 26.8–34.3)
MCHC RBC AUTO-ENTMCNC: 33.9 G/DL (ref 31.4–37.4)
MCV RBC AUTO: 97 FL (ref 82–98)
MONOCYTES # BLD AUTO: 0.28 THOUSAND/ΜL (ref 0.17–1.22)
MONOCYTES NFR BLD AUTO: 3 % (ref 4–12)
NEUTROPHILS # BLD AUTO: 8.19 THOUSANDS/ΜL (ref 1.85–7.62)
NEUTS SEG NFR BLD AUTO: 87 % (ref 43–75)
NRBC BLD AUTO-RTO: 0 /100 WBCS
PLATELET # BLD AUTO: 195 THOUSANDS/UL (ref 149–390)
PMV BLD AUTO: 9.5 FL (ref 8.9–12.7)
POTASSIUM SERPL-SCNC: 4.2 MMOL/L (ref 3.5–5.3)
RBC # BLD AUTO: 4.4 MILLION/UL (ref 3.88–5.62)
SODIUM SERPL-SCNC: 141 MMOL/L (ref 136–145)
WBC # BLD AUTO: 9.42 THOUSAND/UL (ref 4.31–10.16)

## 2021-10-25 PROCEDURE — 80048 BASIC METABOLIC PNL TOTAL CA: CPT | Performed by: PHYSICIAN ASSISTANT

## 2021-10-25 PROCEDURE — 85025 COMPLETE CBC W/AUTO DIFF WBC: CPT | Performed by: PHYSICIAN ASSISTANT

## 2021-10-25 PROCEDURE — 99024 POSTOP FOLLOW-UP VISIT: CPT | Performed by: SURGERY

## 2021-10-25 PROCEDURE — NC001 PR NO CHARGE: Performed by: SURGERY

## 2021-10-25 RX ORDER — AMOXICILLIN AND CLAVULANATE POTASSIUM 875; 125 MG/1; MG/1
1 TABLET, FILM COATED ORAL EVERY 12 HOURS SCHEDULED
Qty: 14 TABLET | Refills: 0 | Status: SHIPPED | OUTPATIENT
Start: 2021-10-25 | End: 2021-11-01

## 2021-10-25 RX ORDER — AVOBENZONE, HOMOSALATE, OCTISALATE, OCTOCRYLENE 30; 100; 50; 25 MG/ML; MG/ML; MG/ML; MG/ML
SPRAY TOPICAL
Qty: 60 TABLET | Refills: 2 | Status: SHIPPED | OUTPATIENT
Start: 2021-10-25

## 2021-10-25 RX ORDER — OXYCODONE HYDROCHLORIDE 5 MG/1
5 TABLET ORAL EVERY 4 HOURS PRN
Qty: 15 TABLET | Refills: 0 | Status: SHIPPED | OUTPATIENT
Start: 2021-10-25 | End: 2021-10-28

## 2021-10-25 RX ADMIN — MORPHINE SULFATE 4 MG: 4 INJECTION INTRAVENOUS at 09:55

## 2021-10-25 RX ADMIN — LISINOPRIL 20 MG: 20 TABLET ORAL at 09:55

## 2021-10-25 RX ADMIN — HEPARIN SODIUM 5000 UNITS: 5000 INJECTION INTRAVENOUS; SUBCUTANEOUS at 05:13

## 2021-10-25 RX ADMIN — SODIUM CHLORIDE, SODIUM LACTATE, POTASSIUM CHLORIDE, AND CALCIUM CHLORIDE 125 ML/HR: .6; .31; .03; .02 INJECTION, SOLUTION INTRAVENOUS at 03:04

## 2021-10-25 RX ADMIN — Medication 2000 UNITS: at 09:55

## 2021-10-25 RX ADMIN — Medication 3.38 G: at 05:13

## 2021-10-28 LAB
BACTERIA SPEC ANAEROBE CULT: ABNORMAL
BACTERIA WND AEROBE CULT: NO GROWTH
GRAM STN SPEC: NORMAL

## 2021-11-01 ENCOUNTER — OFFICE VISIT (OUTPATIENT)
Dept: PAIN MEDICINE | Facility: CLINIC | Age: 51
End: 2021-11-01
Payer: COMMERCIAL

## 2021-11-01 VITALS
SYSTOLIC BLOOD PRESSURE: 136 MMHG | DIASTOLIC BLOOD PRESSURE: 82 MMHG | HEART RATE: 92 BPM | BODY MASS INDEX: 26.55 KG/M2 | HEIGHT: 72 IN | WEIGHT: 196 LBS | TEMPERATURE: 98.3 F

## 2021-11-01 DIAGNOSIS — R20.2 NUMBNESS AND TINGLING IN RIGHT HAND: ICD-10-CM

## 2021-11-01 DIAGNOSIS — R20.0 NUMBNESS AND TINGLING IN RIGHT HAND: ICD-10-CM

## 2021-11-01 DIAGNOSIS — M54.12 CERVICAL RADICULOPATHY: ICD-10-CM

## 2021-11-01 DIAGNOSIS — M50.21 HERNIATED NUCLEUS PULPOSUS, C3-4: ICD-10-CM

## 2021-11-01 DIAGNOSIS — M54.2 NECK PAIN: Primary | ICD-10-CM

## 2021-11-01 PROCEDURE — 1111F DSCHRG MED/CURRENT MED MERGE: CPT | Performed by: NURSE PRACTITIONER

## 2021-11-01 PROCEDURE — 99213 OFFICE O/P EST LOW 20 MIN: CPT | Performed by: NURSE PRACTITIONER

## 2021-11-04 ENCOUNTER — OFFICE VISIT (OUTPATIENT)
Dept: FAMILY MEDICINE CLINIC | Facility: CLINIC | Age: 51
End: 2021-11-04
Payer: COMMERCIAL

## 2021-11-04 VITALS
HEART RATE: 86 BPM | WEIGHT: 197 LBS | SYSTOLIC BLOOD PRESSURE: 126 MMHG | RESPIRATION RATE: 16 BRPM | HEIGHT: 72 IN | OXYGEN SATURATION: 94 % | DIASTOLIC BLOOD PRESSURE: 78 MMHG | BODY MASS INDEX: 26.68 KG/M2

## 2021-11-04 DIAGNOSIS — K61.0 PERIANAL ABSCESS: Primary | ICD-10-CM

## 2021-11-04 DIAGNOSIS — K50.114 CROHN'S DISEASE OF LARGE INTESTINE WITH ABSCESS (HCC): ICD-10-CM

## 2021-11-04 DIAGNOSIS — F51.01 PRIMARY INSOMNIA: ICD-10-CM

## 2021-11-04 PROCEDURE — 1111F DSCHRG MED/CURRENT MED MERGE: CPT | Performed by: FAMILY MEDICINE

## 2021-11-04 PROCEDURE — 99495 TRANSJ CARE MGMT MOD F2F 14D: CPT | Performed by: FAMILY MEDICINE

## 2021-11-04 RX ORDER — AMITRIPTYLINE HYDROCHLORIDE 100 MG/1
TABLET, FILM COATED ORAL
Qty: 60 TABLET | Refills: 5 | Status: SHIPPED | OUTPATIENT
Start: 2021-11-04

## 2021-11-09 ENCOUNTER — TELEMEDICINE (OUTPATIENT)
Dept: GASTROENTEROLOGY | Facility: CLINIC | Age: 51
End: 2021-11-09
Payer: COMMERCIAL

## 2021-11-09 VITALS — BODY MASS INDEX: 26.55 KG/M2 | WEIGHT: 196 LBS | HEIGHT: 72 IN

## 2021-11-09 DIAGNOSIS — Z86.010 HISTORY OF COLON POLYPS: ICD-10-CM

## 2021-11-09 DIAGNOSIS — K50.814 CROHN'S DISEASE OF BOTH SMALL AND LARGE INTESTINE WITH ABSCESS (HCC): ICD-10-CM

## 2021-11-09 DIAGNOSIS — K50.913 PERIANAL FISTULA DUE TO CROHN'S DISEASE (HCC): Primary | ICD-10-CM

## 2021-11-09 DIAGNOSIS — E55.9 VITAMIN D DEFICIENCY: ICD-10-CM

## 2021-11-09 PROCEDURE — 3008F BODY MASS INDEX DOCD: CPT | Performed by: INTERNAL MEDICINE

## 2021-11-09 PROCEDURE — 99214 OFFICE O/P EST MOD 30 MIN: CPT | Performed by: INTERNAL MEDICINE

## 2021-11-09 PROCEDURE — 1036F TOBACCO NON-USER: CPT | Performed by: INTERNAL MEDICINE

## 2021-11-09 PROCEDURE — 1111F DSCHRG MED/CURRENT MED MERGE: CPT | Performed by: INTERNAL MEDICINE

## 2021-11-17 ENCOUNTER — HOSPITAL ENCOUNTER (OUTPATIENT)
Facility: HOSPITAL | Age: 51
Setting detail: OUTPATIENT SURGERY
Discharge: HOME | End: 2021-11-17
Attending: COLON & RECTAL SURGERY | Admitting: COLON & RECTAL SURGERY
Payer: COMMERCIAL

## 2021-11-17 ENCOUNTER — ANESTHESIA (OUTPATIENT)
Dept: PERIOP | Facility: HOSPITAL | Age: 51
End: 2021-11-17
Payer: COMMERCIAL

## 2021-11-17 ENCOUNTER — ANESTHESIA EVENT (OUTPATIENT)
Dept: PERIOP | Facility: HOSPITAL | Age: 51
End: 2021-11-17
Payer: COMMERCIAL

## 2021-11-17 VITALS
RESPIRATION RATE: 16 BRPM | OXYGEN SATURATION: 97 % | WEIGHT: 196 LBS | HEIGHT: 72 IN | BODY MASS INDEX: 26.55 KG/M2 | TEMPERATURE: 98.1 F | DIASTOLIC BLOOD PRESSURE: 68 MMHG | HEART RATE: 90 BPM | SYSTOLIC BLOOD PRESSURE: 102 MMHG

## 2021-11-17 DIAGNOSIS — K50.814 CROHN'S DISEASE OF BOTH SMALL AND LARGE INTESTINE WITH ABSCESS (HCC): Primary | ICD-10-CM

## 2021-11-17 PROCEDURE — C9290 INJ, BUPIVACAINE LIPOSOME: HCPCS | Performed by: COLON & RECTAL SURGERY

## 2021-11-17 PROCEDURE — 46020 PLACEMENT OF SETON: CPT | Performed by: COLON & RECTAL SURGERY

## 2021-11-17 RX ORDER — LIDOCAINE HYDROCHLORIDE 10 MG/ML
INJECTION, SOLUTION EPIDURAL; INFILTRATION; INTRACAUDAL; PERINEURAL AS NEEDED
Status: DISCONTINUED | OUTPATIENT
Start: 2021-11-17 | End: 2021-11-17

## 2021-11-17 RX ORDER — SUCCINYLCHOLINE/SOD CL,ISO/PF 100 MG/5ML
SYRINGE (ML) INTRAVENOUS AS NEEDED
Status: DISCONTINUED | OUTPATIENT
Start: 2021-11-17 | End: 2021-11-17

## 2021-11-17 RX ORDER — BUPIVACAINE HYDROCHLORIDE AND EPINEPHRINE 2.5; 5 MG/ML; UG/ML
INJECTION, SOLUTION EPIDURAL; INFILTRATION; INTRACAUDAL; PERINEURAL AS NEEDED
Status: DISCONTINUED | OUTPATIENT
Start: 2021-11-17 | End: 2021-11-17 | Stop reason: HOSPADM

## 2021-11-17 RX ORDER — MIDAZOLAM HYDROCHLORIDE 2 MG/2ML
INJECTION, SOLUTION INTRAMUSCULAR; INTRAVENOUS AS NEEDED
Status: DISCONTINUED | OUTPATIENT
Start: 2021-11-17 | End: 2021-11-17

## 2021-11-17 RX ORDER — FENTANYL CITRATE 50 UG/ML
INJECTION, SOLUTION INTRAMUSCULAR; INTRAVENOUS AS NEEDED
Status: DISCONTINUED | OUTPATIENT
Start: 2021-11-17 | End: 2021-11-17

## 2021-11-17 RX ORDER — OXYCODONE HYDROCHLORIDE 5 MG/1
5 TABLET ORAL EVERY 4 HOURS PRN
Status: DISCONTINUED | OUTPATIENT
Start: 2021-11-17 | End: 2021-11-17 | Stop reason: HOSPADM

## 2021-11-17 RX ORDER — SODIUM CHLORIDE, SODIUM LACTATE, POTASSIUM CHLORIDE, CALCIUM CHLORIDE 600; 310; 30; 20 MG/100ML; MG/100ML; MG/100ML; MG/100ML
100 INJECTION, SOLUTION INTRAVENOUS CONTINUOUS
Status: CANCELLED | OUTPATIENT
Start: 2021-11-17

## 2021-11-17 RX ORDER — KETAMINE HCL IN NACL, ISO-OSM 100MG/10ML
SYRINGE (ML) INJECTION AS NEEDED
Status: DISCONTINUED | OUTPATIENT
Start: 2021-11-17 | End: 2021-11-17

## 2021-11-17 RX ORDER — SODIUM CHLORIDE, SODIUM LACTATE, POTASSIUM CHLORIDE, CALCIUM CHLORIDE 600; 310; 30; 20 MG/100ML; MG/100ML; MG/100ML; MG/100ML
INJECTION, SOLUTION INTRAVENOUS CONTINUOUS PRN
Status: DISCONTINUED | OUTPATIENT
Start: 2021-11-17 | End: 2021-11-17

## 2021-11-17 RX ORDER — PROPOFOL 10 MG/ML
INJECTION, EMULSION INTRAVENOUS AS NEEDED
Status: DISCONTINUED | OUTPATIENT
Start: 2021-11-17 | End: 2021-11-17

## 2021-11-17 RX ORDER — EPINEPHRINE 1 MG/ML
INJECTION, SOLUTION, CONCENTRATE INTRAVENOUS AS NEEDED
Status: DISCONTINUED | OUTPATIENT
Start: 2021-11-17 | End: 2021-11-17

## 2021-11-17 RX ORDER — OXYCODONE HYDROCHLORIDE 5 MG/1
5 TABLET ORAL EVERY 4 HOURS PRN
Qty: 20 TABLET | Refills: 0 | Status: SHIPPED | OUTPATIENT
Start: 2021-11-17 | End: 2021-11-27

## 2021-11-17 RX ORDER — MORPHINE SULFATE 4 MG/ML
4 INJECTION, SOLUTION INTRAMUSCULAR; INTRAVENOUS
Status: DISCONTINUED | OUTPATIENT
Start: 2021-11-17 | End: 2021-11-17 | Stop reason: HOSPADM

## 2021-11-17 RX ORDER — ALBUTEROL SULFATE 90 UG/1
AEROSOL, METERED RESPIRATORY (INHALATION) AS NEEDED
Status: DISCONTINUED | OUTPATIENT
Start: 2021-11-17 | End: 2021-11-17

## 2021-11-17 RX ORDER — ONDANSETRON 2 MG/ML
INJECTION INTRAMUSCULAR; INTRAVENOUS AS NEEDED
Status: DISCONTINUED | OUTPATIENT
Start: 2021-11-17 | End: 2021-11-17

## 2021-11-17 RX ORDER — DEXAMETHASONE SODIUM PHOSPHATE 10 MG/ML
INJECTION, SOLUTION INTRAMUSCULAR; INTRAVENOUS AS NEEDED
Status: DISCONTINUED | OUTPATIENT
Start: 2021-11-17 | End: 2021-11-17

## 2021-11-17 RX ADMIN — ALBUTEROL SULFATE 6 PUFF: 90 AEROSOL, METERED RESPIRATORY (INHALATION) at 11:29

## 2021-11-17 RX ADMIN — ALBUTEROL SULFATE 6 PUFF: 90 AEROSOL, METERED RESPIRATORY (INHALATION) at 11:36

## 2021-11-17 RX ADMIN — Medication 30 MG: at 11:29

## 2021-11-17 RX ADMIN — SODIUM CHLORIDE, SODIUM LACTATE, POTASSIUM CHLORIDE, AND CALCIUM CHLORIDE: .6; .31; .03; .02 INJECTION, SOLUTION INTRAVENOUS at 11:15

## 2021-11-17 RX ADMIN — EPINEPHRINE 0.1 MG: 1 INJECTION, SOLUTION, CONCENTRATE INTRAVENOUS at 11:44

## 2021-11-17 RX ADMIN — ONDANSETRON 4 MG: 2 INJECTION INTRAMUSCULAR; INTRAVENOUS at 11:17

## 2021-11-17 RX ADMIN — MIDAZOLAM 2 MG: 1 INJECTION INTRAMUSCULAR; INTRAVENOUS at 11:15

## 2021-11-17 RX ADMIN — Medication 100 MG: at 11:18

## 2021-11-17 RX ADMIN — ALBUTEROL SULFATE 6 PUFF: 90 AEROSOL, METERED RESPIRATORY (INHALATION) at 11:22

## 2021-11-17 RX ADMIN — PROPOFOL 50 MG: 10 INJECTION, EMULSION INTRAVENOUS at 11:30

## 2021-11-17 RX ADMIN — LIDOCAINE HYDROCHLORIDE 50 MG: 10 INJECTION, SOLUTION EPIDURAL; INFILTRATION; INTRACAUDAL; PERINEURAL at 11:18

## 2021-11-17 RX ADMIN — FENTANYL CITRATE 100 MCG: 50 INJECTION INTRAMUSCULAR; INTRAVENOUS at 11:18

## 2021-11-17 RX ADMIN — DEXAMETHASONE SODIUM PHOSPHATE 10 MG: 10 INJECTION, SOLUTION INTRAMUSCULAR; INTRAVENOUS at 11:41

## 2022-01-13 ENCOUNTER — OFFICE VISIT (OUTPATIENT)
Dept: GASTROENTEROLOGY | Facility: CLINIC | Age: 52
End: 2022-01-13
Payer: MEDICARE

## 2022-01-13 VITALS
HEART RATE: 3 BPM | HEIGHT: 72 IN | WEIGHT: 196 LBS | SYSTOLIC BLOOD PRESSURE: 138 MMHG | BODY MASS INDEX: 26.55 KG/M2 | DIASTOLIC BLOOD PRESSURE: 82 MMHG

## 2022-01-13 DIAGNOSIS — Z86.010 HISTORY OF COLON POLYPS: ICD-10-CM

## 2022-01-13 DIAGNOSIS — K50.913 PERIANAL FISTULA DUE TO CROHN'S DISEASE (HCC): ICD-10-CM

## 2022-01-13 DIAGNOSIS — K50.814 CROHN'S DISEASE OF BOTH SMALL AND LARGE INTESTINE WITH ABSCESS (HCC): Primary | ICD-10-CM

## 2022-01-13 DIAGNOSIS — Z79.899 LONG TERM CURRENT USE OF THERAPEUTIC DRUG: ICD-10-CM

## 2022-01-13 PROCEDURE — 99214 OFFICE O/P EST MOD 30 MIN: CPT | Performed by: INTERNAL MEDICINE

## 2022-01-13 RX ORDER — LOPERAMIDE HYDROCHLORIDE 2 MG/1
2 TABLET ORAL 4 TIMES DAILY PRN
Qty: 30 TABLET | Refills: 5 | Status: SHIPPED | OUTPATIENT
Start: 2022-01-13

## 2022-01-13 RX ORDER — CHOLESTYRAMINE 4 G/9G
1 POWDER, FOR SUSPENSION ORAL
Qty: 180 PACKET | Refills: 3 | Status: SHIPPED | OUTPATIENT
Start: 2022-01-13 | End: 2022-07-18

## 2022-01-13 NOTE — PATIENT INSTRUCTIONS
Scheduled date of colonoscopy (as of today): 3/7/22  Physician performing colonoscopy: Dr RHOADES [facility]  [facility] of colonoscopy: [facility]  Bowel prep reviewed with patient: Miralax  Instructions reviewed with patient by: Cynthia Bhatti  Clearances: none

## 2022-01-13 NOTE — PROGRESS NOTES
[id number] - Outpatient Follow-up Note  Kip Zuleta 54 y o  male MRN: [id number]  Encounter: [id number]    ASSESSMENT AND PLAN:      1  Crohn's disease of both small and large intestine with abscess ([facility] [address] )  51M here today for f/u of his Crohn's  On stelara and Imuran since 9/30/2021  No sig improvement in diarrhea but was on Abx in 11/2021 for surgery of his perianal abscess and seton placement  No abx pain/GIB  Only episodes of diarrhea  Chronic pain w spinal stimulator placed recently  Last MRE 10/2020      - For now continue Stelara and Imuran  - Prev developed Ab's to Humira  - Increase cholestyramine (QUESTRAN) 4 g packet; Take 1 packet (4 g total) by mouth 3 (three) times a day with meals  Dispense: 180 packet; Refill: 3  - Add loperamide (IMODIUM A-D) 2 MG tablet; Take 1 tablet (2 mg total) by mouth 4 (four) times a day as needed for diarrhea  Dispense: 30 tablet; Refill: 5    - Comprehensive metabolic panel; Future  - CBC and differential; Future  - C-reactive protein; Future  - Sedimentation rate, automated; Future    - Schedule colonoscopy in 4/2022 w [facility] following    2  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 ([facility] )  S/p seton placement by colorectal   Doing well  3  History of colon polyps  Last colon 8/2021 w normal mucosa  MRE 10/202 showed terminal ileitis  4  Long term current use of therapeutic drug  On stelara and imuran  Check labs  Followup Appointment: after colonoscopy  ______________________________________________________________________    Chief Complaint   Patient presents with    Follow-up-crohn's     HPI:  14-year-old male here today for follow-up  Overall still having a lot of diarrhea  Has been on Stelara and Imuran since September 30, 2021  Last colonoscopy in August of 2021 showed normal mucosa a biopsy showing terminal ileitis and left colon with inflammation consistent with Crohn's    Last MRE E in October of 2020 did also show terminal ileitis  Was on Humira but developed antibodies  Recently had some complications at the end of 2021 due to perianal abscess with his fistulas  Had surgery and see Alexis placement with improvement  Clear by Colorectal surgery at this point  Was on antibiotics at the time  Historical Information   Past Medical History:   Diagnosis Date    Back pain     Colon polyp     [facility]'s disease ([facility] [address] [facility]     GERD (gastroesophageal reflux disease)     Hypertension     Perianal fistula     Terminal ileitis ([facility] [address] )      Past Surgical History:   Procedure Laterality Date    ABSCESS DRAINAGE      BACK SURGERY  2019    COLONOSCOPY      HERNIA REPAIR      umbilical hernia    RI I&D PERIRECTAL ABSCESS Right 10/24/2021    Procedure: excisional debredement right gluteal cheek ;  Surgeon: Adan Leavitt MD;  [facility]: [facility];  Service: General    RI PERCUT IMPLNT NEUROELECT,EPIDURAL Right 3/26/2021    Procedure: INSERTION THORACIC DORSAL COLUMN SPINAL CORD STIMULATOR PERCUTANEOUS W IMPLANTABLE PULSE GENERATOR, RIGHT;  Surgeon: Cornelio Boyle MD;  Location: [facility];  Service: Neurosurgery    RI PLACEMENT,SETON N/A 11/17/2021    Procedure: Thresea Quails;  Surgeon: Erica Santos MD;  [facility];  Service: Colorectal    RI SURG DIAGNOSTIC EXAM, ANORECTAL N/A 11/17/2021    Procedure: EXAM UNDER ANESTHESIA (EUA);   Surgeon: Eriac Santos MD;  [facility];  Service: Colorectal    SPINAL STIMULATOR PLACEMENT  03/2021     Social History     Substance and Sexual Activity   Alcohol Use Yes    Alcohol/week: 30 0 standard drinks    Types: 30 Cans of beer per week    Comment: "Couple of beers within the week"     Social History     Substance and Sexual Activity   Drug Use No    Comment: Denies     Social History     Tobacco Use   Smoking Status Current Some Day Smoker    Packs/day: 0 50    Years: 0 00    Pack years: 0 00    Types: Cigarettes    Start date: 2019    Last attempt to quit: 2021    Years since quittin 9   Smokeless Tobacco Never Used   Tobacco Comment    Currently is using nicotine patches - quit 21   smokes a couple ciggs a week     Family History   Problem Relation Age of Onset    Heart disease Father     Heart attack Father     Colon cancer Neg Hx     Colon polyps Neg Hx     Inflammatory bowel disease Neg Hx          Current Outpatient Medications:     amitriptyline (ELAVIL) 100 mg tablet    Atomoxetine HCl (STRATTERA PO)    azaTHIOprine (IMURAN) 50 mg tablet    cholestyramine (QUESTRAN) 4 g packet    lisinopril (ZESTRIL) 20 mg tablet    naproxen (NAPROSYN) 500 mg tablet    RA Vitamin D-3 25 MCG (1000 UT) tablet    loperamide (IMODIUM A-D) 2 MG tablet  No Known Allergies  Reviewed medications and allergies and updated as indicated    PHYSICAL EXAM:    Blood pressure 138/82, pulse (!) 3, height 6' (1 829 m), weight 88 9 kg (196 lb)  Body mass index is 26 58 kg/m²  General Appearance: NAD, cooperative, alert  Eyes: Anicteric, PERRLA, EOMI  ENT:  Normocephalic, atraumatic, normal mucosa  Neck:  Supple, symmetrical, trachea midline  Resp:  Clear to auscultation bilaterally; no rales, rhonchi or wheezing; respirations unlabored   CV:  S1 S2, Regular rate and rhythm; no murmur, rub, or gallop  GI:  Soft, non-tender, non-distended; normal bowel sounds; no masses, no organomegaly   Rectal: Deferred  Musculoskeletal: No cyanosis, clubbing or edema  Normal ROM    Skin:  No jaundice, rashes, or lesions   Heme/Lymph: No palpable cervical lymphadenopathy  Psych: Normal affect, good eye contact  Neuro: No gross deficits, AAOx3    Lab Results:   Lab Results   Component Value Date    WBC 9 42 10/25/2021    HGB 14 5 10/25/2021    HCT 42 8 10/25/2021    MCV 97 10/25/2021     10/25/2021     Lab Results   Component Value Date     10/03/2016    K 4 2 10/25/2021     10/25/2021    CO2 26 10/25/2021    ANIONGAP 23 (H) 2015    BUN 8 10/25/2021    CREATININE 0 77 10/25/2021    GLUCOSE 110 02/20/2015    GLUF 132 (H) 10/25/2021    CALCIUM 8 3 10/25/2021    AST 14 10/23/2021    ALT 33 10/23/2021    ALKPHOS 110 10/23/2021    PROT 7 3 10/03/2016    BILITOT 0 8 10/03/2016    EGFR 107 10/25/2021     No results found for: IRON, TIBC, FERRITIN  Lab Results   Component Value Date    LIPASE 100 10/23/2021       Radiology Results:   No results found

## 2022-01-14 ENCOUNTER — LAB (OUTPATIENT)
Dept: LAB | Facility: HOSPITAL | Age: 52
End: 2022-01-14
Attending: INTERNAL MEDICINE
Payer: MEDICARE

## 2022-01-14 DIAGNOSIS — K50.814 CROHN'S DISEASE OF BOTH SMALL AND LARGE INTESTINE WITH ABSCESS (HCC): ICD-10-CM

## 2022-01-14 LAB
CRP SERPL QL: 5.5 MG/L
ERYTHROCYTE [SEDIMENTATION RATE] IN BLOOD: 6 MM/HOUR (ref 0–14)

## 2022-01-14 PROCEDURE — 86140 C-REACTIVE PROTEIN: CPT

## 2022-01-14 PROCEDURE — 85652 RBC SED RATE AUTOMATED: CPT

## 2022-03-07 ENCOUNTER — ANESTHESIA (OUTPATIENT)
Dept: GASTROENTEROLOGY | Facility: AMBULATORY SURGERY CENTER | Age: 52
End: 2022-03-07

## 2022-03-07 ENCOUNTER — HOSPITAL ENCOUNTER (OUTPATIENT)
Dept: GASTROENTEROLOGY | Facility: AMBULATORY SURGERY CENTER | Age: 52
Discharge: HOME | End: 2022-03-07
Payer: MEDICARE

## 2022-03-07 ENCOUNTER — ANESTHESIA EVENT (OUTPATIENT)
Dept: GASTROENTEROLOGY | Facility: AMBULATORY SURGERY CENTER | Age: 52
End: 2022-03-07

## 2022-03-07 VITALS
RESPIRATION RATE: 18 BRPM | TEMPERATURE: 97.3 F | OXYGEN SATURATION: 98 % | WEIGHT: 192 LBS | HEART RATE: 68 BPM | HEIGHT: 72 IN | BODY MASS INDEX: 26.01 KG/M2 | DIASTOLIC BLOOD PRESSURE: 72 MMHG | SYSTOLIC BLOOD PRESSURE: 110 MMHG

## 2022-03-07 DIAGNOSIS — K50.813 CROHN'S DISEASE OF BOTH SMALL AND LARGE INTESTINE WITH FISTULA (HCC): Primary | ICD-10-CM

## 2022-03-07 DIAGNOSIS — K50.814 CROHN'S DISEASE OF BOTH SMALL AND LARGE INTESTINE WITH ABSCESS (HCC): ICD-10-CM

## 2022-03-07 PROCEDURE — 45380 COLONOSCOPY AND BIOPSY: CPT | Performed by: INTERNAL MEDICINE

## 2022-03-07 PROCEDURE — 88305 TISSUE EXAM BY PATHOLOGIST: CPT | Performed by: PATHOLOGY

## 2022-03-07 RX ORDER — LIDOCAINE HYDROCHLORIDE 10 MG/ML
INJECTION, SOLUTION EPIDURAL; INFILTRATION; INTRACAUDAL; PERINEURAL AS NEEDED
Status: DISCONTINUED | OUTPATIENT
Start: 2022-03-07 | End: 2022-03-07

## 2022-03-07 RX ORDER — SODIUM CHLORIDE, SODIUM LACTATE, POTASSIUM CHLORIDE, CALCIUM CHLORIDE 600; 310; 30; 20 MG/100ML; MG/100ML; MG/100ML; MG/100ML
50 INJECTION, SOLUTION INTRAVENOUS CONTINUOUS
Status: DISCONTINUED | OUTPATIENT
Start: 2022-03-07 | End: 2022-03-11 | Stop reason: HOSPADM

## 2022-03-07 RX ORDER — PROPOFOL 10 MG/ML
INJECTION, EMULSION INTRAVENOUS AS NEEDED
Status: DISCONTINUED | OUTPATIENT
Start: 2022-03-07 | End: 2022-03-07

## 2022-03-07 RX ORDER — PROPOFOL 10 MG/ML
INJECTION, EMULSION INTRAVENOUS CONTINUOUS PRN
Status: DISCONTINUED | OUTPATIENT
Start: 2022-03-07 | End: 2022-03-07

## 2022-03-07 RX ADMIN — PROPOFOL 120 MG: 10 INJECTION, EMULSION INTRAVENOUS at 09:58

## 2022-03-07 RX ADMIN — SODIUM CHLORIDE, SODIUM LACTATE, POTASSIUM CHLORIDE, CALCIUM CHLORIDE 50 ML/HR: 600; 310; 30; 20 INJECTION, SOLUTION INTRAVENOUS at 09:28

## 2022-03-07 RX ADMIN — PROPOFOL 130 MCG/KG/MIN: 10 INJECTION, EMULSION INTRAVENOUS at 09:58

## 2022-03-07 RX ADMIN — LIDOCAINE HYDROCHLORIDE 50 MG: 10 INJECTION, SOLUTION EPIDURAL; INFILTRATION; INTRACAUDAL; PERINEURAL at 09:58

## 2022-03-07 RX ADMIN — PROPOFOL 40 MG: 10 INJECTION, EMULSION INTRAVENOUS at 09:59

## 2022-03-07 NOTE — H&P
History and Physical - [id number]    Andres Licea 54 y o  male MRN: [id number]      HPI: Andres Licea is a 54y o  year old male who presents for small bowel Crohn's, fistulizing disease and sarah anal disease  Currently on Stelara and Imuran  REVIEW OF SYSTEMS: Per the HPI, and otherwise unremarkable  Historical Information     Past Medical History:   Diagnosis Date    Back pain     Colon polyp     Cro[facility]'s disease ([facility] [address] )     GERD (gastroesophageal reflux disease)     Hypertension     Perianal fistula     Terminal ileitis ([facility] [address] )      Past Surgical History:   Procedure Laterality Date    ABSCESS DRAINAGE      BACK SURGERY  2019    COLONOSCOPY      HERNIA REPAIR      umbilical hernia    ME I&D PERIRECTAL ABSCESS Right 10/24/2021    Procedure: excisional debredement right gluteal cheek ;  Surgeon: Heather Jones MD;  [facility]: [facility];  Service: General    ME PERCUT IMPLNT NEUROELECT,EPIDURAL Right 3/26/2021    Procedure: INSERTION THORACIC DORSAL COLUMN SPINAL CORD STIMULATOR PERCUTANEOUS W IMPLANTABLE PULSE GENERATOR, RIGHT;  Surgeon: Aris Barros MD;  Location: [facility];  Service: Neurosurgery    ME PLACEMENT,SETON N/A 11/17/2021    Procedure: [place];  Surgeon: Arnol Rodarte MD;  [facility];  Service: Colorectal    ME SURG DIAGNOSTIC EXAM, ANORECTAL N/A 11/17/2021    Procedure: EXAM UNDER ANESTHESIA (EUA);   Surgeon: Arnol Rodarte MD;  [facility];  Service: Colorectal    SPINAL STIMULATOR PLACEMENT  03/2021     Social History   Social History     Substance and Sexual Activity   Alcohol Use Yes    Alcohol/week: 30 0 standard drinks    Types: 30 Cans of beer per week    Comment: "Couple of beers within the week"     Social History     Substance and Sexual Activity   Drug Use No    Comment: Denies     Social History     Tobacco Use   Smoking Status Current Some Day Smoker    Packs/day: 0 50    Years: 0 00    Pack years: 0 00    Types: Cigarettes    Start date:     Last attempt to quit: 2021    Years since quittin 0   Smokeless Tobacco Never Used   Tobacco Comment    Currently is using nicotine patches - quit 21   smokes a couple ciggs a week     Family History   Problem Relation Age of Onset    Heart disease Father     Heart attack Father     Colon cancer Neg Hx     Colon polyps Neg Hx     Inflammatory bowel disease Neg Hx        Meds/Allergies       Current Outpatient Medications:     amitriptyline (ELAVIL) 100 mg tablet    Atomoxetine HCl (STRATTERA PO)    azaTHIOprine (IMURAN) 50 mg tablet    cholestyramine (QUESTRAN) 4 g packet    lisinopril (ZESTRIL) 20 mg tablet    naproxen (NAPROSYN) 500 mg tablet    RA Vitamin D-3 25 MCG (1000 UT) tablet    loperamide (IMODIUM A-D) 2 MG tablet    Current Facility-Administered Medications:     lactated ringers infusion, 50 mL/hr, Intravenous, Continuous, Continue from Pre-op at 22 0948    No Known Allergies    Objective     /84   Pulse 79   Temp (!) 97 3 °F (36 3 °C) (Temporal)   Resp 15   Ht 6' (1 829 m)   Wt 87 1 kg (192 lb)   SpO2 96%   BMI 26 04 kg/m²       PHYSICAL EXAM    Gen: NAD AAOx3  Head: Normocephalic, Atraumatic  CV: S1S2 RRR no m/r/g  CHEST: Clear b/l no c/r/w  ABD: soft, +BS NT/ND no masses  EXT: no edema      ASSESSMENT/PLAN:  This is a 54y o  year old male here for colonoscopy, and he is stable and optimized for his procedure

## 2022-03-07 NOTE — DISCHARGE INSTRUCTIONS
Colonoscopy   WHAT YOU NEED TO KNOW:   A colonoscopy is a procedure to examine the inside of your colon (intestine) with a scope  Polyps or tissue growths may have been removed during your colonoscopy  It is normal to feel bloated and to have some abdominal discomfort  You should be passing gas  If you have hemorrhoids or you had polyps removed, you may have a small amount of bleeding  DISCHARGE INSTRUCTIONS:   Seek care immediately if:    You have sudden, severe abdominal pain   You have problems swallowing   You have a large amount of black, sticky bowel movements or blood in your bowel movements   You have sudden trouble breathing   You feel weak, lightheaded, or faint or your heart beats faster than normal for you  Contact your healthcare provider if:    You have a fever and chills   You have nausea or are vomiting   Your abdomen is bloated or feels full and hard   You have abdominal pain   You have black, sticky bowel movements or blood in your bowel movements   You have not had a bowel movement for 3 days after your procedure   You have rash or hives   You have questions or concerns about your procedure  Activity:    Do not lift, strain, or run for 24 hours after your procedure   Rest after your procedure  You have been given medicine to relax you  Do not drive or make important decisions until the day after your procedure  Return to your normal activity as directed   Relieve gas and discomfort from bloating by lying on your right side with a heating pad on your abdomen  You may need to take short walks to help the gas move out  Eat small meals until bloating is relieved  Follow up with your healthcare provider as directed: Write down your questions so you remember to ask them during your visits  If you take a blood thinner, please review the specific instructions from your endoscopist about when you should resume it   These can be found in the Recommendation and Your Medication list sections of this After Visit Summary  Diverticulosis Diet   WHAT YOU NEED TO KNOW:   What is a diverticulosis diet? A diverticulosis diet includes high-fiber foods  High-fiber foods help you have regular bowel movements  Extra fiber may decrease your risk of forming new diverticula (small pockets) in your intestine  A high-fiber diet may also help prevent diverticulitis  Diverticulitis is a painful condition that occurs when diverticula become inflamed or infected  You do not need to avoid nuts, seeds, corn, or popcorn while you are on a diverticulosis diet  How much fiber do I need? You may need 25 to 35 grams of fiber each day  Ask your dietitian or healthcare provider how much fiber you should have  Increase your intake of fiber slowly  When you eat more fiber, you may have gas and feel bloated  You may need to take a fiber supplement if you do not get enough fiber from food  Drink plenty of liquids as you increase the fiber in your diet  Your dietitian or healthcare provider may recommend 8 eight-ounce cups or more each day  Ask which liquids are best for you  Which foods are high in fiber? · Foods with at least 4 grams of fiber per serving:      ? ? to ½ cup of high-fiber cereal (check the nutrition label on the box)    ? ½ cup of blackberries or raspberries    ? 4 dried prunes    ? 1 cooked artichoke    ? ½ cup of cooked legumes, such as lentils, or red, kidney, and soto beans    · Foods with 1 to 3 grams of fiber per serving:      ? 1 slice of whole-wheat, pumpernickel, or rye bread    ? 4 whole-wheat crackers    ? ½ cup of cereal with 1 to 3 grams of fiber per serving (check the nutrition label on the box)    ? 1 piece of fruit, such as an apple, banana, pear, kiwi, or orange    ? 3 dates    ? ½ cup of canned apricots, fruit cocktail, peaches, or pears    ?  ½ cup of raw or cooked vegetables, such as carrots, cauliflower, cabbage, spinach, squash, or corn    When should I call my doctor? · You have questions about a high-fiber diet  · You have a change in your bowel movements  · You have an upset stomach  · You have a fever  · You have pain in your lower abdomen on the left side  · You have questions about your condition or care  CARE AGREEMENT:   You have the right to help plan your care  Learn about your health condition and how it may be treated  Discuss treatment options with your healthcare providers to decide what care you want to receive  You always have the right to refuse treatment  The above information is an  only  It is not intended as medical advice for individual conditions or treatments  Talk to your doctor, nurse or pharmacist before following any medical regimen to see if it is safe and effective for you  © Copyright [employer] 2022 Information is for End User's use only and may not be sold, redistributed or otherwise used for commercial purposes  All illustrations and images included in CareNotes® are the copyrighted property of A D A M , Inc  or [facility] Daniel Velázquez   Hemorrhoids   WHAT YOU NEED TO KNOW:   What are hemorrhoids? Hemorrhoids are swollen blood vessels inside your rectum (internal hemorrhoids) or on your anus (external hemorrhoids)  Sometimes a hemorrhoid may prolapse  This means it extends out of your anus  What increases my risk for hemorrhoids? · Pregnancy or obesity    · Straining or sitting for a long time during bowel movements    · Liver disease    · Weak muscles around the anus caused by older age, rectal surgery, or anal intercourse    · A lack of physical activity    · Chronic diarrhea or constipation    · A low-fiber diet    What are the signs and symptoms of hemorrhoids?    · Pain or itching around your anus or inside your rectum    · Swelling or bumps around your anus    · Bright red blood in your bowel movement, on the toilet paper, or in the toilet bowl    · Tissue bulging out of your anus (prolapsed hemorrhoids)    · Incontinence (poor control over urine or bowel movements)    How are hemorrhoids diagnosed? Your healthcare provider will ask about your symptoms, the foods you eat, and your bowel movements  He or she will examine your anus for external hemorrhoids  You may need the following:  · A digital rectal exam  is a test to check for hemorrhoids  Your healthcare provider will put a gloved finger inside your anus to feel for the hemorrhoids  · An anoscopy  is a test that uses a scope (small tube with a light and camera on the end) to look at your hemorrhoids  How are hemorrhoids treated? Treatment will depend on your symptoms  You may need any of the following:  · Medicines  can help decrease pain and swelling, and soften your bowel movement  The medicine may be a pill, pad, cream, or ointment  · Procedures  may be used to shrink or remove your hemorrhoid  Examples include rubber-band ligation, sclerotherapy, and photocoagulation  These procedures may be done in your healthcare provider's office  Ask your healthcare provider for more information about these procedures  · Surgery  may be needed to shrink or remove your hemorrhoids  How can I manage my symptoms? · Apply ice on your anus for 15 to 20 minutes every hour or as directed  Use an ice pack, or put crushed ice in a plastic bag  Cover it with a towel before you apply it to your anus  Ice helps prevent tissue damage and decreases swelling and pain  · Take a sitz bath  Fill a bathtub with 4 to 6 inches of warm water  You may also use a sitz bath pan that fits inside a toilet bowl  Sit in the sitz bath for 15 minutes  Do this 3 times a day, and after each bowel movement  The warm water can help decrease pain and swelling  · Keep your anal area clean  Gently wash the area with warm water daily  Soap may irritate the area   After a bowel movement, wipe with moist towelettes or wet toilet paper  Dry toilet paper can irritate the area  How can I help prevent hemorrhoids? · Do not strain to have a bowel movement  Do not sit on the toilet too long  These actions can increase pressure on the tissues in your rectum and anus  · Drink plenty of liquids  Liquids can help prevent constipation  Ask how much liquid to drink each day and which liquids are best for you  · Eat a variety of high-fiber foods  Examples include fruits, vegetables, and whole grains  Ask your healthcare provider how much fiber you need each day  You may need to take a fiber supplement  · Exercise as directed  Exercise, such as walking, may make it easier to have a bowel movement  Ask your healthcare provider to help you create an exercise plan  · Do not have anal sex  Anal sex can weaken the skin around your rectum and anus  · Avoid heavy lifting  This can cause straining and increase your risk for another hemorrhoid  When should I seek immediate care? · You have severe pain in your rectum or around your anus  · You have severe pain in your abdomen and you are vomiting  · You have bleeding from your anus that soaks through your underwear  When should I contact my healthcare provider? · You have frequent and painful bowel movements  · Your hemorrhoid looks or feels more swollen than usual      · You do not have a bowel movement for 2 days or more  · You see or feel tissue coming through your anus  · You have questions or concerns about your condition or care  CARE AGREEMENT:   You have the right to help plan your care  Learn about your health condition and how it may be treated  Discuss treatment options with your healthcare providers to decide what care you want to receive  You always have the right to refuse treatment  The above information is an  only  It is not intended as medical advice for individual conditions or treatments   Talk to your doctor, nurse or pharmacist before following any medical regimen to see if it is safe and effective for you  © Copyright [facility] 2022 Information is for End User's use only and may not be sold, redistributed or otherwise used for commercial purposes   All illustrations and images included in CareNotes® are the copyrighted property of [employer]  or [address] [place] [address]

## 2022-03-07 NOTE — ANESTHESIA POSTPROCEDURE EVALUATION
Post-Op Assessment Note    CV Status:  Stable  Pain Score: 0    Pain management: adequate     Mental Status:  Alert and awake   Hydration Status:  Euvolemic   PONV Controlled:  Controlled   Airway Patency:  Patent      Post Op Vitals Reviewed: Yes      Staff: CRNA         No complications documented      BP   97/74   Temp     Pulse   96   Resp   15   SpO2   96%

## 2022-03-07 NOTE — ANESTHESIA PREPROCEDURE EVALUATION
Procedure:  COLONOSCOPY    Relevant Problems   CARDIO   (+) Hypertension      GI/HEPATIC   (+) GERD (gastroesophageal reflux disease)      MUSCULOSKELETAL   (+) Arthritis of right shoulder region   (+) Chronic bilateral low back pain with bilateral sciatica   (+) Degenerative disc disease at L5-S1 level   (+) Lumbar spondylosis   (+) Myofascial pain syndrome      NEURO/PSYCH   (+) Chronic pain syndrome   (+) Chronic right shoulder pain   (+) History of colon polyps   (+) Myofascial pain syndrome   (+) Numbness and tingling in right hand   (+) Numbness and tingling of both lower extremities      PULMONARY   (+) Smoker      Other   (+) Heavy alcohol consumption        Physical Exam    Airway    Mallampati score: III  TM Distance: >3 FB  Neck ROM: full     Dental   No notable dental hx     Cardiovascular  Cardiovascular exam normal    Pulmonary  Pulmonary exam normal     Other Findings        Anesthesia Plan  ASA Score- 2     Anesthesia Type- IV sedation with anesthesia with ASA Monitors  Additional Monitors:   Airway Plan:           Plan Factors-    Chart reviewed  Patient summary reviewed  Patient is a current smoker  Induction- intravenous  Postoperative Plan-     Informed Consent- Anesthetic plan and risks discussed with patient  I personally reviewed this patient with the CRNA  Discussed and agreed on the Anesthesia Plan with the CRNA  Eulalio Lemus

## 2022-05-10 ENCOUNTER — OFFICE VISIT (OUTPATIENT)
Dept: OBGYN CLINIC | Facility: CLINIC | Age: 52
End: 2022-05-10
Payer: MEDICARE

## 2022-05-10 VITALS
HEIGHT: 72 IN | WEIGHT: 195 LBS | BODY MASS INDEX: 26.41 KG/M2 | SYSTOLIC BLOOD PRESSURE: 122 MMHG | DIASTOLIC BLOOD PRESSURE: 76 MMHG

## 2022-05-10 DIAGNOSIS — M72.0 DUPUYTREN'S DISEASE OF BOTH PALM AND FINGER WITH CONTRACTURE: Primary | ICD-10-CM

## 2022-05-10 PROCEDURE — 99214 OFFICE O/P EST MOD 30 MIN: CPT | Performed by: PHYSICIAN ASSISTANT

## 2022-05-10 NOTE — PROGRESS NOTES
ASSESSMENT/PLAN:    Assessment:   Dupuytrens Disease of the  bilateral  small finger and right ring finger    Plan:   Discussed with patient that options include Xiaflex injections and operative intervention  Discussion had with patient that the left hand small finger contracture would be amenable to manipulation  Discussed with patient that the right hand may get benefit from manipulation, however, recent data suggests that the small finger PIP contracture does not get the same benefit from xiaflex as other fingers  Will order xialfex and start with manipulation of the left hand  Follow Up: When xiaflex available    To Do Next Visit:  xiaflex for the left hand  Reassess the right hand contracture presentation    General Discussions:  Dupuytren's Disease: The anatomy and physiology of Dupuytren's disease were discussed with the patient today in the office  Increased scar formation within the interval between the skin volarly and the flexor tendons dorsally can result in pit formation, nodular formation, or eventual cord formation  These pathologic cords can cause contracture at either the metacarpophalangeal joint, proximal interphalangeal joint, or both  As the cords progress towards the proximal interphalangeal joint, the neurovascular structures of the finger may become involved within the disease process  While this is a genetic condition, variable penetrance occurs within family trees  Conservative treatment options including therapy to maintain joint mobility and a tabletop test were discussed  Other treatments include Xiaflex and possible surgical intervention  Xiaflex: The process of receiving Xiaflex injection was discussed with the patient today  This includes 3-4 injections in the area of the affected cord  The hand will then be bandaged for 2-3 consecutive days, at which time the patient will return to the office for manipulation of the affected finger   Risks, benefits and alternatives of Xiaflex injections were discussed with the patient today  Risks include bleeding, infection, pain, swelling, bruising, itching, breaks in the skin, redness/warmth of the skin, allergic reaction, tendon rupture, ligament damage, and nerve/vessel injury  Patient agrees to proceed with the injection  Authorization process will be initiated with patient to follow up once this is achieved  ____________________________________________________  CHIEF COMPLAINT:  Chief Complaint   Patient presents with    Right Hand - Pain    Left Hand - Pain         SUBJECTIVE:  Christiano Rouse is a 54 y o  male who presents for follow up regarding dupuytren disease in hands bilaterally  Patient notes that he has had progression of disease at this time, with loss of ability to extend his small fingers bilaterally  At this time, he would like to discuss intervention options  He denies any pain  He denies any numbness or tingling  He denies any acute complaints      PAST MEDICAL HISTORY:  Past Medical History:   Diagnosis Date    Back pain     Colon polyp     [facility] disease [facility] [address] )     GERD (gastroesophageal reflux disease)     Hypertension     Perianal fistula     Terminal ileitis ([facility] [address] )        PAST SURGICAL HISTORY:  Past Surgical History:   Procedure Laterality Date    ABSCESS DRAINAGE      BACK SURGERY  2019    COLONOSCOPY      HERNIA REPAIR      umbilical hernia    SC I&D PERIRECTAL ABSCESS Right 10/24/2021    Procedure: excisional debredement right gluteal cheek ;  Surgeon: Liam Aleman MD;  Location: [facility];  Service: General    SC PERCUT IMPLNT [address] Right 3/26/2021    Procedure: INSERTION THORACIC DORSAL COLUMN SPINAL CORD STIMULATOR PERCUTANEOUS W IMPLANTABLE PULSE GENERATOR, RIGHT;  Surgeon: Jamila Torres MD;  Location: [facility];  Service: Neurosurgery    SC PLACEMENT,SETON N/A 11/17/2021    Procedure: Liam Coreas;  Surgeon: Lisbeth Mason MD;  [facility];  Service: Colorectal    [place] SURG DIAGNOSTIC EXAM, ANORECTAL N/A 2021    Procedure: EXAM UNDER ANESTHESIA (EUA); Surgeon: Judith Alvarez MD;  [facility];  Service: Colorectal    SPINAL STIMULATOR PLACEMENT  2021       FAMILY HISTORY:  Family History   Problem Relation Age of Onset    Heart disease Father     Heart attack Father     Colon cancer Neg Hx     Colon polyps Neg Hx     Inflammatory bowel disease Neg Hx        SOCIAL HISTORY:  Social History     Tobacco Use    Smoking status: Current Some Day Smoker     Packs/day: 0 50     Years: 0 00     Pack years: 0 00     Types: Cigarettes     Start date:      Last attempt to quit: 2021     Years since quittin 2    Smokeless tobacco: Never Used    Tobacco comment: Currently is using nicotine patches - quit 23   smokes a couple ciggs a week   Vaping Use    Vaping Use: Never used   Substance Use Topics    Alcohol use:  Yes     Alcohol/week: 30 0 standard drinks     Types: 30 Cans of beer per week     Comment: "Couple of beers within the week"    Drug use: No     Comment: Denies       MEDICATIONS:    Current Outpatient Medications:     amitriptyline (ELAVIL) 100 mg tablet, 1-2 HS, Disp: 60 tablet, Rfl: 5    Atomoxetine HCl (STRATTERA PO), Inject into a muscle Injections every 6-8 weeks      Last dose 10/11/21 , Disp: , Rfl:     azaTHIOprine (IMURAN) 50 mg tablet, Take 50 mg by mouth daily, Disp: , Rfl:     cholestyramine (QUESTRAN) 4 g packet, Take 1 packet (4 g total) by mouth 3 (three) times a day with meals, Disp: 180 packet, Rfl: 3    lisinopril (ZESTRIL) 20 mg tablet, Take 1 tablet (20 mg total) by mouth daily, Disp: 90 tablet, Rfl: 3    loperamide (IMODIUM A-D) 2 MG tablet, Take 1 tablet (2 mg total) by mouth 4 (four) times a day as needed for diarrhea, Disp: 30 tablet, Rfl: 5    naproxen (NAPROSYN) 500 mg tablet, Take 1 tablet (500 mg total) by mouth 2 (two) times a day with meals, Disp: 60 tablet, Rfl: 5    RA Vitamin D-3 25 MCG (1000 UT) tablet, TAKE 2 TABLETS BY MOUTH DAILY, Disp: 60 tablet, Rfl: 2    ALLERGIES:  No Known Allergies    REVIEW OF SYSTEMS:  Pertinent items are noted in HPI  A comprehensive review of systems was negative  LABS:  HgA1c:   Lab Results   Component Value Date    HGBA1C 5 2 03/12/2021     BMP:   Lab Results   Component Value Date    GLUCOSE 110 02/20/2015    CALCIUM 9 1 01/14/2022     10/03/2016    K 4 0 01/14/2022    CO2 27 01/14/2022     01/14/2022    BUN 12 01/14/2022    CREATININE 1 03 01/14/2022           _____________________________________________________  PHYSICAL EXAMINATION:  Vital signs: /76   Ht 6' (1 829 m)   Wt 88 5 kg (195 lb)   BMI 26 45 kg/m²   General: well developed and well nourished, alert, oriented times 3 and appears comfortable  Psychiatric: Normal  HEENT: Trachea Midline, No torticollis  Cardiovascular: No discernable arrhythmia  Pulmonary: No wheezing or stridor  Abdomen: No rebound or guarding  Extremities: No peripheral edema  Skin: No masses, erythema, lacerations, fluctation, ulcerations  Neurovascular: Sensation Intact to the Median, Ulnar, Radial Nerve, Motor Intact to the Median, Ulnar, Radial Nerve and Pulses Intact     MUSCULOSKELETAL EXAMINATION:  Left hand: pitting to the ring finger along the distal palmar crease   Central cord to the small finger extending to the PIP joint with an MP contracture of 75 degrees  No PIP contracture    Right hand: central cord to the ring finger with MP contracture of 45 degrees  Central radial cord to the small finger with MP 30 degrees and PIP contracture of 75 degrees       _____________________________________________________  STUDIES REVIEWED:  No Studies to review      PROCEDURES PERFORMED:  Procedures  No Procedures performed today

## 2022-05-31 ENCOUNTER — RA CDI HCC (OUTPATIENT)
Dept: OTHER | Facility: HOSPITAL | Age: 52
End: 2022-05-31

## 2022-05-31 ENCOUNTER — TELEPHONE (OUTPATIENT)
Dept: GASTROENTEROLOGY | Facility: CLINIC | Age: 52
End: 2022-05-31

## 2022-05-31 NOTE — TELEPHONE ENCOUNTER
Spoke to patient  States he does not have pharmacy coverage  Advised would contact him later about patient assistance  Link for Carrie Nur sent via [address] to patient

## 2022-05-31 NOTE — TELEPHONE ENCOUNTER
Pt left vm  He get's stelara at [facility] informed that it will not be covered by his insurance there anymore  He needs a new rx for him to do it at home  CB# [phone]

## 2022-05-31 NOTE — LETTER
Sonam 10, 2022         Letter of Medical Necessity Entyvio    Patient: Jigar Purdy   YOB: 1970     Seeking approval for Entyvio 300 mg induction dosing Week 0, 2 and 6, then maintenance dosing every 8 weeks  Patient with history of complicated Crohn's with fistulization  Developed antibodies to Humira  No significant improvement in symptoms with the Stelara and Imuran, along with the fact that he is not able to get Stelara with his insurance  Needs disease control, so will proceed with Entyvio        Sincerely,        Karolyn Schulz MD

## 2022-05-31 NOTE — PROGRESS NOTES
[facility]  coding opportunities       Chart reviewed, no opportunity found: CHART REVIEWED, NO OPPORTUNITY FOUND        Patients Insurance     Medicare Insurance: Medicare

## 2022-06-01 NOTE — TELEPHONE ENCOUNTER
Pt left [facility] stating he does not qualify for the Stelara program/is unsure what else to do; req [phone]

## 2022-06-01 NOTE — TELEPHONE ENCOUNTER
I spoke with patient, unfortunately their income is too high [facility] to [facility]  Patient failed Humira prior to starting Stelara  Would you consider Entyvio? He does not have prescription insurance  The alternative medication would need to be an Infusion that is covered under his medical benefits

## 2022-06-02 NOTE — TELEPHONE ENCOUNTER
New Referral submitted to [facility] TREATMENT [facility]  [address] [place] [address]  Phone: [phone] Fax: [phone]   Patient    [email or web address]  Office: [phone]   Fax- [phone]

## 2022-06-03 NOTE — TELEPHONE ENCOUNTER
We need to send a detailed letter of medical necessity to support the change for Matilda   Can you dictate a few sentences and I will create a letter to submit?!

## 2022-06-03 NOTE — TELEPHONE ENCOUNTER
Patient with history of complicated Crohn's with fistulization  Developed antibodies to Humira  No significant improvement in symptoms with the Stelara and Imuran, along with the fact that he is not able to get Stelara with his insurance  Needs disease control so will proceed with Entyvio

## 2022-06-06 ENCOUNTER — OFFICE VISIT (OUTPATIENT)
Dept: FAMILY MEDICINE CLINIC | Facility: CLINIC | Age: 52
End: 2022-06-06
Payer: MEDICARE

## 2022-06-06 VITALS
HEIGHT: 72 IN | BODY MASS INDEX: 26.01 KG/M2 | SYSTOLIC BLOOD PRESSURE: 128 MMHG | DIASTOLIC BLOOD PRESSURE: 78 MMHG | RESPIRATION RATE: 16 BRPM | OXYGEN SATURATION: 96 % | WEIGHT: 192 LBS | HEART RATE: 93 BPM

## 2022-06-06 DIAGNOSIS — K50.011 TERMINAL ILEITIS WITH RECTAL BLEEDING (HCC): ICD-10-CM

## 2022-06-06 DIAGNOSIS — F17.200 SMOKER: ICD-10-CM

## 2022-06-06 DIAGNOSIS — K50.011 CROHN'S DISEASE OF SMALL INTESTINE WITH RECTAL BLEEDING (HCC): ICD-10-CM

## 2022-06-06 DIAGNOSIS — Z00.00 MEDICARE ANNUAL WELLNESS VISIT, SUBSEQUENT: Primary | ICD-10-CM

## 2022-06-06 DIAGNOSIS — M54.12 CERVICAL RADICULOPATHY: ICD-10-CM

## 2022-06-06 DIAGNOSIS — I10 PRIMARY HYPERTENSION: ICD-10-CM

## 2022-06-06 DIAGNOSIS — G89.29 CHRONIC BILATERAL LOW BACK PAIN WITHOUT SCIATICA: ICD-10-CM

## 2022-06-06 DIAGNOSIS — F41.9 ANXIETY: ICD-10-CM

## 2022-06-06 DIAGNOSIS — M54.50 CHRONIC BILATERAL LOW BACK PAIN WITHOUT SCIATICA: ICD-10-CM

## 2022-06-06 PROCEDURE — G0402 INITIAL PREVENTIVE EXAM: HCPCS | Performed by: FAMILY MEDICINE

## 2022-06-06 PROCEDURE — 99214 OFFICE O/P EST MOD 30 MIN: CPT | Performed by: FAMILY MEDICINE

## 2022-06-06 RX ORDER — LISINOPRIL 20 MG/1
20 TABLET ORAL DAILY
Qty: 90 TABLET | Refills: 3 | Status: SHIPPED | OUTPATIENT
Start: 2022-06-06

## 2022-06-06 RX ORDER — BUPROPION HYDROCHLORIDE 150 MG/1
150 TABLET, EXTENDED RELEASE ORAL 2 TIMES DAILY
Qty: 60 TABLET | Refills: 5 | Status: SHIPPED | OUTPATIENT
Start: 2022-06-06

## 2022-06-06 RX ORDER — NAPROXEN 500 MG/1
500 TABLET ORAL 2 TIMES DAILY WITH MEALS
Qty: 60 TABLET | Refills: 5 | Status: SHIPPED | OUTPATIENT
Start: 2022-06-06

## 2022-06-06 NOTE — PROGRESS NOTES
Assessment/Plan:    No problem-specific Assessment & Plan notes found for this encounter  Diagnoses and all orders for this visit:    Medicare annual wellness visit, subsequent    Primary hypertension  -     lisinopril (ZESTRIL) 20 mg tablet; Take 1 tablet (20 mg total) by mouth daily    Chronic bilateral low back pain without sciatica  -     naproxen (NAPROSYN) 500 mg tablet; Take 1 tablet (500 mg total) by mouth 2 (two) times a day with meals    Other orders  -     adalimumab (HUMIRA) 20 mg/0 4 mL PSKT injection; Inject 20 mg under the skin once ONCE EVERY 2 MONTHS          Subjective:   Chief Complaint   Patient presents with    Medicare Wellness Visit     MED REFILLS, MEDICARE WELLNESS        Patient ID: Geralynn  is a 54 y o  male  HPI    The following portions of the patient's history were reviewed and updated as appropriate: allergies, current medications, past family history, past medical history, past social history, past surgical history and problem list     Review of Systems   Constitutional: Negative for fatigue, fever and unexpected weight change  HENT: Negative for congestion, sinus pain and sore throat  Eyes: Negative for visual disturbance  Respiratory: Negative for shortness of breath and wheezing  Cardiovascular: Negative for chest pain and palpitations  Gastrointestinal: Negative for abdominal pain, nausea and vomiting  Musculoskeletal: Negative  Negative for arthralgias and myalgias  Neurological: Negative for syncope, weakness and numbness  Psychiatric/Behavioral: Negative  Negative for confusion, dysphoric mood and suicidal ideas  Objective:  Vitals:    06/06/22 0819   BP: 128/78   Pulse: 93   Resp: 16   SpO2: 96%   Weight: 87 1 kg (192 lb)   Height: 6' (1 829 m)      Physical Exam  Constitutional:       Appearance: He is well-developed  HENT:      Right Ear: Ear canal normal  Tympanic membrane is not injected        Left Ear: Ear canal normal  Tympanic membrane is not injected  Nose: Nose normal    Eyes:      General:         Right eye: No discharge  Left eye: No discharge  Conjunctiva/sclera: Conjunctivae normal       Pupils: Pupils are equal, round, and reactive to light  Neck:      Thyroid: No thyromegaly  Cardiovascular:      Rate and Rhythm: Normal rate and regular rhythm  Heart sounds: Normal heart sounds  No murmur heard  Pulmonary:      Effort: Pulmonary effort is normal  No respiratory distress  Breath sounds: Normal breath sounds  No wheezing  Abdominal:      General: Bowel sounds are normal  There is no distension  Palpations: Abdomen is soft  Tenderness: There is no abdominal tenderness  Musculoskeletal:         General: Normal range of motion  Cervical back: Normal range of motion and neck supple  Lymphadenopathy:      Cervical: No cervical adenopathy  Skin:     General: Skin is warm and dry  Neurological:      Mental Status: He is alert and oriented to person, place, and time  He is not disoriented  Sensory: No sensory deficit  Gait: Gait normal       Deep Tendon Reflexes: Reflexes are normal and symmetric  Psychiatric:         Speech: Speech normal          Behavior: Behavior normal          Thought Content:  Thought content normal          Judgment: Judgment normal

## 2022-06-06 NOTE — PATIENT INSTRUCTIONS
Medicare Preventive Visit Patient Instructions  Thank you for completing your Welcome to Medicare Visit or Medicare Annual Wellness Visit today  Your next wellness visit will be due in one year (6/7/2023)  The screening/preventive services that you may require over the next 5-10 years are detailed below  Some tests may not apply to you based off risk factors and/or age  Screening tests ordered at today's visit but not completed yet may show as past due  Also, please note that scanned in results may not display below  Preventive Screenings:  Service Recommendations Previous Testing/Comments   Colorectal Cancer Screening  · Colonoscopy    · Fecal Occult Blood Test (FOBT)/Fecal Immunochemical Test (FIT)  · Fecal DNA/Cologuard Test  · Flexible Sigmoidoscopy Age: 54-65 years old   Colonoscopy: every 10 years (May be performed more frequently if at higher risk)  OR  FOBT/FIT: every 1 year  OR  Cologuard: every 3 years  OR  Sigmoidoscopy: every 5 years  Screening may be recommended earlier than age 48 if at higher risk for colorectal cancer  Also, an individualized decision between you and your healthcare provider will decide whether screening between the ages of 74-80 would be appropriate  Colonoscopy: 03/07/2022  FOBT/FIT: Not on file  Cologuard: Not on file  Sigmoidoscopy: Not on file          Prostate Cancer Screening Individualized decision between patient and health care provider in men between ages of 53-78   Medicare will cover every 12 months beginning on the day after your 50th birthday PSA: No results in last 5 years           Hepatitis C Screening Once for adults born between 80 and 1965  More frequently in patients at high risk for Hepatitis C Hep C Antibody: Not on file        Diabetes Screening 1-2 times per year if you're at risk for diabetes or have pre-diabetes Fasting glucose: 113 mg/dL   A1C: 5 2 %        Cholesterol Screening Once every 5 years if you don't have a lipid disorder   May order more often based on risk factors  Lipid panel: Not on file           Other Preventive Screenings Covered by Medicare:  1  Abdominal Aortic Aneurysm (AAA) Screening: covered once if your at risk  You're considered to be at risk if you have a family history of AAA or a male between the age of 73-68 who smoking at least 100 cigarettes in your lifetime  2  Lung Cancer Screening: covers low dose CT scan once per year if you meet all of the following conditions: (1) Age 50-69; (2) No signs or symptoms of lung cancer; (3) Current smoker or have quit smoking within the last 15 years; (4) You have a tobacco smoking history of at least 30 pack years (packs per day x number of years you smoked); (5) You get a written order from a healthcare provider  3  Glaucoma Screening: covered annually if you're considered high risk: (1) You have diabetes OR (2) Family history of glaucoma OR (3)  aged 48 and older OR (3)  American aged 72 and older  3  Osteoporosis Screening: covered every 2 years if you meet one of the following conditions: (1) Have a vertebral abnormality; (2) On glucocorticoid therapy for more than 3 months; (3) Have primary hyperparathyroidism; (4) On osteoporosis medications and need to assess response to drug therapy  5  HIV Screening: covered annually if you're between the age of 12-76  Also covered annually if you are younger than 13 and older than 72 with risk factors for HIV infection  For pregnant patients, it is covered up to 3 times per pregnancy      Immunizations:  Immunization Recommendations   Influenza Vaccine Annual influenza vaccination during flu season is recommended for all persons aged >= 6 months who do not have contraindications   Pneumococcal Vaccine (Prevnar and Pneumovax)  * Prevnar = PCV13  * Pneumovax = PPSV23 Adults 25-60 years old: 1-3 doses may be recommended based on certain risk factors  Adults 72 years old: Prevnar (PCV13) vaccine recommended followed by Pneumovax (PPSV23) vaccine  If already received PPSV23 since turning 65, then PCV13 recommended at least one year after PPSV23 dose  Hepatitis B Vaccine 3 dose series if at intermediate or high risk (ex: diabetes, end stage renal disease, liver disease)   Tetanus (Td) Vaccine - COST NOT COVERED BY MEDICARE PART B Following completion of primary series, a booster dose should be given every 10 years to maintain immunity against tetanus  Td may also be given as tetanus wound prophylaxis  Tdap Vaccine - COST NOT COVERED BY MEDICARE PART B Recommended at least once for all adults  For pregnant patients, recommended with each pregnancy  Shingles Vaccine (Shingrix) - COST NOT COVERED BY MEDICARE PART B  2 shot series recommended in those aged 48 and above     Health Maintenance Due:      Topic Date Due    Hepatitis C Screening  Never done    HIV Screening  Never done    Colorectal Cancer Screening  03/06/2024     Immunizations Due:      Topic Date Due    DTaP,Tdap,and Td Vaccines (1 - Tdap) Never done    COVID-19 Vaccine (3 - Moderna risk series) 05/20/2021    Pneumococcal Vaccine: Pediatrics (0 to 5 Years) and At-Risk Patients (6 to 59 Years) (2 - PPSV23 or PCV20) 11/12/2021     Advance Directives   What are advance directives? Advance directives are legal documents that state your wishes and plans for medical care  These plans are made ahead of time in case you lose your ability to make decisions for yourself  Advance directives can apply to any medical decision, such as the treatments you want, and if you want to donate organs  What are the types of advance directives? There are many types of advance directives, and each state has rules about how to use them  You may choose a combination of any of the following:  · Living will: This is a written record of the treatment you want  You can also choose which treatments you do not want, which to limit, and which to stop at a certain time   This includes surgery, medicine, IV fluid, and tube feedings  · Durable power of  for healthcare [facility]): This is a written record that states who you want to make healthcare choices for you when you are unable to make them for yourself  This person, called a proxy, is usually a family member or a friend  You may choose more than 1 proxy  · Do not resuscitate (DNR) order:  A DNR order is used in case your heart stops beating or you stop breathing  It is a request not to have certain forms of treatment, such as CPR  A DNR order may be included in other types of advance directives  · Medical directive: This covers the care that you want if you are in a coma, near death, or unable to make decisions for yourself  You can list the treatments you want for each condition  Treatment may include pain medicine, surgery, blood transfusions, dialysis, IV or tube feedings, and a ventilator (breathing machine)  · Values history: This document has questions about your views, beliefs, and how you feel and think about life  This information can help others choose the care that you would choose  Why are advance directives important? An advance directive helps you control your care  Although spoken wishes may be used, it is better to have your wishes written down  Spoken wishes can be misunderstood, or not followed  Treatments may be given even if you do not want them  An advance directive may make it easier for your family to make difficult choices about your care  Cigarette Smoking and Your Health   Risks to your health if you smoke:  Nicotine and other chemicals found in tobacco damage every cell in your body  Even if you are a light smoker, you have an increased risk for cancer, heart disease, and lung disease  If you are pregnant or have diabetes, smoking increases your risk for complications  Benefits to your health if you stop smoking:   · You decrease respiratory symptoms such as coughing, wheezing, and shortness of breath  · You reduce your risk for cancers of the lung, mouth, throat, kidney, bladder, pancreas, stomach, and cervix  If you already have cancer, you increase the benefits of chemotherapy  You also reduce your risk for cancer returning or a second cancer from developing  · You reduce your risk for heart disease, blood clots, heart attack, and stroke  · You reduce your risk for lung infections, and diseases such as pneumonia, asthma, chronic bronchitis, and emphysema  · Your circulation improves  More oxygen can be delivered to your body  If you have diabetes, you lower your risk for complications, such as kidney, artery, and eye diseases  You also lower your risk for nerve damage  Nerve damage can lead to amputations, poor vision, and blindness  · You improve your body's ability to heal and to fight infections  For more information and support to stop smoking:   · [employer]  Phone: 1- 754 - 705-5319  Web Address: [employer]  Weight Management   Why it is important to manage your weight:  Being overweight increases your risk of health conditions such as heart disease, high blood pressure, type 2 diabetes, and certain types of cancer  It can also increase your risk for osteoarthritis, sleep apnea, and other respiratory problems  Aim for a slow, steady weight loss  Even a small amount of weight loss can lower your risk of health problems  How to lose weight safely:  A safe and healthy way to lose weight is to eat fewer calories and get regular exercise  You can lose up about 1 pound a week by decreasing the number of calories you eat by 500 calories each day  Healthy meal plan for weight management:  A healthy meal plan includes a variety of foods, contains fewer calories, and helps you stay healthy  A healthy meal plan includes the following:  · Eat whole-grain foods more often  A healthy meal plan should contain fiber   Fiber is the part of grains, fruits, and vegetables that is not broken down by your body  Whole-grain foods are healthy and provide extra fiber in your diet  Some examples of whole-grain foods are whole-wheat breads and pastas, oatmeal, brown rice, and bulgur  · Eat a variety of vegetables every day  Include dark, leafy greens such as spinach, kale, luke greens, and mustard greens  Eat yellow and orange vegetables such as carrots, sweet potatoes, and winter squash  · Eat a variety of fruits every day  Choose fresh or canned fruit (canned in its own juice or light syrup) instead of juice  Fruit juice has very little or no fiber  · Eat low-fat dairy foods  Drink fat-free (skim) milk or 1% milk  Eat fat-free yogurt and low-fat cottage cheese  Try low-fat cheeses such as mozzarella and other reduced-fat cheeses  · Choose meat and other protein foods that are low in fat  Choose beans or other legumes such as split peas or lentils  Choose fish, skinless poultry (chicken or turkey), or lean cuts of red meat (beef or pork)  Before you cook meat or poultry, cut off any visible fat  · Use less fat and oil  Try baking foods instead of frying them  Add less fat, such as margarine, sour cream, regular salad dressing and mayonnaise to foods  Eat fewer high-fat foods  Some examples of high-fat foods include french fries, doughnuts, ice cream, and cakes  · Eat fewer sweets  Limit foods and drinks that are high in sugar  This includes candy, cookies, regular soda, and sweetened drinks  Exercise:  Exercise at least 30 minutes per day on most days of the week  Some examples of exercise include walking, biking, dancing, and swimming  You can also fit in more physical activity by taking the stairs instead of the elevator or parking farther away from stores  Ask your healthcare provider about the best exercise plan for you  © Copyright [employer] 2018 Information is for End User's use only and may not be sold, redistributed or otherwise used for commercial purposes   All illustrations and images included in CareNotes® are the copyrighted property of [employer]  or Kim Fritz

## 2022-06-06 NOTE — PROGRESS NOTES
Assessment and Plan:     Problem List Items Addressed This Visit        Cardiovascular and Mediastinum    Hypertension    Relevant Medications    lisinopril (ZESTRIL) 20 mg tablet      Other Visit Diagnoses     Medicare annual wellness visit, subsequent    -  Primary    Chronic bilateral low back pain without sciatica        Relevant Medications    naproxen (NAPROSYN) 500 mg tablet        BMI Counseling: Body mass index is 26 04 kg/m²  The BMI is above normal  Nutrition recommendations include decreasing portion sizes  Exercise recommendations include moderate physical activity 150 minutes/week  No pharmacotherapy was ordered  Rationale for BMI follow-up plan is due to patient being overweight or obese  Depression Screening and Follow-up Plan: Patient was screened for depression during today's encounter  They screened negative with a PHQ-2 score of 0  Preventive health issues were discussed with patient, and age appropriate screening tests were ordered as noted in patient's After Visit Summary  Personalized health advice and appropriate referrals for health education or preventive services given if needed, as noted in patient's After Visit Summary       History of Present Illness:     Patient presents for Medicare Annual Wellness visit    Patient Care Team:  Srinath Morin MD as PCP - General (Family Medicine)  MD Ashly Smart PA-C as Nurse Practitioner (Vascular Surgery)     Problem List:     Patient Active Problem List   Diagnosis    Sprain of anterior talofibular ligament of right ankle    Perianal abscess    Neuropathy    Chronic bilateral low back pain with bilateral sciatica    Bilateral lower extremity edema    Chronic pain syndrome    Lumbar post-laminectomy syndrome    Lumbar spondylosis    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 ([facility] [address]    Congenital fusion of sacroiliac joint    Terminal ileitis ([facility] [address]    Viral enteritis    Chronic foot pain    Lumbar radiculopathy  Acute non-recurrent maxillary sinusitis    Screening examination for other arthropod-borne viral diseases    GERD (gastroesophageal reflux disease)    History of colon polyps    Perianal fistula    Functional diarrhea    Blepharitis, left eye    Closed fracture of radial styloid    Compression of right ulnar nerve at multiple levels    Degenerative disc disease at L5-S1 level    History of lumbar fusion    Numbness and tingling of both lower extremities    Periorbital cellulitis of left eye    Right leg swelling    Tobacco use    Vitamin D deficiency    Arthritis of right shoulder region    Chronic right shoulder pain    Myofascial pain syndrome    Status post insertion of spinal cord stimulator    Crohn's disease (HCC)    Numbness and tingling in right hand    Neck pain    Cervical radiculopathy    Herniated nucleus pulposus, C3-4    Hypertension    Smoker    Heavy alcohol consumption      Past Medical and Surgical History:     Past Medical History:   Diagnosis Date    Back pain     Colon polyp     Cro[facility]'s disease ([facility] [address]     GERD (gastroesophageal reflux disease)     Hypertension     Perianal fistula     Terminal ileitis ([facility] [address]      Past Surgical History:   Procedure Laterality Date    ABSCESS DRAINAGE      BACK SURGERY  2019    COLONOSCOPY      HERNIA REPAIR      umbilical hernia    [place] I&D PERIRECTAL ABSCESS Right 10/24/2021    Procedure: excisional debredement right gluteal cheek ;  Surgeon: Cora Whelan MD;  [facility];  Service: General    [place] PERCUT IMPLNT Jany Garcia Right 3/26/2021    Procedure: INSERTION THORACIC DORSAL COLUMN SPINAL CORD STIMULATOR PERCUTANEOUS W IMPLANTABLE PULSE GENERATOR, RIGHT;  Surgeon: Fede Adair MD;  [facility];  Service: Neurosurgery    [place] PLACEMENT,SETON N/A 11/17/2021    Procedure: PLACEMENT SETON;  Surgeon: Clara Rees MD;  [facility];  Service: Colorectal    [place] SURG DIAGNOSTIC EXAM, ANORECTAL N/A 2021    Procedure: EXAM UNDER ANESTHESIA (EUA); Surgeon: Judith Alvarez MD;  [facility];  Service: Colorectal    SPINAL STIMULATOR PLACEMENT  2021      Family History:     Family History   Problem Relation Age of Onset    Heart disease Father     Heart attack Father     Colon cancer Neg Hx     Colon polyps Neg Hx     Inflammatory bowel disease Neg Hx       Social History:     Social History     Socioeconomic History    Marital status: /Civil Union     Spouse name: None    Number of children: None    Years of education: None    Highest education level: None   Occupational History    None   Tobacco Use    Smoking status: Current Some Day Smoker     Packs/day: 0 50     Years: 30 00     Pack years: 15 00     Types: Cigarettes, Cigarettes     Start date: 2019     Last attempt to quit: 2021     Years since quittin 3    Smokeless tobacco: Never Used   Vaping Use    Vaping Use: Never used   Substance and Sexual Activity    Alcohol use:  Yes     Alcohol/week: 30 0 standard drinks     Types: 30 Cans of beer per week     Comment: "Couple of beers within the week"    Drug use: No     Comment: Denies    Sexual activity: Yes     Partners: Female   Other Topics Concern    None   Social History Narrative    None     Social Determinants of Health     Financial Resource Strain: Not on file   Food Insecurity: Not on file   Transportation Needs: Not on file   Physical Activity: Not on file   Stress: Not on file   Social Connections: Not on file   Intimate Partner Violence: Not on file   Housing Stability: Not on file      Medications and Allergies:     Current Outpatient Medications   Medication Sig Dispense Refill    adalimumab (HUMIRA) 20 mg/0 4 mL PSKT injection Inject 20 mg under the skin once ONCE EVERY 2 MONTHS      amitriptyline (ELAVIL) 100 mg tablet 1-2 HS 60 tablet 5    azaTHIOprine (IMURAN) 50 mg tablet Take 50 mg by mouth daily      lisinopril (ZESTRIL) 20 mg tablet Take 1 tablet (20 mg total) by mouth daily 90 tablet 3    loperamide (IMODIUM A-D) 2 MG tablet Take 1 tablet (2 mg total) by mouth 4 (four) times a day as needed for diarrhea 30 tablet 5    naproxen (NAPROSYN) 500 mg tablet Take 1 tablet (500 mg total) by mouth 2 (two) times a day with meals 60 tablet 5    RA Vitamin D-3 25 MCG (1000 UT) tablet TAKE 2 TABLETS BY MOUTH DAILY 60 tablet 2    cholestyramine (QUESTRAN) 4 g packet Take 1 packet (4 g total) by mouth 3 (three) times a day with meals 180 packet 3     No current facility-administered medications for this visit  No Known Allergies   Immunizations:     Immunization History   Administered Date(s) Administered    COVID-19 MODERNA VACC 0 5 ML IM 03/25/2021, 04/22/2021    Hep B, adult 11/18/2020    Influenza, injectable, quadrivalent, preservative free 0 5 mL 10/24/2019, 09/09/2020, 10/13/2021    MMR 11/18/2020    Pneumococcal Conjugate 13-Valent 11/12/2020    Zoster Vaccine Recombinant 11/12/2020      Health Maintenance:         Topic Date Due    Hepatitis C Screening  Never done    HIV Screening  Never done    Colorectal Cancer Screening  03/06/2024         Topic Date Due    DTaP,Tdap,and Td Vaccines (1 - Tdap) Never done    COVID-19 Vaccine (3 - Moderna risk series) 05/20/2021    Pneumococcal Vaccine: Pediatrics (0 to 5 Years) and At-Risk Patients (6 to 59 Years) (2 - PPSV23 or PCV20) 11/12/2021      Medicare Health Risk Assessment:     /78   Pulse 93   Resp 16   Ht 6' (1 829 m)   Wt 87 1 kg (192 lb)   SpO2 96%   BMI 26 04 kg/m²      Herman Porter is here for his Initial Wellness visit  Health Risk Assessment:   Patient rates overall health as very good  Patient feels that their physical health rating is slightly better  Patient is satisfied with their life  Eyesight was rated as same  Hearing was rated as same  Patient feels that their emotional and mental health rating is same   Patients states they are never, rarely angry  Patient states they are never, rarely unusually tired/fatigued  Pain experienced in the last 7 days has been some  Patient's pain rating has been 5/10  Patient states that he has experienced no weight loss or gain in last 6 months  LOW BACK PAIN- CONSTANT    Depression Screening:   PHQ-2 Score: 0      Fall Risk Screening: In the past year, patient has experienced: no history of falling in past year      Home Safety:  Patient does not have trouble with stairs inside or outside of their home  Patient has working smoke alarms and has working carbon monoxide detector  Home safety hazards include: none  Nutrition:   Current diet is Regular  Medications:   Patient is currently taking over-the-counter supplements  OTC medications include: see medication list  Patient is able to manage medications  Activities of Daily Living (ADLs)/Instrumental Activities of Daily Living (IADLs):   Walk and transfer into and out of bed and chair?: Yes  Dress and groom yourself?: Yes    Bathe or shower yourself?: Yes    Feed yourself?  Yes  Do your laundry/housekeeping?: Yes  Manage your money, pay your bills and track your expenses?: Yes  Make your own meals?: Yes    Do your own shopping?: Yes    Previous Hospitalizations:   Any hospitalizations or ED visits within the last 12 months?: No      Advance Care Planning:   Living will: No    Durable POA for healthcare: No    Advanced directive: No    Provider agrees with end of life decisions: Yes      Cognitive Screening:   Provider or family/friend/caregiver concerned regarding cognition?: No    PREVENTIVE SCREENINGS        Diabetes Screening:     General: Screening Current      Colorectal Cancer Screening:     General: Screening Current      Prostate Cancer Screening:    General: Screening Not Indicated      Osteoporosis Screening:    General: Screening Not Indicated      Abdominal Aortic Aneurysm (AAA) Screening:    Risk factors include: tobacco use General: Screening Not Indicated      Lung Cancer Screening:     General: Screening Not Indicated      Hepatitis C Screening:    General: Screening Current    Screening, Brief Intervention, and Referral to Treatment (SBIRT)    Screening  Typical number of drinks in a day: 2  Typical number of drinks in a week: 10  Interpretation: Low risk drinking behavior  AUDIT-C Screenin) How often did you have a drink containing alcohol in the past year? 2 to 3 times a week  2) How many drinks did you have on a typical day when you were drinking in the past year? 1 to 2  3) How often did you have 6 or more drinks on one occasion in the past year? never    AUDIT-C Score: 3  Interpretation: Score 0-3 (male): Negative screen for alcohol misuse    Single Item Drug Screening:  How often have you used an illegal drug (including marijuana) or a prescription medication for non-medical reasons in the past year? monthly    Single Item Drug Screen Score: 2  Interpretation: POSITIVE screen for possible drug use disorder    Drug Abuse Screening Test (DAST-10):  1) Have you used drugs other than those required for medical reasons? No  2) Do you abuse more than one drug at a time? No  3) Are you always able to stop using drugs when you want to? No  4) Have you had "blackouts" or "flashbacks" as a result of drug use? No  5) Do you ever feel bad or guilty about your drug use? No  6) Does your spouse (or parents) ever complain about your involvement with drugs? No  7) Have you neglected your family because of your use of drugs? No  8) Have you engaged in illegal activities in order to obtain drugs? No  9) Have you ever experienced withdrawal symptoms (felt sick) when you stopped taking drugs? No  10) Have you had medical problems as a result of your drug use (e g , memory loss, hepatitis, convulsions, bleeding, etc )?  No    DAST-10 Score: 1  Interpretation: Low level problems related to drug abuse      Shelly Flores MD

## 2022-06-08 ENCOUNTER — TELEPHONE (OUTPATIENT)
Dept: GASTROENTEROLOGY | Facility: CLINIC | Age: 52
End: 2022-06-08

## 2022-06-08 NOTE — TELEPHONE ENCOUNTER
Pt left  mssg stating he got a new medication/is off Stelara; had shot 2 wks ago/questions when he can start new med  CB# [phone]

## 2022-06-08 NOTE — TELEPHONE ENCOUNTER
[facility]

## 2022-06-09 NOTE — TELEPHONE ENCOUNTER
Patient called back  He states he needs to know if he can go earlier than that to start Entyvio  Advised per TM okay to get started

## 2022-06-10 ENCOUNTER — TELEPHONE (OUTPATIENT)
Dept: GASTROENTEROLOGY | Facility: CLINIC | Age: 52
End: 2022-06-10

## 2022-06-10 NOTE — TELEPHONE ENCOUNTER
Patient has Medicare so no [place] is required  Patient is scheduled for his first Entyvio Infusion 6/17/22 at [facility]

## 2022-06-10 NOTE — TELEPHONE ENCOUNTER
June 2022 - Insurance will no longer cover Stelara  Transitioning to [facility]  First infusion scheduled at [facility]  No precert required-medicare (medical)    Complete Medication Order: Entyvio 250 ml 0 9% 300 mg  Authorization Number:  No PA needed (Medicare)  Date Range of Authorization:  Entyvio started 6/2022  Pharmacy that fills med:  Uses [facility] for Enytvio Infusion    Last TB Gold date, recall in place?:  9/3/2021, yes  Last Hep B Surface Antigen date, recall in place?:  Added to TB recall    Last OV Date/Provider:  1/13/2022 [facility]  Next OV expected/scheduled date/provider:  'after colonoscopy"  Colon was 3/7/2022  Patient also consults with (Dr Solomon Kunal other providers):  Not at this time    Last Drug level/Drug Antibody Level date:  n/a  Biologic HistoryBiologic Hx: August 2021 Failed Humira Positive antibody levels, limited drug levels, positive biopsies with inflammation and clinically having symptoms  Started on Stelara, changed to [facility] 6/17/2022 because of insurance coverage          Last Date Biologic Tracker updated:  6/10/2022

## 2022-06-17 ENCOUNTER — TELEPHONE (OUTPATIENT)
Dept: FAMILY MEDICINE CLINIC | Facility: CLINIC | Age: 52
End: 2022-06-17

## 2022-06-17 NOTE — TELEPHONE ENCOUNTER
Pt request for Viagra, states it was discussed at his ov on 06/06/2022  Pt aware that you are out till Monday, nothing in note to pass along to another provider       Please review and send to [id number] [facility]

## 2022-06-18 DIAGNOSIS — N52.1 ERECTILE DISORDER DUE TO MEDICAL CONDITION IN MALE: Primary | ICD-10-CM

## 2022-06-18 RX ORDER — SILDENAFIL 100 MG/1
100 TABLET, FILM COATED ORAL DAILY PRN
Qty: 10 TABLET | Refills: 5 | Status: SHIPPED | OUTPATIENT
Start: 2022-06-18

## 2022-06-20 NOTE — TELEPHONE ENCOUNTER
Patient aware-states that RA is $18 and would like to be sent to [facility], aware fo [facility]    Please review and send as the viagra 100MG does not appear for me

## 2022-06-21 DIAGNOSIS — N52.1 ERECTILE DYSFUNCTION DUE TO DISEASES CLASSIFIED ELSEWHERE: Primary | ICD-10-CM

## 2022-06-21 RX ORDER — SILDENAFIL 100 MG/1
100 TABLET, FILM COATED ORAL DAILY PRN
Qty: 30 TABLET | Refills: 2 | Status: SHIPPED | OUTPATIENT
Start: 2022-06-21

## 2022-07-15 ENCOUNTER — OFFICE VISIT (OUTPATIENT)
Dept: OBGYN CLINIC | Facility: HOSPITAL | Age: 52
End: 2022-07-15
Payer: MEDICARE

## 2022-07-15 VITALS
SYSTOLIC BLOOD PRESSURE: 124 MMHG | HEIGHT: 72 IN | WEIGHT: 192 LBS | DIASTOLIC BLOOD PRESSURE: 82 MMHG | HEART RATE: 88 BPM | BODY MASS INDEX: 26.01 KG/M2

## 2022-07-15 DIAGNOSIS — M72.0 DUPUYTREN'S DISEASE OF BOTH PALM AND FINGER WITH CONTRACTURE: Primary | ICD-10-CM

## 2022-07-15 PROCEDURE — 20527 NJX NZM PALMAR FASCIAL CORD: CPT | Performed by: PHYSICIAN ASSISTANT

## 2022-07-15 NOTE — PROGRESS NOTES
ASSESSMENT/PLAN:    Assessment:   Dupuytrens Disease of the  bilateral  small finger and right ring finger    Plan:   The patient will be given a Xiaflex injection into left small finger today  The patient will be wrapped in a bulky dressing that he/she must keep on and dry for the next 2 days  She/he was advised to avoid any heavy activities over the next 2 days however light activities such as eating, drinking and dressing are okay  The patient was advised that he/she might experience some swelling, bruising or itching into the elbow or armpit and that all these symptoms are normal  At his/her follow up he/she was instructed that they will be given a numbing injection and a manipulation will be performed  He/she will then be fit with a splint that he will wear at night time for the next 3 months  Follow Up:  as scheduled on monday    To Do Next Visit:       General Discussions:     Xiaflex: The process of receiving Xiaflex injection was discussed with the patient today  This includes 3-4 injections in the area of the affected cord  The hand will then be bandaged for 2-3 consecutive days, at which time the patient will return to the office for manipulation of the affected finger  Risks, benefits and alternatives of Xiaflex injections were discussed with the patient today  Risks include bleeding, infection, pain, swelling, bruising, itching, breaks in the skin, redness/warmth of the skin, allergic reaction, tendon rupture, ligament damage, and nerve/vessel injury  Patient agrees to proceed with the injection  Authorization process will be initiated with patient to follow up once this is achieved        Operative Discussions:       _____________________________________________________  CHIEF COMPLAINT:  Chief Complaint   Patient presents with    Left Hand - Injections     Xiaflex B&B         SUBJECTIVE:  Sri Guthrie is a 54 y o  male who presents for follow up regarding Dupuytrens Disease of the  left small finger  Since last visit, Arik Seymour has tried activity modification without relief  Today there is Stiffness/LROM to the left small finger  Radiation: Yes to the  small finger  Associated symptoms: No Complaints    PAST MEDICAL HISTORY:  Past Medical History:   Diagnosis Date    Back pain     Colon polyp     [facility]'s disease ([facility] [address]     GERD (gastroesophageal reflux disease)     Hypertension     Perianal fistula     Terminal ileitis ([facility] [address]        PAST SURGICAL HISTORY:  Past Surgical History:   Procedure Laterality Date    ABSCESS DRAINAGE      BACK SURGERY      COLONOSCOPY      HERNIA REPAIR      umbilical hernia    WI I&D PERIRECTAL ABSCESS Right 10/24/2021    Procedure: excisional debredement right gluteal cheek ;  Surgeon: Elvin Girard MD;  [facility];  Service: General    WI PERCUT IMPLNT NEUROELECT,EPIDURAL Right 3/26/2021    Procedure: INSERTION THORACIC DORSAL COLUMN SPINAL CORD STIMULATOR PERCUTANEOUS W IMPLANTABLE PULSE GENERATOR, RIGHT;  Surgeon: Jolene Caldwell MD;  [facility];  Service: Neurosurgery    WI PLACEMENT,SETON N/A 2021    Procedure: Trey Chun;  Surgeon: Beka Lagunas MD;  [facility];  Service: Colorectal    WI SURG DIAGNOSTIC EXAM, ANORECTAL N/A 2021    Procedure: EXAM UNDER ANESTHESIA (EUA);   Surgeon: Beka Lagunas MD;  [facility];  Service: Colorectal    SPINAL STIMULATOR PLACEMENT  2021       FAMILY HISTORY:  Family History   Problem Relation Age of Onset    Heart disease Father     Heart attack Father     Colon cancer Neg Hx     Colon polyps Neg Hx     Inflammatory bowel disease Neg Hx        SOCIAL HISTORY:  Social History     Tobacco Use    Smoking status: Current Some Day Smoker     Packs/day: 0 50     Years: 30 00     Pack years: 15 00     Types: Cigarettes, Cigarettes     Start date: 2019     Last attempt to quit: 2021     Years since quittin 4    Smokeless tobacco: Never Used   Vaping Use    Vaping Use: Never used   Substance Use Topics    Alcohol use: Yes     Alcohol/week: 30 0 standard drinks     Types: 30 Cans of beer per week     Comment: "Couple of beers within the week"    Drug use: No     Comment: Denies       MEDICATIONS:    Current Outpatient Medications:     adalimumab (HUMIRA) 20 mg/0 4 mL PSKT injection, Inject 20 mg under the skin once ONCE EVERY 2 MONTHS, Disp: , Rfl:     amitriptyline (ELAVIL) 100 mg tablet, 1-2 HS, Disp: 60 tablet, Rfl: 5    azaTHIOprine (IMURAN) 50 mg tablet, Take 50 mg by mouth daily, Disp: , Rfl:     buPROPion (Wellbutrin SR) 150 mg 12 hr tablet, Take 1 tablet (150 mg total) by mouth 2 (two) times a day, Disp: 60 tablet, Rfl: 5    cholestyramine (QUESTRAN) 4 g packet, Take 1 packet (4 g total) by mouth 3 (three) times a day with meals, Disp: 180 packet, Rfl: 3    lisinopril (ZESTRIL) 20 mg tablet, Take 1 tablet (20 mg total) by mouth daily, Disp: 90 tablet, Rfl: 3    loperamide (IMODIUM A-D) 2 MG tablet, Take 1 tablet (2 mg total) by mouth 4 (four) times a day as needed for diarrhea, Disp: 30 tablet, Rfl: 5    naproxen (NAPROSYN) 500 mg tablet, Take 1 tablet (500 mg total) by mouth 2 (two) times a day with meals, Disp: 60 tablet, Rfl: 5    RA Vitamin D-3 25 MCG (1000 UT) tablet, TAKE 2 TABLETS BY MOUTH DAILY, Disp: 60 tablet, Rfl: 2    sildenafil (Viagra) 100 mg tablet, Take 1 tablet (100 mg total) by mouth daily as needed for erectile dysfunction, Disp: 10 tablet, Rfl: 5    sildenafil (Viagra) 100 mg tablet, Take 1 tablet (100 mg total) by mouth daily as needed for erectile dysfunction, Disp: 30 tablet, Rfl: 2    ALLERGIES:  No Known Allergies    REVIEW OF SYSTEMS:  Pertinent items are noted in HPI      LABS:  HgA1c:   Lab Results   Component Value Date    HGBA1C 5 2 03/12/2021     BMP:   Lab Results   Component Value Date    GLUCOSE 110 02/20/2015    CALCIUM 9 1 01/14/2022     10/03/2016    K 4 0 01/14/2022    CO2 27 01/14/2022     01/14/2022    BUN 12 01/14/2022    CREATININE 1 03 01/14/2022           _____________________________________________________  PHYSICAL EXAMINATION:  Vital signs: /82   Pulse 88   Ht 6' (1 829 m)   Wt 87 1 kg (192 lb)   BMI 26 04 kg/m²   General: well developed and well nourished, alert, oriented times 3 and appears comfortable  Psychiatric: Normal  HEENT: Trachea Midline, No torticollis  Cardiovascular: No discernable arrhythmia  Pulmonary: No wheezing or stridor  Abdomen: No rebound or guarding  Extremities: No peripheral edema  Skin: No Lacerations  Neurovascular: Sensation Intact to the Median, Ulnar, Radial Nerve, Motor Intact to the Median, Ulnar, Radial Nerve and Pulses Intact    MUSCULOSKELETAL EXAMINATION:  Left hand: pitting to the ring finger along the distal palmar crease   Central cord to the small finger extending to the PIP joint with an MP contracture of 75 degrees  No PIP contracture     Right hand: central cord to the ring finger with MP contracture of 45 degrees  Central radial cord to the small finger with MP 30 degrees and PIP contracture of 75 degrees  _____________________________________________________  STUDIES REVIEWED:  No Studies to review      PROCEDURES PERFORMED:  Hand/upper extremity injection: L small finger  Universal Protocol:  Consent: Verbal consent obtained    Risks and benefits: risks, benefits and alternatives were discussed  Consent given by: patient  Site marked: the operative site was marked  Supporting Documentation  Indications: therapeutic   Procedure Details  Condition:Dupuytren's contracture Dupuytren's Contracture Procedure: Dupuytren's contracture injectionSite: L small finger   Medications administered: 0 58 mg collagenase clostridium histolyticum 0 9 MG    Patient tolerance: patient tolerated the procedure well with no immediate complications  Dressing:  Sterile dressing applied             Scribe Attestation I,:  Rosy Templeton am acting as a scribe while in the presence of the attending physician :       I,:  Nnamdi Gifford MD personally performed the services described in this documentation    as scribed in my presence :

## 2022-07-15 NOTE — PATIENT INSTRUCTIONS
The patient will be given a Xiaflex injection into left ring finger today  The patient will be wrapped in a bulky dressing that he/she must keep on and dry for the next 2 days  She/he was advised to avoid any heavy activities over the next 2 days however light activities such as eating, drinking and dressing are okay  The patient was advised that he/she might experience some swelling, bruising or itching into the elbow or armpit and that all these symptoms are normal  At his/her follow up he/she was instructed that they will be given a numbing injection and a manipulation will be performed  He/she will then be fit with a splint that he will wear at night time for the next 3 months

## 2022-07-18 ENCOUNTER — OFFICE VISIT (OUTPATIENT)
Dept: OCCUPATIONAL THERAPY | Facility: CLINIC | Age: 52
End: 2022-07-18
Payer: MEDICARE

## 2022-07-18 ENCOUNTER — PREP FOR PROCEDURE (OUTPATIENT)
Dept: OBGYN CLINIC | Facility: CLINIC | Age: 52
End: 2022-07-18

## 2022-07-18 ENCOUNTER — TELEPHONE (OUTPATIENT)
Dept: OBGYN CLINIC | Facility: HOSPITAL | Age: 52
End: 2022-07-18

## 2022-07-18 ENCOUNTER — OFFICE VISIT (OUTPATIENT)
Dept: OBGYN CLINIC | Facility: CLINIC | Age: 52
End: 2022-07-18
Payer: MEDICARE

## 2022-07-18 VITALS
BODY MASS INDEX: 26.01 KG/M2 | DIASTOLIC BLOOD PRESSURE: 80 MMHG | WEIGHT: 192 LBS | HEIGHT: 72 IN | SYSTOLIC BLOOD PRESSURE: 138 MMHG

## 2022-07-18 DIAGNOSIS — M72.0 DUPUYTREN'S CONTRACTURE OF LEFT HAND: Primary | ICD-10-CM

## 2022-07-18 DIAGNOSIS — M72.0 DUPUYTREN'S CONTRACTURE OF BOTH HANDS: Primary | ICD-10-CM

## 2022-07-18 PROCEDURE — 97760 ORTHOTIC MGMT&TRAING 1ST ENC: CPT | Performed by: OCCUPATIONAL THERAPIST

## 2022-07-18 PROCEDURE — 26341 MANIPULAT PALM CORD POST INJ: CPT | Performed by: ORTHOPAEDIC SURGERY

## 2022-07-18 RX ORDER — LIDOCAINE HYDROCHLORIDE 20 MG/ML
1 INJECTION, SOLUTION EPIDURAL; INFILTRATION; INTRACAUDAL; PERINEURAL
Status: COMPLETED | OUTPATIENT
Start: 2022-07-18 | End: 2022-07-18

## 2022-07-18 RX ORDER — LIDOCAINE HYDROCHLORIDE 20 MG/ML
5 INJECTION, SOLUTION EPIDURAL; INFILTRATION; INTRACAUDAL; PERINEURAL
Status: COMPLETED | OUTPATIENT
Start: 2022-07-18 | End: 2022-07-18

## 2022-07-18 RX ORDER — LIDOCAINE HYDROCHLORIDE 20 MG/ML
4 INJECTION, SOLUTION EPIDURAL; INFILTRATION; INTRACAUDAL; PERINEURAL
Status: COMPLETED | OUTPATIENT
Start: 2022-07-18 | End: 2022-07-18

## 2022-07-18 RX ADMIN — LIDOCAINE HYDROCHLORIDE 1 ML: 20 INJECTION, SOLUTION EPIDURAL; INFILTRATION; INTRACAUDAL; PERINEURAL at 11:18

## 2022-07-18 RX ADMIN — LIDOCAINE HYDROCHLORIDE 4 ML: 20 INJECTION, SOLUTION EPIDURAL; INFILTRATION; INTRACAUDAL; PERINEURAL at 11:18

## 2022-07-18 RX ADMIN — LIDOCAINE HYDROCHLORIDE 5 ML: 20 INJECTION, SOLUTION EPIDURAL; INFILTRATION; INTRACAUDAL; PERINEURAL at 11:18

## 2022-07-18 NOTE — PROGRESS NOTES
ASSESSMENT/PLAN:    Assessment:   Dupuytrens Disease of the  bilateral  small finger and right ring finger    Plan:   The patient was given a Xiaflex manipulation today  He/she was instructed that they do not have any formal restrictions at this time and may resume activities as tolerated  The patient will meet with the OT in the office today and be fit in a custom extension splint to wear at night time for the next 2-3 months  Patient will also proceed with surgical intervention of his left hand small finger  He consented and signed consents for a Right hand small finger dupuytrens excision  Follow Up: After Surgery    To Do Next Visit:    and Sutures out    General Discussions:     Xiaflex: The process of receiving Xiaflex injection was discussed with the patient today  This includes 3-4 injections in the area of the affected cord  The hand will then be bandaged for 2-3 consecutive days, at which time the patient will return to the office for manipulation of the affected finger  Risks, benefits and alternatives of Xiaflex injections were discussed with the patient today  Risks include bleeding, infection, pain, swelling, bruising, itching, breaks in the skin, redness/warmth of the skin, allergic reaction, tendon rupture, ligament damage, and nerve/vessel injury  Patient agrees to proceed with the injection  Authorization process will be initiated with patient to follow up once this is achieved  Operative Discussions:     Standard Consent: The risks and benefits of the procedure were explained to the patient, which include, but are not limited to: Bleeding, infection, recurrence, pain, scar, damage to tendons, damage to nerves, and damage to blood vessels, failure to give desired results and complications related to anesthesia  These risks, along with alternative conservative treatment options, and postoperative protocols were voiced back and understood by the patient    All questions were answered to the patient's satisfaction  The patient agrees to comply with a standard postoperative protocol, and is willing to proceed  Education was provided via written and auditory forms  There were no barriers to learning  Written handouts regarding wound care, incision and scar care, and general preoperative information was provided to the patient  Prior to surgery, the patient may be requested to stop all anti-inflammatory medications  Prophylactic aspirin, Plavix, and Coumadin may be allowed to be continued  Medications including vitamin E , ginkgo, and fish oil are requested to be stopped approximately one week prior to surgery  Hypertensive medications and beta blockers, if taken, should be continued  _____________________________________________________  CHIEF COMPLAINT:  Chief Complaint   Patient presents with    Left Hand - Follow-up     Xiaflex release          SUBJECTIVE:  Mick Best is a 54 y o  male who presents for follow up regarding Dupuytrens Disease of the  bilateral  small finger and right ring finger  Since last visit, Mick Best has tried xiaflex without relief  Today there is Stiffness/LROM and slight bruising to the left hand      Radiation: None  Associated symptoms: right hand small finger stiffness/lrom     PAST MEDICAL HISTORY:  Past Medical History:   Diagnosis Date    Back pain     Colon polyp     [facility]     GERD (gastroesophageal reflux disease)     Hypertension     Perianal fistula     [facility]        PAST SURGICAL HISTORY:  Past Surgical History:   Procedure Laterality Date    ABSCESS DRAINAGE      BACK SURGERY  2019    COLONOSCOPY      HERNIA REPAIR      umbilical hernia    [place] I&D PERIRECTAL ABSCESS Right 10/24/2021    Procedure: excisional debredement right gluteal cheek ;  Surgeon: Edwige Lopez MD;  Location:  MAIN OR;  Service: General    [place] PERCUT ESTEFANIANT Wilner Campos Right 3/26/2021    Procedure: INSERTION THORACIC DORSAL COLUMN SPINAL CORD STIMULATOR PERCUTANEOUS W IMPLANTABLE PULSE GENERATOR, RIGHT;  Surgeon: Jaclyn Onofre MD;  Location: [facility];  Service: Neurosurgery    MO PLACEMENT,SETON N/A 2021    Procedure: Elida Clemens;  Surgeon: Tashi Low MD;  Location: [facility];  Service: Colorectal    MO SURG DIAGNOSTIC EXAM, ANORECTAL N/A 2021    Procedure: EXAM UNDER ANESTHESIA (EUA); Surgeon: Tashi Low MD;  Location: [facility];  Service: Colorectal    SPINAL STIMULATOR PLACEMENT  2021       FAMILY HISTORY:  Family History   Problem Relation Age of Onset    Heart disease Father     Heart attack Father     Colon cancer Neg Hx     Colon polyps Neg Hx     Inflammatory bowel disease Neg Hx        SOCIAL HISTORY:  Social History     Tobacco Use    Smoking status: Current Some Day Smoker     Packs/day: 0 50     Years: 30 00     Pack years: 15 00     Types: Cigarettes, Cigarettes     Start date: 2019     Last attempt to quit: 2021     Years since quittin 4    Smokeless tobacco: Never Used   Vaping Use    Vaping Use: Never used   Substance Use Topics    Alcohol use:  Yes     Alcohol/week: 30 0 standard drinks     Types: 30 Cans of beer per week     Comment: "Couple of beers within the week"    Drug use: No     Comment: Denies       MEDICATIONS:    Current Outpatient Medications:     adalimumab (HUMIRA) 20 mg/0 4 mL PSKT injection, Inject 20 mg under the skin once ONCE EVERY 2 MONTHS, Disp: , Rfl:     amitriptyline (ELAVIL) 100 mg tablet, 1-2 HS, Disp: 60 tablet, Rfl: 5    azaTHIOprine (IMURAN) 50 mg tablet, Take 50 mg by mouth daily, Disp: , Rfl:     buPROPion (Wellbutrin SR) 150 mg 12 hr tablet, Take 1 tablet (150 mg total) by mouth 2 (two) times a day, Disp: 60 tablet, Rfl: 5    cholestyramine (QUESTRAN) 4 g packet, Take 1 packet (4 g total) by mouth 3 (three) times a day with meals, Disp: 180 packet, Rfl: 3    lisinopril (ZESTRIL) 20 mg tablet, Take 1 tablet (20 mg total) by mouth daily, Disp: 90 tablet, Rfl: 3    loperamide (IMODIUM A-D) 2 MG tablet, Take 1 tablet (2 mg total) by mouth 4 (four) times a day as needed for diarrhea, Disp: 30 tablet, Rfl: 5    naproxen (NAPROSYN) 500 mg tablet, Take 1 tablet (500 mg total) by mouth 2 (two) times a day with meals, Disp: 60 tablet, Rfl: 5    RA Vitamin D-3 25 MCG (1000 UT) tablet, TAKE 2 TABLETS BY MOUTH DAILY, Disp: 60 tablet, Rfl: 2    sildenafil (Viagra) 100 mg tablet, Take 1 tablet (100 mg total) by mouth daily as needed for erectile dysfunction, Disp: 10 tablet, Rfl: 5    sildenafil (Viagra) 100 mg tablet, Take 1 tablet (100 mg total) by mouth daily as needed for erectile dysfunction, Disp: 30 tablet, Rfl: 2    ALLERGIES:  No Known Allergies    REVIEW OF SYSTEMS:  Pertinent items are noted in HPI      LABS:  HgA1c:   Lab Results   Component Value Date    HGBA1C 5 2 03/12/2021     BMP:   Lab Results   Component Value Date    GLUCOSE 110 02/20/2015    CALCIUM 9 1 01/14/2022     10/03/2016    K 4 0 01/14/2022    CO2 27 01/14/2022     01/14/2022    BUN 12 01/14/2022    CREATININE 1 03 01/14/2022           _____________________________________________________  PHYSICAL EXAMINATION:  Vital signs: /80   Ht 6' (1 829 m)   Wt 87 1 kg (192 lb)   BMI 26 04 kg/m²   General: well developed and well nourished, alert, oriented times 3 and appears comfortable  Psychiatric: Normal  HEENT: Trachea Midline, No torticollis  Cardiovascular: No discernable arrhythmia  Pulmonary: No wheezing or stridor  Abdomen: No rebound or guarding  Extremities: No peripheral edema  Skin: No Erythema  Neurovascular: Sensation Intact to the Median, Ulnar, Radial Nerve, Motor Intact to the Median, Ulnar, Radial Nerve and Pulses Intact    MUSCULOSKELETAL EXAMINATION:  Left hand: pitting to the ring finger along the distal palmar crease   Central cord to the small finger extending to the PIP joint with an MP contracture of 75 degrees  No PIP contracture  Ecchymosis present to palm of hand  Minimal swelling  After manipulation: the patient did develop a small skin tear after manipulation  After manipulation pip jt reaches full extension and MP jt has a contracture of 20 degrees  Right hand: central cord to the ring finger with MP contracture of 45 degrees  Central radial cord to the small finger with MP 30 degrees and PIP contracture of 75 degrees  _____________________________________________________  STUDIES REVIEWED:  No Studies to review      PROCEDURES PERFORMED:  Hand/upper extremity injection  Universal Protocol:  Consent: Verbal consent obtained    Risks and benefits: risks, benefits and alternatives were discussed  Consent given by: patient  Site marked: the operative site was marked  Procedure Details  Medications administered: 5 mL lidocaine (PF) 2 %; 4 mL lidocaine (PF) 2 %; 1 mL lidocaine (PF) 2 %    Patient tolerance: patient tolerated the procedure well with no immediate complications  Dressing:  Sterile dressing applied             Scribe Attestation    I,:  Jennifer Whiteside am acting as a scribe while in the presence of the attending physician :       I,:  Eliot Carcamo MD personally performed the services described in this documentation    as scribed in my presence :

## 2022-07-18 NOTE — PROGRESS NOTES
Orthosis    Diagnosis:   1  Dupuytren's contracture of left hand       Indication: Motion Blocking    Location:  L thumb and ring , small  Supplies: Custom Fit Orthotic and Skin coverage   Orthosis type: HB ext   Wearing Schedule: Night only  Describe Position: ext     Precautions: Universal (skin contact/breakdown)    Patient or Caregiver expresses understanding of wearing Schedule and Precautions? Yes  Patient or Caregiver able to don/doff orthotic independently? Yes    Written orders provided to patient?  Yes  Orders Obtained: Written  Orders Obtained from: Dr [facility]    Return for evaluation and treatment No

## 2022-07-18 NOTE — PATIENT INSTRUCTIONS
[facility]  Travon Hollis MD  Chief of [address] [place] [address]  [address] [place] [address]  [phone]  You have chosen to undergo surgery for your condition  Any surgery is associated with risks of potential complications  Certain medical problems such as smoking, diabetes, thyroid disease, neuropathy, malnutrition or bleeding problems may increase your risk of complications  Complications after surgery are rare but include the following:  Bleeding Infection  Scar Pain  Tenderness Problems with healing  Damage to nerves Damage to blood vessels  Lack of desired results Need for further surgery  Numbness Stiffness  Problems with anesthesia Blood clots  Need for hardware removal (if inserted)    If bony work is done, other risks include:  Delayed bone healing  Lack of bone healing  Bones healing in wrong position    While these risks and complications are rare and infrequent they are still important to know and discuss  If you have any other questions, please let me know  _____________________________________________________________________________________  It can be difficult, but it is possible to get on with your life with only one hand for the first few weeks after your operation  The following are a few suggestions that may be helpful when you're recovering from your hand problem or from your hand surgery  While most of these suggestions are really only applicable if your dominant or your writing hand is affected, some apply to problems involving either hand  Most daily activities can be accomplished with some modifications, rather than the need for store bought devices  Before surgery, if you can:  Ask for help: Have others help you with: childcare, housework, and meals  Practice: Dressing, undressing, using the toilet, brushing your teeth, showering  Prepare: for the first few days after surgery    Open and re-sealed cans and bottles that you may need  Open medication containers and leave them easy-to-read open  Make sure you put these medication containers out of reach of children, even if you don't expect children to visit you  Prepare and think about no-cutmeals-sandwiches, ground meats, etc     It helps to have  In the shower  Plastic bags and rubber bands to cover bandages-the osei that newspapers come in are good  Also, small trashcan liners will work  Use 2 of these at a time  The other option is an oversized rubber glove that may be purchased at a food store to aid in dishwashing  A bottle sponge (soft sponge on a long stick)-for the armpit of yourgood hand  Shower brush  At [facility] in the shower will help you to wash your hair  A cotton terrycloth bathrobe to aid you in drying your back    In the bathroom  Toothpaste, shampoo, etc  in a flip top or pump dispenser  Flossers (dental floss on aYshaped handle)  Consider an electric razor    In the bedroom  Back scratcher  Large sleeve shirts and tops  Put away clothing which buttons, fastens or snaps in the back, or which uses drawstrings    In the kitchen  A rubber jar opener Oliver-to help open jars, but also to keep things from sliding around while you are working on them  Double suction cup pads (the little octopus)-to hold items while you use or wash them  An electric can opener with a lid magnet strong enough to hold the can in the air-for one-handed use  Consider Frannie Ear and wear haircut  Yvone Bence! Incision & Scar Care  You should keep your bandages dry and clean; change your dressings if you see drainage coming through your dressings  Signs of infection include increased pain, swelling, redness, warmth, and excessive or foul-smelling drainage  Please contact our office if you experience signs of infection  You may wash with soap and water after given permission  Sutures will be removed between 7-14 days after surgery if the wound is healed   Patients who smoke, have diabetes, or have nutritional problems may need longer to heal   Steri-strips may be placed across your incision at this time, which will fall off or peel away  To help prevent infection, do not submerse your incision area for 2-3 weeks (i e  no hot tubs, pools, ocean, dish water, fish ponds, fish tanks, bathtubs)  Swelling, bruising, and numbness are common after surgery  To help reduce these symptoms, keep the area elevated  Scar Care: To improve the appearance of your scar, you can massage the healed area (using circular motions with your fingertip) for 5 minutes 3x a day after your steri-strips peel away  You can use regular hand lotion or Scar zone from the store  You may also use Silicone pads after the stitches are removed (available at the store)  Redness and bumpiness of the scar are expected  These generally improve as healing progresses, but redness can be expected for up to 6-8 months  ** If you have any questions or concerns, please call our office  [phone]  _____________________________________________________________________________________  Pain Management  Pain after an injury or surgery is common and should often be expected  There are many ways to manage and reduce this pain  This often does not include medications or may not exclusively include medication  Each patient, surgery and surgeon are unique, and the approach to management of pain is individual   It is important to try to discuss your concerns and expectations regarding pain with your surgical team before and after surgery  They want to help you get better and have a good patient experience  Your patient experience includes understanding and treating your pain  Here's what you may expect before surgery and how to manage your pain and medications after surgery  Again, the approach to pain management after injury or surgery is individualized, and this is general information   Your surgical team will have more specific recommendations for you  Before using any of the methods explored here, please discuss with your medical team if these pain management methods are appropriate for you  We advise good communication with your team to let them help you achieve the best outcome  Surgery Day  As your surgery begins, your surgeon and anesthesia team may give you medications by mouth, IV, and/or injection  Giving you medication as the surgery progresses not only helps you to decrease pain during the surgery, but it also reduces your pain post-surgery  You may also receive medication in the recovery room after surgery, if needed  In some cases, you will receive prescription pain medication and instructions for its use to use at home in the days following your surgery  You may also receive instructions on using over-the-counter pain medications  It is important to follow these directions carefully, as many over-the counter medications contain some of the same ingredients as prescription pain medications and using them together can result in a dangerous accidental overdose  Post-Surgery Pain Management  While always important to follow your specific postoperative instructions, here are some different methods, outside of medication, that your team may recommend to reduce your pain:   Elevate: Elevating the injured area so it is higher than your heart can reduce swelling and pain  Swelling can increase quickly by putting your hand at your side, and this can make your dressing feel tight  Often, the pain associated with swelling is difficult to control, so it is best to avoid this problem  Take care of your dressing: If your dressing/splint feels tight, and elevation for 10 minutes does not improve the tight sensation, contact your surgical team  It may be recommended that you unravel any tape or elastic wrap and loosen the outer bandage   If this does not help, you may be advised to tear, unravel, or cut the inner layers with blunt tipped scissors  Make sure you are cutting on the opposite side of where your incision is located  When done, you will need to try to rebuild your dressing to keep your wound clean and covered  Before doing any of this, check with your medical team  They may want to be aware of the tight dressing and could have different instructions for loosening the dressing  Keep moving: If allowed by your surgeon, try to frequently move the fingers, wrist, elbow, and/or shoulder that are outside of the splint or cast  You can do this gently and slowly  This improves blood flow, which limits swelling and prevents bandages from feeling tight  It may be uncomfortable to move at first, but the discomfort will often improve with time and frequently improves with motion  Your surgeon will be more specific about what to move and what to rest    Ice the area: Icing the painful area will typically reduce swelling and inflammation and reduce pain  However, there may be certain procedures (such as surgery on arteries, skin grafts or flaps) where ice could be harmful, so consult your surgeon before using ice  Heat the area: If you are in the phase of care where you can remove your dressing or splint, you may be able to try heat  Heat increases blood flow to an area and can help with muscle spasms, muscle soreness and joint pain  Avoid smoking: Chemicals present in cigarettes can increase pain  Reducing or quitting smoking can improve your pain  Consume vitamin C: Consuming 500mg of vitamin C daily for 6 weeks may reduce pain after some injuries  However, it is ascorbic acid, which can upset your stomach if you have heartburn or gastritis  Post-Surgery Medication Management  The pain-management methods listed above are often effective when used in combination with taking medications post-surgery  There are many different classes of medication that can help pain   Some can be purchased over the counter, and some require a prescription  All medicines can have some benefits and some adverse reactions/side effects  Your surgical team will balance these issues to provide a plan for you  Some commonly prescribed medications can include:   Tylenol (Acetaminophen)   Aleve (Naprosyn/naproxen)   Motrin/Advil (Ibuprofen)   Celebrex (Celecoxib)   Toradol (Ketorolac)    When taking medication, keep the following in mind: It may take 30-60 minutes for your body to absorb the medication after you take it by mouth, so be patient  Longer-acting medications used before bedtime may help you sleep better the first few nights after surgery  The first few nights post-surgery will generally be the toughest    Do not exceed the dose recommended by your physician or combine medications without consulting with your physician  If you are unfamiliar with these medications, your surgeon can specify how much medication you should take, for how long, and how often  Opioids  Opioids are a type of pain medication made from the poppy plant that is used to make opium and heroin  They can be effective in treating pain, but opioids should be used at a last resort, in limited amounts, and for a limited number of days  Use of these medications should only be done under the guidance of your doctor  When taking opioids, you are at risk of becoming dependent on the medicine, and they may become less effective over time  Oxycodone and hydrocodone are two of the most commonly used and effective opioid pain pills  These pills are frequently combined with Tylenol (acetaminophen), but it must be done carefully  Be sure to consult your surgeon before doing so   Your surgeon will give you a customized plan for managing your pain based on your type of surgery, number of procedures, duration of surgery, etc  Keep in mind that many opioids are combined with Tylenol (acetaminophen) already in the pill, so take care to follow your prescribed directions and not to take more opioids or acetaminophen than prescribed  Overdoses of each of these medications can be dangerous and life threatening  Learn more about opioids, including the side effects, how to safely use them, and how to properly dispose of any extras  Following the program below will greatly decrease your post-operative pain  1  Aleve (naproxen) 220 mg and Tylenol Arthritis 650 mg on the afternoon/evening of surgery  Do NOT take Aleve if you have a history of gastric ulcers, uncontrolled reflux or have been told previously by a physician that you should not take anti-inflammatory medications such as Advil/Aleve/Motrin  2  Aleve (naproxen) 220 mg in the morning and afternoon, for about 2-3 days after the surgery; even if you have no pain  You can stop two days after surgery if your hand does not hurt  3  Tylenol Arthritis (or any brand of acetaminophen 8-hour), 650 mg every eight hours, with a maximum dose of 3000 mg per day, for about 2-3 days after the surgery, even if you have no pain  Tylenol Arthritis plus Aleve is a case of 1+1=3, not 2  That is, they work together as a team to make each other stronger a  The maximum amount of Tylenol is 3000 mg per day, which is the same as 4 of the 650 mg pills  Remember; don't substitute any other medication for the Tylenol: don't take Motrin, aspirin, or any other over the counter medication  It must be Tylenol (or any brand of acetaminophen) for it to work as a team  Remember as well that Tylenol Arthritis is taken every 8 hours, the Aleve is twice a day  4  Norco (hydrocodone/acetaminophen) 5/325 mg or a similar medication to assist with sleeping at night, and possibly every 6 hours during the day, ONLY IF NEEDED, for the first few days  You will be given a written prescription, but many patients find they do not need to take any or all of the Norco  Do not take the medication just because it was given to you; only take it if you need it   Remember that Opal Plata is an opioid pain medication and can lead to addiction, respiratory sedation, and death  In 2015 over 17,500 Americans  from opioid overdoses and I don't want this to happen to you  Opioid medications can also cause constipation, so please plan for that, as well as possible mental confusion and drowsiness  Do not drive while you are taking this medication  With this protocol, you can expect your post-operative pain to be very manageable  The worst pain only lasts for the first 48 hours and improves significantly after that  By the time you see your surgeon for your post-operative visit you probably will no longer require any pain medication on a daily basis  Important Information about Painkillers  You are being prescribed an opioid pain medication to help with severe pain after surgery  Use the medication sparingly as needed to reduce your pain  The goal is not to be pain-free, but to make the pain more tolerable  If you are able to take non-steroidal anti-inflammatory drugs (NSAIDs), alternate the prescription pain medication with over-the-counter Ibuprofen or Naproxen (if you do not have a history of reflux or stomach ulcers)  This will allow you to take less opioid medication  Also, elevate the hand to reduce swelling and consider applying ice to the affected area for 15 minutes at a time, several times per day  Opioid medication is powerful and has the risk of overdose, abuse, and addiction  Only use the medication as directed by your physician and keep the pills in a safe place  When you no longer need the medication, please dispose of the pills properly as directed below  Allowing someone else to use your opioid prescription is illegal   Also, possible side effects from opioid medications are over-sedation, itching, nausea/vomiting, and constipation  Do not drive a vehicle or operate machinery while taking the pain medications  Drink plenty of fluids and consider a stool softener to prevent constipation    If the pain you are experiencing is not severe, stop taking the opioid pills and only take over-the-counter medications such as Tylenol and Ibuprofen  Disposing of unused pain medications:  (1) Follow pharmacist instructions on the bottle if available, or  (2) Call [phone] for a [employer] authorized collection site in your area, or  (3) If no collection site is available in your area, mix the pills with an undesirable substance such as used coffee grounds, victor hugo litter, or dirt  Place this mixture in a sealed plastic bag  Place the bag in the household garbage  Adapted from the opioid awareness section of the [employer], thanks to Alicia Marmolejo and Lani Fulton

## 2022-08-08 ENCOUNTER — OFFICE VISIT (OUTPATIENT)
Dept: OBGYN CLINIC | Facility: CLINIC | Age: 52
End: 2022-08-08
Payer: MEDICARE

## 2022-08-08 VITALS
DIASTOLIC BLOOD PRESSURE: 80 MMHG | WEIGHT: 194 LBS | SYSTOLIC BLOOD PRESSURE: 126 MMHG | HEIGHT: 72 IN | BODY MASS INDEX: 26.28 KG/M2

## 2022-08-08 DIAGNOSIS — M72.0 DUPUYTREN'S CONTRACTURE OF BOTH HANDS: Primary | ICD-10-CM

## 2022-08-08 PROCEDURE — 99213 OFFICE O/P EST LOW 20 MIN: CPT | Performed by: ORTHOPAEDIC SURGERY

## 2022-08-08 NOTE — PROGRESS NOTES
Scribe Attestation    I,:  [place] [employer] am acting as a scribe while in the presence of the attending physician :       I,:  Delores Cherry MD personally performed the services described in this documentation    as scribed in my presence :         ASSESSMENT/PLAN:    Assessment:   Dupuytrens Disease of the  bilateral  hand    Plan:   We did discuss repeating Xiaflex on his left hand small finger vs surgical intervention which would have a 90% success rate  The patient would like to consider surgical intervention  He is undergoing surgery on his right hand in November  He will wait until his right hand recovers and then will decide on surgery on his left hand  Follow Up:  as schedule after surgery on his right hand    To Do Next Visit:       General Discussions:     Dupuytren's Disease: The anatomy and physiology of Dupuytren's disease were discussed with the patient today in the office  Increased scar formation within the interval between the skin volarly and the flexor tendons dorsally can result in pit formation, nodular formation, or eventual cord formation  These pathologic cords can cause contracture at either the metacarpophalangeal joint, proximal interphalangeal joint, or both  As the cords progress towards the proximal interphalangeal joint, the neurovascular structures of the finger may become involved within the disease process  While this is a genetic condition, variable penetrance occurs within family trees  Conservative treatment options including therapy to maintain joint mobility and a tabletop test were discussed  Other treatments include Xiaflex and possible surgical intervention       Operative Discussions:       _____________________________________________________  CHIEF COMPLAINT:  Chief Complaint   Patient presents with    Right Hand - Follow-up     Dupuytren's surgery scheduled 11/17/22    Left Hand - Follow-up     Dupuytren's SUBJECTIVE:  Nik Greenberg is a 54 y o  male who presents for follow up regarding Dupuytrens Disease of the  left  hand  Since last visit, Nik Greenberg has tried xiaflex with only partial relief  Today there is Stiffness/LROM to the bilateral hand  Patient was given a xiaflex injection into his left hand small finger MP jt with only partial relief     Radiation: Yes to the  hand  Associated symptoms: No Complaints    PAST MEDICAL HISTORY:  Past Medical History:   Diagnosis Date    Back pain     Colon polyp     Crohn's disease ([facility] )     GERD (gastroesophageal reflux disease)     Hypertension     Perianal fistula     [facility]        PAST SURGICAL HISTORY:  Past Surgical History:   Procedure Laterality Date    ABSCESS DRAINAGE      BACK SURGERY  2019    COLONOSCOPY      HERNIA REPAIR      umbilical hernia    IL I&D PERIRECTAL ABSCESS Right 10/24/2021    Procedure: excisional debredement right gluteal cheek ;  Surgeon: Ria Sesay MD;  Location: [facility];  Service: General    IL PERCUT IMPLNT Sharon Caballero Right 3/26/2021    Procedure: INSERTION THORACIC DORSAL COLUMN SPINAL CORD STIMULATOR PERCUTANEOUS W IMPLANTABLE PULSE GENERATOR, RIGHT;  Surgeon: Althea Rush MD;  Location: [facility];  Service: Neurosurgery    IL PLACEMENT,SETON N/A 11/17/2021    Procedure: Deion Szymanski;  Surgeon: Lisseth Pelaez MD;  Location: [facility];  Service: Colorectal    IL SURG DIAGNOSTIC EXAM, ANORECTAL N/A 11/17/2021    Procedure: EXAM UNDER ANESTHESIA (EUA); Surgeon: Nelly Rehman MD;  [facility];  Service: Colorectal    SPINAL STIMULATOR PLACEMENT  2021       FAMILY HISTORY:  Family History   Problem Relation Age of Onset    Heart disease Father     Heart attack Father     Colon cancer Neg Hx     Colon polyps Neg Hx     Inflammatory bowel disease Neg Hx        SOCIAL HISTORY:  Social History     Tobacco Use    Smoking status: Former Smoker     Packs/day: 0 50     Years: 30 00     Pack years: 15 00     Types: Cigarettes     Start date: 2019     Quit date: 2021     Years since quittin 5    Smokeless tobacco: Never Used   Vaping Use    Vaping Use: Never used   Substance Use Topics    Alcohol use:  Yes     Alcohol/week: 30 0 standard drinks     Types: 30 Cans of beer per week     Comment: "Couple of beers within the week"    Drug use: No     Comment: Denies       MEDICATIONS:    Current Outpatient Medications:     adalimumab (HUMIRA) 20 mg/0 4 mL PSKT injection, Inject 20 mg under the skin once ONCE EVERY 2 MONTHS, Disp: , Rfl:     amitriptyline (ELAVIL) 100 mg tablet, 1-2 HS, Disp: 60 tablet, Rfl: 5    azaTHIOprine (IMURAN) 50 mg tablet, Take 50 mg by mouth daily, Disp: , Rfl:     buPROPion (Wellbutrin SR) 150 mg 12 hr tablet, Take 1 tablet (150 mg total) by mouth 2 (two) times a day, Disp: 60 tablet, Rfl: 5    cholestyramine (QUESTRAN) 4 g packet, Take 1 packet (4 g total) by mouth 3 (three) times a day with meals, Disp: 180 packet, Rfl: 3    lisinopril (ZESTRIL) 20 mg tablet, Take 1 tablet (20 mg total) by mouth daily, Disp: 90 tablet, Rfl: 3    loperamide (IMODIUM A-D) 2 MG tablet, Take 1 tablet (2 mg total) by mouth 4 (four) times a day as needed for diarrhea, Disp: 30 tablet, Rfl: 5    naproxen (NAPROSYN) 500 mg tablet, Take 1 tablet (500 mg total) by mouth 2 (two) times a day with meals, Disp: 60 tablet, Rfl: 5    RA Vitamin D-3 25 MCG (1000 UT) tablet, TAKE 2 TABLETS BY MOUTH DAILY, Disp: 60 tablet, Rfl: 2    sildenafil (Viagra) 100 mg tablet, Take 1 tablet (100 mg total) by mouth daily as needed for erectile dysfunction, Disp: 10 tablet, Rfl: 5    sildenafil (Viagra) 100 mg tablet, Take 1 tablet (100 mg total) by mouth daily as needed for erectile dysfunction, Disp: 30 tablet, Rfl: 2    ALLERGIES:  No Known Allergies    REVIEW OF SYSTEMS:  Pertinent items are noted in HPI  LABS:  HgA1c:   Lab Results   Component Value Date    HGBA1C 5 2 03/12/2021     BMP:   Lab Results   Component Value Date    GLUCOSE 110 02/20/2015    CALCIUM 9 1 01/14/2022     10/03/2016    K 4 0 01/14/2022    CO2 27 01/14/2022     01/14/2022    BUN 12 01/14/2022    CREATININE 1 03 01/14/2022           _____________________________________________________  PHYSICAL EXAMINATION:  Vital signs: /80   Ht 6' (1 829 m)   Wt 88 kg (194 lb)   BMI 26 31 kg/m²   General: well developed and well nourished, alert, oriented times 3 and appears comfortable  Psychiatric: Normal  HEENT: Trachea Midline, No torticollis  Cardiovascular: No discernable arrhythmia  Pulmonary: No wheezing or stridor  Abdomen: No rebound or guarding  Extremities: No peripheral edema  Skin: No Lacerations  Neurovascular: Sensation Intact to the Median, Ulnar, Radial Nerve, Motor Intact to the Median, Ulnar, Radial Nerve and Pulses Intact    MUSCULOSKELETAL EXAMINATION:  Left hand: left hand small finger prior to xiaflex was 75 degree MP jt contracture and after manipulation achieved 20 degrees contracture  Small pre central cord to small finger, pre central to ring with MP jt contractures of 20 degrees, radial sided cord to palmodigital crease adding to the contracture     _____________________________________________________  STUDIES REVIEWED:  No Studies to review      PROCEDURES PERFORMED:  Procedures  No Procedures performed today

## 2022-08-29 DIAGNOSIS — R52 PAIN: Primary | ICD-10-CM

## 2022-08-29 DIAGNOSIS — F51.01 PRIMARY INSOMNIA: ICD-10-CM

## 2022-08-29 RX ORDER — AMITRIPTYLINE HYDROCHLORIDE 100 MG/1
TABLET, FILM COATED ORAL
Qty: 60 TABLET | Refills: 5 | Status: SHIPPED | OUTPATIENT
Start: 2022-08-29

## 2022-08-29 NOTE — TELEPHONE ENCOUNTER
Medication Refill Request   (HISTORICAL PROVIDER, Karissa Cantrell)    Name azaTHIOprine (IMURAN) 50 mg tablet  Dose/Frequency Take 50 mg by mouth daily  Quantity 30  Verified pharmacy   [x]  Verified ordering Provider   [x]  Does patient have enough for the next 3 days?  Yes [x] No []

## 2022-08-30 RX ORDER — AZATHIOPRINE 50 MG/1
50 TABLET ORAL DAILY
Qty: 30 TABLET | Refills: 1 | Status: SHIPPED | OUTPATIENT
Start: 2022-08-30

## 2022-10-27 ENCOUNTER — IMMUNIZATIONS (OUTPATIENT)
Dept: FAMILY MEDICINE CLINIC | Facility: CLINIC | Age: 52
End: 2022-10-27
Payer: MEDICARE

## 2022-10-27 DIAGNOSIS — Z23 FLU VACCINE NEED: Primary | ICD-10-CM

## 2022-10-27 PROCEDURE — 90682 RIV4 VACC RECOMBINANT DNA IM: CPT

## 2022-10-27 PROCEDURE — G0008 ADMIN INFLUENZA VIRUS VAC: HCPCS

## 2022-11-11 ENCOUNTER — TELEPHONE (OUTPATIENT)
Dept: OBGYN CLINIC | Facility: HOSPITAL | Age: 52
End: 2022-11-11

## 2022-11-11 RX ORDER — VEDOLIZUMAB 300 MG/5ML
300 INJECTION, POWDER, LYOPHILIZED, FOR SOLUTION INTRAVENOUS
COMMUNITY
Start: 2022-11-18

## 2022-11-11 NOTE — TELEPHONE ENCOUNTER
Caller: Anna Booth    Doctor: [facility]    Reason for call: He has an infusion scheduled the day after surgery, he is wondering if this is ok to go to or he should cancel      Call back#: [phone]

## 2022-11-11 NOTE — PRE-PROCEDURE INSTRUCTIONS
Pre-Surgery Instructions:   Medication Instructions   • adalimumab (HUMIRA) 20 mg/0 4 mL PSKT injection Due after DOS   • amitriptyline (ELAVIL) 100 mg tablet Take night before surgery   • azaTHIOprine (IMURAN) 50 mg tablet Instructions provided by MD   • buPROPion (Wellbutrin SR) 150 mg 12 hr tablet Take day of surgery  • cholestyramine (QUESTRAN) 4 g packet Hold day of surgery  • lisinopril (ZESTRIL) 20 mg tablet Hold day of surgery  • loperamide (IMODIUM A-D) 2 MG tablet Uses PRN- OK to take day of surgery   • naproxen (NAPROSYN) 500 mg tablet Stop taking 3 days prior to surgery  • RA Vitamin D-3 25 MCG (1000 UT) tablet Stop taking   • sildenafil (Viagra) 100 mg tablet Hold day of surgery  Have you had / have a sore throat? No  Have you had / have a cough less than 1 week? No  Have you had / have a fever greater than 100 0 - 100  4? No  Are you experiencing any shortness of breath? No    Review with patient via phone medications and showering instructions  Instructed to avoid all ASA and OTC Vit/Supp 1 week prior to surgery and to avoid NSAIDs 3 days prior to surgery per anesthesia instructions  Tylenol ok to take prn  [facility] call with surgery schedule time, nothing eat or drink after midnight  Verbalized understanding  Pt Instr for [employer] morning DOS

## 2022-11-11 NOTE — TELEPHONE ENCOUNTER
This should be okay for the patient to obtain  He should avoid infusion on the operative side  Thank you

## 2022-11-15 DIAGNOSIS — R52 PAIN: ICD-10-CM

## 2022-11-16 ENCOUNTER — ANESTHESIA EVENT (OUTPATIENT)
Dept: PERIOP | Facility: HOSPITAL | Age: 52
End: 2022-11-16

## 2022-11-16 RX ORDER — AZATHIOPRINE 50 MG/1
TABLET ORAL
Qty: 30 TABLET | Refills: 1 | Status: SHIPPED | OUTPATIENT
Start: 2022-11-16

## 2022-11-17 ENCOUNTER — ANESTHESIA (OUTPATIENT)
Dept: PERIOP | Facility: HOSPITAL | Age: 52
End: 2022-11-17

## 2022-11-17 ENCOUNTER — HOSPITAL ENCOUNTER (OUTPATIENT)
Facility: HOSPITAL | Age: 52
Setting detail: OUTPATIENT SURGERY
Discharge: HOME | End: 2022-11-17
Attending: ORTHOPAEDIC SURGERY | Admitting: ORTHOPAEDIC SURGERY

## 2022-11-17 VITALS
HEART RATE: 94 BPM | HEIGHT: 72 IN | WEIGHT: 195.2 LBS | BODY MASS INDEX: 26.44 KG/M2 | OXYGEN SATURATION: 90 % | TEMPERATURE: 98.2 F | SYSTOLIC BLOOD PRESSURE: 114 MMHG | DIASTOLIC BLOOD PRESSURE: 70 MMHG | RESPIRATION RATE: 19 BRPM

## 2022-11-17 DIAGNOSIS — M72.0 DUPUYTREN'S CONTRACTURE OF BOTH HANDS: ICD-10-CM

## 2022-11-17 DIAGNOSIS — M72.0 DUPUYTREN'S DISEASE: Primary | ICD-10-CM

## 2022-11-17 RX ORDER — DEXAMETHASONE SODIUM PHOSPHATE 10 MG/ML
INJECTION, SOLUTION INTRAMUSCULAR; INTRAVENOUS AS NEEDED
Status: DISCONTINUED | OUTPATIENT
Start: 2022-11-17 | End: 2022-11-17

## 2022-11-17 RX ORDER — FENTANYL CITRATE 50 UG/ML
INJECTION, SOLUTION INTRAMUSCULAR; INTRAVENOUS AS NEEDED
Status: DISCONTINUED | OUTPATIENT
Start: 2022-11-17 | End: 2022-11-17

## 2022-11-17 RX ORDER — ONDANSETRON 2 MG/ML
4 INJECTION INTRAMUSCULAR; INTRAVENOUS EVERY 6 HOURS PRN
Status: DISCONTINUED | OUTPATIENT
Start: 2022-11-17 | End: 2022-11-17 | Stop reason: HOSPADM

## 2022-11-17 RX ORDER — COVID-19 ANTIGEN TEST
220 KIT MISCELLANEOUS 2 TIMES DAILY
Qty: 60 CAPSULE | Refills: 0 | Status: SHIPPED | OUTPATIENT
Start: 2022-11-17 | End: 2022-11-30 | Stop reason: ALTCHOICE

## 2022-11-17 RX ORDER — KETOROLAC TROMETHAMINE 30 MG/ML
INJECTION, SOLUTION INTRAMUSCULAR; INTRAVENOUS AS NEEDED
Status: DISCONTINUED | OUTPATIENT
Start: 2022-11-17 | End: 2022-11-17

## 2022-11-17 RX ORDER — SENNOSIDES 8.6 MG
650 CAPSULE ORAL EVERY 8 HOURS PRN
Qty: 30 TABLET | Refills: 0 | Status: SHIPPED | OUTPATIENT
Start: 2022-11-17

## 2022-11-17 RX ORDER — LIDOCAINE HYDROCHLORIDE 10 MG/ML
0.5 INJECTION, SOLUTION EPIDURAL; INFILTRATION; INTRACAUDAL; PERINEURAL ONCE AS NEEDED
Status: DISCONTINUED | OUTPATIENT
Start: 2022-11-17 | End: 2022-11-17

## 2022-11-17 RX ORDER — ONDANSETRON 2 MG/ML
4 INJECTION INTRAMUSCULAR; INTRAVENOUS EVERY 4 HOURS PRN
Status: DISCONTINUED | OUTPATIENT
Start: 2022-11-17 | End: 2022-11-17 | Stop reason: HOSPADM

## 2022-11-17 RX ORDER — ACETAMINOPHEN 325 MG/1
650 TABLET ORAL EVERY 6 HOURS PRN
Status: DISCONTINUED | OUTPATIENT
Start: 2022-11-17 | End: 2022-11-17 | Stop reason: HOSPADM

## 2022-11-17 RX ORDER — SODIUM CHLORIDE, SODIUM LACTATE, POTASSIUM CHLORIDE, CALCIUM CHLORIDE 600; 310; 30; 20 MG/100ML; MG/100ML; MG/100ML; MG/100ML
125 INJECTION, SOLUTION INTRAVENOUS CONTINUOUS
Status: DISCONTINUED | OUTPATIENT
Start: 2022-11-17 | End: 2022-11-17

## 2022-11-17 RX ORDER — PROPOFOL 10 MG/ML
INJECTION, EMULSION INTRAVENOUS AS NEEDED
Status: DISCONTINUED | OUTPATIENT
Start: 2022-11-17 | End: 2022-11-17

## 2022-11-17 RX ORDER — ROPIVACAINE HYDROCHLORIDE 5 MG/ML
INJECTION, SOLUTION EPIDURAL; INFILTRATION; PERINEURAL AS NEEDED
Status: DISCONTINUED | OUTPATIENT
Start: 2022-11-17 | End: 2022-11-17

## 2022-11-17 RX ORDER — TRAMADOL HYDROCHLORIDE 50 MG/1
50 TABLET ORAL EVERY 6 HOURS PRN
Status: DISCONTINUED | OUTPATIENT
Start: 2022-11-17 | End: 2022-11-17 | Stop reason: HOSPADM

## 2022-11-17 RX ORDER — MIDAZOLAM HYDROCHLORIDE 2 MG/2ML
INJECTION, SOLUTION INTRAMUSCULAR; INTRAVENOUS AS NEEDED
Status: DISCONTINUED | OUTPATIENT
Start: 2022-11-17 | End: 2022-11-17

## 2022-11-17 RX ORDER — MAGNESIUM HYDROXIDE 1200 MG/15ML
LIQUID ORAL AS NEEDED
Status: DISCONTINUED | OUTPATIENT
Start: 2022-11-17 | End: 2022-11-17 | Stop reason: HOSPADM

## 2022-11-17 RX ORDER — ONDANSETRON 2 MG/ML
INJECTION INTRAMUSCULAR; INTRAVENOUS AS NEEDED
Status: DISCONTINUED | OUTPATIENT
Start: 2022-11-17 | End: 2022-11-17

## 2022-11-17 RX ORDER — LIDOCAINE HYDROCHLORIDE AND EPINEPHRINE 10; 10 MG/ML; UG/ML
INJECTION, SOLUTION INFILTRATION; PERINEURAL AS NEEDED
Status: DISCONTINUED | OUTPATIENT
Start: 2022-11-17 | End: 2022-11-17 | Stop reason: HOSPADM

## 2022-11-17 RX ORDER — DEXAMETHASONE SODIUM PHOSPHATE 4 MG/ML
INJECTION, SOLUTION INTRA-ARTICULAR; INTRALESIONAL; INTRAMUSCULAR; INTRAVENOUS; SOFT TISSUE AS NEEDED
Status: DISCONTINUED | OUTPATIENT
Start: 2022-11-17 | End: 2022-11-17

## 2022-11-17 RX ORDER — HYDROCODONE BITARTRATE AND ACETAMINOPHEN 5; 325 MG/1; MG/1
1 TABLET ORAL EVERY 6 HOURS PRN
Qty: 5 TABLET | Refills: 0 | Status: SHIPPED | OUTPATIENT
Start: 2022-11-17 | End: 2022-11-27

## 2022-11-17 RX ORDER — CEFAZOLIN SODIUM 2 G/50ML
2000 SOLUTION INTRAVENOUS ONCE
Status: COMPLETED | OUTPATIENT
Start: 2022-11-17 | End: 2022-11-17

## 2022-11-17 RX ORDER — FENTANYL CITRATE/PF 50 MCG/ML
25 SYRINGE (ML) INJECTION
Status: DISCONTINUED | OUTPATIENT
Start: 2022-11-17 | End: 2022-11-17 | Stop reason: HOSPADM

## 2022-11-17 RX ADMIN — ROPIVACAINE HYDROCHLORIDE 30 ML: 5 INJECTION, SOLUTION EPIDURAL; INFILTRATION; PERINEURAL at 09:49

## 2022-11-17 RX ADMIN — MIDAZOLAM 1 MG: 1 INJECTION INTRAMUSCULAR; INTRAVENOUS at 10:58

## 2022-11-17 RX ADMIN — LIDOCAINE HYDROCHLORIDE 60 MG: 20 INJECTION INTRAVENOUS at 11:04

## 2022-11-17 RX ADMIN — PROPOFOL 200 MG: 10 INJECTION, EMULSION INTRAVENOUS at 11:04

## 2022-11-17 RX ADMIN — FENTANYL CITRATE 50 MCG: 50 INJECTION, SOLUTION INTRAMUSCULAR; INTRAVENOUS at 09:42

## 2022-11-17 RX ADMIN — DEXAMETHASONE SODIUM PHOSPHATE 4 MG: 4 INJECTION, SOLUTION INTRAMUSCULAR; INTRAVENOUS at 09:49

## 2022-11-17 RX ADMIN — ONDANSETRON 4 MG: 2 INJECTION INTRAMUSCULAR; INTRAVENOUS at 11:25

## 2022-11-17 RX ADMIN — DEXAMETHASONE SODIUM PHOSPHATE 10 MG: 10 INJECTION, SOLUTION INTRAMUSCULAR; INTRAVENOUS at 11:08

## 2022-11-17 RX ADMIN — KETOROLAC TROMETHAMINE 30 MG: 30 INJECTION, SOLUTION INTRAMUSCULAR; INTRAVENOUS at 12:16

## 2022-11-17 RX ADMIN — FENTANYL CITRATE 50 MCG: 50 INJECTION, SOLUTION INTRAMUSCULAR; INTRAVENOUS at 11:04

## 2022-11-17 RX ADMIN — SODIUM CHLORIDE, SODIUM LACTATE, POTASSIUM CHLORIDE, AND CALCIUM CHLORIDE: .6; .31; .03; .02 INJECTION, SOLUTION INTRAVENOUS at 11:02

## 2022-11-17 RX ADMIN — CEFAZOLIN SODIUM 2000 MG: 2 SOLUTION INTRAVENOUS at 10:58

## 2022-11-17 RX ADMIN — MIDAZOLAM 1 MG: 1 INJECTION INTRAMUSCULAR; INTRAVENOUS at 09:42

## 2022-11-17 NOTE — ANESTHESIA PROCEDURE NOTES
Peripheral Block    Patient location during procedure: holding area  Start time: 11/17/2022 9:42 AM  Reason for block: at surgeon's request and post-op pain management  Staffing  Performed: Anesthesiologist   Anesthesiologist: Cris Persaud MD  Preanesthetic Checklist  Completed: patient identified, IV checked, site marked, risks and benefits discussed, surgical consent, monitors and equipment checked, pre-op evaluation and timeout performed  Peripheral Block  Patient position: sitting (Smi-recumbent)  Prep: ChloraPrep  Patient monitoring: heart rate and cardiac monitor  Block type: supraclavicular  Laterality: right  Injection technique: single-shot  Procedures: ultrasound guided, Ultrasound guidance required for the procedure to increase accuracy and safety of medication placement and decrease risk of complications  Ultrasound permanent image saved  Needle  Needle type: Stimuplex   Needle gauge: 20G    Needle length: 10 cm  Needle localization: ultrasound guidance  Needle insertion depth: 1 5 cm  Assessment  Injection assessment: negative aspiration for heme, local visualized surrounding nerve on ultrasound and incremental injection  Paresthesia pain: none  Heart rate change: no  Slow fractionated injection: yes  Post-procedure:  site cleaned  patient tolerated the procedure well with no immediate complications

## 2022-11-17 NOTE — OP NOTE
OPERATIVE REPORT  PATIENT NAME: Francine Iraheta  :  1970  MRN: [id number]  Pt Location: [facility] MAIN OR    SURGERY DATE: 22    Surgeon(s) and Role:     * Nick Robledo MD - Primary     * Vivienne Dumont PA-C - Assisting    Pre-Op Diagnosis:  Dupuytren's disease right hand with contracture of the ring and small finger metacarpophalangeal joints and small finger proximal interphalangeal joint    Post-Op Diagnosis:  Same    Procedure(s) (LRB):  Dupuytren's excision right palm extending into the small finger with release of the metacarpophalangeal joint and proximal interphalangeal joint  Dupuytren's excision right palm with release of the right ring finger metacarpophalangeal joint  (Right)  Application short-arm splint (Right)    Specimen(s):  Order Name Source Comment Collection Info Order Time   TISSUE EXAM Dupuytren's Contracture Tissue  Collected By: Nick Robledo MD 2022 12:14 PM     Release to patient through Mychart   Immediate            Estimated Blood Loss:   Minimal      Anesthesia Type:   General    Operative Indications: The patient has a history of Dupuytren's disease of the right hand and ring and small finger that was recalcitrant to conservative management  The decision was made to bring the patient to the operating room for right hand ring and small finger Dupuytren's excision  Risks of the procedure were explained which include, but are not limited to bleeding; infection; damage to nerves, arteries,veins, tendons; scar; pain; need for reoperation; failure to give desired result; and risks of anaesthesia  All questions were answered to satisfaction and they were willing to proceed           Operative Findings:  Pretendinous cord extending to a radial spiral cord of the right hand small finger with contracture of the metacarpophalangeal joint of 70° and the proximal interphalangeal joint of 90°    Pretendinous cord extending to the ring finger with contracture of the metacarpophalangeal joint of approximately 34°    Complications:   None    Procedure and Technique:  After the patient, site, and procedure were identified, the patient was brought into the operating room in a supine position  Regional and general anaesthesia were provided  A well padded tourniquet was applied to the extremity, set at 250 mmHg  The  right upper extremity was then prepped and drapped in a normal, sterile, orthopedic fashion  After the patient, site, and procedure identified attention was turned towards the right hand  An Esmarch bandage was used to exsanguinate the limb the tourniquet was inflated to 250 mmHg  Jing Shilpa shaped incision was then made in the palmar aspect of the right hand starting just distal to the carpal tunnel and extending to the level of the distal interphalangeal joint of the right hand small finger  As we dissected down through skin subcutaneous tissues we identified a large pretendinous cord which bisected into a Y-shaped fashion extending into the small and ring finger  This then extended down to a radial sided spiral cord of the small finger at the level of the palmar digital crease and proximal interphalangeal joint crease  We elevated skin flaps in this Yoseph incision with care taken to try to prevent buttonholing the skin  Once this was done, proximally, we identified the palmar arch as well as the neurovascular bundles extending to the ring and small finger as well as the deeper flexor tendons  While identifying and protecting the structures, we then subsequently released the pretendinous cord into the ring finger with removal of a section of approximately 2 cm which released the metacarpophalangeal joint of the ring finger allowing full extension  We then traced the entirety of the pretendinous cord of the small finger to the level of the radial spiral cord    This was excised in its entirety and we were able to completely extend the metacarpophalangeal joint and proximal interphalangeal joint  At the completion, we verified no injury to the flexor tendons as well as the radial and ulnar neurovascular bundles into the small finger and radial ulnar neurovascular bundles towards the ring finger  Flexor tendons were intact  Tourniquet was deflated demonstrating rapid restoration of blood supply to the fingers  At the completion of the procedure, hemostasis was obtained with cautery and direct pressure  The wounds were copiously irrigated with sterile solution  The wounds were closed with Prolene  Sterile dressings were applied, including Xeroform, gauze, tweeners, webril, ACE and Volar Splint  Please note, all sponge, needle, and instrument counts were correct prior to closure  Loupe magnification was utilized  The patient tolerated the procedure well       I was present for all critical portions of the procedure, A qualified resident physician was not available and A physician assistant was required during the procedure for retraction tissue handling,dissection and suturing    Patient Disposition:  PACU , hemodynamically stable and extubated and stable    SIGNATURE: Jossy Turner MD  DATE: 11/17/22  TIME: 12:50 PM

## 2022-11-17 NOTE — ANESTHESIA PREPROCEDURE EVALUATION
Procedure:  right hand small finger dupuytrens excision (Right: Hand)    Relevant Problems   CARDIO   (+) Hypertension      GI/HEPATIC   (+) GERD (gastroesophageal reflux disease)      MUSCULOSKELETAL   (+) Chronic bilateral low back pain with bilateral sciatica   (+) Degenerative disc disease at L5-S1 level   (+) Lumbar spondylosis   (+) Myofascial pain syndrome      NEURO/PSYCH   (+) Chronic bilateral low back pain with bilateral sciatica   (+) Chronic pain syndrome   (+) Chronic right shoulder pain   (+) History of colon polyps   (+) Myofascial pain syndrome   (+) Numbness and tingling in right hand   (+) Numbness and tingling of both lower extremities      PULMONARY   (+) Smoker      Digestive   (+) Terminal ileitis (HCC)      Other   (+) Crohn's disease (HCC) (Stable)   (+) Heavy alcohol consumption (Not recently)   (+) Status post insertion of spinal cord stimulator   (+) Tobacco use        Physical Exam    Airway    Mallampati score: II  TM Distance: >3 FB  Neck ROM: full     Dental   No notable dental hx     Cardiovascular      Pulmonary      Other Findings        Anesthesia Plan  ASA Score- 3     Anesthesia Type- general with ASA Monitors  Additional Monitors:   Airway Plan: LMA  Plan Factors-Exercise tolerance (METS): >4 METS  Chart reviewed  Patient is not a current smoker  Obstructive sleep apnea risk education given perioperatively  Induction- intravenous  Postoperative Plan-     Informed Consent- Anesthetic plan and risks discussed with patient  I personally reviewed this patient with the CRNA  Discussed and agreed on the Anesthesia Plan with the CRNA  Dominique Duffy Discussed with Patient the procedure for Supraclavicular Block, side effects including extended numbness of the extremity and potential for an incomplete Block  All questions answered  Consent given

## 2022-11-17 NOTE — DISCHARGE INSTRUCTIONS
Post Operative Instructions    You have had surgery on your arm today, please read and follow the information below:  Elevate your hand above your elbow during the next 24-48 hours to help with swelling  Place your hand and arm over your head with motion at your shoulder three times a day  Do not apply any cream/ointment/oil to your incisions including antibiotics  Do not soak your hands in standing water (dishwater, tubs, Jacuzzi's, pools, etc ) until given permission (typically 2-3 weeks after injury)    Call the office if you notice any:  Increased numbness or tingling of your hand or fingers that is not relieved with elevation  Increasing pain that is not controlled with medication  Difficulty chewing, breathing, swallowing  Chest pains or shortness of breath  Fever over 101 4 degrees  Bandage: Do NOT remove bandage until follow-up appointment  Motion: Move fingers into a fist 5 times a day, DO NOT move any splinted fingers  Weight bearing status: The operated extremity should be non-weight bearing until further notice  Ice: Ice for 10 minutes every hour as needed for swelling x 24 hours  Sling: No sling necessary  Medications:   Naproxen 220 mg two times a day   Tylenol Extended Release 650 mg every 8 hours  Norco/Hydrocodone one tab every 6 hours AS NEEDED for pain     Follow-up Appointment: 7-10 days  Please call the office if you have any questions or concerns regarding your post-operative care

## 2022-11-17 NOTE — H&P
ASSESSMENT/PLAN:    Assessment:   Dupuytrens Disease of the right hand small finger    Plan:   Patient will proceed with surgical intervention  Right hand small finger dupuytrens excision  Follow Up: After Surgery    To Do Next Visit:    and Sutures out    General Discussions:     Xiaflex: The process of receiving Xiaflex injection was discussed with the patient today  This includes 3-4 injections in the area of the affected cord  The hand will then be bandaged for 2-3 consecutive days, at which time the patient will return to the office for manipulation of the affected finger  Risks, benefits and alternatives of Xiaflex injections were discussed with the patient today  Risks include bleeding, infection, pain, swelling, bruising, itching, breaks in the skin, redness/warmth of the skin, allergic reaction, tendon rupture, ligament damage, and nerve/vessel injury  Patient agrees to proceed with the injection  Authorization process will be initiated with patient to follow up once this is achieved  Operative Discussions:     Standard Consent: The risks and benefits of the procedure were explained to the patient, which include, but are not limited to: Bleeding, infection, recurrence, pain, scar, damage to tendons, damage to nerves, and damage to blood vessels, failure to give desired results and complications related to anesthesia  These risks, along with alternative conservative treatment options, and postoperative protocols were voiced back and understood by the patient  All questions were answered to the patient's satisfaction  The patient agrees to comply with a standard postoperative protocol, and is willing to proceed  Education was provided via written and auditory forms  There were no barriers to learning  Written handouts regarding wound care, incision and scar care, and general preoperative information was provided to the patient    Prior to surgery, the patient may be requested to stop all anti-inflammatory medications  Prophylactic aspirin, Plavix, and Coumadin may be allowed to be continued  Medications including vitamin E , ginkgo, and fish oil are requested to be stopped approximately one week prior to surgery  Hypertensive medications and beta blockers, if taken, should be continued  _____________________________________________________  CHIEF COMPLAINT:  No chief complaint on file  SUBJECTIVE:  Jojo Pedroza is a 50 y o  male who presents for follow up regarding Dupuytrens Disease of the  bilateral  small finger and right ring finger  Since last visit, Jojo Pedroza has tried xiaflex without relief  Today there is Stiffness/LROM and slight bruising to the left hand  Radiation: None  Associated symptoms: right hand small finger stiffness/lrom     PAST MEDICAL HISTORY:  Past Medical History:   Diagnosis Date   • Back pain    • Colon polyp    • Crohn's disease ([email or web address] )    • GERD (gastroesophageal reflux disease)    • Hypertension    • Perianal fistula    • Terminal ileitis ([facility] 75 )        PAST SURGICAL HISTORY:  Past Surgical History:   Procedure Laterality Date   • ABSCESS DRAINAGE     • BACK SURGERY  2019   • COLONOSCOPY     • HERNIA REPAIR      umbilical hernia   • SC I&D PERIRECTAL ABSCESS Right 10/24/2021    Procedure: excisional debredement right gluteal cheek ;  Surgeon: Wild Nolen MD;  Location:  MAIN OR;  Service: General   • SC PERCUT IMPLNT [place] Plana Right 3/26/2021    Procedure: INSERTION THORACIC DORSAL COLUMN SPINAL CORD STIMULATOR PERCUTANEOUS W IMPLANTABLE PULSE GENERATOR, RIGHT;  Surgeon: Adam Avery MD;  Location: [facility] MAIN OR;  Service: Neurosurgery   • SC PLACEMENT,SETON N/A 11/17/2021    Procedure: Clifm Yg;  Surgeon: Dorcas Virgen MD;  Location: [facility] MAIN OR;  Service: Colorectal   • SC SURG DIAGNOSTIC EXAM, ANORECTAL N/A 11/17/2021    Procedure: EXAM UNDER ANESTHESIA (EUA);   Surgeon: Dorcas Virgen MD;  Location: [facility];  Service: Colorectal   • SPINAL STIMULATOR PLACEMENT  2021       FAMILY HISTORY:  Family History   Problem Relation Age of Onset   • Heart disease Father    • Heart attack Father    • Colon cancer Neg Hx    • Colon polyps Neg Hx    • Inflammatory bowel disease Neg Hx        SOCIAL HISTORY:  Social History     Tobacco Use   • Smoking status: Former     Packs/day: 0 50     Years: 30 00     Pack years: 15 00     Types: Cigarettes     Start date: 2019     Quit date: 2021     Years since quittin 7   • Smokeless tobacco: Never   Vaping Use   • Vaping Use: Every day   • Substances: THC   Substance Use Topics   • Alcohol use: Yes     Alcohol/week: 10 0 standard drinks     Types: 10 Cans of beer per week     Comment: "Couple of beers within the week"   • Drug use: No     Comment: Denies       MEDICATIONS:    Current Facility-Administered Medications:   •  ceFAZolin (ANCEF) IVPB (premix in dextrose) 2,000 mg 50 mL, 2,000 mg, Intravenous, Once, Jeevan Washington MD  •  lactated ringers infusion, 125 mL/hr, Intravenous, Continuous, Elise Watkins MD  •  lidocaine (PF) (XYLOCAINE-MPF) 1 % injection 0 5 mL, 0 5 mL, Infiltration, Once PRN, Elise Watkins MD    ALLERGIES:  No Known Allergies    REVIEW OF SYSTEMS:  Pertinent items are noted in HPI      LABS:  HgA1c:   Lab Results   Component Value Date    HGBA1C 5 2 2021     BMP:   Lab Results   Component Value Date    GLUCOSE 110 2015    CALCIUM 9 1 2022     10/03/2016    K 4 0 2022    CO2 27 2022     2022    BUN 12 2022    CREATININE 1 03 2022           _____________________________________________________  PHYSICAL EXAMINATION:  Vital signs: /77   Pulse 83   Temp (!) 97 2 °F (36 2 °C) (Temporal)   Resp 16   Ht 6' (1 829 m)   Wt 88 5 kg (195 lb 3 2 oz)   SpO2 95%   BMI 26 47 kg/m²   General: well developed and well nourished, alert, oriented times 3 and appears comfortable  Psychiatric: Normal  HEENT: Trachea Midline, No torticollis  Cardiovascular: No discernable arrhythmia  Pulmonary: No wheezing or stridor  Abdomen: No rebound or guarding  Extremities: No peripheral edema  Skin: No Erythema  Neurovascular: Sensation Intact to the Median, Ulnar, Radial Nerve, Motor Intact to the Median, Ulnar, Radial Nerve and Pulses Intact    MUSCULOSKELETAL EXAMINATION:  Left hand: pitting to the ring finger along the distal palmar crease   Central cord to the small finger extending to the PIP joint with an MP contracture of 75 degrees  No PIP contracture  Ecchymosis present to palm of hand  Minimal swelling  After manipulation: the patient did develop a small skin tear after manipulation  After manipulation pip jt reaches full extension and MP jt has a contracture of 20 degrees  Right hand: central cord to the ring finger with MP contracture of 45 degrees  Central radial cord to the small finger with MP 30 degrees and PIP contracture of 75 degrees    _____________________________________________________  STUDIES REVIEWED:  No Studies to review      PROCEDURES PERFORMED:  Procedures

## 2022-11-17 NOTE — ANESTHESIA POSTPROCEDURE EVALUATION
Post-Op Assessment Note    CV Status:  Stable    Pain management: adequate     Mental Status:  Alert and awake   Hydration Status:  Euvolemic   PONV Controlled:  Controlled   Airway Patency:  Patent      Post Op Vitals Reviewed: Yes      Staff: CRNA, Anesthesiologist         No notable events documented      BP   112/78   Temp  97   Pulse  67   Resp   12   SpO2   100

## 2022-11-18 ENCOUNTER — TELEPHONE (OUTPATIENT)
Dept: OBGYN CLINIC | Facility: HOSPITAL | Age: 52
End: 2022-11-18

## 2022-11-18 NOTE — TELEPHONE ENCOUNTER
Caller: Patient    Doctor: Octaviano Keen    Reason for call: Patient had Dupuytren's excision right palm yesterday and is having pain  Taking pain meds and icing,elevating but not helping  Pain 9/10  Patient was concerned heading into the weekend  Please advise      Call back#: [phone]

## 2022-11-21 ENCOUNTER — EVALUATION (OUTPATIENT)
Dept: OCCUPATIONAL THERAPY | Facility: CLINIC | Age: 52
End: 2022-11-21

## 2022-11-21 DIAGNOSIS — M72.0 DUPUYTREN'S CONTRACTURE OF RIGHT HAND: Primary | ICD-10-CM

## 2022-11-21 DIAGNOSIS — M72.0 DUPUYTREN'S CONTRACTURE OF BOTH HANDS: Primary | ICD-10-CM

## 2022-11-21 NOTE — PROGRESS NOTES
OT Evaluation     Today's date: 2022  Patient name: Abdulkadir Coyne  : 1970  MRN: [id number]  Referring provider: Lalitha Cabral MD  Dx: No diagnosis found  Assessment  Assessment details: Pt  Presents today s/p R Dupytrens release of the small finger  Pt  Currently with stitches in place, he has mild edema and pain, limited ROM and functional use of the R hand with ADLs  Pt  To benefit from skilled hand therapy to address ROM limitations of the R hand and functional strength to return to baseline function  Pt  Was placed in a HB finger extension splint of the small and ring fingers to be worn during the day  Treatment to include modalities, scar mobilization, ROM, passive stretching and progressive hand strengthening  Impairments: abnormal or restricted ROM, lacks appropriate home exercise program and pain with function  Functional limitations: Pt  has limited use of his R hand secondary to wound healing and limited ROM  He is currently wearing an extension splint to maintain digit extension  Goals  STG( 4 visits)  1  Compliant with HEP/ splint  2  Reduce edema to WNLs  3  Increase SF/RF AROM by 10 degrees  LTG( 8 visits or discharge)  1  AROM of digits to [facility] for tight composite fist   2  Full extension of digits to WNLS  3  R  strength >40lbs for ADLs      Plan  Patient would benefit from: skilled occupational therapy  Planned modality interventions: cryotherapy and thermotherapy: hydrocollator packs  Planned therapy interventions: joint mobilization, manual therapy, home exercise program, graded exercise, functional ROM exercises, therapeutic exercise, therapeutic activities, fine motor coordination training, strengthening, orthotic fitting/training and patient education  Frequency: 2x week  Duration in visits: 2  Duration in weeks: 8  Plan of Care beginning date: 2022  Plan of Care expiration date: 2022  Treatment plan discussed with: patient        Subjective Evaluation    History of Present Illness  Date of surgery: 2022  Mechanism of injury: surgery  Mechanism of injury: Pt  S/p R Dupytren's release of the small and ring finger on 22  Pt  Presents today post op for evaluation and treatment    Quality of life: good    Pain  Current pain ratin  At best pain ratin  At worst pain ratin  Quality: discomfort  Progression: improved    Social Support  Lives in: multiple-level home  Lives with: spouse    Employment status: not working  Hand dominance: left    Treatments  Current treatment: occupational therapy        Objective     Tenderness     Additional Tenderness Details  TTP over palmar incision SF    Active Range of Motion     Right Digits   Flexion   Little     MCP: 50    PIP: 50    DIP: 40  Extension   Little     MCP: 5    PIP: 30    DIP: 15    General Comments:      Wrist/Hand Comments  9cm palmar z plasty  Wound dry- stitches in place             Precautions: Universal, Dupytren's release 22    Manuals              IE 30'            retrograde 4m            Scar mob             Intrinsic stretches 4m            Neuro Re-Ed                          Jt  Blocking, TGEs, PROM ext/flex reviewed                         HB digit ext splint ortho fit                                                   Ther Ex             Jt  blocking 2m            Extension stretch- therabar                                                                                           Ther Activity                                       Gait Training                                       Modalities              R  10m

## 2022-11-23 ENCOUNTER — APPOINTMENT (OUTPATIENT)
Dept: OCCUPATIONAL THERAPY | Facility: CLINIC | Age: 52
End: 2022-11-23

## 2022-11-28 ENCOUNTER — OFFICE VISIT (OUTPATIENT)
Dept: OCCUPATIONAL THERAPY | Facility: CLINIC | Age: 52
End: 2022-11-28

## 2022-11-28 DIAGNOSIS — M72.0 DUPUYTREN'S CONTRACTURE OF RIGHT HAND: Primary | ICD-10-CM

## 2022-11-28 NOTE — PROGRESS NOTES
Daily Note     Today's date: 2022  Patient name: Tiki Katz  : 1970  MRN: [id number]  Referring provider: Leonardo Alvarez MD  Dx:   Encounter Diagnosis     ICD-10-CM    1  Dupuytren's contracture of right hand  M72 0                      Subjective: It keeps bleeding      Objective: See treatment diary below      Assessment: Tolerated treatment fair  Patient has some mild drainage from incision  Pt  was re bandaged with loose gauze  Encouraged to remove splint and work on AROM of the digits  Plan: Continue per plan of care        Precautions: Universal, Dupytren's release 22    Manuals             IE 30'            retrograde 4m 4m           Scar mob             Intrinsic stretches 4m 4m           Neuro Re-Ed                          Jt  Blocking, TGEs, PROM ext/flex reviewed                         HB digit ext splint ortho fit wound care                                                  Ther Ex             Jt  blocking 2m 2m           Extension stretch- therabar                                                                                           Ther Activity                                       Gait Training                                       Modalities              R  10m 10m

## 2022-11-30 ENCOUNTER — OFFICE VISIT (OUTPATIENT)
Dept: OBGYN CLINIC | Facility: HOSPITAL | Age: 52
End: 2022-11-30

## 2022-11-30 VITALS
BODY MASS INDEX: 26.41 KG/M2 | WEIGHT: 195 LBS | HEART RATE: 90 BPM | HEIGHT: 72 IN | SYSTOLIC BLOOD PRESSURE: 120 MMHG | OXYGEN SATURATION: 94 % | DIASTOLIC BLOOD PRESSURE: 60 MMHG

## 2022-11-30 DIAGNOSIS — M72.0 DUPUYTREN'S CONTRACTURE OF BOTH HANDS: Primary | ICD-10-CM

## 2022-11-30 NOTE — PROGRESS NOTES
Assessment:   S/P Dupuytren's excision right palm extending into the small finger with release of the metacarpophalangeal joint and proximal interphalangeal joint  Dupuytren's excision right palm with release of the right ring finger metacarpophalangeal joint  - Right and Application short-arm splint - Right on 11/17/2022    Plan:   · Continue hand therapy  · Continue night time splint for 3 mos  · Do not submerge until the skin is completely closed  · Avoid aggressive activities until completely healed, stretching is ok      Follow Up:  6  week(s)    To Do Next Visit:  Bishop Solomon:  Chief Complaint   Patient presents with   • Right Hand - Post-op, Suture / Staple Removal     S/P  Dupuytren's excision right palm extending into the small finger with release of the metacarpophalangeal joint and proximal interphalangeal joint  Dupuytren's excision right palm with release of the right ring finger metacarpophalangeal joint 11/17/22         SUBJECTIVE:  Fracisco Sun is a 50 y o  male who presents for follow up 13 days after Dupuytren's excision right palm extending into the small finger with release of the metacarpophalangeal joint and proximal interphalangeal joint  Dupuytren's excision right palm with release of the right ring finger metacarpophalangeal joint  - Right and Application short-arm splint - Right on 11/17/2022  Today patient reports overall he is doing well  He is pleased with his progress thus far         PHYSICAL EXAMINATION:  Vital signs: /60   Pulse 90   Ht 6' (1 829 m)   Wt 88 5 kg (195 lb)   SpO2 94%   BMI 26 45 kg/m²   General: well developed and well nourished, alert, oriented times 3 and appears comfortable  Psychiatric: Normal    MUSCULOSKELETAL EXAMINATION:  Incision: Clean, dry, intact  Range of Motion: As expected  Neurovascular status: Neuro intact, good cap refill  Activity Restrictions: No restrictions  Done today: Sutures out and Steri strips applied      STUDIES REVIEWED:  No Studies to review      PROCEDURES PERFORMED:  Procedures  No Procedures performed today   Scribe Attestation    I,:  Osmel Paula am acting as a scribe while in the presence of the attending physician :       I,:  Beatriz Koenig MD personally performed the services described in this documentation    as scribed in my presence :

## 2022-12-02 ENCOUNTER — OFFICE VISIT (OUTPATIENT)
Dept: OCCUPATIONAL THERAPY | Facility: CLINIC | Age: 52
End: 2022-12-02

## 2022-12-02 DIAGNOSIS — M72.0 DUPUYTREN'S CONTRACTURE OF RIGHT HAND: Primary | ICD-10-CM

## 2022-12-02 NOTE — PROGRESS NOTES
Daily Note     Today's date: 2022  Patient name: Raj Teran  : 1970  MRN: [id number]  Referring provider: Alaina Hidalgo MD  Dx:   Encounter Diagnosis     ICD-10-CM    1  Dupuytren's contracture of right hand  M72 0                      Subjective: The stitches are out  Objective: See treatment diary below      Assessment: Tolerated treatment fair  Patient stitches removed, scabbing in the area of the incision  Able to passively flex small finger to [facility]  Plan: Continue per plan of care        Precautions: Universal, Dupytren's release 22    Manuals            IE 30'            retrograde 4m 4m 4m          Scar mob             Intrinsic stretches 4m 4m 4m          Neuro Re-Ed                          Jt  Blocking, TGEs, PROM ext/flex reviewed                         HB digit ext splint ortho fit wound care                                                  Ther Ex             Jt  blocking 2m 2m 2m          Extension stretch- therabar                                                                                           Ther Activity                                       Gait Training                                       Modalities              R  10m 10m 10m

## 2022-12-05 ENCOUNTER — OFFICE VISIT (OUTPATIENT)
Dept: OCCUPATIONAL THERAPY | Facility: CLINIC | Age: 52
End: 2022-12-05

## 2022-12-05 DIAGNOSIS — M72.0 DUPUYTREN'S CONTRACTURE OF RIGHT HAND: Primary | ICD-10-CM

## 2022-12-05 NOTE — PROGRESS NOTES
Daily Note     Today's date: 2022  Patient name: Tristian Flores  : 1970  MRN: [id number]  Referring provider: Criss Ndiaye MD  Dx:   Encounter Diagnosis     ICD-10-CM    1  Dupuytren's contracture of right hand  M72 0                      Subjective: I am feeling better  Objective: See treatment diary below      Assessment: Tolerated treatment fair  Wounds are healing  Able to passively flex small finger to [facility]  Less pain overall  Plan: Continue per plan of care        Precautions: Universal, Dupytren's release 22    Manuals           IE 30'            retrograde 4m 4m 4m 4m         Scar mob    2m         Intrinsic stretches 4m 4m 4m 4m         Neuro Re-Ed                          Jt  Blocking, TGEs, PROM ext/flex reviewed                         HB digit ext splint ortho fit wound care                                                  Ther Ex             Jt  blocking 2m 2m 2m 2m         Extension stretch- therabar                                                                                           Ther Activity                                       Gait Training                                       Modalities              R  10m 10m 10m 10m

## 2022-12-09 ENCOUNTER — OFFICE VISIT (OUTPATIENT)
Dept: OCCUPATIONAL THERAPY | Facility: CLINIC | Age: 52
End: 2022-12-09

## 2022-12-09 DIAGNOSIS — M72.0 DUPUYTREN'S CONTRACTURE OF RIGHT HAND: Primary | ICD-10-CM

## 2022-12-09 NOTE — PROGRESS NOTES
Daily Note     Today's date: 2022  Patient name: Lana Broussard  : 1970  MRN: [id number]  Referring provider: Iam Gleason MD  Dx:   Encounter Diagnosis     ICD-10-CM    1  Dupuytren's contracture of right hand  M72 0                      Subjective: I use the heat multiple times a day  Objective: See treatment diary below      Assessment: Tolerated treatment fair  Wounds are healing  Pt  Rosan Narrow with a deep open area of the incision at the base of the small finger crease  Will cover with coban over the weekend to limit friction to the area and allow healing  Pt  Has good flexion and extension of the digit  Plan: Continue per plan of care        Precautions: Universal, Dupytren's release 22    Manuals          IE 30'            retrograde 4m 4m 4m 4m 4m        Scar mob    2m 2m        Intrinsic stretches 4m 4m 4m 4m 4m        Neuro Re-Ed                          Jt  Blocking, TGEs, PROM ext/flex reviewed                         HB digit ext splint ortho fit wound care                                                  Ther Ex             Jt  blocking 2m 2m 2m 2m 2m        Extension stretch- therabar                                                                                           Ther Activity                                       Gait Training                                       Modalities              R  10m 10m 10m 10m 10m

## 2022-12-12 ENCOUNTER — OFFICE VISIT (OUTPATIENT)
Dept: OCCUPATIONAL THERAPY | Facility: CLINIC | Age: 52
End: 2022-12-12

## 2022-12-12 DIAGNOSIS — M72.0 DUPUYTREN'S CONTRACTURE OF RIGHT HAND: Primary | ICD-10-CM

## 2022-12-12 NOTE — PROGRESS NOTES
Daily Note     Today's date: 2022  Patient name: Tristian Flores  : 1970  MRN: [id number]  Referring provider: Criss Ndiaye MD  Dx:   Encounter Diagnosis     ICD-10-CM    1  Dupuytren's contracture of right hand  M72 0                      Subjective: It is looking much better  Objective: See treatment diary below      Assessment: Tolerated treatment fair  Wounds are healing  Pt  Has good ROM of the small finger  Plan: Continue per plan of care        Precautions: Universal, Dupytren's release 22    Manuals         IE 30'            retrograde 4m 4m 4m 4m 4m 4m       Scar mob    2m 2m 2m       Intrinsic stretches 4m 4m 4m 4m 4m 4m       Neuro Re-Ed                          Jt  Blocking, TGEs, PROM ext/flex reviewed                         HB digit ext splint ortho fit wound care                                                  Ther Ex             Jt  blocking 2m 2m 2m 2m 2m 2m       Extension stretch- therabar             Composite       3x10                                                                        Ther Activity                                       Gait Training                                       Modalities              R  10m 10m 10m 10m 10m 10m

## 2022-12-16 ENCOUNTER — OFFICE VISIT (OUTPATIENT)
Dept: OCCUPATIONAL THERAPY | Facility: CLINIC | Age: 52
End: 2022-12-16

## 2022-12-16 DIAGNOSIS — M72.0 DUPUYTREN'S CONTRACTURE OF RIGHT HAND: Primary | ICD-10-CM

## 2022-12-16 NOTE — PROGRESS NOTES
Daily Note     Today's date: 2022  Patient name: Sedrick Huffman  : 1970  MRN: [id number]  Referring provider: Galina Burch MD  Dx:   Encounter Diagnosis     ICD-10-CM    1  Dupuytren's contracture of right hand  M72 0                      Subjective: It is looking much better  Objective: See treatment diary below      Assessment: Tolerated treatment fair  Wounds are healing  Pt  Has improved function  Plan: Continue per plan of care        Precautions: Universal, Dupytren's release 22    Manuals        IE 30'            retrograde 4m 4m 4m 4m 4m 4m 4m      Scar mob    2m 2m 2m 2m      Intrinsic stretches 4m 4m 4m 4m 4m 4m 4m      Neuro Re-Ed                          Jt  Blocking, TGEs, PROM ext/flex reviewed                         HB digit ext splint ortho fit wound care                                                  Ther Ex             Jt  blocking 2m 2m 2m 2m 2m 2m 2m      Extension stretch- therabar       x20      Composite       3x10 GPW 2x30                                                                       Ther Activity                                       Gait Training                                       Modalities             MH R  10m 10m 10m 10m 10m 10m 10m

## 2022-12-19 ENCOUNTER — OFFICE VISIT (OUTPATIENT)
Dept: OCCUPATIONAL THERAPY | Facility: CLINIC | Age: 52
End: 2022-12-19

## 2022-12-19 DIAGNOSIS — M72.0 DUPUYTREN'S CONTRACTURE OF RIGHT HAND: Primary | ICD-10-CM

## 2022-12-19 NOTE — PROGRESS NOTES
Daily Note     Today's date: 2022  Patient name: Vasquez Zepeda  : 1970  MRN: [id number]  Referring provider: Falguni Bermudez MD  Dx:   Encounter Diagnosis     ICD-10-CM    1  Dupuytren's contracture of right hand  M72 0                      Subjective: No new complaints      Objective: See treatment diary below      Assessment: Tolerated treatment fair  Pt  Has good ROM, pain in PIP joint of the small finger with stretching  Plan: Continue per plan of care        Precautions: Universal, Dupytren's release 22    Manuals       IE 30'            retrograde 4m 4m 4m 4m 4m 4m 4m 4m     Scar mob    2m 2m 2m 2m 2m     Intrinsic stretches 4m 4m 4m 4m 4m 4m 4m 4m     Neuro Re-Ed                          Jt  Blocking, TGEs, PROM ext/flex reviewed                         HB digit ext splint ortho fit wound care                                                  Ther Ex             Jt  blocking 2m 2m 2m 2m 2m 2m 2m 2m     Extension stretch- therabar       x20 x20     Composite       3x10 GPW 2x30 GPW 2x30                                                                      Ther Activity             Peg         x30                  Gait Training                                       Modalities             MH R  10m 10m 10m 10m 10m 10m 10m 10m

## 2022-12-23 ENCOUNTER — APPOINTMENT (OUTPATIENT)
Dept: OCCUPATIONAL THERAPY | Facility: CLINIC | Age: 52
End: 2022-12-23

## 2022-12-30 ENCOUNTER — OFFICE VISIT (OUTPATIENT)
Dept: OCCUPATIONAL THERAPY | Facility: CLINIC | Age: 52
End: 2022-12-30

## 2022-12-30 DIAGNOSIS — M72.0 DUPUYTREN'S CONTRACTURE OF RIGHT HAND: Primary | ICD-10-CM

## 2022-12-30 NOTE — PROGRESS NOTES
Daily Note     Today's date: 2022  Patient name: Gordo Hirsch  : 1970  MRN: [id number]  Referring provider: Charanjit Dubois MD  Dx:   Encounter Diagnosis     ICD-10-CM    1  Dupuytren's contracture of right hand  M72 0                      Subjective: No new complaints      Objective: See treatment diary below      Assessment: Tolerated treatment fair  Pt  Wounds are healed  Mild extension lag of the PIP joint of the small finger  Plan: Continue per plan of care        Precautions: Universal, Dupytren's release 22    Manuals      IE 30'            retrograde 4m 4m 4m 4m 4m 4m 4m 4m 4m    Scar mob    2m 2m 2m 2m 2m 2m    Intrinsic stretches 4m 4m 4m 4m 4m 4m 4m 4m 4m    Neuro Re-Ed                          Jt  Blocking, TGEs, PROM ext/flex reviewed                         HB digit ext splint ortho fit wound care                                                  Ther Ex             Jt  blocking 2m 2m 2m 2m 2m 2m 2m 2m 2m    Extension stretch- therabar       x20 x20 x20    Composite       3x10 GPW 2x30 GPW 2x30 GPW 2x30    velcroboard         x5                                                        Ther Activity             Peg         x30                  Gait Training                                       Modalities             MH R  10m 10m 10m 10m 10m 10m 10m 10m 10m

## 2023-01-04 ENCOUNTER — OFFICE VISIT (OUTPATIENT)
Dept: OCCUPATIONAL THERAPY | Facility: CLINIC | Age: 53
End: 2023-01-04

## 2023-01-04 DIAGNOSIS — M72.0 DUPUYTREN'S CONTRACTURE OF RIGHT HAND: Primary | ICD-10-CM

## 2023-01-04 NOTE — PROGRESS NOTES
Daily Note     Today's date: 2023  Patient name: Nik Greenberg  : 1970  MRN: [id number]  Referring provider: Juju Dietrich MD  Dx:   Encounter Diagnosis     ICD-10-CM    1  Dupuytren's contracture of right hand  M72 0                      Subjective: I am not wearing the splint anymore  Objective: See treatment diary below      Assessment: Tolerated treatment fair  Pt  Wounds are healed  Mild extension lag of the PIP joint of the small finger at 20 degrees, full flexion to 100 degrees  R  strength is 50lbs (L 55lbs)  He has full use of the hand with ADLs  Pt  Is encouraged to continue to wear extension splint at NIGHT only  Pt  To continue with therapy until MD appointment on Monday, and then will transition to HEP independently  Goals  STG( 4 visits)  1  Compliant with HEP/ splint- met  2  Reduce edema to WNLs  - met  3  Increase SF/RF AROM by 10 degrees  - met  LTG( 8 visits or discharge)  1  AROM of digits to [facility] for tight composite fist - met  2  Full extension of digits to WNLS- progress  3  R  strength >40lbs for ADLs  - met        Plan: Continue per plan of care        Precautions: Universal, Dupytren's release 22    Manuals     IE 30'            retrograde 4m 4m 4m 4m 4m 4m 4m 4m 4m 4m   Scar mob    2m 2m 2m 2m 2m 2m 2m   Intrinsic stretches 4m 4m 4m 4m 4m 4m 4m 4m 4m 4m   Neuro Re-Ed                          Jt  Blocking, TGEs, PROM ext/flex reviewed                         HB digit ext splint ortho fit wound care                                                  Ther Ex             Jt  blocking 2m 2m 2m 2m 2m 2m 2m 2m 2m 2m   Extension stretch- therabar       x20 x20 x20 x20   Composite       3x10 GPW 2x30 GPW 2x30 GPW 2x30 GPW 2x30   velcroboard         x5 x5   Wrist curls          #3 3x10                                          Ther Activity             Peg         x30                  Gait Training                                       Modalities              R  10m 10m 10m 10m 10m 10m 10m 10m 10m 10m

## 2023-01-06 ENCOUNTER — OFFICE VISIT (OUTPATIENT)
Dept: OCCUPATIONAL THERAPY | Facility: CLINIC | Age: 53
End: 2023-01-06

## 2023-01-06 DIAGNOSIS — M72.0 DUPUYTREN'S CONTRACTURE OF RIGHT HAND: Primary | ICD-10-CM

## 2023-01-06 NOTE — PROGRESS NOTES
Daily Note     Today's date: 2023  Patient name: Candy Thrasher  : 1970  MRN: [id number]  Referring provider: Lorenzo Byrnes MD  Dx:   Encounter Diagnosis     ICD-10-CM    1  Dupuytren's contracture of right hand  M72 0                      Subjective: No new complaints      Objective: See treatment diary below      Assessment: Tolerated treatment fair  Has soreness at the base of the small finger  ROM is good  He is progressing well with functional strength  Plan: Continue per plan of care        Precautions: Universal, Dupytren's release 22    Manuals                 retrograde 4m        4m 4m   Scar mob 2m        2m 2m   Intrinsic stretches 4m        4m 4m   Neuro Re-Ed                          Jt  Blocking, TGEs, PROM ext/flex                          HB digit ext splint                                                    Ther Ex             Jt  blocking 2m        2m 2m   Extension stretch- therabar x20        x20 x20   Composite  GPW 2x30        GPW 2x30 GPW 2x30   velcroboard Flex/ext x5        x5 x5   Wrist curls #3 3x10         #3 3x10                                          Ther Activity             Peg                           Gait Training                                       Modalities             MH R  10m        10m 10m                    Manuals     IE 30'            retrograde 4m 4m 4m 4m 4m 4m 4m 4m 4m 4m   Scar mob    2m 2m 2m 2m 2m 2m 2m   Intrinsic stretches 4m 4m 4m 4m 4m 4m 4m 4m 4m 4m   Neuro Re-Ed                          Jt  Blocking, TGEs, PROM ext/flex reviewed                         HB digit ext splint ortho fit wound care                                                  Ther Ex             Jt  blocking 2m 2m 2m 2m 2m 2m 2m 2m 2m 2m   Extension stretch- therabar       x20 x20 x20 x20   Composite       3x10 GPW 2x30 GPW 2x30 GPW 2x30 GPW 2x30   velcroboard x5 x5   Wrist curls          #3 3x10                                          Ther Activity             Peg         x30                  Gait Training                                       Modalities             MH R  10m 10m 10m 10m 10m 10m 10m 10m 10m 10m

## 2023-01-09 ENCOUNTER — OFFICE VISIT (OUTPATIENT)
Dept: OCCUPATIONAL THERAPY | Facility: CLINIC | Age: 53
End: 2023-01-09

## 2023-01-09 ENCOUNTER — OFFICE VISIT (OUTPATIENT)
Dept: OBGYN CLINIC | Facility: CLINIC | Age: 53
End: 2023-01-09

## 2023-01-09 VITALS
DIASTOLIC BLOOD PRESSURE: 80 MMHG | HEIGHT: 72 IN | SYSTOLIC BLOOD PRESSURE: 100 MMHG | BODY MASS INDEX: 26.38 KG/M2 | WEIGHT: 194.8 LBS

## 2023-01-09 DIAGNOSIS — M72.0 DUPUYTREN'S CONTRACTURE OF RIGHT HAND: Primary | ICD-10-CM

## 2023-01-09 DIAGNOSIS — M72.0 DUPUYTREN'S CONTRACTURE OF BOTH HANDS: Primary | ICD-10-CM

## 2023-01-09 NOTE — PROGRESS NOTES
ASSESSMENT/PLAN:    Assessment:   {Common Diagnosis:60811}    Plan:   {kmtreatments:52068}    Follow Up:  {follow up time choices:63183}    To Do Next Visit:  {To do next visit:67835::" "}    General Discussions:  {Nonoperative Discussions:60384::" "}    Operative Discussions:  {kmsurjuanaaldiscussionlist:47499::" "}  ***    _____________________________________________________  CHIEF COMPLAINT:  Chief Complaint   Patient presents with   • Right Hand - Post-op         SUBJECTIVE:  Claudia Pacheco is a 50 y o  male who presents with {Complaint:29249} to the {Operated side:18970} {Surgical location:}  This started  {NUMBER 1-9:43397} {TIME FRAME:04682} ago as {Causes:38441}  Radiation: {radiation of pain:88245}  Previous Treatments: {kmtreatments:50787} {kmrelief:29607}  Associated symptoms: {Complaint:63241}  Handedness: {Operated side:35338}  Work status: ***    PAST MEDICAL HISTORY:  Past Medical History:   Diagnosis Date   • Back pain    • Colon polyp    • Crohn's disease ([facility] [address] )    • GERD (gastroesophageal reflux disease)    • Hypertension    • Perianal fistula    • Terminal ileitis ([facility] [address] )        PAST SURGICAL HISTORY:  Past Surgical History:   Procedure Laterality Date   • ABSCESS DRAINAGE     • BACK SURGERY  1058   • CAST APPLICATION Right     Procedure: Application short-arm splint;  Surgeon: Victorina Brock MD;  Location:  MAIN OR;  Service: Orthopedics   • COLONOSCOPY     • HERNIA REPAIR      umbilical hernia   • OR ANRCT XM SURG REQ ANES GENERAL SPI/EDRL DX N/A 2021    Procedure: EXAM UNDER ANESTHESIA (EUA); Surgeon: Marie Sheikh MD;  Location:  MAIN OR;  Service: Colorectal   • OR FASCIOTOMY PALMAR OPEN PARTIAL Right 2022    Procedure: Dupuytren's excision right palm extending into the small finger with release of the metacarpophalangeal joint and proximal interphalangeal joint    Dupuytren's excision right palm with release of the right ring finger metacarpophalangeal joint ;  Surgeon: Nenita Coon MD;  Location: [facility] MAIN OR;  Service: Orthopedics   • HI I&D ISCHIORECTAL&/PERIRECTAL ABSCESS SPX Right 10/24/2021    Procedure: excisional debredement right gluteal cheek ;  Surgeon: Taryn Flores MD;  Location: [facility] MAIN OR;  Service: General   • HI PLACEMENT SETON N/A 2021    Procedure: Virgia Amber;  Surgeon: Jerson Ashraf MD;  Location: [facility] MAIN OR;  Service: Colorectal   • HI PRQ IMPLTJ NSTIM ELECTRODE ARRAY EPIDURAL Right 3/26/2021    Procedure: INSERTION THORACIC DORSAL COLUMN SPINAL CORD STIMULATOR PERCUTANEOUS W IMPLANTABLE PULSE GENERATOR, RIGHT;  Surgeon: Jonn Cedeño MD;  Location: [facility] MAIN OR;  Service: Neurosurgery   • SPINAL STIMULATOR PLACEMENT  2021       FAMILY HISTORY:  Family History   Problem Relation Age of Onset   • Heart disease Father    • Heart attack Father    • Colon cancer Neg Hx    • Colon polyps Neg Hx    • Inflammatory bowel disease Neg Hx        SOCIAL HISTORY:  Social History     Tobacco Use   • Smoking status: Former     Packs/day: 0 50     Years: 30 00     Pack years: 15 00     Types: Cigarettes     Start date: 2019     Quit date: 2021     Years since quittin 9   • Smokeless tobacco: Never   Vaping Use   • Vaping Use: Every day   • Substances: THC   Substance Use Topics   • Alcohol use:  Yes     Alcohol/week: 10 0 standard drinks     Types: 10 Cans of beer per week     Comment: "Couple of beers within the week"   • Drug use: No     Comment: Denies       MEDICATIONS:    Current Outpatient Medications:   •  adalimumab (HUMIRA) 20 mg/0 4 mL PSKT injection, Inject 20 mg under the skin once ONCE EVERY 2 MONTHS, Disp: , Rfl:   •  amitriptyline (ELAVIL) 100 mg tablet, 1-2 HS, Disp: 60 tablet, Rfl: 5  •  azaTHIOprine (IMURAN) 50 mg tablet, take 1 tablet by mouth once daily, Disp: 30 tablet, Rfl: 1  •  buPROPion (Wellbutrin SR) 150 mg 12 hr tablet, Take 1 tablet (150 mg total) by mouth 2 (two) times a day, Disp: 60 tablet, Rfl: 5  •  lisinopril (ZESTRIL) 20 mg tablet, Take 1 tablet (20 mg total) by mouth daily, Disp: 90 tablet, Rfl: 3  •  naproxen (NAPROSYN) 500 mg tablet, Take 1 tablet (500 mg total) by mouth 2 (two) times a day with meals, Disp: 60 tablet, Rfl: 5  •  RA Vitamin D-3 25 MCG (1000 UT) tablet, TAKE 2 TABLETS BY MOUTH DAILY, Disp: 60 tablet, Rfl: 2  •  sildenafil (Viagra) 100 mg tablet, Take 1 tablet (100 mg total) by mouth daily as needed for erectile dysfunction, Disp: 30 tablet, Rfl: 2  •  vedolizumab (Entyvio) SOLR, Inject 300 mg into a catheter in a vein Do not start before November 18, 2022 , Disp: , Rfl:     ALLERGIES:  No Known Allergies    REVIEW OF SYSTEMS:  {KMREVIEWOFSYSTEMS:48404::"Pertinent items are noted in HPI ","A comprehensive review of systems was negative "}    LABS:  HgA1c:   Lab Results   Component Value Date    HGBA1C 5 2 03/12/2021     BMP:   Lab Results   Component Value Date    GLUCOSE 110 02/20/2015    CALCIUM 9 1 01/14/2022     10/03/2016    K 4 0 01/14/2022    CO2 27 01/14/2022     01/14/2022    BUN 12 01/14/2022    CREATININE 1 03 01/14/2022         _____________________________________________________  PHYSICAL EXAMINATION:  Vital signs: /80   Ht 6' (1 829 m)   Wt 88 4 kg (194 lb 12 8 oz)   BMI 26 42 kg/m²   General: {1234:26476::"well developed and well nourished","alert","oriented times 3","appears comfortable"}  Psychiatric: {Psych:36841::"Normal"}  HEENT: Trachea Midline, No torticollis  Cardiovascular: No discernable arrhythmia  Pulmonary: No wheezing or stridor  Abdomen: No rebound or guarding  Extremities: No peripheral edema  Skin: {Skin exam:99051::"No masses, erythema, lacerations, fluctation, ulcerations"}  Neurovascular: {Nerve Exam:13849::"Sensation Intact to the Median, Ulnar, Radial Nerve","Motor Intact to the Median, Ulnar, Radial Nerve","Pulses Intact"}    MUSCULOSKELETAL EXAMINATION:  {Side Examined:60981}    _____________________________________________________  STUDIES REVIEWED:  {kmimagin::"No Studies to review"}      PROCEDURES PERFORMED:  Procedures  {Was Demarcus done:99192::"No Procedures performed today"}

## 2023-01-09 NOTE — PROGRESS NOTES
Daily Note     Today's date: 2023  Patient name: Pippa Campos  : 1970  MRN: [id number]  Referring provider: Koko Ervin MD  Dx:   Encounter Diagnosis     ICD-10-CM    1  Dupuytren's contracture of right hand  M72 0                      Subjective: No new complaints      Objective: See treatment diary below      Assessment: Tolerated treatment fair  Has full ROM of the digit, no complaints with function  R  strength 50lbs (L 65lbs)  Pt  Will continue to wear extension splint at night  Continue with HEP independently  Goals  STG( 4 visits)  1  Compliant with HEP/ splint- met  2  Reduce edema to WNLs  - met  3  Increase SF/RF AROM by 10 degrees  - met  LTG( 8 visits or discharge)  1  AROM of digits to [facility] for tight composite fist  - met  2  Full extension of digits to WNLS- met  3  R  strength >40lbs for ADLs  - met        Plan: D/C from OT today       Precautions: Universal, Dupytren's release 22    Manuals                 retrograde 4m 4m       4m 4m   Scar mob 2m 2m       2m 2m   Intrinsic stretches 4m 4m       4m 4m   Neuro Re-Ed                          Jt  Blocking, TGEs, PROM ext/flex                          HB digit ext splint                                                    Ther Ex             Jt  blocking 2m 2m       2m 2m   Extension stretch- therabar x20 x20       x20 x20   Composite  GPW 2x30 GPW 2x30       GPW 2x30 GPW 2x30   velcroboard Flex/ext x5 x5       x5 x5   Wrist curls #3 3x10 #3 3x10        #3 3x10   Ball lifts  4 4# 3x10                                     Ther Activity             Peg                           Gait Training                                       Modalities             MH R  10m 10m       10m 10m                    Manuals     IE 30'            retrograde 4m 4m 4m 4m 4m 4m 4m 4m 4m 4m   Scar mob    2m 2m 2m 2m 2m 2m 2m   Intrinsic stretches 4m 4m 4m 4m 4m 4m 4m 4m 4m 4m   Neuro Re-Ed                          Jt  Blocking, TGEs, PROM ext/flex reviewed                         HB digit ext splint ortho fit wound care                                                  Ther Ex             Jt  blocking 2m 2m 2m 2m 2m 2m 2m 2m 2m 2m   Extension stretch- therabar       x20 x20 x20 x20   Composite       3x10 GPW 2x30 GPW 2x30 GPW 2x30 GPW 2x30   velcroboard         x5 x5   Wrist curls          #3 3x10                                          Ther Activity             Peg         x30                  Gait Training                                       Modalities              R  10m 10m 10m 10m 10m 10m 10m 10m 10m 10m

## 2023-01-09 NOTE — PROGRESS NOTES
Assessment:   S/P Dupuytren's excision right palm extending into the small finger with release of the metacarpophalangeal joint and proximal interphalangeal joint  Dupuytren's excision right palm with release of the right ring finger metacarpophalangeal joint  - Right and Application short-arm splint - Right on 11/17/2022     Left hand dupuytrens disease    Plan:   Continue splint on right hand for 3 months after surgery  Continue lotion and strethcing on his right hand  Left hand will proceed with a left ring and small finger dupuytrens excision and ring finger Metacarpophalangeal joint release and small finger Metacarpophalangeal and proximal interphalangeal joint release    Follow Up: After Surgery    To Do Next Visit:    and Sutures out      CHIEF COMPLAINT:  Chief Complaint   Patient presents with   • Right Hand - Post-op         SUBJECTIVE:  Avelina Lujan is a 50 y o  male who presents for follow up after Dupuytren's excision right palm extending into the small finger with release of the metacarpophalangeal joint and proximal interphalangeal joint  Dupuytren's excision right palm with release of the right ring finger metacarpophalangeal joint  - Right and Application short-arm splint - Right on 11/17/2022  Today patient has No Complaints       PAST MEDICAL HISTORY:  Past Medical History:   Diagnosis Date   • Back pain    • Colon polyp    • Crohn's disease ([facility] [address] )    • GERD (gastroesophageal reflux disease)    • Hypertension    • Perianal fistula    • Terminal ileitis ([facility] [address] )        PAST SURGICAL HISTORY:  Past Surgical History:   Procedure Laterality Date   • ABSCESS DRAINAGE     • BACK SURGERY  9455   • CAST APPLICATION Right 36/66/5898    Procedure: Application short-arm splint;  Surgeon: Nnamdi Gifford MD;  Location:  MAIN OR;  Service: Orthopedics   • COLONOSCOPY     • HERNIA REPAIR      umbilical hernia   • IA ANRCT XM SURG REQ ANES GENERAL SPI/EDRL DX N/A 11/17/2021    Procedure: EXAM UNDER ANESTHESIA (EUA); Surgeon: Trinidad Mckeon MD;  [facility];  Service: Colorectal   • UT FASCIOTOMY PALMAR OPEN PARTIAL Right 2022    Procedure: Dupuytren's excision right palm extending into the small finger with release of the metacarpophalangeal joint and proximal interphalangeal joint  Dupuytren's excision right palm with release of the right ring finger metacarpophalangeal joint ;  Surgeon: Bisi Mckeon MD;  [facility];  Service: Orthopedics   • UT I&D ISCHIORECTAL&/PERIRECTAL ABSCESS SPX Right 10/24/2021    Procedure: excisional debredement right gluteal cheek ;  Surgeon: Jonathan Guido MD;  Location: [facility];  Service: General   • UT PLACEMENT SETON N/A 2021    Procedure: Donzell Perfect;  Surgeon: Trinidad Mckeon MD;  [facility];  Service: Colorectal   • UT PRQ IMPLTJ NSTIM ELECTRODE ARRAY EPIDURAL Right 3/26/2021    Procedure: INSERTION THORACIC DORSAL COLUMN SPINAL CORD STIMULATOR PERCUTANEOUS W IMPLANTABLE PULSE GENERATOR, RIGHT;  Surgeon: Marya Mendoza MD;  Location:  MAIN OR;  Service: Neurosurgery   • SPINAL STIMULATOR PLACEMENT  2021       FAMILY HISTORY:  Family History   Problem Relation Age of Onset   • Heart disease Father    • Heart attack Father    • Colon cancer Neg Hx    • Colon polyps Neg Hx    • Inflammatory bowel disease Neg Hx        SOCIAL HISTORY:  Social History     Tobacco Use   • Smoking status: Former     Packs/day: 0 50     Years: 30 00     Pack years: 15 00     Types: Cigarettes     Start date: 2019     Quit date: 2021     Years since quittin 9   • Smokeless tobacco: Never   Vaping Use   • Vaping Use: Every day   • Substances: THC   Substance Use Topics   • Alcohol use:  Yes     Alcohol/week: 10 0 standard drinks     Types: 10 Cans of beer per week     Comment: "Couple of beers within the week"   • Drug use: No     Comment: Denies       MEDICATIONS:    Current Outpatient Medications:   •  adalimumab (HUMIRA) 20 mg/0 4 mL PSKT injection, Inject 20 mg under the skin once ONCE EVERY 2 MONTHS, Disp: , Rfl:   •  amitriptyline (ELAVIL) 100 mg tablet, 1-2 HS, Disp: 60 tablet, Rfl: 5  •  azaTHIOprine (IMURAN) 50 mg tablet, take 1 tablet by mouth once daily, Disp: 30 tablet, Rfl: 1  •  buPROPion (Wellbutrin SR) 150 mg 12 hr tablet, Take 1 tablet (150 mg total) by mouth 2 (two) times a day, Disp: 60 tablet, Rfl: 5  •  lisinopril (ZESTRIL) 20 mg tablet, Take 1 tablet (20 mg total) by mouth daily, Disp: 90 tablet, Rfl: 3  •  naproxen (NAPROSYN) 500 mg tablet, Take 1 tablet (500 mg total) by mouth 2 (two) times a day with meals, Disp: 60 tablet, Rfl: 5  •  RA Vitamin D-3 25 MCG (1000 UT) tablet, TAKE 2 TABLETS BY MOUTH DAILY, Disp: 60 tablet, Rfl: 2  •  sildenafil (Viagra) 100 mg tablet, Take 1 tablet (100 mg total) by mouth daily as needed for erectile dysfunction, Disp: 30 tablet, Rfl: 2  •  vedolizumab (Entyvio) SOLR, Inject 300 mg into a catheter in a vein Do not start before November 18, 2022 , Disp: , Rfl:     ALLERGIES:  No Known Allergies    REVIEW OF SYSTEMS:  Pertinent items are noted in HPI      LABS:  HgA1c:   Lab Results   Component Value Date    HGBA1C 5 2 03/12/2021     BMP:   Lab Results   Component Value Date    GLUCOSE 110 02/20/2015    CALCIUM 9 1 01/14/2022     10/03/2016    K 4 0 01/14/2022    CO2 27 01/14/2022     01/14/2022    BUN 12 01/14/2022    CREATININE 1 03 01/14/2022         _____________________________________________________  PHYSICAL EXAMINATION:  Psychiatric: Normal  HEENT: Trachea Midline, No torticollis  Cardiovascular: No discernable arrhythmia  Pulmonary: No wheezing or stridor  Abdomen: No rebound or guarding  Extremities: No peripheral edema  Skin: No Erythema  Neurovascular: Sensation Intact to the Median, Ulnar, Radial Nerve, Motor Intact to the Median, Ulnar, Radial Nerve and Pulses Intact  Vital signs: /80   Ht 6' (1 829 m)   Wt 88 4 kg (194 lb 12 8 oz)   BMI 26 42 kg/m²   General: well developed and well nourished, alert, oriented times 3 and appears comfortable  Psychiatric: Normal    RIGHT SIDE: MUSCULOSKELETAL EXAMINATION:  Incision: healed  Range of Motion: opposition intact and full composite fist possible  Neurovascular status: Neuro intact, good cap refill  Activity Restrictions: No restrictions      LEFT SIDE:  Pre central cord extending into a Radial and ulnar spiral cord of small finger with MP jt contracture of 42 pip jt 36  Pre central cord to ring finger with MP jt contracture of 35 degrees    STUDIES REVIEWED:  No Studies to review      PROCEDURES PERFORMED:  Procedures  No Procedures performed today   Scribe Attestation    I,:   am acting as a scribe while in the presence of the attending physician :       I,:   personally performed the services described in this documentation    as scribed in my presence :

## 2023-02-06 ENCOUNTER — OFFICE VISIT (OUTPATIENT)
Dept: FAMILY MEDICINE CLINIC | Facility: CLINIC | Age: 53
End: 2023-02-06

## 2023-02-06 VITALS
SYSTOLIC BLOOD PRESSURE: 126 MMHG | OXYGEN SATURATION: 97 % | BODY MASS INDEX: 26.33 KG/M2 | DIASTOLIC BLOOD PRESSURE: 86 MMHG | HEART RATE: 93 BPM | WEIGHT: 194.4 LBS | HEIGHT: 72 IN | TEMPERATURE: 98 F

## 2023-02-06 DIAGNOSIS — K50.00 TERMINAL ILEITIS WITHOUT COMPLICATION (HCC): ICD-10-CM

## 2023-02-06 DIAGNOSIS — Z12.5 SCREENING FOR PROSTATE CANCER: ICD-10-CM

## 2023-02-06 DIAGNOSIS — E78.2 MIXED HYPERLIPIDEMIA: ICD-10-CM

## 2023-02-06 DIAGNOSIS — M54.16 LUMBAR RADICULOPATHY: ICD-10-CM

## 2023-02-06 DIAGNOSIS — G62.9 NEUROPATHY: Primary | ICD-10-CM

## 2023-02-06 RX ORDER — PREGABALIN 150 MG/1
150 CAPSULE ORAL 3 TIMES DAILY
Qty: 90 CAPSULE | Refills: 5 | Status: SHIPPED | OUTPATIENT
Start: 2023-02-06

## 2023-02-06 NOTE — PROGRESS NOTES
Assessment/Plan:    No problem-specific Assessment & Plan notes found for this encounter  Diagnoses and all orders for this visit:    Neuropathy  -     pregabalin (LYRICA) 150 mg capsule; Take 1 capsule (150 mg total) by mouth 3 (three) times a day    Lumbar radiculopathy    Terminal ileitis without complication (HCC)          Subjective:   Chief Complaint   Patient presents with   • med check     Neuropathy         Patient ID: Shyann Louise is a 50 y o  male  HPI    The following portions of the patient's history were reviewed and updated as appropriate: allergies, current medications, past family history, past medical history, past social history, past surgical history and problem list     Review of Systems   Constitutional: Negative for fatigue, fever and unexpected weight change  HENT: Negative for congestion, sinus pain and sore throat  Eyes: Negative for visual disturbance  Respiratory: Negative for shortness of breath and wheezing  Cardiovascular: Negative for chest pain and palpitations  Gastrointestinal: Negative for abdominal pain, nausea and vomiting  Musculoskeletal: Negative  Negative for arthralgias and myalgias  Neurological: Negative for syncope, weakness and numbness  Psychiatric/Behavioral: Negative  Negative for confusion, dysphoric mood and suicidal ideas  Objective:  Vitals:    02/06/23 0854   BP: 126/86   BP Location: Left arm   Patient Position: Sitting   Cuff Size: Standard   Pulse: 93   Temp: 98 °F (36 7 °C)   TempSrc: Tympanic   SpO2: 97%   Weight: 88 2 kg (194 lb 6 4 oz)   Height: 6' (1 829 m)      Physical Exam  Constitutional:       Appearance: He is well-developed  HENT:      Right Ear: Ear canal normal  Tympanic membrane is not injected  Left Ear: Ear canal normal  Tympanic membrane is not injected  Nose: Nose normal    Eyes:      General:         Right eye: No discharge  Left eye: No discharge        Conjunctiva/sclera: Conjunctivae normal       Pupils: Pupils are equal, round, and reactive to light  Neck:      Thyroid: No thyromegaly  Cardiovascular:      Rate and Rhythm: Normal rate and regular rhythm  Heart sounds: Normal heart sounds  No murmur heard  Pulmonary:      Effort: Pulmonary effort is normal  No respiratory distress  Breath sounds: Normal breath sounds  No wheezing  Abdominal:      General: Bowel sounds are normal  There is no distension  Palpations: Abdomen is soft  Tenderness: There is no abdominal tenderness  Musculoskeletal:         General: Normal range of motion  Cervical back: Normal range of motion and neck supple  Lymphadenopathy:      Cervical: No cervical adenopathy  Skin:     General: Skin is warm and dry  Neurological:      Mental Status: He is alert and oriented to person, place, and time  He is not disoriented  Sensory: No sensory deficit  Gait: Gait normal       Deep Tendon Reflexes: Reflexes are normal and symmetric  Psychiatric:         Speech: Speech normal          Behavior: Behavior normal          Thought Content:  Thought content normal          Judgment: Judgment normal

## 2023-02-15 NOTE — PROGRESS NOTES
Diagnoses and all orders for this visit:    Terminal ileitis without complication [facility])       Terminal ileitis [facility])  Patient presents for follow-up of perianal disease  He has a history of perianal Crohn's disease and in 2021 underwent exam under anesthesia with placement of seton  He notes intermittent discomfort now that he thinks is related to the seton  Examination shows relatively quiescent Crohn's disease with seton in place  Per patient's preference this was removed  We discussed that this may result in spontaneous healing, continued drainage, or even recurrent abscess  Patient understands signs and symptoms of abscess and will return as needed  HPI       Jose Manuel Silveira is here today to discuss seton removal      The patient notes bright red bleeding on the tissue paper, on and off since a month ago  The patient denies any current drainage from the seton  He is status post EUA, placement of seton on 11/17/21      OP note:   Post anal fistula  Anal canal inflammatory changes consistent with active Crohn's disease     His most recent colonoscopy was on 3/7/2022 with Dr Woodrow Montoya, and showed: 1) Normal terminal ileum  2) Scattered diverticulosis in the ascending and left colon  3) Small hemorrhoids  4) External seton noted  5) Otherwise normal colonoscopy  6) Biopsies taken  2 year colonsocopy recall  Final Diagnosis   A  Colon, right ascending, biopsy:  -  Benign colonic mucosa  -  No thickened basement membranes or intraepithelial lymphocytosis to suggest microscopic colitis (i e  collagenous colitis or lymphocytic colitis, respectively)  -  No active or chronic colitis, dysplasia or carcinoma  B  Colon, transverse, biopsy:  -  Benign colonic mucosa  -  No thickened basement membranes or intraepithelial lymphocytosis to suggest microscopic colitis (i e  collagenous colitis or lymphocytic colitis, respectively)    -  No active or chronic colitis, dysplasia or carcinoma  C  Colon, left descending, biopsy:  -  Benign colonic mucosa  -  No thickened basement membranes or intraepithelial lymphocytosis to suggest microscopic colitis (i e  collagenous colitis or lymphocytic colitis, respectively)  -  No active or chronic colitis, dysplasia or carcinoma  D  Colon, rectum, biopsy:  -  Benign colonic mucosa  -  No thickened basement membranes or intraepithelial lymphocytosis to suggest microscopic colitis (i e  collagenous colitis or lymphocytic colitis, respectively)  -  No active or chronic colitis, dysplasia or carcinoma             Past Medical History:   Diagnosis Date   • Back pain    • Colon polyp    • Crohn's disease ([facility] [address] )    • GERD (gastroesophageal reflux disease)    • Hypertension    • Perianal fistula    • Terminal ileitis [facility])      Past Surgical History:   Procedure Laterality Date   • ABSCESS DRAINAGE     • BACK SURGERY  1327   • CAST APPLICATION Right 29/88/1968    Procedure: Application short-arm splint;  Surgeon: Becka Bull MD;  [facility];  Service: Orthopedics   • COLONOSCOPY     • HERNIA REPAIR      umbilical hernia   • NV ANRCT XM SURG REQ ANES GENERAL SPI/EDRL DX N/A 11/17/2021    Procedure: EXAM UNDER ANESTHESIA (EUA); Surgeon: Rubi Harvey MD;  [facility];  Service: Colorectal   • NV FASCIOTOMY PALMAR OPEN PARTIAL Right 11/17/2022    Procedure: Dupuytren's excision right palm extending into the small finger with release of the metacarpophalangeal joint and proximal interphalangeal joint    Dupuytren's excision right palm with release of the right ring finger metacarpophalangeal joint ;  Surgeon: Becka Bull MD;  [facility];  Service: Orthopedics   • NV I&D ISCHIORECTAL&/PERIRECTAL ABSCESS SPX Right 10/24/2021    Procedure: excisional debredement right gluteal cheek ;  Surgeon: Blanca Driver MD;  [facility]: [facility];  Service: General   • NV PLACEMENT SETON N/A 11/17/2021    Procedure: PLACEMENT SETON;  Surgeon: Giovanna Aguilar MD;  [facility];  Service: Colorectal   • [place] PRQ [address] NSTIM ELECTRODE ARRAY EPIDURAL Right 3/26/2021    Procedure: INSERTION THORACIC DORSAL COLUMN SPINAL CORD STIMULATOR PERCUTANEOUS W IMPLANTABLE PULSE GENERATOR, RIGHT;  Surgeon: Madie Morales MD;  [facility];  Service: Neurosurgery   • SPINAL STIMULATOR PLACEMENT  03/2021       Current Outpatient Medications:   •  adalimumab (HUMIRA) 20 mg/0 4 mL PSKT injection, Inject 20 mg under the skin once ONCE EVERY 2 MONTHS, Disp: , Rfl:   •  amitriptyline (ELAVIL) 100 mg tablet, 1-2 HS, Disp: 60 tablet, Rfl: 5  •  azaTHIOprine (IMURAN) 50 mg tablet, take 1 tablet by mouth once daily, Disp: 30 tablet, Rfl: 5  •  buPROPion (Wellbutrin SR) 150 mg 12 hr tablet, Take 1 tablet (150 mg total) by mouth 2 (two) times a day, Disp: 60 tablet, Rfl: 5  •  cholestyramine (QUESTRAN) 4 g packet, Take 1 packet (4 g total) by mouth 3 (three) times a day with meals, Disp: 270 each, Rfl: 0  •  lisinopril (ZESTRIL) 20 mg tablet, Take 1 tablet (20 mg total) by mouth daily, Disp: 90 tablet, Rfl: 3  •  naproxen (NAPROSYN) 500 mg tablet, Take 1 tablet (500 mg total) by mouth 2 (two) times a day with meals, Disp: 60 tablet, Rfl: 5  •  pregabalin (LYRICA) 150 mg capsule, Take 1 capsule (150 mg total) by mouth 3 (three) times a day, Disp: 90 capsule, Rfl: 5  •  RA Vitamin D-3 25 MCG (1000 UT) tablet, TAKE 2 TABLETS BY MOUTH DAILY, Disp: 60 tablet, Rfl: 2  •  sildenafil (Viagra) 100 mg tablet, Take 1 tablet (100 mg total) by mouth daily as needed for erectile dysfunction, Disp: 30 tablet, Rfl: 2  •  vedolizumab (Entyvio) SOLR, Inject 300 mg into a catheter in a vein Do not start before November 18, 2022 , Disp: , Rfl:   Allergies as of 02/16/2023   • (No Known Allergies)     Review of Systems   All other systems reviewed and are negative  There were no vitals filed for this visit    Physical Exam  Constitutional: Appearance: Normal appearance  HENT:      Head: Normocephalic and atraumatic  Eyes:      Extraocular Movements: Extraocular movements intact  Pupils: Pupils are equal, round, and reactive to light  Genitourinary:     Comments: Seton removed in its entirety  Skin:     General: Skin is warm and dry  Neurological:      General: No focal deficit present  Mental Status: He is alert and oriented to person, place, and time  Psychiatric:         Mood and Affect: Mood normal          Behavior: Behavior normal          Thought Content:  Thought content normal          Judgment: Judgment normal

## 2023-02-16 ENCOUNTER — OFFICE VISIT (OUTPATIENT)
Age: 53
End: 2023-02-16

## 2023-02-16 ENCOUNTER — TELEPHONE (OUTPATIENT)
Dept: GASTROENTEROLOGY | Facility: CLINIC | Age: 53
End: 2023-02-16

## 2023-02-16 VITALS — BODY MASS INDEX: 27.63 KG/M2 | HEIGHT: 72 IN | WEIGHT: 204 LBS

## 2023-02-16 DIAGNOSIS — K59.1 FUNCTIONAL DIARRHEA: Primary | ICD-10-CM

## 2023-02-16 DIAGNOSIS — K50.00 TERMINAL ILEITIS WITHOUT COMPLICATION (HCC): Primary | ICD-10-CM

## 2023-02-16 RX ORDER — CHOLESTYRAMINE 4 G/9G
1 POWDER, FOR SUSPENSION ORAL
Qty: 270 EACH | Refills: 0 | Status: SHIPPED | OUTPATIENT
Start: 2023-02-16 | End: 2023-02-22 | Stop reason: ALTCHOICE

## 2023-02-16 NOTE — ASSESSMENT & PLAN NOTE
Patient presents for follow-up of perianal disease  He has a history of perianal Crohn's disease and in 2021 underwent exam under anesthesia with placement of seton  He notes intermittent discomfort now that he thinks is related to the seton  Examination shows relatively quiescent Crohn's disease with seton in place  Per patient's preference this was removed  We discussed that this may result in spontaneous healing, continued drainage, or even recurrent abscess  Patient understands signs and symptoms of abscess and will return as needed

## 2023-02-16 NOTE — TELEPHONE ENCOUNTER
Patients GI provider:  Dr Kelley Seek    Number to return call: [phone]    Reason for call: Pt calling stating he tried to get a refill on cholestryamine but pharmacy told pt he needs a new order placed        Scheduled procedure/appointment date if applicable: apt 5/20

## 2023-02-22 RX ORDER — CHOLESTYRAMINE 4 G/9G
4 POWDER, FOR SUSPENSION ORAL
Qty: 378 G | Refills: 2 | Status: SHIPPED | OUTPATIENT
Start: 2023-02-22

## 2023-02-22 NOTE — TELEPHONE ENCOUNTER
I spoke with patient and advised that he can utilize a Good Rx coupon for cholestyramine can for $24-27 at [employer]  Same medication, he will just need to measure out 4 grams instead of opening packet  Patient agreeable

## 2023-02-22 NOTE — TELEPHONE ENCOUNTER
Per patient, the [employer]ie [employer] is $400  He wants to know if there is something else that he can be prescribed

## 2023-04-24 ENCOUNTER — LAB (OUTPATIENT)
Dept: LAB | Facility: HOSPITAL | Age: 53
End: 2023-04-24
Attending: INTERNAL MEDICINE

## 2023-04-24 DIAGNOSIS — K50.814 CROHN'S DISEASE OF BOTH SMALL AND LARGE INTESTINE WITH ABSCESS (HCC): ICD-10-CM

## 2023-04-24 LAB
25(OH)D3 SERPL-MCNC: 34.2 NG/ML (ref 30–100)
ALBUMIN SERPL BCP-MCNC: 3.8 G/DL (ref 3.5–5)
ALP SERPL-CCNC: 87 U/L (ref 46–116)
ALT SERPL W P-5'-P-CCNC: 51 U/L (ref 12–78)
ANION GAP SERPL CALCULATED.3IONS-SCNC: 5 MMOL/L (ref 4–13)
AST SERPL W P-5'-P-CCNC: 27 U/L (ref 5–45)
BASOPHILS # BLD AUTO: 0.03 THOUSANDS/ΜL (ref 0–0.1)
BASOPHILS NFR BLD AUTO: 0 % (ref 0–1)
BILIRUB SERPL-MCNC: 0.39 MG/DL (ref 0.2–1)
BUN SERPL-MCNC: 18 MG/DL (ref 5–25)
CALCIUM SERPL-MCNC: 9.7 MG/DL (ref 8.3–10.1)
CHLORIDE SERPL-SCNC: 107 MMOL/L (ref 96–108)
CO2 SERPL-SCNC: 26 MMOL/L (ref 21–32)
CREAT SERPL-MCNC: 0.96 MG/DL (ref 0.6–1.3)
CRP SERPL QL: 4.4 MG/L
EOSINOPHIL # BLD AUTO: 0.2 THOUSAND/ΜL (ref 0–0.61)
EOSINOPHIL NFR BLD AUTO: 2 % (ref 0–6)
ERYTHROCYTE [DISTWIDTH] IN BLOOD BY AUTOMATED COUNT: 13.2 % (ref 11.6–15.1)
GFR SERPL CREATININE-BSD FRML MDRD: 91 ML/MIN/1.73SQ M
GLUCOSE P FAST SERPL-MCNC: 142 MG/DL (ref 65–99)
HCT VFR BLD AUTO: 49.4 % (ref 36.5–49.3)
HGB BLD-MCNC: 16.7 G/DL (ref 12–17)
IMM GRANULOCYTES # BLD AUTO: 0.04 THOUSAND/UL (ref 0–0.2)
IMM GRANULOCYTES NFR BLD AUTO: 1 % (ref 0–2)
LYMPHOCYTES # BLD AUTO: 1.94 THOUSANDS/ΜL (ref 0.6–4.47)
LYMPHOCYTES NFR BLD AUTO: 23 % (ref 14–44)
MCH RBC QN AUTO: 34.2 PG (ref 26.8–34.3)
MCHC RBC AUTO-ENTMCNC: 33.8 G/DL (ref 31.4–37.4)
MCV RBC AUTO: 101 FL (ref 82–98)
MONOCYTES # BLD AUTO: 0.56 THOUSAND/ΜL (ref 0.17–1.22)
MONOCYTES NFR BLD AUTO: 7 % (ref 4–12)
NEUTROPHILS # BLD AUTO: 5.53 THOUSANDS/ΜL (ref 1.85–7.62)
NEUTS SEG NFR BLD AUTO: 67 % (ref 43–75)
NRBC BLD AUTO-RTO: 0 /100 WBCS
PLATELET # BLD AUTO: 216 THOUSANDS/UL (ref 149–390)
PMV BLD AUTO: 10.1 FL (ref 8.9–12.7)
POTASSIUM SERPL-SCNC: 4.2 MMOL/L (ref 3.5–5.3)
PROT SERPL-MCNC: 7.2 G/DL (ref 6.4–8.4)
RBC # BLD AUTO: 4.89 MILLION/UL (ref 3.88–5.62)
SODIUM SERPL-SCNC: 138 MMOL/L (ref 135–147)
WBC # BLD AUTO: 8.3 THOUSAND/UL (ref 4.31–10.16)

## 2023-04-25 ENCOUNTER — TELEPHONE (OUTPATIENT)
Dept: GASTROENTEROLOGY | Facility: CLINIC | Age: 53
End: 2023-04-25

## 2023-04-25 LAB
GAMMA INTERFERON BACKGROUND BLD IA-ACNC: 0.02 IU/ML
M TB IFN-G BLD-IMP: NEGATIVE
M TB IFN-G CD4+ BCKGRND COR BLD-ACNC: 0 IU/ML
M TB IFN-G CD4+ BCKGRND COR BLD-ACNC: 0 IU/ML
MITOGEN IGNF BCKGRD COR BLD-ACNC: >10 IU/ML

## 2023-04-25 NOTE — TELEPHONE ENCOUNTER
From: Sophie Marquez MD   Sent: 4/21/2023   4:21 PM EDT   To: Nicol Childers RN     Pt currently on q8 week stelara - feels he gets symptoms around 5 weeks  Will check antibody levels and drug levels before next infusion  But can we look into getting the infusion q4 weeks?

## 2023-04-26 ENCOUNTER — LAB (OUTPATIENT)
Dept: LAB | Facility: HOSPITAL | Age: 53
End: 2023-04-26
Attending: INTERNAL MEDICINE

## 2023-04-26 DIAGNOSIS — K50.814 CROHN'S DISEASE OF BOTH SMALL AND LARGE INTESTINE WITH ABSCESS (HCC): ICD-10-CM

## 2023-04-26 NOTE — TELEPHONE ENCOUNTER
Please confirm medication/dosage  Per notes, patient is on entyvio every 8 weeks, however task states to increase stelara

## 2023-04-27 NOTE — TELEPHONE ENCOUNTER
I spoke with patient, his next Entyvio Infusion is scheduled for May 19 th at [facility]  As discussed with Dr RHOADES [facility] at office visit he plans to go for his blood work (Vedolizumab and Anti-Vedo Ab) May 11th  I confirmed he has a print out of the lab order

## 2023-04-27 NOTE — TELEPHONE ENCOUNTER
From: MIKE Wheeler  Sent: 4/26/2023 4:09pm EST  To: Soniya Campuzano      Patient is currently on Entyvio every 8 weeks   Dr Hampton would like to increase dosing to every 4 weeks

## 2023-05-01 LAB — CALPROTECTIN STL-MCNT: 22 UG/G (ref 0–120)

## 2023-05-11 ENCOUNTER — APPOINTMENT (OUTPATIENT)
Dept: LAB | Facility: HOSPITAL | Age: 53
End: 2023-05-11

## 2023-05-11 DIAGNOSIS — K50.814 CROHN'S DISEASE OF BOTH SMALL AND LARGE INTESTINE WITH ABSCESS (HCC): ICD-10-CM

## 2023-05-17 ENCOUNTER — RA CDI HCC (OUTPATIENT)
Dept: OTHER | Facility: HOSPITAL | Age: 53
End: 2023-05-17

## 2023-05-23 ENCOUNTER — OFFICE VISIT (OUTPATIENT)
Dept: FAMILY MEDICINE CLINIC | Facility: CLINIC | Age: 53
End: 2023-05-23

## 2023-05-23 VITALS
TEMPERATURE: 95.5 F | OXYGEN SATURATION: 95 % | SYSTOLIC BLOOD PRESSURE: 138 MMHG | WEIGHT: 206 LBS | HEART RATE: 85 BPM | BODY MASS INDEX: 27.94 KG/M2 | DIASTOLIC BLOOD PRESSURE: 89 MMHG

## 2023-05-23 DIAGNOSIS — G47.30 SLEEP APNEA, UNSPECIFIED TYPE: Primary | ICD-10-CM

## 2023-05-23 DIAGNOSIS — Z12.5 SCREENING FOR PROSTATE CANCER: ICD-10-CM

## 2023-05-23 DIAGNOSIS — E78.2 MIXED HYPERLIPIDEMIA: ICD-10-CM

## 2023-05-23 NOTE — PROGRESS NOTES
Assessment/Plan:    No problem-specific Assessment & Plan notes found for this encounter  Diagnoses and all orders for this visit:    Sleep apnea, unspecified type    Screening for prostate cancer  -     PSA Total (Reflex To Free); Future    Mixed hyperlipidemia  -     Comprehensive metabolic panel; Future  -     Lipid Panel with Direct LDL reflex; Future          Subjective:   Chief Complaint   Patient presents with   • Insomnia   • Snoring        Patient ID: Radha Ojeda is a 50 y o  male  HPI    The following portions of the patient's history were reviewed and updated as appropriate: allergies, current medications, past family history, past medical history, past social history, past surgical history and problem list     Review of Systems   Constitutional: Negative for fatigue, fever and unexpected weight change  HENT: Negative for congestion, sinus pain and sore throat  Eyes: Negative for visual disturbance  Respiratory: Negative for shortness of breath and wheezing  Cardiovascular: Negative for chest pain and palpitations  Gastrointestinal: Negative for abdominal pain, nausea and vomiting  Musculoskeletal: Negative  Negative for arthralgias and myalgias  Neurological: Negative for syncope, weakness and numbness  Psychiatric/Behavioral: Negative  Negative for confusion, dysphoric mood and suicidal ideas  Objective:  Vitals:    05/23/23 1256   BP: 138/89   BP Location: Left arm   Patient Position: Sitting   Cuff Size: Standard   Pulse: 85   Temp: (!) 95 5 °F (35 3 °C)   TempSrc: Tympanic   SpO2: 95%   Weight: 93 4 kg (206 lb)      Physical Exam  Constitutional:       Appearance: He is well-developed  HENT:      Right Ear: Ear canal normal  Tympanic membrane is not injected  Left Ear: Ear canal normal  Tympanic membrane is not injected  Nose: Nose normal    Eyes:      General:         Right eye: No discharge  Left eye: No discharge  Conjunctiva/sclera: Conjunctivae normal       Pupils: Pupils are equal, round, and reactive to light  Neck:      Thyroid: No thyromegaly  Cardiovascular:      Rate and Rhythm: Normal rate and regular rhythm  Heart sounds: Normal heart sounds  No murmur heard  Pulmonary:      Effort: Pulmonary effort is normal  No respiratory distress  Breath sounds: Normal breath sounds  No wheezing  Abdominal:      General: Bowel sounds are normal  There is no distension  Palpations: Abdomen is soft  Tenderness: There is no abdominal tenderness  Musculoskeletal:         General: Normal range of motion  Cervical back: Normal range of motion and neck supple  Lymphadenopathy:      Cervical: No cervical adenopathy  Skin:     General: Skin is warm and dry  Neurological:      Mental Status: He is alert and oriented to person, place, and time  He is not disoriented  Sensory: No sensory deficit  Gait: Gait normal       Deep Tendon Reflexes: Reflexes are normal and symmetric  Psychiatric:         Speech: Speech normal          Behavior: Behavior normal          Thought Content:  Thought content normal          Judgment: Judgment normal

## 2023-05-24 LAB — MISCELLANEOUS LAB TEST RESULT: NORMAL

## 2023-05-26 NOTE — PRE-PROCEDURE INSTRUCTIONS
Pre-Surgery Instructions:   Medication Instructions   • amitriptyline (ELAVIL) 100 mg tablet Take night before surgery   • azaTHIOprine (IMURAN) 50 mg tablet Stop taking 7 days prior to surgery  Asked patient to call gastroenterologist and let them be aware of this recommendation  • buPROPion (Wellbutrin SR) 150 mg 12 hr tablet Take day of surgery  • cholestyramine (QUESTRAN) 4 GM/DOSE powder Hold day of surgery  • lisinopril (ZESTRIL) 20 mg tablet Hold day of surgery  • naproxen (NAPROSYN) 500 mg tablet Stop taking 3 days prior to surgery  • pregabalin (LYRICA) 150 mg capsule Take day of surgery  • RA Vitamin D-3 25 MCG (1000 UT) tablet Hold day of surgery  • sildenafil (Viagra) 100 mg tablet Uses PRN- DO NOT take day of surgery   • vedolizumab Deryl Angela) SOLR Continue normal schedule   Medication instructions for day surgery reviewed  Please use only a sip of water to take your instructed medications  Avoid all over the counter vitamins, supplements and NSAIDS for one week prior to surgery per anesthesia guidelines  Tylenol is ok to take as needed  You will receive a call one business day prior to surgery with an arrival time and hospital directions  If your surgery is scheduled on a Monday, the hospital will be calling you on the Friday prior to your surgery  If you have not heard from anyone by 8pm, please call the hospital supervisor through the hospital  at [phone]  Sharita Schultz [phone])  Do not eat or drink anything after midnight the night before your surgery, including candy, mints, lifesavers, or chewing gum  Do not drink alcohol 24hrs before your surgery  Try not to smoke at least 24hrs before your surgery  Follow the pre surgery showering instructions as listed in the [facility] Surgical Experience Booklet” or otherwise provided by your surgeon's office  Do not shave the surgical area 24 hours before surgery   Do not apply any lotions, creams, including makeup, cologne, deodorant, or perfumes after showering on the day of your surgery  No contact lenses, eye make-up, or artificial eyelashes  Remove nail polish, including gel polish, and any artificial, gel, or acrylic nails if possible  Remove all jewelry including rings and body piercing jewelry  Wear causal clothing that is easy to take on and off  Consider your type of surgery  Keep any valuables, jewelry, piercings at home  Please bring any specially ordered equipment (sling, braces) if indicated  Arrange for a responsible person to drive you to and from the hospital on the day of your surgery  Visitor Guidelines discussed  Call the surgeon's office with any new illnesses, exposures, or additional questions prior to surgery  Please reference your [facility] Surgical Experience Booklet” for additional information to prepare for your upcoming surgery

## 2023-05-29 DIAGNOSIS — I10 PRIMARY HYPERTENSION: ICD-10-CM

## 2023-05-29 RX ORDER — LISINOPRIL 20 MG/1
TABLET ORAL
Qty: 90 TABLET | Refills: 3 | Status: SHIPPED | OUTPATIENT
Start: 2023-05-29

## 2023-05-31 ENCOUNTER — TELEPHONE (OUTPATIENT)
Dept: OBGYN CLINIC | Facility: HOSPITAL | Age: 53
End: 2023-05-31

## 2023-05-31 ENCOUNTER — ANESTHESIA EVENT (OUTPATIENT)
Dept: PERIOP | Facility: HOSPITAL | Age: 53
End: 2023-05-31

## 2023-05-31 NOTE — TELEPHONE ENCOUNTER
Caller: patient    Doctor: Janiya Jha    Reason for call: patient missed called for surgery arrival time   Transferred to [address] [address]    Call back#: n/a

## 2023-06-01 ENCOUNTER — APPOINTMENT (OUTPATIENT)
Dept: LAB | Facility: HOSPITAL | Age: 53
End: 2023-06-01

## 2023-06-01 ENCOUNTER — ANESTHESIA (OUTPATIENT)
Dept: PERIOP | Facility: HOSPITAL | Age: 53
End: 2023-06-01

## 2023-06-01 ENCOUNTER — HOSPITAL ENCOUNTER (OUTPATIENT)
Facility: HOSPITAL | Age: 53
Setting detail: OUTPATIENT SURGERY
Discharge: HOME | End: 2023-06-01
Attending: ORTHOPAEDIC SURGERY | Admitting: ORTHOPAEDIC SURGERY

## 2023-06-01 VITALS
SYSTOLIC BLOOD PRESSURE: 134 MMHG | WEIGHT: 204.4 LBS | RESPIRATION RATE: 21 BRPM | OXYGEN SATURATION: 93 % | TEMPERATURE: 97.5 F | BODY MASS INDEX: 27.68 KG/M2 | HEART RATE: 90 BPM | HEIGHT: 72 IN | DIASTOLIC BLOOD PRESSURE: 84 MMHG

## 2023-06-01 DIAGNOSIS — M72.0 DUPUYTREN'S CONTRACTURE: Primary | ICD-10-CM

## 2023-06-01 DIAGNOSIS — M72.0 DUPUYTREN'S CONTRACTURE OF BOTH HANDS: ICD-10-CM

## 2023-06-01 DIAGNOSIS — E78.2 MIXED HYPERLIPIDEMIA: ICD-10-CM

## 2023-06-01 DIAGNOSIS — Z12.5 SCREENING FOR PROSTATE CANCER: ICD-10-CM

## 2023-06-01 LAB
ALBUMIN SERPL BCP-MCNC: 3.8 G/DL (ref 3.5–5)
ALP SERPL-CCNC: 88 U/L (ref 46–116)
ALT SERPL W P-5'-P-CCNC: 48 U/L (ref 12–78)
ANION GAP SERPL CALCULATED.3IONS-SCNC: 2 MMOL/L (ref 4–13)
AST SERPL W P-5'-P-CCNC: 35 U/L (ref 5–45)
BASOPHILS # BLD AUTO: 0.02 THOUSANDS/ÂΜL (ref 0–0.1)
BASOPHILS NFR BLD AUTO: 0 % (ref 0–1)
BILIRUB SERPL-MCNC: 0.59 MG/DL (ref 0.2–1)
BUN SERPL-MCNC: 13 MG/DL (ref 5–25)
CALCIUM SERPL-MCNC: 9.2 MG/DL (ref 8.3–10.1)
CHLORIDE SERPL-SCNC: 114 MMOL/L (ref 96–108)
CHOLEST SERPL-MCNC: 208 MG/DL
CO2 SERPL-SCNC: 25 MMOL/L (ref 21–32)
CREAT SERPL-MCNC: 0.94 MG/DL (ref 0.6–1.3)
EOSINOPHIL # BLD AUTO: 0.24 THOUSAND/ÂΜL (ref 0–0.61)
EOSINOPHIL NFR BLD AUTO: 3 % (ref 0–6)
ERYTHROCYTE [DISTWIDTH] IN BLOOD BY AUTOMATED COUNT: 13.3 % (ref 11.6–15.1)
GFR SERPL CREATININE-BSD FRML MDRD: 94 ML/MIN/1.73SQ M
GLUCOSE P FAST SERPL-MCNC: 114 MG/DL (ref 65–99)
HCT VFR BLD AUTO: 48.9 % (ref 36.5–49.3)
HDLC SERPL-MCNC: 48 MG/DL
HGB BLD-MCNC: 16.9 G/DL (ref 12–17)
IMM GRANULOCYTES # BLD AUTO: 0.02 THOUSAND/UL (ref 0–0.2)
IMM GRANULOCYTES NFR BLD AUTO: 0 % (ref 0–2)
LDLC SERPL CALC-MCNC: 113 MG/DL (ref 0–100)
LYMPHOCYTES # BLD AUTO: 1.8 THOUSANDS/ÂΜL (ref 0.6–4.47)
LYMPHOCYTES NFR BLD AUTO: 24 % (ref 14–44)
MCH RBC QN AUTO: 34.4 PG (ref 26.8–34.3)
MCHC RBC AUTO-ENTMCNC: 34.6 G/DL (ref 31.4–37.4)
MCV RBC AUTO: 100 FL (ref 82–98)
MONOCYTES # BLD AUTO: 0.52 THOUSAND/ÂΜL (ref 0.17–1.22)
MONOCYTES NFR BLD AUTO: 7 % (ref 4–12)
NEUTROPHILS # BLD AUTO: 4.77 THOUSANDS/ÂΜL (ref 1.85–7.62)
NEUTS SEG NFR BLD AUTO: 66 % (ref 43–75)
NRBC BLD AUTO-RTO: 0 /100 WBCS
PLATELET # BLD AUTO: 197 THOUSANDS/UL (ref 149–390)
PMV BLD AUTO: 10.7 FL (ref 8.9–12.7)
POTASSIUM SERPL-SCNC: 4 MMOL/L (ref 3.5–5.3)
PROT SERPL-MCNC: 7.2 G/DL (ref 6.4–8.4)
RBC # BLD AUTO: 4.91 MILLION/UL (ref 3.88–5.62)
SODIUM SERPL-SCNC: 141 MMOL/L (ref 135–147)
TRIGL SERPL-MCNC: 234 MG/DL
WBC # BLD AUTO: 7.37 THOUSAND/UL (ref 4.31–10.16)

## 2023-06-01 RX ORDER — LIDOCAINE HYDROCHLORIDE AND EPINEPHRINE 10; 10 MG/ML; UG/ML
INJECTION, SOLUTION INFILTRATION; PERINEURAL AS NEEDED
Status: DISCONTINUED | OUTPATIENT
Start: 2023-06-01 | End: 2023-06-01 | Stop reason: HOSPADM

## 2023-06-01 RX ORDER — ONDANSETRON 2 MG/ML
INJECTION INTRAMUSCULAR; INTRAVENOUS AS NEEDED
Status: DISCONTINUED | OUTPATIENT
Start: 2023-06-01 | End: 2023-06-01

## 2023-06-01 RX ORDER — SENNOSIDES 8.6 MG
650 CAPSULE ORAL EVERY 8 HOURS PRN
Qty: 30 TABLET | Refills: 0 | Status: SHIPPED | OUTPATIENT
Start: 2023-06-01

## 2023-06-01 RX ORDER — DEXAMETHASONE SODIUM PHOSPHATE 10 MG/ML
INJECTION, SOLUTION INTRAMUSCULAR; INTRAVENOUS AS NEEDED
Status: DISCONTINUED | OUTPATIENT
Start: 2023-06-01 | End: 2023-06-01

## 2023-06-01 RX ORDER — FENTANYL CITRATE/PF 50 MCG/ML
50 SYRINGE (ML) INJECTION
Status: DISCONTINUED | OUTPATIENT
Start: 2023-06-01 | End: 2023-06-01 | Stop reason: HOSPADM

## 2023-06-01 RX ORDER — FENTANYL CITRATE 50 UG/ML
INJECTION, SOLUTION INTRAMUSCULAR; INTRAVENOUS AS NEEDED
Status: DISCONTINUED | OUTPATIENT
Start: 2023-06-01 | End: 2023-06-01

## 2023-06-01 RX ORDER — TRAMADOL HYDROCHLORIDE 50 MG/1
50 TABLET ORAL EVERY 6 HOURS PRN
Qty: 10 TABLET | Refills: 0 | Status: SHIPPED | OUTPATIENT
Start: 2023-06-01

## 2023-06-01 RX ORDER — CEFAZOLIN SODIUM 2 G/50ML
2000 SOLUTION INTRAVENOUS ONCE
Status: COMPLETED | OUTPATIENT
Start: 2023-06-01 | End: 2023-06-01

## 2023-06-01 RX ORDER — ALBUTEROL SULFATE 90 UG/1
AEROSOL, METERED RESPIRATORY (INHALATION) AS NEEDED
Status: DISCONTINUED | OUTPATIENT
Start: 2023-06-01 | End: 2023-06-01

## 2023-06-01 RX ORDER — BUPIVACAINE HYDROCHLORIDE 5 MG/ML
INJECTION, SOLUTION EPIDURAL; INTRACAUDAL AS NEEDED
Status: DISCONTINUED | OUTPATIENT
Start: 2023-06-01 | End: 2023-06-01

## 2023-06-01 RX ORDER — MIDAZOLAM HYDROCHLORIDE 2 MG/2ML
INJECTION, SOLUTION INTRAMUSCULAR; INTRAVENOUS AS NEEDED
Status: DISCONTINUED | OUTPATIENT
Start: 2023-06-01 | End: 2023-06-01

## 2023-06-01 RX ORDER — COVID-19 ANTIGEN TEST
220 KIT MISCELLANEOUS 2 TIMES DAILY
Qty: 60 CAPSULE | Refills: 0 | Status: SHIPPED | OUTPATIENT
Start: 2023-06-01 | End: 2023-07-01

## 2023-06-01 RX ORDER — ONDANSETRON 2 MG/ML
4 INJECTION INTRAMUSCULAR; INTRAVENOUS EVERY 6 HOURS PRN
Status: DISCONTINUED | OUTPATIENT
Start: 2023-06-01 | End: 2023-06-01 | Stop reason: HOSPADM

## 2023-06-01 RX ORDER — ACETAMINOPHEN 325 MG/1
650 TABLET ORAL EVERY 6 HOURS PRN
Status: DISCONTINUED | OUTPATIENT
Start: 2023-06-01 | End: 2023-06-01 | Stop reason: HOSPADM

## 2023-06-01 RX ORDER — HYDROMORPHONE HCL IN WATER/PF 6 MG/30 ML
0.2 PATIENT CONTROLLED ANALGESIA SYRINGE INTRAVENOUS
Status: DISCONTINUED | OUTPATIENT
Start: 2023-06-01 | End: 2023-06-01 | Stop reason: HOSPADM

## 2023-06-01 RX ORDER — SODIUM CHLORIDE, SODIUM LACTATE, POTASSIUM CHLORIDE, CALCIUM CHLORIDE 600; 310; 30; 20 MG/100ML; MG/100ML; MG/100ML; MG/100ML
125 INJECTION, SOLUTION INTRAVENOUS CONTINUOUS
Status: DISCONTINUED | OUTPATIENT
Start: 2023-06-01 | End: 2023-06-01

## 2023-06-01 RX ORDER — SODIUM CHLORIDE, SODIUM LACTATE, POTASSIUM CHLORIDE, CALCIUM CHLORIDE 600; 310; 30; 20 MG/100ML; MG/100ML; MG/100ML; MG/100ML
INJECTION, SOLUTION INTRAVENOUS CONTINUOUS PRN
Status: DISCONTINUED | OUTPATIENT
Start: 2023-06-01 | End: 2023-06-01

## 2023-06-01 RX ORDER — ONDANSETRON 2 MG/ML
4 INJECTION INTRAMUSCULAR; INTRAVENOUS ONCE AS NEEDED
Status: DISCONTINUED | OUTPATIENT
Start: 2023-06-01 | End: 2023-06-01 | Stop reason: HOSPADM

## 2023-06-01 RX ORDER — TRAMADOL HYDROCHLORIDE 50 MG/1
50 TABLET ORAL EVERY 6 HOURS PRN
Status: DISCONTINUED | OUTPATIENT
Start: 2023-06-01 | End: 2023-06-01 | Stop reason: HOSPADM

## 2023-06-01 RX ORDER — LIDOCAINE HYDROCHLORIDE 10 MG/ML
0.5 INJECTION, SOLUTION EPIDURAL; INFILTRATION; INTRACAUDAL; PERINEURAL ONCE AS NEEDED
Status: DISCONTINUED | OUTPATIENT
Start: 2023-06-01 | End: 2023-06-01

## 2023-06-01 RX ADMIN — ALBUTEROL SULFATE 2 PUFF: 90 AEROSOL, METERED RESPIRATORY (INHALATION) at 11:21

## 2023-06-01 RX ADMIN — CEFAZOLIN SODIUM 2000 MG: 2 SOLUTION INTRAVENOUS at 11:32

## 2023-06-01 RX ADMIN — MIDAZOLAM 1 MG: 1 INJECTION INTRAMUSCULAR; INTRAVENOUS at 10:56

## 2023-06-01 RX ADMIN — BUPIVACAINE HYDROCHLORIDE 10 ML: 5 INJECTION, SOLUTION EPIDURAL; INTRACAUDAL; PERINEURAL at 10:59

## 2023-06-01 RX ADMIN — MIDAZOLAM 1 MG: 1 INJECTION INTRAMUSCULAR; INTRAVENOUS at 10:50

## 2023-06-01 RX ADMIN — FENTANYL CITRATE 25 MCG: 50 INJECTION, SOLUTION INTRAMUSCULAR; INTRAVENOUS at 12:54

## 2023-06-01 RX ADMIN — SODIUM CHLORIDE, SODIUM LACTATE, POTASSIUM CHLORIDE, AND CALCIUM CHLORIDE: .6; .31; .03; .02 INJECTION, SOLUTION INTRAVENOUS at 10:39

## 2023-06-01 RX ADMIN — FENTANYL CITRATE 50 MCG: 50 INJECTION, SOLUTION INTRAMUSCULAR; INTRAVENOUS at 10:50

## 2023-06-01 RX ADMIN — ONDANSETRON 4 MG: 2 INJECTION INTRAMUSCULAR; INTRAVENOUS at 12:15

## 2023-06-01 RX ADMIN — BUPIVACAINE 20 ML: 13.3 INJECTION, SUSPENSION, LIPOSOMAL INFILTRATION at 10:59

## 2023-06-01 RX ADMIN — DEXAMETHASONE SODIUM PHOSPHATE 10 MG: 10 INJECTION, SOLUTION INTRAMUSCULAR; INTRAVENOUS at 11:47

## 2023-06-01 RX ADMIN — FENTANYL CITRATE 25 MCG: 50 INJECTION, SOLUTION INTRAMUSCULAR; INTRAVENOUS at 11:41

## 2023-06-01 NOTE — DISCHARGE INSTR - AVS FIRST PAGE
Post Operative Instructions    You have had surgery on your arm today, please read and follow the information below:  Elevate your hand above your elbow during the next 24-48 hours to help with swelling  Place your hand and arm over your head with motion at your shoulder three times a day  Do not apply any cream/ointment/oil to your incisions including antibiotics  Do not soak your hands in standing water (dishwater, tubs, Jacuzzi's, pools, etc ) until given permission (typically 2-3 weeks after injury)    Call the office if you notice any:  Increased numbness or tingling of your hand or fingers that is not relieved with elevation  Increasing pain that is not controlled with medication  Difficulty chewing, breathing, swallowing  Chest pains or shortness of breath  Fever over 101 4 degrees  Bandage: Your therapist will remove your bandage at your first therapy appointment  Motion: Move fingers into a fist 5 times a day, DO NOT move any splinted fingers  Weight bearing status: The operated extremity should be non-weight bearing until further notice  Ice: Ice for 10 minutes every hour as needed for swelling x 24 hours  Sling: Sling for comfort for 2-3 days  Medications:   Naproxen 220 mg two times a day   Tylenol Extended Release 650 mg every 8 hours  Tramadol 1 tab every 6 hours AS needed for pain     Follow-up Appointment: 7-10 days  PLEASE SCHEDULE THERAPY TO MAKE A CUSTOM SPLINT TO BE WORN AT NIGHT TIME ONLY AND TO HAVE WOUND EVALUATED  PHONE NUMBER [phone]  Please call the office if you have any questions or concerns regarding your post-operative care

## 2023-06-01 NOTE — H&P
H&P Exam - Orthopedics   Mp Clarke 50 y o  male MRN: [id number]  Unit/Bed#: [facility] 07    Assessment/Plan   Assessment:  Left ring and small finger Dupuytren's disease    Plan:  Left ring and small finger Dupuytren's excision under General    History of Present Illness   HPI:  Mp Clarke is a 50 y o  male who presents with left ring and small finger contracture  He states progressively the contracture has gotten worse  He has tried conservative treatments without relief of his symptoms  He like to proceed with surgical intervention  Historical Information  Review Of Systems:   · Skin: Normal  · Neuro: See HPI  · Musculoskeletal: See HPI  · 14 point review of systems negative except as stated above     Past Medical History:   Past Medical History:   Diagnosis Date   • Anxiety    • Back pain    • Colon polyp    • Crohn's disease ([facility] [address] )    • Depression    • GERD (gastroesophageal reflux disease)    • Hypertension    • Perianal fistula    • Terminal ileitis ([facility] [address] )        Past Surgical History:   Past Surgical History:   Procedure Laterality Date   • ABSCESS DRAINAGE     • BACK SURGERY  2664   • CAST APPLICATION Right 13/08/2873    Procedure: Application short-arm splint;  Surgeon: Rupal Pena MD;  Location:  MAIN OR;  Service: Orthopedics   • COLONOSCOPY     • HERNIA REPAIR      umbilical hernia   • [place] ANRCT XM SURG REQ ANES GENERAL SPI/EDRL DX N/A 11/17/2021    Procedure: EXAM UNDER ANESTHESIA (EUA); Surgeon: Concepción Eaton MD;  Location:  MAIN OR;  Service: Colorectal   • [place] FASCIOTOMY PALMAR OPEN PARTIAL Right 11/17/2022    Procedure: Dupuytren's excision right palm extending into the small finger with release of the metacarpophalangeal joint and proximal interphalangeal joint    Dupuytren's excision right palm with release of the right ring finger metacarpophalangeal joint ;  Surgeon: Rupal Pena MD;  Location: [facility] MAIN OR;  Service: Orthopedics   • [place] I&D ISCHIORECTAL&/PERIRECTAL ABSCESS SPX Right 10/24/2021    Procedure: excisional debredement right gluteal cheek ;  Surgeon: Silvano Jacques MD;  Location: [facility] MAIN OR;  Service: General   • PA PLACEMENT SETON N/A 2021    Procedure: Karma Knenedy;  Surgeon: Nadia Cochran MD;  Location: [facility] MA[facility];  Service: Colorectal   • PA PRQ IMPLTJ NSTIM ELECTRODE ARRAY EPIDURAL Right 3/26/2021    Procedure: INSERTION THORACIC DORSAL COLUMN SPINAL CORD STIMULATOR PERCUTANEOUS W IMPLANTABLE PULSE GENERATOR, RIGHT;  Surgeon: Di Phillip MD;  Location: [facility] MAIN OR;  Service: Neurosurgery   • SPINAL STIMULATOR PLACEMENT  2021       Family History:  Family history reviewed and non-contributory  Family History   Problem Relation Age of Onset   • Heart disease Father    • Heart attack Father    • Colon cancer Neg Hx    • Colon polyps Neg Hx    • Inflammatory bowel disease Neg Hx        Social History:  Social History     Socioeconomic History   • Marital status: /Civil Union     Spouse name: None   • Number of children: None   • Years of education: None   • Highest education level: None   Occupational History   • None   Tobacco Use   • Smoking status: Former     Packs/day: 0 50     Years: 30 00     Total pack years: 15 00     Types: Cigarettes     Start date: 2019     Quit date: 2021     Years since quittin 3   • Smokeless tobacco: Never   Vaping Use   • Vaping Use: Every day   • Substances: THC   Substance and Sexual Activity   • Alcohol use: Not Currently     Alcohol/week: 6 0 standard drinks of alcohol     Types: 6 Standard drinks or equivalent per week   • Drug use: Yes     Frequency: 7 0 times per week     Types: Marijuana     Comment: Medical marijuana   • Sexual activity: Yes     Partners: Female   Other Topics Concern   • None   Social History Narrative   • None     Social Determinants of Health     Financial Resource Strain: Not on file   Food Insecurity: Not on file   Transportation Needs: Not on file   Physical Activity: Not on file   Stress: Not on file   Social Connections: Not on file   Intimate Partner Violence: Not on file   Housing Stability: Not on file       Allergies:   No Known Allergies        Labs:  0   Lab Value Date/Time    CRP 4 4 (H) 04/24/2023 0803    ESR 6 01/14/2022 0847    ESR 6 11/11/2020 0937    HCT 49 4 (H) 04/24/2023 0803    HCT 49 0 01/14/2022 0847    HCT 42 8 10/25/2021 0519    HCT 47 4 02/20/2015 2108    HGB 16 7 04/24/2023 0803    HGB 16 8 01/14/2022 0847    HGB 14 5 10/25/2021 0519    HGB 16 3 02/20/2015 2113    HGB 16 1 02/20/2015 2108    INR 0 92 03/12/2021 1008    INR 0 96 02/20/2015 2108    WBC 8 30 04/24/2023 0803    WBC 7 13 01/14/2022 0847    WBC 9 42 10/25/2021 0519    WBC 12 45 (H) 02/20/2015 2108       Meds:    Current Facility-Administered Medications:   •  bupivacaine liposomal (EXPAREL) 1 3 % injection 20 mL, 20 mL, Infiltration, Once, Baltazar Barakat MD  •  ceFAZolin (ANCEF) IVPB (premix in dextrose) 2,000 mg 50 mL, 2,000 mg, Intravenous, Once, Evelyn Weaver MD  •  lactated ringers infusion, 125 mL/hr, Intravenous, Continuous, Baltazar Barakat MD  •  lidocaine (PF) (XYLOCAINE-MPF) 1 % injection 0 5 mL, 0 5 mL, Infiltration, Once PRN, Baltazar Barakat MD    Blood Culture:   Lab Results   Component Value Date    BLOODCX No Growth After 5 Days  09/21/2019    BLOODCX No Growth After 5 Days  09/21/2019       Wound Culture:   Lab Results   Component Value Date    WOUNDCULT No growth 10/24/2021       Ins and Outs:  No intake/output data recorded              Physical Exam  /100   Pulse 82   Temp 97 7 °F (36 5 °C) (Oral)   Resp 18   Ht 6' (1 829 m)   Wt 92 7 kg (204 lb 6 4 oz)   SpO2 92%   BMI 27 72 kg/m²   /100   Pulse 82   Temp 97 7 °F (36 5 °C) (Oral)   Resp 18   Ht 6' (1 829 m)   Wt 92 7 kg (204 lb 6 4 oz)   SpO2 92%   BMI 27 72 kg/m²   Gen: No acute distress, resting comfortably in bed  HEENT: Eyes clear, moist mucus membranes, hearing intact  Respiratory: No audible wheezing or stridor  Cardiovascular: Well Perfused peripherally, 2+ distal pulse  Abdomen: nondistended, no peritoneal signs  Ortho Exam: Left hand  Precentral cord extending into the radial and ulnar spiral cord of the small finger with MP joint contracture of 42 degrees, PIP joint contracture 36 degrees  Precentral cord to the ring finger with MP joint contracture of 35 degrees  Neuro Exam: Patient is neurovascular intact for median, radial, ulnar nerve distribution    Capillary refill less than 2 seconds    Lab Results: Reviewed  Imaging: Reviewed

## 2023-06-01 NOTE — ANESTHESIA PROCEDURE NOTES
Peripheral Block    Patient location during procedure: holding area  Start time: 6/1/2023 10:49 AM  Reason for block: at surgeon's request and post-op pain management  Staffing  Performed by: Amy Blevins MD  Authorized by: Amy Blevins MD    Preanesthetic Checklist  Completed: patient identified, IV checked, site marked, risks and benefits discussed, surgical consent, monitors and equipment checked, pre-op evaluation and timeout performed  Peripheral Block  Patient position: Semi recumbent  Prep: ChloraPrep  Patient monitoring: heart rate and continuous pulse ox  Block type: supraclavicular  Laterality: left  Injection technique: single-shot  Procedures: ultrasound guided, Ultrasound guidance required for the procedure to increase accuracy and safety of medication placement and decrease risk of complications    Ultrasound permanent image saved  Needle  Needle type: Stimuplex   Needle gauge: 20 G  Needle length: 10 cm  Needle localization: ultrasound guidance  Needle insertion depth: 1 5 cm  Assessment  Injection assessment: incremental injection, local visualized surrounding nerve on ultrasound, negative aspiration for heme and no paresthesia on injection  Paresthesia pain: none  Heart rate change: no  Slow fractionated injection: yes  Post-procedure:  site cleaned  patient tolerated the procedure well with no immediate complications

## 2023-06-01 NOTE — ANESTHESIA POSTPROCEDURE EVALUATION
Post-Op Assessment Note    CV Status:  Stable  Pain Score: 0    Pain management: adequate     Mental Status:  Alert and awake   Hydration Status:  Euvolemic   PONV Controlled:  Controlled   Airway Patency:  Patent      Post Op Vitals Reviewed: Yes      Staff: CRNA         No notable events documented      BP   124/76   Temp   98 4   Pulse  77   Resp   15   SpO2   96%

## 2023-06-01 NOTE — OP NOTE
OPERATIVE REPORT  PATIENT NAME: Sofia Lemus  :  1970  MRN: [id number]  Pt Location: [facility] MAIN OR    SURGERY DATE: 23    Surgeon(s) and Role:     * Rio Barraza MD - Primary     * Ofelia Mcfarland PA-C - Assisting    Pre-Op Diagnosis:  Dupuytren's contracture of both hands [M72 0]    Post-Op Diagnosis:    Dupuytren's contracture of both hands [M72 0]    Procedure(s) (LRB):  1  left ring and small finger dupuytrens excision   2  Left ring finger Metacarpophalangeal joint release   3  Left small finger Metacarpophalangeal and proximal interphalangeal joint release (Left)  4  Short arm splint application    Specimen(s):  Order Name Source Comment Collection Info Order Time   TISSUE EXAM Dupuytren's Contracture Tissue  Collected By: Rio Barraza MD 2023 11:50 AM     Release to patient through Mychart   Immediate            Estimated Blood Loss:   Minimal      Anesthesia Type:   General    Operative Indications: The patient has a history of Dupuytren's disease of the left hand that was recalcitrant to conservative management  The decision was made to bring the patient to the operating room for excision Dupuytren's disease left hand ring and small finger  Risks of the procedure were explained which include, but are not limited to bleeding; infection; damage to nerves, arteries,veins, tendons; scar; pain; need for reoperation; failure to give desired result; and risks of anaesthesia  All questions were answered to satisfaction and they were willing to proceed           Operative Findings:  Pretendinous cord into the left hand ring finger with metacarpophalangeal joint contracture of 30 degrees successfully released    Pretendinous cord into the left hand ring finger extending to a radial spiral cord with contracture of the metacarpal phalangeal joint of 50 degrees at the proximal interphalangeal joint of 60 degrees    Complications:   None    Procedure and Technique:  After the patient, site, and procedure were identified, the patient was brought into the operating room in a supine position  Regional and general anaesthesia were provided  A well padded tourniquet was applied to the extremity, set at 250 mmHg  The  left upper extremity was then prepped and drapped in a normal, sterile, orthopedic fashion  After the patient, site, and procedure identified attention was turned towards the left hand  An Esmarch bandage was used to exsanguinate the limb and the tourniquet was inflated to 250 mmHg  Naaman Bora shaped incision was made over the palmar aspect of the hand in line with the ring finger  We elevated skin flaps  We dissected down and identified the precentral cord to the left hand ring finger  With care taken to protect the radial and ulnar digital arteries and nerves as well as the deep flexor tendon, this was excised in its entirety and sent for routine pathologic evaluation  This allowed then to manipulate and extend the metacarpophalangeal joint of the ring finger to full extension  Attention was then turned towards the left hand small finger  Naaman Bora shaped incision was made starting from the distal interphalangeal joint and extending all the way into the palmar aspect of the hand  Full-thickness skin flaps were then elevated with care taken to protect the neurovascular structures  Once this was done, we identified a precentral cord in the palm of the hand that then extended into an ulnar spiral cord and extended to the level of the distal interphalangeal joint  We meticulously dissected out the radial digital and radial nerve followed by the ulnar digital artery and nerve freeing it from around the Dupuytren's disease  We then meticulously dissected out the flexor tendon and at this point we were able to excise the Dupuytren's cords into the left hand and left small finger  These were sent for routine pathologic evaluation    We were then able to extend the metacarpophalangeal joint to full extension  Proximal interphalangeal joint still had a contracture  The A3 pulley was then opened and with the flexor tendons retracted, we released the volar plate radially, ulnarly, and proximally  Check rein ligaments were then released and at this point we were able to fully extend the proximal interphalangeal joint to neutral   Tourniquet was deflated demonstrating restoration of blood supply to the left hand  Once again reevaluated the radial and ulnar neurovascular structures to the ring and small finger demonstrating no injury as well as the flexor tendons of the ring and small finger demonstrating no injury  At the completion of the procedure, hemostasis was obtained with cautery and direct pressure  The wounds were copiously irrigated with sterile solution  The wounds were closed with Prolene  Sterile dressings were applied, including Xeroform, gauze, tweeners, webril, ACE and Volar Splint  Please note, all sponge, needle, and instrument counts were correct prior to closure  Loupe magnification was utilized  The patient tolerated the procedure well  I was present for all critical portions of the procedure , A qualified resident physician was not available  and A physician assistant was required during the procedure for retraction, tissue handling, dissection and suturing      Patient Disposition:  PACU  and hemodynamically stable    SIGNATURE: Patti Reis MD  DATE: 06/01/23  TIME: 1:55 PM

## 2023-06-01 NOTE — ANESTHESIA PREPROCEDURE EVALUATION
Procedure:  left ring and small finger dupuytrens excision and ring finger Metacarpophalangeal joint release and small finger Metacarpophalangeal and proximal interphalangeal joint release (Left: Hand)    Relevant Problems   CARDIO   (+) Hypertension (No BP meds today)      GI/HEPATIC   (+) GERD (gastroesophageal reflux disease)      MUSCULOSKELETAL   (+) Arthritis of right shoulder region   (+) Chronic bilateral low back pain with bilateral sciatica   (+) Degenerative disc disease at L5-S1 level   (+) Lumbar spondylosis   (+) Myofascial pain syndrome      NEURO/PSYCH   (+) Chronic bilateral low back pain with bilateral sciatica   (+) Chronic pain syndrome   (+) Chronic right shoulder pain   (+) Myofascial pain syndrome   (+) Numbness and tingling in right hand   (+) Numbness and tingling of both lower extremities      PULMONARY   (+) Smoker (Chronic Cough)      Other   (+) 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 (HCC) (Stable)   (+) Tobacco use        Physical Exam    Airway    Mallampati score: II  TM Distance: >3 FB  Neck ROM: full     Dental   No notable dental hx     Cardiovascular      Pulmonary      Other Findings        Anesthesia Plan  ASA Score- 3     Anesthesia Type- general with ASA Monitors  Additional Monitors:   Airway Plan:     Comment: Supraclavicular Block Planned  Plan Factors-Exercise tolerance (METS): >4 METS  Patient is a current smoker (1ppd)  Patient not instructed to abstain from smoking on day of procedure  Patient smoked on day of surgery  Obstructive sleep apnea risk education given perioperatively  Induction- intravenous  Postoperative Plan-     Informed Consent- Anesthetic plan and risks discussed with patient and spouse  I personally reviewed this patient with the CRNA  Discussed and agreed on the Anesthesia Plan with the CRNA  Jarod Do         Discussed with Patient the procedure for Supraclavicular Block, side effects including extended numbness of the extremity and potential for an incomplete Block  All questions answered  Consent given

## 2023-06-02 LAB — MISCELLANEOUS LAB TEST RESULT: NORMAL

## 2023-06-06 DIAGNOSIS — F51.01 PRIMARY INSOMNIA: ICD-10-CM

## 2023-06-06 RX ORDER — AMITRIPTYLINE HYDROCHLORIDE 100 MG/1
TABLET, FILM COATED ORAL
Qty: 60 TABLET | Refills: 5 | Status: SHIPPED | OUTPATIENT
Start: 2023-06-06

## 2023-06-07 ENCOUNTER — OFFICE VISIT (OUTPATIENT)
Dept: FAMILY MEDICINE CLINIC | Facility: CLINIC | Age: 53
End: 2023-06-07
Payer: MEDICARE

## 2023-06-07 VITALS
BODY MASS INDEX: 27.77 KG/M2 | HEART RATE: 99 BPM | SYSTOLIC BLOOD PRESSURE: 122 MMHG | DIASTOLIC BLOOD PRESSURE: 74 MMHG | OXYGEN SATURATION: 99 % | WEIGHT: 205 LBS | TEMPERATURE: 97.2 F | HEIGHT: 72 IN

## 2023-06-07 DIAGNOSIS — M54.12 CERVICAL RADICULOPATHY: ICD-10-CM

## 2023-06-07 DIAGNOSIS — Z00.00 MEDICARE ANNUAL WELLNESS VISIT, SUBSEQUENT: Primary | ICD-10-CM

## 2023-06-07 DIAGNOSIS — I10 PRIMARY HYPERTENSION: ICD-10-CM

## 2023-06-07 DIAGNOSIS — R60.9 DEPENDENT EDEMA: ICD-10-CM

## 2023-06-07 PROCEDURE — G0402 INITIAL PREVENTIVE EXAM: HCPCS | Performed by: FAMILY MEDICINE

## 2023-06-07 PROCEDURE — 99214 OFFICE O/P EST MOD 30 MIN: CPT | Performed by: FAMILY MEDICINE

## 2023-06-07 RX ORDER — FUROSEMIDE 20 MG/1
20 TABLET ORAL DAILY
Qty: 90 TABLET | Refills: 3 | Status: SHIPPED | OUTPATIENT
Start: 2023-06-07

## 2023-06-07 NOTE — PATIENT INSTRUCTIONS
Medicare Preventive Visit Patient Instructions  Thank you for completing your Welcome to Medicare Visit or Medicare Annual Wellness Visit today  Your next wellness visit will be due in one year (6/7/2024)  The screening/preventive services that you may require over the next 5-10 years are detailed below  Some tests may not apply to you based off risk factors and/or age  Screening tests ordered at today's visit but not completed yet may show as past due  Also, please note that scanned in results may not display below  Preventive Screenings:  Service Recommendations Previous Testing/Comments   Colorectal Cancer Screening  · Colonoscopy    · Fecal Occult Blood Test (FOBT)/Fecal Immunochemical Test (FIT)  · Fecal DNA/Cologuard Test  · Flexible Sigmoidoscopy Age: 39-70 years old   Colonoscopy: every 10 years (May be performed more frequently if at higher risk)  OR  FOBT/FIT: every 1 year  OR  Cologuard: every 3 years  OR  Sigmoidoscopy: every 5 years  Screening may be recommended earlier than age 39 if at higher risk for colorectal cancer  Also, an individualized decision between you and your healthcare provider will decide whether screening between the ages of 74-80 would be appropriate   Colonoscopy: 03/07/2022  FOBT/FIT: Not on file  Cologuard: Not on file  Sigmoidoscopy: Not on file          Prostate Cancer Screening Individualized decision between patient and health care provider in men between ages of 53-78   Medicare will cover every 12 months beginning on the day after your 50th birthday PSA: No results in last 5 years           Hepatitis C Screening Once for adults born between [birth year] and 1965  More frequently in patients at high risk for Hepatitis C Hep C Antibody: Not on file        Diabetes Screening 1-2 times per year if you're at risk for diabetes or have pre-diabetes Fasting glucose: 114 mg/dL (6/1/2023)  A1C: 5 2 % (3/12/2021)      Cholesterol Screening Once every 5 years if you don't have a lipid disorder  May order more often based on risk factors  Lipid panel: 06/01/2023         Other Preventive Screenings Covered by Medicare:  1  Abdominal Aortic Aneurysm (AAA) Screening: covered once if your at risk  You're considered to be at risk if you have a family history of AAA or a male between the age of 73-68 who smoking at least 100 cigarettes in your lifetime  2  Lung Cancer Screening: covers low dose CT scan once per year if you meet all of the following conditions: (1) Age 50-69; (2) No signs or symptoms of lung cancer; (3) Current smoker or have quit smoking within the last 15 years; (4) You have a tobacco smoking history of at least 20 pack years (packs per day x number of years you smoked); (5) You get a written order from a healthcare provider  3  Glaucoma Screening: covered annually if you're considered high risk: (1) You have diabetes OR (2) Family history of glaucoma OR (3)  aged 48 and older OR (3)  American aged 72 and older  3  Osteoporosis Screening: covered every 2 years if you meet one of the following conditions: (1) Have a vertebral abnormality; (2) On glucocorticoid therapy for more than 3 months; (3) Have primary hyperparathyroidism; (4) On osteoporosis medications and need to assess response to drug therapy  5  HIV Screening: covered annually if you're between the age of 12-76  Also covered annually if you are younger than 13 and older than 72 with risk factors for HIV infection  For pregnant patients, it is covered up to 3 times per pregnancy      Immunizations:  Immunization Recommendations   Influenza Vaccine Annual influenza vaccination during flu season is recommended for all persons aged >= 6 months who do not have contraindications   Pneumococcal Vaccine   * Pneumococcal conjugate vaccine = PCV13 (Prevnar 13), PCV15 (Vaxneuvance), PCV20 (Prevnar 20)  * Pneumococcal polysaccharide vaccine = PPSV23 (Pneumovax) Adults 25-60 years old: 1-3 doses may be recommended based on certain risk factors  Adults 72 years old: 1-2 doses may be recommended based off what pneumonia vaccine you previously received   Hepatitis B Vaccine 3 dose series if at intermediate or high risk (ex: diabetes, end stage renal disease, liver disease)   Tetanus (Td) Vaccine - COST NOT COVERED BY MEDICARE PART B Following completion of primary series, a booster dose should be given every 10 years to maintain immunity against tetanus  Td may also be given as tetanus wound prophylaxis  Tdap Vaccine - COST NOT COVERED BY MEDICARE PART B Recommended at least once for all adults  For pregnant patients, recommended with each pregnancy  Shingles Vaccine (Shingrix) - COST NOT COVERED BY MEDICARE PART B  2 shot series recommended in those aged 48 and above     Health Maintenance Due:      Topic Date Due   • Hepatitis C Screening  Never done   • HIV Screening  Never done   • Colorectal Cancer Screening  03/06/2024     Immunizations Due:      Topic Date Due   • Hepatitis A Vaccine (1 of 2 - Risk 2-dose series) Never done   • Hepatitis B Vaccine (2 of 3 - Risk 3-dose series) 12/16/2020   • Pneumococcal Vaccine: Pediatrics (0 to 5 Years) and At-Risk Patients (6 to 59 Years) (2 - PPSV23 if available, else PCV20) 01/07/2021   • COVID-19 Vaccine (3 - Moderna risk series) 05/20/2021     Advance Directives   What are advance directives? Advance directives are legal documents that state your wishes and plans for medical care  These plans are made ahead of time in case you lose your ability to make decisions for yourself  Advance directives can apply to any medical decision, such as the treatments you want, and if you want to donate organs  What are the types of advance directives? There are many types of advance directives, and each state has rules about how to use them  You may choose a combination of any of the following:  · Living will: This is a written record of the treatment you want   You can also choose which treatments you do not want, which to limit, and which to stop at a certain time  This includes surgery, medicine, IV fluid, and tube feedings  · Durable power of  for healthcare [facility]): This is a written record that states who you want to make healthcare choices for you when you are unable to make them for yourself  This person, called a proxy, is usually a family member or a friend  You may choose more than 1 proxy  · Do not resuscitate (DNR) order:  A DNR order is used in case your heart stops beating or you stop breathing  It is a request not to have certain forms of treatment, such as CPR  A DNR order may be included in other types of advance directives  · Medical directive: This covers the care that you want if you are in a coma, near death, or unable to make decisions for yourself  You can list the treatments you want for each condition  Treatment may include pain medicine, surgery, blood transfusions, dialysis, IV or tube feedings, and a ventilator (breathing machine)  · Values history: This document has questions about your views, beliefs, and how you feel and think about life  This information can help others choose the care that you would choose  Why are advance directives important? An advance directive helps you control your care  Although spoken wishes may be used, it is better to have your wishes written down  Spoken wishes can be misunderstood, or not followed  Treatments may be given even if you do not want them  An advance directive may make it easier for your family to make difficult choices about your care  Weight Management   Why it is important to manage your weight:  Being overweight increases your risk of health conditions such as heart disease, high blood pressure, type 2 diabetes, and certain types of cancer  It can also increase your risk for osteoarthritis, sleep apnea, and other respiratory problems  Aim for a slow, steady weight loss   Even a small amount of weight loss can lower your risk of health problems  How to lose weight safely:  A safe and healthy way to lose weight is to eat fewer calories and get regular exercise  You can lose up about 1 pound a week by decreasing the number of calories you eat by 500 calories each day  Healthy meal plan for weight management:  A healthy meal plan includes a variety of foods, contains fewer calories, and helps you stay healthy  A healthy meal plan includes the following:  · Eat whole-grain foods more often  A healthy meal plan should contain fiber  Fiber is the part of grains, fruits, and vegetables that is not broken down by your body  Whole-grain foods are healthy and provide extra fiber in your diet  Some examples of whole-grain foods are whole-wheat breads and pastas, oatmeal, brown rice, and bulgur  · Eat a variety of vegetables every day  Include dark, leafy greens such as spinach, kale, luke greens, and mustard greens  Eat yellow and orange vegetables such as carrots, sweet potatoes, and winter squash  · Eat a variety of fruits every day  Choose fresh or canned fruit (canned in its own juice or light syrup) instead of juice  Fruit juice has very little or no fiber  · Eat low-fat dairy foods  Drink fat-free (skim) milk or 1% milk  Eat fat-free yogurt and low-fat cottage cheese  Try low-fat cheeses such as mozzarella and other reduced-fat cheeses  · Choose meat and other protein foods that are low in fat  Choose beans or other legumes such as split peas or lentils  Choose fish, skinless poultry (chicken or turkey), or lean cuts of red meat (beef or pork)  Before you cook meat or poultry, cut off any visible fat  · Use less fat and oil  Try baking foods instead of frying them  Add less fat, such as margarine, sour cream, regular salad dressing and mayonnaise to foods  Eat fewer high-fat foods  Some examples of high-fat foods include french fries, doughnuts, ice cream, and cakes  · Eat fewer sweets  Limit foods and drinks that are high in sugar  This includes candy, cookies, regular soda, and sweetened drinks  Exercise:  Exercise at least 30 minutes per day on most days of the week  Some examples of exercise include walking, biking, dancing, and swimming  You can also fit in more physical activity by taking the stairs instead of the elevator or parking farther away from stores  Ask your healthcare provider about the best exercise plan for you  © Copyright [employer] 2018 Information is for End User's use only and may not be sold, redistributed or otherwise used for commercial purposes   All illustrations and images included in CareNotes® are the copyrighted property of [employer]  or [address] [place] [address]

## 2023-06-07 NOTE — PROGRESS NOTES
Assessment and Plan:     Problem List Items Addressed This Visit        Cardiovascular and Mediastinum    Hypertension       Nervous and Auditory    Cervical radiculopathy   Other Visit Diagnoses     Medicare annual wellness visit, subsequent    -  Primary        BMI Counseling: Body mass index is 27 8 kg/m²  The BMI is above normal  Nutrition recommendations include decreasing portion sizes  Exercise recommendations include moderate physical activity 150 minutes/week  No pharmacotherapy was ordered  Rationale for BMI follow-up plan is due to patient being overweight or obese  Depression Screening and Follow-up Plan: Patient was screened for depression during today's encounter  They screened negative with a PHQ-2 score of 0  Preventive health issues were discussed with patient, and age appropriate screening tests were ordered as noted in patient's After Visit Summary  Personalized health advice and appropriate referrals for health education or preventive services given if needed, as noted in patient's After Visit Summary  History of Present Illness:     Patient presents for a Medicare Wellness Visit    HPI   Patient Care Team:  Javier Koch MD as PCP - General (Family Medicine)  MD Vega Tejeda PA-C as Nurse Practitioner (Vascular Surgery)     Review of Systems:     Review of Systems   Constitutional: Negative for fatigue, fever and unexpected weight change  HENT: Negative for congestion, sinus pain and sore throat  Eyes: Negative for visual disturbance  Respiratory: Negative for shortness of breath and wheezing  Cardiovascular: Negative for chest pain and palpitations  Gastrointestinal: Negative for abdominal pain, nausea and vomiting  Musculoskeletal: Negative  Negative for arthralgias and myalgias  Neurological: Negative for syncope, weakness and numbness  Psychiatric/Behavioral: Negative  Negative for confusion, dysphoric mood and suicidal ideas  Problem List:     Patient Active Problem List   Diagnosis   • Sprain of anterior talofibular ligament of right ankle   • Perianal abscess   • Neuropathy   • Chronic bilateral low back pain with bilateral sciatica   • Bilateral lower extremity edema   • Chronic pain syndrome   • Lumbar post-laminectomy syndrome   • Lumbar spondylosis   • 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 ([facility] )   • Congenital fusion of sacroiliac joint   • Terminal ileitis ([facility] )   • Viral enteritis   • Chronic foot pain   • Lumbar radiculopathy   • Acute non-recurrent maxillary sinusitis   • Screening examination for other arthropod-borne viral diseases   • GERD (gastroesophageal reflux disease)   • History of colon polyps   • Perianal fistula   • Functional diarrhea   • Blepharitis, left eye   • Closed fracture of radial styloid   • Compression of right ulnar nerve at multiple levels   • Degenerative disc disease at L5-S1 level   • History of lumbar fusion   • Numbness and tingling of both lower extremities   • Periorbital cellulitis of left eye   • Right leg swelling   • Tobacco use   • Vitamin D deficiency   • Arthritis of right shoulder region   • Chronic right shoulder pain   • Myofascial pain syndrome   • Status post insertion of spinal cord stimulator   • Crohn's disease (HCC)   • Numbness and tingling in right hand   • Neck pain   • Cervical radiculopathy   • Herniated nucleus pulposus, C3-4   • Hypertension   • Smoker   • Heavy alcohol consumption      Past Medical and Surgical History:     Past Medical History:   Diagnosis Date   • Anxiety    • Back pain    • Colon polyp    • Crohn's disease ([facility] )    • Depression    • GERD (gastroesophageal reflux disease)    • Hypertension    • Perianal fistula    • Terminal ileitis [facility])      Past Surgical History:   Procedure Laterality Date   • ABSCESS DRAINAGE     • BACK SURGERY  2699   • CAST APPLICATION Right 93/69/0403    Procedure: Application short-arm splint;  Surgeon: Virginia Way MD; Location: [facility] MAIN OR;  Service: Orthopedics   • COLONOSCOPY     • HAND CONTRACTURE RELEASE Left 6/1/2023    Procedure: left ring and small finger dupuytrens excision and ring finger Metacarpophalangeal joint release and small finger Metacarpophalangeal and proximal interphalangeal joint release;  Surgeon: Rob De Jesus MD;  Location: [facility];  Service: Orthopedics   • HERNIA REPAIR      umbilical hernia   • FL ANRCT XM SURG REQ ANES GENERAL SPI/EDRL DX N/A 11/17/2021    Procedure: EXAM UNDER ANESTHESIA (EUA); Surgeon: Johnnie Brock MD;  Location: [facility];  Service: Colorectal   • FL FASCIOTOMY PALMAR OPEN PARTIAL Right 11/17/2022    Procedure: Dupuytren's excision right palm extending into the small finger with release of the metacarpophalangeal joint and proximal interphalangeal joint    Dupuytren's excision right palm with release of the right ring finger metacarpophalangeal joint ;  Surgeon: Rob De Jesus MD;  Location: [facility];  Service: Orthopedics   • FL I&D ISCHIORECTAL&/PERIRECTAL ABSCESS SPX Right 10/24/2021    Procedure: excisional debredement right gluteal cheek ;  Surgeon: Андрей Mansfield MD;  Location: [facility];  Service: General   • FL PLACEMENT SETON N/A 11/17/2021    Procedure: Daysi Francois;  Surgeon: Johnnie Brock MD;  Location: [facility];  Service: Colorectal   • FL PRQ IMPLTJ NSTIM ELECTRODE ARRAY EPIDURAL Right 3/26/2021    Procedure: INSERTION THORACIC DORSAL COLUMN SPINAL CORD STIMULATOR PERCUTANEOUS W IMPLANTABLE PULSE GENERATOR, RIGHT;  Surgeon: Nikki Horowitz MD;  Location: [facility];  Service: Neurosurgery   • SPINAL STIMULATOR PLACEMENT  03/2021      Family History:     Family History   Problem Relation Age of Onset   • Heart disease Father    • Heart attack Father    • Colon cancer Neg Hx    • Colon polyps Neg Hx    • Inflammatory bowel disease Neg Hx       Social History:     Social History     Socioeconomic History   • Marital status: /Civil [employer]     Spouse name: None   • Number of children: None   • Years of education: None   • Highest education level: None   Occupational History   • None   Tobacco Use   • Smoking status: Former     Packs/day: 0 50     Years: 30 00     Total pack years: 15 00     Types: Cigarettes     Start date: 2019     Quit date: 2021     Years since quittin 3   • Smokeless tobacco: Never   Vaping Use   • Vaping Use: Every day   • Substances: THC   Substance and Sexual Activity   • Alcohol use: Not Currently     Alcohol/week: 6 0 standard drinks of alcohol     Types: 6 Standard drinks or equivalent per week   • Drug use: Yes     Frequency: 7 0 times per week     Types: Marijuana     Comment: Medical marijuana   • Sexual activity: Yes     Partners: Female   Other Topics Concern   • None   Social History Narrative   • None     Social Determinants of Health     Financial Resource Strain: Low Risk  (2023)    Overall Financial Resource Strain (CARDIA)    • Difficulty of Paying Living Expenses: Not hard at all   Food Insecurity: Not on file   Transportation Needs: No Transportation Needs (2023)    PRAPARE - Transportation    • Lack of Transportation (Medical): No    • Lack of Transportation (Non-Medical):  No   Physical Activity: Not on file   Stress: Not on file   Social Connections: Not on file   Intimate Partner Violence: Not on file   Housing Stability: Not on file      Medications and Allergies:     Current Outpatient Medications   Medication Sig Dispense Refill   • acetaminophen (TYLENOL) 650 mg CR tablet Take 1 tablet (650 mg total) by mouth every 8 (eight) hours as needed for mild pain 30 tablet 0   • amitriptyline (ELAVIL) 100 mg tablet take 1 to 2 tablets by mouth at bedtime 60 tablet 5   • azaTHIOprine (IMURAN) 50 mg tablet Take 1 tablet (50 mg total) by mouth daily 30 tablet 11   • buPROPion (Wellbutrin SR) 150 mg 12 hr tablet Take 1 tablet (150 mg total) by mouth 2 (two) times a day 60 tablet 5   • cholestyramine (QUESTRAN) 4 GM/DOSE powder Take 1 packet (4 g total) by mouth 3 (three) times a day with meals 378 g 2   • lisinopril (ZESTRIL) 20 mg tablet take 1 tablet by mouth once daily 90 tablet 3   • naproxen (NAPROSYN) 500 mg tablet Take 1 tablet (500 mg total) by mouth 2 (two) times a day with meals 60 tablet 5   • Naproxen Sodium 220 MG CAPS Take 1 capsule (220 mg total) by mouth 2 (two) times a day 60 capsule 0   • pregabalin (LYRICA) 150 mg capsule Take 1 capsule (150 mg total) by mouth 3 (three) times a day 90 capsule 5   • RA Vitamin D-3 25 MCG (1000 UT) tablet TAKE 2 TABLETS BY MOUTH DAILY 60 tablet 2   • sildenafil (Viagra) 100 mg tablet Take 1 tablet (100 mg total) by mouth daily as needed for erectile dysfunction 30 tablet 2   • traMADol (Ultram) 50 mg tablet Take 1 tablet (50 mg total) by mouth every 6 (six) hours as needed for moderate pain 10 tablet 0   • vedolizumab (Entyvio) SOLR Inject 300 mg into a catheter in a vein every 4 weeks     • ciprofloxacin-dexamethasone (CIPRODEX) otic suspension Administer 4 drops to the right ear 2 (two) times a day (Patient not taking: Reported on 5/23/2023) 7 5 mL 0     No current facility-administered medications for this visit       No Known Allergies   Immunizations:     Immunization History   Administered Date(s) Administered   • COVID-19 MODERNA VACC 0 5 ML IM 03/25/2021, 04/22/2021   • Hep B, adult 11/18/2020   • Influenza, injectable, quadrivalent, preservative free 0 5 mL 10/24/2019, 09/09/2020, 10/13/2021   • Influenza, recombinant, quadrivalent,injectable, preservative free 10/27/2022   • MMR 11/18/2020   • Pneumococcal Conjugate 13-Valent 11/12/2020   • Zoster Vaccine Recombinant 11/12/2020      Health Maintenance:         Topic Date Due   • Hepatitis C Screening  Never done   • HIV Screening  Never done   • Colorectal Cancer Screening  03/06/2024         Topic Date Due   • Hepatitis A Vaccine (1 of 2 - Risk 2-dose series) Never done   • Hepatitis B Vaccine (2 of 3 - Risk 3-dose series) 12/16/2020   • Pneumococcal Vaccine: Pediatrics (0 to 5 Years) and At-Risk Patients (6 to 59 Years) (2 - PPSV23 if available, else PCV20) 01/07/2021   • COVID-19 Vaccine (3 - Moderna risk series) 05/20/2021      Medicare Screening Tests and Risk Assessments:     Last Medicare Wellness visit information reviewed, patient interviewed and updates made to the record today  Health Risk Assessment:   Patient rates overall health as good  Patient feels that their physical health rating is same  Patient is satisfied with their life  Eyesight was rated as same  Hearing was rated as same  Patient feels that their emotional and mental health rating is same  Patients states they are never, rarely angry  Patient states they are always unusually tired/fatigued  Pain experienced in the last 7 days has been a lot  Patient's pain rating has been 8/10  Patient states that he has experienced no weight loss or gain in last 6 months  Depression Screening:   PHQ-2 Score: 0      Fall Risk Screening: In the past year, patient has experienced: no history of falling in past year      Home Safety:  Patient does not have trouble with stairs inside or outside of their home  Patient has working smoke alarms and has working carbon monoxide detector  Home safety hazards include: none  Nutrition:   Current diet is Regular  Medications:   Patient is not currently taking any over-the-counter supplements  Patient is able to manage medications  Activities of Daily Living (ADLs)/Instrumental Activities of Daily Living (IADLs):   Walk and transfer into and out of bed and chair?: Yes  Dress and groom yourself?: Yes    Bathe or shower yourself?: Yes    Feed yourself?  Yes  Do your laundry/housekeeping?: Yes  Manage your money, pay your bills and track your expenses?: Yes  Make your own meals?: Yes    Do your own shopping?: Yes    Previous Hospitalizations:   Any hospitalizations or ED visits within the last 12 months?: No      Advance Care Planning:   Living will: Yes    Durable POA for healthcare: Yes    Advanced directive: Yes    Provider agrees with end of life decisions: Yes      Cognitive Screening:   Provider or family/friend/caregiver concerned regarding cognition?: No    PREVENTIVE SCREENINGS      Cardiovascular Screening:    General: Screening Not Indicated and History Lipid Disorder      Diabetes Screening:     General: Screening Current      Colorectal Cancer Screening:     General: Screening Current      Prostate Cancer Screening:    General: Screening Not Indicated      Osteoporosis Screening:    General: Screening Not Indicated      Abdominal Aortic Aneurysm (AAA) Screening:    Risk factors include: tobacco use        General: Screening Not Indicated      Lung Cancer Screening:     General: Risks and Benefits Discussed      Hepatitis C Screening:    General: Screening Current    Screening, Brief Intervention, and Referral to Treatment (SBIRT)    Screening      Single Item Drug Screening:  How often have you used an illegal drug (including marijuana) or a prescription medication for non-medical reasons in the past year? never    Single Item Drug Screen Score: 0  Interpretation: Negative screen for possible drug use disorder    Review of Current Opioid Use    Opioid Risk Tool (ORT) Interpretation: Complete Opioid Risk Tool (ORT)    No results found  Physical Exam:     /74 (BP Location: Left arm, Patient Position: Sitting, Cuff Size: Standard)   Pulse 99   Temp (!) 97 2 °F (36 2 °C) (Tympanic)   Ht 6' (1 829 m)   Wt 93 kg (205 lb)   SpO2 99%   BMI 27 80 kg/m²     Physical Exam  Vitals and nursing note reviewed  Constitutional:       General: He is not in acute distress  Appearance: He is well-developed  HENT:      Head: Normocephalic and atraumatic     Eyes:      Conjunctiva/sclera: Conjunctivae normal    Cardiovascular:      Rate and Rhythm: Normal rate and regular rhythm  Heart sounds: No murmur heard  Pulmonary:      Effort: Pulmonary effort is normal  No respiratory distress  Breath sounds: Normal breath sounds  Abdominal:      Palpations: Abdomen is soft  Tenderness: There is no abdominal tenderness  Musculoskeletal:         General: No swelling  Cervical back: Neck supple  Skin:     General: Skin is warm and dry  Capillary Refill: Capillary refill takes less than 2 seconds  Neurological:      Mental Status: He is alert     Psychiatric:         Mood and Affect: Mood normal           Balwinder Anthony MD

## 2023-06-08 ENCOUNTER — EVALUATION (OUTPATIENT)
Dept: OCCUPATIONAL THERAPY | Facility: CLINIC | Age: 53
End: 2023-06-08
Payer: MEDICARE

## 2023-06-08 DIAGNOSIS — M72.0 DUPUYTREN'S CONTRACTURE: ICD-10-CM

## 2023-06-08 PROCEDURE — 97760 ORTHOTIC MGMT&TRAING 1ST ENC: CPT | Performed by: OCCUPATIONAL THERAPIST

## 2023-06-08 PROCEDURE — 97140 MANUAL THERAPY 1/> REGIONS: CPT | Performed by: OCCUPATIONAL THERAPIST

## 2023-06-08 PROCEDURE — 97165 OT EVAL LOW COMPLEX 30 MIN: CPT | Performed by: OCCUPATIONAL THERAPIST

## 2023-06-08 NOTE — PROGRESS NOTES
OT Evaluation     Today's date: 2023  Patient name: Flaquita Jefferson  : 1970  MRN: [id number]  Referring provider: Chandler Mejia  Dx:   Encounter Diagnosis     ICD-10-CM    1  Dupuytren's contracture  M72 0 Ambulatory Referral to PT/OT Hand Therapy                     Assessment  Assessment details: Silvio Cagle presents s/p L dupuytren's release L RF/SF   His post op dressing was removed today and he will be fitted with custom HB ext orthosis   He has mild local edema  Sensation is impaired for the SF  He has limited with A/PROM of L ring and small finger   He is unable to use L hand for gripping , lifting , wt bearing   He is limited with use of L for ADL - self care, cutting food  He does not work   He has help from his wife as needed     Impairments: abnormal or restricted ROM, activity intolerance, impaired physical strength, lacks appropriate home exercise program and pain with function  Functional limitations: unable to use L for gripping , lifting , wt bearing ; limited use of L for self care- hygeine , cutting food  Symptom irritability: moderateUnderstanding of Dx/Px/POC: good   Prognosis: good    Goals  STG- 1 wk  1- I with HEP  2- compliant with custom orthosis    LTG- 6 wks  1- I  ADL- self care, cut food  2- make a composite fist  3- full ext L RF/SF  4- L  50% of R     Plan  Patient would benefit from: custom splinting, OT eval and skilled occupational therapy  Planned modality interventions: cryotherapy and thermotherapy: hydrocollator packs  Planned therapy interventions: activity modification, massage, joint mobilization, manual therapy, stretching, strengthening, therapeutic activities, therapeutic exercise, home exercise program, functional ROM exercises, fine motor coordination training and graded exercise  Frequency: 2x week  Duration in weeks: 4  Plan of Care beginning date: 2023  Treatment plan discussed with: patient        Subjective Evaluation    History of Present Illness  Date of surgery: 2023  Mechanism of injury: surgery  Mechanism of injury: L dupuytren's release L 4/5  Quality of life: fair    Pain  Current pain ratin  At best pain ratin  At worst pain ratin  Location: L hand   Quality: dull ache, sharp and tight  Relieving factors: rest    Social Support  Steps to enter house: yes  Stairs in house: yes   Lives in: multiple-level home  Lives with: spouse and adult children    Employment status: not working  Hand dominance: left    Treatments  No previous or current treatments  Patient Goals  Patient goals for therapy: decreased edema, decreased pain, increased motion, increased strength, independence with ADLs/IADLs and return to sport/leisure activities          Objective     Observations     Additional Observation Details  Suture volar L - 7 cm small finger , 4 cm ring - light bleeding     Tenderness     Additional Tenderness Details  Tender incision     Neurological Testing     Additional Neurological Details  C/o numbness L small finger   Decreased 2 point small finger - unable to give accurate report     Active Range of Motion     Left Wrist   Wrist flexion: 50 degrees   Wrist extension: 60 degrees   Radial deviation: 25 degrees   Ulnar deviation: 25 degrees     Left Digits    Flexion   Ring     MCP: 60    PIP: 60    DIP: 20  Little     MCP: 55    PIP: 30    DIP: 20  Extension   Ring     MCP: 10  Little     MCP: 15    PIP: 20    Passive Range of Motion     Left Digits   Flexion   Ring     MCP: 90    PIP: 95    DIP: 65  Little     MCP: 90    PIP: 62    DIP: 60    Strength/Myotome Testing     Right Wrist/Hand      (2nd hand position)     Trial 1: 30    Additional Strength Details  L not tested    Tests     Left Wrist/Hand   Positive extrinsic flexor tightness and intrinsic muscle tightness       Swelling     Left Wrist/Hand     Additional Swelling Details  Local edema L hand - surgical site             Precautions: universal       Manuals  Retrograde  5m            MOB - MCP/PIP/DIP 5m            intrinsic stretch  2m                         HEP  5x day             Custom HB ext orthosis - RF/SF 13m                                                                                          Ther Ex             A/PROM digits  2x10                                                                                                       Ther Activity                                       Gait Training                                       Modalities             MH  8m

## 2023-06-08 NOTE — PROGRESS NOTES
Assessment:   S/P left ring and small finger dupuytrens excision and ring finger Metacarpophalangeal joint release and small finger Metacarpophalangeal and proximal interphalangeal joint release - Left on 6/1/2023    Plan:   Continue with hand therapy  Continue with night time splinting for 6 weeks post op  Resume activities as tolerated  - use pain as a guide  Resume normal hygiene activities  - do not submerge for another 2-3 days    Follow Up:  6 weeks    To Do Next Visit:  Elder Abbott  - patient would also like to review      CHIEF COMPLAINT:  Chief Complaint   Patient presents with   • Left Hand - Post-op, Suture / Staple Removal     left ring and small finger dupuytrens excision and ring finger Metacarpophalangeal joint release and small finger Metacarpophalangeal and proximal interphalangeal joint release- 6/1/23         SUBJECTIVE:  Miya Auguste is a 50 y o  male who presents for follow up after left ring and small finger dupuytrens excision and ring finger Metacarpophalangeal joint release and small finger Metacarpophalangeal and proximal interphalangeal joint release - Left on 6/1/2023  Today patient has no pain  He denies any issues with the operative site, however, notes that the right small finger is giving him some discomfort  He has been to therapy, and is wearing his brace at night  He has some numbness on the small finger  He denies any other acute complaints          PHYSICAL EXAMINATION:  Vital signs: /91   Pulse 85   Ht 6' (1 829 m)   Wt 93 kg (205 lb)   BMI 27 80 kg/m²   General: well developed and well nourished, alert, oriented times 3 and appears comfortable  Psychiatric: Normal    MUSCULOSKELETAL EXAMINATION:  Incision: Clean, dry, intact  Range of Motion: Limited due to stiffness  Neurovascular status: cap refill intact   Numbness on the ulnar side of the small finger  Activity Restrictions: No restrictions  Done today: Sutures out and Steri strips applied      STUDIES REVIEWED:  No Studies to review      PROCEDURES PERFORMED:  Procedures  No Procedures performed today

## 2023-06-09 ENCOUNTER — OFFICE VISIT (OUTPATIENT)
Dept: OBGYN CLINIC | Facility: HOSPITAL | Age: 53
End: 2023-06-09

## 2023-06-09 ENCOUNTER — TELEPHONE (OUTPATIENT)
Dept: OBGYN CLINIC | Facility: HOSPITAL | Age: 53
End: 2023-06-09

## 2023-06-09 VITALS
HEART RATE: 85 BPM | SYSTOLIC BLOOD PRESSURE: 132 MMHG | DIASTOLIC BLOOD PRESSURE: 91 MMHG | WEIGHT: 205 LBS | HEIGHT: 72 IN | BODY MASS INDEX: 27.77 KG/M2

## 2023-06-09 DIAGNOSIS — Z47.89 AFTERCARE FOLLOWING SURGERY OF THE MUSCULOSKELETAL SYSTEM: Primary | ICD-10-CM

## 2023-06-09 PROCEDURE — 99024 POSTOP FOLLOW-UP VISIT: CPT | Performed by: PHYSICIAN ASSISTANT

## 2023-06-09 NOTE — TELEPHONE ENCOUNTER
Patient needs a 6 week follow up    [facility] office preferred    po l hand poojauytrens realease DOS 6/1/2023-8/30/23    Phone [phone]

## 2023-06-11 DIAGNOSIS — G89.29 CHRONIC BILATERAL LOW BACK PAIN WITHOUT SCIATICA: ICD-10-CM

## 2023-06-11 DIAGNOSIS — M54.50 CHRONIC BILATERAL LOW BACK PAIN WITHOUT SCIATICA: ICD-10-CM

## 2023-06-11 RX ORDER — NAPROXEN 500 MG/1
TABLET ORAL
Qty: 60 TABLET | Refills: 5 | Status: SHIPPED | OUTPATIENT
Start: 2023-06-11

## 2023-06-12 ENCOUNTER — OFFICE VISIT (OUTPATIENT)
Dept: OCCUPATIONAL THERAPY | Facility: CLINIC | Age: 53
End: 2023-06-12
Payer: MEDICARE

## 2023-06-12 DIAGNOSIS — M72.0 DUPUYTREN'S CONTRACTURE: Primary | ICD-10-CM

## 2023-06-12 PROCEDURE — 97140 MANUAL THERAPY 1/> REGIONS: CPT | Performed by: OCCUPATIONAL THERAPIST

## 2023-06-12 PROCEDURE — 97110 THERAPEUTIC EXERCISES: CPT | Performed by: OCCUPATIONAL THERAPIST

## 2023-06-12 NOTE — PROGRESS NOTES
Daily Note     Today's date: 2023  Patient name: Marylouise Mortimer  : 1970  MRN: [id number]  Referring provider: Marii Jaquez PA-C  Dx:   Encounter Diagnosis     ICD-10-CM    1  Dupuytren's contracture  M72 0                      Subjective: I am doing ok       Objective: See treatment diary below      Assessment: Tolerated treatment well  Patient has improved ROM   Wound slightly open , tx with gauze and coban    Plan: Continue per plan of care        Precautions: universal       Manuals            Retrograde  5m 5m           MOB - MCP/PIP/DIP 5m 5m           intrinsic stretch  2m 2m           Flexor stretch   2m           HEP  5x day             Custom HB ext orthosis - RF/SF 13m                                                                                          Ther Ex             A/PROM digits  2x10 8m           Blocking PIP /DIP   2m                                                                                         Ther Activity                                       Gait Training                                       Modalities               8m 8m

## 2023-06-20 ENCOUNTER — OFFICE VISIT (OUTPATIENT)
Dept: OCCUPATIONAL THERAPY | Facility: CLINIC | Age: 53
End: 2023-06-20
Payer: MEDICARE

## 2023-06-20 DIAGNOSIS — M72.0 DUPUYTREN'S CONTRACTURE: Primary | ICD-10-CM

## 2023-06-20 PROCEDURE — 97110 THERAPEUTIC EXERCISES: CPT | Performed by: OCCUPATIONAL THERAPIST

## 2023-06-20 PROCEDURE — 97140 MANUAL THERAPY 1/> REGIONS: CPT | Performed by: OCCUPATIONAL THERAPIST

## 2023-06-20 NOTE — PROGRESS NOTES
Daily Note     Today's date: 2023  Patient name: Vicenta Sands  : 1970  MRN: [id number]  Referring provider: Evalee Bence, PA-C  Dx:   Encounter Diagnosis     ICD-10-CM    1  Dupuytren's contracture  M72 0                      Subjective: I am doing better      Objective: See treatment diary below      Assessment: Tolerated treatment well  Patient has improved ext   Wound closed       Plan: Continue per plan of care        Precautions: universal       Manuals           Retrograde  5m 5m 5m          MOB - MCP/PIP/DIP 5m 5m 5m          intrinsic stretch  2m 2m 2m          Flexor stretch   2m 2m          HEP  5x day             Custom HB ext orthosis - RF/SF 13m            Coban and tubigrip    apply                                                                           Ther Ex             A/PROM digits  2x10 8m 8m          Blocking PIP /DIP   2m 2m          powerweb     Red  3x10          Flex  ext   Yellow  3x10                                                              Ther Activity                                       Gait Training                                       Modalities             MH  8m 8m 8m

## 2023-06-23 ENCOUNTER — OFFICE VISIT (OUTPATIENT)
Dept: OCCUPATIONAL THERAPY | Facility: CLINIC | Age: 53
End: 2023-06-23
Payer: MEDICARE

## 2023-06-23 DIAGNOSIS — M72.0 DUPUYTREN'S CONTRACTURE: Primary | ICD-10-CM

## 2023-06-23 PROCEDURE — 97140 MANUAL THERAPY 1/> REGIONS: CPT | Performed by: OCCUPATIONAL THERAPIST

## 2023-06-23 PROCEDURE — 97110 THERAPEUTIC EXERCISES: CPT | Performed by: OCCUPATIONAL THERAPIST

## 2023-06-23 NOTE — PROGRESS NOTES
Daily Note     Today's date: 2023  Patient name: Shekhar Ramires  : 1970  MRN: [id number]  Referring provider: Haseeb Ye PA-C  Dx:   Encounter Diagnosis     ICD-10-CM    1  Dupuytren's contracture  M72 0                      Subjective: I am doing better      Objective: See treatment diary below      Assessment: Tolerated treatment well  Patient has improved ext       Plan: Continue per plan of care        Precautions: universal       Manuals          Retrograde  5m 5m 5m 5m         MOB - MCP/PIP/DIP 5m 5m 5m 5m         intrinsic stretch  2m 2m 2m 2m         Flexor stretch   2m 2m 2m         HEP  5x day             Custom HB ext orthosis - RF/SF 13m            Coban and tubigrip    apply elastomer to scar for night                                                                          Ther Ex             A/PROM digits  2x10 8m 8m 6m         Blocking PIP /DIP   2m 2m 2m         powerweb     Red  3x10 Green  3x10         Flex  ext   Yellow  3x10 Yellow  3x10                                                             Ther Activity                                       Gait Training                                       Modalities               8m 8m 8m 8m

## 2023-06-27 ENCOUNTER — OFFICE VISIT (OUTPATIENT)
Dept: OCCUPATIONAL THERAPY | Facility: CLINIC | Age: 53
End: 2023-06-27
Payer: MEDICARE

## 2023-06-27 DIAGNOSIS — M72.0 DUPUYTREN'S CONTRACTURE: Primary | ICD-10-CM

## 2023-06-27 PROCEDURE — 97110 THERAPEUTIC EXERCISES: CPT | Performed by: OCCUPATIONAL THERAPIST

## 2023-06-27 PROCEDURE — 97140 MANUAL THERAPY 1/> REGIONS: CPT | Performed by: OCCUPATIONAL THERAPIST

## 2023-06-27 NOTE — PROGRESS NOTES
Daily Note     Today's date: 2023  Patient name: Jorge Orellana  : 1970  MRN: [id number]  Referring provider: Cher Caro PA-C  Dx:   Encounter Diagnosis     ICD-10-CM    1  Dupuytren's contracture  M72 0                      Subjective: I doing better      Objective: See treatment diary below      Assessment: Tolerated treatment well  Patient has improved ext   Scar is well healed       Plan: Continue per plan of care        Precautions: universal       Manuals         Retrograde  5m 5m 5m 5m 5m        MOB - MCP/PIP/DIP 5m 5m 5m 5m 5m        intrinsic stretch  2m 2m 2m 2m 2m        Flexor stretch   2m 2m 2m 2m        HEP  5x day             Custom HB ext orthosis - RF/SF 13m            Coban and tubigrip    apply elastomer to scar for night                                                                          Ther Ex             A/PROM digits  2x10 8m 8m 6m 6m        Blocking PIP /DIP   2m 2m 2m 2m        powerweb     Red  3x10 Green  3x10 Green  3x10        Flex  ext   Yellow  3x10 Yellow  3x10 Yellow  3x10        Wrist ext      4#  3x10        Flexbar  p/s     Red  3x10        flexbar roll     2m                     Ther Activity                                       Gait Training                                       Modalities               8m 8m 8m 8m 8m

## 2023-06-29 ENCOUNTER — OFFICE VISIT (OUTPATIENT)
Dept: OCCUPATIONAL THERAPY | Facility: CLINIC | Age: 53
End: 2023-06-29
Payer: MEDICARE

## 2023-06-29 DIAGNOSIS — M72.0 DUPUYTREN'S CONTRACTURE: Primary | ICD-10-CM

## 2023-06-29 PROCEDURE — 97140 MANUAL THERAPY 1/> REGIONS: CPT | Performed by: OCCUPATIONAL THERAPIST

## 2023-06-29 PROCEDURE — 97110 THERAPEUTIC EXERCISES: CPT | Performed by: OCCUPATIONAL THERAPIST

## 2023-06-29 NOTE — PROGRESS NOTES
Daily Note     Today's date: 2023  Patient name: Jay Kennedy  : 1970  MRN: [id number]  Referring provider: Cyndy Seals PA-C  Dx:   Encounter Diagnosis     ICD-10-CM    1  Dupuytren's contracture  M72 0                      Subjective: I am doing better      Objective: See treatment diary below      Assessment: Tolerated treatment well  Patient has good ROM   Scars are flat         Plan: as needed     Precautions: universal       Manuals        Retrograde  5m 5m 5m 5m 5m 5m       MOB - MCP/PIP/DIP 5m 5m 5m 5m 5m 5m       intrinsic stretch  2m 2m 2m 2m 2m 2m       Flexor stretch   2m 2m 2m 2m        HEP  5x day             Custom HB ext orthosis - RF/SF 13m            Coban and tubigrip    apply elastomer to scar for night                                                                          Ther Ex             A/PROM digits  2x10 8m 8m 6m 6m 2m       Blocking PIP /DIP   2m 2m 2m 2m 2m       powerweb     Red  3x10 Green  3x10 Green  3x10 Black  3x10       Flex  ext   Yellow  3x10 Yellow  3x10 Yellow  3x10 Yellow  3x10       Wrist ext      4#  3x10 4#  3x10       Flexbar  p/s     Red  3x10 green  3x10       flexbar roll     2m 2m                    Ther Activity                                       Gait Training                                       Modalities             MH  8m 8m 8m 8m 8m 8m       CP      5m

## 2023-07-05 ENCOUNTER — OFFICE VISIT (OUTPATIENT)
Dept: OCCUPATIONAL THERAPY | Facility: CLINIC | Age: 53
End: 2023-07-05
Payer: MEDICARE

## 2023-07-05 DIAGNOSIS — M72.0 DUPUYTREN'S CONTRACTURE: Primary | ICD-10-CM

## 2023-07-05 PROCEDURE — 97110 THERAPEUTIC EXERCISES: CPT | Performed by: OCCUPATIONAL THERAPIST

## 2023-07-05 PROCEDURE — 97140 MANUAL THERAPY 1/> REGIONS: CPT | Performed by: OCCUPATIONAL THERAPIST

## 2023-07-05 NOTE — PROGRESS NOTES
Daily Note     Today's date: 2023  Patient name: Nik Robledo  : 1970  MRN: [id number]  Referring provider: Derian Monet PA-C  Dx:   Encounter Diagnosis     ICD-10-CM    1. Dupuytren's contracture  M72.0                      Subjective: still numb but getting better      Objective: See treatment diary below      Assessment: Tolerated treatment well. Patient has full ext. Composite flexion 1 cm to DPM       Plan: Continue per plan of care.       Precautions: universal       Manuals  7      Retrograde  5m 5m 5m 5m 5m 5m 5m      MOB - MCP/PIP/DIP 5m 5m 5m 5m 5m 5m 5m      intrinsic stretch  2m 2m 2m 2m 2m 2m 2m      Flexor stretch   2m 2m 2m 2m  2m      HEP  5x day             Custom HB ext orthosis - RF/SF 13m            Coban and tubigrip    apply elastomer to scar for night                                                                          Ther Ex             A/PROM digits  2x10 8m 8m 6m 6m 2m 2m      Blocking PIP /DIP   2m 2m 2m 2m 2m 2m      powerweb     Red  3x10 Green  3x10 Green  3x10 Black  3x10 Black  3x10      Flex  ext   Yellow  3x10 Yellow  3x10 Yellow  3x10 Yellow  3x10 Yellow  3x10      Wrist ext      4#  3x10 4#  3x10 4#  3x10      Flexbar  p/s     Red  3x10 green  3x10 Green  3x10      flexbar roll     2m 2m 2m                   Ther Activity             fxnal grasp        3.3#  Ball  3x10                   Gait Training                                       Modalities             MH  8m 8m 8m 8m 8m 8m 8m      CP      5m 5m

## 2023-07-11 ENCOUNTER — OFFICE VISIT (OUTPATIENT)
Dept: OCCUPATIONAL THERAPY | Facility: CLINIC | Age: 53
End: 2023-07-11
Payer: MEDICARE

## 2023-07-11 DIAGNOSIS — M72.0 DUPUYTREN'S CONTRACTURE: Primary | ICD-10-CM

## 2023-07-11 PROCEDURE — 97110 THERAPEUTIC EXERCISES: CPT | Performed by: OCCUPATIONAL THERAPIST

## 2023-07-11 PROCEDURE — 97140 MANUAL THERAPY 1/> REGIONS: CPT | Performed by: OCCUPATIONAL THERAPIST

## 2023-07-11 NOTE — PROGRESS NOTES
Daily Note     Today's date: 2023  Patient name: Celena Escobar  : 1970  MRN: [id number]  Referring provider: Rayray Vieyra PA-C  Dx:   Encounter Diagnosis     ICD-10-CM    1. Dupuytren's contracture  M72.0                      Subjective: My hand still hurts , I dropped the vacuum      Objective: See treatment diary below      Assessment: Tolerated treatment well. Patient has improved ROM . He continues to have pain that limits function. Plan: Continue per plan of care.       Precautions: universal       Manuals      Retrograde  5m 5m 5m 5m 5m 5m 5m 5m     MOB - MCP/PIP/DIP 5m 5m 5m 5m 5m 5m 5m 5m     intrinsic stretch  2m 2m 2m 2m 2m 2m 2m 2m     Flexor stretch   2m 2m 2m 2m  2m 2m     HEP  5x day             Custom HB ext orthosis - RF/SF 13m            Coban and tubigrip    apply elastomer to scar for night                                                                          Ther Ex             A/PROM digits  2x10 8m 8m 6m 6m 2m 2m 2m     Blocking PIP /DIP   2m 2m 2m 2m 2m 2m 2m     powerweb     Red  3x10 Green  3x10 Green  3x10 Black  3x10 Black  3x10 Black  3x10     Flex  ext   Yellow  3x10 Yellow  3x10 Yellow  3x10 Yellow  3x10 Yellow  3x10 Yellow  3x10     Wrist ext      4#  3x10 4#  3x10 4#  3x10 4#  3x10     Flexbar  p/s     Red  3x10 green  3x10 Green  3x10 Green  3x10     flexbar roll     2m 2m 2m 2m                  Ther Activity             fxnal grasp        3.3#  Ball  3x10 3.3#  Ball  3x10                  Gait Training                                       Modalities             MH  8m 8m 8m 8m 8m 8m 8m 8m     CP      5m 5m 5m

## 2023-07-13 ENCOUNTER — OFFICE VISIT (OUTPATIENT)
Dept: OCCUPATIONAL THERAPY | Facility: CLINIC | Age: 53
End: 2023-07-13
Payer: MEDICARE

## 2023-07-13 DIAGNOSIS — M72.0 DUPUYTREN'S CONTRACTURE: Primary | ICD-10-CM

## 2023-07-13 PROCEDURE — 97110 THERAPEUTIC EXERCISES: CPT | Performed by: OCCUPATIONAL THERAPIST

## 2023-07-13 PROCEDURE — 97140 MANUAL THERAPY 1/> REGIONS: CPT | Performed by: OCCUPATIONAL THERAPIST

## 2023-07-13 NOTE — PROGRESS NOTES
Daily Note     Today's date: 2023  Patient name: Lakeshia Agustin  : 1970  MRN: [id number]  Referring provider: Solo Paula PA-C  Dx:   Encounter Diagnosis     ICD-10-CM    1. Dupuytren's contracture  M72.0                      Subjective: I am doing better      Objective: See treatment diary below      Assessment: Tolerated treatment well. Patient has improved ROM . Plan: Continue per plan of care.       Precautions: universal       Manuals     Retrograde  5m 5m 5m 5m 5m 5m 5m 5m 5m    MOB - MCP/PIP/DIP 5m 5m 5m 5m 5m 5m 5m 5m 5m    intrinsic stretch  2m 2m 2m 2m 2m 2m 2m 2m 2m    Flexor stretch   2m 2m 2m 2m  2m 2m 2m    HEP  5x day             Custom HB ext orthosis - RF/SF 13m            Coban and tubigrip    apply elastomer to scar for night                                                                          Ther Ex             A/PROM digits  2x10 8m 8m 6m 6m 2m 2m 2m 2m    Blocking PIP /DIP   2m 2m 2m 2m 2m 2m 2m 2m    powerweb     Red  3x10 Green  3x10 Green  3x10 Black  3x10 Black  3x10 Black  3x10 Black  3x10    Flex  ext   Yellow  3x10 Yellow  3x10 Yellow  3x10 Yellow  3x10 Yellow  3x10 Yellow  3x10 Yellow  3x10    Wrist ext      4#  3x10 4#  3x10 4#  3x10 4#  3x10 4#  3x10    Flexbar  p/s     Red  3x10 green  3x10 Green  3x10 Green  3x10 Green  3x10    flexbar roll     2m 2m 2m 2m 2m                 Ther Activity             fxnal grasp        3.3#  Ball  3x10 3.3#  Ball  3x10 3.3#  Ball  3x10                 Gait Training                                       Modalities             MH  8m 8m 8m 8m 8m 8m 8m 8m 8m    CP      5m 5m 5m 5m

## 2023-07-14 ENCOUNTER — NURSE TRIAGE (OUTPATIENT)
Age: 53
End: 2023-07-14

## 2023-07-14 DIAGNOSIS — K50.814 CROHN'S DISEASE OF BOTH SMALL AND LARGE INTESTINE WITH ABSCESS (HCC): Primary | ICD-10-CM

## 2023-07-14 NOTE — TELEPHONE ENCOUNTER
Last OV: 4/21/23 Dr. Storm Bashir   Hx: Crohn's, vit D def, perianal fistula    Patient calling in, reports the last two weeks he has been having frequent loose stools with very mild abdominal cramping. Yesterday was the worst, loose BMs 8x- small amount of bright red blood when wiping. He had his entyvio infusion done today, infusions are every 4 weeks. He want to let Dr. Storm Bashir know about his symptoms.

## 2023-07-18 ENCOUNTER — RA CDI HCC (OUTPATIENT)
Dept: OTHER | Facility: HOSPITAL | Age: 53
End: 2023-07-18

## 2023-07-18 ENCOUNTER — EVALUATION (OUTPATIENT)
Dept: OCCUPATIONAL THERAPY | Facility: CLINIC | Age: 53
End: 2023-07-18
Payer: MEDICARE

## 2023-07-18 DIAGNOSIS — M72.0 DUPUYTREN'S CONTRACTURE: Primary | ICD-10-CM

## 2023-07-18 PROCEDURE — 97110 THERAPEUTIC EXERCISES: CPT | Performed by: OCCUPATIONAL THERAPIST

## 2023-07-18 PROCEDURE — 97140 MANUAL THERAPY 1/> REGIONS: CPT | Performed by: OCCUPATIONAL THERAPIST

## 2023-07-18 NOTE — PROGRESS NOTES
[address] [facility] coding opportunities       Chart reviewed, no opportunity found: CHART REVIEWED, NO OPPORTUNITY FOUND        Patients Insurance     Medicare Insurance: Medicare

## 2023-07-18 NOTE — PROGRESS NOTES
Daily Note     Today's date: 2023  Patient name: Veknata Mayorga  : 1970  MRN: [id number]  Referring provider: Leo Moon PA-C  Dx:   Encounter Diagnosis     ICD-10-CM    1. Dupuytren's contracture  M72.0                      Subjective: still pain       Objective: See treatment diary below           Assessment  Assessment details: Mary Fletcher presents s/p L dupuytren's release L RF/SF . His post op dressing was removed today and he will be fitted with custom HB ext orthosis . He has mild local edema. Sensation is impaired for the SF. He has limited with A/PROM of L ring and small finger . He is unable to use L hand for gripping , lifting , wt bearing . He is limited with use of L for ADL - self care, cutting food. He does not work . He has help from his wife as needed. Impairments: abnormal or restricted ROM, activity intolerance, impaired physical strength, lacks appropriate home exercise program and pain with function     23-Anand has improved ROM and strength . He has continued pain and impaired sensation.       Functional limitations: limited  use L for gripping , lifting , wt bearing    Symptom irritability: moderateUnderstanding of Dx/Px/POC: good   Prognosis: good    Goals  STG- 1 wk  1- I with HEP- met   2- compliant with custom orthosis-met   LTG- 6 wks  1- I  ADL- self care, cut food- met   2- make a composite fist-met  3- full ext L RF/SF-not met   4- L  50% of R -met  Plan  Patient would benefit from: custom splinting,  skilled occupational therapy  Planned modality interventions: cryotherapy and thermotherapy: hydrocollator packs  Planned therapy interventions: activity modification, massage, joint mobilization, manual therapy, stretching, strengthening, therapeutic activities, therapeutic exercise, home exercise program, functional ROM exercises, fine motor coordination training and graded exercise  Frequency: 2x week  Duration in weeks: 4  Plan of Care beginning date: 2023  Treatment plan discussed with: patient        Subjective Evaluation    History of Present Illness  Date of surgery: 2023  Mechanism of injury: surgery  Mechanism of injury: L dupuytren's release L . Quality of life: fair    Pain  Current pain ratin  At best pain ratin  At worst pain ratin  Location: L hand   Quality: dull ache, sharp and tight  Relieving factors: rest    Social Support  Steps to enter house: yes  Stairs in house: yes   Lives in: multiple-level home  Lives with: spouse and adult children    Employment status: not working  Hand dominance: left    Treatments  No previous or current treatments  Patient Goals  Patient goals for therapy: decreased edema, decreased pain, increased motion, increased strength, independence with ADLs/IADLs and return to sport/leisure activities          Objective     Observations     Additional Observation Details  scar 7 cm small finger , 4 cm ring    Tenderness     Additional Tenderness Details  Tender incision     Neurological Testing     Additional Neurological Details  C/o numbness L small finger   Decreased 2 point small finger - unable to give accurate report     Active Range of Motion          Left Digits    Flexion        MCP: 80 previous 55    PIP: 85 previous 30    DIP 60 prevous  20  Extension   Ring     MCP: 0  Little     MCP: 0    PIP: 20    Passive Range of Motion     Left Digits   Flexion   Ring     MCP: 90    PIP: 95    DIP: 65  Little     MCP: 90    PIP: 62    DIP: 60    Strength/Myotome Testing           (2nd hand position)   R/L =65/45         Tests     Left Wrist/Hand   Positive extrinsic flexor tightness and intrinsic muscle tightness.      Swelling     Left Wrist/Hand     Additional Swelling Details  Local edema L hand - surgical site              Plan: as needed     Precautions: universal       Manuals  7   Retrograde  5m 5m 5m 5m 5m 5m 5m 5m 5m 3m   MOB - MCP/PIP/DIP 5m 5m 5m 5m 5m 5m 5m 5m 5m 3m   intrinsic stretch  2m 2m 2m 2m 2m 2m 2m 2m 2m 2m   Flexor stretch   2m 2m 2m 2m  2m 2m 2m 2m   HEP  5x day             Custom HB ext orthosis - RF/SF 13m            Coban and tubigrip    apply elastomer to scar for night                                                                          Ther Ex             A/PROM digits  2x10 8m 8m 6m 6m 2m 2m 2m 2m 2m   Blocking PIP /DIP   2m 2m 2m 2m 2m 2m 2m 2m 2m   powerweb     Red  3x10 Green  3x10 Green  3x10 Black  3x10 Black  3x10 Black  3x10 Black  3x10 black   Flex  ext   Yellow  3x10 Yellow  3x10 Yellow  3x10 Yellow  3x10 Yellow  3x10 Yellow  3x10 Yellow  3x10 Yellow  3x10   Wrist ext      4#  3x10 4#  3x10 4#  3x10 4#  3x10 4#  3x10 5#  3x10   Flexbar  p/s     Red  3x10 green  3x10 Green  3x10 Green  3x10 Green  3x10 Green  3x10   flexbar roll     2m 2m 2m 2m 2m 2m                Ther Activity             fxnal grasp        3.3#  Ball  3x10 3.3#  Ball  3x10 3.3#  Ball  3x10 3.3#  Ball  3x10                Gait Training                                       Modalities             MH  8m 8m 8m 8m 8m 8m 8m 8m 8m 8m   CP      5m 5m 5m 5m 5m

## 2023-07-19 ENCOUNTER — APPOINTMENT (OUTPATIENT)
Dept: LAB | Facility: HOSPITAL | Age: 53
End: 2023-07-19
Payer: MEDICARE

## 2023-07-19 ENCOUNTER — LAB (OUTPATIENT)
Dept: LAB | Facility: HOSPITAL | Age: 53
End: 2023-07-19
Payer: MEDICARE

## 2023-07-19 DIAGNOSIS — K50.814 CROHN'S DISEASE OF BOTH SMALL AND LARGE INTESTINE WITH ABSCESS (HCC): ICD-10-CM

## 2023-07-19 LAB
BASOPHILS # BLD AUTO: 0.02 THOUSANDS/ÂΜL (ref 0–0.1)
BASOPHILS NFR BLD AUTO: 0 % (ref 0–1)
CRP SERPL QL: 7.7 MG/L
EOSINOPHIL # BLD AUTO: 0.3 THOUSAND/ÂΜL (ref 0–0.61)
EOSINOPHIL NFR BLD AUTO: 4 % (ref 0–6)
ERYTHROCYTE [DISTWIDTH] IN BLOOD BY AUTOMATED COUNT: 13 % (ref 11.6–15.1)
HCT VFR BLD AUTO: 49.8 % (ref 36.5–49.3)
HGB BLD-MCNC: 17.1 G/DL (ref 12–17)
IMM GRANULOCYTES # BLD AUTO: 0.01 THOUSAND/UL (ref 0–0.2)
IMM GRANULOCYTES NFR BLD AUTO: 0 % (ref 0–2)
LYMPHOCYTES # BLD AUTO: 2.29 THOUSANDS/ÂΜL (ref 0.6–4.47)
LYMPHOCYTES NFR BLD AUTO: 30 % (ref 14–44)
MCH RBC QN AUTO: 33.7 PG (ref 26.8–34.3)
MCHC RBC AUTO-ENTMCNC: 34.3 G/DL (ref 31.4–37.4)
MCV RBC AUTO: 98 FL (ref 82–98)
MONOCYTES # BLD AUTO: 0.58 THOUSAND/ÂΜL (ref 0.17–1.22)
MONOCYTES NFR BLD AUTO: 8 % (ref 4–12)
NEUTROPHILS # BLD AUTO: 4.48 THOUSANDS/ÂΜL (ref 1.85–7.62)
NEUTS SEG NFR BLD AUTO: 58 % (ref 43–75)
NRBC BLD AUTO-RTO: 0 /100 WBCS
PLATELET # BLD AUTO: 180 THOUSANDS/UL (ref 149–390)
PMV BLD AUTO: 10.4 FL (ref 8.9–12.7)
RBC # BLD AUTO: 5.07 MILLION/UL (ref 3.88–5.62)
WBC # BLD AUTO: 7.68 THOUSAND/UL (ref 4.31–10.16)

## 2023-07-19 PROCEDURE — 83993 ASSAY FOR CALPROTECTIN FECAL: CPT

## 2023-07-19 PROCEDURE — 36415 COLL VENOUS BLD VENIPUNCTURE: CPT

## 2023-07-19 PROCEDURE — 85025 COMPLETE CBC W/AUTO DIFF WBC: CPT

## 2023-07-19 PROCEDURE — 87493 C DIFF AMPLIFIED PROBE: CPT

## 2023-07-19 PROCEDURE — 80280 DRUG ASSAY VEDOLIZUMAB: CPT

## 2023-07-19 PROCEDURE — 82397 CHEMILUMINESCENT ASSAY: CPT

## 2023-07-19 PROCEDURE — 86140 C-REACTIVE PROTEIN: CPT

## 2023-07-19 NOTE — TELEPHONE ENCOUNTER
Let's get some labs/stool studies (ordered). Please ask pt to get these studies to see if he's having a flare. We only started the q4 week entyvio after the May labs, so it may take some time. We'll check drug levels to see if it's improving.

## 2023-07-19 NOTE — TELEPHONE ENCOUNTER
Called pt and told him orders were in to get labs/stool studies. He is going to [place] now to get the labs and will go back for stool studies.

## 2023-07-20 ENCOUNTER — HOSPITAL ENCOUNTER (OUTPATIENT)
Dept: SLEEP CENTER | Facility: HOSPITAL | Age: 53
Discharge: HOME | End: 2023-07-20
Attending: INTERNAL MEDICINE
Payer: MEDICARE

## 2023-07-20 ENCOUNTER — TELEPHONE (OUTPATIENT)
Dept: PULMONOLOGY | Facility: CLINIC | Age: 53
End: 2023-07-20

## 2023-07-20 ENCOUNTER — TELEPHONE (OUTPATIENT)
Dept: GASTROENTEROLOGY | Facility: CLINIC | Age: 53
End: 2023-07-20

## 2023-07-20 ENCOUNTER — CONSULT (OUTPATIENT)
Age: 53
End: 2023-07-20
Payer: MEDICARE

## 2023-07-20 VITALS
HEART RATE: 82 BPM | WEIGHT: 206 LBS | HEIGHT: 72 IN | TEMPERATURE: 97.8 F | OXYGEN SATURATION: 95 % | DIASTOLIC BLOOD PRESSURE: 86 MMHG | SYSTOLIC BLOOD PRESSURE: 120 MMHG | BODY MASS INDEX: 27.9 KG/M2

## 2023-07-20 DIAGNOSIS — R06.83 SNORING: ICD-10-CM

## 2023-07-20 DIAGNOSIS — G47.30 SLEEP APNEA, UNSPECIFIED TYPE: ICD-10-CM

## 2023-07-20 DIAGNOSIS — K50.814 CROHN'S DISEASE OF BOTH SMALL AND LARGE INTESTINE WITH ABSCESS (HCC): Primary | ICD-10-CM

## 2023-07-20 DIAGNOSIS — E66.3 OVERWEIGHT (BMI 25.0-29.9): ICD-10-CM

## 2023-07-20 DIAGNOSIS — G47.19 EXCESSIVE DAYTIME SLEEPINESS: ICD-10-CM

## 2023-07-20 DIAGNOSIS — G47.30 SLEEP APNEA, UNSPECIFIED TYPE: Primary | ICD-10-CM

## 2023-07-20 DIAGNOSIS — F17.200 SMOKER: ICD-10-CM

## 2023-07-20 LAB — C DIFF TOX GENS STL QL NAA+PROBE: NEGATIVE

## 2023-07-20 PROCEDURE — 99204 OFFICE O/P NEW MOD 45 MIN: CPT | Performed by: INTERNAL MEDICINE

## 2023-07-20 PROCEDURE — G0399 HOME SLEEP TEST/TYPE 3 PORTA: HCPCS

## 2023-07-20 RX ORDER — PREDNISONE 10 MG/1
TABLET ORAL
Qty: 98 TABLET | Refills: 0 | Status: SHIPPED | OUTPATIENT
Start: 2023-07-20 | End: 2023-08-24

## 2023-07-20 NOTE — TELEPHONE ENCOUNTER
We never recommend smoking anything.    Medical marijuana is something up to the patient and he is medical marijuana provider to address

## 2023-07-20 NOTE — TELEPHONE ENCOUNTER
Pt calling in stating he smokes medical marijuana to help him sleep at night and he forgot to ask during his appt if he can still use that prior to his sleep study tonight. Please advise.

## 2023-07-20 NOTE — ASSESSMENT & PLAN NOTE
· Liliana Grider has a high pretest probability for OSIRIS  · He has hx of snoring, EDS, and BMI 27.94  · Home study ordered  · I explained to the patient in details the pathophysiology of obstructive sleep apnea. I also explained the importance of treatment, and the consequences of untreated obstructive sleep apnea on underlying cardiovascular and cerebrovascular risk, morbidity, and mortality.

## 2023-07-20 NOTE — PROGRESS NOTES
Sleep Consultation   Nik Robledo 50 y.o. male MRN: [id number]      Reason for consultation: OSIRIS    Requesting physician: Dr. Celia Lagunas     Assessment/Plan  1. Sleep apnea, unspecified type  Assessment & Plan:  · Stef Romero has a high pretest probability for OSIRIS  · He has hx of snoring, EDS, and BMI 27.94  · Home study ordered  · I explained to the patient in details the pathophysiology of obstructive sleep apnea. I also explained the importance of treatment, and the consequences of untreated obstructive sleep apnea on underlying cardiovascular and cerebrovascular risk, morbidity, and mortality. Orders:  -     Ambulatory Referral to Sleep Medicine  -     Home Study; Future    2. Snoring  -     Home Study; Future    3. Excessive daytime sleepiness  -     Home Study; Future    4. Overweight (BMI 25.0-29. 9)  Assessment & Plan:  BMI 27.94    Orders:  -     Home Study; Future    5. Smoker          History of Present Illness   HPI:  Nik Robledo is a 50 y.o. male with PMHx as below who comes in for evaluation of is here for new consultation for possible obstructive sleep apnea the patient has history of loud snoring is witnessed by his wife he also has excessive daytime sleepiness requiring a daily nap for about 1 hour, unfortunately he is disabled due to multiple back surgeries and Crohn's disease he averages sleep between 11 PM and 6 AM but he tries to sleep at 9 PM and he takes amitriptyline to help falling asleep which has been successful. He has multiple awakenings overnight for using the bathroom or sometimes just being awakened due to snoring he gets occasional headaches 1-2 times per month and he has sleep talking history and acid reflux he drinks 3 cups of coffee per day he smokes 1 cigarette pack per week and he has about 15-pack-year history of smoking, drinks 2 beers per night and he is also using medical marijuana. Sitting and reading:  Moderate chance of dozing  Watching TV: Moderate chance of dozing  Sitting, inactive in a public place (e.g. a theatre or a meeting): Moderate chance of dozing  As a passenger in a car for an hour without a break: Would never doze  Lying down to rest in the afternoon when circumstances permit: Moderate chance of dozing  Sitting and talking to someone: Would never doze  Sitting quietly after a lunch without alcohol: Moderate chance of dozing  In a car, while stopped for a few minutes in traffic: Would never doze  Total score: 10    He denies significant shortness of breath, cough or sputum production      Historical Information   Past Medical History:   Diagnosis Date   • Anxiety    • Back pain    • Colon polyp    • Crohn's disease ([address] [facility])    • Depression    • GERD (gastroesophageal reflux disease)    • Hypertension    • Perianal fistula    • Terminal ileitis [facility])      Past Surgical History:   Procedure Laterality Date   • ABSCESS DRAINAGE     • BACK SURGERY  9257   • CAST APPLICATION Right 43/77/9370    Procedure: Application short-arm splint;  Surgeon: April Levin MD;  Location:  MAIN OR;  Service: Orthopedics   • COLONOSCOPY     • HAND CONTRACTURE RELEASE Left 6/1/2023    Procedure: left ring and small finger dupuytrens excision and ring finger Metacarpophalangeal joint release and small finger Metacarpophalangeal and proximal interphalangeal joint release;  Surgeon: April Levin MD;  Location:  MAIN OR;  Service: Orthopedics   • HERNIA REPAIR      umbilical hernia   • MS ANRCT XM SURG REQ ANES GENERAL SPI/EDRL DX N/A 11/17/2021    Procedure: EXAM UNDER ANESTHESIA (EUA); Surgeon: Sue Barger MD;  Location:  MAIN OR;  Service: Colorectal   • MS FASCIOTOMY PALMAR OPEN PARTIAL Right 11/17/2022    Procedure: Dupuytren's excision right palm extending into the small finger with release of the metacarpophalangeal joint and proximal interphalangeal joint.   Dupuytren's excision right palm with release of the right ring finger metacarpophalangeal joint.;  Surgeon: Sariah Chowdary MD;  Location: [facility] MAIN OR;  Service: Orthopedics   • NC I&D ISCHIORECTAL&/PERIRECTAL ABSCESS SPX Right 10/24/2021    Procedure: excisional debredement right gluteal cheek ;  Surgeon: Hugh Bhagat MD;  Location: [facility] MAIN OR;  Service: General   • NC PLACEMENT SETON N/A 2021    Procedure: Maryam Beam;  Surgeon: Derrick Trevino MD;  Location: [facility] MAIN OR;  Service: Colorectal   • NC PRQ IMPLTJ NSTIM ELECTRODE ARRAY EPIDURAL Right 3/26/2021    Procedure: INSERTION THORACIC DORSAL COLUMN SPINAL CORD STIMULATOR PERCUTANEOUS W IMPLANTABLE PULSE GENERATOR, RIGHT;  Surgeon: Silvano Franco MD;  Location: [facility] MAIN OR;  Service: Neurosurgery   • SPINAL STIMULATOR PLACEMENT  2021     Family History   Problem Relation Age of Onset   • Heart disease Father    • Heart attack Father    • Colon cancer Neg Hx    • Colon polyps Neg Hx    • Inflammatory bowel disease Neg Hx      Social History     Socioeconomic History   • Marital status: /Civil Union     Spouse name: Not on file   • Number of children: Not on file   • Years of education: Not on file   • Highest education level: Not on file   Occupational History   • Not on file   Tobacco Use   • Smoking status: Former     Packs/day: 0.50     Years: 30.00     Total pack years: 15.00     Types: Cigarettes     Start date: 2019     Quit date: 2021     Years since quittin.4   • Smokeless tobacco: Never   Vaping Use   • Vaping Use: Every day   • Substances: THC   Substance and Sexual Activity   • Alcohol use: Not Currently     Alcohol/week: 6.0 standard drinks of alcohol     Types: 6 Standard drinks or equivalent per week   • Drug use: Yes     Frequency: 7.0 times per week     Types: Marijuana     Comment: Medical marijuana   • Sexual activity: Yes     Partners: Female   Other Topics Concern   • Not on file   Social History Narrative   • Not on file     Social Determinants of Health     Financial Resource Strain: Low Risk  (6/7/2023)    Overall Financial Resource Strain (CARDIA)    • Difficulty of Paying Living Expenses: Not hard at all   Food Insecurity: Not on file   Transportation Needs: No Transportation Needs (6/7/2023)    PRAPARE - Transportation    • Lack of Transportation (Medical): No    • Lack of Transportation (Non-Medical): No   Physical Activity: Not on file   Stress: Not on file   Social Connections: Not on file   Intimate Partner Violence: Not on file   Housing Stability: Not on file       Occupational History: disability    Meds/Allergies   No Known Allergies    Home medications:  Prior to Admission medications    Medication Sig Start Date End Date Taking?  Authorizing Provider   acetaminophen (TYLENOL) 650 mg CR tablet Take 1 tablet (650 mg total) by mouth every 8 (eight) hours as needed for mild pain 6/1/23  Yes Miya Chang PA-C   amitriptyline (ELAVIL) 100 mg tablet take 1 to 2 tablets by mouth at bedtime 6/6/23  Yes Adriel Fonseca MD   azaTHIOprine (IMURAN) 50 mg tablet Take 1 tablet (50 mg total) by mouth daily 4/21/23  Yes Rene Tran MD   buPROPion (Wellbutrin SR) 150 mg 12 hr tablet Take 1 tablet (150 mg total) by mouth 2 (two) times a day 6/6/22  Yes Adriel Fonseca MD   cholestyramine Ted Muniz) 4 GM/DOSE powder Take 1 packet (4 g total) by mouth 3 (three) times a day with meals 4/21/23  Yes Rene Tarn MD   furosemide (LASIX) 20 mg tablet Take 1 tablet (20 mg total) by mouth daily 6/7/23  Yes Adriel Fonseca MD   lisinopril (ZESTRIL) 20 mg tablet take 1 tablet by mouth once daily 5/29/23  Yes Adriel Fonseca MD   naproxen (NAPROSYN) 500 mg tablet take 1 tablet by mouth twice a day with meals 6/11/23  Yes Adriel Fonseca MD   pregabalin (LYRICA) 150 mg capsule Take 1 capsule (150 mg total) by mouth 3 (three) times a day 2/6/23  Yes Adriel Fonseca MD   RA Vitamin D-3 25 MCG (1000 UT) tablet TAKE 2 TABLETS BY MOUTH DAILY 10/25/21  Yes Rene Tran MD   sildenafil (Viagra) 100 mg tablet Take 1 tablet (100 mg total) by mouth daily as needed for erectile dysfunction 6/21/22  Yes Jose Maria Kaur MD   traMADol (Ultram) 50 mg tablet Take 1 tablet (50 mg total) by mouth every 6 (six) hours as needed for moderate pain 6/1/23  Yes Sophia Graff PA-C   vedolizumab Maryesmer Collins) SOLR Inject 300 mg into a catheter in a vein every 4 weeks 4/21/23  Yes Gely Saini MD   ciprofloxacin-dexamethasone (CIPRODEX) otic suspension Administer 4 drops to the right ear 2 (two) times a day  Patient not taking: Reported on 5/23/2023 5/2/23   Loyda Wilson MD   Naproxen Sodium 220 MG CAPS Take 1 capsule (220 mg total) by mouth 2 (two) times a day 6/1/23 7/1/23  Sophia Graff PA-C       Vitals:   Blood pressure 120/86, pulse 82, temperature 97.8 °F (36.6 °C), height 6' (1.829 m), weight 93.4 kg (206 lb), SpO2 95 %. ,  Body mass index is 27.94 kg/m². Physical Exam  General:  Awake alert and oriented x 3, conversant without conversational dyspnea, NAD, normal affect  HEENT:   Sclera noninjected, nonicteric OU, Nares patent,  no craniofacial abnormalities, Mucous membranes, moist, no oral lesions, normal dentition  NECK:  Trachea midline, no accessory muscle use, no stridor,  JVP not elevated  CARDIAC: Reg, single s1/S2, no m/r/g  PULM: CTA bilaterally no wheezing, rhonchi or rales  ABD: Soft nontender, nondistended, no rebound, no rigidity, no guarding  EXT: No cyanosis, no clubbing, no edema, normal capillary refill  NEURO: no focal neurologic deficits, AAOx3, moving all extremities appropriately    Labs: I have personally reviewed pertinent lab results. , ABG: No results found for: "PHART", "KCK5HEY", "PO2ART", "XSD2ZQL", "F4RKTVOS", "BEART", "SOURCE", BNP: No results found for: "BNP", CBC:   Lab Results   Component Value Date    WBC 7.68 07/19/2023    HGB 17.1 (H) 07/19/2023    HCT 49.8 (H) 07/19/2023    MCV 98 07/19/2023     07/19/2023    RBC 5.07 07/19/2023    MCH 33.7 07/19/2023    MCHC 34.3 07/19/2023    RDW 13.0 07/19/2023    MPV 10.4 07/19/2023    NRBC 0 07/19/2023   , CMP: No results found for: "SODIUM", "K", "CL", "CO2", "ANIONGAP", "BUN", "CREATININE", "GLUCOSE", "CALCIUM", "AST", "ALT", "ALKPHOS", "PROT", "BILITOT", "EGFR", PT/INR: No results found for: "PT", "INR", Troponin: No results found for: "TROPONINI"  Lab Results   Component Value Date    WBC 7.68 07/19/2023    HGB 17.1 (H) 07/19/2023    HCT 49.8 (H) 07/19/2023    MCV 98 07/19/2023     07/19/2023      Lab Results   Component Value Date    GLUCOSE 110 02/20/2015    CALCIUM 9.2 06/01/2023     10/03/2016    K 4.0 06/01/2023    CO2 25 06/01/2023     (H) 06/01/2023    BUN 13 06/01/2023    CREATININE 0.94 06/01/2023     No results found for: "IRON", "TIBC", "FERRITIN"  Lab Results   Component Value Date    FKKDOTEN14 440 05/20/2020     No results found for: "FOLATE"      Sleep studies:  HST: ordered       Mel Fuller MD  [facility]       Portions of the record may have been created with voice recognition software. Occasional wrong word or "sound a like" substitutions may have occurred due to the inherent limitations of voice recognition software. Read the chart carefully and recognize, using context, where substitutions have occurred.

## 2023-07-20 NOTE — PATIENT INSTRUCTIONS
Sleep Apnea   AMBULATORY CARE:   Sleep apnea  is a condition that causes you to stop breathing often during sleep. Types of sleep apnea:   Obstructive sleep apnea (OSIRIS)  is the most common kind. The muscles and tissues around your throat relax and block air from passing through. Obesity, use of alcohol or cigarettes, or a family history are common causes. OSIRIS may increase your risk for complications after surgery. Central sleep apnea (CSA)  means your brain does not send signals to the muscles that control breathing. You do not take a breath even though your airway is open. Common causes include medical conditions such as heart failure, being older than 40, or use of opioids. Complex (or mixed) sleep apnea  means you have both obstructive and central sleep apnea. Common signs and symptoms:   Loud snoring or long pauses in breathing    Feeling sleepy, slow, and tired during the day    Snorting, gasping, or choking while you sleep, and waking up suddenly because of these    Feeling irritable during the day    Dry mouth or a headache in the mornings    Heavy night sweating    A hard time thinking, remembering things, or focusing on your tasks the following day    Call your local emergency number (911 in the [address]) if:   You have chest pain or trouble breathing. Call your doctor if:   You have new or worsening signs or symptoms. You have questions or concerns about your condition or care. Treatment  depends on the kind of apnea you have. A mouth device  may be needed if you have mild sleep apnea. These are designed to keep your throat open. Ask your dentist or healthcare provider about the best mouth device for you. A machine  may be used to help you get more air during sleep. A mask may be placed over your nose and mouth, or just your nose. The mask is hooked to the machine. You will get air through the mask.     A continuous positive airway pressure (CPAP) machine  is used to keep your airway open during sleep. The machine blows a gentle stream of air into the mask when you breathe. This helps keep your airway open so you can breathe more regularly. Extra oxygen may be given through the machine. A bilevel positive airway pressure (BiPAP) machine  gives air but lowers the pressure when you breathe out. An adaptive servo-ventilator (ASV)  is a machine that learns your usual breathing pattern. Then, it uses pressure to give you air and prevent stops in your breathing. Surgery  to expand your airway or remove extra tissues may be needed. Surgery is usually only considered if other treatments do not work. Manage or prevent sleep apnea:   Reach and maintain a healthy weight. Ask your healthcare provider what a healthy weight is for you. Ask your provider to help you create a safe weight loss plan if you are overweight. Even a small goal of a 10% weight loss can improve your symptoms. Do not smoke. Nicotine and other chemicals in cigarettes and cigars can cause lung damage. Ask your healthcare provider for information if you currently smoke and need help to quit. E-cigarettes or smokeless tobacco still contain nicotine. Talk to your healthcare provider before you use these products. Do not drink alcohol or take sedative medicine before you go to sleep. Alcohol and sedatives can relax the muscles and tissues around your throat. This can block the airflow to your lungs. Sleep on your side or use pillows designed to prevent sleep apnea. This prevents your tongue or other tissues from blocking your throat. You can also raise the head of your bed. Follow up with your doctor or specialist as directed: You may need to have blood tests during your follow-up visits. Work with your provider to find the right breathing support equipment and settings for you. Write down your questions so you remember to ask them during your visits.   © Copyright [employer] 2022 Information is for End User's use only and may not be sold, redistributed or otherwise used for commercial purposes. The above information is an  only. It is not intended as medical advice for individual conditions or treatments. Talk to your doctor, nurse or pharmacist before following any medical regimen to see if it is safe and effective for you.

## 2023-07-21 LAB — CALPROTECTIN STL-MCNT: 10 UG/G (ref 0–120)

## 2023-07-22 PROBLEM — G47.33 OSA (OBSTRUCTIVE SLEEP APNEA): Status: ACTIVE | Noted: 2023-07-20

## 2023-07-22 NOTE — PROGRESS NOTES
Home Sleep Study Documentation    HOME STUDY DEVICE: Noxturnal no                                           Serina G3 yes      Pre-Sleep Home Study:    Set-up and instructions performed by: Matt Hoskins performed demonstration for Patient: yes    Return demonstration performed by Patient: yes    Written instructions provided to Patient: yes    Patient signed consent form: yes        Post-Sleep Home Study:    Additional comments by Patient:     Home Sleep Study Failed:no:    Failure reason: N/A    Reported or Detected: N/A    Scored by: Alexys Dubois

## 2023-07-23 DIAGNOSIS — F41.9 ANXIETY: ICD-10-CM

## 2023-07-24 ENCOUNTER — OFFICE VISIT (OUTPATIENT)
Dept: OBGYN CLINIC | Facility: CLINIC | Age: 53
End: 2023-07-24

## 2023-07-24 ENCOUNTER — OFFICE VISIT (OUTPATIENT)
Dept: FAMILY MEDICINE CLINIC | Facility: CLINIC | Age: 53
End: 2023-07-24
Payer: MEDICARE

## 2023-07-24 VITALS
DIASTOLIC BLOOD PRESSURE: 83 MMHG | WEIGHT: 209 LBS | SYSTOLIC BLOOD PRESSURE: 118 MMHG | HEART RATE: 93 BPM | TEMPERATURE: 97.4 F | BODY MASS INDEX: 28.35 KG/M2 | OXYGEN SATURATION: 93 %

## 2023-07-24 VITALS
HEART RATE: 94 BPM | BODY MASS INDEX: 28.31 KG/M2 | DIASTOLIC BLOOD PRESSURE: 80 MMHG | SYSTOLIC BLOOD PRESSURE: 134 MMHG | WEIGHT: 209 LBS | HEIGHT: 72 IN

## 2023-07-24 DIAGNOSIS — G47.33 OBSTRUCTIVE SLEEP APNEA SYNDROME: Primary | ICD-10-CM

## 2023-07-24 DIAGNOSIS — G62.9 NEUROPATHY: ICD-10-CM

## 2023-07-24 DIAGNOSIS — M72.0 DUPUYTREN CONTRACTURE: ICD-10-CM

## 2023-07-24 DIAGNOSIS — M96.1 FAILED BACK SURGICAL SYNDROME: ICD-10-CM

## 2023-07-24 DIAGNOSIS — K50.011 TERMINAL ILEITIS WITH RECTAL BLEEDING (HCC): Primary | ICD-10-CM

## 2023-07-24 DIAGNOSIS — M72.0 DUPUYTREN'S CONTRACTURE OF BOTH HANDS: Primary | ICD-10-CM

## 2023-07-24 DIAGNOSIS — G47.34 NOCTURNAL HYPOXIA: ICD-10-CM

## 2023-07-24 DIAGNOSIS — Z96.89 S/P INSERTION OF SPINAL CORD STIMULATOR: ICD-10-CM

## 2023-07-24 PROCEDURE — 99214 OFFICE O/P EST MOD 30 MIN: CPT | Performed by: FAMILY MEDICINE

## 2023-07-24 PROCEDURE — 95806 SLEEP STUDY UNATT&RESP EFFT: CPT | Performed by: INTERNAL MEDICINE

## 2023-07-24 PROCEDURE — 99024 POSTOP FOLLOW-UP VISIT: CPT | Performed by: ORTHOPAEDIC SURGERY

## 2023-07-24 RX ORDER — BUPROPION HYDROCHLORIDE 150 MG/1
150 TABLET, EXTENDED RELEASE ORAL 2 TIMES DAILY
Qty: 60 TABLET | Refills: 5 | Status: SHIPPED | OUTPATIENT
Start: 2023-07-24

## 2023-07-24 NOTE — PROGRESS NOTES
Assessment/Plan:    Neuropathy  Due to failed back surg    Dupuytren contracture  Followed by hand surg    Terminal ileitis ([address])  freq flares---followed by GI    Failed back surgical syndrome  Pain/leg weakness continues       Diagnoses and all orders for this visit:    Terminal ileitis with rectal bleeding ([address])    Failed back surgical syndrome    S/P insertion of spinal cord stimulator    Neuropathy    Dupuytren contracture          Subjective:   Chief Complaint   Patient presents with   • Foot Swelling     Both feet        Patient ID: Bard Herring is a 50 y.o. male. F/u for SSI disability      The following portions of the patient's history were reviewed and updated as appropriate: allergies, current medications, past family history, past medical history, past social history, past surgical history and problem list.    Review of Systems   Constitutional: Negative for fatigue, fever and unexpected weight change. HENT: Negative for congestion, sinus pain and sore throat. Eyes: Negative for visual disturbance. Respiratory: Negative for shortness of breath and wheezing. Cardiovascular: Negative for chest pain and palpitations. Gastrointestinal: Negative for abdominal pain, nausea and vomiting. Musculoskeletal: Negative. Negative for arthralgias and myalgias. Neurological: Negative for syncope, weakness and numbness. Psychiatric/Behavioral: Negative. Negative for confusion, dysphoric mood and suicidal ideas. Continues w/ hand contr/back pain/neuropathy/Crohns not controlled  Objective:  Vitals:    07/24/23 0754   BP: 118/83   BP Location: Left arm   Patient Position: Sitting   Cuff Size: Standard   Pulse: 93   Temp: (!) 97.4 °F (36.3 °C)   SpO2: 93%   Weight: 94.8 kg (209 lb)      Physical Exam  Constitutional:       Appearance: He is well-developed. HENT:      Right Ear: Ear canal normal. Tympanic membrane is not injected.       Left Ear: Ear canal normal. Tympanic membrane is not injected. Nose: Nose normal.   Eyes:      General:         Right eye: No discharge. Left eye: No discharge. Conjunctiva/sclera: Conjunctivae normal.      Pupils: Pupils are equal, round, and reactive to light. Neck:      Thyroid: No thyromegaly. Cardiovascular:      Rate and Rhythm: Normal rate and regular rhythm. Heart sounds: Normal heart sounds. No murmur heard. Pulmonary:      Effort: Pulmonary effort is normal. No respiratory distress. Breath sounds: Normal breath sounds. No wheezing. Abdominal:      General: Bowel sounds are normal. There is no distension. Palpations: Abdomen is soft. Tenderness: There is no abdominal tenderness. Musculoskeletal:         General: Normal range of motion. Cervical back: Normal range of motion and neck supple. Lymphadenopathy:      Cervical: No cervical adenopathy. Skin:     General: Skin is warm and dry. Neurological:      Mental Status: He is alert and oriented to person, place, and time. He is not disoriented. Sensory: No sensory deficit. Gait: Gait normal.      Deep Tendon Reflexes: Reflexes are normal and symmetric. Psychiatric:         Speech: Speech normal.         Behavior: Behavior normal.         Thought Content:  Thought content normal.         Judgment: Judgment normal.

## 2023-07-24 NOTE — PROGRESS NOTES
Assessment:   S/P left ring and small finger dupuytrens excision and ring finger Metacarpophalangeal joint release and small finger Metacarpophalangeal and proximal interphalangeal joint release - Left on 6/1/2023    Plan:   He was advised to continue with massage and to work on his range of motion  He was advised to continue with nighttime extension splint  He will follow-up in 6 weeks for reevaluation -at that time he would also like to discuss the right hand as he is noting a flexion contracture of the small finger again. Follow Up:  6 weeks    To Do Next Visit:  Evaluation      CHIEF COMPLAINT:  Chief Complaint   Patient presents with   • Left Hand - Post-op     left ring and small finger dupuytrens excision and ring finger Metacarpophalangeal joint release and small finger Metacarpophalangeal and proximal interphalangeal joint release- 6/1/23             SUBJECTIVE:  Eladia Lane is a 50 y.o. male who presents for follow up after left ring and small finger dupuytrens excision and ring finger Metacarpophalangeal joint release and small finger Metacarpophalangeal and proximal interphalangeal joint release - Left on 6/1/2023. Today patient has some discomfort and numbness into the small finger. He has been working with therapy to work on his range of motion and scar tissue mobilization. He has been wearing his nighttime extension splint.        PHYSICAL EXAMINATION:  Vital signs: /80   Pulse 94   Ht 6' (1.829 m)   Wt 94.8 kg (208 lb 15.9 oz)   BMI 28.34 kg/m²   General: well developed and well nourished, alert, oriented times 3 and appears comfortable  Psychiatric: Normal    MUSCULOSKELETAL EXAMINATION:  Incision: clean, dry and healed  Range of Motion: As expected and Patient has slight flexion contracture appreciated at the PIP joint of the small finger  Neurovascular status: Neuro intact, good cap refill  Activity Restrictions: No restrictions    DASH SCORE: 24    STUDIES REVIEWED:  No Studies to review      PROCEDURES PERFORMED:  Procedures  No Procedures performed today

## 2023-07-26 ENCOUNTER — TELEPHONE (OUTPATIENT)
Dept: PULMONOLOGY | Facility: CLINIC | Age: 53
End: 2023-07-26

## 2023-07-26 NOTE — TELEPHONE ENCOUNTER
Pt calling in requesting a call in reference to his next steps after his sleep study. Pt has an order in for a cpap study he was not advised on. Please advise as pt states he has reached out to the sleep center for results and has not yet received a call.

## 2023-07-26 NOTE — TELEPHONE ENCOUNTER
Patient of Dr. Lyudmila George in the [facility] pulmonary office. Returned call from patient and advised sleep study resulted and shows moderate sleep apnea with hypoxemia. CPAP study ordered. Explained CPAP study to patient and scheduled 8/13/23 in [facility]. Instructions provided. Verbalized understanding.

## 2023-08-04 LAB — MISCELLANEOUS LAB TEST RESULT: NORMAL

## 2023-08-13 ENCOUNTER — HOSPITAL ENCOUNTER (OUTPATIENT)
Dept: SLEEP CENTER | Facility: CLINIC | Age: 53
Discharge: HOME | End: 2023-08-13
Payer: MEDICARE

## 2023-08-13 DIAGNOSIS — G47.33 OBSTRUCTIVE SLEEP APNEA SYNDROME: ICD-10-CM

## 2023-08-13 DIAGNOSIS — G47.34 NOCTURNAL HYPOXIA: ICD-10-CM

## 2023-08-13 PROCEDURE — 95811 POLYSOM 6/>YRS CPAP 4/> PARM: CPT

## 2023-08-13 PROCEDURE — 95811 POLYSOM 6/>YRS CPAP 4/> PARM: CPT | Performed by: INTERNAL MEDICINE

## 2023-08-14 NOTE — PROGRESS NOTES
Sleep Study Documentation    Pre-Sleep Study       Sleep testing procedure explained to patient:ERIC    Patient napped prior to study:NO    Caffeine:Dayshift worker after 12PM.  Caffeine use:YES- coffee  6 to 18 ounces and soda  12 ounces    Alcohol:Dayshift workers after 5PM: Alcohol use:NO    Typical day for patient:YES       Study Documentation    Sleep Study Indications: OSIRIS diagnosed HST    Sleep Study: Treatment   Optimal PAP pressure: [id number]  Leak:Small  Snore:Eliminated  REM Obtained:yes  Supplemental O2: no    Minimum SaO2 83%  Baseline SaO2 92%  PAP mask tried (list all) AirFit F30  PAP mask choice (final)AirFit F30 (Medium)  PAP mask type:full face  PAP pressure at which snoring was eliminated [id number]  Minimum SaO2 at final PAP pressure 88%  Mode of Therapy:CPAP  ETCO2:No  CPAP changed to BiPAP:No    Mode of Therapy:CPAP    EKG abnormalities: no     EEG abnormalities: no    Sleep Study Recorded < 2 hours: N/A    Sleep Study Recorded > 2 hours but incomplete study: N/A    Sleep Study Recorded 6 hours but no sleep obtained: NO    Patient classification: disabled       Post-Sleep Study    Medication used at bedtime or during sleep study:YES prescription sleep aid    Patient reports time it took to fall asleep:30 to 60 minutes    Patient reports waking up during study:Denied    Patient reports sleeping 6 to 8 hours without dreaming. Patient reports sleep during study:better than usual    Patient rated sleepiness: Not sleepy or tired    PAP treatment:yes: Post PAP treatment patient reports feeling better and  would wear PAP mask at home.

## 2023-08-18 DIAGNOSIS — G62.9 NEUROPATHY: ICD-10-CM

## 2023-08-18 RX ORDER — PREGABALIN 150 MG/1
150 CAPSULE ORAL 3 TIMES DAILY
Qty: 90 CAPSULE | Refills: 5 | Status: SHIPPED | OUTPATIENT
Start: 2023-08-18

## 2023-08-24 ENCOUNTER — TELEPHONE (OUTPATIENT)
Dept: SLEEP CENTER | Facility: CLINIC | Age: 53
End: 2023-08-24

## 2023-08-24 DIAGNOSIS — G47.33 OBSTRUCTIVE SLEEP APNEA SYNDROME: Primary | ICD-10-CM

## 2023-08-24 NOTE — TELEPHONE ENCOUNTER
Patient of Dr. Soren Martinez in the [facility] pulmonary office.      CPAP study resulted and per results, CPAP at setting of 14cm is recommended. Dr. Soren Martinez, would you like to place order for CPAP?

## 2023-08-25 LAB

## 2023-08-25 NOTE — TELEPHONE ENCOUNTER
Called patient and advised CPAP study resulted and CPAP ordered. Patient agreeable to use [employer] for DME provider. Advised I will send message to pulmonary office to process order. Someone from [address] [place] [address] should then reach out to arrange set up of machine. Also advised that insurance will require follow up with the physician 31-90 days after starting use of machine. I will send message to pulmonary office to reach out to schedule appointment. Patient agreeable.

## 2023-08-25 NOTE — TELEPHONE ENCOUNTER
Order placed for CPAP via [facility] for [facility]. I called Lisa Ojeda and made an appt for a compliance FU in [employer].

## 2023-09-15 LAB

## 2023-09-25 ENCOUNTER — TELEPHONE (OUTPATIENT)
Dept: OBGYN CLINIC | Facility: CLINIC | Age: 53
End: 2023-09-25

## 2023-09-25 ENCOUNTER — OFFICE VISIT (OUTPATIENT)
Dept: OBGYN CLINIC | Facility: CLINIC | Age: 53
End: 2023-09-25
Payer: MEDICARE

## 2023-09-25 VITALS
HEIGHT: 72 IN | DIASTOLIC BLOOD PRESSURE: 80 MMHG | WEIGHT: 206 LBS | BODY MASS INDEX: 27.9 KG/M2 | SYSTOLIC BLOOD PRESSURE: 123 MMHG

## 2023-09-25 DIAGNOSIS — M72.0 DUPUYTREN'S CONTRACTURE OF BOTH HANDS: Primary | ICD-10-CM

## 2023-09-25 PROCEDURE — 99214 OFFICE O/P EST MOD 30 MIN: CPT | Performed by: ORTHOPAEDIC SURGERY

## 2023-09-25 RX ORDER — CHLORHEXIDINE GLUCONATE ORAL RINSE 1.2 MG/ML
15 SOLUTION DENTAL ONCE
Status: CANCELLED | OUTPATIENT
Start: 2023-09-25 | End: 2023-09-25

## 2023-09-25 NOTE — TELEPHONE ENCOUNTER
Returned patient's call to let him know (ib voicemail) that I scheduled him for surgery 1/11/23 and scheduled his post op appointment 01/22/23 at 4:30pm.

## 2023-09-25 NOTE — PROGRESS NOTES
ASSESSMENT/PLAN:    Assessment:   S/p left hand ring and small finger Dupuytren's excision performed 6/1/2023    Right hand small finger Dupuytren's contracture    Plan:   Risks and benefits to surgical intervention for right hand small finger Dupuytren's excision  Patient would like to proceed with surgical intervention  Detailed consent was obtained for right hand small finger Dupuytren's excision  Therapy prescription was placed for custom nighttime extension splint, wound care, range of motion  He will follow-up after surgery for suture removal    Follow Up: After Surgery    To Do Next Visit:  Sutures out      Operative Discussions:     Standard Consent: The risks and benefits of the procedure were explained to the patient, which include, but are not limited to: Bleeding, infection, recurrence, pain, scar, damage to tendons, damage to nerves, and damage to blood vessels, failure to give desired results and complications related to anesthesia. These risks, along with alternative conservative treatment options, and postoperative protocols were voiced back and understood by the patient. All questions were answered to the patient's satisfaction. The patient agrees to comply with a standard postoperative protocol, and is willing to proceed. Education was provided via written and auditory forms. There were no barriers to learning. Written handouts regarding wound care, incision and scar care, and general preoperative information was provided to the patient. Prior to surgery, the patient may be requested to stop all anti-inflammatory medications. Prophylactic aspirin, Plavix, and Coumadin may be allowed to be continued. Medications including vitamin E., ginkgo, and fish oil are requested to be stopped approximately one week prior to surgery. Hypertensive medications and beta blockers, if taken, should be continued.     _____________________________________________________  CHIEF COMPLAINT:  Chief Complaint Patient presents with   • Left Hand - Follow-up     S/P left ring and small finger dupuytrens excision and ring finger Metacarpophalangeal joint release and small finger Metacarpophalangeal and proximal interphalangeal joint release - Left on 6/1/2023   • Right Hand - Follow-up     flexion contracture of the small finger again. SUBJECTIVE:  Tiara Montelongo is a 50 y.o. male who presents for follow-up regarding s/p left hand small finger and ring finger Dupuytren's excision. He states he still notes a slight flexion contracture at the PIP joint. He said he has been working on range of motion and stretching on his own. He states at this point he would like to proceed with a right hand small finger Dupuytren's excision as he is noting a more flexed contracture at the PIP joint. He previously did have surgery on this hand but is noting difficulty extending his small finger. He denies any numbness or tingling.     PAST MEDICAL HISTORY:  Past Medical History:   Diagnosis Date   • Anxiety    • Back pain    • Colon polyp    • Crohn's disease ([address])    • Depression    • GERD (gastroesophageal reflux disease)    • Hypertension    • Perianal fistula    • Terminal ileitis ([address])        PAST SURGICAL HISTORY:  Past Surgical History:   Procedure Laterality Date   • ABSCESS DRAINAGE     • BACK SURGERY  8670   • CAST APPLICATION Right 57/44/2722    Procedure: Application short-arm splint;  Surgeon: Clau Huggins MD;  Location:  MAIN OR;  Service: Orthopedics   • COLONOSCOPY     • HAND CONTRACTURE RELEASE Left 6/1/2023    Procedure: left ring and small finger dupuytrens excision and ring finger Metacarpophalangeal joint release and small finger Metacarpophalangeal and proximal interphalangeal joint release;  Surgeon: Clau Huggins MD;  Location:  MAIN OR;  Service: Orthopedics   • HERNIA REPAIR      umbilical hernia   • SD ANRCT XM SURG REQ ANES GENERAL SPI/EDRL DX N/A 11/17/2021    Procedure: EXAM UNDER ANESTHESIA (EUA); Surgeon: Juan Curtis MD;  [facility]: [facility];  Service: Colorectal   • RI FASCIOTOMY PALMAR OPEN PARTIAL Right 2022    Procedure: Dupuytren's excision right palm extending into the small finger with release of the metacarpophalangeal joint and proximal interphalangeal joint.   Dupuytren's excision right palm with release of the right ring finger metacarpophalangeal joint.;  Surgeon: Cristóbal Ann MD;  Location:  MAIN OR;  Service: Orthopedics   • RI I&D ISCHIORECTAL&/PERIRECTAL ABSCESS SPX Right 10/24/2021    Procedure: excisional debredement right gluteal cheek ;  Surgeon: Lela Leyva MD;  Location: [facility];  Service: General   • RI PLACEMENT SETON N/A 2021    Procedure: Dirk Marycruz;  Surgeon: Juan Curtis MD;  [facility]: [facility];  Service: Colorectal   • RI PRQ IMPLTJ NSTIM ELECTRODE ARRAY EPIDURAL Right 3/26/2021    Procedure: INSERTION THORACIC DORSAL COLUMN SPINAL CORD STIMULATOR PERCUTANEOUS W IMPLANTABLE PULSE GENERATOR, RIGHT;  Surgeon: Joycelyn Ibanez MD;  Location:  MAIN OR;  Service: Neurosurgery   • SPINAL STIMULATOR PLACEMENT  2021       FAMILY HISTORY:  Family History   Problem Relation Age of Onset   • Heart disease Father    • Heart attack Father    • Colon cancer Neg Hx    • Colon polyps Neg Hx    • Inflammatory bowel disease Neg Hx        SOCIAL HISTORY:  Social History     Tobacco Use   • Smoking status: Former     Packs/day: 0.50     Years: 30.00     Total pack years: 15.00     Types: Cigarettes     Start date: 2019     Quit date: 2021     Years since quittin.6   • Smokeless tobacco: Never   Vaping Use   • Vaping Use: Every day   • Substances: THC   Substance Use Topics   • Alcohol use: Not Currently     Alcohol/week: 6.0 standard drinks of alcohol     Types: 6 Standard drinks or equivalent per week   • Drug use: Yes     Frequency: 7.0 times per week     Types: Marijuana     Comment: Medical marijuana MEDICATIONS:    Current Outpatient Medications:   •  amitriptyline (ELAVIL) 100 mg tablet, take 1 to 2 tablets by mouth at bedtime, Disp: 60 tablet, Rfl: 5  •  azaTHIOprine (IMURAN) 50 mg tablet, Take 1 tablet (50 mg total) by mouth daily, Disp: 30 tablet, Rfl: 11  •  buPROPion (Wellbutrin SR) 150 mg 12 hr tablet, Take 1 tablet (150 mg total) by mouth 2 (two) times a day, Disp: 60 tablet, Rfl: 5  •  cholestyramine (QUESTRAN) 4 GM/DOSE powder, Take 1 packet (4 g total) by mouth 3 (three) times a day with meals, Disp: 378 g, Rfl: 2  •  furosemide (LASIX) 20 mg tablet, Take 1 tablet (20 mg total) by mouth daily, Disp: 90 tablet, Rfl: 3  •  lisinopril (ZESTRIL) 20 mg tablet, take 1 tablet by mouth once daily, Disp: 90 tablet, Rfl: 3  •  naproxen (NAPROSYN) 500 mg tablet, take 1 tablet by mouth twice a day with meals, Disp: 60 tablet, Rfl: 5  •  Naproxen Sodium 220 MG CAPS, Take 1 capsule (220 mg total) by mouth 2 (two) times a day, Disp: 60 capsule, Rfl: 0  •  pregabalin (LYRICA) 150 mg capsule, take 1 capsule by mouth three times a day, Disp: 90 capsule, Rfl: 5  •  RA Vitamin D-3 25 MCG (1000 UT) tablet, TAKE 2 TABLETS BY MOUTH DAILY, Disp: 60 tablet, Rfl: 2  •  sildenafil (Viagra) 100 mg tablet, Take 1 tablet (100 mg total) by mouth daily as needed for erectile dysfunction, Disp: 30 tablet, Rfl: 2  •  vedolizumab (Entyvio) SOLR, Inject 300 mg into a catheter in a vein every 4 weeks, Disp: , Rfl:     ALLERGIES:  No Known Allergies    REVIEW OF SYSTEMS:  Pertinent items are noted in HPI. A comprehensive review of systems was negative.     LABS:  HgA1c:   Lab Results   Component Value Date    HGBA1C 5.2 03/12/2021     BMP:   Lab Results   Component Value Date    GLUCOSE 110 02/20/2015    CALCIUM 9.2 06/01/2023     10/03/2016    K 4.0 06/01/2023    CO2 25 06/01/2023     (H) 06/01/2023    BUN 13 06/01/2023    CREATININE 0.94 06/01/2023       _____________________________________________________  PHYSICAL EXAMINATION:  Vital signs: /80   Ht 6' (1.829 m)   Wt 93.4 kg (206 lb)   BMI 27.94 kg/m²   General: well developed and well nourished, alert, oriented times 3 and appears comfortable  Psychiatric: Normal  HEENT: Trachea Midline, No torticollis  Cardiovascular: No discernable arrhythmia  Pulmonary: No wheezing or stridor  Abdomen: No rebound or guarding  Extremities: No peripheral edema  Skin: No masses, erythema, lacerations, fluctation, ulcerations  Neurovascular: Sensation Intact to the Median, Ulnar, Radial Nerve, Motor Intact to the Median, Ulnar, Radial Nerve and Pulses Intact    MUSCULOSKELETAL EXAMINATION:  Right hand small finger  41 degree flexion contracture at the pip and 15 degrees at the MCP joint    Left hand small finger  20 degree flexion contracture at the PIP joint  He is able to make a full composite fist  Incision has healed well without signs or symptoms of infection    _____________________________________________________  STUDIES REVIEWED:  No Studies to review      PROCEDURES PERFORMED:  Procedures  No Procedures performed today    Scribe Attestation    I,:  Katerina Canales PA-C am acting as a scribe while in the presence of the attending physician.:       I,:  Isha Garcia MD personally performed the services described in this documentation    as scribed in my presence.:

## 2023-09-26 ENCOUNTER — PREP FOR PROCEDURE (OUTPATIENT)
Dept: OBGYN CLINIC | Facility: CLINIC | Age: 53
End: 2023-09-26

## 2023-09-27 LAB

## 2023-10-02 ENCOUNTER — OFFICE VISIT (OUTPATIENT)
Dept: GASTROENTEROLOGY | Facility: CLINIC | Age: 53
End: 2023-10-02
Payer: MEDICARE

## 2023-10-02 ENCOUNTER — TELEPHONE (OUTPATIENT)
Dept: GASTROENTEROLOGY | Facility: CLINIC | Age: 53
End: 2023-10-02

## 2023-10-02 VITALS
BODY MASS INDEX: 28.99 KG/M2 | SYSTOLIC BLOOD PRESSURE: 133 MMHG | DIASTOLIC BLOOD PRESSURE: 66 MMHG | WEIGHT: 214 LBS | HEIGHT: 72 IN

## 2023-10-02 DIAGNOSIS — Z86.010 HISTORY OF COLON POLYPS: ICD-10-CM

## 2023-10-02 DIAGNOSIS — Z72.0 TOBACCO USE: ICD-10-CM

## 2023-10-02 DIAGNOSIS — K50.811 CROHN'S DISEASE OF BOTH SMALL AND LARGE INTESTINE WITH RECTAL BLEEDING (HCC): Primary | ICD-10-CM

## 2023-10-02 DIAGNOSIS — K21.9 GASTROESOPHAGEAL REFLUX DISEASE, UNSPECIFIED WHETHER ESOPHAGITIS PRESENT: ICD-10-CM

## 2023-10-02 PROCEDURE — 99214 OFFICE O/P EST MOD 30 MIN: CPT | Performed by: NURSE PRACTITIONER

## 2023-10-02 RX ORDER — POLYETHYLENE GLYCOL 3350 17 G/17G
238 POWDER, FOR SOLUTION ORAL ONCE
Qty: 238 G | Refills: 0 | Status: SHIPPED | OUTPATIENT
Start: 2023-10-02 | End: 2023-10-02

## 2023-10-02 RX ORDER — OMEPRAZOLE 40 MG/1
40 CAPSULE, DELAYED RELEASE ORAL DAILY
Qty: 30 CAPSULE | Refills: 6 | Status: SHIPPED | OUTPATIENT
Start: 2023-10-02

## 2023-10-02 NOTE — PROGRESS NOTES
[facility] - Outpatient Follow-up Note  Alonso Everett 50 y.o. male MRN: [id number]  Encounter: [id number]    ASSESSMENT AND PLAN:      1. Crohn's disease of both small and large intestine with rectal bleeding ([address])  Patient presents for follow-up after changes with his Entyvio dosing from every 2 months to every 4 weeks. Patient states that he does have increased symptoms within 2 weeks after his infusion. States his bowel movements could be anywhere from 5-6 to 8 to 9/day. He does state he does have some periodic blood on toilet tissue. Recent Entyvio level 46, antibodies undetected/<25. With patient's increased symptoms will repeat colonoscopy, considering worsening IBD vs IBS. Discussed trying not to use naproxen or NSAIDs for back pain, possibly Tylenol and/or discussed with PCP, pain management or physical therapy. - Colonoscopy scheduled at [facility]  - polyethylene glycol (MiraLax) 17 GM/SCOOP powder; Take 238 g by mouth once for 1 dose Take 238 g my mouth. Use as directed  Dispense: 238 g; Refill: 0  - CBC and differential; Future  - C-reactive protein; Future    2. Gastroesophageal reflux disease, unspecified whether esophagitis present  Patient states that he has been having increased heartburn/acid reflux symptoms with or without eating. However he does state that sometimes it is worse with dietary discretion such as pizza the other night. Consider with increased use of NSAIDs, gastric versus peptic ulcer. GERD, functional dyspepsia. Will initiate omeprazole 40 mg daily until EGD is completed. - EGD scheduled at [facility]  - omeprazole (PriLOSEC) 40 MG capsule; Take 1 capsule (40 mg total) by mouth daily  Dispense: 30 capsule; Refill: 6    3. Tobacco use  States he does continue to smoke cigarettes. Encouraged patient to consider quitting. Patient does state that he and his wife are looking into quitting together.     4. History of colon polyps  Colonoscopy 2022; 2-year recall      Followup Appointment: After procedures  ______________________________________________________________________    Chief Complaint   Patient presents with   • Follow-up     HPI:-year-old male past medical history of Crohn's, GERD, degenerative disc disease, anxiety and continued tobacco use presents for follow-up Crohn's disease. Patient was last seen in the office in April of this year by Dr. Mandeep Reilly and had a Crohn's flare in July and was treated with a prednisone taper. Patient states that he was actually little better during the taper however states he currently is having periods of normalcy around the times of his infusions which are now every 4 weeks however after approximately 2 weeks he states his bowel movements increase in symptoms return. In discussion with patient he states he is taking 2 naproxen every day or every other day for back pain. Discussed possibly using Tylenol versus an NSAID and/or following up with his PCP in order to have other testing or physical therapy evaluated. On exam, patient states he is having mid epigastric discomfort. He denies nausea or vomiting. States his bowel movements could be anywhere from 5 to 6/day to 8 to 9/day depending on what week after his infusion. With patient's initial lab work in April his CRP was slightly bumped at 4.4 otherwise blood work appear to be essentially normal.  His hemoglobin was 17. He did have Entyvio levels drawn 5/11 antibody level <25 (non detected) and Entyvio level was 46. Patient's last colonoscopy was 3/2022 with 2-year recall however with patient's increased symptoms and questionable IBD versus IBS consider repeating at this time. He has never had an EGD completed. Considering his increased acid reflux symptoms with or without eating will initiate omeprazole 40 mg daily and schedule EGD as well.     Historical Information   Past Medical History:   Diagnosis Date   • Anxiety    • Back pain    • Colon polyp • Crohn's disease ([facility])    • Depression    • GERD (gastroesophageal reflux disease)    • Hypertension    • Perianal fistula    • Terminal ileitis [facility])      Past Surgical History:   Procedure Laterality Date   • ABSCESS DRAINAGE     • BACK SURGERY  3379   • CAST APPLICATION Right 74/67/1076    Procedure: Application short-arm splint;  Surgeon: Maryjane Pineda MD;  Location[facility];  Service: Orthopedics   • COLONOSCOPY     • HAND CONTRACTURE RELEASE Left 6/1/2023    Procedure: left ring and small finger dupuytrens excision and ring finger Metacarpophalangeal joint release and small finger Metacarpophalangeal and proximal interphalangeal joint release;  Surgeon: Maryjane Pineda MD;  Location: [facility];  Service: Orthopedics   • HERNIA REPAIR      umbilical hernia   • VA ANRCT XM SURG REQ ANES GENERAL SPI/EDRL DX N/A 11/17/2021    Procedure: EXAM UNDER ANESTHESIA (EUA); Surgeon: Christoph Champagne MD;  [facility];  Service: Colorectal   • VA FASCIOTOMY PALMAR OPEN PARTIAL Right 11/17/2022    Procedure: Dupuytren's excision right palm extending into the small finger with release of the metacarpophalangeal joint and proximal interphalangeal joint.   Dupuytren's excision right palm with release of the right ring finger metacarpophalangeal joint.;  Surgeon: Maryjane Pineda MD;  Location: [facility];  Service: Orthopedics   • VA I&D ISCHIORECTAL&/PERIRECTAL ABSCESS SPX Right 10/24/2021    Procedure: excisional debredement right gluteal cheek ;  Surgeon: Naomy South MD;  Location: [facility];  Service: General   • VA PLACEMENT SETON N/A 11/17/2021    Procedure: Abimbola Bob;  Surgeon: Christoph Champagne MD;  [facility];  Service: Colorectal   • VA PRQ [address] NSTIM ELECTRODE ARRAY EPIDURAL Right 3/26/2021    Procedure: INSERTION THORACIC DORSAL COLUMN SPINAL CORD STIMULATOR PERCUTANEOUS W IMPLANTABLE PULSE GENERATOR, RIGHT;  Surgeon: Jayleen Maciel MD;  Location: [facility]; Service: Neurosurgery   • SPINAL STIMULATOR PLACEMENT  2021     Social History     Substance and Sexual Activity   Alcohol Use Not Currently   • Alcohol/week: 6.0 standard drinks of alcohol   • Types: 6 Standard drinks or equivalent per week     Social History     Substance and Sexual Activity   Drug Use Yes   • Frequency: 7.0 times per week   • Types: Marijuana    Comment: Medical marijuana     Social History     Tobacco Use   Smoking Status Former   • Packs/day: 0.50   • Years: 30.00   • Total pack years: 15.00   • Types: Cigarettes   • Start date: 2019   • Quit date: 2021   • Years since quittin.6   Smokeless Tobacco Never     Family History   Problem Relation Age of Onset   • Heart disease Father    • Heart attack Father    • Colon cancer Neg Hx    • Colon polyps Neg Hx    • Inflammatory bowel disease Neg Hx          Current Outpatient Medications:   •  amitriptyline (ELAVIL) 100 mg tablet  •  azaTHIOprine (IMURAN) 50 mg tablet  •  buPROPion (Wellbutrin SR) 150 mg 12 hr tablet  •  cholestyramine (QUESTRAN) 4 GM/DOSE powder  •  furosemide (LASIX) 20 mg tablet  •  lisinopril (ZESTRIL) 20 mg tablet  •  naproxen (NAPROSYN) 500 mg tablet  •  omeprazole (PriLOSEC) 40 MG capsule  •  polyethylene glycol (MiraLax) 17 GM/SCOOP powder  •  pregabalin (LYRICA) 150 mg capsule  •  RA Vitamin D-3 25 MCG (1000 UT) tablet  •  sildenafil (Viagra) 100 mg tablet  •  vedolizumab (Entyvio) SOLR  •  Naproxen Sodium 220 MG CAPS  No Known Allergies  Reviewed medications and allergies and updated as indicated    PHYSICAL EXAM:    Blood pressure 133/66, height 6' (1.829 m), weight 97.1 kg (214 lb). Body mass index is 29.02 kg/m². General Appearance: NAD, cooperative, alert  Eyes: Anicteric, PERRLA, EOMI  ENT:  Normocephalic, atraumatic, normal mucosa.     Neck:  Supple, symmetrical, trachea midline  Resp:  Clear to auscultation bilaterally; no rales, rhonchi or wheezing; respirations unlabored   CV:  S1 S2, Regular rate and rhythm; no murmur, rub, or gallop. GI:  Soft, mild mid epigastric discomfort with palpation, non-distended; normal bowel sounds; no masses, no organomegaly   Rectal: Deferred  Musculoskeletal: No cyanosis, clubbing or edema. Normal ROM. Skin:  No jaundice, rashes, or lesions   Heme/Lymph: No palpable cervical lymphadenopathy  Psych: Normal affect, good eye contact  Neuro: No gross deficits, AAOx3    Lab Results:   Lab Results   Component Value Date    WBC 7.68 07/19/2023    HGB 17.1 (H) 07/19/2023    HCT 49.8 (H) 07/19/2023    MCV 98 07/19/2023     07/19/2023     Lab Results   Component Value Date     10/03/2016    K 4.0 06/01/2023     (H) 06/01/2023    CO2 25 06/01/2023    ANIONGAP 23 (H) 02/20/2015    BUN 13 06/01/2023    CREATININE 0.94 06/01/2023    GLUCOSE 110 02/20/2015    GLUF 114 (H) 06/01/2023    CALCIUM 9.2 06/01/2023    AST 35 06/01/2023    ALT 48 06/01/2023    ALKPHOS 88 06/01/2023    PROT 7.3 10/03/2016    BILITOT 0.8 10/03/2016    EGFR 94 06/01/2023     No results found for: "IRON", "TIBC", "FERRITIN"  Lab Results   Component Value Date    LIPASE 100 10/23/2021       Radiology Results:   No results found.

## 2023-10-02 NOTE — TELEPHONE ENCOUNTER
Scheduled date of combo (as of today): 11/14/2023  Physician performing combo: Dr. Aceves  Location of combo:  [address] [place] [address]  Bowel prep reviewed with patient: Miralax/Dulcolax  Instructions reviewed with patient by: Gave pt instructions packet  Clearances: n/a

## 2023-10-04 ENCOUNTER — APPOINTMENT (OUTPATIENT)
Dept: LAB | Facility: HOSPITAL | Age: 53
End: 2023-10-04
Payer: MEDICARE

## 2023-10-04 DIAGNOSIS — K50.811 CROHN'S DISEASE OF BOTH SMALL AND LARGE INTESTINE WITH RECTAL BLEEDING (HCC): ICD-10-CM

## 2023-10-04 LAB
BASOPHILS # BLD AUTO: 0.02 THOUSANDS/ÂΜL (ref 0–0.1)
BASOPHILS NFR BLD AUTO: 0 % (ref 0–1)
CRP SERPL QL: 21.7 MG/L
EOSINOPHIL # BLD AUTO: 0.25 THOUSAND/ÂΜL (ref 0–0.61)
EOSINOPHIL NFR BLD AUTO: 3 % (ref 0–6)
ERYTHROCYTE [DISTWIDTH] IN BLOOD BY AUTOMATED COUNT: 13.5 % (ref 11.6–15.1)
HCT VFR BLD AUTO: 46.9 % (ref 36.5–49.3)
HGB BLD-MCNC: 15.8 G/DL (ref 12–17)
IMM GRANULOCYTES # BLD AUTO: 0.04 THOUSAND/UL (ref 0–0.2)
IMM GRANULOCYTES NFR BLD AUTO: 1 % (ref 0–2)
LYMPHOCYTES # BLD AUTO: 2.45 THOUSANDS/ÂΜL (ref 0.6–4.47)
LYMPHOCYTES NFR BLD AUTO: 30 % (ref 14–44)
MCH RBC QN AUTO: 34.3 PG (ref 26.8–34.3)
MCHC RBC AUTO-ENTMCNC: 33.7 G/DL (ref 31.4–37.4)
MCV RBC AUTO: 102 FL (ref 82–98)
MONOCYTES # BLD AUTO: 0.76 THOUSAND/ÂΜL (ref 0.17–1.22)
MONOCYTES NFR BLD AUTO: 9 % (ref 4–12)
NEUTROPHILS # BLD AUTO: 4.75 THOUSANDS/ÂΜL (ref 1.85–7.62)
NEUTS SEG NFR BLD AUTO: 57 % (ref 43–75)
NRBC BLD AUTO-RTO: 0 /100 WBCS
PLATELET # BLD AUTO: 231 THOUSANDS/UL (ref 149–390)
PMV BLD AUTO: 9.7 FL (ref 8.9–12.7)
RBC # BLD AUTO: 4.61 MILLION/UL (ref 3.88–5.62)
WBC # BLD AUTO: 8.27 THOUSAND/UL (ref 4.31–10.16)

## 2023-10-04 PROCEDURE — 86140 C-REACTIVE PROTEIN: CPT

## 2023-10-04 PROCEDURE — 85025 COMPLETE CBC W/AUTO DIFF WBC: CPT

## 2023-10-04 PROCEDURE — 36415 COLL VENOUS BLD VENIPUNCTURE: CPT

## 2023-10-31 ENCOUNTER — ANESTHESIA (OUTPATIENT)
Dept: ANESTHESIOLOGY | Facility: AMBULATORY SURGERY CENTER | Age: 53
End: 2023-10-31

## 2023-10-31 ENCOUNTER — ANESTHESIA EVENT (OUTPATIENT)
Dept: ANESTHESIOLOGY | Facility: AMBULATORY SURGERY CENTER | Age: 53
End: 2023-10-31

## 2023-10-31 ENCOUNTER — OFFICE VISIT (OUTPATIENT)
Dept: FAMILY MEDICINE CLINIC | Facility: CLINIC | Age: 53
End: 2023-10-31
Payer: MEDICARE

## 2023-10-31 VITALS
BODY MASS INDEX: 29.58 KG/M2 | SYSTOLIC BLOOD PRESSURE: 126 MMHG | HEIGHT: 72 IN | DIASTOLIC BLOOD PRESSURE: 82 MMHG | WEIGHT: 218.4 LBS

## 2023-10-31 DIAGNOSIS — N45.1 EPIDIDYMITIS, RIGHT: ICD-10-CM

## 2023-10-31 DIAGNOSIS — I78.9: Primary | ICD-10-CM

## 2023-10-31 PROCEDURE — 99214 OFFICE O/P EST MOD 30 MIN: CPT | Performed by: FAMILY MEDICINE

## 2023-10-31 RX ORDER — DOXYCYCLINE HYCLATE 100 MG
100 TABLET ORAL 2 TIMES DAILY
Qty: 20 TABLET | Refills: 0 | Status: SHIPPED | OUTPATIENT
Start: 2023-10-31 | End: 2023-11-10

## 2023-10-31 NOTE — PROGRESS NOTES
Assessment/Plan:    Capillary disorder  Capillary leaking related to ETOH and naproxen. Encouraged decreased ETOH consumption. Epididymitis, right  Testicular pain for approx 6 mth-1yr. Tender to palpation without swelling or masses. Ref urology. Ordered doxy x10 days. Diagnoses and all orders for this visit:    Capillary disorder    Epididymitis, right  -     Ambulatory Referral to Urology; Future  -     doxycycline hyclate (VIBRA-TABS) 100 mg tablet; Take 1 tablet (100 mg total) by mouth 2 (two) times a day for 10 days          Subjective:   Chief Complaint   Patient presents with    Testicle Pain     Skin irritation        Patient ID: Juan Antonio Montelongo is a 48 y.o. male. Testicle Pain  He complains of testicular pain. Associated symptoms include a rash. Pertinent negatives include no abdominal pain, chest pain, fever, nausea, shortness of breath, sore throat or vomiting. The following portions of the patient's history were reviewed and updated as appropriate: allergies, current medications, past family history, past medical history, past social history, past surgical history and problem list.    Review of Systems   Constitutional:  Negative for fatigue, fever and unexpected weight change. HENT:  Negative for congestion, sinus pain and sore throat. Eyes:  Negative for visual disturbance. Respiratory:  Negative for shortness of breath and wheezing. Cardiovascular:  Negative for chest pain and palpitations. Gastrointestinal:  Negative for abdominal pain, nausea and vomiting. Genitourinary:  Positive for testicular pain. Musculoskeletal: Negative. Negative for arthralgias and myalgias. Skin:  Positive for rash. Ecchymotic area to left arm    Neurological:  Negative for syncope, weakness and numbness. Psychiatric/Behavioral: Negative. Negative for confusion, dysphoric mood and suicidal ideas.           Objective:  Vitals:    10/31/23 0836   BP: 126/82   BP Location: Left arm   Patient Position: Sitting   Cuff Size: Standard   Weight: 99.1 kg (218 lb 6.4 oz)   Height: 6' (1.829 m)      Physical Exam  Constitutional:       Appearance: He is well-developed. HENT:      Right Ear: Ear canal normal. Tympanic membrane is not injected. Left Ear: Ear canal normal. Tympanic membrane is not injected. Nose: Nose normal.   Eyes:      General:         Right eye: No discharge. Left eye: No discharge. Conjunctiva/sclera: Conjunctivae normal.      Pupils: Pupils are equal, round, and reactive to light. Neck:      Thyroid: No thyromegaly. Cardiovascular:      Rate and Rhythm: Normal rate and regular rhythm. Heart sounds: Normal heart sounds. No murmur heard. Pulmonary:      Effort: Pulmonary effort is normal. No respiratory distress. Breath sounds: Normal breath sounds. No wheezing. Abdominal:      General: Bowel sounds are normal. There is no distension. Palpations: Abdomen is soft. Tenderness: There is no abdominal tenderness. Genitourinary:     Testes: Cremasteric reflex is present. Right: Tenderness present. Mass not present. Epididymis:      Right: Tenderness present. No mass. Musculoskeletal:         General: Normal range of motion. Cervical back: Normal range of motion and neck supple. Lymphadenopathy:      Cervical: No cervical adenopathy. Skin:     General: Skin is warm and dry. Neurological:      Mental Status: He is alert and oriented to person, place, and time. He is not disoriented. Sensory: No sensory deficit. Motor: No weakness. Coordination: Coordination normal.      Gait: Gait normal.      Deep Tendon Reflexes: Reflexes are normal and symmetric. Psychiatric:         Speech: Speech normal.         Behavior: Behavior normal.         Thought Content:  Thought content normal.         Judgment: Judgment normal.

## 2023-10-31 NOTE — ASSESSMENT & PLAN NOTE
Testicular pain for approx 6 mth-1yr. Tender to palpation without swelling or masses. Ref urology. Ordered doxy x10 days.

## 2023-11-03 ENCOUNTER — TELEPHONE (OUTPATIENT)
Dept: GASTROENTEROLOGY | Facility: CLINIC | Age: 53
End: 2023-11-03

## 2023-11-03 NOTE — TELEPHONE ENCOUNTER
Procedure confirmed  Colonoscopy/Endoscopy     Via: [facility]    Instructions given: Given to Patient at Visit     Prep Given: Miralax/Dulcolax    Call the office if there are any questions.

## 2023-11-14 ENCOUNTER — HOSPITAL ENCOUNTER (OUTPATIENT)
Dept: GASTROENTEROLOGY | Facility: AMBULATORY SURGERY CENTER | Age: 53
Discharge: HOME | End: 2023-11-14
Payer: MEDICARE

## 2023-11-14 ENCOUNTER — ANESTHESIA EVENT (OUTPATIENT)
Dept: GASTROENTEROLOGY | Facility: AMBULATORY SURGERY CENTER | Age: 53
End: 2023-11-14

## 2023-11-14 ENCOUNTER — ANESTHESIA (OUTPATIENT)
Dept: GASTROENTEROLOGY | Facility: AMBULATORY SURGERY CENTER | Age: 53
End: 2023-11-14

## 2023-11-14 VITALS
HEIGHT: 72 IN | HEART RATE: 83 BPM | SYSTOLIC BLOOD PRESSURE: 124 MMHG | OXYGEN SATURATION: 94 % | DIASTOLIC BLOOD PRESSURE: 70 MMHG | RESPIRATION RATE: 21 BRPM | BODY MASS INDEX: 29.53 KG/M2 | TEMPERATURE: 97.4 F | WEIGHT: 218 LBS

## 2023-11-14 DIAGNOSIS — K50.811 CROHN'S DISEASE OF BOTH SMALL AND LARGE INTESTINE WITH RECTAL BLEEDING (HCC): ICD-10-CM

## 2023-11-14 DIAGNOSIS — K21.9 GASTROESOPHAGEAL REFLUX DISEASE, UNSPECIFIED WHETHER ESOPHAGITIS PRESENT: ICD-10-CM

## 2023-11-14 PROCEDURE — 88305 TISSUE EXAM BY PATHOLOGIST: CPT | Performed by: PATHOLOGY

## 2023-11-14 PROCEDURE — 43239 EGD BIOPSY SINGLE/MULTIPLE: CPT | Performed by: INTERNAL MEDICINE

## 2023-11-14 PROCEDURE — 45380 COLONOSCOPY AND BIOPSY: CPT | Performed by: INTERNAL MEDICINE

## 2023-11-14 RX ORDER — PROPOFOL 10 MG/ML
INJECTION, EMULSION INTRAVENOUS AS NEEDED
Status: DISCONTINUED | OUTPATIENT
Start: 2023-11-14 | End: 2023-11-14

## 2023-11-14 RX ORDER — SODIUM CHLORIDE, SODIUM LACTATE, POTASSIUM CHLORIDE, CALCIUM CHLORIDE 600; 310; 30; 20 MG/100ML; MG/100ML; MG/100ML; MG/100ML
50 INJECTION, SOLUTION INTRAVENOUS CONTINUOUS
Status: DISCONTINUED | OUTPATIENT
Start: 2023-11-14 | End: 2023-11-18 | Stop reason: HOSPADM

## 2023-11-14 RX ORDER — SODIUM CHLORIDE, SODIUM LACTATE, POTASSIUM CHLORIDE, CALCIUM CHLORIDE 600; 310; 30; 20 MG/100ML; MG/100ML; MG/100ML; MG/100ML
INJECTION, SOLUTION INTRAVENOUS CONTINUOUS PRN
Status: DISCONTINUED | OUTPATIENT
Start: 2023-11-14 | End: 2023-11-14

## 2023-11-14 RX ORDER — LIDOCAINE HYDROCHLORIDE 10 MG/ML
INJECTION, SOLUTION EPIDURAL; INFILTRATION; INTRACAUDAL; PERINEURAL AS NEEDED
Status: DISCONTINUED | OUTPATIENT
Start: 2023-11-14 | End: 2023-11-14

## 2023-11-14 RX ADMIN — PROPOFOL 50 MG: 10 INJECTION, EMULSION INTRAVENOUS at 10:24

## 2023-11-14 RX ADMIN — PROPOFOL 50 MG: 10 INJECTION, EMULSION INTRAVENOUS at 10:20

## 2023-11-14 RX ADMIN — LIDOCAINE HYDROCHLORIDE 100 MG: 10 INJECTION, SOLUTION EPIDURAL; INFILTRATION; INTRACAUDAL; PERINEURAL at 10:10

## 2023-11-14 RX ADMIN — PROPOFOL 50 MG: 10 INJECTION, EMULSION INTRAVENOUS at 10:27

## 2023-11-14 RX ADMIN — PROPOFOL 50 MG: 10 INJECTION, EMULSION INTRAVENOUS at 10:15

## 2023-11-14 RX ADMIN — PROPOFOL 50 MG: 10 INJECTION, EMULSION INTRAVENOUS at 10:11

## 2023-11-14 RX ADMIN — SODIUM CHLORIDE, SODIUM LACTATE, POTASSIUM CHLORIDE, CALCIUM CHLORIDE: 600; 310; 30; 20 INJECTION, SOLUTION INTRAVENOUS at 10:08

## 2023-11-14 RX ADMIN — PROPOFOL 70 MG: 10 INJECTION, EMULSION INTRAVENOUS at 10:33

## 2023-11-14 RX ADMIN — PROPOFOL 50 MG: 10 INJECTION, EMULSION INTRAVENOUS at 10:18

## 2023-11-14 RX ADMIN — SODIUM CHLORIDE, SODIUM LACTATE, POTASSIUM CHLORIDE, CALCIUM CHLORIDE 50 ML/HR: 600; 310; 30; 20 INJECTION, SOLUTION INTRAVENOUS at 09:38

## 2023-11-14 RX ADMIN — PROPOFOL 50 MG: 10 INJECTION, EMULSION INTRAVENOUS at 10:29

## 2023-11-14 NOTE — ANESTHESIA POSTPROCEDURE EVALUATION
Post-Op Assessment Note    CV Status:  Stable    Pain management: adequate       Mental Status:  Sleepy   Hydration Status:  Euvolemic   PONV Controlled:  Controlled   Airway Patency:  Patent     Post Op Vitals Reviewed: Yes    No anethesia notable event occurred.     Staff: CRNA           BP   117/58   Temp     Pulse 77   Resp 14   SpO2 96% ra

## 2023-11-14 NOTE — H&P
History and Physical - [facility]    Robles Abraham 48 y.o. male MRN: [id number]      HPI: Robles Abraham is a 48 y.o. male who presents for reflux and crohns disease. No Known Allergies      REVIEW OF SYSTEMS: Per the HPI, and otherwise unremarkable. Historical Information     Past Medical History:   Diagnosis Date    Anxiety     Back pain     Colon polyp     Crohn's disease ([address])     Depression     GERD (gastroesophageal reflux disease)     Hypertension     Perianal fistula     Terminal ileitis [facility])      Past Surgical History:   Procedure Laterality Date    ABSCESS DRAINAGE      BACK SURGERY  5567    CAST APPLICATION Right 91/79/8665    Procedure: Application short-arm splint;  Surgeon: Angeli Dewitt MD;  Location:  MAIN OR;  Service: Orthopedics    COLONOSCOPY      HAND CONTRACTURE RELEASE Left 06/01/2023    Procedure: left ring and small finger dupuytrens excision and ring finger Metacarpophalangeal joint release and small finger Metacarpophalangeal and proximal interphalangeal joint release;  Surgeon: Angeli Dewitt MD;  Location:  MAIN OR;  Service: Orthopedics    HAND SURGERY      HERNIA REPAIR      umbilical hernia    MD ANRCT XM SURG REQ ANES GENERAL SPI/EDRL DX N/A 11/17/2021    Procedure: EXAM UNDER ANESTHESIA (EUA); Surgeon: Alejandra Leigh MD;  Location:  MAIN OR;  Service: Colorectal    MD FASCIOTOMY PALMAR OPEN PARTIAL Right 11/17/2022    Procedure: Dupuytren's excision right palm extending into the small finger with release of the metacarpophalangeal joint and proximal interphalangeal joint.   Dupuytren's excision right palm with release of the right ring finger metacarpophalangeal joint.;  Surgeon: Angeli Dewitt MD;  Location:  MAIN OR;  Service: Orthopedics    MD I&D ISCHIORECTAL&/PERIRECTAL ABSCESS SPX Right 10/24/2021    Procedure: excisional debredement right gluteal cheek ;  Surgeon: Amos Stoddard MD;  Location:  MAIN OR;  Service: General    WA PLACEMENT SETON N/A 2021    Procedure: Rhunette Mixer;  Surgeon: Sunshine Vanegas MD;  Location: [facility] MAIN [facility];  Service: Colorectal    WA PRQ IMPLTJ NSTIM ELECTRODE ARRAY EPIDURAL Right 2021    Procedure: INSERTION THORACIC DORSAL COLUMN SPINAL CORD STIMULATOR PERCUTANEOUS W IMPLANTABLE PULSE GENERATOR, RIGHT;  Surgeon: Vishnu Estrella MD;  Location: [facility]IN [facility];  Service: Neurosurgery    SPINAL STIMULATOR PLACEMENT  2021     Social History   Social History     Substance and Sexual Activity   Alcohol Use Not Currently    Alcohol/week: 6.0 standard drinks of alcohol    Types: 6 Standard drinks or equivalent per week     Social History     Substance and Sexual Activity   Drug Use Yes    Frequency: 7.0 times per week    Types: Marijuana    Comment: Medical marijuana     Social History     Tobacco Use   Smoking Status Every Day    Packs/day: 0.50    Years: 30.00    Total pack years: 15.00    Types: Cigarettes    Start date: 2019    Last attempt to quit: 2021    Years since quittin.7   Smokeless Tobacco Never     Family History   Problem Relation Age of Onset    Heart disease Father     Heart attack Father     Colon cancer Neg Hx     Colon polyps Neg Hx     Inflammatory bowel disease Neg Hx        Meds/Allergies       Current Outpatient Medications:     amitriptyline (ELAVIL) 100 mg tablet    azaTHIOprine (IMURAN) 50 mg tablet    buPROPion (Wellbutrin SR) 150 mg 12 hr tablet    cholestyramine (QUESTRAN) 4 GM/DOSE powder    furosemide (LASIX) 20 mg tablet    lisinopril (ZESTRIL) 20 mg tablet    polyethylene glycol (MiraLax) 17 GM/SCOOP powder    pregabalin (LYRICA) 150 mg capsule    RA Vitamin D-3 25 MCG (1000 UT) tablet    sildenafil (Viagra) 100 mg tablet    amoxicillin-clavulanate (AUGMENTIN) 875-125 mg per tablet    methylPREDNISolone 4 MG tablet therapy pack    naproxen (NAPROSYN) 500 mg tablet    Naproxen Sodium 220 MG CAPS    omeprazole (PriLOSEC) 40 MG capsule    vedolizumab Litzy MCCORMACK    Current Facility-Administered Medications:     lactated ringers infusion, 50 mL/hr, Intravenous, Continuous, 50 mL/hr at 11/14/23 8038        Objective     /89   Pulse 82   Temp (!) 97.4 °F (36.3 °C) (Temporal)   Resp (!) 24   Ht 6' (1.829 m)   Wt 98.9 kg (218 lb)   SpO2 92%   BMI 29.57 kg/m²       PHYSICAL EXAM    Gen: NAD AAOx3  Head: Normocephalic, Atraumatic  CV: S1S2 RRR no m/r/g  CHEST: Clear b/l no c/r/w  ABD: soft, +BS NT/ND no masses  EXT: no edema      ASSESSMENT/PLAN:  This is a 48y.o. year old male here for EGD/COLON, and he is stable and optimized for his procedure.

## 2023-11-14 NOTE — ANESTHESIA PREPROCEDURE EVALUATION
Procedure:  COLONOSCOPY  EGD    Relevant Problems   CARDIO   (+) Hypertension      GI/HEPATIC   (+) GERD (gastroesophageal reflux disease)      MUSCULOSKELETAL   (+) Chronic bilateral low back pain with bilateral sciatica   (+) Degenerative disc disease at L5-S1 level   (+) Failed back surgical syndrome   (+) Lumbar spondylosis   (+) Myofascial pain syndrome      NEURO/PSYCH   (+) Chronic bilateral low back pain with bilateral sciatica   (+) Chronic pain syndrome   (+) Chronic right shoulder pain   (+) Myofascial pain syndrome   (+) Numbness and tingling in right hand   (+) Numbness and tingling of both lower extremities      PULMONARY   (+) Sleep apnea   (+) Smoker      Nervous and Auditory   (+) Cervical radiculopathy      Other   (+) S/P insertion of spinal cord stimulator   (+) Tobacco use        Physical Exam    Airway    Mallampati score: I  TM Distance: >3 FB  Neck ROM: full     Dental   No notable dental hx     Cardiovascular  Cardiovascular exam normal    Pulmonary  Pulmonary exam normal     Other Findings        Anesthesia Plan  ASA Score- 3     Anesthesia Type- IV sedation with anesthesia with ASA Monitors. Additional Monitors:     Airway Plan:     Comment: I discussed risks (reviewed with patient on the anesthesia consent form), benefits and alternatives of monitored sedation including the possibility under sedation to have recall or mild discomfort. .       Plan Factors-    Chart reviewed. Patient summary reviewed. Induction- intravenous. Postoperative Plan-     Informed Consent- Anesthetic plan and risks discussed with patient. I personally reviewed this patient with the CRNA. Discussed and agreed on the Anesthesia Plan with the CRNA. Rodrigo Hampton

## 2023-11-20 PROCEDURE — 88305 TISSUE EXAM BY PATHOLOGIST: CPT | Performed by: PATHOLOGY

## 2023-12-06 ENCOUNTER — OFFICE VISIT (OUTPATIENT)
Age: 53
End: 2023-12-06
Payer: MEDICARE

## 2023-12-06 VITALS
HEIGHT: 72 IN | OXYGEN SATURATION: 94 % | BODY MASS INDEX: 29.66 KG/M2 | TEMPERATURE: 97.5 F | RESPIRATION RATE: 18 BRPM | SYSTOLIC BLOOD PRESSURE: 120 MMHG | DIASTOLIC BLOOD PRESSURE: 86 MMHG | HEART RATE: 94 BPM | WEIGHT: 219 LBS

## 2023-12-06 DIAGNOSIS — G47.33 OBSTRUCTIVE SLEEP APNEA SYNDROME: Primary | ICD-10-CM

## 2023-12-06 DIAGNOSIS — E66.3 OVERWEIGHT (BMI 25.0-29.9): ICD-10-CM

## 2023-12-06 DIAGNOSIS — F17.200 SMOKER: ICD-10-CM

## 2023-12-06 PROCEDURE — 99213 OFFICE O/P EST LOW 20 MIN: CPT | Performed by: INTERNAL MEDICINE

## 2023-12-06 RX ORDER — ONDANSETRON 4 MG/1
4 TABLET, FILM COATED ORAL
COMMUNITY
Start: 2023-07-14

## 2023-12-06 NOTE — ASSESSMENT & PLAN NOTE
Moderate with nocturnal hypoxemia on home sleep study underwent a CPAP titration started on CPAP 14 cm H2O, significant improvement of sleep quality, excessive daytime sleepiness as well as snoring  I reviewed his compliance as detailed below

## 2023-12-06 NOTE — PROGRESS NOTES
Pulmonary/Sleep Follow Up Note   Helena Marc 48 y.o. male MRN: [id number]  12/6/2023      Assessment and Plan:    1. Obstructive sleep apnea syndrome  Assessment & Plan: Moderate with nocturnal hypoxemia on home sleep study underwent a CPAP titration started on CPAP 14 cm H2O, significant improvement of sleep quality, excessive daytime sleepiness as well as snoring  I reviewed his compliance as detailed below      Orders:  -     PAP DME Resupply/Reorder    2. Smoker  Assessment & Plan:  Counseled for 4 minutes       3. Overweight (BMI 25.0-29. 9)  Assessment & Plan:  BMI 29.70              History of Present Illness   HPI:  Helena Marc is a 48 y.o. male who is here for follow-up appointment and compliance visit he was last seen in July this year to evaluate for possible obstructive sleep apnea as he had a reported history of loud snoring witnessed by his wife, excessive daytime sleepiness requiring daytime naps almost every day  His sleep study found to have moderate OSIRIS with significant nocturnal hypoxemia started on a CPAP after having a CPAP titration that showed adequate treatment of his sleep disordered breathing events as well as his oxygen requirements.         Historical Information   Past Medical History:   Diagnosis Date    Anxiety     Back pain     Colon polyp     Crohn's disease ([address])     Depression     GERD (gastroesophageal reflux disease)     Hypertension     Perianal fistula     Terminal ileitis [facility])      Past Surgical History:   Procedure Laterality Date    ABSCESS DRAINAGE      BACK SURGERY  9430    CAST APPLICATION Right 98/90/2125    Procedure: Application short-arm splint;  Surgeon: Lo Zavaleta MD;  Location: [facility] OR;  Service: Orthopedics    COLONOSCOPY      HAND CONTRACTURE RELEASE Left 06/01/2023    Procedure: left ring and small finger dupuytrens excision and ring finger Metacarpophalangeal joint release and small finger Metacarpophalangeal and proximal interphalangeal joint release;  Surgeon: Valorie Litten, MD;  Location: [facility] OR;  Service: Orthopedics    HAND SURGERY      HERNIA REPAIR      umbilical hernia    DC ANRCT XM SURG REQ ANES GENERAL SPI/EDRL DX N/A 11/17/2021    Procedure: EXAM UNDER ANESTHESIA (EUA); Surgeon: Jennifer Hines MD;  [facility]: [facility];  Service: Colorectal    DC FASCIOTOMY PALMAR OPEN PARTIAL Right 11/17/2022    Procedure: Dupuytren's excision right palm extending into the small finger with release of the metacarpophalangeal joint and proximal interphalangeal joint.   Dupuytren's excision right palm with release of the right ring finger metacarpophalangeal joint.;  Surgeon: Valorie Litten, MD;  Location: [facility];  Service: Orthopedics    DC I&D ISCHIORECTAL&/PERIRECTAL ABSCESS SPX Right 10/24/2021    Procedure: excisional debredement right gluteal cheek ;  Surgeon: Anne Marie Franklin MD;  Location: [facility];  Service: General    DC PLACEMENT SETON N/A 11/17/2021    Procedure: Jacinto Campos;  Surgeon: Jennifer Hines MD;  Location[facility];  Service: Colorectal    DC PRQ IMPLTJ NSTIM ELECTRODE ARRAY EPIDURAL Right 03/26/2021    Procedure: INSERTION THORACIC DORSAL COLUMN SPINAL CORD STIMULATOR PERCUTANEOUS W IMPLANTABLE PULSE GENERATOR, RIGHT;  Surgeon: Ibrahima Del Rosario MD;  Location: [facility];  Service: Neurosurgery    SPINAL STIMULATOR PLACEMENT  03/2021     Family History   Problem Relation Age of Onset    Heart disease Father     Heart attack Father     Colon cancer Neg Hx     Colon polyps Neg Hx     Inflammatory bowel disease Neg Hx          Meds/Allergies     Current Outpatient Medications:     amitriptyline (ELAVIL) 100 mg tablet, take 1 to 2 tablets by mouth at bedtime, Disp: 60 tablet, Rfl: 5    azaTHIOprine (IMURAN) 50 mg tablet, Take 1 tablet (50 mg total) by mouth daily, Disp: 30 tablet, Rfl: 11    buPROPion (Wellbutrin SR) 150 mg 12 hr tablet, Take 1 tablet (150 mg total) by mouth 2 (two) times a day, Disp: 60 tablet, Rfl: 5    cholestyramine (QUESTRAN) 4 GM/DOSE powder, Take 1 packet (4 g total) by mouth 3 (three) times a day with meals, Disp: 378 g, Rfl: 2    furosemide (LASIX) 20 mg tablet, Take 1 tablet (20 mg total) by mouth daily, Disp: 90 tablet, Rfl: 3    lisinopril (ZESTRIL) 20 mg tablet, take 1 tablet by mouth once daily, Disp: 90 tablet, Rfl: 3    naproxen (NAPROSYN) 500 mg tablet, take 1 tablet by mouth twice a day with meals, Disp: 60 tablet, Rfl: 5    omeprazole (PriLOSEC) 40 MG capsule, Take 1 capsule (40 mg total) by mouth daily, Disp: 30 capsule, Rfl: 6    ondansetron (ZOFRAN) 4 mg tablet, Inject 4 mg into a catheter in a vein, Disp: , Rfl:     polyethylene glycol (MiraLax) 17 GM/SCOOP powder, Take 238 g by mouth once for 1 dose Take 238 g my mouth. Use as directed, Disp: 238 g, Rfl: 0    pregabalin (LYRICA) 150 mg capsule, take 1 capsule by mouth three times a day, Disp: 90 capsule, Rfl: 5    RA Vitamin D-3 25 MCG (1000 UT) tablet, TAKE 2 TABLETS BY MOUTH DAILY, Disp: 60 tablet, Rfl: 2    vedolizumab (Entyvio) SOLR, Inject 300 mg into a catheter in a vein every 4 weeks, Disp: , Rfl:     methylPREDNISolone 4 MG tablet therapy pack, Use as directed on package (Patient not taking: Reported on 12/6/2023), Disp: 1 each, Rfl: 0    Naproxen Sodium 220 MG CAPS, Take 1 capsule (220 mg total) by mouth 2 (two) times a day, Disp: 60 capsule, Rfl: 0    sildenafil (Viagra) 100 mg tablet, Take 1 tablet (100 mg total) by mouth daily as needed for erectile dysfunction, Disp: 30 tablet, Rfl: 2  No Known Allergies    Vitals: Blood pressure 120/86, pulse 94, temperature 97.5 °F (36.4 °C), resp. rate 18, height 6' (1.829 m), weight 99.3 kg (219 lb), SpO2 94 %. Body mass index is 29.7 kg/m².  Oxygen Therapy  SpO2: 94 %  Oxygen Therapy: None (Room air)      Physical Exam  General:  Awake alert and oriented x 3, conversant without conversational dyspnea, NAD, normal affect  HEENT:   Sclera noninjected, nonicteric OU, Nares patent,  no craniofacial abnormalities, Mucous membranes, moist, no oral lesions, normal dentition  NECK:  Trachea midline, no accessory muscle use, no stridor,  JVP not elevated  CARDIAC: Reg, single s1/S2, no m/r/g  PULM: CTA bilaterally no wheezing, rhonchi or rales  EXT: No cyanosis, no clubbing, no edema  NEURO: no focal neurologic deficits, AAOx3, moving all extremities appropriately    Labs: I have personally reviewed pertinent lab results. , ABG: No results found for: "PHART", "BMI3IZB", "PO2ART", "TTT6EBL", "S0FNEHUM", "BEART", "SOURCE", BNP: No results found for: "BNP", CBC: No results found for: "WBC", "HGB", "HCT", "MCV", "PLT", "ADJUSTEDWBC", "RBC", "MCH", "MCHC", "RDW", "MPV", "NRBC", CMP: No results found for: "SODIUM", "K", "CL", "CO2", "ANIONGAP", "BUN", "CREATININE", "GLUCOSE", "CALCIUM", "AST", "ALT", "ALKPHOS", "PROT", "BILITOT", "EGFR", PT/INR: No results found for: "PT", "INR", Troponin: No results found for: "TROPONINI"  Lab Results   Component Value Date    WBC 8.27 10/04/2023    HGB 15.8 10/04/2023    HCT 46.9 10/04/2023     (H) 10/04/2023     10/04/2023     Lab Results   Component Value Date    GLUCOSE 110 02/20/2015    CALCIUM 9.2 06/01/2023     10/03/2016    K 4.0 06/01/2023    CO2 25 06/01/2023     (H) 06/01/2023    BUN 13 06/01/2023    CREATININE 0.94 06/01/2023     No results found for: "IGE"  Lab Results   Component Value Date    ALT 48 06/01/2023    AST 35 06/01/2023    ALKPHOS 88 06/01/2023    BILITOT 0.8 10/03/2016           Sleep studies: I have personally reviewed pertinent reports. HST 7/2023:   RECOMMENDATION:  1. CPAP titration study for moderate OSIRIS given degree of nocturnal hypoxemia out of proportion to his respiratory events  2. Positional therapy as tolerated could be an alternative therapy. 3. Nocturnal hypoxemia possible secondary to hypoventilation in the setting of hx of tobacco and marijuana use, follow-up with pulmonology  4.  Follow-up with sleep medicine      CPAP titration 8/2023:  IMPRESSION:        This was an effective PAP titration study. The CPAP setting of 14 cm h20  was optimal, resulting in an AHI of 0.0. This setting was observed in REM sleep. Previously noted hypoxia was sufficiently treated with the CPAP  Sleep efficiency was normal. Stage N1 sleep (light sleep) was normal.. Stage N3 sleep (deep sleep) was absent. REM sleep percentage was markedly decreased on this test.   Increased periodic limb movements during sleep. Compliance report:I have personally reviewed pertinent reports. Type of CPAP:  fixed 14 cmH2O                                   Percent usage: 100%                                   Average time used: 6 hrs 12 min                                  Time in large leak: not sig                                   Residual AHI: 5.6               Callie Ramirez MD  [facility]       Portions of the record may have been created with voice recognition software. Occasional wrong word or "sound a like" substitutions may have occurred due to the inherent limitations of voice recognition software. Read the chart carefully and recognize, using context, where substitutions have occurred.

## 2023-12-06 NOTE — PATIENT INSTRUCTIONS
CPAP   AMBULATORY CARE:   CPAP (continuous positive airway pressure)  is a treatment that uses air pressure to keep your airways open while you sleep. CPAP is used to treat obstructive sleep apnea (OSIRIS). A CPAP machine connects the mask to the machine with a hose. Air pressure prevents your airway from collapsing or becoming blocked during sleep. Use your CPAP machine when you sleep, even when you nap. You may need to use a CPAP machine for the rest of your life. Make CPAP easier to use: At first, try to use your CPAP for a few hours every night. Then slowly increase the length of time you use your machine. It takes time to adjust to CPAP treatment. You may need to use a special moisturizer made for CPAP users. You may need a mask that is a different size, shape, or material. Talk to your healthcare provider if your mask feels uncomfortable or irritates your skin. Use a saline nasal spray at bedtime to help relieve nasal irritation. A chin strap to help keep your mouth closed. A different type of mask can help dry mouth. Some machines come with a heated humidifier to help relieve these symptoms. Talk to your healthcare provider if you are having problems adjusting to the air pressure. He or she can tell you how to adjust the air pressure on your CPAP. You may need to start at a lower pressure and slowly increase it over time. Call your doctor if:   You continue to feel very sleepy during the day, even after you wear your CPAP device as directed. Your CPAP is causing a problem, such as a rash, that does not improve. You have questions or concerns about your condition, care, or equipment. Follow up with your doctor as directed:  Tell your healthcare provider if your mask no longer fits properly. Write down your questions so you remember to ask them during your visits.   © Copyright [employer] 2023 Information is for End User's use only and may not be sold, redistributed or otherwise used for commercial purposes. The above information is an  only. It is not intended as medical advice for individual conditions or treatments. Talk to your doctor, nurse or pharmacist before following any medical regimen to see if it is safe and effective for you.

## 2023-12-07 LAB
DME PARACHUTE DELIVERY DATE ACTUAL: NORMAL
DME PARACHUTE DELIVERY DATE REQUESTED: NORMAL
DME PARACHUTE ITEM DESCRIPTION: NORMAL
DME PARACHUTE ORDER STATUS: NORMAL
DME PARACHUTE SUPPLIER NAME: NORMAL
DME PARACHUTE SUPPLIER PHONE: NORMAL

## 2023-12-08 ENCOUNTER — CONSULT (OUTPATIENT)
Dept: UROLOGY | Facility: CLINIC | Age: 53
End: 2023-12-08
Payer: MEDICARE

## 2023-12-08 VITALS
HEART RATE: 92 BPM | DIASTOLIC BLOOD PRESSURE: 80 MMHG | BODY MASS INDEX: 29.66 KG/M2 | SYSTOLIC BLOOD PRESSURE: 128 MMHG | WEIGHT: 219 LBS | HEIGHT: 72 IN | OXYGEN SATURATION: 99 %

## 2023-12-08 DIAGNOSIS — N50.811 PAIN IN BOTH TESTICLES: Primary | ICD-10-CM

## 2023-12-08 DIAGNOSIS — G89.29 CHRONIC BILATERAL LOW BACK PAIN WITHOUT SCIATICA: ICD-10-CM

## 2023-12-08 DIAGNOSIS — N50.812 PAIN IN BOTH TESTICLES: Primary | ICD-10-CM

## 2023-12-08 DIAGNOSIS — M54.50 CHRONIC BILATERAL LOW BACK PAIN WITHOUT SCIATICA: ICD-10-CM

## 2023-12-08 DIAGNOSIS — N45.1 EPIDIDYMITIS, RIGHT: ICD-10-CM

## 2023-12-08 PROCEDURE — 99203 OFFICE O/P NEW LOW 30 MIN: CPT | Performed by: PHYSICIAN ASSISTANT

## 2023-12-08 RX ORDER — NAPROXEN 500 MG/1
500 TABLET ORAL 2 TIMES DAILY WITH MEALS
Qty: 60 TABLET | Refills: 5 | Status: SHIPPED | OUTPATIENT
Start: 2023-12-08

## 2023-12-08 NOTE — PROGRESS NOTES
UROLOGY CONSULTATION NOTE     Patient Identifiers: Audrey Wilkes (MRN: [id number])  Service Requesting Consultation: Debbora Leyden, MD  Service Providing Consultation:  Urology, Ciara Pereira PA-C  Consults    Date of Service: 12/8/2023    Reason for Consultation: Shannan Goldstein    History of Present Illness:     Audrey Wilkes is a 48 y.o. male with multiple medical problems including back troubles and neuropathy pain. He is a tobacco user. He comes in complaining of about 6 months worth of primarily right testicular pain. In fact both of his testicles hurt. He was placed on antibiotics by primary care in case there is an infectious component. He said it did make a slight improvement. He denies any trauma. He tries denies any voiding or bowel problems. He does complain of  ED. Past Medical, Past Surgical History:     Past Medical History:   Diagnosis Date    Anxiety     Back pain     Colon polyp     Crohn's disease ([address])     Depression     GERD (gastroesophageal reflux disease)     Hypertension     Perianal fistula     Terminal ileitis ([address])    :    Past Surgical History:   Procedure Laterality Date    ABSCESS DRAINAGE      BACK SURGERY  8596    CAST APPLICATION Right 81/14/4113    Procedure: Application short-arm splint;  Surgeon: Casi Leon MD;  Location: [facility];  Service: Orthopedics    COLONOSCOPY      HAND CONTRACTURE RELEASE Left 06/01/2023    Procedure: left ring and small finger dupuytrens excision and ring finger Metacarpophalangeal joint release and small finger Metacarpophalangeal and proximal interphalangeal joint release;  Surgeon: Casi Leon MD;  Location: [facility];  Service: Orthopedics    HAND SURGERY      HERNIA REPAIR      umbilical hernia    WI ANRCT XM SURG REQ ANES GENERAL SPI/EDRL DX N/A 11/17/2021    Procedure: EXAM UNDER ANESTHESIA (EUA);   Surgeon: Patrick Jauregui MD;  [facility]: [facility];  Service: Colorectal    WI FASCIOTOMY PALMAR OPEN PARTIAL Right 11/17/2022    Procedure: Dupuytren's excision right palm extending into the small finger with release of the metacarpophalangeal joint and proximal interphalangeal joint.   Dupuytren's excision right palm with release of the right ring finger metacarpophalangeal joint.;  Surgeon: Cristian Mckeon MD;  Location: [facility];  Service: Orthopedics    ND I&D ISCHIORECTAL&/PERIRECTAL ABSCESS SPX Right 10/24/2021    Procedure: excisional debredement right gluteal cheek ;  Surgeon: Smith Diaz MD;  Location: [facility];  Service: General    ND PLACEMENT SETON N/A 11/17/2021    Procedure: [address];  Surgeon: Brayan Bautista MD;  [facility];  Service: Colorectal    ND PRQ IMPLTJ NSTIM ELECTRODE ARRAY EPIDURAL Right 03/26/2021    Procedure: INSERTION THORACIC DORSAL COLUMN SPINAL CORD STIMULATOR PERCUTANEOUS W IMPLANTABLE PULSE GENERATOR, RIGHT;  Surgeon: Katherine Hoffman MD;  Location: [facility];  Service: Neurosurgery    SPINAL STIMULATOR PLACEMENT  03/2021   :    Medications, Allergies:     Current Outpatient Medications:     amitriptyline (ELAVIL) 100 mg tablet, take 1 to 2 tablets by mouth at bedtime, Disp: 60 tablet, Rfl: 5    azaTHIOprine (IMURAN) 50 mg tablet, Take 1 tablet (50 mg total) by mouth daily, Disp: 30 tablet, Rfl: 11    buPROPion (Wellbutrin SR) 150 mg 12 hr tablet, Take 1 tablet (150 mg total) by mouth 2 (two) times a day, Disp: 60 tablet, Rfl: 5    cholestyramine (QUESTRAN) 4 GM/DOSE powder, Take 1 packet (4 g total) by mouth 3 (three) times a day with meals, Disp: 378 g, Rfl: 2    furosemide (LASIX) 20 mg tablet, Take 1 tablet (20 mg total) by mouth daily, Disp: 90 tablet, Rfl: 3    lisinopril (ZESTRIL) 20 mg tablet, take 1 tablet by mouth once daily, Disp: 90 tablet, Rfl: 3    naproxen (NAPROSYN) 500 mg tablet, take 1 tablet by mouth twice a day with meals, Disp: 60 tablet, Rfl: 5    omeprazole (PriLOSEC) 40 MG capsule, Take 1 capsule (40 mg total) by mouth daily, Disp: 30 capsule, Rfl: 6    ondansetron (ZOFRAN) 4 mg tablet, Inject 4 mg into a catheter in a vein, Disp: , Rfl:     pregabalin (LYRICA) 150 mg capsule, take 1 capsule by mouth three times a day, Disp: 90 capsule, Rfl: 5    RA Vitamin D-3 25 MCG (1000 UT) tablet, TAKE 2 TABLETS BY MOUTH DAILY, Disp: 60 tablet, Rfl: 2    sildenafil (Viagra) 100 mg tablet, Take 1 tablet (100 mg total) by mouth daily as needed for erectile dysfunction, Disp: 30 tablet, Rfl: 2    vedolizumab (Entyvio) SOLR, Inject 300 mg into a catheter in a vein every 4 weeks, Disp: , Rfl:     methylPREDNISolone 4 MG tablet therapy pack, Use as directed on package (Patient not taking: Reported on 2023), Disp: 1 each, Rfl: 0    Naproxen Sodium 220 MG CAPS, Take 1 capsule (220 mg total) by mouth 2 (two) times a day, Disp: 60 capsule, Rfl: 0    polyethylene glycol (MiraLax) 17 GM/SCOOP powder, Take 238 g by mouth once for 1 dose Take 238 g my mouth. Use as directed, Disp: 238 g, Rfl: 0    Allergies:  No Known Allergies:    Social and Family History:   Social History:   Social History     Tobacco Use    Smoking status: Every Day     Packs/day: 0.50     Years: 30.00     Total pack years: 15.00     Types: Cigarettes     Start date: 2019     Last attempt to quit: 2021     Years since quittin.8    Smokeless tobacco: Never   Vaping Use    Vaping Use: Every day    Substances: THC   Substance Use Topics    Alcohol use: Not Currently     Alcohol/week: 6.0 standard drinks of alcohol     Types: 6 Standard drinks or equivalent per week    Drug use: Yes     Frequency: 7.0 times per week     Types: Marijuana     Comment: Medical marijuana   .     Social History     Tobacco Use   Smoking Status Every Day    Packs/day: 0.50    Years: 30.00    Total pack years: 15.00    Types: Cigarettes    Start date: 2019    Last attempt to quit: 2021    Years since quittin.8   Smokeless Tobacco Never       Family History:  Family History   Problem Relation Age of Onset    Heart disease Father     Heart attack Father     Colon cancer Neg Hx     Colon polyps Neg Hx     Inflammatory bowel disease Neg Hx    :     Review of Systems:     General: Fever, chills, or night sweats: negative  Cardiac: Negative for chest pain. Pulmonary: Negative for shortness of breath. Gastrointestinal: Abdominal pain negative. Nausea, vomiting, or diarrhea negative,  Genitourinary: See HPI above. Patient does not have hematuria. All other systems queried were negative. Physical Exam:   General: Patient is pleasant and in NAD. Awake and alert  Ht 6' (1.829 m)   Wt 99.3 kg (219 lb)   BMI 29.70 kg/m²   HEENT:  Conjunctiva are clear  Constitutional:  pleasant and cooperative     no apparent distress  Cardiac: Peripheral edema: negative  Pulmonary: Non-labored breathing  Abdomen: Soft, non-tender, non-distended. No surgical scars. No masses, tenderness, hernias noted. Genitourinary: Negative CVA tenderness, negative suprapubic tenderness. Extremities:  Moves all extremities  Neurological:CNII-XII intact. No numbness or tingling. Essentially non focal neurologic exam  Psychiatric:mood affect and behavior normal    (Male): Penis circumcised, phallus normal, meatus patent. Testicles descended into scrotum bilaterally. Testicles are normal in size and shape. There are no masses. There is no inguinal hernia or hernia. Both are significantly tender right greater than left. No inguinal hernias bilaterally.       Labs:     Lab Results   Component Value Date    HGB 15.8 10/04/2023    HCT 46.9 10/04/2023    WBC 8.27 10/04/2023     10/04/2023   ]    Lab Results   Component Value Date     10/03/2016    K 4.0 06/01/2023     (H) 06/01/2023    CO2 25 06/01/2023    BUN 13 06/01/2023    CREATININE 0.94 06/01/2023    CALCIUM 9.2 06/01/2023    GLUCOSE 110 02/20/2015   ]    Imaging:   I personally reviewed the images and report of the following studies, and reviewed them with the patient:  None  ASSESSMENT:     #1.  Bilateral orchialgia    PLAN:   -He will get a scrotal ultrasound  -Naprosyn 500 mg twice a day  -Follow-up with urology in about 2 months  -I suspect his discomfort may be referred pain from his back      Thank you for allowing me to participate in this patients’ care. Please do not hesitate to call with any additional questions.   Brandy Pereira PA-C

## 2024-01-03 ENCOUNTER — OFFICE VISIT (OUTPATIENT)
Dept: GASTROENTEROLOGY | Facility: CLINIC | Age: 54
End: 2024-01-03
Payer: MEDICARE

## 2024-01-03 VITALS
DIASTOLIC BLOOD PRESSURE: 76 MMHG | HEIGHT: 72 IN | SYSTOLIC BLOOD PRESSURE: 120 MMHG | BODY MASS INDEX: 29.53 KG/M2 | WEIGHT: 218 LBS

## 2024-01-03 DIAGNOSIS — I10 PRIMARY HYPERTENSION: ICD-10-CM

## 2024-01-03 DIAGNOSIS — Z86.010 HISTORY OF COLON POLYPS: ICD-10-CM

## 2024-01-03 DIAGNOSIS — Z72.0 TOBACCO USE: ICD-10-CM

## 2024-01-03 DIAGNOSIS — K50.814 CROHN'S DISEASE OF BOTH SMALL AND LARGE INTESTINE WITH ABSCESS (HCC): Primary | ICD-10-CM

## 2024-01-03 DIAGNOSIS — K21.9 GASTROESOPHAGEAL REFLUX DISEASE, UNSPECIFIED WHETHER ESOPHAGITIS PRESENT: ICD-10-CM

## 2024-01-03 DIAGNOSIS — E55.9 VITAMIN D DEFICIENCY: ICD-10-CM

## 2024-01-03 PROCEDURE — 99214 OFFICE O/P EST MOD 30 MIN: CPT | Performed by: INTERNAL MEDICINE

## 2024-01-03 RX ORDER — CHOLESTYRAMINE 4 G/9G
4 POWDER, FOR SUSPENSION ORAL 2 TIMES DAILY WITH MEALS
Qty: 378 G | Refills: 7 | Status: SHIPPED | OUTPATIENT
Start: 2024-01-03

## 2024-01-03 RX ORDER — AZATHIOPRINE 50 MG/1
50 TABLET ORAL DAILY
Qty: 30 TABLET | Refills: 11 | Status: SHIPPED | OUTPATIENT
Start: 2024-01-03

## 2024-01-03 RX ORDER — VEDOLIZUMAB 300 MG/5ML
300 INJECTION, POWDER, LYOPHILIZED, FOR SOLUTION INTRAVENOUS
Start: 2024-01-03

## 2024-01-03 RX ORDER — LISINOPRIL 20 MG/1
20 TABLET ORAL DAILY
Qty: 90 TABLET | Refills: 0 | Status: SHIPPED | OUTPATIENT
Start: 2024-01-03

## 2024-01-03 RX ORDER — OMEPRAZOLE 40 MG/1
40 CAPSULE, DELAYED RELEASE ORAL DAILY
Qty: 30 CAPSULE | Refills: 11 | Status: SHIPPED | OUTPATIENT
Start: 2024-01-03

## 2024-01-03 NOTE — PATIENT INSTRUCTIONS
"IBD Healthcare maintenance recommendations:    Vaccines and infections  1) avoid live vaccines  2) yearly flu shot  3) Prevnar 13, followed by Pneumovax at least 8 weeks later (and a Pneumovax booster 5 years after)  4) Shingrix  5) Quantiferon prior to immunosuppression and then annually  6) MMR prior to biologic  7) varicella prior to biologic  8) HPV vaccination as per national guidelines  9) hepatitis a and B vaccinations if not previously vaccinated    Cancer screening  1) dysplasia surveillance for colorectal cancer  2) annual dermatologic/skin exam    Miscellaneous  1) screening for osteoporosis  2) smoking screen in counseling  3) depression screen annually      Health Maintenance recomendations guidelines based on [facility] recommendations    Vaccines  1) Influenza vaccine (\"Flu shot\") - Dead virus- annually is suggested   2 and 3) Pneumonia vaccinations for all patient's older than 19 in receiving systemic immunosuppression  4) shingles vaccine with non live virus in all patients receiving systemic immunosuppression is specially Xeljanz  5) QuantiFERON prior to immunosuppression and then annually  6) If the patient is not immune to measles mumps or rubella should receive immunization, but this should be avoided in patients on systemic immunosuppression  7) if the patient is not immune to varicella they should receive immunization, but this should be avoided in patients on systemic immunosuppression  8) HPV vaccination as per national guidelines  9) hepatitis a and B vaccinations if not previously vaccinated     Cancer screening  1) colonoscopy in all patients with extensive colitis (more than 1/3 of the colon involved) who had disease for at least 8 or more years.  Repeat colonoscopy approximately every 12-24 months.  In patients with concurrent primary sclerosing cholangitis repeat colonoscopy annually and consider use of cromolyn endoscopy.  Consider use of cromolyn endoscopy in patients with prior dysplasia " or a family history of colon cancer.  2) in all woman with inflammatory bowel disease treated with systemic immunosuppression.    3) in all patients with IBD, especially those on immunosuppression including anti TNF therapy as well as thiopurines    Miscellaneous  1) DEXA scan in all patients with IBD if any risk factor for osteoporosis (low BMI, 3 months or more of cumulative steroid exposure, smoker, postmenopausal).  If initial screen is normal repeat in 5 years  2) screen all patients with IBD for smoking status and refer current smokers for smoking cessation therapy  3) In all patients with IBD at baseline and consider annual screening

## 2024-01-03 NOTE — PROGRESS NOTES
[facility] - Outpatient Follow-up Note  [place] 53 y.o. male MRN: [id number]  Encounter: [id number]    ASSESSMENT AND PLAN:      1. Crohn's disease of both small and large intestine with abscess (HCC)  53M here today for f/u. Crohns of TI and colon - recent EGD/COLON neg. Ab's to Humira, no response to Stelara but doing much  better on Entyvio q4 weeks. Occ runs depending on what he eats.     Given ongoing intermittent issues, will check MRE (last in 2020) to assure no fibrostenosis disease. Otherwise, we discussed the importance of med compliance, smoking cessation, and dietary discretion.    - vedolizumab (Entyvio) SOLR; Inject 300 mg into a catheter in a vein every 4 weeks  - cholestyramine (QUESTRAN) 4 GM/DOSE powder; Take 1 packet (4 g total) by mouth 2 (two) times a day with meals  Dispense: 378 g; Refill: 7  - azaTHIOprine (IMURAN) 50 mg tablet; Take 1 tablet (50 mg total) by mouth daily  Dispense: 30 tablet; Refill: 11    - CBC and differential; Future  - Comprehensive metabolic panel; Future  - C-reactive protein; Future  - Vedolizumab and Anti-Vedo Ab; Future  - Calprotectin,Fecal; Future  - Quantiferon TB Gold Plus Assay; Future  - Vitamin D 25 hydroxy; Future  - TPMT ENZYME ACTIVITY,BLOOD; Future    - MRI enterography w wo; Future    - RECALL COLON 11/2024    2. Gastroesophageal reflux disease, unspecified whether esophagitis present  Well controlled.     - smoking cessation and gerd lifestyle modifications  - f/u with dermatology  - omeprazole (PriLOSEC) 40 MG capsule; Take 1 capsule (40 mg total) by mouth daily  Dispense: 30 capsule; Refill: 11    3. Vitamin D deficiency  Improved. Will check levels now.    4. Tobacco use  Smoking cessation.    5. History of colon polyps  Recall 11/2024      Followup Appointment: 3 months  ______________________________________________________________________    Chief Complaint   Patient presents with   • Follow-up     Pt  has good days and has bad days, every 4 weeks is his infusion, stools are still vey loose, at times he goes 5-6 times a day     HPI: This is a 53-year-old male here today for follow-up.  Ever since we increased his Entyvio to every 4 weeks, his abdominal symptoms have improved although he still has some days when he has multiple bowel movements.  He thinks that it usually happens closer to his next infusion date.  Weight is up.  No nausea or vomiting.  Taking all his medications.  Trying to quit smoking.  No significant abdominal pains.  No fevers or chills.  Due for his dermatology exam.    Historical Information   Past Medical History:   Diagnosis Date   • Anxiety    • Back pain    • Colon polyp    • Crohn's disease (HCC)    • Depression    • GERD (gastroesophageal reflux disease)    • Hypertension    • Perianal fistula    • Terminal ileitis (HCC)      Past Surgical History:   Procedure Laterality Date   • ABSCESS DRAINAGE     • BACK SURGERY  2019   • CAST APPLICATION Right 11/17/2022    Procedure: Application short-arm splint;  Surgeon: Sarthak Villatoro MD;  Location:  MAIN OR;  Service: Orthopedics   • COLONOSCOPY     • HAND CONTRACTURE RELEASE Left 06/01/2023    Procedure: left ring and small finger dupuytrens excision and ring finger Metacarpophalangeal joint release and small finger Metacarpophalangeal and proximal interphalangeal joint release;  Surgeon: Sarthak Villatoro MD;  Location:  MAIN OR;  Service: Orthopedics   • HAND SURGERY     • HERNIA REPAIR      umbilical hernia   • UT ANRCT XM SURG REQ ANES GENERAL SPI/EDRL DX N/A 11/17/2021    Procedure: EXAM UNDER ANESTHESIA (EUA);  Surgeon: Davi Thao MD;  Location:  MAIN OR;  Service: Colorectal   • UT FASCIOTOMY PALMAR OPEN PARTIAL Right 11/17/2022    Procedure: Dupuytren's excision right palm extending into the small finger with release of the metacarpophalangeal joint and proximal interphalangeal joint.  Dupuytren's excision right  palm with release of the right ring finger metacarpophalangeal joint.;  Surgeon: Sarthak Villatoro MD;  Location: [facility];  Service: Orthopedics   • DE I&D ISCHIORECTAL&/PERIRECTAL ABSCESS SPX Right 10/24/2021    Procedure: excisional debredement right gluteal cheek ;  Surgeon: Jeana Bustamante MD;  Location: [facility];  Service: General   • DE PLACEMENT SETON N/A 2021    Procedure: PLACEMENT SETON;  Surgeon: Davi Thao MD;  [facility];  Service: Colorectal   • DE PRQ IMPLTJ NSTIM ELECTRODE ARRAY EPIDURAL Right 2021    Procedure: INSERTION THORACIC DORSAL COLUMN SPINAL CORD STIMULATOR PERCUTANEOUS W IMPLANTABLE PULSE GENERATOR, RIGHT;  Surgeon: Akshat Witt MD;  Location: [facility];  Service: Neurosurgery   • SPINAL STIMULATOR PLACEMENT  2021     Social History     Substance and Sexual Activity   Alcohol Use Yes   • Alcohol/week: 3.0 standard drinks of alcohol   • Types: 3 Cans of beer per week     Social History     Substance and Sexual Activity   Drug Use Yes   • Frequency: 7.0 times per week   • Types: Marijuana    Comment: Medical marijuana     Social History     Tobacco Use   Smoking Status Former   • Current packs/day: 0.00   • Average packs/day: 0.5 packs/day for 30.0 years (15.0 ttl pk-yrs)   • Types: Cigarettes   • Start date: 2019   • Quit date: 2021   • Years since quittin.9   Smokeless Tobacco Never     Family History   Problem Relation Age of Onset   • Heart disease Father    • Heart attack Father    • Colon cancer Neg Hx    • Colon polyps Neg Hx    • Inflammatory bowel disease Neg Hx          Current Outpatient Medications:   •  amitriptyline (ELAVIL) 100 mg tablet  •  azaTHIOprine (IMURAN) 50 mg tablet  •  buPROPion (Wellbutrin SR) 150 mg 12 hr tablet  •  cholestyramine (QUESTRAN) 4 GM/DOSE powder  •  lisinopril (ZESTRIL) 20 mg tablet  •  omeprazole (PriLOSEC) 40 MG capsule  •  pregabalin (LYRICA) 150 mg capsule  •  RA Vitamin D-3 25 MCG (1000 UT)  tablet  •  sildenafil (Viagra) 100 mg tablet  •  vedolizumab (Entyvio) SOLR  No Known Allergies  Reviewed medications and allergies and updated as indicated    PHYSICAL EXAM:    Blood pressure 120/76, height 6' (1.829 m), weight 98.9 kg (218 lb). Body mass index is 29.57 kg/m².  General Appearance: NAD, cooperative, alert  Eyes: Anicteric, conjunctiva pink  ENT:  Normocephalic, atraumatic, normal mucosa.    Neck:  Supple, symmetrical, trachea midline  Resp:  Clear to auscultation bilaterally; no rales, rhonchi or wheezing; respirations unlabored   CV:  S1 S2, Regular rate and rhythm; no murmur, rub, or gallop.  GI:  Soft, non-tender, non-distended; normal bowel sounds; no masses, no organomegaly   Rectal: Deferred  Musculoskeletal: No cyanosis, clubbing or edema. Normal ROM.  Skin:  No jaundice, rashes, or lesions   Heme/Lymph: No palpable cervical lymphadenopathy  Psych: Normal affect, good eye contact  Neuro: No gross deficits, AAOx3    Lab Results:   Lab Results   Component Value Date    WBC 8.27 10/04/2023    HGB 15.8 10/04/2023    HCT 46.9 10/04/2023     (H) 10/04/2023     10/04/2023     Lab Results   Component Value Date     10/03/2016    K 4.0 06/01/2023     (H) 06/01/2023    CO2 25 06/01/2023    ANIONGAP 23 (H) 02/20/2015    BUN 13 06/01/2023    CREATININE 0.94 06/01/2023    GLUCOSE 110 02/20/2015    GLUF 114 (H) 06/01/2023    CALCIUM 9.2 06/01/2023    AST 35 06/01/2023    ALT 48 06/01/2023    ALKPHOS 88 06/01/2023    PROT 7.3 10/03/2016    BILITOT 0.8 10/03/2016    EGFR 94 06/01/2023       Radiology Results:   No results found.    Answers submitted by the patient for this visit:  Inflammatory Bowel Disease (Submitted on 1/3/2024)  Did the infection require hospitalization?: No  Did the infection require antibiotics?: Yes  In the past three months, have you used tobacco in any form?: No  During the last year, how many days have you missed work or school because of your inflammatory  bowel disease?: 0  During the last year, how many days have you been hospitalized because of your inflammatory bowel disease?: 0  During the last year, how many days have you visited a hospital emergency department because of your inflammatory bowel disease?: 0  During the last month, have you taken narcotic pain medications (such as Percocet, oxycodone, Oxycontin, morphine, Vicodin, Dilaudid, MS Contin) for your inflammatory bowel disease?: No  Have you awoken at night because you needed to move your bowels during the last month? : Yes  Have you had leakage of stool while sleeping during the last month?: No  Have you had leakage of stool while you were awake during the last month?: Yes  In the last 6 months, have you unintentionally lost weight?: No  Fever: No  Eye irritation: No  Mouth sores: No  Sore throat: No  Chest pain: No  Shortness of breath: No  Numbness or tingling in your hands or feet: Yes  Skin rash: No  Pain or swelling in your joints: No  Bruising or bleeding: No  Felt depressed or blue: No  Inflammatory Bowel Disease (Submitted on 1/3/2024)  When you are not experiencing symptoms of your inflammatory bowel disease, how many bowel movements do you typically have each day?: 2  What is the average (typical) number of bowel movements that you had in a single day during the last week?: 6  Over the last 3 days, have you had any bowel movements where you passed blood without stool?: No  Since your last visit, have you received any vaccinations?: No  Since the last visit, have you had an infection?: Yes

## 2024-01-04 NOTE — PRE-PROCEDURE INSTRUCTIONS
Pre-Surgery Instructions:   Medication Instructions    amitriptyline (ELAVIL) 100 mg tablet Take night before surgery    azaTHIOprine (IMURAN) 50 mg tablet Hold 6 days prior to surgery    buPROPion (Wellbutrin SR) 150 mg 12 hr tablet Take day of surgery.    cholestyramine (QUESTRAN) 4 GM/DOSE powder Hold day of surgery.    lisinopril (ZESTRIL) 20 mg tablet Hold day of surgery.    omeprazole (PriLOSEC) 40 MG capsule Take day of surgery.    pregabalin (LYRICA) 150 mg capsule Take day of surgery.    RA Vitamin D-3 25 MCG (1000 UT) tablet Hold 6 days prior to surgery    sildenafil (Viagra) 100 mg tablet Hold day of surgery.    vedolizumab (Entyvio) SOLR Takes monthly-not due till 1/29   Medication instructions for day surgery reviewed. Please use only a sip of water to take your instructed medications. Avoid all over the counter vitamins, supplements and NSAIDS for one week prior to surgery per anesthesia guidelines. Tylenol is ok to take as needed.     You will receive a call one business day prior to surgery with an arrival time and hospital directions. If your surgery is scheduled on a Monday, the hospital will be calling you on the Friday prior to your surgery. If you have not heard from anyone by 8pm, please call the hospital supervisor through the hospital  at [phone]. (Tray [phone]).    Do not eat or drink anything after midnight the night before your surgery, including candy, mints, lifesavers, or chewing gum. Do not drink alcohol 24hrs before your surgery. Try not to smoke at least 24hrs before your surgery.       Follow the pre surgery showering instructions as listed in the “My Surgical Experience Booklet” or otherwise provided by your surgeon's office. Do not use a blade to shave the surgical area 1 week before surgery. It is okay to use a clean electric clippers up to 24 hours before surgery. Do not apply any lotions, creams, including makeup, cologne, deodorant, or perfumes after  showering on the day of your surgery. Do not use dry shampoo, hair spray, hair gel, or any type of hair products.     No contact lenses, eye make-up, or artificial eyelashes. Remove nail polish, including gel polish, and any artificial, gel, or acrylic nails if possible. Remove all jewelry including rings and body piercing jewelry.     Wear causal clothing that is easy to take on and off. Consider your type of surgery.    Keep any valuables, jewelry, piercings at home. Please bring any specially ordered equipment (sling, braces) if indicated.    Arrange for a responsible person to drive you to and from the hospital on the day of your surgery. Visitor Guidelines discussed.     Call the surgeon's office with any new illnesses, exposures, or additional questions prior to surgery.    Please reference your “My Surgical Experience Booklet” for additional information to prepare for your upcoming surgery.    Advised to bring SCS remote DOS

## 2024-01-08 ENCOUNTER — HOSPITAL ENCOUNTER (OUTPATIENT)
Dept: ULTRASOUND IMAGING | Facility: HOSPITAL | Age: 54
Discharge: HOME | End: 2024-01-08
Payer: MEDICARE

## 2024-01-08 DIAGNOSIS — N50.812 PAIN IN BOTH TESTICLES: ICD-10-CM

## 2024-01-08 DIAGNOSIS — N50.811 PAIN IN BOTH TESTICLES: ICD-10-CM

## 2024-01-08 PROCEDURE — 76870 US EXAM SCROTUM: CPT

## 2024-01-10 ENCOUNTER — ANESTHESIA EVENT (OUTPATIENT)
Dept: PERIOP | Facility: HOSPITAL | Age: 54
End: 2024-01-10
Payer: MEDICARE

## 2024-01-11 ENCOUNTER — HOSPITAL ENCOUNTER (OUTPATIENT)
Facility: HOSPITAL | Age: 54
Setting detail: OUTPATIENT SURGERY
Discharge: HOME | End: 2024-01-11
Attending: ORTHOPAEDIC SURGERY | Admitting: ORTHOPAEDIC SURGERY
Payer: MEDICARE

## 2024-01-11 ENCOUNTER — ANESTHESIA (OUTPATIENT)
Dept: PERIOP | Facility: HOSPITAL | Age: 54
End: 2024-01-11
Payer: MEDICARE

## 2024-01-11 VITALS
HEART RATE: 84 BPM | TEMPERATURE: 98.2 F | SYSTOLIC BLOOD PRESSURE: 125 MMHG | OXYGEN SATURATION: 91 % | RESPIRATION RATE: 22 BRPM | DIASTOLIC BLOOD PRESSURE: 70 MMHG

## 2024-01-11 DIAGNOSIS — M72.0 DUPUYTREN CONTRACTURE: Primary | ICD-10-CM

## 2024-01-11 DIAGNOSIS — M72.0 DUPUYTREN'S CONTRACTURE OF BOTH HANDS: ICD-10-CM

## 2024-01-11 PROCEDURE — 88304 TISSUE EXAM BY PATHOLOGIST: CPT | Performed by: PATHOLOGY

## 2024-01-11 PROCEDURE — 26123 RELEASE PALM CONTRACTURE: CPT | Performed by: ORTHOPAEDIC SURGERY

## 2024-01-11 PROCEDURE — NC001 PR NO CHARGE: Performed by: PHYSICIAN ASSISTANT

## 2024-01-11 PROCEDURE — 26123 RELEASE PALM CONTRACTURE: CPT | Performed by: PHYSICIAN ASSISTANT

## 2024-01-11 RX ORDER — SENNOSIDES 8.6 MG
650 CAPSULE ORAL EVERY 8 HOURS PRN
Qty: 30 TABLET | Refills: 0 | Status: SHIPPED | OUTPATIENT
Start: 2024-01-11

## 2024-01-11 RX ORDER — ACETAMINOPHEN 325 MG/1
650 TABLET ORAL EVERY 6 HOURS PRN
Status: DISCONTINUED | OUTPATIENT
Start: 2024-01-11 | End: 2024-01-11 | Stop reason: HOSPADM

## 2024-01-11 RX ORDER — DEXAMETHASONE SODIUM PHOSPHATE 10 MG/ML
INJECTION, SOLUTION INTRAMUSCULAR; INTRAVENOUS AS NEEDED
Status: DISCONTINUED | OUTPATIENT
Start: 2024-01-11 | End: 2024-01-11

## 2024-01-11 RX ORDER — ONDANSETRON 2 MG/ML
4 INJECTION INTRAMUSCULAR; INTRAVENOUS ONCE AS NEEDED
Status: DISCONTINUED | OUTPATIENT
Start: 2024-01-11 | End: 2024-01-11 | Stop reason: HOSPADM

## 2024-01-11 RX ORDER — TRAMADOL HYDROCHLORIDE 50 MG/1
50 TABLET ORAL EVERY 6 HOURS PRN
Qty: 7 TABLET | Refills: 0 | Status: SHIPPED | OUTPATIENT
Start: 2024-01-11

## 2024-01-11 RX ORDER — BUPIVACAINE HYDROCHLORIDE 5 MG/ML
INJECTION, SOLUTION EPIDURAL; INTRACAUDAL AS NEEDED
Status: DISCONTINUED | OUTPATIENT
Start: 2024-01-11 | End: 2024-01-11

## 2024-01-11 RX ORDER — SODIUM CHLORIDE, SODIUM LACTATE, POTASSIUM CHLORIDE, CALCIUM CHLORIDE 600; 310; 30; 20 MG/100ML; MG/100ML; MG/100ML; MG/100ML
INJECTION, SOLUTION INTRAVENOUS CONTINUOUS PRN
Status: DISCONTINUED | OUTPATIENT
Start: 2024-01-11 | End: 2024-01-11

## 2024-01-11 RX ORDER — TRAMADOL HYDROCHLORIDE 50 MG/1
50 TABLET ORAL EVERY 6 HOURS PRN
Status: DISCONTINUED | OUTPATIENT
Start: 2024-01-11 | End: 2024-01-11 | Stop reason: HOSPADM

## 2024-01-11 RX ORDER — LIDOCAINE HYDROCHLORIDE 10 MG/ML
INJECTION, SOLUTION EPIDURAL; INFILTRATION; INTRACAUDAL; PERINEURAL AS NEEDED
Status: DISCONTINUED | OUTPATIENT
Start: 2024-01-11 | End: 2024-01-11

## 2024-01-11 RX ORDER — FENTANYL CITRATE 50 UG/ML
INJECTION, SOLUTION INTRAMUSCULAR; INTRAVENOUS AS NEEDED
Status: DISCONTINUED | OUTPATIENT
Start: 2024-01-11 | End: 2024-01-11

## 2024-01-11 RX ORDER — ONDANSETRON 2 MG/ML
4 INJECTION INTRAMUSCULAR; INTRAVENOUS EVERY 6 HOURS PRN
Status: DISCONTINUED | OUTPATIENT
Start: 2024-01-11 | End: 2024-01-11 | Stop reason: HOSPADM

## 2024-01-11 RX ORDER — COVID-19 ANTIGEN TEST
220 KIT MISCELLANEOUS 2 TIMES DAILY
Qty: 60 CAPSULE | Refills: 0 | Status: SHIPPED | OUTPATIENT
Start: 2024-01-11 | End: 2024-02-10

## 2024-01-11 RX ORDER — ONDANSETRON 2 MG/ML
INJECTION INTRAMUSCULAR; INTRAVENOUS AS NEEDED
Status: DISCONTINUED | OUTPATIENT
Start: 2024-01-11 | End: 2024-01-11

## 2024-01-11 RX ORDER — PROPOFOL 10 MG/ML
INJECTION, EMULSION INTRAVENOUS AS NEEDED
Status: DISCONTINUED | OUTPATIENT
Start: 2024-01-11 | End: 2024-01-11

## 2024-01-11 RX ORDER — HYDROMORPHONE HCL/PF 1 MG/ML
0.25 SYRINGE (ML) INJECTION
Status: DISCONTINUED | OUTPATIENT
Start: 2024-01-11 | End: 2024-01-11 | Stop reason: HOSPADM

## 2024-01-11 RX ORDER — CEFAZOLIN SODIUM 2 G/50ML
2000 SOLUTION INTRAVENOUS ONCE
Status: COMPLETED | OUTPATIENT
Start: 2024-01-11 | End: 2024-01-11

## 2024-01-11 RX ORDER — FENTANYL CITRATE/PF 50 MCG/ML
25 SYRINGE (ML) INJECTION
Status: DISCONTINUED | OUTPATIENT
Start: 2024-01-11 | End: 2024-01-11 | Stop reason: HOSPADM

## 2024-01-11 RX ORDER — LIDOCAINE HYDROCHLORIDE 20 MG/ML
INJECTION, SOLUTION EPIDURAL; INFILTRATION; INTRACAUDAL; PERINEURAL AS NEEDED
Status: DISCONTINUED | OUTPATIENT
Start: 2024-01-11 | End: 2024-01-11

## 2024-01-11 RX ORDER — SODIUM CHLORIDE, SODIUM LACTATE, POTASSIUM CHLORIDE, CALCIUM CHLORIDE 600; 310; 30; 20 MG/100ML; MG/100ML; MG/100ML; MG/100ML
125 INJECTION, SOLUTION INTRAVENOUS CONTINUOUS
Status: DISCONTINUED | OUTPATIENT
Start: 2024-01-11 | End: 2024-01-11 | Stop reason: HOSPADM

## 2024-01-11 RX ORDER — MIDAZOLAM HYDROCHLORIDE 2 MG/2ML
INJECTION, SOLUTION INTRAMUSCULAR; INTRAVENOUS AS NEEDED
Status: DISCONTINUED | OUTPATIENT
Start: 2024-01-11 | End: 2024-01-11

## 2024-01-11 RX ORDER — KETOROLAC TROMETHAMINE 30 MG/ML
INJECTION, SOLUTION INTRAMUSCULAR; INTRAVENOUS AS NEEDED
Status: DISCONTINUED | OUTPATIENT
Start: 2024-01-11 | End: 2024-01-11

## 2024-01-11 RX ADMIN — FENTANYL CITRATE 50 MCG: 50 INJECTION, SOLUTION INTRAMUSCULAR; INTRAVENOUS at 10:55

## 2024-01-11 RX ADMIN — MIDAZOLAM 2 MG: 1 INJECTION INTRAMUSCULAR; INTRAVENOUS at 10:55

## 2024-01-11 RX ADMIN — PROPOFOL 200 MG: 10 INJECTION, EMULSION INTRAVENOUS at 11:23

## 2024-01-11 RX ADMIN — LIDOCAINE HYDROCHLORIDE 0.5 ML: 10 INJECTION, SOLUTION EPIDURAL; INFILTRATION; INTRACAUDAL; PERINEURAL at 10:57

## 2024-01-11 RX ADMIN — LIDOCAINE HYDROCHLORIDE 100 MG: 20 INJECTION, SOLUTION EPIDURAL; INFILTRATION; INTRACAUDAL; PERINEURAL at 11:23

## 2024-01-11 RX ADMIN — SODIUM CHLORIDE, SODIUM LACTATE, POTASSIUM CHLORIDE, AND CALCIUM CHLORIDE: .6; .31; .03; .02 INJECTION, SOLUTION INTRAVENOUS at 11:18

## 2024-01-11 RX ADMIN — FENTANYL CITRATE 25 MCG: 50 INJECTION, SOLUTION INTRAMUSCULAR; INTRAVENOUS at 12:05

## 2024-01-11 RX ADMIN — DEXAMETHASONE SODIUM PHOSPHATE 4 MG: 10 INJECTION, SOLUTION INTRAMUSCULAR; INTRAVENOUS at 10:58

## 2024-01-11 RX ADMIN — BUPIVACAINE HYDROCHLORIDE 30 ML: 5 INJECTION, SOLUTION EPIDURAL; INTRACAUDAL; PERINEURAL at 10:58

## 2024-01-11 RX ADMIN — CEFAZOLIN SODIUM 2000 MG: 2 SOLUTION INTRAVENOUS at 11:18

## 2024-01-11 RX ADMIN — KETOROLAC TROMETHAMINE 15 MG: 30 INJECTION, SOLUTION INTRAMUSCULAR; INTRAVENOUS at 12:21

## 2024-01-11 RX ADMIN — DEXAMETHASONE SODIUM PHOSPHATE 10 MG: 10 INJECTION, SOLUTION INTRAMUSCULAR; INTRAVENOUS at 11:23

## 2024-01-11 RX ADMIN — FENTANYL CITRATE 25 MCG: 50 INJECTION, SOLUTION INTRAMUSCULAR; INTRAVENOUS at 12:21

## 2024-01-11 RX ADMIN — ONDANSETRON 4 MG: 2 INJECTION INTRAMUSCULAR; INTRAVENOUS at 11:23

## 2024-01-11 NOTE — OP NOTE
OPERATIVE REPORT  PATIENT NAME: Benito Park  :  1970  MRN: [id number]  Pt Location: [facility] OR    SURGERY DATE: 24    Surgeons and Role:     * Sarthak Villatoro MD - Primary     * Onesimo Kelley PA-C - Assisting    Pre-Op Diagnosis:  Dupuytren's contracture of the right small finger with contracture    Post-Op Diagnosis:  .Dupuytren's contracture of the right small finger with contracture of the metacarpophalangeal joint and proximal interphalangeal joint    Procedure(s) (LRB):  Right hand small finger duppuytrens excision with release of the metacarpophalangeal joint and proximal interphalangeal joint (Right)  Application short arm splint (Right)  Contracture release right hand small finger proximal interphalangeal joint (Right)    Specimen(s):  Order Name Source Comment Collection Info Order Time   TISSUE EXAM Dupuytren's Contracture Tissue  Collected By: Sarthak Villatoro MD 2024 11:59 AM     Release to patient through Mychart   Immediate            Estimated Blood Loss:   Minimal      Anesthesia Type:   Regional with Sedation    Operative Indications:  The patient has a history of Dupuytren's disease of the right hand small finger with contracture of the metacarpophalangeal joint and proximal interphalangeal joint that was recalcitrant to conservative management.  The decision was made to bring the patient to the operating room for Dupuytren's excision right hand small finger.  Risks of the procedure were explained which include, but are not limited to bleeding; infection; damage to nerves, arteries,veins, tendons; scar; pain; need for reoperation; failure to give desired result; and risks of anaesthesia.  All questions were answered to satisfaction and they were willing to proceed.         Operative Findings:  Pretendinous cord extending to radial spiral cord right hand small finger with contracture of the metacarpophalangeal joint and proximal interphalangeal  joint.    Complications:   None    Procedure and Technique:  After the patient, site, and procedure were identified, the patient was brought into the operating room in a supine position.  Regional and general anaesthesia were provided.  A well padded tourniquet was applied to the extremity, set at 250 mmHg.  The  right upper extremity was then prepped and drapped in a normal, sterile, orthopedic fashion.    After the patient, site, and procedure identified attention was turned towards the right hand.  An Esmarch bandage was used to exsanguinate the limb and the tourniquet was inflated to 250 mmHg.  Brunner shaped incision was made from the distal interphalangeal joint down towards the level of the carpal tunnel over the volar aspect of the right hand small finger.  We elevated full-thickness skin flaps on both the radial and ulnar side of the small finger.  This brought us down to the level of a Dupuytren's cord pretendinous/precentral in nature extending towards a radial spiral cord of the small finger with contracture of the metacarpophalangeal joint and proximal interphalangeal joint.  We elevated full skin thickness flaps with care taken to avoid buttonholing the skin.  We identified the radial and ulnar digital artery and nerve as well as the deeper flexor tendon.  The pretendinous/precentral cord was then removed and the spiral cord was removed in its entirety after tracing out and protecting the digital arteries, digital nerves, and flexor tendon sheath.  This allowed partial release of the proximal interphalangeal joint and full release of the metacarpophalangeal joint.  A contracture release of the PIP joint was then required.  We opened up the cruciate ligament complex overlying the proximal interphalangeal joint and between the 8 3 and A4 pulleys.  We then reflected the flexor tendons.  At this point in time, we then released the volar plate on the volar side of the joint on both the radial, ulnar, and  proximal side as well as the radial and ulnar check rein ligaments and the volar capsule.  At this point in time, the patient's proximal interphalangeal joint of the right small finger was then able to be completely extended to a neutral position.  That is fied with our Dupuytren's excision.  Dupuytren's cord was excised and sent for routine pathologic evaluation.  Tourniquet was then deflated demonstrating rapid restoration of blood supply to the finger.    At the completion of the procedure, hemostasis was obtained with cautery and direct pressure.  The wounds were copiously irrigated with sterile solution.  The wounds were closed with Prolene.  Sterile dressings were applied, including Xeroform, gauze, tweeners, webril, ACE and Volar Splint.  Please note, all sponge, needle, and instrument counts were correct prior to closure.  Loupe magnification was utilized.  The patient tolerated the procedure well.     I was present for all critical portions of the procedure., A qualified resident physician was not available., and A physician assistant was required during the procedure for retraction, tissue handling, dissection and suturing.    Patient Disposition:  PACU , hemodynamically stable, and extubated and stable    SIGNATURE: Sarthak Villatoro MD  DATE: 01/11/24  TIME: 3:07 PM

## 2024-01-11 NOTE — DISCHARGE INSTR - AVS FIRST PAGE
Post Operative Instructions    You have had surgery on your arm today, please read and follow the information below:  Elevate your hand above your elbow during the next 24-48 hours to help with swelling.  Place your hand and arm over your head with motion at your shoulder three times a day.  Do not apply any cream/ointment/oil to your incisions including antibiotics.  Do not soak your hands in standing water (dishwater, tubs, Jacuzzi's, pools, etc.) until given permission (typically 2-3 weeks after injury)    Call the office if you notice any:  Increased numbness or tingling of your hand or fingers that is not relieved with elevation.  Increasing pain that is not controlled with medication.  Difficulty chewing, breathing, swallowing.  Chest pains or shortness of breath.  Fever over 101.4 degrees.    Bandage: Your therapist will remove your bandage at your first therapy appointment.    Motion: Move fingers into a fist 5 times a day, DO NOT move any splinted fingers.    Weight bearing status: The operated extremity should be non-weight bearing until further notice.    Ice: Ice for 10 minutes every hour as needed for swelling x 24 hours.    Sling: Sling for comfort for 2-3 days.    Medications:   Naproxen 220 mg two times a day   Tylenol Extended Release 650 mg every 8 hours  Tramadol 1 tab every 6 hours AS needed for pain    After surgery, we would like you to take naproxen 220 mg one tablet by mouth every 12 hours with food  AND Tylenol 650 mg one tablet by mouth every 8 hours  (at breakfast, lunch and dinner) for 3-5 days after your surgery.  Please take these medication EVERYDAY after surgery for 3-5 days, and not just as needed. You can take these medications at the same time.  Taking these medications after surgery will limit your need for prescription pain medication.      We will also prescribe a narcotic pain medication for a limited time after surgery that you can take as needed for moderate or severe pain.       Follow-up Appointment: 7-10 days.    ******PLEASE MAKE A THERAPY APPOINTMENT FOR WOUND EVALUATION, CUSTOM NIGHTTIME EXTENSION SPLINT AND RANGE OF MOTION. CALL [phone]*****      Please call the office if you have any questions or concerns regarding your post-operative care.

## 2024-01-11 NOTE — ANESTHESIA POSTPROCEDURE EVALUATION
Post-Op Assessment Note    CV Status:  Stable  Pain Score: 0    Pain management: adequate       Mental Status:  Arousable and sleepy   Hydration Status:  Stable   PONV Controlled:  Controlled   Airway Patency:  Patent     Post Op Vitals Reviewed: Yes      Staff: CRNA               BP   121/72   Temp 97.6   Pulse 77   Resp 14   SpO2 99

## 2024-01-11 NOTE — H&P
H&P Exam - Orthopedics   Benito Park 53 y.o. male MRN: [id number]  Unit/Bed#: [facility] 06    Assessment/Plan   Assessment:  Right small finger Dupuytren's disease    Plan:  Right small finger Dupuytren's disease excision with regional anesthesia    History of Present Illness   HPI:  Benito Park is a 53 y.o. male who presents with right small finger flexion contracture.  He previously underwent surgical intervention but states that the finger is starting to flex down again.  He would like to proceed with surgical intervention.    Historical Information  Review Of Systems:   Skin: Normal  Neuro: See HPI  Musculoskeletal: See HPI  14 point review of systems negative except as stated above     Past Medical History:   Past Medical History:   Diagnosis Date    Anxiety     Back pain     Colon polyp     CPAP (continuous positive airway pressure) dependence     Crohn's disease (HCC)     Depression     GERD (gastroesophageal reflux disease)     Hypertension     Perianal fistula     Sleep apnea     Terminal ileitis (HCC)        Past Surgical History:   Past Surgical History:   Procedure Laterality Date    BACK SURGERY  2019    CAST APPLICATION Right 11/17/2022    Procedure: Application short-arm splint;  Surgeon: Sarthak Villatoro MD;  Location:  MAIN OR;  Service: Orthopedics    COLONOSCOPY      EGD      HAND CONTRACTURE RELEASE Left 06/01/2023    Procedure: left ring and small finger dupuytrens excision and ring finger Metacarpophalangeal joint release and small finger Metacarpophalangeal and proximal interphalangeal joint release;  Surgeon: Sarthak Villatoro MD;  Location:  MAIN OR;  Service: Orthopedics    HERNIA REPAIR      umbilical hernia    ND ANRCT XM SURG REQ ANES GENERAL SPI/EDRL DX N/A 11/17/2021    Procedure: EXAM UNDER ANESTHESIA (EUA);  Surgeon: Davi Thao MD;  Location:  MAIN OR;  Service: Colorectal    ND FASCIOTOMY PALMAR OPEN PARTIAL Right 11/17/2022    Procedure: Dupuytren's excision  right palm extending into the small finger with release of the metacarpophalangeal joint and proximal interphalangeal joint.  Dupuytren's excision right palm with release of the right ring finger metacarpophalangeal joint.;  Surgeon: Sarthak Villatoro MD;  Location: [facility];  Service: Orthopedics    WY I&D ISCHIORECTAL&/PERIRECTAL ABSCESS SPX Right 10/24/2021    Procedure: excisional debredement right gluteal cheek ;  Surgeon: Jeana Bustamante MD;  Location: [facility];  Service: General    WY PLACEMENT SETON N/A 2021    Procedure: PLACEMENT SETON;  Surgeon: Davi Thao MD;  Location:  MAIN OR;  Service: Colorectal    WY PRQ IMPLTJ NSTIM ELECTRODE ARRAY EPIDURAL Right 2021    Procedure: INSERTION THORACIC DORSAL COLUMN SPINAL CORD STIMULATOR PERCUTANEOUS W IMPLANTABLE PULSE GENERATOR, RIGHT;  Surgeon: Akshat Witt MD;  Location: [facility];  Service: Neurosurgery       Family History:  Family history reviewed and non-contributory  Family History   Problem Relation Age of Onset    Heart disease Father     Heart attack Father     Colon cancer Neg Hx     Colon polyps Neg Hx     Inflammatory bowel disease Neg Hx        Social History:  Social History     Socioeconomic History    Marital status: /Civil Union     Spouse name: None    Number of children: None    Years of education: None    Highest education level: None   Occupational History    None   Tobacco Use    Smoking status: Former     Current packs/day: 0.00     Average packs/day: 0.5 packs/day for 30.0 years (15.0 ttl pk-yrs)     Types: Cigarettes     Start date: 2019     Quit date: 2021     Years since quittin.9    Smokeless tobacco: Never   Vaping Use    Vaping status: Every Day    Substances: THC   Substance and Sexual Activity    Alcohol use: Not Currently     Alcohol/week: 3.0 standard drinks of alcohol     Types: 3 Cans of beer per week    Drug use: Yes     Frequency: 7.0 times per week     Types: Marijuana      Comment: Medical marijuana-vapes    Sexual activity: Yes     Partners: Female   Other Topics Concern    None   Social History Narrative    None     Social Determinants of Health     Financial Resource Strain: Low Risk  (6/7/2023)    Overall Financial Resource Strain (CARDIA)     Difficulty of Paying Living Expenses: Not hard at all   Food Insecurity: No Food Insecurity (4/11/2023)    Received from [employer]    Food Insecurity     Within the past 12 months, you worried that your food would run out before you got the money to buy more.: 1     Within the past 12 months, the food you bought just didn't last and you didn't have money to get more.: 1   Transportation Needs: No Transportation Needs (6/7/2023)    PRAPARE - Transportation     Lack of Transportation (Medical): No     Lack of Transportation (Non-Medical): No   Physical Activity: Sufficiently Active (4/11/2023)    Received from [employer]    Physical Activity     On average, how many days per week do you engage in moderate to strenuous exercise (like a brisk walk)?: 4 days     On average, how many minutes do you engage in exercise at this level?: 60 min   Stress: No Stress Concern Present (4/11/2023)    Received from [employer]    [place] [employer] - Occupational Stress Questionnaire     Feeling of Stress : Only a little   Social Connections: Moderately Integrated (4/11/2023)    Received from [employer]    Social Connection and Isolation Panel [NHANES]     Frequency of Communication with Friends and Family: More than three times a week     Frequency of Social Gatherings with Friends and Family: Three times a week     Attends Sabianism Services: More than 4 times per year     Active Member of Clubs or Organizations: No     Attends Club or Organization Meetings: Never     Marital Status:    Intimate Partner Violence: Not At Risk (4/11/2023)    Received from [employer]    Humiliation, Afraid, Rape, and Kick questionnaire      Fear of Current or Ex-Partner: No     Emotionally Abused: No     Physically Abused: No     Sexually Abused: No   Housing Stability: Unknown (4/11/2023)    [facility] from [facility]    Housing Stability     In the last 12 months, was there a time when you were not able to pay the mortgage or rent on time?: 2     [facility] Housing Places Lived Last Flowsheet Value Most Recent Result: Not on file     In the last 12 months, was there a time when you did not have a steady place to sleep or slept in a shelter (including now)?: 2       Allergies:   No Known Allergies        Labs:  0   Lab Value Date/Time    HCT 46.9 10/04/2023 1135    HCT 49.8 (H) 07/19/2023 1318    HCT 48.9 06/01/2023 0917    HCT 47.4 02/20/2015 2108    HGB 15.8 10/04/2023 1135    HGB 17.1 (H) 07/19/2023 1318    HGB 16.9 06/01/2023 0917    HGB 16.3 02/20/2015 2113    HGB 16.1 02/20/2015 2108    INR 0.92 03/12/2021 1008    INR 0.96 02/20/2015 2108    WBC 8.27 10/04/2023 1135    WBC 7.68 07/19/2023 1318    WBC 7.37 06/01/2023 0917    WBC 12.45 (H) 02/20/2015 2108    ESR 6 01/14/2022 0847    ESR 6 11/11/2020 0937    CRP 21.7 (H) 10/04/2023 1135       Meds:    Current Facility-Administered Medications:     ceFAZolin (ANCEF) IVPB (premix in dextrose) 2,000 mg 50 mL, 2,000 mg, Intravenous, Once, Onesimo Kelley PA-C    lactated ringers infusion, 125 mL/hr, Intravenous, Continuous, Mohini Ricci MD    Blood Culture:   Lab Results   Component Value Date    BLOODCX No Growth After 5 Days. 09/21/2019    BLOODCX No Growth After 5 Days. 09/21/2019       Wound Culture:   Lab Results   Component Value Date    WOUNDCULT No growth 10/24/2021       Ins and Outs:  No intake/output data recorded.            Physical Exam  /82   Pulse 89   Temp 98.1 °F (36.7 °C) (Oral)   Resp 16   SpO2 96%   /82   Pulse 89   Temp 98.1 °F (36.7 °C) (Oral)   Resp 16   SpO2 96%   Gen: No acute distress, resting comfortably in bed  HEENT: Eyes clear,  moist mucus membranes, hearing intact  Respiratory: No audible wheezing or stridor  Cardiovascular: Well Perfused peripherally, 2+ distal pulse  Abdomen: nondistended, no peritoneal signs  Ortho Exam: Right small finger  MCP flexion contracture of 15 degrees and 41 degrees flexion contracture at the PIP joint    Neuro Exam: Patient is neurovascularly intact from a median, radial and ulnar nerve distribution. Capillary refill less than 2 seconds.       Lab Results: Reviewed  Imaging: Reviewed

## 2024-01-11 NOTE — ANESTHESIA PREPROCEDURE EVALUATION
Procedure:  Right hand small finger duppuytrens excision (Right: Hand)    Relevant Problems   CARDIO   (+) Hypertension      GI/HEPATIC   (+) GERD (gastroesophageal reflux disease)      MUSCULOSKELETAL   (+) Chronic bilateral low back pain with bilateral sciatica   (+) Failed back surgical syndrome   (+) Lumbar spondylosis   (+) Myofascial pain syndrome      NEURO/PSYCH   (+) Chronic bilateral low back pain with bilateral sciatica   (+) Chronic pain syndrome   (+) Chronic right shoulder pain   (+) Myofascial pain syndrome      PULMONARY   (+) Obstructive sleep apnea syndrome   (+) Sleep apnea   (+) Smoker      Musculoskeletal and Integument   (+) Congenital fusion of sacroiliac joint      Other   (+) Crohn's disease (HCC)   (+) Heavy alcohol consumption   (+) 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 (HCC)   (+) S/P insertion of spinal cord stimulator        Physical Exam    Airway    Mallampati score: II  TM Distance: <3 FB       Dental        Cardiovascular      Pulmonary      Other Findings        Anesthesia Plan  ASA Score- 3     Anesthesia Type- general with ASA Monitors.         Additional Monitors:     Airway Plan: LMA.    Comment: IV sedation vs. GA with LMA  Right supraclavicular nerve block for postop pain control.       Plan Factors-Exercise tolerance (METS): >4 METS.    Chart reviewed.    Patient summary reviewed.    Patient is a current smoker.              Induction- intravenous.    Postoperative Plan- . Planned trial extubation    Informed Consent- Anesthetic plan and risks discussed with patient.  I personally reviewed this patient with the CRNA. Discussed and agreed on the Anesthesia Plan with the CRNA..

## 2024-01-11 NOTE — ANESTHESIA PROCEDURE NOTES
Peripheral Block    Patient location during procedure: holding area  Start time: 1/11/2024 10:58 AM  Reason for block: at surgeon's request and post-op pain management  Staffing  Performed by: Peyton Boyd MD  Authorized by: Peyton Body MD    Preanesthetic Checklist  Completed: patient identified, IV checked, site marked, risks and benefits discussed, surgical consent, monitors and equipment checked, pre-op evaluation and timeout performed  Peripheral Block  Patient position: supine  Prep: ChloraPrep  Patient monitoring: frequent blood pressure checks, continuous pulse oximetry and heart rate  Block type: Supraclavicular  Laterality: right  Injection technique: single-shot  Procedures: ultrasound guided, Ultrasound guidance required for the procedure to increase accuracy and safety of medication placement and decrease risk of complications.  Ultrasound permanent image saved  Needle  Needle type: Stimuplex   Needle gauge: 22 G  Needle length: 2 in  Needle localization: anatomical landmarks and ultrasound guidance  Needle insertion depth: 2 cm  Assessment  Injection assessment: incremental injection, frequent aspiration, injected with ease, negative aspiration, negative for heart rate change, no paresthesia on injection, no symptoms of intraneural/intravenous injection and needle tip visualized at all times  Paresthesia pain: none  Post-procedure:  site cleaned  patient tolerated the procedure well with no immediate complications

## 2024-01-12 ENCOUNTER — RA CDI HCC (OUTPATIENT)
Dept: OTHER | Facility: HOSPITAL | Age: 54
End: 2024-01-12

## 2024-01-16 PROCEDURE — 88304 TISSUE EXAM BY PATHOLOGIST: CPT | Performed by: PATHOLOGY

## 2024-01-17 ENCOUNTER — TELEPHONE (OUTPATIENT)
Age: 54
End: 2024-01-17

## 2024-01-17 NOTE — TELEPHONE ENCOUNTER
Caller: Patient    Doctor: Dr. Villatoro    Reason for call: Patient calling stating that his right arm short arm splint is really loose and patient stating that it is not really holding his fingers up anymore.  Patient asking to speak to the clinical team. He is wondering if it is ok to wait until the f/u?   He has f/u on 1/22/24 with Dr. Villatoro.    Call back#:

## 2024-01-17 NOTE — TELEPHONE ENCOUNTER
Called and spoke w/pt and relayed TUAN Kelley's msg.  Pt states he thinks splint may be broken.  Advised to call PT now-today for appt as they were to make him a splint and he had surgery on 1/11/14 and was to call to be seen 2-3 days PO.  Pt states if he tightens ace wrap if throbs and if he loosens it it feels like splint is broken.  Please advise further. Has f/u appt on Monday. Pt states he will call PT.

## 2024-01-17 NOTE — TELEPHONE ENCOUNTER
Patient was to see therapy after surgery 2 to 3 days.  This is in his after visit summary with the phone number.  He was to have a custom nighttime extension splint made for him.  They would be able to evaluate this.  He is more than welcome to tighten up the Ace wrap to provide additional support if he would like.  Thank you.

## 2024-01-18 ENCOUNTER — TELEMEDICINE (OUTPATIENT)
Dept: FAMILY MEDICINE CLINIC | Facility: CLINIC | Age: 54
End: 2024-01-18
Payer: MEDICARE

## 2024-01-18 ENCOUNTER — APPOINTMENT (OUTPATIENT)
Dept: OCCUPATIONAL THERAPY | Facility: CLINIC | Age: 54
End: 2024-01-18
Payer: MEDICARE

## 2024-01-18 DIAGNOSIS — K50.00 CROHN'S DISEASE OF SMALL INTESTINE WITHOUT COMPLICATION (HCC): ICD-10-CM

## 2024-01-18 DIAGNOSIS — M79.18 MYOFASCIAL PAIN SYNDROME: Primary | ICD-10-CM

## 2024-01-18 DIAGNOSIS — Z96.89 S/P INSERTION OF SPINAL CORD STIMULATOR: ICD-10-CM

## 2024-01-18 DIAGNOSIS — M96.1 FAILED BACK SURGICAL SYNDROME: ICD-10-CM

## 2024-01-18 PROCEDURE — 99213 OFFICE O/P EST LOW 20 MIN: CPT | Performed by: FAMILY MEDICINE

## 2024-01-18 NOTE — PROGRESS NOTES
Virtual Regular Visit    Verification of patient location:    Patient is located at Home in the following state in which I hold an active license [place]      Assessment/Plan:    Problem List Items Addressed This Visit       Failed back surgical syndrome    Myofascial pain syndrome - Primary    S/P insertion of spinal cord stimulator    Crohn's disease (HCC)            Reason for visit is   Chief Complaint   Patient presents with    [facility]    Virtual Regular Visit        Encounter provider Ariel Sosa MD    Provider located at [address] [id number]      Recent Visits  No visits were found meeting these conditions.  Showing recent visits within past 7 days and meeting all other requirements  Today's Visits  Date Type Provider Dept   01/18/24 Telemedicine Ariel Sosa MD [facility]   Showing today's visits and meeting all other requirements  Future Appointments  No visits were found meeting these conditions.  Showing future appointments within next 150 days and meeting all other requirements       The patient was identified by name and date of birth. Benito Park was informed that this is a telemedicine visit and that the visit is being conducted through the Epic Embedded platform. He agrees to proceed..  My office door was closed. No one else was in the room.  He acknowledged consent and understanding of privacy and security of the video platform. The patient has agreed to participate and understands they can discontinue the visit at any time.    Patient is aware this is a billable service.     Subjective  Benito Park is a 53 y.o. male f/u for asessed med cond---stable on current meds .      HPI     Past Medical History:   Diagnosis Date    Anxiety     Back pain     Colon polyp     CPAP (continuous positive airway pressure) dependence     Crohn's disease (HCC)     Depression     GERD (gastroesophageal  reflux disease)     Hypertension     Perianal fistula     Sleep apnea     Terminal ileitis (HCC)        Past Surgical History:   Procedure Laterality Date    BACK SURGERY  2019    CAST APPLICATION Right 11/17/2022    Procedure: Application short-arm splint;  Surgeon: Sarthak Villatoro MD;  Location:  MAIN OR;  Service: Orthopedics    COLONOSCOPY      EGD      HAND CONTRACTURE RELEASE Left 06/01/2023    Procedure: left ring and small finger dupuytrens excision and ring finger Metacarpophalangeal joint release and small finger Metacarpophalangeal and proximal interphalangeal joint release;  Surgeon: Sarthak Villatoro MD;  Location:  MAIN OR;  Service: Orthopedics    HERNIA REPAIR      umbilical hernia    DE ANRCT XM SURG REQ ANES GENERAL SPI/EDRL DX N/A 11/17/2021    Procedure: EXAM UNDER ANESTHESIA (EUA);  Surgeon: Davi Thao MD;  Location:  MAIN OR;  Service: Colorectal    DE APPLICATION SHORT ARM SPLINT FOREARM-HAND STATIC Right 1/11/2024    Procedure: Application short arm splint;  Surgeon: Sarthak Villatoro MD;  Location:  MAIN OR;  Service: Orthopedics    DE CAPSULECTOMY/CAPSULOTOMY IPHAL JOINT EACH Right 1/11/2024    Procedure: Contracture release right hand small finger proximal interphalangeal joint;  Surgeon: Sarthak Villatoro MD;  Location:  MAIN OR;  Service: Orthopedics    DE FASCIOTOMY PALMAR OPEN PARTIAL Right 11/17/2022    Procedure: Dupuytren's excision right palm extending into the small finger with release of the metacarpophalangeal joint and proximal interphalangeal joint.  Dupuytren's excision right palm with release of the right ring finger metacarpophalangeal joint.;  Surgeon: Sarthak Villatoro MD;  Location:  MAIN OR;  Service: Orthopedics    DE FASCIOTOMY PALMAR OPEN PARTIAL Right 1/11/2024    Procedure: Right hand small finger duppuytrens excision with release of the metacarpophalangeal joint and proximal interphalangeal joint;  Surgeon: Sarthak Villatoro MD;   Location: [facility] MAIN OR;  Service: Orthopedics    CO FASCT PALM W/WO Z-PLASTY TISSUE REARGMT/SKN GRFT Right 1/11/2024    Procedure: Right hand small finger duppuytrens excision with release of the metacarpophalangeal joint and proximal interphalangeal joint;  Surgeon: Sarthak Villatoro MD;  Location: [facility];  Service: Orthopedics    CO FASCT PRTL PALMAR 1 DGT PROX IPHAL JT W/WO RPR Right 1/11/2024    Procedure: Right hand small finger duppuytrens excision with release of the metacarpophalangeal joint and proximal interphalangeal joint;  Surgeon: Sarthak Villatoro MD;  Location: [facility];  Service: Orthopedics    CO I&D ISCHIORECTAL&/PERIRECTAL ABSCESS SPX Right 10/24/2021    Procedure: excisional debredement right gluteal cheek ;  Surgeon: Jeana Bustamante MD;  Location: [facility];  Service: General    CO PLACEMENT SETON N/A 11/17/2021    Procedure: PLACEMENT SETON;  Surgeon: Davi Thao MD;  Location: [facility];  Service: Colorectal    CO PRQ IMPLTJ NSTIM ELECTRODE ARRAY EPIDURAL Right 03/26/2021    Procedure: INSERTION THORACIC DORSAL COLUMN SPINAL CORD STIMULATOR PERCUTANEOUS W IMPLANTABLE PULSE GENERATOR, RIGHT;  Surgeon: Akshat Witt MD;  Location: [facility];  Service: Neurosurgery       Current Outpatient Medications   Medication Sig Dispense Refill    acetaminophen (TYLENOL) 650 mg CR tablet Take 1 tablet (650 mg total) by mouth every 8 (eight) hours as needed for mild pain 30 tablet 0    amitriptyline (ELAVIL) 100 mg tablet take 1 to 2 tablets by mouth at bedtime 60 tablet 5    azaTHIOprine (IMURAN) 50 mg tablet Take 1 tablet (50 mg total) by mouth daily 30 tablet 11    buPROPion (Wellbutrin SR) 150 mg 12 hr tablet Take 1 tablet (150 mg total) by mouth 2 (two) times a day 60 tablet 5    cholestyramine (QUESTRAN) 4 GM/DOSE powder Take 1 packet (4 g total) by mouth 2 (two) times a day with meals (Patient taking differently: Take 4 g by mouth if needed (takes twice per month)) 378 g 7     lisinopril (ZESTRIL) 20 mg tablet Take 1 tablet (20 mg total) by mouth daily 90 tablet 0    Naproxen Sodium 220 MG CAPS Take 1 capsule (220 mg total) by mouth 2 (two) times a day 60 capsule 0    omeprazole (PriLOSEC) 40 MG capsule Take 1 capsule (40 mg total) by mouth daily 30 capsule 11    pregabalin (LYRICA) 150 mg capsule take 1 capsule by mouth three times a day 90 capsule 5    RA Vitamin D-3 25 MCG (1000 UT) tablet TAKE 2 TABLETS BY MOUTH DAILY 60 tablet 2    sildenafil (Viagra) 100 mg tablet Take 1 tablet (100 mg total) by mouth daily as needed for erectile dysfunction 30 tablet 2    traMADol (Ultram) 50 mg tablet Take 1 tablet (50 mg total) by mouth every 6 (six) hours as needed for moderate pain 7 tablet 0    vedolizumab (Entyvio) SOLR Inject 300 mg into a catheter in a vein every 4 weeks (Patient taking differently: Inject 300 mg into a catheter in a vein every 4 weeks)       No current facility-administered medications for this visit.        No Known Allergies    Review of Systems   Constitutional:  Negative for fatigue, fever and unexpected weight change.   HENT:  Negative for congestion, sinus pain and sore throat.    Eyes:  Negative for visual disturbance.   Respiratory:  Negative for shortness of breath and wheezing.    Cardiovascular:  Negative for chest pain and palpitations.   Gastrointestinal:  Negative for abdominal pain, nausea and vomiting.   Musculoskeletal: Negative.  Negative for arthralgias and myalgias.   Neurological:  Negative for syncope, weakness and numbness.   Psychiatric/Behavioral: Negative.  Negative for confusion, dysphoric mood and suicidal ideas.        Video Exam    There were no vitals filed for this visit.    Physical Exam  Constitutional:       Appearance: He is well-developed.   HENT:      Right Ear: Ear canal normal. Tympanic membrane is not injected.      Left Ear: Ear canal normal. Tympanic membrane is not injected.      Nose: Nose normal.   Eyes:      General:          Right eye: No discharge.         Left eye: No discharge.      Conjunctiva/sclera: Conjunctivae normal.      Pupils: Pupils are equal, round, and reactive to light.   Neck:      Thyroid: No thyromegaly.   Cardiovascular:      Rate and Rhythm: Normal rate and regular rhythm.      Heart sounds: Normal heart sounds. No murmur heard.  Pulmonary:      Effort: Pulmonary effort is normal. No respiratory distress.      Breath sounds: Normal breath sounds. No wheezing.   Abdominal:      General: Bowel sounds are normal. There is no distension.      Palpations: Abdomen is soft.      Tenderness: There is no abdominal tenderness.   Musculoskeletal:         General: Normal range of motion.      Cervical back: Normal range of motion and neck supple.   Lymphadenopathy:      Cervical: No cervical adenopathy.   Skin:     General: Skin is warm and dry.   Neurological:      Mental Status: He is alert and oriented to person, place, and time. He is not disoriented.      Sensory: No sensory deficit.      Motor: No weakness.      Coordination: Coordination normal.      Gait: Gait normal.      Deep Tendon Reflexes: Reflexes are normal and symmetric.   Psychiatric:         Speech: Speech normal.         Behavior: Behavior normal.         Thought Content: Thought content normal.         Judgment: Judgment normal.          Visit Time  Total Visit Duration: 15

## 2024-01-19 ENCOUNTER — EVALUATION (OUTPATIENT)
Dept: OCCUPATIONAL THERAPY | Facility: CLINIC | Age: 54
End: 2024-01-19
Payer: MEDICARE

## 2024-01-19 DIAGNOSIS — M72.0 DUPUYTREN'S CONTRACTURE OF BOTH HANDS: ICD-10-CM

## 2024-01-19 PROCEDURE — 97140 MANUAL THERAPY 1/> REGIONS: CPT | Performed by: OCCUPATIONAL THERAPIST

## 2024-01-19 PROCEDURE — 97760 ORTHOTIC MGMT&TRAING 1ST ENC: CPT | Performed by: OCCUPATIONAL THERAPIST

## 2024-01-19 PROCEDURE — 97165 OT EVAL LOW COMPLEX 30 MIN: CPT | Performed by: OCCUPATIONAL THERAPIST

## 2024-01-19 NOTE — PROGRESS NOTES
OT Evaluation     Today's date: 2024  Patient name: Benito Park  : 1970  MRN: [id number]  Referring provider: Onesimo Kelley PA-C  Dx:   Encounter Diagnosis     ICD-10-CM    1. Dupuytren's contracture of both hands  M72.0 Ambulatory Referral to PT/OT Hand Therapy                     Assessment  Assessment details: Anand returns with revision R SF Dupuytren's release . His post op dressing was removed today . He has intact sutures. He has no pain at rest .  He has pain with ROM. He has limited ROM of R SF. He has mild edema . He has limited use of R for gripping , lifting, bottles/jars , knobs. He does not work from a previous injury .   Impairments: abnormal or restricted ROM, activity intolerance, impaired physical strength and pain with function  Functional limitations: limited with lift/carry , bottles/jars , opening doors with R  Symptom irritability: lowUnderstanding of Dx/Px/POC: good   Prognosis: good    Goals  STG-1 wk  1- I with HEP  2- improve digit flexion  R SF 30 at PIP  3- compliant with splint use      LTG- 4 wks  1- R MCP 0-90, PIP 0-90, DIP 5-60  2- R  = to L   3- no pain   4-I with bottles/jars , lifting 10#    Plan  Patient would benefit from: OT eval, custom splinting and skilled occupational therapy  Planned modality interventions: cryotherapy and thermotherapy: hydrocollator packs  Planned therapy interventions: ADL retraining, joint mobilization, IASTM, manual therapy, massage, functional ROM exercises, stretching, strengthening, orthotic fitting/training, home exercise program, therapeutic exercise and therapeutic activities  Frequency: 2x week  Duration in weeks: 4  Plan of Care beginning date: 2024  Treatment plan discussed with: patient        Subjective Evaluation    History of Present Illness  Date of onset: 2024  Mechanism of injury: Anand had revision dupuytren's release of R SF.  Quality of life: fair    Patient Goals  Patient goals for therapy:  decreased edema, decreased pain, increased motion, increased strength and independence with ADLs/IADLs    Pain  Current pain ratin  At best pain ratin  At worst pain ratin  Location: R SF  Quality: dull ache, sharp and tight  Relieving factors: rest  Exacerbated by: ROM.  Progression: improved    Social Support  Steps to enter house: yes  Stairs in house: yes   Lives in: multiple-level home  Lives with: spouse and adult children    Employment status: not working  Hand dominance: right    Treatments  No previous or current treatments        Objective     Observations     Additional Observation Details  Sutures volar R SF 7 cm - clean and dry    Neurological Testing     Additional Neurological Details  Mild paresthesias tip of SF    Active Range of Motion     Right Wrist   Normal active range of motion    Right Digits   Flexion   Little     MCP: 90    PIP: 40    DIP: 30  Extension   Little     MCP: 20    PIP: 20    DIP: 10    Passive Range of Motion     Right Digits   Flexion   Little     MCP: 92    PIP: 90    DIP: 70  Extension   Little     MCP: 0    PIP: 0    DIP: 8    Strength/Myotome Testing     Left Wrist/Hand      (2nd hand position)     Trial 1: 50    Tests     Right Wrist/Hand   Positive extrinsic flexor tightness and intrinsic muscle tightness.     Swelling     Right Wrist/Hand     Additional Swelling Details  Mild edema R SF              Precautions: Ania's       Manuals   IE            Graston dorsal SF 2m            MOB MCP/PIP/DIP  6m            Retrograde  4m                         HEP 4x day             Custom  HB ext orthosis 13m            Coban wrap R SF  apply                                                                             Ther Ex             A/PROM R SF 4m            Blocking  2m                                                                                          Ther Activity                                       Gait Training                                        Modalities             MH in ext  8m

## 2024-01-22 ENCOUNTER — OFFICE VISIT (OUTPATIENT)
Dept: OCCUPATIONAL THERAPY | Facility: CLINIC | Age: 54
End: 2024-01-22
Payer: MEDICARE

## 2024-01-22 ENCOUNTER — OFFICE VISIT (OUTPATIENT)
Dept: OBGYN CLINIC | Facility: CLINIC | Age: 54
End: 2024-01-22

## 2024-01-22 VITALS
SYSTOLIC BLOOD PRESSURE: 134 MMHG | WEIGHT: 218 LBS | DIASTOLIC BLOOD PRESSURE: 86 MMHG | BODY MASS INDEX: 29.53 KG/M2 | HEIGHT: 72 IN

## 2024-01-22 DIAGNOSIS — M72.0 DUPUYTREN'S CONTRACTURE OF BOTH HANDS: Primary | ICD-10-CM

## 2024-01-22 PROCEDURE — 99024 POSTOP FOLLOW-UP VISIT: CPT | Performed by: ORTHOPAEDIC SURGERY

## 2024-01-22 PROCEDURE — 97110 THERAPEUTIC EXERCISES: CPT | Performed by: OCCUPATIONAL THERAPIST

## 2024-01-22 PROCEDURE — 97140 MANUAL THERAPY 1/> REGIONS: CPT | Performed by: OCCUPATIONAL THERAPIST

## 2024-01-22 NOTE — PROGRESS NOTES
Assessment:   S/P Right hand small finger duppuytrens excision with release of the metacarpophalangeal joint and proximal interphalangeal joint - Right, Application short arm splint - Right, and Contracture release right hand small finger proximal interphalangeal joint - Right on 1/11/2024    Plan:   It was discussed with the patient that we will leave a few of the stitches in which can be removed from occupational therapy as long as the wound has completely healed  He was advised to continue with his nighttime extension splint  He will follow-up in 6 weeks for reevaluation    Follow Up:  6  week(s)    To Do Next Visit:  Reevaluation      CHIEF COMPLAINT:  Chief Complaint   Patient presents with    Right Hand - Post-op, Suture / Staple Removal      Right hand small finger duppuytrens excision with release of the metacarpophalangeal joint and proximal interphalangeal joint- 1/11/24         SUBJECTIVE:  Benito Park is a 53 y.o. male who presents for follow up after Right hand small finger duppuytrens excision with release of the metacarpophalangeal joint and proximal interphalangeal joint - Right, Application short arm splint - Right, and Contracture release right hand small finger proximal interphalangeal joint - Right on 1/11/2024.  Today patient has minimal pain and discomfort.  He states that he was carrying a ladder and it fell onto his hand that he had surgery on.  He states some of the wound opened up slightly.  He has been going to therapy to work on his range of motion.       PHYSICAL EXAMINATION:  Vital signs: /86   Ht 6' (1.829 m)   Wt 98.9 kg (218 lb)   BMI 29.57 kg/m²   General: well developed and well nourished, alert, oriented times 3, and appears comfortable  Psychiatric: Normal    MUSCULOSKELETAL EXAMINATION:  Incision: clean and white dehiscence noted at the MCP crease, sutures remained in  Range of Motion: As expected and full composite fist possible  Neurovascular status: Neuro  intact, good cap refill  Activity Restrictions: Restrictions: Continue 5 pound weight restriction while the stitches are in  Done today: Sutures out      STUDIES REVIEWED:  Pathology report was reviewed which demonstrated palmar fibromatosis consistent with Dupuytren's disease    PROCEDURES PERFORMED:  Procedures  No Procedures performed today

## 2024-01-22 NOTE — PROGRESS NOTES
Daily Note     Today's date: 2024  Patient name: Benito Park  : 1970  MRN: [id number]  Referring provider: Onesimo Kelley PA-C  Dx:   Encounter Diagnosis     ICD-10-CM    1. Dupuytren's contracture of both hands  M72.0                      Subjective: the splint fell off .       Objective: See treatment diary below      Assessment: Tolerated treatment well. Patient  has improved ROM .      Plan: Continue per plan of care.      Precautions: Ania's       Manuals   IE            Graston dorsal SF 2m 2m           MOB MCP/PIP/DIP  6m 6m           Retrograde  4m 4m                        HEP 4x day             Custom  HB ext orthosis 13m            Coban wrap R SF  apply                                                                             Ther Ex             A/PROM R SF 4m 6m           Blocking  2m 2m           Flexor stretch  4m                                                                            Ther Activity                                       Gait Training                                       Modalities             MH in ext  8m 8m

## 2024-01-28 DIAGNOSIS — F41.9 ANXIETY: ICD-10-CM

## 2024-01-29 ENCOUNTER — OFFICE VISIT (OUTPATIENT)
Dept: OCCUPATIONAL THERAPY | Facility: CLINIC | Age: 54
End: 2024-01-29
Payer: MEDICARE

## 2024-01-29 DIAGNOSIS — M72.0 DUPUYTREN'S CONTRACTURE OF BOTH HANDS: Primary | ICD-10-CM

## 2024-01-29 PROCEDURE — 97110 THERAPEUTIC EXERCISES: CPT | Performed by: OCCUPATIONAL THERAPIST

## 2024-01-29 PROCEDURE — 97140 MANUAL THERAPY 1/> REGIONS: CPT | Performed by: OCCUPATIONAL THERAPIST

## 2024-01-29 NOTE — PROGRESS NOTES
Daily Note     Today's date: 2024  Patient name: Benito Park  : 1970  MRN: [id number]  Referring provider: Onesimo Kelley PA-C  Dx:   Encounter Diagnosis     ICD-10-CM    1. Dupuytren's contracture of both hands  M72.0                      Subjective: I am doing better      Objective: See treatment diary below      Assessment: Tolerated treatment well. Patient  has improved ROM       Plan: Continue per plan of care.      Precautions: Sandran's       Manuals   IE           Graston dorsal SF 2m 2m 2m          MOB MCP/PIP/DIP  6m 6m 6m          Retrograde  4m 4m 4m                       HEP 4x day             Custom  HB ext orthosis 13m            Coban wrap R SF  apply                                                                             Ther Ex             A/PROM R SF 4m 6m 6m          Blocking  2m 2m 2m          Flexor stretch  4m 4m          Powerweb     Green  3x10          Wrist e/f    2#  3x10          Roll flexbar   2m                                    Ther Activity                                       Gait Training                                       Modalities             MH in ext  8m 8m 8m          CP    5m

## 2024-01-30 RX ORDER — BUPROPION HYDROCHLORIDE 150 MG/1
150 TABLET, EXTENDED RELEASE ORAL 2 TIMES DAILY
Qty: 60 TABLET | Refills: 2 | Status: SHIPPED | OUTPATIENT
Start: 2024-01-30

## 2024-02-01 DIAGNOSIS — K21.9 GASTROESOPHAGEAL REFLUX DISEASE, UNSPECIFIED WHETHER ESOPHAGITIS PRESENT: ICD-10-CM

## 2024-02-01 RX ORDER — OMEPRAZOLE 40 MG/1
40 CAPSULE, DELAYED RELEASE ORAL DAILY
Qty: 90 CAPSULE | Refills: 1 | Status: SHIPPED | OUTPATIENT
Start: 2024-02-01

## 2024-02-02 ENCOUNTER — LAB (OUTPATIENT)
Dept: LAB | Facility: HOSPITAL | Age: 54
End: 2024-02-02
Payer: MEDICARE

## 2024-02-02 ENCOUNTER — APPOINTMENT (OUTPATIENT)
Dept: LAB | Facility: HOSPITAL | Age: 54
End: 2024-02-02
Payer: MEDICARE

## 2024-02-02 ENCOUNTER — HOSPITAL ENCOUNTER (OUTPATIENT)
Dept: RADIOLOGY | Facility: HOSPITAL | Age: 54
End: 2024-02-02
Payer: MEDICARE

## 2024-02-02 ENCOUNTER — OFFICE VISIT (OUTPATIENT)
Dept: OCCUPATIONAL THERAPY | Facility: CLINIC | Age: 54
End: 2024-02-02
Payer: MEDICARE

## 2024-02-02 DIAGNOSIS — K50.814 CROHN'S DISEASE OF BOTH SMALL AND LARGE INTESTINE WITH ABSCESS (HCC): ICD-10-CM

## 2024-02-02 DIAGNOSIS — Z12.5 SCREENING FOR PROSTATE CANCER: ICD-10-CM

## 2024-02-02 DIAGNOSIS — M72.0 DUPUYTREN'S CONTRACTURE OF BOTH HANDS: Primary | ICD-10-CM

## 2024-02-02 LAB
25(OH)D3 SERPL-MCNC: 45.4 NG/ML (ref 30–100)
BASOPHILS # BLD AUTO: 0.02 THOUSANDS/ÂΜL (ref 0–0.1)
BASOPHILS NFR BLD AUTO: 0 % (ref 0–1)
CRP SERPL QL: 13.2 MG/L
EOSINOPHIL # BLD AUTO: 0.18 THOUSAND/ÂΜL (ref 0–0.61)
EOSINOPHIL NFR BLD AUTO: 3 % (ref 0–6)
ERYTHROCYTE [DISTWIDTH] IN BLOOD BY AUTOMATED COUNT: 13.6 % (ref 11.6–15.1)
HCT VFR BLD AUTO: 44.8 % (ref 36.5–49.3)
HGB BLD-MCNC: 14.9 G/DL (ref 12–17)
IMM GRANULOCYTES # BLD AUTO: 0.02 THOUSAND/UL (ref 0–0.2)
IMM GRANULOCYTES NFR BLD AUTO: 0 % (ref 0–2)
LYMPHOCYTES # BLD AUTO: 1.8 THOUSANDS/ÂΜL (ref 0.6–4.47)
LYMPHOCYTES NFR BLD AUTO: 26 % (ref 14–44)
MCH RBC QN AUTO: 34.1 PG (ref 26.8–34.3)
MCHC RBC AUTO-ENTMCNC: 33.3 G/DL (ref 31.4–37.4)
MCV RBC AUTO: 103 FL (ref 82–98)
MONOCYTES # BLD AUTO: 0.53 THOUSAND/ÂΜL (ref 0.17–1.22)
MONOCYTES NFR BLD AUTO: 8 % (ref 4–12)
NEUTROPHILS # BLD AUTO: 4.44 THOUSANDS/ÂΜL (ref 1.85–7.62)
NEUTS SEG NFR BLD AUTO: 63 % (ref 43–75)
NRBC BLD AUTO-RTO: 0 /100 WBCS
PLATELET # BLD AUTO: 216 THOUSANDS/UL (ref 149–390)
PMV BLD AUTO: 9.6 FL (ref 8.9–12.7)
PSA SERPL-MCNC: 0.65 NG/ML (ref 0–4)
RBC # BLD AUTO: 4.37 MILLION/UL (ref 3.88–5.62)
WBC # BLD AUTO: 6.99 THOUSAND/UL (ref 4.31–10.16)

## 2024-02-02 PROCEDURE — 86480 TB TEST CELL IMMUN MEASURE: CPT

## 2024-02-02 PROCEDURE — 82397 CHEMILUMINESCENT ASSAY: CPT

## 2024-02-02 PROCEDURE — 80280 DRUG ASSAY VEDOLIZUMAB: CPT

## 2024-02-02 PROCEDURE — 36415 COLL VENOUS BLD VENIPUNCTURE: CPT

## 2024-02-02 PROCEDURE — G0103 PSA SCREENING: HCPCS

## 2024-02-02 PROCEDURE — 82306 VITAMIN D 25 HYDROXY: CPT

## 2024-02-02 PROCEDURE — 85025 COMPLETE CBC W/AUTO DIFF WBC: CPT

## 2024-02-02 PROCEDURE — 97110 THERAPEUTIC EXERCISES: CPT | Performed by: OCCUPATIONAL THERAPIST

## 2024-02-02 PROCEDURE — 86140 C-REACTIVE PROTEIN: CPT

## 2024-02-02 PROCEDURE — 97140 MANUAL THERAPY 1/> REGIONS: CPT | Performed by: OCCUPATIONAL THERAPIST

## 2024-02-02 PROCEDURE — 84433 ASY THIOPURIN S-MTHYLTRNSFRS: CPT

## 2024-02-02 NOTE — PROGRESS NOTES
Daily Note     Today's date: 2024  Patient name: Benito Park  : 1970  MRN: [id number]  Referring provider: Onesimo Kelley PA-C  Dx:   Encounter Diagnosis     ICD-10-CM    1. Dupuytren's contracture of both hands  M72.0                      Subjective: I am doing better      Objective: See treatment diary below      Assessment: Tolerated treatment well. Patient  has improved ROM .  Mild flexor tightness        Plan: Continue per plan of care.      Precautions: Ania's       Manuals   IE          Graston dorsal SF 2m 2m 2m 2m         MOB MCP/PIP/DIP  6m 6m 6m 6m         Retrograde  4m 4m 4m 4m                      HEP 4x day             Custom  HB ext orthosis 13m            Coban wrap R SF  apply                                                                             Ther Ex             A/PROM R SF 4m 6m 6m 6m         Blocking  2m 2m 2m 2m         Flexor stretch  4m 4m 2m         Powerweb     Green  3x10 Green  3x10         Wrist e/f    2#  3x10 3#  3x10         Roll flexbar   2m 2m                                   Ther Activity                                       Gait Training                                       Modalities             MH in ext  8m 8m 8m 8m         CP    5m

## 2024-02-03 LAB
GAMMA INTERFERON BACKGROUND BLD IA-ACNC: <0 IU/ML
M TB IFN-G BLD-IMP: NEGATIVE
M TB IFN-G CD4+ BCKGRND COR BLD-ACNC: 0 IU/ML
M TB IFN-G CD4+ BCKGRND COR BLD-ACNC: 0 IU/ML
MITOGEN IGNF BCKGRD COR BLD-ACNC: 10 IU/ML

## 2024-02-05 ENCOUNTER — TELEPHONE (OUTPATIENT)
Dept: FAMILY MEDICINE CLINIC | Facility: CLINIC | Age: 54
End: 2024-02-05

## 2024-02-05 ENCOUNTER — OFFICE VISIT (OUTPATIENT)
Dept: OCCUPATIONAL THERAPY | Facility: CLINIC | Age: 54
End: 2024-02-05
Payer: MEDICARE

## 2024-02-05 DIAGNOSIS — M72.0 DUPUYTREN'S CONTRACTURE OF BOTH HANDS: Primary | ICD-10-CM

## 2024-02-05 DIAGNOSIS — F51.01 PRIMARY INSOMNIA: ICD-10-CM

## 2024-02-05 PROCEDURE — 97140 MANUAL THERAPY 1/> REGIONS: CPT | Performed by: OCCUPATIONAL THERAPIST

## 2024-02-05 PROCEDURE — 97110 THERAPEUTIC EXERCISES: CPT | Performed by: OCCUPATIONAL THERAPIST

## 2024-02-05 NOTE — PROGRESS NOTES
Daily Note     Today's date: 2024  Patient name: Benito Park  : 1970  MRN: [id number]  Referring provider: Onesimo Kelley PA-C  Dx:   Encounter Diagnosis     ICD-10-CM    1. Dupuytren's contracture of both hands  M72.0                      Subjective: I am doing better      Objective: See treatment diary below      Assessment: Tolerated treatment well. Patient  has improved ROM  . Wound well healed       Plan: Continue per plan of care.      Precautions: Ania's       Manuals   IE         Graston dorsal SF 2m 2m 2m 2m 2m        MOB MCP/PIP/DIP  6m 6m 6m 6m 6m        Retrograde  4m 4m 4m 4m 4m                     HEP 4x day             Custom  HB ext orthosis 13m            Coban wrap R SF  apply                                                                             Ther Ex             A/PROM R SF 4m 6m 6m 6m 4m          Blocking  2m 2m 2m 2m 2m        Flexor stretch  4m 4m 2m 2m        Powerweb     Green  3x10 Green  3x10 Blue  4x10        Wrist e/f    2#  3x10 3#  3x10 4#  3x10        Roll flexbar   2m 2m 2m                                  Ther Activity                                       Gait Training                                       Modalities             MH in ext  8m 8m 8m 8m 8m        CP    5m

## 2024-02-05 NOTE — TELEPHONE ENCOUNTER
----- Message from Ariel Sosa MD sent at 2/3/2024  8:36 AM EST -----  PSA good  ----- Message -----  From: Lab, Background User  Sent: 2/2/2024  11:20 PM EST  To: Ariel Sosa MD

## 2024-02-06 ENCOUNTER — APPOINTMENT (OUTPATIENT)
Dept: LAB | Facility: HOSPITAL | Age: 54
End: 2024-02-06
Payer: MEDICARE

## 2024-02-06 PROCEDURE — 83993 ASSAY FOR CALPROTECTIN FECAL: CPT

## 2024-02-06 RX ORDER — AMITRIPTYLINE HYDROCHLORIDE 100 MG/1
TABLET ORAL
Qty: 180 TABLET | Refills: 3 | Status: SHIPPED | OUTPATIENT
Start: 2024-02-06

## 2024-02-09 LAB
VEDOLIZUMAB AB SERPL IA-MCNC: <25 NG/ML
VEDOLIZUMAB SERPL IA-MCNC: 53 UG/ML

## 2024-02-10 LAB
CALPROTECTIN STL-MCNT: 291 UG/G (ref 0–120)
REF LAB TEST METHOD: NORMAL
TEST INTERPRETATION: NORMAL
TPMT RBC-CCNC: 25.6 UNITS/ML RBC

## 2024-02-10 NOTE — ANESTHESIA POSTPROCEDURE EVALUATION
HPI   Chief Complaint   Patient presents with    Abdominal Pain     54 y/o female complains of several days of left side abd pain with radiation to rlq. Pain became more intense today. Last bm this morning and normal.       55-year-old female presents complaining of abdominal pain.  The patient states discomfort for the past few days.  She reports the preponderance is in the left lower quadrant.  Denies fevers.  She states nausea but denies vomiting.                          Altair Coma Scale Score: 15                     Patient History   Past Medical History:   Diagnosis Date    Bright red blood per rectum 2023    Contact with and (suspected) exposure to covid-19 2020    Close exposure to COVID-19 virus    COVID-19 2023    Dry eyes 2023    Heavy menses 2023    Other general symptoms and signs 2020    Flu-like symptoms    Personal history of diseases of the blood and blood-forming organs and certain disorders involving the immune mechanism     History of thalassemia     Past Surgical History:   Procedure Laterality Date     SECTION, CLASSIC  2014     Section    TONSILLECTOMY  2014    Tonsillectomy     Family History   Problem Relation Name Age of Onset    Diabetes type II Mother      Hyperlipidemia Mother      Hypertension Mother      Thalassemia Mother      Coronary artery disease Father      Hyperlipidemia Father      Hypertension Father      Breast cancer Sister      Thalassemia Sister      Thalassemia Daughter       Social History     Tobacco Use    Smoking status: Never    Smokeless tobacco: Never   Substance Use Topics    Alcohol use: Never    Drug use: Never       Physical Exam   ED Triage Vitals [24 1907]   Temperature Heart Rate Respirations BP   36.1 °C (97 °F) 71 16 128/70      Pulse Ox Temp Source Heart Rate Source Patient Position   98 % Temporal Monitor Sitting      BP Location FiO2 (%)     Right arm --       Physical Exam  Vitals  Post-Op Assessment Note    CV Status:  Stable  Pain Score: 0    Pain management: adequate     Mental Status:  Awake   Hydration Status:  Euvolemic   PONV Controlled:  None   Airway Patency:  Patent      Post Op Vitals Reviewed: Yes      Staff: Anesthesiologist, CRNA   Comments: REPORT GIVEN TO rn; vss; RA        No complications documented      BP   115/79   Temp      Pulse  68   Resp   18   SpO2   95 and nursing note reviewed.   Constitutional:       General: She is not in acute distress.     Appearance: Normal appearance. She is normal weight. She is not ill-appearing, toxic-appearing or diaphoretic.   HENT:      Head: Normocephalic.      Nose: Nose normal.      Mouth/Throat:      Mouth: Mucous membranes are moist.   Eyes:      Extraocular Movements: Extraocular movements intact.      Conjunctiva/sclera: Conjunctivae normal.   Cardiovascular:      Rate and Rhythm: Normal rate and regular rhythm.      Pulses: Normal pulses.   Pulmonary:      Effort: Pulmonary effort is normal. No respiratory distress.      Breath sounds: Normal breath sounds.   Abdominal:      General: Abdomen is flat. Bowel sounds are normal. There is no distension.      Palpations: Abdomen is soft.      Tenderness: There is abdominal tenderness in the left lower quadrant. There is no guarding or rebound.   Musculoskeletal:         General: Normal range of motion.      Cervical back: Normal range of motion and neck supple.   Skin:     General: Skin is warm and dry.      Capillary Refill: Capillary refill takes less than 2 seconds.   Neurological:      General: No focal deficit present.      Mental Status: She is alert and oriented to person, place, and time.   Psychiatric:         Mood and Affect: Mood normal.         Behavior: Behavior normal.         Thought Content: Thought content normal.         Judgment: Judgment normal.         ED Course & MDM   ED Course as of 02/09/24 2228 Fri Feb 09, 2024 2100 WBC(!): 12.4 [DS]   2100 Lactate: 0.8 [DS]   2100 LIPASE: 24 [DS]   2100 ALT: 15 [DS]   2100 AST: 15 [DS]   2100 Creatinine: 0.75  UA non-specific  [DS]   2156 CT IMPRESSION:  1. Constellation of imaging findings consistent with acute  uncomplicated diverticulitis at the junction of the sigmoid and  descending colon. No abnormal bowel dilatation is evident.  2. Several solid pulmonary nodules are present in the left lower  lobe, largest  measuring up to 8 mm in size, unchanged to prior exam  in April 2022.  3. Multilobulated uterus with likely subserosal fibroids.       [DS]      ED Course User Index  [DS] Rolando Saenz PA-C         Diagnoses as of 02/09/24 2228   Diverticulitis   Pulmonary nodule       Medical Decision Making  Workup revealed a leukocytosis of 12.4 with a normal lactate level.  Creatinine within normal limits.  No evidence of transaminitis or elevation of the serum lipase.  Urinalysis nonspecific.  CT imaging revealed findings concerning for uncomplicated diverticulitis at the junction of the sigmoid and descending colon consistent with the patient's discomfort on exam.  There is no evidence of bowel dilation.  There are several pulmonary nodules which appear to be unchanged from prior exam.  Patient was notified of the findings.  At this point believe the patient is appropriate for discharge.  She will be treated with Augmentin and was given her first dose prior to discharge.  Recommended that she increase the fiber in her diet.  She was given GI follow-up.  In regard to the pulmonary nodules the patient was instructed to follow-up with the assigned pulmonary clinic.  Consultation order was placed.  Patient was also instructed to follow-up with her OB/GYN in regard to the multilobulated uterus.  Return precautions were discussed at length.  Patient be discharged home with a prescription for Percocet for her discomfort        Procedure  Procedures     Rolando Saenz PA-C  02/09/24 2230

## 2024-02-12 ENCOUNTER — APPOINTMENT (OUTPATIENT)
Dept: OCCUPATIONAL THERAPY | Facility: CLINIC | Age: 54
End: 2024-02-12
Payer: MEDICARE

## 2024-02-12 ENCOUNTER — OFFICE VISIT (OUTPATIENT)
Dept: UROLOGY | Facility: CLINIC | Age: 54
End: 2024-02-12
Payer: MEDICARE

## 2024-02-12 VITALS
HEART RATE: 86 BPM | RESPIRATION RATE: 14 BRPM | OXYGEN SATURATION: 95 % | HEIGHT: 72 IN | DIASTOLIC BLOOD PRESSURE: 78 MMHG | WEIGHT: 218 LBS | SYSTOLIC BLOOD PRESSURE: 128 MMHG | BODY MASS INDEX: 29.53 KG/M2

## 2024-02-12 DIAGNOSIS — N50.819 PAIN IN TESTICLE, UNSPECIFIED LATERALITY: Primary | ICD-10-CM

## 2024-02-12 PROCEDURE — 99213 OFFICE O/P EST LOW 20 MIN: CPT | Performed by: PHYSICIAN ASSISTANT

## 2024-02-12 NOTE — PROGRESS NOTES
UROLOGY PROGRESS NOTE   Patient Identifiers: Benito Park (MRN [id number])  Date of Service: 2/12/2024    Subjective:   53-year-old man history of multiple medical problems including back pain and neuropathy.  I saw him in December for orchialgia.  Started him on naproxen and a scrotal ultrasound.  There was no testicular mass or torsion.  Slight asymmetry of the testicles.  He was better after treatment with naproxen ice and scrotal support.  PSA was 0.69.    Reason for visit: Orchialgia follow-up    Objective:     VITALS:    There were no vitals filed for this visit.  AUA SYMPTOM SCORE      Flowsheet Row Most Recent Value   AUA SYMPTOM SCORE    How often have you had a sensation of not emptying your bladder completely after you finished urinating? 3 (P)    How often have you had to urinate again less than two hours after you finished urinating? 3 (P)    How often have you found you stopped and started again several times when you urinate? 5 (P)    How often have you found it difficult to postpone urination? 3 (P)    How often have you had a weak urinary stream? 0 (P)    How often have you had to push or strain to begin urination? 0 (P)    How many times did you most typically get up to urinate from the time you went to bed at night until the time you got up in the morning? 1 (P)    Quality of Life: If you were to spend the rest of your life with your urinary condition just the way it is now, how would you feel about that? 5 (P)    AUA SYMPTOM SCORE 15 (P)               LABS:  Lab Results   Component Value Date    HGB 14.9 02/02/2024    HCT 44.8 02/02/2024    WBC 6.99 02/02/2024     02/02/2024   ]    Lab Results   Component Value Date     10/03/2016    K 4.0 06/01/2023     (H) 06/01/2023    CO2 25 06/01/2023    BUN 13 06/01/2023    CREATININE 0.94 06/01/2023    CALCIUM 9.2 06/01/2023    GLUCOSE 110 02/20/2015   ]        INPATIENT MEDS:    Current Outpatient Medications:     acetaminophen  (TYLENOL) 650 mg CR tablet, Take 1 tablet (650 mg total) by mouth every 8 (eight) hours as needed for mild pain, Disp: 30 tablet, Rfl: 0    amitriptyline (ELAVIL) 100 mg tablet, take 1 to 2 tablets by mouth at bedtime, Disp: 180 tablet, Rfl: 3    azaTHIOprine (IMURAN) 50 mg tablet, Take 1 tablet (50 mg total) by mouth daily, Disp: 30 tablet, Rfl: 11    buPROPion (WELLBUTRIN SR) 150 mg 12 hr tablet, TAKE 1 TABLET BY MOUTH TWICE A DAY, Disp: 60 tablet, Rfl: 2    cholestyramine (QUESTRAN) 4 GM/DOSE powder, Take 1 packet (4 g total) by mouth 2 (two) times a day with meals (Patient taking differently: Take 4 g by mouth if needed (takes twice per month)), Disp: 378 g, Rfl: 7    lisinopril (ZESTRIL) 20 mg tablet, Take 1 tablet (20 mg total) by mouth daily, Disp: 90 tablet, Rfl: 0    Naproxen Sodium 220 MG CAPS, Take 1 capsule (220 mg total) by mouth 2 (two) times a day, Disp: 60 capsule, Rfl: 0    omeprazole (PriLOSEC) 40 MG capsule, TAKE 1 CAPSULE (40 MG TOTAL) BY MOUTH DAILY., Disp: 90 capsule, Rfl: 1    pregabalin (LYRICA) 150 mg capsule, take 1 capsule by mouth three times a day, Disp: 90 capsule, Rfl: 5    RA Vitamin D-3 25 MCG (1000 UT) tablet, TAKE 2 TABLETS BY MOUTH DAILY, Disp: 60 tablet, Rfl: 2    sildenafil (Viagra) 100 mg tablet, Take 1 tablet (100 mg total) by mouth daily as needed for erectile dysfunction, Disp: 30 tablet, Rfl: 2    traMADol (Ultram) 50 mg tablet, Take 1 tablet (50 mg total) by mouth every 6 (six) hours as needed for moderate pain, Disp: 7 tablet, Rfl: 0    vedolizumab (Entyvio) SOLR, Inject 300 mg into a catheter in a vein every 4 weeks (Patient taking differently: Inject 300 mg into a catheter in a vein every 4 weeks), Disp: , Rfl:       Physical Exam:   There were no vitals taken for this visit.  GEN: no acute distress    RESP: breathing comfortably with no accessory muscle use    ABD: soft, non-tender, non-distended   INCISION:    EXT: no significant peripheral edema       RADIOLOGY:    IMPRESSION:     No testicular mass or testicular torsion. Right testis smaller than the left.  Epididymal head cysts.    Assessment:   #1.  Orchialgia    Plan:   -Most likely related to musculoskeletal back pain  -Can follow-up with urology as needed  -  -

## 2024-02-12 NOTE — RESULT ENCOUNTER NOTE
Wicho Michel,     The stool inflammatory level is elevated, but the drug levels for entyvio looks good - without any antibodies. We will follow up once your MRI Enteroscopy is completed.     Dr Hampton

## 2024-02-13 ENCOUNTER — OFFICE VISIT (OUTPATIENT)
Dept: OCCUPATIONAL THERAPY | Facility: CLINIC | Age: 54
End: 2024-02-13
Payer: MEDICARE

## 2024-02-13 DIAGNOSIS — M72.0 DUPUYTREN'S CONTRACTURE OF BOTH HANDS: Primary | ICD-10-CM

## 2024-02-13 PROCEDURE — 97110 THERAPEUTIC EXERCISES: CPT | Performed by: OCCUPATIONAL THERAPIST

## 2024-02-13 PROCEDURE — 97140 MANUAL THERAPY 1/> REGIONS: CPT | Performed by: OCCUPATIONAL THERAPIST

## 2024-02-13 NOTE — PROGRESS NOTES
Daily Note     Today's date: 2024  Patient name: Benito Park  : 1970  MRN: [id number]  Referring provider: Onesimo Kelley PA-C  Dx:   Encounter Diagnosis     ICD-10-CM    1. Dupuytren's contracture of both hands  M72.0                      Subjective: I am doing better      Objective: See treatment diary below      Assessment: Tolerated treatment well. Patient  has improved ROM . Pain is improving       Plan: Continue per plan of care.      Precautions: Ania's       Manuals   IE        Graston dorsal SF 2m 2m 2m 2m 2m 2m       MOB MCP/PIP/DIP  6m 6m 6m 6m 6m 6m       Retrograde  4m 4m 4m 4m 4m 4m                    HEP 4x day             Custom  HB ext orthosis 13m            Coban wrap R SF  apply                                                                             Ther Ex             A/PROM R SF 4m 6m 6m 6m 4m   4m       Blocking  2m 2m 2m 2m 2m 2m       Flexor stretch  4m 4m 2m 2m 2m       Powerweb     Green  3x10 Green  3x10 Blue  4x10 Black  4x10       Wrist e/f    2#  3x10 3#  3x10 4#  3x10 4#  3x10       Roll flexbar   2m 2m 2m 2m                                 Ther Activity             Fxnl grasp       3.3#  Ball  3x10                    Gait Training                                       Modalities             MH in ext  8m 8m 8m 8m 8m 8m       CP    5m

## 2024-02-20 ENCOUNTER — OFFICE VISIT (OUTPATIENT)
Dept: OCCUPATIONAL THERAPY | Facility: CLINIC | Age: 54
End: 2024-02-20
Payer: MEDICARE

## 2024-02-20 DIAGNOSIS — M72.0 DUPUYTREN'S CONTRACTURE OF BOTH HANDS: Primary | ICD-10-CM

## 2024-02-20 PROCEDURE — 97110 THERAPEUTIC EXERCISES: CPT | Performed by: OCCUPATIONAL THERAPIST

## 2024-02-20 PROCEDURE — 97140 MANUAL THERAPY 1/> REGIONS: CPT | Performed by: OCCUPATIONAL THERAPIST

## 2024-02-20 NOTE — PROGRESS NOTES
Daily Note     Today's date: 2024  Patient name: Benito Park  : 1970  MRN: [id number]  Referring provider: Onesimo Kelley PA-C  Dx:   Encounter Diagnosis     ICD-10-CM    1. Dupuytren's contracture of both hands  M72.0                      Subjective: I am doing better      Objective: See treatment diary below      Assessment: Tolerated treatment well. Patient  has improved ROM  . Minimal pain       Plan: reeval , possible d/c     Precautions: Sandran's       Manuals   IE       Graston dorsal SF 2m 2m 2m 2m 2m 2m 2m      MOB MCP/PIP/DIP  6m 6m 6m 6m 6m 6m 6m      Retrograde  4m 4m 4m 4m 4m 4m 4m                   HEP 4x day             Custom  HB ext orthosis 13m            Coban wrap R SF  apply                                                                             Ther Ex             A/PROM R SF 4m 6m 6m 6m 4m   4m 4m      Blocking  2m 2m 2m 2m 2m 2m 2m      Flexor stretch  4m 4m 2m 2m 2m 2m      Powerweb     Green  3x10 Green  3x10 Blue  4x10 Black  4x10 Black  4x10      Wrist e/f    2#  3x10 3#  3x10 4#  3x10 4#  3x10 4#  3x10      Roll flexbar   2m 2m 2m 2m 2m                                Ther Activity             Fxnl grasp       3.3#  Ball  3x10 3.3#  Ball  3x10                   Gait Training                                       Modalities             MH in ext  8m 8m 8m 8m 8m 8m 8m      CP    5m

## 2024-02-21 PROBLEM — J01.00 ACUTE NON-RECURRENT MAXILLARY SINUSITIS: Status: RESOLVED | Noted: 2020-09-22 | Resolved: 2024-02-21

## 2024-02-21 PROBLEM — Z11.59 SCREENING EXAMINATION FOR OTHER ARTHROPOD-BORNE VIRAL DISEASES: Status: RESOLVED | Noted: 2020-09-22 | Resolved: 2024-02-21

## 2024-03-06 NOTE — PROGRESS NOTES
Patient ID: Benito Park is a 53 y.o. male Date of Birth 1970       Chief Complaint   Patient presents with    Foot Problem     Bilateral; neuropathy; burning; numbing;     Callouses     Bilateral               Diagnosis:  1. Lumbar radiculopathy  -     Ambulatory referral to Spine & Pain Management; Future    2. Neuropathy  -     Ambulatory referral to Spine & Pain Management; Future    3. Xerosis of skin  -     urea (CARMOL) 40 %; Apply topically daily    4. Ulcer of left foot, limited to breakdown of skin (HCC)         Initial bilateral pedal examination with socks and shoes removed.  I personally reviewed patient's medical records in [facility], PCP visit notes, orthopedic surgery, Occupational Therapy, pain management notes  Today we discussed the etiology of his neuropathy, loss of protective sensation, lumbar radiculopathy related to his spine, failed spinal surgery.  Referral was placed for spine and pain, he was being followed by them, last visit was in 2021, his neuropathy is continuing to get worse and is being medically managed by PCP.  Foot ulcer dorsal first MTPJ left foot, Dermagran gauze dry dressing was applied, he was instructed on how to change dressing daily, do not leave open to air, do not wear shoes that are hard or will put pressure over the area, I recommend wearing soft upper sneakers or open toed slippers.  Patient with severe xerosis, urea 40% cream was ordered, he is to apply this to the bottom of his feet, avoid in between the toes, he is to do this twice daily.  I recommended he use a back brush or loofah on a stick to scrub the bottom of his feet 2-3 times a week to help reduce the dry skin.  I discussed at risk footcare secondary to loss of protective sensation, we discussed proper shoe gear, daily foot inspection, no barefoot, wash and moisturize feet daily.  Total time spent doing chart review, evaluation management, education of patient, placing orders, corresponding with  orthopedic surgery, documentation of visit is 48 minutes.  Patient understands and agrees with the plan and will follow-up in 2 weeks.        Subjective:   Anand presents today for evaluation and management of dry scaly skin on the bottom of his feet and peripheral neuropathy, he states he has had lumbar radiculopathy and peripheral neuropathy for many years since having surgery on L5/S1 and got worse after that, he did have a spinal stimulator placed which helps sometimes, he is currently being managed with Lyrica and gabapentin by his PCP.  He states he has very dry skin on the bottom of his feet, his wife is applied ulcers of different lotions is not getting better and now he is sore on the top of his left foot.        The following portions of the patient's history were reviewed and updated as appropriate:     Past Medical History:   Diagnosis Date    Anxiety     Back pain     Colon polyp     CPAP (continuous positive airway pressure) dependence     Crohn's disease (HCC)     Depression     GERD (gastroesophageal reflux disease)     Hypertension     Perianal fistula     Sleep apnea     Terminal ileitis (HCC)        Past Surgical History:   Procedure Laterality Date    BACK SURGERY  2019    CAST APPLICATION Right 11/17/2022    Procedure: Application short-arm splint;  Surgeon: Sarthak Villatoro MD;  Location: [facility];  Service: Orthopedics    COLONOSCOPY      EGD      HAND CONTRACTURE RELEASE Left 06/01/2023    Procedure: left ring and small finger dupuytrens excision and ring finger Metacarpophalangeal joint release and small finger Metacarpophalangeal and proximal interphalangeal joint release;  Surgeon: Sarthak Villatoro MD;  Location: [facility];  Service: Orthopedics    HERNIA REPAIR      umbilical hernia    IL ANRCT XM SURG REQ ANES GENERAL SPI/EDRL DX N/A 11/17/2021    Procedure: EXAM UNDER ANESTHESIA (EUA);  Surgeon: Davi Thao MD;  Location: [facility];  Service: Colorectal    IL  APPLICATION SHORT ARM SPLINT FOREARM-HAND STATIC Right 1/11/2024    Procedure: Application short arm splint;  Surgeon: Sarthak Villatoro MD;  Location: [facility];  Service: Orthopedics    AZ CAPSULECTOMY/CAPSULOTOMY IPHAL JOINT EACH Right 1/11/2024    Procedure: Contracture release right hand small finger proximal interphalangeal joint;  Surgeon: Sarthak Villatoro MD;  Location: [facility];  Service: Orthopedics    AZ FASCIOTOMY PALMAR OPEN PARTIAL Right 11/17/2022    Procedure: Dupuytren's excision right palm extending into the small finger with release of the metacarpophalangeal joint and proximal interphalangeal joint.  Dupuytren's excision right palm with release of the right ring finger metacarpophalangeal joint.;  Surgeon: Sarthak Villatoro MD;  Location: [facility];  Service: Orthopedics    AZ FASCIOTOMY PALMAR OPEN PARTIAL Right 1/11/2024    Procedure: Right hand small finger duppuytrens excision with release of the metacarpophalangeal joint and proximal interphalangeal joint;  Surgeon: Sarthak Villatoro MD;  Location: [facility];  Service: Orthopedics    AZ FASCT PALM W/WO Z-PLASTY TISSUE REARGMT/SKN GRFT Right 1/11/2024    Procedure: Right hand small finger duppuytrens excision with release of the metacarpophalangeal joint and proximal interphalangeal joint;  Surgeon: Sarthak Villatoro MD;  Location:  MAIN OR;  Service: Orthopedics    AZ FASCT PRTL PALMAR 1 DGT PROX IPHAL JT W/WO RPR Right 1/11/2024    Procedure: Right hand small finger duppuytrens excision with release of the metacarpophalangeal joint and proximal interphalangeal joint;  Surgeon: Sarthak Villatoro MD;  Location: [facility];  Service: Orthopedics    AZ I&D ISCHIORECTAL&/PERIRECTAL ABSCESS SPX Right 10/24/2021    Procedure: excisional debredement right gluteal cheek ;  Surgeon: Jeana Bustamante MD;  Location:  MAIN OR;  Service: General    AZ PLACEMENT SETON N/A 11/17/2021    Procedure: PLACEMENT SETON;  Surgeon: Davi Baig  MD Master;  Location: [facility];  Service: Colorectal    AK PRQ IMPLTJ NSTIM ELECTRODE ARRAY EPIDURAL Right 03/26/2021    Procedure: INSERTION THORACIC DORSAL COLUMN SPINAL CORD STIMULATOR PERCUTANEOUS W IMPLANTABLE PULSE GENERATOR, RIGHT;  Surgeon: Akshat Witt MD;  Location: [facility];  Service: Neurosurgery       Social History     Socioeconomic History    Marital status: /Civil Union     Spouse name: None    Number of children: None    Years of education: None    Highest education level: None   Occupational History    None   Tobacco Use    Smoking status: Former     Current packs/day: 0.00     Average packs/day: 0.5 packs/day for 30.0 years (15.0 ttl pk-yrs)     Types: Cigarettes     Start date: 1/1/2019     Quit date: 2/4/2021     Years since quitting: 3.0    Smokeless tobacco: Never   Vaping Use    Vaping status: Never Used   Substance and Sexual Activity    Alcohol use: Yes     Alcohol/week: 3.0 standard drinks of alcohol     Types: 3 Cans of beer per week     Comment: social    Drug use: Yes     Frequency: 7.0 times per week     Types: Marijuana     Comment: Medical marijuana-vapes    Sexual activity: Yes     Partners: Female   Other Topics Concern    None   Social History Narrative    None     Social Determinants of Health     Financial Resource Strain: Low Risk  (6/7/2023)    Overall Financial Resource Strain (CARDIA)     Difficulty of Paying Living Expenses: Not hard at all   Food Insecurity: No Food Insecurity (4/11/2023)    Received from [facility]    Food Insecurity     Within the past 12 months, you worried that your food would run out before you got the money to buy more.: 1     Within the past 12 months, the food you bought just didn't last and you didn't have money to get more.: 1   Transportation Needs: No Transportation Needs (6/7/2023)    PRAPARE - Transportation     Lack of Transportation (Medical): No     Lack of Transportation (Non-Medical): No   Physical Activity: Sufficiently  Active (4/11/2023)    Received from [facility]    Physical Activity     On average, how many days per week do you engage in moderate to strenuous exercise (like a brisk walk)?: 4 days     On average, how many minutes do you engage in exercise at this level?: 60 min   Stress: No Stress Concern Present (4/11/2023)    Received from [facility]    [employer]ational [employer] - Occupational Stress Questionnaire     Feeling of Stress : Only a little   Social Connections: Moderately Integrated (4/11/2023)    Received from [facility]    Social Connection and Isolation Panel [NHANES]     Frequency of Communication with Friends and Family: More than three times a week     Frequency of Social Gatherings with Friends and Family: Three times a week     Attends Buddhist Services: More than 4 times per year     Active Member of Clubs or Organizations: No     Attends Club or Organization Meetings: Never     Marital Status:    Intimate Partner Violence: Not At Risk (4/11/2023)    Received from [facility]    Humiliation, Afraid, Rape, and Kick questionnaire     Fear of Current or Ex-Partner: No     Emotionally Abused: No     Physically Abused: No     Sexually Abused: No   Housing Stability: Unknown (4/11/2023)    Received from [facility]    Housing Stability     In the last 12 months, was there a time when you were not able to pay the mortgage or rent on time?: 2     [facility] Lived Last Flowsheet Value Most Recent Result: Not on file     In the last 12 months, was there a time when you did not have a steady place to sleep or slept in a shelter (including now)?: 2          Current Outpatient Medications:     acetaminophen (TYLENOL) 650 mg CR tablet, Take 1 tablet (650 mg total) by mouth every 8 (eight) hours as needed for mild pain, Disp: 30 tablet, Rfl: 0    amitriptyline (ELAVIL) 100 mg tablet, take 1 to 2 tablets by mouth at bedtime, Disp: 180 tablet, Rfl: 3    azaTHIOprine  (IMURAN) 50 mg tablet, Take 1 tablet (50 mg total) by mouth daily, Disp: 30 tablet, Rfl: 11    buPROPion (WELLBUTRIN SR) 150 mg 12 hr tablet, TAKE 1 TABLET BY MOUTH TWICE A DAY, Disp: 60 tablet, Rfl: 2    furosemide (LASIX) 20 mg tablet, Take 20 mg by mouth daily, Disp: , Rfl:     lisinopril (ZESTRIL) 20 mg tablet, Take 1 tablet (20 mg total) by mouth daily, Disp: 90 tablet, Rfl: 0    omeprazole (PriLOSEC) 40 MG capsule, TAKE 1 CAPSULE (40 MG TOTAL) BY MOUTH DAILY., Disp: 90 capsule, Rfl: 1    pregabalin (LYRICA) 150 mg capsule, take 1 capsule by mouth three times a day, Disp: 90 capsule, Rfl: 5    RA Vitamin D-3 25 MCG (1000 UT) tablet, TAKE 2 TABLETS BY MOUTH DAILY, Disp: 60 tablet, Rfl: 2    sildenafil (Viagra) 100 mg tablet, Take 1 tablet (100 mg total) by mouth daily as needed for erectile dysfunction, Disp: 30 tablet, Rfl: 2    traMADol (Ultram) 50 mg tablet, Take 1 tablet (50 mg total) by mouth every 6 (six) hours as needed for moderate pain, Disp: 7 tablet, Rfl: 0    urea (CARMOL) 40 %, Apply topically daily, Disp: 120 g, Rfl: 2    vedolizumab (Entyvio) SOLR, Inject 300 mg into a catheter in a vein every 4 weeks (Patient taking differently: Inject 300 mg into a catheter in a vein every 4 weeks), Disp: , Rfl:     Naproxen Sodium 220 MG CAPS, Take 1 capsule (220 mg total) by mouth 2 (two) times a day, Disp: 60 capsule, Rfl: 0    Allergies  Patient has no known allergies.    Family History   Problem Relation Age of Onset    Heart disease Father     Heart attack Father     Colon cancer Neg Hx     Colon polyps Neg Hx     Inflammatory bowel disease Neg Hx            Objective:  /73 (BP Location: Left arm, Patient Position: Sitting, Cuff Size: Adult)   Pulse 102   Ht 6' (1.829 m) Comment: verbal  Wt 101 kg (222 lb 6.4 oz)   BMI 30.16 kg/m²     Review of Systems   Constitutional:  Negative for chills and fever.   HENT:  Negative for ear pain and sore throat.    Eyes:  Negative for pain and visual  disturbance.   Respiratory:  Negative for cough and shortness of breath.    Cardiovascular:  Negative for chest pain and palpitations.   Gastrointestinal:  Negative for abdominal pain and vomiting.   Genitourinary:  Negative for dysuria and hematuria.   Musculoskeletal:  Negative for arthralgias and back pain.   Skin:  Positive for color change and wound. Negative for rash.        Dry scaly skin on feet   Neurological:  Positive for numbness. Negative for seizures and syncope.   All other systems reviewed and are negative.      Physical Exam  Constitutional:       Appearance: Normal appearance. He is normal weight.   HENT:      Head: Normocephalic and atraumatic.      Right Ear: External ear normal.      Left Ear: External ear normal.      Nose: Nose normal.      Mouth/Throat:      Mouth: Mucous membranes are moist.      Pharynx: Oropharynx is clear.   Eyes:      Conjunctiva/sclera: Conjunctivae normal.      Pupils: Pupils are equal, round, and reactive to light.   Cardiovascular:      Pulses: Normal pulses.           Dorsalis pedis pulses are 2+ on the right side and 2+ on the left side.        Posterior tibial pulses are 2+ on the right side and 2+ on the left side.      Comments: Bilateral lower extremity varicosities are noted  Pulmonary:      Effort: Pulmonary effort is normal.   Musculoskeletal:      Cervical back: Normal range of motion.      Right lower leg: No edema.      Left lower leg: No edema.      Right foot: Decreased range of motion. Deformity present.      Left foot: Decreased range of motion. Deformity present.   Feet:      Right foot:      Protective Sensation: 10 sites tested.  0 sites sensed.      Skin integrity: Ulcer, dry skin and fissure present.      Toenail Condition: Right toenails are abnormally thick.      Left foot:      Protective Sensation: 10 sites tested.  0 sites sensed.      Skin integrity: Dry skin and fissure present.      Toenail Condition: Left toenails are abnormally thick.  "     Comments: Patient with extremely dry skin plantar bilateral feet with superficial and not ulcerated fissures, this is diffuse.  No pustules, no blisters noted.  Ulceration dorsal first MTPJ with black adherent eschar 0.8 cm in diameter, no fluctuance, crepitus, no signs of infection noted.  Hallux limitus bilateral  Cavus bilateral  Neurological:      Mental Status: He is alert. Mental status is at baseline.   Psychiatric:         Mood and Affect: Mood normal.         Behavior: Behavior normal.              No pertinent results found.      Rosmery Weir DPM, DANITA, FACFAS    Portions of the record may have been created with voice recognition software. Occasional wrong word or \"sound a like\" substitutions may have occurred due to the inherent limitations of voice recognition software. Read the chart carefully and recognize, using context, where substitutions have occurred.  "

## 2024-03-07 ENCOUNTER — TELEPHONE (OUTPATIENT)
Age: 54
End: 2024-03-07

## 2024-03-07 ENCOUNTER — OFFICE VISIT (OUTPATIENT)
Dept: OCCUPATIONAL THERAPY | Facility: CLINIC | Age: 54
End: 2024-03-07
Payer: MEDICARE

## 2024-03-07 ENCOUNTER — OFFICE VISIT (OUTPATIENT)
Dept: PODIATRY | Facility: CLINIC | Age: 54
End: 2024-03-07
Payer: MEDICARE

## 2024-03-07 VITALS
HEIGHT: 72 IN | SYSTOLIC BLOOD PRESSURE: 111 MMHG | BODY MASS INDEX: 30.12 KG/M2 | WEIGHT: 222.4 LBS | HEART RATE: 102 BPM | DIASTOLIC BLOOD PRESSURE: 73 MMHG

## 2024-03-07 DIAGNOSIS — G62.9 NEUROPATHY: ICD-10-CM

## 2024-03-07 DIAGNOSIS — M72.0 DUPUYTREN'S CONTRACTURE OF BOTH HANDS: Primary | ICD-10-CM

## 2024-03-07 DIAGNOSIS — L97.521 ULCER OF LEFT FOOT, LIMITED TO BREAKDOWN OF SKIN (HCC): ICD-10-CM

## 2024-03-07 DIAGNOSIS — L85.3 XEROSIS OF SKIN: ICD-10-CM

## 2024-03-07 DIAGNOSIS — M54.16 RADICULOPATHY, LUMBAR REGION: Primary | ICD-10-CM

## 2024-03-07 DIAGNOSIS — M54.16 LUMBAR RADICULOPATHY: Primary | ICD-10-CM

## 2024-03-07 PROCEDURE — 97110 THERAPEUTIC EXERCISES: CPT | Performed by: OCCUPATIONAL THERAPIST

## 2024-03-07 PROCEDURE — 99204 OFFICE O/P NEW MOD 45 MIN: CPT | Performed by: PODIATRIST

## 2024-03-07 PROCEDURE — 97140 MANUAL THERAPY 1/> REGIONS: CPT | Performed by: OCCUPATIONAL THERAPIST

## 2024-03-07 RX ORDER — UREA 40 %
CREAM (GRAM) TOPICAL DAILY
Qty: 120 G | Refills: 2 | Status: SHIPPED | OUTPATIENT
Start: 2024-03-07

## 2024-03-07 RX ORDER — FUROSEMIDE 20 MG/1
20 TABLET ORAL DAILY
COMMUNITY
Start: 2024-03-04

## 2024-03-07 NOTE — PROGRESS NOTES
Daily Note     Today's date: 3/7/2024  Patient name: Benito Park  : 1970  MRN: [id number]  Referring provider: Onesimo Kelley PA-C  Dx:   Encounter Diagnosis     ICD-10-CM    1. Dupuytren's contracture of both hands  M72.0                      Subjective: My hand is better      Objective: See treatment diary below      Assessment:           Goals  STG-1 wk  1- I with HEP- met   2- improve digit flexion  R SF 30 at PIP- met  3- compliant with splint use- met       LTG- 4 wks  1- R MCP 0-90, PIP 0-90, DIP 5-60 - met   2- R  = to L met   3- no pain - partially met  4-I with bottles/jars , lifting 10#- met              Subjective Evaluation    History of Present Illness  Date of onset: 2024  Mechanism of injury: Anand had revision dupuytren's release of R SF.  Quality of life: fair    Patient Goals  Patient goals for therapy: decreased edema, decreased pain, increased motion, increased strength and independence with ADLs/IADLs    Pain  Current pain ratin  At best pain ratin  At worst pain ratin infrequent   Location: R SF  Quality: dull ache, sharp and tight  Relieving factors: rest  Exacerbated by: ROM.  Progression: improved    Social Support  Steps to enter house: yes  Stairs in house: yes   Lives in: multiple-level home  Lives with: spouse and adult children    Employment status: not working  Hand dominance: right    Treatments  No previous or current treatments        Objective     Observations     Additional Observation Details  Sutures volar R SF 7 cm - clean and dry    Neurological Testing     Additional Neurological Details  Mild paresthesias tip of SF    Active Range of Motion     Right Wrist   Normal active range of motion    Right Digits   Flexion   Little     MCP: 90    PIP: 85 previous 40    DIP: 360 previous 0  Extension   Little     MCP: 0 , previousl 20    PIP: 20    DIP: 10    Passive Range of Motion     Right Digits   Flexion   Little     MCP: 92    PIP: 90    DIP:  70  Extension   Little     MCP: 0    PIP: 5    DIP: 0    Strength/Myotome Testing           (2nd hand position)     2 R/L =  60/50        Swelling     Right Wrist/Hand     Additional Swelling Details  Mild edema R SF           Plan: Continue per plan of care.      Precautions: Ania's       Manuals 1/19  IE 1/22 1/29 2/2 2/5 2/13 2/20 3/7     Graston dorsal SF 2m 2m 2m 2m 2m 2m 2m 2m     MOB MCP/PIP/DIP  6m 6m 6m 6m 6m 6m 6m 6m     Retrograde  4m 4m 4m 4m 4m 4m 4m 4m                  HEP 4x day             Custom  HB ext orthosis 13m            Coban wrap R SF  apply                                                                             Ther Ex             A/PROM R SF 4m 6m 6m 6m 4m   4m 2m 2m     Blocking  2m 2m 2m 2m 2m 2m 2m 2m     Flexor stretch  4m 4m 2m 2m 2m 2m 2m     Powerweb     Green  3x10 Green  3x10 Blue  4x10 Black  4x10 Black  4x10 Black  3x10     Wrist e/f    2#  3x10 3#  3x10 4#  3x10 4#  3x10 4#  3x10 5#  3x10     Roll flexbar   2m 2m 2m 2m 2m 2m                               Ther Activity             Fxnl grasp       3.3#  Ball  3x10 3.3#  Ball  3x10 3.3#  Ball  3x10                  Gait Training                                       Modalities             MH in ext  8m 8m 8m 8m 8m 8m 8m 8m     CP    5m

## 2024-03-07 NOTE — TELEPHONE ENCOUNTER
Called and spoke w/pt and relayed TUAN Kelley's msg.  He requested info for [facility] to be sent to his [facility].  [facility] msg sent.

## 2024-03-07 NOTE — TELEPHONE ENCOUNTER
----- Message from Onesimo Kelley PA-C sent at 3/7/2024  8:23 AM EST -----  Regarding: [facility] referral  Good morning,  Can we please reach out to the patient above and inform him that a referral to [facility] was sent.  He did see Dr. Weir yesterday of which she recommended follow-up with us for his lumbar radiculopathy however we do not treat this.  Again a referral was placed that he can schedule an appointment for evaluation.  Thank you.

## 2024-03-08 ENCOUNTER — TELEPHONE (OUTPATIENT)
Age: 54
End: 2024-03-08

## 2024-03-11 ENCOUNTER — OFFICE VISIT (OUTPATIENT)
Dept: FAMILY MEDICINE CLINIC | Facility: CLINIC | Age: 54
End: 2024-03-11
Payer: MEDICARE

## 2024-03-11 ENCOUNTER — OFFICE VISIT (OUTPATIENT)
Dept: OBGYN CLINIC | Facility: CLINIC | Age: 54
End: 2024-03-11

## 2024-03-11 ENCOUNTER — OFFICE VISIT (OUTPATIENT)
Dept: OCCUPATIONAL THERAPY | Facility: CLINIC | Age: 54
End: 2024-03-11
Payer: MEDICARE

## 2024-03-11 ENCOUNTER — CONSULT (OUTPATIENT)
Dept: PAIN MEDICINE | Facility: CLINIC | Age: 54
End: 2024-03-11
Payer: MEDICARE

## 2024-03-11 VITALS
DIASTOLIC BLOOD PRESSURE: 86 MMHG | HEART RATE: 98 BPM | BODY MASS INDEX: 29.8 KG/M2 | HEIGHT: 72 IN | TEMPERATURE: 98.2 F | WEIGHT: 220 LBS | SYSTOLIC BLOOD PRESSURE: 122 MMHG

## 2024-03-11 VITALS — SYSTOLIC BLOOD PRESSURE: 118 MMHG | HEIGHT: 72 IN | DIASTOLIC BLOOD PRESSURE: 78 MMHG | BODY MASS INDEX: 29.84 KG/M2

## 2024-03-11 VITALS — WEIGHT: 218 LBS | BODY MASS INDEX: 29.57 KG/M2

## 2024-03-11 DIAGNOSIS — M70.52 PES ANSERINUS BURSITIS OF LEFT KNEE: Primary | ICD-10-CM

## 2024-03-11 DIAGNOSIS — M72.0 DUPUYTREN'S CONTRACTURE OF BOTH HANDS: Primary | ICD-10-CM

## 2024-03-11 DIAGNOSIS — G62.9 NEUROPATHY: ICD-10-CM

## 2024-03-11 DIAGNOSIS — M54.16 LUMBAR RADICULOPATHY: ICD-10-CM

## 2024-03-11 DIAGNOSIS — G89.4 CHRONIC PAIN SYNDROME: Primary | ICD-10-CM

## 2024-03-11 DIAGNOSIS — Z98.890 STATUS POST LUMBAR SPINE SURGERY FOR DECOMPRESSION OF SPINAL CORD: ICD-10-CM

## 2024-03-11 PROCEDURE — 99024 POSTOP FOLLOW-UP VISIT: CPT | Performed by: ORTHOPAEDIC SURGERY

## 2024-03-11 PROCEDURE — 99214 OFFICE O/P EST MOD 30 MIN: CPT | Performed by: FAMILY MEDICINE

## 2024-03-11 PROCEDURE — G2211 COMPLEX E/M VISIT ADD ON: HCPCS | Performed by: FAMILY MEDICINE

## 2024-03-11 PROCEDURE — G2211 COMPLEX E/M VISIT ADD ON: HCPCS | Performed by: ANESTHESIOLOGY

## 2024-03-11 PROCEDURE — 99204 OFFICE O/P NEW MOD 45 MIN: CPT | Performed by: ANESTHESIOLOGY

## 2024-03-11 PROCEDURE — 97760 ORTHOTIC MGMT&TRAING 1ST ENC: CPT | Performed by: OCCUPATIONAL THERAPIST

## 2024-03-11 NOTE — PROGRESS NOTES
Assessment/Plan:    Pes anserinus bursitis of left knee  Injected 10mg dexameth L pes bursitis     Diagnoses and all orders for this visit:    Pes anserinus bursitis of left knee          Subjective:   Chief Complaint   Patient presents with    Knee Pain        Patient ID: Benito Park is a 53 y.o. male.    Knee Pain   Pertinent negatives include no numbness.       The following portions of the patient's history were reviewed and updated as appropriate: allergies, current medications, past family history, past medical history, past social history, past surgical history and problem list.    Review of Systems   Constitutional:  Negative for fatigue, fever and unexpected weight change.   HENT:  Negative for congestion, sinus pain and sore throat.    Eyes:  Negative for visual disturbance.   Respiratory:  Negative for shortness of breath and wheezing.    Cardiovascular:  Negative for chest pain and palpitations.   Gastrointestinal:  Negative for abdominal pain, nausea and vomiting.   Musculoskeletal: Negative.  Negative for arthralgias and myalgias.   Neurological:  Negative for syncope, weakness and numbness.   Psychiatric/Behavioral: Negative.  Negative for confusion, dysphoric mood and suicidal ideas.          Objective:  Vitals:    03/11/24 1432   Weight: 98.9 kg (218 lb)      Physical Exam  Constitutional:       Appearance: He is well-developed.   HENT:      Right Ear: Ear canal normal. Tympanic membrane is not injected.      Left Ear: Ear canal normal. Tympanic membrane is not injected.      Nose: Nose normal.   Eyes:      General:         Right eye: No discharge.         Left eye: No discharge.      Conjunctiva/sclera: Conjunctivae normal.      Pupils: Pupils are equal, round, and reactive to light.   Neck:      Thyroid: No thyromegaly.   Cardiovascular:      Rate and Rhythm: Normal rate and regular rhythm.      Heart sounds: Normal heart sounds. No murmur heard.  Pulmonary:      Effort: Pulmonary effort is  normal. No respiratory distress.      Breath sounds: Normal breath sounds. No wheezing.   Abdominal:      General: Bowel sounds are normal. There is no distension.      Palpations: Abdomen is soft.      Tenderness: There is no abdominal tenderness.   Musculoskeletal:         General: Normal range of motion.      Cervical back: Normal range of motion and neck supple.   Lymphadenopathy:      Cervical: No cervical adenopathy.   Skin:     General: Skin is warm and dry.   Neurological:      Mental Status: He is alert and oriented to person, place, and time. He is not disoriented.      Sensory: No sensory deficit.      Motor: No weakness.      Coordination: Coordination normal.      Gait: Gait normal.      Deep Tendon Reflexes: Reflexes are normal and symmetric.   Psychiatric:         Speech: Speech normal.         Behavior: Behavior normal.         Thought Content: Thought content normal.         Judgment: Judgment normal.       L pes bursa tender to palp/sl swelling

## 2024-03-11 NOTE — PROGRESS NOTES
Assessment:   S/P Right hand small finger duppuytrens excision with release of the metacarpophalangeal joint and proximal interphalangeal joint - Right, Application short arm splint - Right, and Contracture release right hand small finger proximal interphalangeal joint - Right on 1/11/2024    Plan:   It was discussed with the patient to continue to work on heat and massage and continue the use of his nighttime extension splint  He was encouraged to work on his home exercise program  It was discussed that we will see him back in 2 months for reevaluation of the left hand small finger as he previously had a Dupuytren's excision and he is starting to note slight flexion contracture at the PIP joint    Follow Up:  2  month(s)    To Do Next Visit:  Reevaluation      CHIEF COMPLAINT:  Chief Complaint   Patient presents with    Right Hand - Post-op      Right hand small finger duppuytrens excision with release of the metacarpophalangeal joint and proximal interphalangeal joint- 1/11/24         SUBJECTIVE:  Benito Park is a 53 y.o. male who presents for follow up after Right hand small finger duppuytrens excision with release of the metacarpophalangeal joint and proximal interphalangeal joint - Right, Application short arm splint - Right, and Contracture release right hand small finger proximal interphalangeal joint - Right on 1/11/2024.  Today patient has improvement in his range of motion over the right small finger.       PHYSICAL EXAMINATION:  Vital signs: /78   Ht 6' (1.829 m)   BMI 29.84 kg/m²   General: well developed and well nourished, alert, oriented times 3, and appears comfortable  Psychiatric: Normal    MUSCULOSKELETAL EXAMINATION:  Incision: Clean, dry, intact  Range of Motion: As expected, opposition intact, and full composite fist possible  Neurovascular status: Neuro intact, good cap refill  Activity Restrictions: No restrictions         STUDIES REVIEWED:  No Studies to review      PROCEDURES  PERFORMED:  Procedures  No Procedures performed today

## 2024-03-11 NOTE — PROGRESS NOTES
Assessment  1. Chronic pain syndrome    2. Lumbar radiculopathy    3. Neuropathy    4. Status post lumbar spine surgery for decompression of spinal cord        Plan    Pleasant 53-year-old gentleman with history of lumbar sacral radiculopathy and diabetic neuropathy.  Patient was seen greater than 3 years ago.  He has spinal cord stimulator placed.  Patient reports that he has never followed up with the spinal cord stimulator representative for reprogramming since placement.    In the interim there has been significant changes with programming for diabetic neuropathy.  We will reach out to the [employer] representative and have them regroup with Anand for reprogramming.    In the interim I will obtain lumbar and thoracic radiographs to check for placement.    Currently he is on Lyrica, amitriptyline and tramadol being prescribed by his primary care physician.  He has tried gabapentin in the past without relief.    Depending on the results of the above plan may consider reimaging in the way of an MRI with and without contrast but we will reevaluate.    My impressions and treatment recommendations were discussed in detail with the patient who verbalized understanding and had no further questions.  Discharge instructions were provided. I personally saw and examined the patient and I agree with the above discussed plan of care.  This note is created using dictation transcription.  It may contain typographical errors, grammatical errors, improperly dictated words, background noise and other errors.    Orders Placed This Encounter   Procedures    X-ray lumbar spine 2 or 3 views     Standing Status:   Future     Standing Expiration Date:   3/11/2028     Scheduling Instructions:      Bring along any outside films relating to this procedure.          X-ray lumbar spine 2 or 3 views     Standing Status:   Future     Standing Expiration Date:   3/11/2028     Scheduling Instructions:      Bring along any outside films relating to  this procedure.           No orders of the defined types were placed in this encounter.    Referred By: Rosmery Weir DPM  History of Present Illness    Benito Park is a 53 y.o. male with history of chronic lumbar sacral radiculopathy previous lumbar spine surgery as well as peripheral neuropathy.    He has a history of spinal cord stimulator placement in 2021 with previous lumbar spine surgery.  He has tried gabapentin in the past.  Currently on Lyrica, tramadol and amitriptyline.    He rates his pain as moderate to severe 8 out of 10 the visual analog scale significant impairing with his daily activities.  It is constant throughout the day described as burning with a numbing sharp sensation subjective weakness of his upper or lower limbs.  Most activities aggravate his symptoms with nothing seems to reduce his pain.  In the past he has undergone chiropractic manipulation exercise and injections with minimal relief.  Denies loss of bowel bladder function.    I have personally reviewed and/or updated the patient's past medical history, past surgical history, family history, social history, current medications, allergies, and vital signs today.     Review of Systems   Constitutional:  Negative for fever and unexpected weight change.   HENT:  Negative for trouble swallowing.    Eyes:  Negative for visual disturbance.   Respiratory:  Negative for shortness of breath and wheezing.    Cardiovascular:  Positive for leg swelling. Negative for chest pain and palpitations.   Gastrointestinal:  Negative for constipation, diarrhea, nausea and vomiting.   Endocrine: Negative for cold intolerance, heat intolerance and polydipsia.   Genitourinary:  Negative for difficulty urinating and frequency.   Musculoskeletal:  Positive for arthralgias, joint swelling and myalgias. Negative for gait problem.   Skin:  Negative for rash.   Neurological:  Positive for numbness. Negative for dizziness, seizures, syncope, weakness and  headaches.   Hematological:  Does not bruise/bleed easily.   Psychiatric/Behavioral:  Negative for dysphoric mood.    All other systems reviewed and are negative.      Patient Active Problem List   Diagnosis    Sprain of anterior talofibular ligament of right ankle    Perianal abscess    Neuropathy    Chronic bilateral low back pain with bilateral sciatica    Bilateral lower extremity edema    Chronic pain syndrome    Failed back surgical syndrome    Lumbar spondylosis    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 ([facility])    Congenital fusion of sacroiliac joint    Terminal ileitis (HCC)    Viral enteritis    Chronic foot pain    Lumbar radiculopathy    GERD (gastroesophageal reflux disease)    History of colon polyps    Perianal fistula    Functional diarrhea    Blepharitis, left eye    Closed fracture of radial styloid    Compression of right ulnar nerve at multiple levels    Degenerative disc disease at L5-S1 level    History of lumbar fusion    Numbness and tingling of both lower extremities    Periorbital cellulitis of left eye    Right leg swelling    Tobacco use    Vitamin D deficiency    Arthritis of right shoulder region    Chronic right shoulder pain    Myofascial pain syndrome    S/P insertion of spinal cord stimulator    Crohn's disease ([facility])    Numbness and tingling in right hand    Neck pain    Cervical radiculopathy    Herniated nucleus pulposus, C3-4    Hypertension    Smoker    Heavy alcohol consumption    Sleep apnea    Snoring    Excessive daytime sleepiness    Overweight (BMI 25.0-29.9)    Dupuytren contracture    Capillary disorder    Epididymitis, right    Obstructive sleep apnea syndrome       Past Medical History:   Diagnosis Date    Anxiety     Back pain     Colon polyp     CPAP (continuous positive airway pressure) dependence     Crohn's disease ([facility])     Depression     GERD (gastroesophageal reflux disease)     Hypertension     Perianal fistula     Sleep apnea     Terminal ileitis (HCC)         Past Surgical History:   Procedure Laterality Date    BACK SURGERY  2019    CAST APPLICATION Right 11/17/2022    Procedure: Application short-arm splint;  Surgeon: Sarthak Villatoro MD;  Location:  MAIN OR;  Service: Orthopedics    COLONOSCOPY      EGD      HAND CONTRACTURE RELEASE Left 06/01/2023    Procedure: left ring and small finger dupuytrens excision and ring finger Metacarpophalangeal joint release and small finger Metacarpophalangeal and proximal interphalangeal joint release;  Surgeon: Sarthak Villatoro MD;  Location: [facility];  Service: Orthopedics    HERNIA REPAIR      umbilical hernia    CO ANRCT XM SURG REQ ANES GENERAL SPI/EDRL DX N/A 11/17/2021    Procedure: EXAM UNDER ANESTHESIA (EUA);  Surgeon: Davi Thao MD;  Location:  MAIN OR;  Service: Colorectal    CO APPLICATION SHORT ARM SPLINT FOREARM-HAND STATIC Right 1/11/2024    Procedure: Application short arm splint;  Surgeon: Sarthak Villatoro MD;  Location:  MAIN OR;  Service: Orthopedics    CO CAPSULECTOMY/CAPSULOTOMY IPHAL JOINT EACH Right 1/11/2024    Procedure: Contracture release right hand small finger proximal interphalangeal joint;  Surgeon: Sarthak Villatoro MD;  Location: [facility];  Service: Orthopedics    CO FASCIOTOMY PALMAR OPEN PARTIAL Right 11/17/2022    Procedure: Dupuytren's excision right palm extending into the small finger with release of the metacarpophalangeal joint and proximal interphalangeal joint.  Dupuytren's excision right palm with release of the right ring finger metacarpophalangeal joint.;  Surgeon: Sarthak Villatoro MD;  Location:  MAIN OR;  Service: Orthopedics    CO FASCIOTOMY PALMAR OPEN PARTIAL Right 1/11/2024    Procedure: Right hand small finger duppuytrens excision with release of the metacarpophalangeal joint and proximal interphalangeal joint;  Surgeon: Sarthak Villatoro MD;  Location:  MAIN OR;  Service: Orthopedics    CO FASCT PALM W/WO Z-PLASTY TISSUE REARGMT/SKN GRFT  Right 1/11/2024    Procedure: Right hand small finger duppuytrens excision with release of the metacarpophalangeal joint and proximal interphalangeal joint;  Surgeon: Sarthak Villatoro MD;  Location: [facility];  Service: Orthopedics    NC FASCT PRTL PALMAR 1 DGT PROX IPHAL JT W/WO RPR Right 1/11/2024    Procedure: Right hand small finger duppuytrens excision with release of the metacarpophalangeal joint and proximal interphalangeal joint;  Surgeon: Sarthak Villatoro MD;  Location: [facility];  Service: Orthopedics    NC I&D ISCHIORECTAL&/PERIRECTAL ABSCESS SPX Right 10/24/2021    Procedure: excisional debredement right gluteal cheek ;  Surgeon: Jeana Bustamante MD;  Location: [facility];  Service: General    NC PLACEMENT SETON N/A 11/17/2021    Procedure: PLACEMENT SETON;  Surgeon: Davi Thao MD;  Location:  MAIN OR;  Service: Colorectal    NC PRQ IMPLTJ NSTIM ELECTRODE ARRAY EPIDURAL Right 03/26/2021    Procedure: INSERTION THORACIC DORSAL COLUMN SPINAL CORD STIMULATOR PERCUTANEOUS W IMPLANTABLE PULSE GENERATOR, RIGHT;  Surgeon: Akshat Witt MD;  Location: [facility];  Service: Neurosurgery       Family History   Problem Relation Age of Onset    Heart disease Father     Heart attack Father     Colon cancer Neg Hx     Colon polyps Neg Hx     Inflammatory bowel disease Neg Hx        Social History     Occupational History    Not on file   Tobacco Use    Smoking status: Former     Current packs/day: 0.00     Average packs/day: 0.5 packs/day for 30.0 years (15.0 ttl pk-yrs)     Types: Cigarettes     Start date: 1/1/2019     Quit date: 2/4/2021     Years since quitting: 3.0    Smokeless tobacco: Never   Vaping Use    Vaping status: Never Used   Substance and Sexual Activity    Alcohol use: Yes     Alcohol/week: 3.0 standard drinks of alcohol     Types: 3 Cans of beer per week     Comment: social    Drug use: Yes     Frequency: 7.0 times per week     Types: Marijuana     Comment: Medical marijuana-vapes     Sexual activity: Yes     Partners: Female       Current Outpatient Medications on File Prior to Visit   Medication Sig    acetaminophen (TYLENOL) 650 mg CR tablet Take 1 tablet (650 mg total) by mouth every 8 (eight) hours as needed for mild pain    amitriptyline (ELAVIL) 100 mg tablet take 1 to 2 tablets by mouth at bedtime    azaTHIOprine (IMURAN) 50 mg tablet Take 1 tablet (50 mg total) by mouth daily    buPROPion (WELLBUTRIN SR) 150 mg 12 hr tablet TAKE 1 TABLET BY MOUTH TWICE A DAY    furosemide (LASIX) 20 mg tablet Take 20 mg by mouth daily    lisinopril (ZESTRIL) 20 mg tablet Take 1 tablet (20 mg total) by mouth daily    omeprazole (PriLOSEC) 40 MG capsule TAKE 1 CAPSULE (40 MG TOTAL) BY MOUTH DAILY.    pregabalin (LYRICA) 150 mg capsule take 1 capsule by mouth three times a day    RA Vitamin D-3 25 MCG (1000 UT) tablet TAKE 2 TABLETS BY MOUTH DAILY    traMADol (Ultram) 50 mg tablet Take 1 tablet (50 mg total) by mouth every 6 (six) hours as needed for moderate pain    urea (CARMOL) 40 % Apply topically daily    vedolizumab (Entyvio) SOLR Inject 300 mg into a catheter in a vein every 4 weeks (Patient taking differently: Inject 300 mg into a catheter in a vein every 4 weeks)    Naproxen Sodium 220 MG CAPS Take 1 capsule (220 mg total) by mouth 2 (two) times a day    sildenafil (Viagra) 100 mg tablet Take 1 tablet (100 mg total) by mouth daily as needed for erectile dysfunction     No current facility-administered medications on file prior to visit.       No Known Allergies    Physical Exam    /86 (BP Location: Left arm, Patient Position: Sitting, Cuff Size: Standard)   Pulse 98   Temp 98.2 °F (36.8 °C)   Ht 6' (1.829 m)   Wt 99.8 kg (220 lb)   BMI 29.84 kg/m²     Constitutional: normal, well developed, well nourished, alert, in no distress and non-toxic and no overt pain behavior. and overweight  Eyes: anicteric  HEENT: grossly intact  Neck: supple, symmetric, trachea midline and no masses    Pulmonary:even and unlabored  Cardiovascular:No edema or pitting edema present  Skin:Normal without rashes or lesions and well hydrated  Psychiatric:Mood and affect appropriate  Neurologic:Cranial Nerves II-XII grossly intact  Musculoskeletal:normal, well-healed surgical scar lumbar sacral spine no other obvious skin wounds or erythema, no tenderness lumbar sacral spine spinous process sacral joint or greater trochanter bilateral, deep tendon reflexes are symmetrical bilateral lower limbs, decree sensation in a stocking glove distribution with good peripheral pulses.    Imaging  CERVICAL SPINE @  8-12-21     INDICATION:   M79.18: Myalgia, other site  G89.4: Chronic pain syndrome  M25.511: Pain in right shoulder  G89.29: Other chronic pain  R20.0: Anesthesia of skin  R20.2: Paresthesia of skin  M54.2: Cervicalgia.     COMPARISON:  None     VIEWS:  XR SPINE CERVICAL COMPLETE 4 OR 5 VW NON INJURY         FINDINGS:     No fracture.      Normal alignment without subluxation.     Mild multilevel osteophytosis, disc space narrowing and facet spondylosis.      Mild osseous foraminal stenosis bilaterally at the C3-C4 level.     The prevertebral soft tissues are within normal limits.       The lung apices are clear.     IMPRESSION:     No acute osseous abnormality.     Degenerative changes as above.      MRI LUMBAR SPINE WITH AND WITHOUT CONTRAST @  6-3-20     INDICATION: G62.9: Polyneuropathy, unspecified.   persistent low back pain and bilateral leg pain (primarily right-side). s/p lumbar sx Dec 2019     COMPARISON:  7/25/2019 outside MRI     TECHNIQUE:  Sagittal T1, sagittal T2, sagittal inversion recovery, axial T1 and axial T2, coronal T2.  Sagittal and axial T1 postcontrast.     IV Contrast:  8 mL of Gadobutrol injection (SINGLE-DOSE)      IMAGE QUALITY:  Diagnostic     FINDINGS:     VERTEBRAL BODIES:  There are 5 lumbar type vertebral bodies.  Normal alignment of the lumbar spine.  No spondylolysis or  spondylolisthesis. No scoliosis.  No compression fracture.    Interval artifact at the L5-S1 intervertebral disc space indicative of   interval surgery at this level from the prior MRI.  Scattered spondylotic changes elsewhere noted.  No compression abnormality or bone marrow edema.  No focally cyst is marrow lesions     SACRUM:  Normal signal within the sacrum. No evidence of insufficiency or stress fracture.     DISTAL CORD AND CONUS:  Normal size and signal within the distal cord and conus.  Conus medullaris terminates at the L2 level.     PARASPINAL SOFT TISSUES:  Paraspinal soft tissues are unremarkable.     LOWER THORACIC DISC SPACES:  Normal disc height and signal.  No disc herniation, canal stenosis or foraminal narrowing.     LUMBAR DISC SPACES:     L1-L2:  Tiny left paracentral disc herniation, protrusion type.  No significant central canal or neural foraminal narrowing. This level is similar to the prior study.     L2-L3:  There is bilateral facet hypertrophy.  There is mild right neural foraminal narrowing.  Central canal and left neural foramen patent. This level is similar to the prior study.     L3-L4:  There is a right neural foraminal disc herniation, protrusion type.  Mild right neural foraminal narrowing.  Central canal and left neural foramen patent. This level is similar to the prior study.     L4-L5:  Small right neural foraminal disc herniation, protrusion type.  Mild right neural foraminal narrowing.  Central canal and left neural foramen patent. This level is similar to the prior study.     L5-S1:  Intervertebral cage noted.  No evidence of recurrent or residual disc herniation.     POSTCONTRAST IMAGING:  No abnormal enhancement.     IMPRESSION:        1.  Interval postoperative changes status post discectomy at L5-S1 with intervertebral cage.  No evidence of recurrent or residual disc herniation at L5-S1.  2.  Mild scattered spondylotic changes elsewhere are stable.    I have personally  reviewed pertinent films in PACS and my interpretation is very mild spondylosis with postop changes at L5-S1.

## 2024-03-11 NOTE — PROGRESS NOTES
Orthosis    Diagnosis:   1. Dupuytren's contracture of both hands          Indication: Motion Blocking    Location: Left  ring finger and small finger  Supplies: Custom Fit Orthotic  Orthosis type:  HB ext   Wearing Schedule: Night only  Describe Position: ext     Precautions: Universal (skin contact/breakdown)    Patient or Caregiver expresses understanding of wearing Schedule and Precautions? Yes  Patient or Caregiver able to don/doff orthotic independently?Yes    Written orders provided to patient? Yes  Orders Obtained: Written  Orders Obtained from: ANDRÉS Kelley     Return for evaluation and treatment No

## 2024-03-12 ENCOUNTER — HOSPITAL ENCOUNTER (OUTPATIENT)
Dept: RADIOLOGY | Facility: HOSPITAL | Age: 54
Discharge: HOME | End: 2024-03-12
Payer: MEDICARE

## 2024-03-12 ENCOUNTER — HOSPITAL ENCOUNTER (OUTPATIENT)
Dept: MRI IMAGING | Facility: HOSPITAL | Age: 54
Discharge: HOME | End: 2024-03-12
Payer: MEDICARE

## 2024-03-12 DIAGNOSIS — M54.16 LUMBAR RADICULOPATHY: ICD-10-CM

## 2024-03-12 DIAGNOSIS — K50.814 CROHN'S DISEASE OF BOTH SMALL AND LARGE INTESTINE WITH ABSCESS (HCC): ICD-10-CM

## 2024-03-12 DIAGNOSIS — G89.4 CHRONIC PAIN SYNDROME: ICD-10-CM

## 2024-03-12 PROCEDURE — G1004 CDSM NDSC: HCPCS

## 2024-03-12 PROCEDURE — 74183 MRI ABD W/O CNTR FLWD CNTR: CPT

## 2024-03-12 PROCEDURE — A9585 GADOBUTROL INJECTION: HCPCS | Performed by: INTERNAL MEDICINE

## 2024-03-12 PROCEDURE — 72100 X-RAY EXAM L-S SPINE 2/3 VWS: CPT

## 2024-03-12 PROCEDURE — 72197 MRI PELVIS W/O & W/DYE: CPT

## 2024-03-12 RX ORDER — GADOBUTROL 604.72 MG/ML
9 INJECTION INTRAVENOUS
Status: COMPLETED | OUTPATIENT
Start: 2024-03-12 | End: 2024-03-12

## 2024-03-12 RX ADMIN — GADOBUTROL 9 ML: 604.72 INJECTION INTRAVENOUS at 10:20

## 2024-03-12 RX ADMIN — GLUCAGON 1 MG: 1 INJECTION, POWDER, LYOPHILIZED, FOR SOLUTION INTRAMUSCULAR; INTRAVENOUS at 09:54

## 2024-03-12 NOTE — QUICK NOTE
[employer] spinal stim put in mri mode scanned normal mode under 30 mins scan time  scan time 8:31 mins cc

## 2024-03-14 ENCOUNTER — OFFICE VISIT (OUTPATIENT)
Dept: OCCUPATIONAL THERAPY | Facility: CLINIC | Age: 54
End: 2024-03-14
Payer: MEDICARE

## 2024-03-14 ENCOUNTER — TELEPHONE (OUTPATIENT)
Age: 54
End: 2024-03-14

## 2024-03-14 DIAGNOSIS — M72.0 DUPUYTREN'S CONTRACTURE OF BOTH HANDS: Primary | ICD-10-CM

## 2024-03-14 PROCEDURE — 97140 MANUAL THERAPY 1/> REGIONS: CPT | Performed by: OCCUPATIONAL THERAPIST

## 2024-03-14 PROCEDURE — 97110 THERAPEUTIC EXERCISES: CPT | Performed by: OCCUPATIONAL THERAPIST

## 2024-03-14 NOTE — TELEPHONE ENCOUNTER
Beatrice from [facility] called asking if the last office visit notes from 12/6/2023 can be faxed to them because the notes are needed for insurance purposes.    Fax- [phone]

## 2024-03-14 NOTE — PROGRESS NOTES
REEVAL   Today's date: 3/14/2024  Patient name: Benito Park  : 1970  MRN: [id number]  Referring provider: Onesimo Kelley PA-C  Dx:   Encounter Diagnosis     ICD-10-CM    1. Dupuytren's contracture of both hands  M72.0                      Subjective: splint helps       Objective: See treatment diary below      Assessment:   Goals  STG- 1 wk  1- I with HEP  2- compliant with custom orthosis    LTG- 6 wks  1- I  ADL- self care, cut food  2- make a composite fist  3- full ext L RF/SF  4- L  50% of R     Plan  Patient would benefit from: custom splinting, OT eval and skilled occupational therapy  Planned modality interventions: cryotherapy and thermotherapy: hydrocollator packs  Planned therapy interventions: activity modification, massage, joint mobilization, manual therapy, stretching, strengthening, therapeutic activities, therapeutic exercise, home exercise program, functional ROM exercises, fine motor coordination training and graded exercise  Frequency: 2x week  Duration in weeks: 4  Plan of Care beginning date: 3/14/24  Treatment plan discussed with: patient        Subjective Evaluation    History of Present Illness  Date of surgery: 2023  Mechanism of injury: surgery  Mechanism of injury: L dupuytren's release L .  Quality of life: fair    Pain  Current pain ratin  At best pain ratin  At worst pain ratinLocation: L hand   Quality: dull ache, sharp and tight  Relieving factors: rest    Social Support  Steps to enter house: yes  Stairs in house: yes   Lives in: multiple-level home  Lives with: spouse and adult children    Employment status: not working  Hand dominance: left    Treatments  No previous or current treatments  Patient Goals  Patient goals for therapy: decreased edema, decreased pain, increased motion, increased strength, independence with ADLs/IADLs and return to sport/leisure activities          Objective     Observations     Additional Observation Details  Scar   L - 7 cm small finger      Tenderness     Additional Tenderness Details  Tender incision     Neurological Testing     Additional Neurological Details  C/o numbness L small finger   Decreased 2 point small finger - unable to give accurate report     Active Range of Motion     Left Wrist -WNL       Left Digits    Flexion   Ring     MCP: 100    PIP: 90    DIP: 60  Little     MCP: 55    PIP: 30    DIP: 20  Extension   Ring     MCP: 0  Little     MCP: 45    PIP: 0  Passive Range of Motion     Left Digits   Flexion     Little     MCP: 90    PIP: 62    DIP: 60    Extension   PIP 40   Strength/Myotome Testing     R/L=42/42    Tests     Left Wrist/Hand   Positive extrinsic flexor tightness and intrinsic muscle tightness.              Precautions: Ania's       Manuals  3/14             [place]  6m            MOB   6m                                      HEP 4x day             Custom  HB ext orthosis night                                                                                           Ther Ex             A/PROM R SF 4m             Blocking  2m            Flexor stretch             Powerweb   Yellow  3x10            Wrist e/f              Roll flexbar 3m                                      Ther Activity             Fxnl grasp                           Gait Training                                       Modalities             MH in ext  8m              CP

## 2024-03-20 NOTE — PROGRESS NOTES
"Patient ID: Benito Park is a 53 y.o. male Date of Birth 1970       Chief Complaint   Patient presents with    Foot Problem     Left foot ulcer               Diagnosis:  1. Ulcer of left foot, limited to breakdown of skin (HCC)    2. Neuropathy      Bilateral pedal examination with socks and shoes removed.  I personally reviewed patient's medical records, spine and pain management visit note from 3/11/2024.  Left first MTPJ neuropathic ulceration -debrided through subcuticular tissues, please see procedure note, patient will do the same daily after shower, do not leave open to air.  Continue soft upper shoes.  Continue urea 40% cream daily to top and bottom of feet, avoid area of wound.  Continue to work with [employer] and spine and pain regarding his spinal stimulator.  Patient understands and agrees with the plan will follow-up in 2 weeks.      Debridement    Universal Protocol:  Consent: Verbal consent obtained.  Risks and benefits: risks, benefits and alternatives were discussed  Consent given by: patient  Time out: Immediately prior to procedure a \"time out\" was called to verify the correct patient, procedure, equipment, support staff and site/side marked as required.  Patient understanding: patient states understanding of the procedure being performed  Patient identity confirmed: verbally with patient    Debridement Details  Performed by: physician  Debridement type: surgical  Level of debridement: subcutaneous tissue  Pain control: lidocaine 4%      Post-debridement measurements  Length (cm): 0.3  Width (cm): 0.3  Depth (cm): 0.2  Percent debrided: 100%  Surface Area (cm^2): 0.09  Area Debrided (cm^2): 0.09  Volume (cm^3): 0.02    Tissue and other material debrided: subcutaneous tissue  Devitalized tissue debrided: biofilm, fibrin, necrotic debris, slough and eschar  Instrument(s) utilized: blade  Bleeding: small  Hemostasis obtained with: pressure  Procedural pain (0-10): " insensate  Post-procedural pain: insensate   Response to treatment: procedure was tolerated well         Subjective:   Anand presents today for follow-up of left foot neuropathic ulceration, he has been changing dressings as directed with Dermagran gauze dry dressing, he has  gotten his urea 40% cream and has been applying it to his heels.  He has been to see pain management on 3/11/2024, who states he is working with [employer] rep on making adjustments to his spinal stimulator.          The following portions of the patient's history were reviewed and updated as appropriate: allergies, current medications, past family history, past medical history, past social history, past surgical history, and problem list.        Objective:  /90 (BP Location: Left arm, Patient Position: Sitting, Cuff Size: Adult)   Pulse 97   Ht 6' (1.829 m) Comment: stated  Wt 95.3 kg (210 lb)   BMI 28.48 kg/m²     Review of Systems   Constitutional:  Negative for chills and fever.   HENT:  Negative for ear pain and sore throat.    Eyes:  Negative for pain and visual disturbance.   Respiratory:  Negative for cough and shortness of breath.    Cardiovascular:  Negative for chest pain and palpitations.   Gastrointestinal:  Negative for abdominal pain and vomiting.   Genitourinary:  Negative for dysuria and hematuria.   Musculoskeletal:  Positive for gait problem. Negative for arthralgias and back pain.   Skin:  Positive for color change and wound. Negative for rash.   Neurological:  Positive for numbness. Negative for seizures and syncope.   All other systems reviewed and are negative.      Physical Exam  Constitutional:       Appearance: Normal appearance. He is normal weight.   HENT:      Head: Normocephalic and atraumatic.      Right Ear: External ear normal.      Left Ear: External ear normal.      Nose: Nose normal.      Mouth/Throat:      Mouth: Mucous membranes are moist.      Pharynx: Oropharynx is clear.   Eyes:       Conjunctiva/sclera: Conjunctivae normal.      Pupils: Pupils are equal, round, and reactive to light.   Cardiovascular:      Pulses: Normal pulses.           Dorsalis pedis pulses are 2+ on the right side and 2+ on the left side.        Posterior tibial pulses are 2+ on the right side and 2+ on the left side.   Pulmonary:      Effort: Pulmonary effort is normal.   Musculoskeletal:      Cervical back: Normal range of motion.      Right lower leg: No edema.      Left lower leg: No edema.      Right foot: Decreased range of motion. Deformity present.      Left foot: Decreased range of motion. Deformity present.   Feet:      Right foot:      Protective Sensation: 10 sites tested.  0 sites sensed.      Skin integrity: Dry skin and fissure present.      Toenail Condition: Right toenails are normal.      Left foot:      Protective Sensation: 10 sites tested.  0 sites sensed.      Skin integrity: Ulcer, dry skin and fissure present.      Toenail Condition: Left toenails are normal.      Comments:   Patient with extremely dry skin plantar bilateral feet with superficial and not ulcerated fissures, this is diffuse.  No pustules, no blisters noted.  Ulceration dorsal first MTPJ left foot with yellow dry  adherent eschar 0.2 cm in diameter, no fluctuance, crepitus, no signs of infection noted.  Hallux limitus bilateral  Cavus bilateral    Skin:     General: Skin is warm and dry.      Capillary Refill: Capillary refill takes less than 2 seconds.   Neurological:      General: No focal deficit present.      Mental Status: He is alert and oriented to person, place, and time. Mental status is at baseline.      Sensory: Sensory deficit present.      Coordination: Coordination abnormal.      Gait: Gait abnormal.      Deep Tendon Reflexes: Reflexes abnormal.   Psychiatric:         Mood and Affect: Mood normal.         Behavior: Behavior normal.         Thought Content: Thought content normal.         Judgment: Judgment normal.           "  X-ray lumbar spine 2 or 3 views    Result Date: 3/12/2024  Narrative: LUMBAR SPINE INDICATION:   Radiculopathy, lumbar region. Chronic pain syndrome. COMPARISON:  None. VIEWS:  XR SPINE LUMBAR 2 OR 3 VIEWS INJURY FINDINGS: There are 5 non rib bearing lumbar vertebral bodies. There is no evidence of acute fracture or destructive osseous lesion. Mild scoliotic deformity is noted.  Alignment is otherwise unremarkable. Multilevel mild to moderate discogenic degenerative disease. Disc spacer at L5-S1 without appreciable complication The pedicles appear intact. Soft tissues are unremarkable. Spinal stimulator device     Impression: No acute osseous abnormality.  Degenerative changes as described. Electronically signed: 03/12/2024 01:16 PM MD Rosmery Durán DPM, DANITA, FACFAS    Portions of the record may have been created with voice recognition software. Occasional wrong word or \"sound a like\" substitutions may have occurred due to the inherent limitations of voice recognition software. Read the chart carefully and recognize, using context, where substitutions have occurred.  "

## 2024-03-21 ENCOUNTER — OFFICE VISIT (OUTPATIENT)
Dept: PODIATRY | Facility: CLINIC | Age: 54
End: 2024-03-21
Payer: MEDICARE

## 2024-03-21 VITALS
DIASTOLIC BLOOD PRESSURE: 90 MMHG | WEIGHT: 210 LBS | HEART RATE: 97 BPM | BODY MASS INDEX: 28.44 KG/M2 | SYSTOLIC BLOOD PRESSURE: 132 MMHG | HEIGHT: 72 IN

## 2024-03-21 DIAGNOSIS — G62.9 NEUROPATHY: ICD-10-CM

## 2024-03-21 DIAGNOSIS — L97.521 ULCER OF LEFT FOOT, LIMITED TO BREAKDOWN OF SKIN (HCC): Primary | ICD-10-CM

## 2024-03-21 PROCEDURE — 11042 DBRDMT SUBQ TIS 1ST 20SQCM/<: CPT | Performed by: PODIATRIST

## 2024-03-22 ENCOUNTER — APPOINTMENT (OUTPATIENT)
Dept: OCCUPATIONAL THERAPY | Facility: CLINIC | Age: 54
End: 2024-03-22
Payer: MEDICARE

## 2024-03-30 DIAGNOSIS — I10 PRIMARY HYPERTENSION: ICD-10-CM

## 2024-04-01 ENCOUNTER — APPOINTMENT (OUTPATIENT)
Dept: LAB | Facility: HOSPITAL | Age: 54
End: 2024-04-01
Payer: MEDICARE

## 2024-04-01 ENCOUNTER — OFFICE VISIT (OUTPATIENT)
Dept: GASTROENTEROLOGY | Facility: CLINIC | Age: 54
End: 2024-04-01
Payer: MEDICARE

## 2024-04-01 VITALS
HEIGHT: 72 IN | BODY MASS INDEX: 29.93 KG/M2 | WEIGHT: 221 LBS | DIASTOLIC BLOOD PRESSURE: 70 MMHG | SYSTOLIC BLOOD PRESSURE: 110 MMHG

## 2024-04-01 DIAGNOSIS — F51.01 PRIMARY INSOMNIA: ICD-10-CM

## 2024-04-01 DIAGNOSIS — K50.814 CROHN'S DISEASE OF BOTH SMALL AND LARGE INTESTINE WITH ABSCESS (HCC): Primary | ICD-10-CM

## 2024-04-01 DIAGNOSIS — K21.9 GASTROESOPHAGEAL REFLUX DISEASE, UNSPECIFIED WHETHER ESOPHAGITIS PRESENT: ICD-10-CM

## 2024-04-01 DIAGNOSIS — F17.200 SMOKING: ICD-10-CM

## 2024-04-01 DIAGNOSIS — I10 PRIMARY HYPERTENSION: ICD-10-CM

## 2024-04-01 DIAGNOSIS — K50.814 CROHN'S DISEASE OF BOTH SMALL AND LARGE INTESTINE WITH ABSCESS (HCC): ICD-10-CM

## 2024-04-01 DIAGNOSIS — Z86.010 HISTORY OF COLON POLYPS: ICD-10-CM

## 2024-04-01 DIAGNOSIS — K59.1 FUNCTIONAL DIARRHEA: ICD-10-CM

## 2024-04-01 LAB
ALBUMIN SERPL BCP-MCNC: 4.3 G/DL (ref 3.5–5)
ALP SERPL-CCNC: 97 U/L (ref 34–104)
ALT SERPL W P-5'-P-CCNC: 32 U/L (ref 7–52)
ANION GAP SERPL CALCULATED.3IONS-SCNC: 7 MMOL/L (ref 4–13)
AST SERPL W P-5'-P-CCNC: 22 U/L (ref 13–39)
BILIRUB SERPL-MCNC: 0.47 MG/DL (ref 0.2–1)
BUN SERPL-MCNC: 14 MG/DL (ref 5–25)
CALCIUM SERPL-MCNC: 9.3 MG/DL (ref 8.4–10.2)
CHLORIDE SERPL-SCNC: 104 MMOL/L (ref 96–108)
CO2 SERPL-SCNC: 27 MMOL/L (ref 21–32)
CREAT SERPL-MCNC: 1.15 MG/DL (ref 0.6–1.3)
CRP SERPL QL: 15.9 MG/L
GFR SERPL CREATININE-BSD FRML MDRD: 73 ML/MIN/1.73SQ M
GLUCOSE SERPL-MCNC: 219 MG/DL (ref 65–140)
POTASSIUM SERPL-SCNC: 4.3 MMOL/L (ref 3.5–5.3)
PROT SERPL-MCNC: 7 G/DL (ref 6.4–8.4)
SODIUM SERPL-SCNC: 138 MMOL/L (ref 135–147)

## 2024-04-01 PROCEDURE — G2211 COMPLEX E/M VISIT ADD ON: HCPCS | Performed by: INTERNAL MEDICINE

## 2024-04-01 PROCEDURE — 83993 ASSAY FOR CALPROTECTIN FECAL: CPT

## 2024-04-01 PROCEDURE — 87493 C DIFF AMPLIFIED PROBE: CPT

## 2024-04-01 PROCEDURE — 86140 C-REACTIVE PROTEIN: CPT

## 2024-04-01 PROCEDURE — 99214 OFFICE O/P EST MOD 30 MIN: CPT | Performed by: INTERNAL MEDICINE

## 2024-04-01 PROCEDURE — 36415 COLL VENOUS BLD VENIPUNCTURE: CPT

## 2024-04-01 PROCEDURE — 80053 COMPREHEN METABOLIC PANEL: CPT

## 2024-04-01 RX ORDER — AMITRIPTYLINE HYDROCHLORIDE 100 MG/1
TABLET ORAL
Qty: 180 TABLET | Refills: 0 | Status: SHIPPED | OUTPATIENT
Start: 2024-04-01

## 2024-04-01 RX ORDER — LISINOPRIL 20 MG/1
20 TABLET ORAL DAILY
Qty: 90 TABLET | Refills: 0 | OUTPATIENT
Start: 2024-04-01

## 2024-04-01 RX ORDER — LISINOPRIL 20 MG/1
20 TABLET ORAL DAILY
Qty: 90 TABLET | Refills: 0 | Status: SHIPPED | OUTPATIENT
Start: 2024-04-01

## 2024-04-01 RX ORDER — LOPERAMIDE HYDROCHLORIDE 2 MG/1
2 TABLET ORAL 4 TIMES DAILY PRN
Qty: 60 TABLET | Refills: 2 | Status: SHIPPED | OUTPATIENT
Start: 2024-04-01

## 2024-04-01 NOTE — PROGRESS NOTES
[facility] - Outpatient Follow-up Note  Benito Park 53 y.o. male MRN: [id number]  Encounter: [id number]    ASSESSMENT AND PLAN:      1. Crohn's disease of both small and large intestine with abscess (HCC)  53M here today for f/u of Crohns.     EGD/COLON 2023 neg.  Good drug levels on Entyvio but still feels like around week 3/4, has increased symptoms.  MRE 2024 reviewed - some TI scarring    - Discussed the importance of dietary control - avoid overeating and eating late to limit his night time symptoms.   - Smoking cessation  - Recheck CRP/fecal fabio/cdiff - although I suspect his symptoms are likely 2/2 dietary indiscretion  - However, given MRE w some TI disease, can consider surgical referral if symptoms persist despite above    - C-reactive protein; Future  - Calprotectin,Fecal; Future    2. Functional diarrhea    - Clostridium difficile toxin by PCR with EIA; Future  - loperamide (IMODIUM A-D) 2 MG tablet; Take 1 tablet (2 mg total) by mouth 4 (four) times a day as needed for diarrhea  Dispense: 60 tablet; Refill: 2    3. Smoking  Emphasized smoking cessation    4. Gastroesophageal reflux disease, unspecified whether esophagitis present  Well controlled. Cont omeprazole for now.     5. History of colon polyps  UTD, recall 11/2025      Followup Appointment: 3 months  ______________________________________________________________________    Chief Complaint   Patient presents with   • Follow-up     F/u from Stool studies, Mri etc     HPI:  53M here today for f/u. Reviewed MRE and labs/stool studies. Elev fecal fabio and MRE does show some TI disease scarring.     Pt continues to smoke.     He is on disability and not working. So when bored at home, he eats indiscretionally. Binges at times and then feels poor appetite so not eats on other days followed by binging at night time before bed causing all sorts of symptoms.     Generally moving his bowels 3-4 times a day - even  during the first two weeks post entyvio infusion.     However, the last 2 weeks, he often has multiple BM's, sometimes even waking up at night. No recent Abx use.     Historical Information   Past Medical History:   Diagnosis Date   • Anxiety    • Back pain    • Callus Few months ago   • Colon polyp    • CPAP (continuous positive airway pressure) dependence    • Crohn's disease (HCC)    • Depression    • GERD (gastroesophageal reflux disease)    • Hypertension    • Perianal fistula    • Sleep apnea    • Terminal ileitis (HCC)      Past Surgical History:   Procedure Laterality Date   • BACK SURGERY  2019   • CAST APPLICATION Right 11/17/2022    Procedure: Application short-arm splint;  Surgeon: Sarthak Villatoro MD;  Location:  MAIN OR;  Service: Orthopedics   • COLONOSCOPY     • EGD     • HAND CONTRACTURE RELEASE Left 06/01/2023    Procedure: left ring and small finger dupuytrens excision and ring finger Metacarpophalangeal joint release and small finger Metacarpophalangeal and proximal interphalangeal joint release;  Surgeon: Sarthak Villatoro MD;  Location:  MAIN OR;  Service: Orthopedics   • HERNIA REPAIR      umbilical hernia   • NC ANRCT XM SURG REQ ANES GENERAL SPI/EDRL DX N/A 11/17/2021    Procedure: EXAM UNDER ANESTHESIA (EUA);  Surgeon: Davi Thao MD;  Location:  MAIN OR;  Service: Colorectal   • NC APPLICATION SHORT ARM SPLINT FOREARM-HAND STATIC Right 1/11/2024    Procedure: Application short arm splint;  Surgeon: Sarthak Villatoro MD;  Location: [facility];  Service: Orthopedics   • NC CAPSULECTOMY/CAPSULOTOMY IPHAL JOINT EACH Right 1/11/2024    Procedure: Contracture release right hand small finger proximal interphalangeal joint;  Surgeon: Sarthak Villatoro MD;  Location: [facility];  Service: Orthopedics   • NC FASCIOTOMY PALMAR OPEN PARTIAL Right 11/17/2022    Procedure: Dupuytren's excision right palm extending into the small finger with release of the metacarpophalangeal joint  and proximal interphalangeal joint.  Dupuytren's excision right palm with release of the right ring finger metacarpophalangeal joint.;  Surgeon: Sarthak Villatoro MD;  Location: [facility];  Service: Orthopedics   • MS FASCIOTOMY PALMAR OPEN PARTIAL Right 1/11/2024    Procedure: Right hand small finger duppuytrens excision with release of the metacarpophalangeal joint and proximal interphalangeal joint;  Surgeon: Sarthak Villatoro MD;  Location: [facility];  Service: Orthopedics   • MS FASCT PALM W/WO Z-PLASTY TISSUE REARGMT/SKN GRFT Right 1/11/2024    Procedure: Right hand small finger duppuytrens excision with release of the metacarpophalangeal joint and proximal interphalangeal joint;  Surgeon: Sarthak Villatoro MD;  Location:  MAIN OR;  Service: Orthopedics   • MS FASCT PRTL PALMAR 1 DGT PROX IPHAL JT W/WO RPR Right 1/11/2024    Procedure: Right hand small finger duppuytrens excision with release of the metacarpophalangeal joint and proximal interphalangeal joint;  Surgeon: Sarthak Villatoro MD;  Location:  MAIN OR;  Service: Orthopedics   • MS I&D ISCHIORECTAL&/PERIRECTAL ABSCESS SPX Right 10/24/2021    Procedure: excisional debredement right gluteal cheek ;  Surgeon: Jeana Bustamante MD;  Location:  MAIN OR;  Service: General   • MS PLACEMENT SETON N/A 11/17/2021    Procedure: PLACEMENT SETON;  Surgeon: Davi Thao MD;  Location:  MAIN OR;  Service: Colorectal   • MS PRQ IMPLTJ NSTIM ELECTRODE ARRAY EPIDURAL Right 03/26/2021    Procedure: INSERTION THORACIC DORSAL COLUMN SPINAL CORD STIMULATOR PERCUTANEOUS W IMPLANTABLE PULSE GENERATOR, RIGHT;  Surgeon: Akshat Witt MD;  Location: [facility];  Service: Neurosurgery     Social History     Substance and Sexual Activity   Alcohol Use Yes   • Alcohol/week: 3.0 standard drinks of alcohol   • Types: 3 Cans of beer per week    Comment: social     Social History     Substance and Sexual Activity   Drug Use Yes   • Frequency: 7.0 times per week    • Types: Marijuana    Comment: Medical marijuana-vapes     Social History     Tobacco Use   Smoking Status Former   • Current packs/day: 0.00   • Average packs/day: 0.5 packs/day for 30.0 years (15.0 ttl pk-yrs)   • Types: Cigarettes   • Start date: 1/1/2019   • Quit date: 2/4/2021   • Years since quitting: 3.1   Smokeless Tobacco Former     Family History   Problem Relation Age of Onset   • Heart disease Father    • Heart attack Father    • Colon cancer Neg Hx    • Colon polyps Neg Hx    • Inflammatory bowel disease Neg Hx          Current Outpatient Medications:   •  acetaminophen (TYLENOL) 650 mg CR tablet  •  amitriptyline (ELAVIL) 100 mg tablet  •  azaTHIOprine (IMURAN) 50 mg tablet  •  buPROPion (WELLBUTRIN SR) 150 mg 12 hr tablet  •  furosemide (LASIX) 20 mg tablet  •  lisinopril (ZESTRIL) 20 mg tablet  •  loperamide (IMODIUM A-D) 2 MG tablet  •  Naproxen Sodium 220 MG CAPS  •  omeprazole (PriLOSEC) 40 MG capsule  •  pregabalin (LYRICA) 150 mg capsule  •  RA Vitamin D-3 25 MCG (1000 UT) tablet  •  sildenafil (Viagra) 100 mg tablet  •  traMADol (Ultram) 50 mg tablet  •  urea (CARMOL) 40 %  •  vedolizumab (Entyvio) SOLR  No Known Allergies  Reviewed medications and allergies and updated as indicated    PHYSICAL EXAM:    Blood pressure 110/70, height 6' (1.829 m), weight 100 kg (221 lb). Body mass index is 29.97 kg/m².  General Appearance: NAD, cooperative, alert  Eyes: Anicteric, conjunctiva pink  ENT:  Normocephalic, atraumatic, normal mucosa.    Neck:  Supple, symmetrical, trachea midline  Resp:  Clear to auscultation bilaterally; no rales, rhonchi or wheezing; respirations unlabored   CV:  S1 S2, Regular rate and rhythm; no murmur, rub, or gallop.  GI:  Soft, non-tender, non-distended; normal bowel sounds; no masses, no organomegaly   Rectal: Deferred  Musculoskeletal: No cyanosis, clubbing or edema. Normal ROM.  Skin:  No jaundice, rashes, or lesions   Heme/Lymph: No palpable cervical  lymphadenopathy  Psych: Normal affect, good eye contact  Neuro: No gross deficits, AAOx3    Lab Results:   Lab Results   Component Value Date    WBC 6.99 02/02/2024    HGB 14.9 02/02/2024    HCT 44.8 02/02/2024     (H) 02/02/2024     02/02/2024     Lab Results   Component Value Date     10/03/2016    K 4.0 06/01/2023     (H) 06/01/2023    CO2 25 06/01/2023    ANIONGAP 23 (H) 02/20/2015    BUN 13 06/01/2023    CREATININE 0.94 06/01/2023    GLUCOSE 110 02/20/2015    GLUF 114 (H) 06/01/2023    CALCIUM 9.2 06/01/2023    AST 35 06/01/2023    ALT 48 06/01/2023    ALKPHOS 88 06/01/2023    PROT 7.3 10/03/2016    BILITOT 0.8 10/03/2016    EGFR 94 06/01/2023       Radiology Results:   MRI enterography w wo    Result Date: 3/18/2024  Narrative: MRI ABDOMEN AND PELVIS WITH AND WITHOUT CONTRAST (MR ENTEROGRAPHY) INDICATION: 53 years / Male. K50.814: Crohn's disease of both small and large intestine with abscess. Patient is being treated with Entyvio. Most recent colonoscopy performed in November 2023 revealed no active inflammatory disease. History of perianal abscess. COMPARISON: Most recent CT of abdomen and pelvis performed October 23, 2021. Prior MR enterography performed October 2020. TECHNIQUE: The following pulse sequences of the abdomen and pelvis were obtained: Axial 2D FIESTA with fat saturation, axial and coronal T2, DWI/ADC, pre-contrast coronal T1 with fat saturation, post-contrast dynamic coronal T1 at 20, 70, and 180 seconds  with fat saturation, and axial T1 post-contrast with fat saturation through the abdomen and pelvis. Enteric Contrast: 1500 cc of enteric contrast (Breeza) was administered beginning 45 minute prior to scanning. In addition, 1 mg of glucagon was administered IM prior to contrast administration by the radiology nurse. IV Contrast: 9 mL of Gadobutrol injection (SINGLE-DOSE) Note that dynamic imaging was tailored for evaluation of the bowel with limited evaluation of the  remainder of the abdominal and pelvic viscera. ENTEROGRAPHY: - Small bowel distension: Adequate. -Small bowel: No segments of bowel wall thickening or hyperenhancement to indicate active inflammatory bowel disease. Angulation of terminal ileum, similar when compared to prior examinations with incomplete distention but no upstream small bowel dilatation. No evidence for fistula, sinus tract, or abscess in the abdomen or pelvis. Large bowel: Submucosal wall thickening and incomplete distention associated with sigmoid colon at a site of diverticulosis most consistent with the sequela of chronic diverticular disease. Otherwise no segments of colonic wall thickening or hyperenhancement and specifically no findings to indicate active colonic inflammatory bowel disease. No perianal abscess. - Mesenteric findings: None. - Extraintestinal findings: None. REMAINDER OF THE ABDOMEN AND PELVIS: LOWER THORAX: Unremarkable. LIVER: Profound signal loss on opposed phase gradient T1 imaging throughout hepatic parenchyma consistent with steatotic change. No suspicious mass. BILE DUCTS: No intrahepatic or extrahepatic bile ductal dilation. GALLBLADDER: Collapsed. No gallstones. SPLEEN: Normal. PANCREAS: Periampullary duodenal diverticulum reidentified. No pancreatic mass. No pancreatic ductal enlargement. ADRENAL GLANDS: Normal. KIDNEYS/URETERS: No hydroureteronephrosis. No suspicious renal mass. PERITONEUM/RETROPERITONEUM: No ascites. LYMPH NODES: No lymphadenopathy. REPRODUCTIVE STRUCTURES: Age-appropriate. BLADDER: Normal. VESSELS: No aneurysm. ABDOMINOPELVIC WALL: Unremarkable BONES: No suspicious osseous lesion. Fusion across bilateral sacroiliac joints, similar at least as far back as October 2021 possibly representing the sequela of chronic sacroiliitis. Discectomy and fixation at L5-S1 are identified.     Impression: No MR findings of active inflammatory bowel disease. Angulation/distortion of terminal ileum and ileocecal valve  suggesting probable chronic stricturing without obstructive upstream dilatation, similar when compared to previous examination. Findings suggestive of chronic sigmoid diverticular disease. No perianal abscess. Hepatic steatosis. Workstation performed: [id number]     X-ray lumbar spine 2 or 3 views    Result Date: 3/12/2024  Narrative: LUMBAR SPINE INDICATION:   Radiculopathy, lumbar region. Chronic pain syndrome. COMPARISON:  None. VIEWS:  XR SPINE LUMBAR 2 OR 3 VIEWS INJURY FINDINGS: There are 5 non rib bearing lumbar vertebral bodies. There is no evidence of acute fracture or destructive osseous lesion. Mild scoliotic deformity is noted.  Alignment is otherwise unremarkable. Multilevel mild to moderate discogenic degenerative disease. Disc spacer at L5-S1 without appreciable complication The pedicles appear intact. Soft tissues are unremarkable. Spinal stimulator device     Impression: No acute osseous abnormality.  Degenerative changes as described. Electronically signed: 03/12/2024 01:16 PM Lloyd Leon MD      Answers submitted by the patient for this visit:  Inflammatory Bowel Disease (Submitted on 3/29/2024)  When you are not experiencing symptoms of your inflammatory bowel disease, how many bowel movements do you typically have each day?: 5  What is the average (typical) number of bowel movements that you had in a single day during the last week?: 5  Over the last 3 days, have you had any bowel movements where you passed blood without stool?: Yes  Since your last visit, have you received any vaccinations?: No  Since the last visit, have you had an infection?: No  In the past three months, have you used tobacco in any form?: No  During the last year, how many days have you missed work or school because of your inflammatory bowel disease?: 0  During the last year, how many days have you been hospitalized because of your inflammatory bowel disease?: 0  During the last year, how many days have you visited a  hospital emergency department because of your inflammatory bowel disease?: 0  During the last month, have you taken narcotic pain medications (such as Percocet, oxycodone, Oxycontin, morphine, Vicodin, Dilaudid, MS Contin) for your inflammatory bowel disease?: No  Have you awoken at night because you needed to move your bowels during the last month? : Yes  Have you had leakage of stool while sleeping during the last month?: No  Have you had leakage of stool while you were awake during the last month?: Yes  In the last 6 months, have you unintentionally lost weight?: No  Fever: No  Eye irritation: No  Mouth sores: No  Sore throat: No  Chest pain: No  Shortness of breath: No  Numbness or tingling in your hands or feet: Yes  Skin rash: No  Pain or swelling in your joints: Yes  Bruising or bleeding: No  Felt depressed or blue: No

## 2024-04-02 LAB — C DIFF TOX GENS STL QL NAA+PROBE: NEGATIVE

## 2024-04-03 ENCOUNTER — APPOINTMENT (OUTPATIENT)
Dept: OCCUPATIONAL THERAPY | Facility: CLINIC | Age: 54
End: 2024-04-03
Payer: MEDICARE

## 2024-04-03 NOTE — PROGRESS NOTES
"Patient ID: Benito Park is a 53 y.o. male Date of Birth 1970       Chief Complaint   Patient presents with    Foot Problem     Left great toe ulcer             Diagnosis:  1. Ulcer of left foot, limited to breakdown of skin (HCC)    2. Neuropathy      Bilateral pedal examination with socks and shoes removed.  I personally reviewed patient's medical records, spine and pain management visit note from 3/11/2024.  Left first MTPJ neuropathic ulceration -debrided through sub q  tissues, please see procedure note, wound was dressed with Dermagran gauze dry dressing, patient will do the same daily after shower, do not leave open to air.  Continue soft upper shoes.  Continue urea 40% cream daily to top and bottom of feet, avoid area of wound.  Continue to work with [employer] and spine and pain regarding his spinal stimulator.  Patient understands and agrees with the plan will follow-up in 2 weeks.      Debridement    Universal Protocol:  Consent: Verbal consent obtained.  Risks and benefits: risks, benefits and alternatives were discussed  Consent given by: patient  Time out: Immediately prior to procedure a \"time out\" was called to verify the correct patient, procedure, equipment, support staff and site/side marked as required.  Patient understanding: patient states understanding of the procedure being performed  Patient identity confirmed: verbally with patient    Debridement Details  Performed by: physician  Debridement type: surgical  Level of debridement: subcutaneous tissue  Pain control: none      Post-debridement measurements  Length (cm): 0.2  Width (cm): 0.2  Depth (cm): 0.2  Percent debrided: 100%  Surface Area (cm^2): 0.04  Area Debrided (cm^2): 0.04  Volume (cm^3): 0.01    Tissue and other material debrided: subcutaneous tissue  Devitalized tissue debrided: biofilm, callus, fibrin, necrotic debris and slough  Instrument(s) utilized: blade  Bleeding: large  Procedural pain (0-10): " insensate  Post-procedural pain: insensate   Response to treatment: procedure was tolerated well         Subjective:   Anand presents today for follow-up of left foot neuropathic ulceration, he has been changing dressings as directed with Dermagran gauze dry dressing, he has  gotten his urea 40% cream and has been applying it to his heels.  He has been to see pain management on 3/11/2024, who states he is working with [employer] rep on making adjustments to his spinal stimulator.          The following portions of the patient's history were reviewed and updated as appropriate: allergies, current medications, past family history, past medical history, past social history, past surgical history, and problem list.        Objective:  /83 (BP Location: Right arm, Patient Position: Sitting, Cuff Size: Adult)   Pulse 99   Ht 6' (1.829 m) Comment: verbal  Wt 98.5 kg (217 lb 3.2 oz)   BMI 29.46 kg/m²     Review of Systems   Constitutional:  Negative for chills and fever.   HENT:  Negative for ear pain and sore throat.    Eyes:  Negative for pain and visual disturbance.   Respiratory:  Negative for cough and shortness of breath.    Cardiovascular:  Negative for chest pain and palpitations.   Gastrointestinal:  Negative for abdominal pain and vomiting.   Genitourinary:  Negative for dysuria and hematuria.   Musculoskeletal:  Positive for gait problem. Negative for arthralgias and back pain.   Skin:  Positive for color change and wound. Negative for rash.   Neurological:  Positive for numbness. Negative for seizures and syncope.   All other systems reviewed and are negative.      Physical Exam  Constitutional:       Appearance: Normal appearance. He is normal weight.   HENT:      Head: Normocephalic and atraumatic.      Right Ear: External ear normal.      Left Ear: External ear normal.      Nose: Nose normal.      Mouth/Throat:      Mouth: Mucous membranes are moist.      Pharynx: Oropharynx is clear.   Eyes:       Conjunctiva/sclera: Conjunctivae normal.      Pupils: Pupils are equal, round, and reactive to light.   Cardiovascular:      Pulses: Normal pulses.           Dorsalis pedis pulses are 2+ on the right side and 2+ on the left side.        Posterior tibial pulses are 2+ on the right side and 2+ on the left side.   Pulmonary:      Effort: Pulmonary effort is normal.   Musculoskeletal:      Cervical back: Normal range of motion.      Right lower leg: No edema.      Left lower leg: No edema.      Right foot: Decreased range of motion. Deformity present.      Left foot: Decreased range of motion. Deformity present.   Feet:      Right foot:      Protective Sensation: 10 sites tested.  0 sites sensed.      Skin integrity: Dry skin and fissure present.      Toenail Condition: Right toenails are normal.      Left foot:      Protective Sensation: 10 sites tested.  0 sites sensed.      Skin integrity: Ulcer, dry skin and fissure present.      Toenail Condition: Left toenails are normal.      Comments: Patient with decrease in dry skin plantar bilateral feet with superficial and not ulcerated fissures, this is diffuse.  No pustules, no blisters noted.   Ulceration dorsal first MTPJ left foot with yellow dry  adherent eschar 0.1cm in diameter, no fluctuance, crepitus, no signs of infection noted.  Hallux limitus bilateral  Cavus bilateral    Skin:     General: Skin is warm and dry.      Capillary Refill: Capillary refill takes less than 2 seconds.   Neurological:      General: No focal deficit present.      Mental Status: He is alert and oriented to person, place, and time. Mental status is at baseline.      Sensory: Sensory deficit present.      Coordination: Coordination abnormal.      Gait: Gait abnormal.      Deep Tendon Reflexes: Reflexes abnormal.   Psychiatric:         Mood and Affect: Mood normal.         Behavior: Behavior normal.         Thought Content: Thought content normal.         Judgment: Judgment normal.       No  "pertinent results            Rosmery Weir, DPM, DPM, FACFAS    Portions of the record may have been created with voice recognition software. Occasional wrong word or \"sound a like\" substitutions may have occurred due to the inherent limitations of voice recognition software. Read the chart carefully and recognize, using context, where substitutions have occurred.  "

## 2024-04-04 ENCOUNTER — OFFICE VISIT (OUTPATIENT)
Dept: PODIATRY | Facility: CLINIC | Age: 54
End: 2024-04-04
Payer: MEDICARE

## 2024-04-04 VITALS
SYSTOLIC BLOOD PRESSURE: 123 MMHG | HEIGHT: 72 IN | WEIGHT: 217.2 LBS | HEART RATE: 99 BPM | BODY MASS INDEX: 29.42 KG/M2 | DIASTOLIC BLOOD PRESSURE: 83 MMHG

## 2024-04-04 DIAGNOSIS — L97.521 ULCER OF LEFT FOOT, LIMITED TO BREAKDOWN OF SKIN (HCC): Primary | ICD-10-CM

## 2024-04-04 DIAGNOSIS — G62.9 NEUROPATHY: ICD-10-CM

## 2024-04-04 LAB — CALPROTECTIN STL-MCNT: 157 UG/G (ref 0–120)

## 2024-04-04 PROCEDURE — 11042 DBRDMT SUBQ TIS 1ST 20SQCM/<: CPT | Performed by: PODIATRIST

## 2024-04-05 ENCOUNTER — OFFICE VISIT (OUTPATIENT)
Dept: OCCUPATIONAL THERAPY | Facility: CLINIC | Age: 54
End: 2024-04-05
Payer: MEDICARE

## 2024-04-05 DIAGNOSIS — M72.0 DUPUYTREN'S CONTRACTURE OF BOTH HANDS: Primary | ICD-10-CM

## 2024-04-05 PROCEDURE — 97110 THERAPEUTIC EXERCISES: CPT | Performed by: OCCUPATIONAL THERAPIST

## 2024-04-05 PROCEDURE — 97140 MANUAL THERAPY 1/> REGIONS: CPT | Performed by: OCCUPATIONAL THERAPIST

## 2024-04-05 NOTE — PROGRESS NOTES
Daily Note     Today's date: 2024  Patient name: Benito Park  : 1970  MRN: [id number]  Referring provider: Onesimo Kelley PA-C  Dx:   Encounter Diagnosis     ICD-10-CM    1. Dupuytren's contracture of both hands  M72.0                      Subjective: still stiff      Objective: See treatment diary below      Assessment: Tolerated treatment fair. Patient  has limited PIP ext       Plan: Continue per plan of care.      Precautions: Sandran's       Manuals   IE 1/22 1/29 2/2 2/5 2/13 2/20 3/7 4/5    Graston dorsal SF 2m 2m 2m 2m 2m 2m 2m 2m 2m    MOB MCP/PIP/DIP  6m 6m 6m 6m 6m 6m 6m 6m 6m    Retrograde  4m 4m 4m 4m 4m 4m 4m 4m 4m                 HEP 4x day             Custom  HB ext orthosis 13m            Coban wrap R SF  apply                                                                             Ther Ex             A/PROM R SF 4m 6m 6m 6m 4m   4m 2m 2m 2m    Blocking  2m 2m 2m 2m 2m 2m 2m 2m 2m    Flexor stretch  4m 4m 2m 2m 2m 2m 2m 2m    Powerweb     Green  3x10 Green  3x10 Blue  4x10 Black  4x10 Black  4x10 Black  3x10 Black  3x10    Wrist e/f    2#  3x10 3#  3x10 4#  3x10 4#  3x10 4#  3x10 5#  3x10 5#  3x10    Roll flexbar   2m 2m 2m 2m 2m 2m 2m                              Ther Activity             Fxnl grasp       3.3#  Ball  3x10 3.3#  Ball  3x10 3.3#  Ball  3x10 3.3#  Ball  3x10                 Gait Training                                       Modalities             MH in ext  8m 8m 8m 8m 8m 8m 8m 8m 8m    CP    5m

## 2024-04-08 ENCOUNTER — TELEPHONE (OUTPATIENT)
Dept: FAMILY MEDICINE CLINIC | Facility: CLINIC | Age: 54
End: 2024-04-08

## 2024-04-08 DIAGNOSIS — M54.50 CHRONIC BILATERAL LOW BACK PAIN WITHOUT SCIATICA: ICD-10-CM

## 2024-04-08 DIAGNOSIS — M72.0 DUPUYTREN CONTRACTURE: Primary | ICD-10-CM

## 2024-04-08 DIAGNOSIS — G89.29 CHRONIC BILATERAL LOW BACK PAIN WITHOUT SCIATICA: ICD-10-CM

## 2024-04-08 RX ORDER — NAPROXEN 250 MG/1
250 TABLET ORAL 2 TIMES DAILY WITH MEALS
Qty: 180 TABLET | Refills: 2 | Status: SHIPPED | OUTPATIENT
Start: 2024-04-08

## 2024-04-08 NOTE — TELEPHONE ENCOUNTER
Patient called in requesting a refill on his Naproxen. He states that the dosage was suppose to be increased to 250mg twice a day, but on his MYC it's still showing the old dosage. Patient would like this called into [facility]. [facility] [phone].

## 2024-04-11 ENCOUNTER — APPOINTMENT (OUTPATIENT)
Dept: OCCUPATIONAL THERAPY | Facility: CLINIC | Age: 54
End: 2024-04-11
Payer: MEDICARE

## 2024-04-15 ENCOUNTER — OFFICE VISIT (OUTPATIENT)
Dept: OCCUPATIONAL THERAPY | Facility: CLINIC | Age: 54
End: 2024-04-15
Payer: MEDICARE

## 2024-04-15 ENCOUNTER — OFFICE VISIT (OUTPATIENT)
Dept: FAMILY MEDICINE CLINIC | Facility: CLINIC | Age: 54
End: 2024-04-15
Payer: MEDICARE

## 2024-04-15 VITALS — OXYGEN SATURATION: 99 % | HEIGHT: 72 IN | TEMPERATURE: 97.1 F | BODY MASS INDEX: 29.39 KG/M2 | WEIGHT: 217 LBS

## 2024-04-15 DIAGNOSIS — M72.0 DUPUYTREN'S CONTRACTURE OF BOTH HANDS: Primary | ICD-10-CM

## 2024-04-15 DIAGNOSIS — M25.562 CHRONIC PAIN OF LEFT KNEE: ICD-10-CM

## 2024-04-15 DIAGNOSIS — K50.00 TERMINAL ILEITIS WITHOUT COMPLICATION (HCC): Primary | ICD-10-CM

## 2024-04-15 DIAGNOSIS — M79.18 MYOFASCIAL PAIN SYNDROME: ICD-10-CM

## 2024-04-15 DIAGNOSIS — K50.00 CROHN'S DISEASE OF SMALL INTESTINE WITHOUT COMPLICATION (HCC): ICD-10-CM

## 2024-04-15 DIAGNOSIS — F41.9 ANXIETY: ICD-10-CM

## 2024-04-15 DIAGNOSIS — M47.816 LUMBAR SPONDYLOSIS: ICD-10-CM

## 2024-04-15 DIAGNOSIS — G89.29 CHRONIC PAIN OF LEFT KNEE: ICD-10-CM

## 2024-04-15 DIAGNOSIS — M25.50 ARTHRALGIA, UNSPECIFIED JOINT: ICD-10-CM

## 2024-04-15 PROCEDURE — G2211 COMPLEX E/M VISIT ADD ON: HCPCS | Performed by: FAMILY MEDICINE

## 2024-04-15 PROCEDURE — 97140 MANUAL THERAPY 1/> REGIONS: CPT | Performed by: OCCUPATIONAL THERAPIST

## 2024-04-15 PROCEDURE — 99214 OFFICE O/P EST MOD 30 MIN: CPT | Performed by: FAMILY MEDICINE

## 2024-04-15 PROCEDURE — 97110 THERAPEUTIC EXERCISES: CPT | Performed by: OCCUPATIONAL THERAPIST

## 2024-04-15 RX ORDER — BUPROPION HYDROCHLORIDE 150 MG/1
150 TABLET, EXTENDED RELEASE ORAL 3 TIMES DAILY
Qty: 270 TABLET | Refills: 3 | Status: SHIPPED | OUTPATIENT
Start: 2024-04-15 | End: 2024-04-15

## 2024-04-15 RX ORDER — PREDNISONE 20 MG/1
TABLET ORAL
Qty: 15 TABLET | Refills: 2 | Status: SHIPPED | OUTPATIENT
Start: 2024-04-15

## 2024-04-15 RX ORDER — BUPROPION HYDROCHLORIDE 150 MG/1
150 TABLET, EXTENDED RELEASE ORAL 3 TIMES DAILY
Qty: 270 TABLET | Refills: 3 | Status: SHIPPED | OUTPATIENT
Start: 2024-04-15

## 2024-04-15 NOTE — PROGRESS NOTES
Assessment/Plan:    No problem-specific Assessment & Plan notes found for this encounter.       Diagnoses and all orders for this visit:    Terminal ileitis without complication (HCC)    Lumbar spondylosis    Myofascial pain syndrome    Arthralgia, unspecified joint    Anxiety  -     predniSONE 20 mg tablet; TAKE 1 TABLET BID X 5 DAYS THEN TAKE 1 TABLET QD FOR 5 DAYS  -     buPROPion (WELLBUTRIN SR) 150 mg 12 hr tablet; Take 1 tablet (150 mg total) by mouth 3 (three) times a day          Subjective:   Chief Complaint   Patient presents with    Knee Pain        Patient ID: Benito Park is a 53 y.o. male.    Knee Pain   Pertinent negatives include no numbness.       The following portions of the patient's history were reviewed and updated as appropriate: allergies, current medications, past family history, past medical history, past social history, past surgical history and problem list.    Review of Systems   Constitutional:  Negative for fatigue, fever and unexpected weight change.   HENT:  Negative for congestion, sinus pain and sore throat.    Eyes:  Negative for visual disturbance.   Respiratory:  Negative for shortness of breath and wheezing.    Cardiovascular:  Negative for chest pain and palpitations.   Gastrointestinal:  Negative for abdominal pain, nausea and vomiting.   Musculoskeletal: Negative.  Negative for arthralgias and myalgias.   Neurological:  Negative for syncope, weakness and numbness.   Psychiatric/Behavioral: Negative.  Negative for confusion, dysphoric mood and suicidal ideas.        L knee---good stability---neg effusion  Objective:  Vitals:    04/15/24 0941   Temp: (!) 97.1 °F (36.2 °C)   TempSrc: Tympanic   SpO2: 99%   Weight: 98.4 kg (217 lb)   Height: 6' (1.829 m)      Physical Exam  Constitutional:       Appearance: He is well-developed.   HENT:      Right Ear: Ear canal normal. Tympanic membrane is not injected.      Left Ear: Ear canal normal. Tympanic membrane is not injected.       Nose: Nose normal.   Eyes:      General:         Right eye: No discharge.         Left eye: No discharge.      Conjunctiva/sclera: Conjunctivae normal.      Pupils: Pupils are equal, round, and reactive to light.   Neck:      Thyroid: No thyromegaly.   Cardiovascular:      Rate and Rhythm: Normal rate and regular rhythm.      Heart sounds: Normal heart sounds. No murmur heard.  Pulmonary:      Effort: Pulmonary effort is normal. No respiratory distress.      Breath sounds: Normal breath sounds. No wheezing.   Abdominal:      General: Bowel sounds are normal. There is no distension.      Palpations: Abdomen is soft.      Tenderness: There is no abdominal tenderness.   Musculoskeletal:         General: Normal range of motion.      Cervical back: Normal range of motion and neck supple.   Lymphadenopathy:      Cervical: No cervical adenopathy.   Skin:     General: Skin is warm and dry.   Neurological:      Mental Status: He is alert and oriented to person, place, and time. He is not disoriented.      Sensory: No sensory deficit.      Motor: No weakness.      Coordination: Coordination normal.      Gait: Gait normal.      Deep Tendon Reflexes: Reflexes are normal and symmetric.   Psychiatric:         Speech: Speech normal.         Behavior: Behavior normal.         Thought Content: Thought content normal.         Judgment: Judgment normal.

## 2024-04-15 NOTE — PROGRESS NOTES
Daily Note     Today's date: 4/15/2024  Patient name: Benito Park  : 1970  MRN: [id number]  Referring provider: Onesimo Kelley PA-C  Dx:   Encounter Diagnosis     ICD-10-CM    1. Dupuytren's contracture of both hands  M72.0                      Subjective: I am doing a little better      Objective: See treatment diary below      Assessment:          Goals  STG-1 wk  1- I with HEP- met   2- improve digit flexion  R SF 30 at PIP- met  3- compliant with splint use- met       LTG- 4 wks  1- R MCP 0-90, PIP 0-90, DIP 5-60 - met   2- R  = to L met   3- no pain - partially met  4-I with bottles/jars , lifting 10#- met              Subjective Evaluation    History of Present Illness  Date of onset: 2024  Mechanism of injury: Anand had revision dupuytren's release of R SF.  Quality of life: fair    Patient Goals  Patient goals for therapy: decreased edema, decreased pain, increased motion, increased strength and independence with ADLs/IADLs    Pain  Current pain ratin  At best pain ratin  At worst pain ratin infrequent   Location:B SF  Quality: dull ache, sharp and tight  Relieving factors: rest  Exacerbated by: ROM.  Progression: improved    Social Support  Steps to enter house: yes  Stairs in house: yes   Lives in: multiple-level home  Lives with: spouse and adult children    Employment status: not working  Hand dominance: right    Treatments  No previous or current treatments        Objective     Observations     Additional Observation Details  Sutures volar R SF 7 cm - clean and dry    Neurological Testing     Additional Neurological Details  Mild paresthesias tip of SF    Active Range of Motion     Right Wrist   Normal active range of motion    Right Digits   Flexion   Little     MCP: 90    PIP: 85 previous 40    DIP: 36 previous 0  Extension   Little     MCP: 0 , previousl 20    PIP: 20    DIP: 10    Left   Flexion   Little   MCP 90   PIP 95   DIP 60    Extension  MCP 0  PIP 40  DIP  0    Passive Range of Motion     Right Digits   Flexion   Little     MCP: 92    PIP: 90    DIP: 70  Extension   Little     MCP: 0    PIP: 5    DIP: 0  Left   Flexion        Extension   MCP 0  PIP 30  DIP 0    Strength/Myotome Testing           (2nd hand position)     2 R/L =  60/70 - previous 50         Swelling     Right Wrist/Hand     Additional Swelling Details  Mild edema R SF           Plan: Continue per plan of care.      Precautions: Ania's       Manuals 1/19  IE 1/22 1/29 2/2 2/5 2/13 2/20 3/7 4/5 4/15  REEVAL   Graston dorsal SF 2m 2m 2m 2m 2m 2m 2m 2m 2m 2m   MOB MCP/PIP/DIP  6m 6m 6m 6m 6m 6m 6m 6m 6m 6m   Retrograde  4m 4m 4m 4m 4m 4m 4m 4m 4m 4m                HEP 4x day             Custom  HB ext orthosis 13m            Coban wrap R SF  apply                                                                             Ther Ex             A/PROM R SF 4m 6m 6m 6m 4m   4m 2m 2m 2m 2m   Blocking  2m 2m 2m 2m 2m 2m 2m 2m 2m 2m   Flexor stretch  4m 4m 2m 2m 2m 2m 2m 2m 2m   Powerweb     Green  3x10 Green  3x10 Blue  4x10 Black  4x10 Black  4x10 Black  3x10 Black  3x10 Black  3x10   Wrist e/f    2#  3x10 3#  3x10 4#  3x10 4#  3x10 4#  3x10 5#  3x10 5#  3x10 5#  3x10   Roll flexbar   2m 2m 2m 2m 2m 2m 2m 2m                             Ther Activity             Fxnl grasp       3.3#  Ball  3x10 3.3#  Ball  3x10 3.3#  Ball  3x10 3.3#  Ball  3x10 3.3#  Ball  3x10                Gait Training                                       Modalities             MH in ext  8m 8m 8m 8m 8m 8m 8m 8m 8m 8m   CP    5m                                 DISPLAY PLAN FREE TEXT
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
## 2024-04-19 ENCOUNTER — OFFICE VISIT (OUTPATIENT)
Dept: PODIATRY | Facility: CLINIC | Age: 54
End: 2024-04-19
Payer: MEDICARE

## 2024-04-19 VITALS
SYSTOLIC BLOOD PRESSURE: 137 MMHG | WEIGHT: 217 LBS | HEIGHT: 72 IN | DIASTOLIC BLOOD PRESSURE: 85 MMHG | HEART RATE: 91 BPM | BODY MASS INDEX: 29.39 KG/M2

## 2024-04-19 DIAGNOSIS — L97.521 ULCER OF LEFT FOOT, LIMITED TO BREAKDOWN OF SKIN (HCC): Primary | ICD-10-CM

## 2024-04-19 DIAGNOSIS — G62.9 NEUROPATHY: ICD-10-CM

## 2024-04-19 PROCEDURE — 97597 DBRDMT OPN WND 1ST 20 CM/<: CPT | Performed by: PODIATRIST

## 2024-04-29 ENCOUNTER — TELEPHONE (OUTPATIENT)
Dept: FAMILY MEDICINE CLINIC | Facility: CLINIC | Age: 54
End: 2024-04-29

## 2024-05-10 ENCOUNTER — OFFICE VISIT (OUTPATIENT)
Dept: PODIATRY | Facility: CLINIC | Age: 54
End: 2024-05-10
Payer: MEDICARE

## 2024-05-10 VITALS
DIASTOLIC BLOOD PRESSURE: 76 MMHG | HEIGHT: 72 IN | HEART RATE: 89 BPM | WEIGHT: 221.4 LBS | BODY MASS INDEX: 29.99 KG/M2 | SYSTOLIC BLOOD PRESSURE: 125 MMHG

## 2024-05-10 DIAGNOSIS — L03.116 CELLULITIS OF LEFT FOOT: ICD-10-CM

## 2024-05-10 DIAGNOSIS — L97.521 ULCER OF LEFT FOOT, LIMITED TO BREAKDOWN OF SKIN (HCC): Primary | ICD-10-CM

## 2024-05-10 DIAGNOSIS — G62.9 NEUROPATHY: ICD-10-CM

## 2024-05-10 PROCEDURE — 99213 OFFICE O/P EST LOW 20 MIN: CPT | Performed by: PODIATRIST

## 2024-05-10 PROCEDURE — 87186 SC STD MICRODIL/AGAR DIL: CPT | Performed by: PODIATRIST

## 2024-05-10 PROCEDURE — 11045 DBRDMT SUBQ TISS EACH ADDL: CPT | Performed by: PODIATRIST

## 2024-05-10 PROCEDURE — 11042 DBRDMT SUBQ TIS 1ST 20SQCM/<: CPT | Performed by: PODIATRIST

## 2024-05-10 PROCEDURE — 87205 SMEAR GRAM STAIN: CPT | Performed by: PODIATRIST

## 2024-05-10 PROCEDURE — 87077 CULTURE AEROBIC IDENTIFY: CPT | Performed by: PODIATRIST

## 2024-05-10 PROCEDURE — 87070 CULTURE OTHR SPECIMN AEROBIC: CPT | Performed by: PODIATRIST

## 2024-05-10 RX ORDER — CEPHALEXIN 500 MG/1
500 CAPSULE ORAL EVERY 6 HOURS SCHEDULED
Qty: 28 CAPSULE | Refills: 0 | Status: SHIPPED | OUTPATIENT
Start: 2024-05-10 | End: 2024-05-17

## 2024-05-10 NOTE — PROGRESS NOTES
"Patient ID: Benito Park is a 53 y.o. male Date of Birth 1970       Chief Complaint   Patient presents with    Foot Problem     FU Lt great toe             Diagnosis:  1. Ulcer of left foot, limited to breakdown of skin (HCC)  -     cephalexin (KEFLEX) 500 mg capsule; Take 1 capsule (500 mg total) by mouth every 6 (six) hours for 7 days  -     Post Op Shoe    2. Neuropathy    3. Cellulitis of left foot            Bilateral pedal examination with socks and shoes removed.  I personally reviewed patient's medical records, spine and pain management visit note from 3/11/2024.  Left first MTPJ neuropathic ulceration -debrided through sub q selective tissues, please see procedure note, wound was dressed with dermagran gauze, dry dressing, patient will do the same every other day do not get foot wet in shower, do not leave open to air.  Cellulitis was marked on the foot if goes past he is to go to ED.  Cephalexin 500mg 4 x a day for 7 days ordered - C&S taken  Surgical shoe dispensed.  Continue urea 40% cream daily to top and bottom of feet, avoid area of wound.  Continue to work with [employer] and spine and pain regarding his spinal stimulator.  Patient understands and agrees with the plan will follow-up in 1 week.       Debridement    Universal Protocol:  Consent: Verbal consent obtained.  Risks and benefits: risks, benefits and alternatives were discussed  Consent given by: patient  Time out: Immediately prior to procedure a \"time out\" was called to verify the correct patient, procedure, equipment, support staff and site/side marked as required.  Patient understanding: patient states understanding of the procedure being performed  Patient identity confirmed: verbally with patient    Debridement Details  Performed by: physician  Debridement type: surgical  Level of debridement: subcutaneous tissue  Pain control: none      Post-debridement measurements  Length (cm): 1.1  Width (cm): 0.7  Depth (cm): " 0.3  Percent debrided: 100%  Surface Area (cm^2): 0.77  Area Debrided (cm^2): 0.77  Volume (cm^3): 0.23    Tissue and other material debrided: subcutaneous tissue  Devitalized tissue debrided: biofilm, fibrin, necrotic debris and slough  Instrument(s) utilized: blade  Bleeding: small  Hemostasis obtained with: pressure  Procedural pain (0-10): insensate  Post-procedural pain: insensate   Response to treatment: procedure was tolerated well         Subjective:   Anand presents today for follow-up of left foot neuropathic ulceration, he states he was working on wife's car on Friday and when he was done the wound was much bigger he has been changing dressing   with triple abx ointment and bandaid, he has  gotten his urea 40% cream and has been applying it to his heels.  He has been to see pain management on 3/11/2024, who states he is working with [employer] rep on making adjustments to his spinal stimulator.          The following portions of the patient's history were reviewed and updated as appropriate: allergies, current medications, past family history, past medical history, past social history, past surgical history, and problem list.        Objective:  /76 (BP Location: Left arm, Patient Position: Sitting, Cuff Size: Adult)   Pulse 89   Ht 6' (1.829 m) Comment: verbal  Wt 100 kg (221 lb 6.4 oz)   BMI 30.03 kg/m²     Review of Systems   Constitutional:  Negative for chills and fever.   HENT:  Negative for ear pain and sore throat.    Eyes:  Negative for pain and visual disturbance.   Respiratory:  Negative for cough and shortness of breath.    Cardiovascular:  Negative for chest pain and palpitations.   Gastrointestinal:  Negative for abdominal pain and vomiting.   Genitourinary:  Negative for dysuria and hematuria.   Musculoskeletal:  Negative for arthralgias and back pain.   Skin:  Positive for color change and wound. Negative for rash.   Neurological:  Positive for numbness. Negative for seizures and  syncope.   All other systems reviewed and are negative.      Physical Exam  Constitutional:       Appearance: Normal appearance. He is normal weight.   HENT:      Head: Normocephalic and atraumatic.      Right Ear: External ear normal.      Left Ear: External ear normal.      Nose: Nose normal.      Mouth/Throat:      Mouth: Mucous membranes are moist.      Pharynx: Oropharynx is clear.   Eyes:      Conjunctiva/sclera: Conjunctivae normal.      Pupils: Pupils are equal, round, and reactive to light.   Cardiovascular:      Pulses: Normal pulses.           Dorsalis pedis pulses are 2+ on the right side and 2+ on the left side.        Posterior tibial pulses are 2+ on the right side and 2+ on the left side.   Pulmonary:      Effort: Pulmonary effort is normal.   Musculoskeletal:      Cervical back: Normal range of motion.      Right lower leg: No edema.      Left lower leg: No edema.   Feet:      Right foot:      Protective Sensation: 10 sites tested.  0 sites sensed.      Skin integrity: Dry skin present.      Toenail Condition: Right toenails are normal.      Left foot:      Protective Sensation: 10 sites tested.  0 sites sensed.      Skin integrity: Ulcer and dry skin present.      Toenail Condition: Left toenails are normal.      Comments:   Patient with decrease in dry skin plantar bilateral feet with superficial and not ulcerated fissures, this is diffuse.  No pustules, no blisters noted.    Ulceration dorsal first MTPJ left foot with yellow dry  adherent eschar 0.5 x 1.0 x 0.2cm  , no fluctuance, crepitus, + periwound erythema, no purulence.  Hallux limitus bilateral  Cavus bilateral    Skin:     General: Skin is warm and dry.      Capillary Refill: Capillary refill takes less than 2 seconds.   Neurological:      General: No focal deficit present.      Mental Status: He is alert and oriented to person, place, and time. Mental status is at baseline.      Sensory: Sensory deficit present.      Coordination:  "Coordination abnormal.      Gait: Gait abnormal.      Deep Tendon Reflexes: Reflexes abnormal.   Psychiatric:         Mood and Affect: Mood normal.         Behavior: Behavior normal.         Thought Content: Thought content normal.         Judgment: Judgment normal.              No pertinent results found.      Rosmery Weir DPM, DANITA, ALONSO    Portions of the record may have been created with voice recognition software. Occasional wrong word or \"sound a like\" substitutions may have occurred due to the inherent limitations of voice recognition software. Read the chart carefully and recognize, using context, where substitutions have occurred.  "

## 2024-05-12 DIAGNOSIS — G62.9 NEUROPATHY: ICD-10-CM

## 2024-05-12 LAB
BACTERIA TISS AEROBE CULT: ABNORMAL
BACTERIA TISS AEROBE CULT: ABNORMAL
GRAM STN SPEC: ABNORMAL

## 2024-05-13 LAB
BACTERIA TISS AEROBE CULT: ABNORMAL
BACTERIA TISS AEROBE CULT: ABNORMAL
GRAM STN SPEC: ABNORMAL

## 2024-05-14 RX ORDER — PREGABALIN 150 MG/1
150 CAPSULE ORAL 3 TIMES DAILY
Qty: 90 CAPSULE | Refills: 5 | Status: SHIPPED | OUTPATIENT
Start: 2024-05-14

## 2024-05-16 ENCOUNTER — APPOINTMENT (EMERGENCY)
Dept: CT IMAGING | Facility: HOSPITAL | Age: 54
End: 2024-05-16
Attending: EMERGENCY MEDICINE
Payer: MEDICARE

## 2024-05-16 ENCOUNTER — APPOINTMENT (EMERGENCY)
Dept: RADIOLOGY | Facility: HOSPITAL | Age: 54
End: 2024-05-16
Payer: MEDICARE

## 2024-05-16 ENCOUNTER — HOSPITAL ENCOUNTER (OUTPATIENT)
Facility: HOSPITAL | Age: 54
Setting detail: OBSERVATION
Discharge: HOME | End: 2024-05-17
Attending: EMERGENCY MEDICINE | Admitting: INTERNAL MEDICINE
Payer: MEDICARE

## 2024-05-16 DIAGNOSIS — E11.9 DIABETES (HCC): ICD-10-CM

## 2024-05-16 DIAGNOSIS — R91.1 PULMONARY NODULE: ICD-10-CM

## 2024-05-16 DIAGNOSIS — R47.1 DYSARTHRIA: ICD-10-CM

## 2024-05-16 DIAGNOSIS — R29.6 MULTIPLE FALLS: ICD-10-CM

## 2024-05-16 DIAGNOSIS — R47.9 SPEECH DISTURBANCE: ICD-10-CM

## 2024-05-16 DIAGNOSIS — S91.309A MULTIPLE OPEN WOUNDS OF FOOT: ICD-10-CM

## 2024-05-16 DIAGNOSIS — L97.521 ULCER OF LEFT FOOT, LIMITED TO BREAKDOWN OF SKIN (HCC): ICD-10-CM

## 2024-05-16 DIAGNOSIS — E78.5 HYPERLIPIDEMIA: ICD-10-CM

## 2024-05-16 DIAGNOSIS — R42 DIZZINESS: Primary | ICD-10-CM

## 2024-05-16 PROBLEM — F44.89 CONFUSIONAL STATE: Status: ACTIVE | Noted: 2024-05-16

## 2024-05-16 LAB
2HR DELTA HS TROPONIN: -1 NG/L
AMPHETAMINES SERPL QL SCN: NEGATIVE
ANION GAP SERPL CALCULATED.3IONS-SCNC: 8 MMOL/L (ref 4–13)
APTT PPP: 34 SECONDS (ref 23–37)
ATRIAL RATE: 109 BPM
BARBITURATES UR QL: NEGATIVE
BENZODIAZ UR QL: NEGATIVE
BUN SERPL-MCNC: 17 MG/DL (ref 5–25)
CALCIUM SERPL-MCNC: 9.2 MG/DL (ref 8.4–10.2)
CARDIAC TROPONIN I PNL SERPL HS: 4 NG/L
CARDIAC TROPONIN I PNL SERPL HS: 5 NG/L
CHLORIDE SERPL-SCNC: 104 MMOL/L (ref 96–108)
CO2 SERPL-SCNC: 27 MMOL/L (ref 21–32)
COCAINE UR QL: NEGATIVE
CREAT SERPL-MCNC: 1.15 MG/DL (ref 0.6–1.3)
ERYTHROCYTE [DISTWIDTH] IN BLOOD BY AUTOMATED COUNT: 13.2 % (ref 11.6–15.1)
FENTANYL UR QL SCN: NEGATIVE
FLUAV RNA RESP QL NAA+PROBE: NEGATIVE
FLUBV RNA RESP QL NAA+PROBE: NEGATIVE
GFR SERPL CREATININE-BSD FRML MDRD: 73 ML/MIN/1.73SQ M
GLUCOSE SERPL-MCNC: 297 MG/DL (ref 65–140)
GLUCOSE SERPL-MCNC: 302 MG/DL (ref 65–140)
HCT VFR BLD AUTO: 42.9 % (ref 36.5–49.3)
HGB BLD-MCNC: 14.4 G/DL (ref 12–17)
HYDROCODONE UR QL SCN: NEGATIVE
INR PPP: 1 (ref 0.84–1.19)
MCH RBC QN AUTO: 33 PG (ref 26.8–34.3)
MCHC RBC AUTO-ENTMCNC: 33.6 G/DL (ref 31.4–37.4)
MCV RBC AUTO: 98 FL (ref 82–98)
METHADONE UR QL: NEGATIVE
OPIATES UR QL SCN: NEGATIVE
OXYCODONE+OXYMORPHONE UR QL SCN: NEGATIVE
P AXIS: 60 DEGREES
PCP UR QL: NEGATIVE
PLATELET # BLD AUTO: 195 THOUSANDS/UL (ref 149–390)
PMV BLD AUTO: 9.3 FL (ref 8.9–12.7)
POTASSIUM SERPL-SCNC: 4.1 MMOL/L (ref 3.5–5.3)
PR INTERVAL: 164 MS
PROTHROMBIN TIME: 13.6 SECONDS (ref 11.6–14.5)
QRS AXIS: 19 DEGREES
QRSD INTERVAL: 90 MS
QT INTERVAL: 336 MS
QTC INTERVAL: 452 MS
RBC # BLD AUTO: 4.37 MILLION/UL (ref 3.88–5.62)
RSV RNA RESP QL NAA+PROBE: NEGATIVE
SARS-COV-2 RNA RESP QL NAA+PROBE: NEGATIVE
SODIUM SERPL-SCNC: 139 MMOL/L (ref 135–147)
T WAVE AXIS: 51 DEGREES
THC UR QL: POSITIVE
VENTRICULAR RATE: 109 BPM
WBC # BLD AUTO: 6.17 THOUSAND/UL (ref 4.31–10.16)

## 2024-05-16 PROCEDURE — 0241U HB NFCT DS VIR RESP RNA 4 TRGT: CPT | Performed by: EMERGENCY MEDICINE

## 2024-05-16 PROCEDURE — 71045 X-RAY EXAM CHEST 1 VIEW: CPT

## 2024-05-16 PROCEDURE — 93005 ELECTROCARDIOGRAM TRACING: CPT

## 2024-05-16 PROCEDURE — 74177 CT ABD & PELVIS W/CONTRAST: CPT

## 2024-05-16 PROCEDURE — 80307 DRUG TEST PRSMV CHEM ANLYZR: CPT | Performed by: PHYSICIAN ASSISTANT

## 2024-05-16 PROCEDURE — 85610 PROTHROMBIN TIME: CPT | Performed by: EMERGENCY MEDICINE

## 2024-05-16 PROCEDURE — 99285 EMERGENCY DEPT VISIT HI MDM: CPT

## 2024-05-16 PROCEDURE — 99215 OFFICE O/P EST HI 40 MIN: CPT | Performed by: PSYCHIATRY & NEUROLOGY

## 2024-05-16 PROCEDURE — 85027 COMPLETE CBC AUTOMATED: CPT | Performed by: EMERGENCY MEDICINE

## 2024-05-16 PROCEDURE — 93010 ELECTROCARDIOGRAM REPORT: CPT | Performed by: INTERNAL MEDICINE

## 2024-05-16 PROCEDURE — 85730 THROMBOPLASTIN TIME PARTIAL: CPT | Performed by: EMERGENCY MEDICINE

## 2024-05-16 PROCEDURE — 84484 ASSAY OF TROPONIN QUANT: CPT | Performed by: EMERGENCY MEDICINE

## 2024-05-16 PROCEDURE — 99222 1ST HOSP IP/OBS MODERATE 55: CPT | Performed by: INTERNAL MEDICINE

## 2024-05-16 PROCEDURE — 80048 BASIC METABOLIC PNL TOTAL CA: CPT | Performed by: EMERGENCY MEDICINE

## 2024-05-16 PROCEDURE — 82948 REAGENT STRIP/BLOOD GLUCOSE: CPT

## 2024-05-16 PROCEDURE — 36415 COLL VENOUS BLD VENIPUNCTURE: CPT | Performed by: EMERGENCY MEDICINE

## 2024-05-16 PROCEDURE — 71260 CT THORAX DX C+: CPT

## 2024-05-16 PROCEDURE — 70496 CT ANGIOGRAPHY HEAD: CPT

## 2024-05-16 PROCEDURE — 70498 CT ANGIOGRAPHY NECK: CPT

## 2024-05-16 RX ORDER — AZATHIOPRINE 50 MG/1
50 TABLET ORAL DAILY
Status: DISCONTINUED | OUTPATIENT
Start: 2024-05-17 | End: 2024-05-17 | Stop reason: HOSPADM

## 2024-05-16 RX ORDER — CLOPIDOGREL BISULFATE 75 MG/1
75 TABLET ORAL DAILY
Status: DISCONTINUED | OUTPATIENT
Start: 2024-05-17 | End: 2024-05-17

## 2024-05-16 RX ORDER — CEPHALEXIN 250 MG/1
500 CAPSULE ORAL EVERY 6 HOURS SCHEDULED
Status: COMPLETED | OUTPATIENT
Start: 2024-05-16 | End: 2024-05-17

## 2024-05-16 RX ORDER — ASPIRIN 325 MG
325 TABLET ORAL ONCE
Status: COMPLETED | OUTPATIENT
Start: 2024-05-16 | End: 2024-05-16

## 2024-05-16 RX ORDER — PANTOPRAZOLE SODIUM 40 MG/1
40 TABLET, DELAYED RELEASE ORAL
Status: DISCONTINUED | OUTPATIENT
Start: 2024-05-17 | End: 2024-05-17 | Stop reason: HOSPADM

## 2024-05-16 RX ORDER — NAPROXEN 250 MG/1
250 TABLET ORAL AS NEEDED
COMMUNITY

## 2024-05-16 RX ORDER — ENOXAPARIN SODIUM 100 MG/ML
40 INJECTION SUBCUTANEOUS
Status: DISCONTINUED | OUTPATIENT
Start: 2024-05-17 | End: 2024-05-17 | Stop reason: HOSPADM

## 2024-05-16 RX ORDER — AMITRIPTYLINE HYDROCHLORIDE 50 MG/1
100 TABLET, FILM COATED ORAL
Status: DISCONTINUED | OUTPATIENT
Start: 2024-05-16 | End: 2024-05-17 | Stop reason: HOSPADM

## 2024-05-16 RX ORDER — BUPROPION HYDROCHLORIDE 150 MG/1
150 TABLET ORAL DAILY
Status: DISCONTINUED | OUTPATIENT
Start: 2024-05-17 | End: 2024-05-17 | Stop reason: HOSPADM

## 2024-05-16 RX ORDER — CLOPIDOGREL BISULFATE 75 MG/1
300 TABLET ORAL ONCE
Status: COMPLETED | OUTPATIENT
Start: 2024-05-16 | End: 2024-05-16

## 2024-05-16 RX ORDER — ATORVASTATIN CALCIUM 40 MG/1
40 TABLET, FILM COATED ORAL EVERY EVENING
Status: DISCONTINUED | OUTPATIENT
Start: 2024-05-16 | End: 2024-05-17 | Stop reason: HOSPADM

## 2024-05-16 RX ADMIN — AMITRIPTYLINE HYDROCHLORIDE 100 MG: 50 TABLET, FILM COATED ORAL at 21:50

## 2024-05-16 RX ADMIN — CLOPIDOGREL BISULFATE 300 MG: 75 TABLET ORAL at 15:06

## 2024-05-16 RX ADMIN — CEPHALEXIN 500 MG: 250 CAPSULE ORAL at 19:20

## 2024-05-16 RX ADMIN — ASPIRIN 325 MG ORAL TABLET 325 MG: 325 PILL ORAL at 15:06

## 2024-05-16 RX ADMIN — IOHEXOL 100 ML: 350 INJECTION, SOLUTION INTRAVENOUS at 13:15

## 2024-05-16 RX ADMIN — IOHEXOL 85 ML: 350 INJECTION, SOLUTION INTRAVENOUS at 13:05

## 2024-05-16 RX ADMIN — PREGABALIN 150 MG: 100 CAPSULE ORAL at 19:20

## 2024-05-16 RX ADMIN — PREGABALIN 150 MG: 100 CAPSULE ORAL at 21:50

## 2024-05-16 RX ADMIN — ATORVASTATIN CALCIUM 40 MG: 40 TABLET, FILM COATED ORAL at 19:20

## 2024-05-16 NOTE — INCIDENTAL FINDINGS
The following findings require follow up:  Radiographic finding   Finding:  pulmonary nodules noted in partially imaged lungs including 9 mm right upper lobe spiculated nodule. Based on current Fleischner Society 2017 Guidelines on incidental pulmonary nodule, either PET/CT scan evaluation, tissue sampling or short-term   interval follow-up non-contrast CT follow-up (initially in 3 months) may be considered appropriate   Follow up required: yes   Follow up should be done within 3 month(s)    Please notify the following clinician to assist with the follow up:   Dr. Ariel Sosa

## 2024-05-16 NOTE — ASSESSMENT & PLAN NOTE
/95   Pulse 95   Temp 98.3 °F (36.8 °C)   Resp 17   Ht 6' (1.829 m)   Wt 102 kg (224 lb 13.9 oz)   SpO2 94%   BMI 30.50 kg/m²     Home regimen consist of lisinopril 20 mg daily and Lasix 20 mg daily  Hold home meds to allow permissive hypertension  Vitals as per routine

## 2024-05-16 NOTE — CONSULTS
Consultation - Neurology   Benito Park 53 y.o. male MRN: [id number]  Unit/Bed#: TR13A Encounter: [id number]      Assessment & Plan     Dysarthria  Assessment & Plan  53-year-old male with Crohn's disease, OSIRIS on CPAP, depression/anxiety, chronic back pain status post spinal cord stimulator (MRI conditional), hypertension, tobacco and medical marijuana use, history of alcohol use, neuropathy, presents with falls and dysarthria.  Patient has at least 1 year history of lightheadedness upon standing with gait imbalance but noted worsening of his gait and balance overnight, resulting in several falls.  His wife at bedside also notes patient's speech is dysarthric and he seemed to have difficulty with speech comprehension or was perhaps confused the morning of 5/16/2024.    CT head demonstrated no acute changes.  CTA head/neck demonstrated no flow consequential stenosis or LVO.  Patient is AP/AC naïve at baseline.    Will admit patient to stroke pathway.    Plan:  -Admit to stroke pathway.  -MRI brain without contrast.  -Check orthostatic vital signs.  -UDS.  -2D echocardiogram with bubble study.  -Check hemoglobin A1c, lipid panel, TSH.  -Loaded with aspirin 325 mg and Plavix 300 mg in the ED.  Continue aspirin 81 mg and Plavix 105 mg daily for now.  -Initiate atorvastatin 40 mg.  -Telemetry.  -PT/OT/speech therapy.  -Allow permissive hypertension, up to 200 systolic.  -Frequent neuro checks.  -Continue to monitor and notify with changes.            Recommendations for outpatient neurological follow up have yet to be determined.    History of Present Illness     Reason for Consult / Principal Problem: gait imbalance and dysarthria  Hx and PE limited by: n/a  HPI: Benito Park is a 53 y.o. left handed male with Crohn's disease, OSIRIS on CPAP, depression/anxiety, chronic back pain status post spinal cord stimulator (MRI conditional), hypertension, tobacco and medical marijuana use, history of alcohol use, neuropathy,  presents with falls and dysarthria.    Patient has had at least 1 year of lightheadedness upon standing and gait imbalance.  Last night he went to bed around 2100 and at around 0300 patient had significantly worsened balance difficulty and fell several times.  Per his wife at bedside, patient's speech sounded slurred this morning before she went to work around 0800.  He also seemed somewhat confused as he stated he had let the dogs out but when she came home from work she found that this was not true.  Patient denies lateralized weakness, numbness, vision change or headache.  Per his wife, speech still sounds slurred.    CT head demonstrated no acute changes.  CTA head/neck demonstrated no flow consequential stenosis or LVO.  Patient is AP/AC naïve at baseline.    No recent medication changes except for a course of prednisone to treat knee pain, which ended approximately 1 week ago.  Patient denies illicit substances or significant alcohol use but perhaps has a history of heavier alcohol use.  Does use medical marijuana and states he has not used tobacco in at least 2 months.    Neurologic exam significant for mild dysarthria and patient does have chronic numbness in his bilateral distal lower extremities.  Also bilateral lower extremity strength limited by back pain.  Bilateral upper extremity tremors noted.    Consult to Neurology  Consult performed by: Millicent Christiansen PA-C  Consult ordered by: Aylin Larson DO          Review of Systems   Constitutional: Negative.    HENT: Negative.     Eyes: Negative.    Respiratory: Negative.     Cardiovascular: Negative.    Gastrointestinal: Negative.    Endocrine: Negative.    Genitourinary: Negative.    Musculoskeletal:  Positive for back pain and gait problem.   Skin:  Negative for rash.   Allergic/Immunologic: Negative.    Neurological:  Positive for dizziness, tremors, speech difficulty and light-headedness.        As above.   Hematological: Negative.     Psychiatric/Behavioral: Negative.         Historical Information   Past Medical History:   Diagnosis Date    Anxiety     Back pain     Callus Few months ago    Colon polyp     CPAP (continuous positive airway pressure) dependence     Crohn's disease (HCC)     Depression     GERD (gastroesophageal reflux disease)     Hypertension     Perianal fistula     Sleep apnea     Terminal ileitis (HCC)      Past Surgical History:   Procedure Laterality Date    BACK SURGERY  2019    CAST APPLICATION Right 11/17/2022    Procedure: Application short-arm splint;  Surgeon: Sarthak Villatoro MD;  Location: [facility];  Service: Orthopedics    COLONOSCOPY      EGD      HAND CONTRACTURE RELEASE Left 06/01/2023    Procedure: left ring and small finger dupuytrens excision and ring finger Metacarpophalangeal joint release and small finger Metacarpophalangeal and proximal interphalangeal joint release;  Surgeon: Sarthak Villatoro MD;  Location: [facility];  Service: Orthopedics    HERNIA REPAIR      umbilical hernia    VA ANRCT XM SURG REQ ANES GENERAL SPI/EDRL DX N/A 11/17/2021    Procedure: EXAM UNDER ANESTHESIA (EUA);  Surgeon: Davi Thao MD;  Location: [facility];  Service: Colorectal    VA APPLICATION SHORT ARM SPLINT FOREARM-HAND STATIC Right 1/11/2024    Procedure: Application short arm splint;  Surgeon: Sarthak Villatoro MD;  Location: [facility];  Service: Orthopedics    VA CAPSULECTOMY/CAPSULOTOMY IPHAL JOINT EACH Right 1/11/2024    Procedure: Contracture release right hand small finger proximal interphalangeal joint;  Surgeon: Sarthak Villatoro MD;  Location: [facility];  Service: Orthopedics    VA FASCIOTOMY PALMAR OPEN PARTIAL Right 11/17/2022    Procedure: Dupuytren's excision right palm extending into the small finger with release of the metacarpophalangeal joint and proximal interphalangeal joint.  Dupuytren's excision right palm with release of the right ring finger metacarpophalangeal joint.;  Surgeon:  Sarthak Villatoro MD;  Location: [facility];  Service: Orthopedics    SD FASCIOTOMY PALMAR OPEN PARTIAL Right 1/11/2024    Procedure: Right hand small finger duppuytrens excision with release of the metacarpophalangeal joint and proximal interphalangeal joint;  Surgeon: Sarthak Villatoro MD;  Location: [facility];  Service: Orthopedics    SD FASCT PALM W/WO Z-PLASTY TISSUE REARGMT/SKN GRFT Right 1/11/2024    Procedure: Right hand small finger duppuytrens excision with release of the metacarpophalangeal joint and proximal interphalangeal joint;  Surgeon: Sarthak Villatoro MD;  Location: [facility];  Service: Orthopedics    SD FASCT PRTL PALMAR 1 DGT PROX IPHAL JT W/WO RPR Right 1/11/2024    Procedure: Right hand small finger duppuytrens excision with release of the metacarpophalangeal joint and proximal interphalangeal joint;  Surgeon: Sarthak Villatoro MD;  Location: [facility];  Service: Orthopedics    SD I&D ISCHIORECTAL&/PERIRECTAL ABSCESS SPX Right 10/24/2021    Procedure: excisional debredement right gluteal cheek ;  Surgeon: Jeana Bustamante MD;  Location: [facility];  Service: General    SD PLACEMENT SETON N/A 11/17/2021    Procedure: PLACEMENT SETON;  Surgeon: Davi Thao MD;  [facility]: [facility];  Service: Colorectal    SD PRQ IMPLTJ NSTIM ELECTRODE ARRAY EPIDURAL Right 03/26/2021    Procedure: INSERTION THORACIC DORSAL COLUMN SPINAL CORD STIMULATOR PERCUTANEOUS W IMPLANTABLE PULSE GENERATOR, RIGHT;  Surgeon: Akshat Witt MD;  Location: [facility];  Service: Neurosurgery     Social History   Social History     Substance and Sexual Activity   Alcohol Use Yes    Alcohol/week: 3.0 standard drinks of alcohol    Types: 3 Cans of beer per week    Comment: social     Social History     Substance and Sexual Activity   Drug Use Yes    Frequency: 7.0 times per week    Types: Marijuana    Comment: Medical marijuana-vapes     E-Cigarette/Vaping    E-Cigarette Use Never User     Comments HS-medical  marijuana/for sleep      E-Cigarette/Vaping Substances    Nicotine No     THC Yes     CBD No     Flavoring No     Other No     Unknown No      Social History     Tobacco Use   Smoking Status Former    Current packs/day: 0.00    Average packs/day: 0.5 packs/day for 30.0 years (15.0 ttl pk-yrs)    Types: Cigarettes    Start date: 1/1/2019    Quit date: 2/4/2021    Years since quitting: 3.2   Smokeless Tobacco Former     Family History:   Family History   Problem Relation Age of Onset    Heart disease Father     Heart attack Father     Colon cancer Neg Hx     Colon polyps Neg Hx     Inflammatory bowel disease Neg Hx        Review of previous medical records was completed.     Meds/Allergies   PTA meds:   Prior to Admission Medications   Prescriptions Last Dose Informant Patient Reported? Taking?   RA Vitamin D-3 25 MCG (1000 UT) tablet  Self No No   Sig: TAKE 2 TABLETS BY MOUTH DAILY   acetaminophen (TYLENOL) 650 mg CR tablet  Self No No   Sig: Take 1 tablet (650 mg total) by mouth every 8 (eight) hours as needed for mild pain   amitriptyline (ELAVIL) 100 mg tablet   No No   Sig: take 1 to 2 tablets by mouth at bedtime   azaTHIOprine (IMURAN) 50 mg tablet  Self No No   Sig: Take 1 tablet (50 mg total) by mouth daily   buPROPion (WELLBUTRIN SR) 150 mg 12 hr tablet   No No   Sig: TAKE 1 TABLET (150 MG TOTAL) BY MOUTH 3 (THREE) TIMES A DAY   cephalexin (KEFLEX) 500 mg capsule   No No   Sig: Take 1 capsule (500 mg total) by mouth every 6 (six) hours for 7 days   furosemide (LASIX) 20 mg tablet  Self Yes No   Sig: Take 20 mg by mouth daily   lisinopril (ZESTRIL) 20 mg tablet   No No   Sig: TAKE 1 TABLET BY MOUTH EVERY DAY   loperamide (IMODIUM A-D) 2 MG tablet   No No   Sig: Take 1 tablet (2 mg total) by mouth 4 (four) times a day as needed for diarrhea   naproxen (NAPROSYN) 250 mg tablet   No No   Sig: Take 1 tablet (250 mg total) by mouth 2 (two) times a day with meals   omeprazole (PriLOSEC) 40 MG capsule  Self No No    Sig: TAKE 1 CAPSULE (40 MG TOTAL) BY MOUTH DAILY.   predniSONE 20 mg tablet   No No   Sig: TAKE 1 TABLET BID X 5 DAYS THEN TAKE 1 TABLET QD FOR 5 DAYS   pregabalin (LYRICA) 150 mg capsule   No No   Sig: TAKE 1 CAPSULE BY MOUTH 3 TIMES A DAY   sildenafil (Viagra) 100 mg tablet  Self No No   Sig: Take 1 tablet (100 mg total) by mouth daily as needed for erectile dysfunction   traMADol (Ultram) 50 mg tablet  Self No No   Sig: Take 1 tablet (50 mg total) by mouth every 6 (six) hours as needed for moderate pain   urea (CARMOL) 40 %  Self No No   Sig: Apply topically daily   vedolizumab (Entyvio) SOLR  Self No No   Sig: Inject 300 mg into a catheter in a vein every 4 weeks   Patient taking differently: Inject 300 mg into a catheter in a vein every 4 weeks      Facility-Administered Medications: None       No Known Allergies    Objective   Vitals:Blood pressure 132/98, pulse 101, temperature 98.3 °F (36.8 °C), resp. rate 18, height 6' (1.829 m), weight 102 kg (224 lb 13.9 oz), SpO2 93%.,Body mass index is 30.5 kg/m².  No intake or output data in the 24 hours ending 05/16/24 1538    Invasive Devices:   Invasive Devices       Peripheral Intravenous Line  Duration             Peripheral IV 05/16/24 Left Antecubital <1 day                    Physical Exam  General:  Patient is well-developed, obese BMI 30.50, and in no acute distress.  HEENT:  Head normocephalic.  Eyes anicteric.    Cardiovascular:  With regular rhythm.  Lungs:  Normal effort. Nonlabored breathing.  Extremities:  With no significant edema.    Skin: No rashes.    Neurologic Exam  Neurologic:  Patient is alert, pleasantly interactive, and appropriately conversational.  Mildly dysarthric but not aphasic.  Fully oriented.    Gait deferred for safety.    Cranial Nerves:   II: Visual fields full to confrontation.  Cannot visualize optic fundi.  III,IV,VI: extraocular movements intact with no nystagmus.   V: Sensation in the V1 through V3 distributions intact to  light touch bilaterally.   VII: Face is symmetric with no weakness noted.   XII: Tongue midline with no atrophy or fasciculations with appropriate movement.     Coordination:  Accurate with finger-to-nose maneuvers bilaterally.    Motor testing with no upper or lower extremity drift.  Full strength noted bilateral upper extremities and bilateral lower extremities with some giveaway noted with lower extremity motor testing due to back pain.    Patient had high-frequency, low amplitude tremor of the bilateral hands but in sustention.    Sensory testing grossly intact to light touch throughout except for diminished light touch appreciation in the distal lower extremities bilaterally, chronic.     Trace to absent reflexes noted throughout.    Toe responses are mute bilaterally.    Lab Results: CBC:   Results from last 7 days   Lab Units 05/16/24  1253   WBC Thousand/uL 6.17   RBC Million/uL 4.37   HEMOGLOBIN g/dL 14.4   HEMATOCRIT % 42.9   MCV fL 98   PLATELETS Thousands/uL 195   , BMP/CMP:   Results from last 7 days   Lab Units 05/16/24  1253   SODIUM mmol/L 139   POTASSIUM mmol/L 4.1   CHLORIDE mmol/L 104   CO2 mmol/L 27   BUN mg/dL 17   CREATININE mg/dL 1.15   CALCIUM mg/dL 9.2   EGFR ml/min/1.73sq m 73   , HgBA1C:   , Coagulation:   Results from last 7 days   Lab Units 05/16/24  1253   INR  1.00   , Lipid Profile:     Imaging Studies: I have personally reviewed pertinent reports.   and I have personally reviewed pertinent films in PACS CTH, CTA h/n  EKG, Pathology, and Other Studies: I have personally reviewed pertinent reports.    VTE Prophylaxis: Sequential compression device (Venodyne)     Code Status: Prior  Advance Directive and Living Will:      Power of :    POLST:

## 2024-05-16 NOTE — H&P
[facility]  H&P  Name: Benito Park 53 y.o. male I MRN: [id number]  Unit/Bed#: TR13A I Date of Admission: 5/16/2024   Date of Service: 5/16/2024 I Hospital Day: 0      Assessment & Plan   * Dysarthria  Assessment & Plan  Patient with hypertension, prior tobacco and alcohol abuse disorder presented with strokelike symptoms  Endorses dysarthria/slurring of speech and dizziness  Reported dizziness is acute on chronic  Associated with 2 mechanical falls at home    CT head no acute changes  CTA head/neck unremarkable for stenosis, vascular obstruction/aneurysm    MRI brain ordered and in process  Echo ordered and in process  Orthostatic blood pressure ordered and pending  Loaded with aspirin and Plavix, continue with aspirin 81 mg daily and 75 of Plavix daily starting tomorrow  Continue with atorvastatin 40 mg daily at bedtime  A1c and lipid panel ordered for risk stratification  Telemetry  PT/OT  Frequent neurochecks  Appreciate neurology's assistance      acute Confusional state  Assessment & Plan  Prior tobacco and alcohol abuse disorder  Endorses consumption of medical marijuana  Presented with strokelike symptoms accompanied by acute confusional state as per patient's spouse  Currently resolved  UDS ordered and pending    Ulcer of left foot, limited to breakdown of skin (HCC)  Assessment & Plan  Follows with podiatry  On Keflex to be continued till 5/17  Continue with wound care    Hypertension  Assessment & Plan  /95   Pulse 95   Temp 98.3 °F (36.8 °C)   Resp 17   Ht 6' (1.829 m)   Wt 102 kg (224 lb 13.9 oz)   SpO2 94%   BMI 30.50 kg/m²     Home regimen consist of lisinopril 20 mg daily and Lasix 20 mg daily  Hold home meds to allow permissive hypertension  Vitals as per routine    S/P insertion of spinal cord stimulator  Assessment & Plan  Noted    Tobacco use  Assessment & Plan  History noted  Not actively smoking  Encouraged cessation  Continue with Wellbutrin 3  times daily    Neuropathy  Assessment & Plan  Continue with pregabalin and amitriptyline           VTE Pharmacologic Prophylaxis:   Moderate Risk (Score 3-4) - Pharmacological DVT Prophylaxis Ordered: enoxaparin (Lovenox).  Code Status: Level 1 - Full Code patient  Discussion with family: Updated  (wife and daughter) at bedside.    Anticipated Length of Stay: Patient will be admitted on an observation basis with an anticipated length of stay of less than 2 midnights secondary to dysarthria.    Total Time Spent on Date of Encounter in care of patient: 75 mins. This time was spent on one or more of the following: performing physical exam; counseling and coordination of care; obtaining or reviewing history; documenting in the medical record; reviewing/ordering tests, medications or procedures; communicating with other healthcare professionals and discussing with patient's family/caregivers.    Chief Complaint:   Chief Complaint   Patient presents with    Slurred Speech     Pt's wife reports that she noticed that his speech seemed a little slurred this am before she went to work around 8am this am. Fell a couple times today. Has been having balance issues and forgetfulness. Went to bed feeling ok around 9pm. Woke up in the middle of the night with dizziness and balance issues         History of Present Illness:  Benito Park is a 53 y.o. male with a PMH of Crohn's disease, prior tobacco alcohol abuse disorder, hypertension, neuropathy, lumbar radiculopathy presented with dysarthria, slurring of speech, acute confusional state.  On my encounter he is awake, alert and hemodynamically stable.  Stroke alert was called.  Patient had CT head and CTA head/neck which were unremarkable.  He will be admitted for further stroke workup.  Wife at bedside updated.  Patient confirmed level 1 full CODE STATUS.    Review of Systems:  Review of Systems   Constitutional:  Positive for activity change and fatigue.  Negative for chills and fever.   HENT:  Negative for congestion.    Respiratory:  Negative for cough and shortness of breath.    Cardiovascular:  Negative for chest pain, palpitations and leg swelling.   Gastrointestinal:  Negative for abdominal pain.   Genitourinary:  Negative for dysuria.   Musculoskeletal:  Positive for arthralgias and back pain.   Neurological:  Positive for dizziness, speech difficulty, light-headedness and numbness. Negative for weakness.   Psychiatric/Behavioral:  Negative for agitation and confusion.        Past Medical and Surgical History:   Past Medical History:   Diagnosis Date    Anxiety     Back pain     Callus Few months ago    Colon polyp     CPAP (continuous positive airway pressure) dependence     Crohn's disease (HCC)     Depression     GERD (gastroesophageal reflux disease)     Hypertension     Perianal fistula     Sleep apnea     Terminal ileitis (HCC)        Past Surgical History:   Procedure Laterality Date    BACK SURGERY  2019    CAST APPLICATION Right 11/17/2022    Procedure: Application short-arm splint;  Surgeon: Sarthak Villatoro MD;  Location: [facility];  Service: Orthopedics    COLONOSCOPY      EGD      HAND CONTRACTURE RELEASE Left 06/01/2023    Procedure: left ring and small finger dupuytrens excision and ring finger Metacarpophalangeal joint release and small finger Metacarpophalangeal and proximal interphalangeal joint release;  Surgeon: Sarthak Villatoro MD;  Location: [facility];  Service: Orthopedics    HERNIA REPAIR      umbilical hernia    ND ANRCT XM SURG REQ ANES GENERAL SPI/EDRL DX N/A 11/17/2021    Procedure: EXAM UNDER ANESTHESIA (EUA);  Surgeon: Davi Thao MD;  Location:  MAIN OR;  Service: Colorectal    ND APPLICATION SHORT ARM SPLINT FOREARM-HAND STATIC Right 1/11/2024    Procedure: Application short arm splint;  Surgeon: Sarthak Villatoro MD;  Location: [facility];  Service: Orthopedics    ND CAPSULECTOMY/CAPSULOTOMY IPHAL JOINT EACH  Right 1/11/2024    Procedure: Contracture release right hand small finger proximal interphalangeal joint;  Surgeon: Sarthak Villatoro MD;  Location: [facility];  Service: Orthopedics    VT FASCIOTOMY PALMAR OPEN PARTIAL Right 11/17/2022    Procedure: Dupuytren's excision right palm extending into the small finger with release of the metacarpophalangeal joint and proximal interphalangeal joint.  Dupuytren's excision right palm with release of the right ring finger metacarpophalangeal joint.;  Surgeon: Sarthak Villatoro MD;  Location: [facility];  Service: Orthopedics    VT FASCIOTOMY PALMAR OPEN PARTIAL Right 1/11/2024    Procedure: Right hand small finger duppuytrens excision with release of the metacarpophalangeal joint and proximal interphalangeal joint;  Surgeon: Sarthak Villatoro MD;  Location: [facility];  Service: Orthopedics    VT FASCT PALM W/WO Z-PLASTY TISSUE REARGMT/SKN GRFT Right 1/11/2024    Procedure: Right hand small finger duppuytrens excision with release of the metacarpophalangeal joint and proximal interphalangeal joint;  Surgeon: Sarthak Villatoro MD;  Location: [facility];  Service: Orthopedics    VT FASCT PRTL PALMAR 1 DGT PROX IPHAL JT W/WO RPR Right 1/11/2024    Procedure: Right hand small finger duppuytrens excision with release of the metacarpophalangeal joint and proximal interphalangeal joint;  Surgeon: Sarthak Villatoro MD;  Location: [facility];  Service: Orthopedics    VT I&D ISCHIORECTAL&/PERIRECTAL ABSCESS SPX Right 10/24/2021    Procedure: excisional debredement right gluteal cheek ;  Surgeon: Jeana Bustamante MD;  Location: [facility];  Service: General    VT PLACEMENT SETON N/A 11/17/2021    Procedure: PLACEMENT SETON;  Surgeon: Davi Thao MD;  Location:  MAIN OR;  Service: Colorectal    VT PRQ IMPLTJ NSTIM ELECTRODE ARRAY EPIDURAL Right 03/26/2021    Procedure: INSERTION THORACIC DORSAL COLUMN SPINAL CORD STIMULATOR PERCUTANEOUS W IMPLANTABLE PULSE GENERATOR,  RIGHT;  Surgeon: Akshat Witt MD;  Location:  MAIN OR;  Service: Neurosurgery       Meds/Allergies:  Prior to Admission medications    Medication Sig Start Date End Date Taking? Authorizing Provider   acetaminophen (TYLENOL) 650 mg CR tablet Take 1 tablet (650 mg total) by mouth every 8 (eight) hours as needed for mild pain 1/11/24   Onesimo Kelley PA-C   amitriptyline (ELAVIL) 100 mg tablet take 1 to 2 tablets by mouth at bedtime 4/1/24   Ariel Sosa MD   azaTHIOprine (IMURAN) 50 mg tablet Take 1 tablet (50 mg total) by mouth daily 1/3/24   Sharon Hampton MD   buPROPion (WELLBUTRIN SR) 150 mg 12 hr tablet TAKE 1 TABLET (150 MG TOTAL) BY MOUTH 3 (THREE) TIMES A DAY 4/15/24   Ariel Sosa MD   cephalexin (KEFLEX) 500 mg capsule Take 1 capsule (500 mg total) by mouth every 6 (six) hours for 7 days 5/10/24 5/17/24  Rosmery Weir DPM   furosemide (LASIX) 20 mg tablet Take 20 mg by mouth daily 3/4/24   Historical Provider, MD   lisinopril (ZESTRIL) 20 mg tablet TAKE 1 TABLET BY MOUTH EVERY DAY 4/1/24   Ariel Sosa MD   omeprazole (PriLOSEC) 40 MG capsule TAKE 1 CAPSULE (40 MG TOTAL) BY MOUTH DAILY. 2/1/24   Sharon Hampton MD   pregabalin (LYRICA) 150 mg capsule TAKE 1 CAPSULE BY MOUTH 3 TIMES A DAY 5/14/24   Ariel Sosa MD   RA Vitamin D-3 25 MCG (1000 UT) tablet TAKE 2 TABLETS BY MOUTH DAILY 10/25/21   Sharon Hampton MD   sildenafil (Viagra) 100 mg tablet Take 1 tablet (100 mg total) by mouth daily as needed for erectile dysfunction 6/21/22   Ariel Sosa MD   urea (CARMOL) 40 % Apply topically daily 3/7/24   Rosmery Weir DPM   vedolizumab (Entyvio) SOLR Inject 300 mg into a catheter in a vein every 4 weeks  Patient taking differently: Inject 300 mg into a catheter in a vein every 4 weeks 1/3/24   Sharon Hampton MD   loperamide (IMODIUM A-D) 2 MG tablet Take 1 tablet (2 mg total) by mouth 4 (four) times a day as needed for diarrhea 4/1/24 5/16/24  Sharon Hampton MD   naproxen (NAPROSYN) 250 mg tablet  Take 1 tablet (250 mg total) by mouth 2 (two) times a day with meals 4/8/24 5/16/24  MIKE Mane   predniSONE 20 mg tablet TAKE 1 TABLET BID X 5 DAYS THEN TAKE 1 TABLET QD FOR 5 DAYS 4/15/24 5/16/24  Ariel Sosa MD   traMADol (Ultram) 50 mg tablet Take 1 tablet (50 mg total) by mouth every 6 (six) hours as needed for moderate pain 1/11/24 5/16/24  Onesimo Kelley PA-C     I have reviewed home medications with patient personally.    Allergies: No Known Allergies    Social History:  Marital Status: /Civil Union   Substance Use History:   Social History     Substance and Sexual Activity   Alcohol Use Yes    Alcohol/week: 3.0 standard drinks of alcohol    Types: 3 Cans of beer per week    Comment: social     Social History     Tobacco Use   Smoking Status Former    Current packs/day: 0.00    Average packs/day: 0.5 packs/day for 30.0 years (15.0 ttl pk-yrs)    Types: Cigarettes    Start date: 1/1/2019    Quit date: 2/4/2021    Years since quitting: 3.2   Smokeless Tobacco Former     Social History     Substance and Sexual Activity   Drug Use Yes    Frequency: 7.0 times per week    Types: Marijuana    Comment: Medical marijuana-vapes       Family History:  Family History   Problem Relation Age of Onset    Heart disease Father     Heart attack Father     Colon cancer Neg Hx     Colon polyps Neg Hx     Inflammatory bowel disease Neg Hx        Physical Exam:     Vitals:   Blood Pressure: 140/95 (05/16/24 1530)  Pulse: 95 (05/16/24 1530)  Temperature: 98.3 °F (36.8 °C) (05/16/24 1248)  Temp Source: Temporal (05/16/24 1233)  Respirations: 17 (05/16/24 1530)  Height: 6' (182.9 cm) (05/16/24 1233)  Weight - Scale: 102 kg (224 lb 13.9 oz) (05/16/24 1249)  SpO2: 94 % (05/16/24 1530)    Physical Exam  Vitals reviewed.   Constitutional:       Appearance: He is obese.   HENT:      Head: Atraumatic.      Mouth/Throat:      Mouth: Mucous membranes are dry.   Eyes:      General: No scleral  icterus.  Cardiovascular:      Rate and Rhythm: Normal rate and regular rhythm.      Heart sounds: Normal heart sounds.   Abdominal:      General: Bowel sounds are normal.   Musculoskeletal:      Right lower leg: No edema.      Left lower leg: No edema.   Skin:     Findings: Bruising and lesion present.   Neurological:      General: No focal deficit present.      Mental Status: He is alert.   Psychiatric:         Mood and Affect: Mood normal.          Additional Data:     Lab Results:  Results from last 7 days   Lab Units 05/16/24  1253   WBC Thousand/uL 6.17   HEMOGLOBIN g/dL 14.4   HEMATOCRIT % 42.9   PLATELETS Thousands/uL 195     Results from last 7 days   Lab Units 05/16/24  1253   SODIUM mmol/L 139   POTASSIUM mmol/L 4.1   CHLORIDE mmol/L 104   CO2 mmol/L 27   BUN mg/dL 17   CREATININE mg/dL 1.15   ANION GAP mmol/L 8   CALCIUM mg/dL 9.2   GLUCOSE RANDOM mg/dL 297*     Results from last 7 days   Lab Units 05/16/24  1253   INR  1.00     Results from last 7 days   Lab Units 05/16/24  1253   POC GLUCOSE mg/dl 302*               Lines/Drains:  Invasive Devices       Peripheral Intravenous Line  Duration             Peripheral IV 05/16/24 Left Antecubital <1 day                        Imaging: Reviewed radiology reports from this admission including: CT head  CT chest abdomen pelvis w contrast   Final Result by Antonio Busch MD (05/16 1410)      No acute finding in the chest, abdomen, or pelvis.      Hepatic steatosis.               Workstation performed: [id number]         CT stroke alert brain   Final Result by Katy Milner MD (05/16 140)      No acute intracranial CT abnormality.               Findings were directly discussed with Juan José Vanessa at 1:27 p.m.         Workstation performed: [id number]         CTA stroke alert (head/neck)   Final Result by Katy Milner MD (05/16 1400)      1.  Patent major vessels of the [place] of cantu without high-grade stenosis.  No aneurysm.   2.  No significant  stenosis or dissection of cervical carotid and vertebral arteries.   3.  pulmonary nodules noted in partially imaged lungs including 9 mm right upper lobe spiculated nodule. Based on current Fleischner Society 2017 Guidelines on incidental pulmonary nodule, either PET/CT scan evaluation, tissue sampling or short-term    interval follow-up non-contrast CT follow-up (initially in 3 months) may be considered appropriate.                  Findings were directly discussed with Juan José Vanessa at 1:27 p.m.      This study was marked in EPIC for notification and follow-up.                                    Workstation performed: [id number]         X-ray chest 1 view portable    (Results Pending)   MRI inpatient order    (Results Pending)       EKG and Other Studies Reviewed on Admission:   EKG:  Sinus tachycardia.    ** Please Note: This note has been constructed using a voice recognition system. **

## 2024-05-16 NOTE — ASSESSMENT & PLAN NOTE
53-year-old male with Crohn's disease, OSIRIS on CPAP, depression/anxiety, chronic back pain status post spinal cord stimulator (MRI conditional), hypertension, tobacco and medical marijuana use, history of alcohol use, neuropathy, presents with falls and dysarthria.  Patient has at least 1 year history of lightheadedness upon standing with gait imbalance but noted worsening of his gait and balance overnight, resulting in several falls.  His wife at bedside also notes patient's speech is dysarthric and he seemed to have difficulty with speech comprehension or was perhaps confused the morning of 5/16/2024.    CT head demonstrated no acute changes.  CTA head/neck demonstrated no flow consequential stenosis or LVO.  Patient is AP/AC naïve at baseline.    Will admit patient to stroke pathway.    Plan:  -Admit to stroke pathway.  -MRI brain without contrast.  -Check orthostatic vital signs.  -UDS.  -2D echocardiogram with bubble study.  -Check hemoglobin A1c, lipid panel, TSH.  -Loaded with aspirin 325 mg and Plavix 300 mg in the ED.  Continue aspirin 81 mg and Plavix 105 mg daily for now.  -Initiate atorvastatin 40 mg.  -Telemetry.  -PT/OT/speech therapy.  -Allow permissive hypertension, up to 200 systolic.  -Frequent neuro checks.  -Continue to monitor and notify with changes.

## 2024-05-16 NOTE — ASSESSMENT & PLAN NOTE
Prior tobacco and alcohol abuse disorder  Endorses consumption of medical marijuana  Presented with strokelike symptoms accompanied by acute confusional state as per patient's spouse  Currently resolved  UDS ordered and pending

## 2024-05-16 NOTE — ED PROVIDER NOTES
History  Chief Complaint   Patient presents with    Slurred Speech     Pt's wife reports that she noticed that his speech seemed a little slurred this am before she went to work around 8am this am. Fell a couple times today. Has been having balance issues and forgetfulness. Went to bed feeling ok around 9pm. Woke up in the middle of the night with dizziness and balance issues     Patient is a 53 year old male who presents with dizziness, off balance, and falls. Patient reports that he has been having dizziness for over a year. Went to bed 830-930pm last night, then woke up at 3 AM and wife reports that he sounded like his speech was slurred and was not making very much sense.  He then woke up again at 6 AM and reports that he was more dizzy than usual, feeling unsteady and that resulted in him falling twice.  Currently, the dizziness continues.  He states that he used to drink more frequently, but now he no longer drinks alcohol except for socially.  Denies any new medications.  Has not seen a physician in a while.            Prior to Admission Medications   Prescriptions Last Dose Informant Patient Reported? Taking?   RA Vitamin D-3 25 MCG (1000 UT) tablet  Self No No   Sig: TAKE 2 TABLETS BY MOUTH DAILY   acetaminophen (TYLENOL) 650 mg CR tablet  Self No No   Sig: Take 1 tablet (650 mg total) by mouth every 8 (eight) hours as needed for mild pain   amitriptyline (ELAVIL) 100 mg tablet   No No   Sig: take 1 to 2 tablets by mouth at bedtime   azaTHIOprine (IMURAN) 50 mg tablet  Self No No   Sig: Take 1 tablet (50 mg total) by mouth daily   buPROPion (WELLBUTRIN SR) 150 mg 12 hr tablet   No No   Sig: TAKE 1 TABLET (150 MG TOTAL) BY MOUTH 3 (THREE) TIMES A DAY   cephalexin (KEFLEX) 500 mg capsule   No No   Sig: Take 1 capsule (500 mg total) by mouth every 6 (six) hours for 7 days   furosemide (LASIX) 20 mg tablet  Self Yes No   Sig: Take 20 mg by mouth daily   lisinopril (ZESTRIL) 20 mg tablet   No No   Sig: TAKE 1  TABLET BY MOUTH EVERY DAY   omeprazole (PriLOSEC) 40 MG capsule  Self No No   Sig: TAKE 1 CAPSULE (40 MG TOTAL) BY MOUTH DAILY.   pregabalin (LYRICA) 150 mg capsule   No No   Sig: TAKE 1 CAPSULE BY MOUTH 3 TIMES A DAY   sildenafil (Viagra) 100 mg tablet  Self No No   Sig: Take 1 tablet (100 mg total) by mouth daily as needed for erectile dysfunction   urea (CARMOL) 40 %  Self No No   Sig: Apply topically daily   vedolizumab (Entyvio) SOLR  Self No No   Sig: Inject 300 mg into a catheter in a vein every 4 weeks   Patient taking differently: Inject 300 mg into a catheter in a vein every 4 weeks      Facility-Administered Medications: None       Past Medical History:   Diagnosis Date    Anxiety     Back pain     Callus Few months ago    Colon polyp     CPAP (continuous positive airway pressure) dependence     Crohn's disease (HCC)     Depression     GERD (gastroesophageal reflux disease)     Hypertension     Perianal fistula     Sleep apnea     Terminal ileitis (HCC)        Past Surgical History:   Procedure Laterality Date    BACK SURGERY  2019    CAST APPLICATION Right 11/17/2022    Procedure: Application short-arm splint;  Surgeon: Sarthak Villatoro MD;  Location: [facility];  Service: Orthopedics    COLONOSCOPY      EGD      HAND CONTRACTURE RELEASE Left 06/01/2023    Procedure: left ring and small finger dupuytrens excision and ring finger Metacarpophalangeal joint release and small finger Metacarpophalangeal and proximal interphalangeal joint release;  Surgeon: Sarthak Villatoro MD;  Location:  MAIN OR;  Service: Orthopedics    HERNIA REPAIR      umbilical hernia    IA ANRCT XM SURG REQ ANES GENERAL SPI/EDRL DX N/A 11/17/2021    Procedure: EXAM UNDER ANESTHESIA (EUA);  Surgeon: Davi Thao MD;  Location:  MAIN OR;  Service: Colorectal    IA APPLICATION SHORT ARM SPLINT FOREARM-HAND STATIC Right 1/11/2024    Procedure: Application short arm splint;  Surgeon: Sarthak Villatoro MD;  Location: [facility];  Service: Orthopedics    DE CAPSULECTOMY/CAPSULOTOMY IPHAL JOINT EACH Right 1/11/2024    Procedure: Contracture release right hand small finger proximal interphalangeal joint;  Surgeon: Sarthak Villatoro MD;  Location: [facility];  Service: Orthopedics    DE FASCIOTOMY PALMAR OPEN PARTIAL Right 11/17/2022    Procedure: Dupuytren's excision right palm extending into the small finger with release of the metacarpophalangeal joint and proximal interphalangeal joint.  Dupuytren's excision right palm with release of the right ring finger metacarpophalangeal joint.;  Surgeon: Sarthak Villatoro MD;  Location: [facility];  Service: Orthopedics    DE FASCIOTOMY PALMAR OPEN PARTIAL Right 1/11/2024    Procedure: Right hand small finger duppuytrens excision with release of the metacarpophalangeal joint and proximal interphalangeal joint;  Surgeon: Sarthak Villatoro MD;  Location: [facility];  Service: Orthopedics    DE FASCT PALM W/WO Z-PLASTY TISSUE REARGMT/SKN GRFT Right 1/11/2024    Procedure: Right hand small finger duppuytrens excision with release of the metacarpophalangeal joint and proximal interphalangeal joint;  Surgeon: Sarthak Villatoro MD;  Location: [facility];  Service: Orthopedics    DE FASCT PRTL PALMAR 1 DGT PROX IPHAL JT W/WO RPR Right 1/11/2024    Procedure: Right hand small finger duppuytrens excision with release of the metacarpophalangeal joint and proximal interphalangeal joint;  Surgeon: Sarthak Villatoro MD;  Location: [facility];  Service: Orthopedics    DE I&D ISCHIORECTAL&/PERIRECTAL ABSCESS SPX Right 10/24/2021    Procedure: excisional debredement right gluteal cheek ;  Surgeon: Jeana Bustamante MD;  Location: [facility];  Service: General    DE PLACEMENT SETON N/A 11/17/2021    Procedure: PLACEMENT SETON;  Surgeon: Davi Thao MD;  Location:  MAIN OR;  Service: Colorectal    DE PRQ IMPLTJ NSTIM ELECTRODE ARRAY EPIDURAL Right 03/26/2021    Procedure: INSERTION THORACIC  DORSAL COLUMN SPINAL CORD STIMULATOR PERCUTANEOUS W IMPLANTABLE PULSE GENERATOR, RIGHT;  Surgeon: Akshat Witt MD;  Location:  MAIN OR;  Service: Neurosurgery       Family History   Problem Relation Age of Onset    Heart disease Father     Heart attack Father     Colon cancer Neg Hx     Colon polyps Neg Hx     Inflammatory bowel disease Neg Hx      I have reviewed and agree with the history as documented.    E-Cigarette/Vaping    E-Cigarette Use Never User     Comments HS-medical marijuana/for sleep      E-Cigarette/Vaping Substances    Nicotine No     THC Yes     CBD No     Flavoring No     Other No     Unknown No      Social History     Tobacco Use    Smoking status: Former     Current packs/day: 0.00     Average packs/day: 0.5 packs/day for 30.0 years (15.0 ttl pk-yrs)     Types: Cigarettes     Start date: 1/1/2019     Quit date: 2/4/2021     Years since quitting: 3.2    Smokeless tobacco: Former   Vaping Use    Vaping status: Never Used   Substance Use Topics    Alcohol use: Yes     Alcohol/week: 3.0 standard drinks of alcohol     Types: 3 Cans of beer per week     Comment: social    Drug use: Yes     Frequency: 7.0 times per week     Types: Marijuana     Comment: Medical marijuana-vapes       Review of Systems   Constitutional:  Negative for chills and fever.   Eyes:  Negative for photophobia and visual disturbance.   Respiratory:  Negative for shortness of breath.    Cardiovascular:  Negative for chest pain.   Gastrointestinal:  Negative for abdominal pain, nausea and vomiting.   Neurological:  Positive for dizziness, speech difficulty and numbness (Chronic polyneuropathy). Negative for tremors, syncope and headaches.       Physical Exam  Physical Exam  Vitals and nursing note reviewed.   Constitutional:       General: He is not in acute distress.     Appearance: Normal appearance. He is not ill-appearing, toxic-appearing or diaphoretic.   HENT:      Head: Normocephalic and atraumatic.      Mouth/Throat:       Mouth: Mucous membranes are moist.   Eyes:      General: No visual field deficit.     Extraocular Movements: Extraocular movements intact.      Conjunctiva/sclera: Conjunctivae normal.      Pupils: Pupils are equal, round, and reactive to light.   Cardiovascular:      Rate and Rhythm: Regular rhythm. Tachycardia present.      Pulses: Normal pulses.      Heart sounds: Normal heart sounds. No murmur heard.  Pulmonary:      Effort: Pulmonary effort is normal. No respiratory distress.      Breath sounds: Normal breath sounds. No stridor. No wheezing, rhonchi or rales.   Chest:      Chest wall: No tenderness.   Abdominal:      General: Bowel sounds are normal. There is no distension.      Palpations: Abdomen is soft.      Tenderness: There is no abdominal tenderness. There is no guarding or rebound.   Musculoskeletal:      Cervical back: Neck supple.   Skin:     General: Skin is warm and dry.   Neurological:      General: No focal deficit present.      Mental Status: He is alert and oriented to person, place, and time. Mental status is at baseline.      GCS: GCS eye subscore is 4. GCS verbal subscore is 5. GCS motor subscore is 6.      Cranial Nerves: Cranial nerves 2-12 are intact. No cranial nerve deficit, dysarthria or facial asymmetry.      Sensory: Sensation is intact.      Motor: Motor function is intact.      Coordination: Coordination is intact. Finger-Nose-Finger Test and Heel to Shin Test normal.         Vital Signs  ED Triage Vitals   Temperature Pulse Respirations Blood Pressure SpO2   05/16/24 1233 05/16/24 1233 05/16/24 1233 05/16/24 1233 05/16/24 1233   98.3 °F (36.8 °C) (!) 112 18 132/86 97 %      Temp Source Heart Rate Source Patient Position - Orthostatic VS BP Location FiO2 (%)   05/16/24 1233 05/16/24 1233 05/16/24 1233 05/16/24 1233 --   Temporal Monitor Sitting Right arm       Pain Score       05/16/24 1536       No Pain           Vitals:    05/16/24 1645 05/16/24 1646 05/16/24 1647 05/16/24 1650    BP: 140/85 142/95 126/75 130/74   Pulse: 89 98 97 95   Patient Position - Orthostatic VS: Lying - Orthostatic VS Sitting - Orthostatic VS Standing - Orthostatic VS Standing for 3 minutes - Orthostatic VS         Visual Acuity  Visual Acuity      Flowsheet Row Most Recent Value   L Pupil Size (mm) 4   R Pupil Size (mm) 4            ED Medications  Medications   atorvastatin (LIPITOR) tablet 40 mg (has no administration in time range)   clopidogrel (PLAVIX) tablet 75 mg (has no administration in time range)   amitriptyline (ELAVIL) tablet 100 mg (has no administration in time range)   azaTHIOprine (IMURAN) tablet 50 mg (has no administration in time range)   buPROPion (WELLBUTRIN XL) 24 hr tablet 150 mg (has no administration in time range)   cephalexin (KEFLEX) capsule 500 mg (has no administration in time range)   pantoprazole (PROTONIX) EC tablet 40 mg (has no administration in time range)   pregabalin (LYRICA) capsule 150 mg (has no administration in time range)   enoxaparin (LOVENOX) subcutaneous injection 40 mg (has no administration in time range)   iohexol (OMNIPAQUE) 350 MG/ML injection (MULTI-DOSE) 100 mL (85 mL Intravenous Given 5/16/24 1305)   iohexol (OMNIPAQUE) 350 MG/ML injection (MULTI-DOSE) 100 mL (100 mL Intravenous Given 5/16/24 1315)   aspirin tablet 325 mg (325 mg Oral Given 5/16/24 1506)   clopidogrel (PLAVIX) tablet 300 mg (300 mg Oral Given 5/16/24 1506)       Diagnostic Studies  Results Reviewed       Procedure Component Value Units Date/Time    HS Troponin I 2hr [[id number]]  (Normal) Collected: 05/16/24 1506    Lab Status: Final result Specimen: Blood from Arm, Right Updated: 05/16/24 1545     hs TnI 2hr 4 ng/L      Delta 2hr hsTnI -1 ng/L     Rapid drug screen, urine [[id number]]     Lab Status: No result Specimen: Urine     HS Troponin I 4hr [[id number]]     Lab Status: No result Specimen: Blood     FLU/RSV/COVID - if FLU/RSV clinically relevant [[id number]]  (Normal) Collected: 05/16/24  1317    Lab Status: Final result Specimen: Nares from Nose Updated: 05/16/24 1406     SARS-CoV-2 Negative     INFLUENZA A PCR Negative     INFLUENZA B PCR Negative     RSV PCR Negative    Narrative:      FOR PEDIATRIC PATIENTS - copy/paste COVID Guidelines URL to browser: [email or web address]    SARS-CoV-2 assay is a Nucleic Acid Amplification assay intended for the  qualitative detection of nucleic acid from SARS-CoV-2 in nasopharyngeal  swabs. Results are for the presumptive identification of SARS-CoV-2 RNA.    Positive results are indicative of infection with SARS-CoV-2, the virus  causing COVID-19, but do not rule out bacterial infection or co-infection  with other viruses. Laboratories within the [place] and its  territories are required to report all positive results to the appropriate  public health authorities. Negative results do not preclude SARS-CoV-2  infection and should not be used as the sole basis for treatment or other  patient management decisions. Negative results must be combined with  clinical observations, patient history, and epidemiological information.  This test has not been FDA cleared or approved.    This test has been authorized by FDA under an Emergency Use Authorization  (EUA). This test is only authorized for the duration of time the  declaration that circumstances exist justifying the authorization of the  emergency use of an in vitro diagnostic tests for detection of SARS-CoV-2  virus and/or diagnosis of COVID-19 infection under section 564(b)(1) of  the Act, 21 U.S.C. 360bbb-3(b)(1), unless the authorization is terminated  or revoked sooner. The test has been validated but independent review by FDA  and CLIA is pending.    Test performed using [employer]: This RT-PCR assay targets N2,  a region unique to SARS-CoV-2. A conserved region in the E-gene was chosen  for pan-Sarbecovirus detection which includes  SARS-CoV-2.    According to CMS-2020-01-R, this platform meets the definition of high-throughput technology.    Basic metabolic panel [[id number]]  (Abnormal) Collected: 05/16/24 1253    Lab Status: Final result Specimen: Blood from Arm, Left Updated: 05/16/24 1326     Sodium 139 mmol/L      Potassium 4.1 mmol/L      Chloride 104 mmol/L      CO2 27 mmol/L      ANION GAP 8 mmol/L      BUN 17 mg/dL      Creatinine 1.15 mg/dL      Glucose 297 mg/dL      Calcium 9.2 mg/dL      eGFR 73 ml/min/1.73sq m     Narrative:      National Kidney Disease Foundation guidelines for Chronic Kidney Disease (CKD):     Stage 1 with normal or high GFR (GFR > 90 mL/min/1.73 square meters)    Stage 2 Mild CKD (GFR = 60-89 mL/min/1.73 square meters)    Stage 3A Moderate CKD (GFR = 45-59 mL/min/1.73 square meters)    Stage 3B Moderate CKD (GFR = 30-44 mL/min/1.73 square meters)    Stage 4 Severe CKD (GFR = 15-29 mL/min/1.73 square meters)    Stage 5 End Stage CKD (GFR <15 mL/min/1.73 square meters)  Note: GFR calculation is accurate only with a steady state creatinine    HS Troponin 0hr (reflex protocol) [[id number]]  (Normal) Collected: 05/16/24 1253    Lab Status: Final result Specimen: Blood from Arm, Left Updated: 05/16/24 1322     hs TnI 0hr 5 ng/L     Protime-INR [[id number]]  (Normal) Collected: 05/16/24 1253    Lab Status: Final result Specimen: Blood from Arm, Left Updated: 05/16/24 1322     Protime 13.6 seconds      INR 1.00    APTT [[id number]]  (Normal) Collected: 05/16/24 1253    Lab Status: Final result Specimen: Blood from Arm, Left Updated: 05/16/24 1322     PTT 34 seconds     CBC and Platelet [[id number]]  (Normal) Collected: 05/16/24 1253    Lab Status: Final result Specimen: Blood from Arm, Left Updated: 05/16/24 1300     WBC 6.17 Thousand/uL      RBC 4.37 Million/uL      Hemoglobin 14.4 g/dL      Hematocrit 42.9 %      MCV 98 fL      MCH 33.0 pg      MCHC 33.6 g/dL      RDW 13.2 %      Platelets 195 Thousands/uL      MPV 9.3  fL     Fingerstick Glucose (POCT) [[id number]]  (Abnormal) Collected: 05/16/24 1253    Lab Status: Final result Specimen: Blood Updated: 05/16/24 1255     POC Glucose 302 mg/dl                    CT chest abdomen pelvis w contrast   Final Result by Antonio Busch MD (05/16 1410)      No acute finding in the chest, abdomen, or pelvis.      Hepatic steatosis.               Workstation performed: [id number]         CT stroke alert brain   Final Result by Katy Milner MD (05/16 1401)      No acute intracranial CT abnormality.               Findings were directly discussed with Juan José Vanessa at 1:27 p.m.         Workstation performed: [id number]         CTA stroke alert (head/neck)   Final Result by Katy Milner MD (05/16 1400)      1.  Patent major vessels of the [place] of cantu without high-grade stenosis.  No aneurysm.   2.  No significant stenosis or dissection of cervical carotid and vertebral arteries.   3.  pulmonary nodules noted in partially imaged lungs including 9 mm right upper lobe spiculated nodule. Based on current Fleischner Society 2017 Guidelines on incidental pulmonary nodule, either PET/CT scan evaluation, tissue sampling or short-term    interval follow-up non-contrast CT follow-up (initially in 3 months) may be considered appropriate.                  Findings were directly discussed with Juan José Vanessa at 1:27 p.m.      This study was marked in EPIC for notification and follow-up.                                    Workstation performed: [id number]         X-ray chest 1 view portable    (Results Pending)   MRI inpatient order    (Results Pending)              Procedures  CriticalCare Time    Date/Time: 5/16/2024 4:53 PM    Performed by: Aylin Larson DO  Authorized by: Aylin Larson DO    Critical care provider statement:     Critical care time (minutes):  33    Critical care time was exclusive of:  Separately billable procedures and treating other patients and teaching time     Critical care was necessary to treat or prevent imminent or life-threatening deterioration of the following conditions: stroke alert.    Critical care was time spent personally by me on the following activities:  Blood draw for specimens, obtaining history from patient or surrogate, development of treatment plan with patient or surrogate, discussions with consultants, discussions with primary provider, evaluation of patient's response to treatment, examination of patient, review of old charts, re-evaluation of patient's condition, ordering and review of radiographic studies, ordering and review of laboratory studies and ordering and performing treatments and interventions    I assumed direction of critical care for this patient from another provider in my specialty: no             ED Course  ED Course as of 05/16/24 1652   Thu May 16, 2024   1300 Discussed case with Dr. Vanessa- not TNK candidate due to timing. If imaging negative, will need ASA and plavix load and admission for stroke pathway.   1333 Dr. Vanessa reviewed imaging and discussed with radiology. Images negative. Recommends asa/plavix                    Stroke Assessment       Row Name 05/16/24 1300             NIH Stroke Scale    Interval Baseline      Level of Consciousness (1a.) 0      LOC Questions (1b.) 0      LOC Commands (1c.) 0      Best Gaze (2.) 0      Visual (3.) 0      Facial Palsy (4.) 0      Motor Arm, Left (5a.) 0      Motor Arm, Right (5b.) 0      Motor Leg, Left (6a.) 0      Motor Leg, Right (6b.) 0      Limb Ataxia (7.) 0      Sensory (8.) 0      Best Language (9.) 0      Dysarthria (10.) 0      Extinction and Inattention (11.) (Formerly Neglect) 0      Total 0                    Flowsheet Row Most Recent Value   Thrombolytic Decision Options    Thrombolytic Decision Patient not a candidate.   Patient is not a candidate options Unclear time of onset outside appropriate time window.                      SBIRT 22yo+      Flowsheet Row  Most Recent Value   Initial Alcohol Screen: US AUDIT-C     1. How often do you have a drink containing alcohol? 0 Filed at: 05/16/2024 1234   2. How many drinks containing alcohol do you have on a typical day you are drinking?  0 Filed at: 05/16/2024 1234   3a. Male UNDER 65: How often do you have five or more drinks on one occasion? 0 Filed at: 05/16/2024 1234   3b. FEMALE Any Age, or MALE 65+: How often do you have 4 or more drinks on one occassion? 0 Filed at: 05/16/2024 1234   Audit-C Score 0 Filed at: 05/16/2024 1234   MADHAVI: How many times in the past year have you...    Used an illegal drug or used a prescription medication for non-medical reasons? Never Filed at: 05/16/2024 1234                      Medical Decision Making  Assessment and plan:  Differential includes vertigo versus stroke versus metabolic derangement/abnormalities.  NIH 0.  Not TNK candidate due to outside of the window.  Stroke alert called.  Check labs to evaluate for leukocytosis, anemia, electrolyte abnormalities, kidney and liver function; EKG/troponin to evaluate for arrhythmia/ischemia; chest x-ray to rule out widened mediastinum; CT scan of the head, head and neck, chest/abdomen/pelvis to evaluate for stroke as well as blunt traumatic injury in setting of falls.    Review of medical records shows that the patient has a past medical history significant for Crohn's disease, chronic back pain status post spinal cord stimulator, hypertension, tobacco dependence, history of alcohol use, polyneuropathy.    Amount and/or Complexity of Data Reviewed  Labs: ordered.  Radiology: ordered.    Risk  OTC drugs.  Prescription drug management.  Decision regarding hospitalization.             Disposition  Final diagnoses:   Dizziness   Multiple falls   Pulmonary nodule   Speech disturbance     Time reflects when diagnosis was documented in both MDM as applicable and the Disposition within this note       Time User Action Codes Description Comment     5/16/2024 12:46 PM Aylin Larson [R42] Dizziness     5/16/2024  1:50 PM Aylin Larson [R29.6] Multiple falls     5/16/2024  2:09 PM Aylin Larson [R91.1] Pulmonary nodule     5/16/2024  2:18 PM Aylin Larson [R47.9] Speech disturbance           ED Disposition       ED Disposition   Admit    Condition   Stable    Date/Time   Thu May 16, 2024 1350    Comment   Case was discussed with hospitalist and the patient's admission status was agreed to be Admission Status: observation status to the service of Dr. Patel .               Follow-up Information    None         Patient's Medications   Discharge Prescriptions    No medications on file       No discharge procedures on file.    PDMP Review         Value Time User    PDMP Reviewed  Yes 11/17/2021 11:52 AM Davi Thao MD            ED Provider  Electronically Signed by             Aylin Larson DO  05/16/24 1257

## 2024-05-16 NOTE — ASSESSMENT & PLAN NOTE
Patient with hypertension, prior tobacco and alcohol abuse disorder presented with strokelike symptoms  Endorses dysarthria/slurring of speech and dizziness  Reported dizziness is acute on chronic  Associated with 2 mechanical falls at home    CT head no acute changes  CTA head/neck unremarkable for stenosis, vascular obstruction/aneurysm    MRI brain ordered and in process  Echo ordered and in process  Orthostatic blood pressure ordered and pending  Loaded with aspirin and Plavix, continue with aspirin 81 mg daily and 75 of Plavix daily starting tomorrow  Continue with atorvastatin 40 mg daily at bedtime  A1c and lipid panel ordered for risk stratification  Telemetry  PT/OT  Frequent neurochecks  Appreciate neurology's assistance

## 2024-05-17 ENCOUNTER — APPOINTMENT (OUTPATIENT)
Dept: MRI IMAGING | Facility: HOSPITAL | Age: 54
End: 2024-05-17
Payer: MEDICARE

## 2024-05-17 ENCOUNTER — APPOINTMENT (OUTPATIENT)
Dept: NON INVASIVE DIAGNOSTICS | Facility: HOSPITAL | Age: 54
End: 2024-05-17
Payer: MEDICARE

## 2024-05-17 VITALS
TEMPERATURE: 98.4 F | HEIGHT: 72 IN | RESPIRATION RATE: 18 BRPM | SYSTOLIC BLOOD PRESSURE: 139 MMHG | HEART RATE: 87 BPM | DIASTOLIC BLOOD PRESSURE: 95 MMHG | BODY MASS INDEX: 30.34 KG/M2 | WEIGHT: 224 LBS | OXYGEN SATURATION: 92 %

## 2024-05-17 PROBLEM — E11.9 DIABETES MELLITUS (HCC): Status: ACTIVE | Noted: 2024-05-17

## 2024-05-17 PROBLEM — E78.5 HYPERLIPIDEMIA: Status: ACTIVE | Noted: 2024-05-17

## 2024-05-17 LAB
ANION GAP SERPL CALCULATED.3IONS-SCNC: 9 MMOL/L (ref 4–13)
AORTIC ROOT: 3.5 CM
AORTIC VALVE MEAN VELOCITY: 10 M/S
APICAL FOUR CHAMBER EJECTION FRACTION: 67 %
AV LVOT MEAN GRADIENT: 2 MMHG
AV MEAN GRADIENT: 4 MMHG
BASOPHILS # BLD AUTO: 0.02 THOUSANDS/ÂΜL (ref 0–0.1)
BASOPHILS NFR BLD AUTO: 0 % (ref 0–1)
BSA FOR ECHO PROCEDURE: 2.24 M2
BUN SERPL-MCNC: 16 MG/DL (ref 5–25)
CALCIUM SERPL-MCNC: 8.9 MG/DL (ref 8.4–10.2)
CHLORIDE SERPL-SCNC: 103 MMOL/L (ref 96–108)
CHOLEST SERPL-MCNC: 208 MG/DL
CO2 SERPL-SCNC: 25 MMOL/L (ref 21–32)
CREAT SERPL-MCNC: 0.99 MG/DL (ref 0.6–1.3)
DOP CALC AO VTI: 24.5 CM
DOP CALC LVOT PEAK VEL VTI: 18 CM
DOP CALC MV VTI: 17.9 CM
E WAVE DECELERATION TIME: 182 MS
E/A RATIO: 0.88
EOSINOPHIL # BLD AUTO: 0.12 THOUSAND/ÂΜL (ref 0–0.61)
EOSINOPHIL NFR BLD AUTO: 2 % (ref 0–6)
ERYTHROCYTE [DISTWIDTH] IN BLOOD BY AUTOMATED COUNT: 13.5 % (ref 11.6–15.1)
EST. AVERAGE GLUCOSE BLD GHB EST-MCNC: 189 MG/DL
FRACTIONAL SHORTENING: 36 (ref 28–44)
GFR SERPL CREATININE-BSD FRML MDRD: 87 ML/MIN/1.73SQ M
GLOBAL LONGITUIDAL STRAIN: -24 %
GLUCOSE SERPL-MCNC: 166 MG/DL (ref 65–140)
GLUCOSE SERPL-MCNC: 174 MG/DL (ref 65–140)
GLUCOSE SERPL-MCNC: 251 MG/DL (ref 65–140)
HBA1C MFR BLD: 8.2 %
HCT VFR BLD AUTO: 42.2 % (ref 36.5–49.3)
HDLC SERPL-MCNC: 35 MG/DL
HGB BLD-MCNC: 14.2 G/DL (ref 12–17)
IMM GRANULOCYTES # BLD AUTO: 0.02 THOUSAND/UL (ref 0–0.2)
IMM GRANULOCYTES NFR BLD AUTO: 0 % (ref 0–2)
INTERVENTRICULAR SEPTUM IN DIASTOLE (PARASTERNAL SHORT AXIS VIEW): 1.2 CM
INTERVENTRICULAR SEPTUM: 1.2 CM (ref 0.6–1.1)
LA/AORTA RATIO 2D: 1.14
LAAS-AP2: 17.5 CM2
LAAS-AP4: 16.1 CM2
LDLC SERPL DIRECT ASSAY-MCNC: 116 MG/DL (ref 0–100)
LEFT ATRIUM SIZE: 4 CM
LEFT ATRIUM VOLUME (MOD BIPLANE): 50 ML
LEFT ATRIUM VOLUME INDEX (MOD BIPLANE): 22.3 ML/M2
LEFT INTERNAL DIMENSION IN SYSTOLE: 3.2 CM (ref 2.1–4)
LEFT VENTRICLE DIASTOLIC VOLUME (MOD BIPLANE): 114 ML
LEFT VENTRICLE DIASTOLIC VOLUME INDEX (MOD BIPLANE): 50.9 ML/M2
LEFT VENTRICLE SYSTOLIC VOLUME (MOD BIPLANE): 42 ML
LEFT VENTRICLE SYSTOLIC VOLUME INDEX (MOD BIPLANE): 18.8 ML/M2
LEFT VENTRICULAR INTERNAL DIMENSION IN DIASTOLE: 5 CM (ref 3.5–6)
LEFT VENTRICULAR POSTERIOR WALL IN END DIASTOLE: 1 CM
LEFT VENTRICULAR STROKE VOLUME: 79 ML
LV EF: 63 %
LVSV (TEICH): 79 ML
LYMPHOCYTES # BLD AUTO: 1.94 THOUSANDS/ÂΜL (ref 0.6–4.47)
LYMPHOCYTES NFR BLD AUTO: 29 % (ref 14–44)
MCH RBC QN AUTO: 33.3 PG (ref 26.8–34.3)
MCHC RBC AUTO-ENTMCNC: 33.6 G/DL (ref 31.4–37.4)
MCV RBC AUTO: 99 FL (ref 82–98)
MONOCYTES # BLD AUTO: 0.61 THOUSAND/ÂΜL (ref 0.17–1.22)
MONOCYTES NFR BLD AUTO: 9 % (ref 4–12)
MV E'TISSUE VEL-LAT: 10 CM/S
MV E'TISSUE VEL-SEP: 7 CM/S
MV MEAN GRADIENT: 2 MMHG
MV PEAK A VEL: 0.91 M/S
MV PEAK E VEL: 80 CM/S
MV PEAK GRADIENT: 4 MMHG
MV STENOSIS PRESSURE HALF TIME: 53 MS
MV VALVE AREA P 1/2 METHOD: 4.15
NEUTROPHILS # BLD AUTO: 3.88 THOUSANDS/ÂΜL (ref 1.85–7.62)
NEUTS SEG NFR BLD AUTO: 60 % (ref 43–75)
NRBC BLD AUTO-RTO: 0 /100 WBCS
PLATELET # BLD AUTO: 183 THOUSANDS/UL (ref 149–390)
PMV BLD AUTO: 9.4 FL (ref 8.9–12.7)
POTASSIUM SERPL-SCNC: 4.3 MMOL/L (ref 3.5–5.3)
RBC # BLD AUTO: 4.26 MILLION/UL (ref 3.88–5.62)
RIGHT VENTRICLE ID DIMENSION: 4.3 CM
SL CV LV EF: 65
SL CV PED ECHO LEFT VENTRICLE DIASTOLIC VOLUME (MOD BIPLANE) 2D: 119 ML
SL CV PED ECHO LEFT VENTRICLE SYSTOLIC VOLUME (MOD BIPLANE) 2D: 40 ML
SODIUM SERPL-SCNC: 137 MMOL/L (ref 135–147)
TRICUSPID ANNULAR PLANE SYSTOLIC EXCURSION: 2.4 CM
TRIGL SERPL-MCNC: 454 MG/DL
WBC # BLD AUTO: 6.59 THOUSAND/UL (ref 4.31–10.16)

## 2024-05-17 PROCEDURE — 93356 MYOCRD STRAIN IMG SPCKL TRCK: CPT

## 2024-05-17 PROCEDURE — 85025 COMPLETE CBC W/AUTO DIFF WBC: CPT | Performed by: INTERNAL MEDICINE

## 2024-05-17 PROCEDURE — 99239 HOSP IP/OBS DSCHRG MGMT >30: CPT

## 2024-05-17 PROCEDURE — 99214 OFFICE O/P EST MOD 30 MIN: CPT | Performed by: PODIATRIST

## 2024-05-17 PROCEDURE — 82948 REAGENT STRIP/BLOOD GLUCOSE: CPT

## 2024-05-17 PROCEDURE — 83036 HEMOGLOBIN GLYCOSYLATED A1C: CPT | Performed by: INTERNAL MEDICINE

## 2024-05-17 PROCEDURE — 93356 MYOCRD STRAIN IMG SPCKL TRCK: CPT | Performed by: INTERNAL MEDICINE

## 2024-05-17 PROCEDURE — 93306 TTE W/DOPPLER COMPLETE: CPT | Performed by: INTERNAL MEDICINE

## 2024-05-17 PROCEDURE — 70551 MRI BRAIN STEM W/O DYE: CPT

## 2024-05-17 PROCEDURE — 97166 OT EVAL MOD COMPLEX 45 MIN: CPT

## 2024-05-17 PROCEDURE — 80048 BASIC METABOLIC PNL TOTAL CA: CPT | Performed by: INTERNAL MEDICINE

## 2024-05-17 PROCEDURE — 92610 EVALUATE SWALLOWING FUNCTION: CPT

## 2024-05-17 PROCEDURE — 80061 LIPID PANEL: CPT | Performed by: INTERNAL MEDICINE

## 2024-05-17 PROCEDURE — 93306 TTE W/DOPPLER COMPLETE: CPT

## 2024-05-17 PROCEDURE — 97163 PT EVAL HIGH COMPLEX 45 MIN: CPT

## 2024-05-17 PROCEDURE — 83721 ASSAY OF BLOOD LIPOPROTEIN: CPT | Performed by: INTERNAL MEDICINE

## 2024-05-17 RX ORDER — LANCETS 33 GAUGE
EACH MISCELLANEOUS
Qty: 100 EACH | Refills: 0 | Status: SHIPPED | OUTPATIENT
Start: 2024-05-17 | End: 2024-05-21 | Stop reason: SDUPTHER

## 2024-05-17 RX ORDER — LORAZEPAM 2 MG/ML
0.5 INJECTION INTRAMUSCULAR ONCE
Status: COMPLETED | OUTPATIENT
Start: 2024-05-17 | End: 2024-05-17

## 2024-05-17 RX ORDER — GLUCOSAMINE HCL/CHONDROITIN SU 500-400 MG
CAPSULE ORAL
Qty: 100 EACH | Refills: 0 | Status: SHIPPED | OUTPATIENT
Start: 2024-05-17

## 2024-05-17 RX ORDER — BLOOD-GLUCOSE METER
KIT MISCELLANEOUS
Qty: 1 KIT | Refills: 0 | Status: SHIPPED | OUTPATIENT
Start: 2024-05-17

## 2024-05-17 RX ORDER — INSULIN LISPRO 100 [IU]/ML
1-6 INJECTION, SOLUTION INTRAVENOUS; SUBCUTANEOUS
Status: DISCONTINUED | OUTPATIENT
Start: 2024-05-17 | End: 2024-05-17 | Stop reason: HOSPADM

## 2024-05-17 RX ORDER — INSULIN LISPRO 100 [IU]/ML
1-5 INJECTION, SOLUTION INTRAVENOUS; SUBCUTANEOUS
Status: DISCONTINUED | OUTPATIENT
Start: 2024-05-17 | End: 2024-05-17 | Stop reason: HOSPADM

## 2024-05-17 RX ORDER — CHLORAL HYDRATE 500 MG
1000 CAPSULE ORAL DAILY
Qty: 30 CAPSULE | Refills: 0 | Status: SHIPPED | OUTPATIENT
Start: 2024-05-17 | End: 2024-06-16

## 2024-05-17 RX ORDER — CHLORAL HYDRATE 500 MG
1000 CAPSULE ORAL DAILY
Status: DISCONTINUED | OUTPATIENT
Start: 2024-05-17 | End: 2024-05-17 | Stop reason: HOSPADM

## 2024-05-17 RX ORDER — ATORVASTATIN CALCIUM 40 MG/1
40 TABLET, FILM COATED ORAL EVERY EVENING
Qty: 30 TABLET | Refills: 0 | Status: SHIPPED | OUTPATIENT
Start: 2024-05-17 | End: 2024-06-16

## 2024-05-17 RX ORDER — BLOOD SUGAR DIAGNOSTIC
STRIP MISCELLANEOUS
Qty: 100 EACH | Refills: 0 | Status: SHIPPED | OUTPATIENT
Start: 2024-05-17 | End: 2024-05-21 | Stop reason: SDUPTHER

## 2024-05-17 RX ADMIN — AZATHIOPRINE 50 MG: 50 TABLET ORAL at 08:45

## 2024-05-17 RX ADMIN — BUPROPION HYDROCHLORIDE 150 MG: 150 TABLET, EXTENDED RELEASE ORAL at 08:45

## 2024-05-17 RX ADMIN — ENOXAPARIN SODIUM 40 MG: 40 INJECTION SUBCUTANEOUS at 08:45

## 2024-05-17 RX ADMIN — LORAZEPAM 0.5 MG: 2 INJECTION INTRAMUSCULAR; INTRAVENOUS at 12:47

## 2024-05-17 RX ADMIN — CEPHALEXIN 500 MG: 250 CAPSULE ORAL at 06:07

## 2024-05-17 RX ADMIN — PREGABALIN 150 MG: 100 CAPSULE ORAL at 08:45

## 2024-05-17 RX ADMIN — INSULIN LISPRO 3 UNITS: 100 INJECTION, SOLUTION INTRAVENOUS; SUBCUTANEOUS at 12:22

## 2024-05-17 RX ADMIN — CEPHALEXIN 500 MG: 250 CAPSULE ORAL at 12:22

## 2024-05-17 RX ADMIN — PANTOPRAZOLE SODIUM 40 MG: 40 TABLET, DELAYED RELEASE ORAL at 06:07

## 2024-05-17 RX ADMIN — CEPHALEXIN 500 MG: 250 CAPSULE ORAL at 00:16

## 2024-05-17 RX ADMIN — CLOPIDOGREL BISULFATE 75 MG: 75 TABLET ORAL at 08:45

## 2024-05-17 NOTE — ASSESSMENT & PLAN NOTE
Lipid panel reviewed with elevated total cholesterol, LDL, low HDL  Triglycerides elevated  Given 10-year ASCVD risk 11.3%, will discharge home on fish oil pills as well as atorvastatin  Patient educated on lifestyle/nutrition modifications  Follow-up with PCP

## 2024-05-17 NOTE — NURSING NOTE
Patient to be discharged to home in stable condition. Instructions given to patient. Spouse to take him home.

## 2024-05-17 NOTE — OCCUPATIONAL THERAPY NOTE
Occupational Therapy Evaluation     Patient Name: Benito Park  Today's Date: 5/17/2024  Problem List  Principal Problem:    Dysarthria  Active Problems:    Neuropathy    Tobacco use    S/P insertion of spinal cord stimulator    Hypertension    Ulcer of left foot, limited to breakdown of skin (HCC)    acute Confusional state    Past Medical History  Past Medical History:   Diagnosis Date    Anxiety     Back pain     Callus Few months ago    Colon polyp     CPAP (continuous positive airway pressure) dependence     Crohn's disease (HCC)     Depression     GERD (gastroesophageal reflux disease)     Hypertension     Perianal fistula     Sleep apnea     Terminal ileitis (HCC)      Past Surgical History  Past Surgical History:   Procedure Laterality Date    BACK SURGERY  2019    CAST APPLICATION Right 11/17/2022    Procedure: Application short-arm splint;  Surgeon: Sarthak Villatoro MD;  Location: [facility];  Service: Orthopedics    COLONOSCOPY      EGD      HAND CONTRACTURE RELEASE Left 06/01/2023    Procedure: left ring and small finger dupuytrens excision and ring finger Metacarpophalangeal joint release and small finger Metacarpophalangeal and proximal interphalangeal joint release;  Surgeon: Sarthak Villatoro MD;  Location: [facility];  Service: Orthopedics    HERNIA REPAIR      umbilical hernia    WV ANRCT XM SURG REQ ANES GENERAL SPI/EDRL DX N/A 11/17/2021    Procedure: EXAM UNDER ANESTHESIA (EUA);  Surgeon: Davi Thao MD;  Location: [facility];  Service: Colorectal    WV APPLICATION SHORT ARM SPLINT FOREARM-HAND STATIC Right 1/11/2024    Procedure: Application short arm splint;  Surgeon: Sarthak Villatoro MD;  Location: [facility];  Service: Orthopedics    WV CAPSULECTOMY/CAPSULOTOMY IPHAL JOINT EACH Right 1/11/2024    Procedure: Contracture release right hand small finger proximal interphalangeal joint;  Surgeon: Sarthak Villatoro MD;  Location: [facility];  Service: Orthopedics    WV  FASCIOTOMY PALMAR OPEN PARTIAL Right 11/17/2022    Procedure: Dupuytren's excision right palm extending into the small finger with release of the metacarpophalangeal joint and proximal interphalangeal joint.  Dupuytren's excision right palm with release of the right ring finger metacarpophalangeal joint.;  Surgeon: Sarthak Villatoro MD;  Location: [facility];  Service: Orthopedics    OH FASCIOTOMY PALMAR OPEN PARTIAL Right 1/11/2024    Procedure: Right hand small finger duppuytrens excision with release of the metacarpophalangeal joint and proximal interphalangeal joint;  Surgeon: Sarthak Villatoro MD;  Location: [facility];  Service: Orthopedics    OH FASCT PALM W/WO Z-PLASTY TISSUE REARGMT/SKN GRFT Right 1/11/2024    Procedure: Right hand small finger duppuytrens excision with release of the metacarpophalangeal joint and proximal interphalangeal joint;  Surgeon: Sarthak Villatoro MD;  Location: [facility];  Service: Orthopedics    OH FASCT PRTL PALMAR 1 DGT PROX IPHAL JT W/WO RPR Right 1/11/2024    Procedure: Right hand small finger duppuytrens excision with release of the metacarpophalangeal joint and proximal interphalangeal joint;  Surgeon: Sarthak Villatoro MD;  Location:  MAIN OR;  Service: Orthopedics    OH I&D ISCHIORECTAL&/PERIRECTAL ABSCESS SPX Right 10/24/2021    Procedure: excisional debredement right gluteal cheek ;  Surgeon: Jeana Bustamante MD;  Location: [facility];  Service: General    OH PLACEMENT SETON N/A 11/17/2021    Procedure: PLACEMENT SETON;  Surgeon: Davi Thao MD;  [facility];  Service: Colorectal    OH PRQ IMPLTJ NSTIM ELECTRODE ARRAY EPIDURAL Right 03/26/2021    Procedure: INSERTION THORACIC DORSAL COLUMN SPINAL CORD STIMULATOR PERCUTANEOUS W IMPLANTABLE PULSE GENERATOR, RIGHT;  Surgeon: Akshat Witt MD;  Location:  MAIN OR;  Service: Neurosurgery             05/17/24 1346   OT Last Visit   OT Visit Date 05/17/24   Note Type   Note type Evaluation   Pain  "Assessment   Pain Assessment Tool 0-10   Pain Score No Pain   Restrictions/Precautions   Weight Bearing Precautions Per Order No   Home Living   Type of Home House   Home Layout Multi-level;Bed/bath upstairs  (3 GITA- B/B)   Bathroom Shower/Tub Walk-in shower   Bathroom Equipment Built-in shower seat   Additional Comments No DME PTA   Prior Function   Level of [place] Independent with functional mobility;Needs assistance with IADLS;Needs assistance with ADLs  (Pt gets assistance for donning socks at baseline 2/2 h/o back sx/pain)   Lives With Spouse;Daughter   Receives Help From Family   IADLs Independent with driving   Falls in the last 6 months 1 to 4  (2 falls yesterday - wife reports an additional fall that pt unable to recall)   General   Additional Pertinent History Pt & spouse report balance/dizziness problems for \"a long time\"   Family/Caregiver Present Yes   Additional General Comments Spouse present   Subjective   Subjective \"I wanna get out of here\"   ADL   Eating Assistance 7  Independent   Grooming Assistance 7  Independent   UB Bathing Assistance 7  Independent   LB Bathing Assistance 7  Independent   UB Dressing Assistance 7  Independent   LB Dressing Assistance 4  Minimal Assistance   Toileting Assistance  7  Independent   Bed Mobility   Supine to Sit Unable to assess   Additional Comments Received OOB to recliner   Transfers   Sit to Stand 7  Independent   Stand to Sit 7  Independent   Functional Mobility   Functional Mobility 7  Independent   Additional Comments Functional household distance   Balance   Static Sitting Normal   Dynamic Sitting Normal   Static Standing Normal   Dynamic Standing Good   Ambulatory Good   Activity Tolerance   Activity Tolerance Patient tolerated treatment well   Medical Staff Made Aware PT Melissa   RUE Assessment   RUE Assessment WFL   LUE Assessment   LUE Assessment WFL   Vision-Basic Assessment   Current Vision Wears glasses only for reading   Cognition   Overall " Cognitive Status WFL   Arousal/Participation Alert   Attention Within functional limits   Orientation Level Oriented X4   Memory Decreased recall of recent events   Following Commands Follows all commands and directions without difficulty   Assessment   Prognosis Good   Assessment Pt is a 53 y.o. male seen for OT evaluation at [facility], admitted 5/16/2024 w/ Dysarthria. OT completed expanded review of pt's medical and social history. Comorbidities affecting pt's functional performance at time of assessment include: neuropathy, HTN, L foot ulcer, acute confusional state, multiple falls, h/o LBP, h/o heavy alcohol use. Prior to admission, pt was living with his wife in a 3SH with B/B on 2nd floor. Pt was Mostly independent for ADLs (A for donning socks), shared IADLs with wife, (+) , & independent with functional txfers/mobility. Upon evaluation, pt presents to OT at his functional baseline. Based on findings, pt is of moderate complexity. The patient's raw score on the AM-PAC Daily Activity inpatient short form is 23, standardized score is 51.12, greater than 39.4. Patients at this level are likely to benefit from DC to home. Please refer to the recommendation of the Occupational Therapist for safe DC planning. At this time, OT recommendations at time of discharge are no OT needs. No further acute OT needs indicated at this time - Recommend pt continue to be OOB for meals, ambulation to/from BR, perform self care tasks, and mobility in hallway with nursing. D/C from OT caseload with above recommendations.   Goals   Patient Goals Pt wants to go home   Plan   OT Frequency Eval only   Discharge Recommendation   Rehab Resource Intensity Level, OT No post-acute rehabilitation needs   AM-PAC Daily Activity Inpatient   Lower Body Dressing 3   Bathing 4   Toileting 4   Upper Body Dressing 4   Grooming 4   Eating 4   Daily Activity Raw Score 23   Daily Activity Standardized Score (Calc for Raw Score >=11)  51.12   AM-PAC Applied Cognition Inpatient   Following a Speech/Presentation 4   Understanding Ordinary Conversation 4   Taking Medications 4   Remembering Where Things Are Placed or Put Away 4   Remembering List of 4-5 Errands 4   Taking Care of Complicated Tasks 4   Applied Cognition Raw Score 24   Applied Cognition Standardized Score 62.21   End of Consult   Education Provided Yes;Family or social support of family present for education by provider   Patient Position at End of Consult Bedside chair;All needs within reach   Nurse Communication Nurse aware of consult     Rosa Keen OTR/L

## 2024-05-17 NOTE — PLAN OF CARE
Problem: PAIN - ADULT  Goal: Verbalizes/displays adequate comfort level or baseline comfort level  Description: Interventions:  - Encourage patient to monitor pain and request assistance  - Assess pain using appropriate pain scale  - Administer analgesics based on type and severity of pain and evaluate response  - Implement non-pharmacological measures as appropriate and evaluate response  - Consider cultural and social influences on pain and pain management  - Notify physician/advanced practitioner if interventions unsuccessful or patient reports new pain  Outcome: Progressing     Problem: INFECTION - ADULT  Goal: Absence or prevention of progression during hospitalization  Description: INTERVENTIONS:  - Assess and monitor for signs and symptoms of infection  - Monitor lab/diagnostic results  - Monitor all insertion sites, i.e. indwelling lines, tubes, and drains  - Monitor endotracheal if appropriate and nasal secretions for changes in amount and color  - [place] appropriate cooling/warming therapies per order  - Administer medications as ordered  - Instruct and encourage patient and family to use good hand hygiene technique  - Identify and instruct in appropriate isolation precautions for identified infection/condition  Outcome: Progressing  Goal: Absence of fever/infection during neutropenic period  Description: INTERVENTIONS:  - Monitor WBC    Outcome: Progressing

## 2024-05-17 NOTE — PLAN OF CARE
Problem: Potential for Falls  Goal: Patient will remain free of falls  Description: INTERVENTIONS:  - Educate patient/family on patient safety including physical limitations  - Instruct patient to call for assistance with activity   - Consult OT/PT to assist with strengthening/mobility   - Keep Call bell within reach  - Keep bed low and locked with side rails adjusted as appropriate  - Keep care items and personal belongings within reach  - Initiate and maintain comfort rounds  - Make Fall Risk Sign visible to staff  - Offer Toileting every 3 Hours, in advance of need  - Initiate/Maintain alarm  - Obtain necessary fall risk management equipment  - Apply yellow socks and bracelet for high fall risk patients  - Consider moving patient to room near nurses station  Outcome: Progressing     Problem: PAIN - ADULT  Goal: Verbalizes/displays adequate comfort level or baseline comfort level  Description: Interventions:  - Encourage patient to monitor pain and request assistance  - Assess pain using appropriate pain scale  - Administer analgesics based on type and severity of pain and evaluate response  - Implement non-pharmacological measures as appropriate and evaluate response  - Consider cultural and social influences on pain and pain management  - Notify physician/advanced practitioner if interventions unsuccessful or patient reports new pain  Outcome: Progressing     Problem: INFECTION - ADULT  Goal: Absence or prevention of progression during hospitalization  Description: INTERVENTIONS:  - Assess and monitor for signs and symptoms of infection  - Monitor lab/diagnostic results  - Monitor all insertion sites, i.e. indwelling lines, tubes, and drains  - Monitor endotracheal if appropriate and nasal secretions for changes in amount and color  - [place] appropriate cooling/warming therapies per order  - Administer medications as ordered  - Instruct and encourage patient and family to use good hand hygiene technique  -  Identify and instruct in appropriate isolation precautions for identified infection/condition  Outcome: Progressing  Goal: Absence of fever/infection during neutropenic period  Description: INTERVENTIONS:  - Monitor WBC    Outcome: Progressing     Problem: SAFETY ADULT  Goal: Patient will remain free of falls  Description: INTERVENTIONS:  - Educate patient/family on patient safety including physical limitations  - Instruct patient to call for assistance with activity   - Consult OT/PT to assist with strengthening/mobility   - Keep Call bell within reach  - Keep bed low and locked with side rails adjusted as appropriate  - Keep care items and personal belongings within reach  - Initiate and maintain comfort rounds  - Make Fall Risk Sign visible to staff  - Offer Toileting every 3 Hours, in advance of need  - Initiate/Maintain alarm  - Obtain necessary fall risk management equipment  - Apply yellow socks and bracelet for high fall risk patients  - Consider moving patient to room near nurses station  Outcome: Progressing     Problem: SAFETY ADULT  Goal: Patient will remain free of falls  Description: INTERVENTIONS:  - Educate patient/family on patient safety including physical limitations  - Instruct patient to call for assistance with activity   - Consult OT/PT to assist with strengthening/mobility   - Keep Call bell within reach  - Keep bed low and locked with side rails adjusted as appropriate  - Keep care items and personal belongings within reach  - Initiate and maintain comfort rounds  - Make Fall Risk Sign visible to staff  - Offer Toileting every 3 Hours, in advance of need  - Initiate/Maintain alarm  - Obtain necessary fall risk management equipment  - Apply yellow socks and bracelet for high fall risk patients  - Consider moving patient to room near nurses station  Outcome: Progressing  Goal: Maintain or return to baseline ADL function  Description: INTERVENTIONS:  -  Assess patient's ability to carry out  ADLs; assess patient's baseline for ADL function and identify physical deficits which impact ability to perform ADLs (bathing, care of mouth/teeth, toileting, grooming, dressing, etc.)  - Assess/evaluate cause of self-care deficits   - Assess range of motion  - Assess patient's mobility; develop plan if impaired  - Assess patient's need for assistive devices and provide as appropriate  - Encourage maximum independence but intervene and supervise when necessary  - Involve family in performance of ADLs  - Assess for home care needs following discharge   - Consider OT consult to assist with ADL evaluation and planning for discharge  - Provide patient education as appropriate  Outcome: Progressing  Goal: Maintains/Returns to pre admission functional level  Description: INTERVENTIONS:  - Perform AM-PAC 6 Click Basic Mobility/ Daily Activity assessment daily.  - Set and communicate daily mobility goal to care team and patient/family/caregiver.   - Collaborate with rehabilitation services on mobility goals if consulted  - Perform Range of Motion 3 times a day.  - Reposition patient every 3 hours.  - Dangle patient 3 times a day  - Stand patient 3 times a day  - Ambulate patient 3 times a day  - Out of bed to chair 3 times a day   - Out of bed for meals 3 times a day  - Out of bed for toileting  - Record patient progress and toleration of activity level   Outcome: Progressing     Problem: DISCHARGE PLANNING  Goal: Discharge to home or other facility with appropriate resources  Description: INTERVENTIONS:  - Identify barriers to discharge w/patient and caregiver  - Arrange for needed discharge resources and transportation as appropriate  - Identify discharge learning needs (meds, wound care, etc.)  - Arrange for interpretive services to assist at discharge as needed  - Refer to Case Management Department for coordinating discharge planning if the patient needs post-hospital services based on physician/advanced practitioner  order or complex needs related to functional status, cognitive ability, or social support system  Outcome: Progressing     Problem: Knowledge Deficit  Goal: Patient/family/caregiver demonstrates understanding of disease process, treatment plan, medications, and discharge instructions  Description: Complete learning assessment and assess knowledge base.  Interventions:  - Provide teaching at level of understanding  - Provide teaching via preferred learning methods  Outcome: Progressing     Problem: MOBILITY - ADULT  Goal: Maintain or return to baseline ADL function  Description: INTERVENTIONS:  -  Assess patient's ability to carry out ADLs; assess patient's baseline for ADL function and identify physical deficits which impact ability to perform ADLs (bathing, care of mouth/teeth, toileting, grooming, dressing, etc.)  - Assess/evaluate cause of self-care deficits   - Assess range of motion  - Assess patient's mobility; develop plan if impaired  - Assess patient's need for assistive devices and provide as appropriate  - Encourage maximum independence but intervene and supervise when necessary  - Involve family in performance of ADLs  - Assess for home care needs following discharge   - Consider OT consult to assist with ADL evaluation and planning for discharge  - Provide patient education as appropriate  Outcome: Progressing  Goal: Maintains/Returns to pre admission functional level  Description: INTERVENTIONS:  - Perform AM-PAC 6 Click Basic Mobility/ Daily Activity assessment daily.  - Set and communicate daily mobility goal to care team and patient/family/caregiver.   - Collaborate with rehabilitation services on mobility goals if consulted  - Perform Range of Motion 3 times a day.  - Reposition patient every 3 hours.  - Dangle patient 3 times a day  - Stand patient 3 times a day  - Ambulate patient 3 times a day  - Out of bed to chair 3 times a day   - Out of bed for meals 3 times a day  - Out of bed for  toileting  - Record patient progress and toleration of activity level   Outcome: Progressing     Problem: NEUROSENSORY - ADULT  Goal: Achieves stable or improved neurological status  Description: INTERVENTIONS  - Monitor and report changes in neurological status  - Monitor vital signs such as temperature, blood pressure, glucose, and any other labs ordered   - Initiate measures to prevent increased intracranial pressure  - Monitor for seizure activity and implement precautions if appropriate      Outcome: Progressing  Goal: Achieves maximal functionality and self care  Description: INTERVENTIONS  - Monitor swallowing and airway patency with patient fatigue and changes in neurological status  - Encourage and assist patient to increase activity and self care.   - Encourage visually impaired, hearing impaired and aphasic patients to use assistive/communication devices  Outcome: Progressing     Problem: CARDIOVASCULAR - ADULT  Goal: Maintains optimal cardiac output and hemodynamic stability  Description: INTERVENTIONS:  - Monitor I/O, vital signs and rhythm  - Monitor for S/S and trends of decreased cardiac output  - Administer and titrate ordered vasoactive medications to optimize hemodynamic stability  - Assess quality of pulses, skin color and temperature  - Assess for signs of decreased coronary artery perfusion  - Instruct patient to report change in severity of symptoms  Outcome: Progressing     Problem: SKIN/TISSUE INTEGRITY - ADULT  Goal: Incision(s), wounds(s) or drain site(s) healing without S/S of infection  Description: INTERVENTIONS  - Assess and document dressing, incision, wound bed, drain sites and surrounding tissue  - Provide patient and family education  - Perform skin care/dressing changes  Outcome: Progressing     Problem: MUSCULOSKELETAL - ADULT  Goal: Maintain or return mobility to safest level of function  Description: INTERVENTIONS:  - Assess patient's ability to carry out ADLs; assess patient's  baseline for ADL function and identify physical deficits which impact ability to perform ADLs (bathing, care of mouth/teeth, toileting, grooming, dressing, etc.)  - Assess/evaluate cause of self-care deficits   - Assess range of motion  - Assess patient's mobility  - Assess patient's need for assistive devices and provide as appropriate  - Encourage maximum independence but intervene and supervise when necessary  - Involve family in performance of ADLs  - Assess for home care needs following discharge   - Consider OT consult to assist with ADL evaluation and planning for discharge  - Provide patient education as appropriate  Outcome: Progressing

## 2024-05-17 NOTE — ASSESSMENT & PLAN NOTE
Follows with podiatry  Completed Keflex on 5/17  Podiatry consulted, no further antibiotics needed, continue outpatient follow-up

## 2024-05-17 NOTE — PHYSICAL THERAPY NOTE
PHYSICAL THERAPY EVALUATION NOTE    Patient Name: Benito Park  Today's Date: 2024    AGE:   53 y.o.  Mrn:   [id number]  ADMIT DX:  Dizziness [R42]  Slurred speech [R47.81]  Speech disturbance [R47.9]  Pulmonary nodule [R91.1]  Multiple falls [R29.6]    Past Medical History:   Diagnosis Date    Anxiety     Back pain     Callus Few months ago    Colon polyp     CPAP (continuous positive airway pressure) dependence     Crohn's disease (HCC)     Depression     GERD (gastroesophageal reflux disease)     Hypertension     Perianal fistula     Sleep apnea     Terminal ileitis (HCC)      Length Of Stay: 0  PHYSICAL THERAPY EVALUATION :   Patient's identity confirmed via 2 patient identifiers (full name and ) at start of session       24 1354   PT Last Visit   PT Visit Date 24   Note Type   Note type Evaluation   Pain Assessment   Pain Assessment Tool 0-10   Pain Score No Pain   Restrictions/Precautions   Weight Bearing Precautions Per Order No   Other Precautions Bed Alarm;Chair Alarm;Fall Risk;Multiple lines   Home Living   Type of Home House   Home Layout Multi-level;Stairs to enter with rails  (3 GITA)   Bathroom Shower/Tub Walk-in shower   Home Equipment   (none PTA)   Prior Function   Level of [place] Independent with functional mobility;Needs assistance with IADLS;Needs assistance with ADLs   Lives With Spouse;Daughter   Receives Help From Family   Falls in the last 6 months 1 to 4  (2 yesterday)   General   Family/Caregiver Present Yes  (Wife)   Cognition   Overall Cognitive Status WFL   Arousal/Participation Alert   Orientation Level Oriented X4   Memory Decreased short term memory   Following Commands Follows multistep commands with increased time or repetition   Comments Pt pleasant and agreeable to participate in PT eval   RLE Assessment   RLE Assessment WFL   Strength RLE   RLE Overall Strength 4/5   LLE  Assessment   LLE Assessment WFL   Strength LLE   LLE Overall Strength 4/5   Vision-Basic Assessment   Current Vision Wears glasses only for reading   Light Touch   RLE Light Touch Impaired   RLE Light Touch Comments numbness/burning, baseline neuropathy  (follows w/ podiatry)   LLE Light Touch Impaired   LLE Light Touch Comments numbness/burning, baseline neuropathy   Bed Mobility   Supine to Sit Unable to assess   Additional Comments Pt seated OOB in recliner chair at start and end of session   Transfers   Sit to Stand 7  Independent   Stand to Sit 7  Independent   Ambulation/Elevation   Gait pattern Wide ENMA   Gait Assistance 6  Modified independent   Assistive Device None   Distance 120 ft   Balance   Static Sitting Good   Dynamic Sitting Good   Static Standing Good   Dynamic Standing Good   Ambulatory Good   Activity Tolerance   Activity Tolerance Patient tolerated treatment well   Medical Staff Made Aware OT Rosa   Assessment   Problem List Impaired balance;Decreased mobility;Impaired sensation   Assessment Benito Park is a 53 y.o. Male who presents [facility] on 5/17/2024 from home w/ c/o slurred speech and multiple falls and diagnosis of dysarthria. Orders for PT eval and treat received. Pt presents w/ comorbidities of neuropathy, HTN, CPS, Crohn's Disease. At baseline, pt mobilizes independently w/ no AD, and reports 2 falls in the last 6 months. Upon evaluation, pt presents w/ the following deficits: altered sensation, impaired balance, and impaired vision. Upon eval, pt requires modified I for transfers, and modified I for gait. Based on this PT evaluation today, patient's discharge recommendation is for Level IV. Given the above findings from this evaluation, at this time this patient does not require skilled inpatient PT for the remainder of this admission. Will D/C patient from PT caseload, please reconsult if any changes or needs arise.   Goals   Patient Goals to go home   Discharge Recommendation    Rehab Resource Intensity Level, PT No post-acute rehabilitation needs   AM-PAC Basic Mobility Inpatient   Turning in Flat Bed Without Bedrails 4   Lying on Back to Sitting on Edge of Flat Bed Without Bedrails 4   Moving Bed to Chair 4   Standing Up From Chair Using Arms 4   Walk in Room 4   Climb 3-5 Stairs With Railing 3   Basic Mobility Inpatient Raw Score 23   Basic Mobility Standardized Score 50.88   [facility] Highest Level Of Mobility   -HLM Goal 7: Walk 25 feet or more   -HLM Achieved 7: Walk 25 feet or more   End of Consult   Patient Position at End of Consult Bedside chair;All needs within reach           The patient's AM-PAC Basic Mobility Inpatient Short Form Raw Score is 23, Standardized Score is 50.88. A standardized score greater than 38.32 (raw score of 16) suggests the patient may benefit from discharge to home which may not coincide with above PT recommendations. However please refer to therapist recommendation for discharge planning given other factors that may influence destination.    Given the above findings from this evaluation, at this time this patient does not require skilled inpatient PT for the remainder of this admission. Will D/C patient from PT caseload, please reconsult if any changes or needs arise.      Melissa Dotson PT, DPT

## 2024-05-17 NOTE — CASE MANAGEMENT
Case Management Assessment & Discharge Planning Note    Patient name Benito Park  Location /-01 MRN [id number]  : 1970 Date 2024       Current Admission Date: 2024  Current Admission Diagnosis:Dysarthria   Patient Active Problem List    Diagnosis Date Noted Date Diagnosed    Dysarthria 2024     acute Confusional state 2024     Ulcer of left foot, limited to breakdown of skin (HCC) 05/10/2024     Arthralgia 04/15/2024     Pes anserinus bursitis of left knee 2024     Obstructive sleep apnea syndrome 2023     Capillary disorder 10/31/2023     Epididymitis, right 10/31/2023     Dupuytren contracture 2023     Sleep apnea 2023     Snoring 2023     Excessive daytime sleepiness 2023     Overweight (BMI 25.0-29.9) 2023     Hypertension      Smoker      Heavy alcohol consumption      Cervical radiculopathy      Herniated nucleus pulposus, C3-4      Numbness and tingling in right hand 2021     Neck pain 2021     Crohn's disease ([facility])      S/P insertion of spinal cord stimulator 2021     Myofascial pain syndrome 2021     Arthritis of right shoulder region      Chronic right shoulder pain      Vitamin D deficiency 2021     Functional diarrhea 11/10/2020     History of colon polyps 2020     GERD (gastroesophageal reflux disease)      Perianal fistula      Lumbar radiculopathy 2020     Chronic foot pain 2020     Terminal ileitis (HCC) 2020     Viral enteritis 2020     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 (HCC) 2020     Congenital fusion of sacroiliac joint 2020     Right leg swelling 2020     Chronic pain syndrome 2020     Failed back surgical syndrome 2020     Lumbar spondylosis 2020     Neuropathy 2020     Chronic bilateral low back pain with bilateral sciatica 2020     Bilateral lower extremity edema 2020     Numbness  and tingling of both lower extremities 01/23/2020     History of lumbar fusion 12/26/2019     Degenerative disc disease at L5-S1 level 11/26/2019     Tobacco use 10/31/2019     Perianal abscess 09/25/2019     Sprain of anterior talofibular ligament of right ankle 03/26/2019     Periorbital cellulitis of left eye 01/18/2018     Blepharitis, left eye 01/16/2018     Compression of right ulnar nerve at multiple levels 03/15/2017     Closed fracture of radial styloid 01/03/2017       LOS (days): 0  Geometric Mean LOS (GMLOS) (days):   Days to GMLOS:     OBJECTIVE:              Current admission status: Observation       Preferred Pharmacy:   [id number] - [address] [address]  Phone: [phone] Fax: [phone]    Primary Care Provider: Ariel Sosa MD    Primary Insurance: MEDICARE  Secondary Insurance: [employer]    ASSESSMENT:  Active Health Care Proxies    There are no active Health Care Proxies on file.                 Readmission Root Cause  30 Day Readmission: No    Patient Information  Admitted from:: Home  Mental Status: Alert  During Assessment patient was accompanied by: Spouse  Assessment information provided by:: Patient  Primary Caregiver: Self  Support Systems: Spouse/significant other, Daughter  County of Residence: [place]  What city do you live in?: [place]  Home entry access options. Select all that apply.: Stairs  Number of steps to enter home.: 3  Do the steps have railings?: No  Type of Current Residence: [address] [place] [address] home  Upon entering residence, is there a bedroom on the main floor (no further steps)?: No  A bedroom is located on the following floor levels of residence (select all that apply):: 2nd Floor  Upon entering residence, is there a bathroom on the main floor (no further steps)?: No  Indicate which floors of current residence have a bathroom (select all the apply):: 2nd Floor  Number of steps to 2nd floor from main floor:  One Flight  Living Arrangements: Lives w/ Spouse/significant other, Lives w/ Daughter  Is patient a ?: No    Activities of Daily Living Prior to Admission  Functional Status: Independent  Completes ADLs independently?: Yes  Ambulates independently?: Yes  Does patient use assisted devices?: No  Does patient currently own DME?: No  Does patient have a history of Outpatient Therapy (PT/OT)?: Yes  Does the patient have a history of Short-Term Rehab?: No  Does patient have a history of HHC?: No  Does patient currently have HHC?: No         Patient Information Continued  Income Source: SSI/SSD  Does patient have prescription coverage?: Yes  Does patient receive dialysis treatments?: No  Does patient have a history of substance abuse?: No  Does patient have a history of Mental Health Diagnosis?: No         Means of Transportation  Means of Transport to Appts:: Drives Self      Social Determinants of Health (SDOH)      Flowsheet Row Most Recent Value   Housing Stability    In the last 12 months, was there a time when you were not able to pay the mortgage or rent on time? N   In the past 12 months, how many times have you moved where you were living? 1   At any time in the past 12 months, were you homeless or living in a shelter (including now)? N   Transportation Needs    In the past 12 months, has lack of transportation kept you from medical appointments or from getting medications? no   In the past 12 months, has lack of transportation kept you from meetings, work, or from getting things needed for daily living? No   Food Insecurity    Within the past 12 months, you worried that your food would run out before you got the money to buy more. Never true   Within the past 12 months, the food you bought just didn't last and you didn't have money to get more. Never true   Utilities    In the past 12 months has the electric, gas, oil, or water company threatened to shut off services in your home? No            DISCHARGE  DETAILS:    Discharge planning discussed with:: Patient and pt's spouse at bedside.  Freedom of Choice: Yes  Comments - Freedom of Choice: PT/OT evals pending.  CM contacted family/caregiver?: Yes  Were Treatment Team discharge recommendations reviewed with patient/caregiver?: Yes  Did patient/caregiver verbalize understanding of patient care needs?: Yes  Were patient/caregiver advised of the risks associated with not following Treatment Team discharge recommendations?: Yes    Contacts  Patient Contacts: Elisabeth Xavier-Spouse  Relationship to Patient:: Family  Contact Method: In Person  Reason/Outcome: Continuity of Care, Discharge Planning, Emergency Contact    Requested Home Health Care         Is the patient interested in HHC at discharge?: No    DME Referral Provided  Referral made for DME?: No    Other Referral/Resources/Interventions Provided:  Referral Comments: No referrals made at this time. PT/OT evals pending.    Additional Comments: CM met with pt and pt's spouse at bedside and introduced self and role. Pt resides with spouse and daughter in a 3-story home with 3STE. Pt ambulatory and independent with ADL's prior to admission. Pt does needs some assistance at time with bathing when he is struggling more with his Crohns disease and back pain. Pt's spouse did report that pt had 2 falls yesterday at home. Pt does not own any DME. Pt denied hx of STR and HHC. Pt does have hx of OPPT. Pt denied hx of MH and D&A usage. PT/OT evals pending at this time. CM department to continue to follow for discharge planning needs throughout this admission.

## 2024-05-17 NOTE — CONSULTS
[facility] - Consultation    Patient Information:   Benito Park 53 y.o. male MRN: [id number]  Unit/Bed#: -01 Encounter: [id number]  PCP: Ariel Sosa MD  Date of Admission:  5/16/2024  Date of Consultation: 05/17/24  Requesting Physician: Angie Patel MD      ASSESSMENT:    Benito Park is a 53 y.o. male with:    Diabetic Loza grade 2 ulceration Left 1st MTPJ  Diabetic Loza grade 1 ulceration right 1st MTPJ  Diabetic Loza grade 0 ulceration 4th toe right  New onset type 2 DM   Peripheral Neuropathy.  Dysarthria, acute confusion state which has resolved, hypertension, status post insertion of spinal cord stimulator, tobacco use    PLAN:    Initial inpatient hospital consultation and bedside pedal examination.  I personally viewed patient's ED visit note, admission H&P, pertinent blood work, of note patient has new onset type II diabetic with A1c of 8.2 yesterday.  Superficial diabetic ulcerations bilateral feet, no infection is noted, he may complete Keflex as prescribed outpatient, local wound care only with Dermagran gauze dry dressing, no surgical intervention is necessary.  Dressings were placed on patient's feet, he is to be discharged today, extra dressings were given to patient's wife and she was educated how to change dressings every other day, do not get foot wet in shower, make sure he is wearing soft upper shoes so there is no pressure over the areas of the wounds.  Frequent elevation, low-sodium diet, proper protein intake, proper glycemic control was reviewed with patient.  Today reviewed proper diabetic footcare, shoe gear and daily foot inspection.  Patient is stable for discharge from perspective, he will follow-up outpatient in [facility] office within 1 to 2 weeks of discharge.  Rest of care per primary team.        SUBJECTIVE    History of Present Illness:    Benito Park is a 53 y.o. male with past medical history of Crohn's disease, proper tobacco and alcohol  abuse disorder, hypertension, neuropathy, lumbar radiculopathy who presented to ED yesterday with dysarthria, slurring of speech, acute confusional state, we have been consulted for care of of ulcerations which were present on admission.      Review of Systems:    Constitutional: Negative.    HENT: Negative.    Eyes: Negative.    Respiratory: Negative.    Cardiovascular: Negative.    Gastrointestinal: Negative.    Musculoskeletal: Arthralgias, back pain  Skin: Diabetic foot ulcerations  Neurological: Known peripheral neuropathy  Psych: Negative.     Past Medical and Surgical History:     Past Medical History:   Diagnosis Date    Anxiety     Back pain     Callus Few months ago    Colon polyp     CPAP (continuous positive airway pressure) dependence     Crohn's disease (HCC)     Depression     GERD (gastroesophageal reflux disease)     Hypertension     Perianal fistula     Sleep apnea     Terminal ileitis (HCC)        Past Surgical History:   Procedure Laterality Date    BACK SURGERY  2019    CAST APPLICATION Right 11/17/2022    Procedure: Application short-arm splint;  Surgeon: Sarthak Villatoro MD;  Location: [facility];  Service: Orthopedics    COLONOSCOPY      EGD      HAND CONTRACTURE RELEASE Left 06/01/2023    Procedure: left ring and small finger dupuytrens excision and ring finger Metacarpophalangeal joint release and small finger Metacarpophalangeal and proximal interphalangeal joint release;  Surgeon: Sarthak Villatoro MD;  Location: [facility];  Service: Orthopedics    HERNIA REPAIR      umbilical hernia    AL ANRCT XM SURG REQ ANES GENERAL SPI/EDRL DX N/A 11/17/2021    Procedure: EXAM UNDER ANESTHESIA (EUA);  Surgeon: Davi Thao MD;  Location: [facility];  Service: Colorectal    AL APPLICATION SHORT ARM SPLINT FOREARM-HAND STATIC Right 1/11/2024    Procedure: Application short arm splint;  Surgeon: Sarthak Villatoro MD;  Location: [facility];  Service: Orthopedics    AL  CAPSULECTOMY/CAPSULOTOMY IPHAL JOINT EACH Right 1/11/2024    Procedure: Contracture release right hand small finger proximal interphalangeal joint;  Surgeon: Sarthak Villatoro MD;  Location: [facility];  Service: Orthopedics    AZ FASCIOTOMY PALMAR OPEN PARTIAL Right 11/17/2022    Procedure: Dupuytren's excision right palm extending into the small finger with release of the metacarpophalangeal joint and proximal interphalangeal joint.  Dupuytren's excision right palm with release of the right ring finger metacarpophalangeal joint.;  Surgeon: Sarthak Villatoro MD;  Location: [facility];  Service: Orthopedics    AZ FASCIOTOMY PALMAR OPEN PARTIAL Right 1/11/2024    Procedure: Right hand small finger duppuytrens excision with release of the metacarpophalangeal joint and proximal interphalangeal joint;  Surgeon: Sarthak Villatoro MD;  Location: [facility];  Service: Orthopedics    AZ FASCT PALM W/WO Z-PLASTY TISSUE REARGMT/SKN GRFT Right 1/11/2024    Procedure: Right hand small finger duppuytrens excision with release of the metacarpophalangeal joint and proximal interphalangeal joint;  Surgeon: Sarthak Villatoro MD;  Location:  MAIN OR;  Service: Orthopedics    AZ FASCT PRTL PALMAR 1 DGT PROX IPHAL JT W/WO RPR Right 1/11/2024    Procedure: Right hand small finger duppuytrens excision with release of the metacarpophalangeal joint and proximal interphalangeal joint;  Surgeon: Sarthak Villatoro MD;  Location: [facility];  Service: Orthopedics    AZ I&D ISCHIORECTAL&/PERIRECTAL ABSCESS SPX Right 10/24/2021    Procedure: excisional debredement right gluteal cheek ;  Surgeon: Jeana Bustamante MD;  Location: [facility];  Service: General    AZ PLACEMENT SETON N/A 11/17/2021    Procedure: PLACEMENT SETON;  Surgeon: Davi Thao MD;  Location:  MAIN OR;  Service: Colorectal    AZ PRQ IMPLTJ NSTIM ELECTRODE ARRAY EPIDURAL Right 03/26/2021    Procedure: INSERTION THORACIC DORSAL COLUMN SPINAL CORD STIMULATOR  PERCUTANEOUS W IMPLANTABLE PULSE GENERATOR, RIGHT;  Surgeon: Akshat Witt MD;  Location:  MAIN OR;  Service: Neurosurgery       Meds/Allergies:      Medications Prior to Admission:     amitriptyline (ELAVIL) 100 mg tablet    azaTHIOprine (IMURAN) 50 mg tablet    buPROPion (WELLBUTRIN SR) 150 mg 12 hr tablet    cephalexin (KEFLEX) 500 mg capsule    furosemide (LASIX) 20 mg tablet    lisinopril (ZESTRIL) 20 mg tablet    naproxen (NAPROSYN) 250 mg tablet    omeprazole (PriLOSEC) 40 MG capsule    pregabalin (LYRICA) 150 mg capsule    RA Vitamin D-3 25 MCG (1000 UT) tablet    vedolizumab (Entyvio) SOLR    acetaminophen (TYLENOL) 650 mg CR tablet    sildenafil (Viagra) 100 mg tablet    urea (CARMOL) 40 %    No Known Allergies    Social History:     Marital Status: /Civil Union    Substance Use History:   Social History     Substance and Sexual Activity   Alcohol Use Yes    Alcohol/week: 3.0 standard drinks of alcohol    Types: 3 Cans of beer per week    Comment: social     Social History     Tobacco Use   Smoking Status Former    Current packs/day: 0.00    Average packs/day: 0.5 packs/day for 30.0 years (15.0 ttl pk-yrs)    Types: Cigarettes    Start date: 1/1/2019    Quit date: 2/4/2021    Years since quitting: 3.2   Smokeless Tobacco Former     Social History     Substance and Sexual Activity   Drug Use Yes    Frequency: 7.0 times per week    Types: Marijuana    Comment: Medical marijuana-vapes       Family History:    Family History   Problem Relation Age of Onset    Heart disease Father     Heart attack Father     Colon cancer Neg Hx     Colon polyps Neg Hx     Inflammatory bowel disease Neg Hx          OBJECTIVE:    Vitals:   Blood Pressure: 131/92 (05/17/24 1154)  Pulse: 80 (05/17/24 1154)  Temperature: (!) 97.2 °F (36.2 °C) (05/17/24 1154)  Temp Source: Oral (05/17/24 0018)  Respirations: 18 (05/17/24 1154)  Height: 6' (182.9 cm) (05/17/24 0901)  Weight - Scale: 102 kg (224 lb) (05/17/24 0901)  SpO2: 92 %  (05/17/24 1154)    Physical Exam    General Appearance: Alert, cooperative, no distress.  HEENT: Head normocephalic, atraumatic, without obvious abnormality.  Psychiatric: AAOx3  Lower Extremity:  Vascular:   Pedal pulses are present. CRT < 3 seconds at the digits. +0/4 edema noted at bilateral lower extremities. Pedal hair is absent. Skin temperature is WNL bilaterally.    Musculoskeletal:  MMT is 5/5 in all muscle compartments bilaterally. ROM at the 1st MPJ and ankle joint are WNL bilaterally with the leg extended. No Pain on palpation of BL foot and ankles. No gross deformities noted.     Dermatological:  Lower extremity wounds as noted below:    Wound (1) located first MTPJ left foot, measures approximately 0.5 cm cm x 1 cm cm x 0.2 cm cm  without sinus tracking or undermining. Wound bed appears granular with no drainage.  Deepest tissue noted in base is subcu. Malodor is not noticed. Wound edge appears Attached. Sydney-wound skin appears intact. Probe to bone is negative . Signs of infection are not present at this time.     Wound (2) located right first MTPJ, measures approximately 0.4 cm x 0.4 cm x 0.1 cm  without sinus tracking or undermining. Wound bed appears eschar with no drainage.  Deepest tissue noted in base is subcu. Malodor is not noticed. Wound edge appears Attached. Sydney-wound skin appears intact. Probe to bone is negative . Signs of infection are not present at this time.    Wound (3) located fourth toe right foot, measures approximately 0.5 cm x 0.2 cm x 0 cm  without sinus tracking or undermining. Wound bed appears eschar with no drainage.  Deepest tissue noted in base is subcu. Malodor is not noticed. Wound edge appears Attached. Sydney-wound skin appears erythematous. Probe to bone is negative . Signs of infection are not present at this time.    Neurological:  Gross sensation is absent. Protective sensation is absent. Patient Reports numbness and/or paresthesias.    Clinical Images  "05/17/24:                Additional Data:     Lab Results: I have personally reviewed pertinent labs including:    Results from last 7 days   Lab Units 05/17/24  0220   WBC Thousand/uL 6.59   HEMOGLOBIN g/dL 14.2   HEMATOCRIT % 42.2   PLATELETS Thousands/uL 183   SEGS PCT % 60   LYMPHO PCT % 29   MONO PCT % 9   EOS PCT % 2     Results from last 7 days   Lab Units 05/17/24  0220   POTASSIUM mmol/L 4.3   CHLORIDE mmol/L 103   CO2 mmol/L 25   BUN mg/dL 16   CREATININE mg/dL 0.99   CALCIUM mg/dL 8.9     Results from last 7 days   Lab Units 05/16/24  1253   INR  1.00       Cultures: I have personally reviewed pertinent cultures including:    Results from last 7 days   Lab Units 05/10/24  1325   GRAM STAIN RESULT  No Polys or Bacteria seen           Imaging: I have personally reviewed pertinent films in PACS.  Pathology, and Other Studies: I have personally reviewed pertinent reports.        ** Please Note: Portions of the record may have been created with voice recognition software. Occasional wrong word or \"sound a like\" substitutions may have occurred due to the inherent limitations of voice recognition software. Read the chart carefully and recognize, using context, where substitutions have occurred. **   "

## 2024-05-17 NOTE — SPEECH THERAPY NOTE
Speech Language/Pathology    Speech-Language Pathology Bedside Swallow Evaluation      Patient Name: Benito Pakr    Today's Date: 5/17/2024     Problem List  Principal Problem:    Dysarthria  Active Problems:    Neuropathy    Tobacco use    S/P insertion of spinal cord stimulator    Hypertension    Ulcer of left foot, limited to breakdown of skin (HCC)    acute Confusional state      Past Medical History  Past Medical History:   Diagnosis Date    Anxiety     Back pain     Callus Few months ago    Colon polyp     CPAP (continuous positive airway pressure) dependence     Crohn's disease (HCC)     Depression     GERD (gastroesophageal reflux disease)     Hypertension     Perianal fistula     Sleep apnea     Terminal ileitis (HCC)        Past Surgical History  Past Surgical History:   Procedure Laterality Date    BACK SURGERY  2019    CAST APPLICATION Right 11/17/2022    Procedure: Application short-arm splint;  Surgeon: Sarthak Villatoro MD;  Location: [facility];  Service: Orthopedics    COLONOSCOPY      EGD      HAND CONTRACTURE RELEASE Left 06/01/2023    Procedure: left ring and small finger dupuytrens excision and ring finger Metacarpophalangeal joint release and small finger Metacarpophalangeal and proximal interphalangeal joint release;  Surgeon: Sarthak Villatoro MD;  Location: [facility];  Service: Orthopedics    HERNIA REPAIR      umbilical hernia    WY ANRCT XM SURG REQ ANES GENERAL SPI/EDRL DX N/A 11/17/2021    Procedure: EXAM UNDER ANESTHESIA (EUA);  Surgeon: Davi Thao MD;  Location: [facility];  Service: Colorectal    WY APPLICATION SHORT ARM SPLINT FOREARM-HAND STATIC Right 1/11/2024    Procedure: Application short arm splint;  Surgeon: Sarthak Villatoro MD;  Location: [facility];  Service: Orthopedics    WY CAPSULECTOMY/CAPSULOTOMY IPHAL JOINT EACH Right 1/11/2024    Procedure: Contracture release right hand small finger proximal interphalangeal joint;  Surgeon: Sarthak Villatoro MD;   Location: [facility];  Service: Orthopedics    OR FASCIOTOMY PALMAR OPEN PARTIAL Right 11/17/2022    Procedure: Dupuytren's excision right palm extending into the small finger with release of the metacarpophalangeal joint and proximal interphalangeal joint.  Dupuytren's excision right palm with release of the right ring finger metacarpophalangeal joint.;  Surgeon: Sarthak Villatoro MD;  Location: [facility];  Service: Orthopedics    OR FASCIOTOMY PALMAR OPEN PARTIAL Right 1/11/2024    Procedure: Right hand small finger duppuytrens excision with release of the metacarpophalangeal joint and proximal interphalangeal joint;  Surgeon: Sarthak Villatoro MD;  Location: [facility];  Service: Orthopedics    OR FASCT PALM W/WO Z-PLASTY TISSUE REARGMT/SKN GRFT Right 1/11/2024    Procedure: Right hand small finger duppuytrens excision with release of the metacarpophalangeal joint and proximal interphalangeal joint;  Surgeon: Sarthak Villatoro MD;  Location: [facility];  Service: Orthopedics    OR FASCT PRTL PALMAR 1 DGT PROX IPHAL JT W/WO RPR Right 1/11/2024    Procedure: Right hand small finger duppuytrens excision with release of the metacarpophalangeal joint and proximal interphalangeal joint;  Surgeon: Sarthak Villatoro MD;  Location: [facility];  Service: Orthopedics    OR I&D ISCHIORECTAL&/PERIRECTAL ABSCESS SPX Right 10/24/2021    Procedure: excisional debredement right gluteal cheek ;  Surgeon: Jeana Bustamante MD;  Location: [facility];  Service: General    OR PLACEMENT SETON N/A 11/17/2021    Procedure: PLACEMENT SETON;  Surgeon: Davi Thao MD;  [facility];  Service: Colorectal    OR PRQ IMPLTJ NSTIM ELECTRODE ARRAY EPIDURAL Right 03/26/2021    Procedure: INSERTION THORACIC DORSAL COLUMN SPINAL CORD STIMULATOR PERCUTANEOUS W IMPLANTABLE PULSE GENERATOR, RIGHT;  Surgeon: Akshat Witt MD;  Location: [facility];  Service: Neurosurgery       Summary   Pt presented with functional appearing oral  "and pharyngeal stage swallowing skills with materials administered today. Pt takes large bites at somewhat rapid rate. Despite large boluses, complete mastication. Rapid lingual manipulation w/ piecemeal transfers to clear oral cavity. Swallows suspected fairly prompt. No overt s/s aspiration across trials. No gross dysarthria noted during evaluation today. Pt's wife reports occasional \"slurring\" though pt reports he feels improved/at baseline. Pt may benefit from further assessment is s/s continue, w/ f/u to determine need.     Risk for Aspiration: Low risk      Recommended Diet: regular diet and thin liquids   Recommended Form of Meds: whole with liquid   Aspiration precautions and swallowing strategies: upright posture and only feed when fully alert  Other Recommendations: Continue frequent oral care        Current Medical Status  Pt is a 53 y.o. male who presented to  [facility]  with a PMH of Crohn's disease, prior tobacco alcohol abuse disorder, hypertension, neuropathy, lumbar radiculopathy presented with dysarthria, slurring of speech, acute confusional state.  On my encounter he is awake, alert and hemodynamically stable.  Stroke alert was called.  Patient had CT head and CTA head/neck which were unremarkable.  He will be admitted for further stroke workup.  Wife at bedside updated.  Patient confirmed level 1 full CODE STATUS.    Current Precautions:      Allergies:  No known food allergies    Past medical history:  Please see H&P for details    Special Studies:  CT chest abdomen pelvis 5/16: No acute finding in the chest, abdomen, or pelvis.     Hepatic steatosis.    CTA stroke alert 5/16: No acute finding in the chest, abdomen, or pelvis.     Hepatic steatosis.    CT stroke alert 5/16: No acute intracranial CT abnormality.       CXR 5/16: 1. No active pulmonary disease.     Social/Education/Vocational Hx:  Pt lives with family    Swallow Information   Current Risks for Dysphagia & Aspiration:  " r/o CVA  Current Symptoms/Concerns:  dysarthria, failed screen   Current Diet: regular diet and thin liquids   Baseline Diet: regular diet and thin liquids      Baseline Assessment   Behavior/Cognition: alert  Speech/Language Status: able to participate in conversation, able to follow commands, and no gross dysarthria noted - pt's wife reports ongoing occasional slurring - may benefit from further assessment if continues  Patient Positioning: upright in chair  Pain Status/Interventions/Response to Interventions:   No report of or nonverbal indications of pain.       Swallow Mechanism Exam  Facial: symmetrical  Labial: WFL  Lingual: WFL  Velum: symmetrical  Mandible: adequate ROM  Dentition: adequate  Vocal quality:clear/adequate   Volitional Cough: strong/productive   Respiratory Status: on RA       Consistencies Assessed and Performance   Consistencies Administered: thin liquids and regular texture solids  Materials administered included ham, egg, and cheese breakfast sandwich on a bagel      Oral Stage: WFL  Mastication was adequate with the materials administered today.  Bolus formation and transfer were functional with no significant oral residue noted.  No overt s/s reduced oral control.    Pharyngeal Stage: WFL  Swallow Mechanics:  Swallowing initiation appeared prompt.  Laryngeal rise was palpated and judged to be within functional limits.  No coughing, throat clearing, change in vocal quality or respiratory status noted today.     Esophageal Concerns:  h/o GERD which pt reports is well managed w/ medications     Strategies and Efficacy:  -    Summary and Recommendations (see above)    Results Reviewed with: patient, RN, and family     Treatment Recommended: Brief peripheral f/u if needed for motor speech evaluation. No dysphagia f/u indicated.

## 2024-05-17 NOTE — ASSESSMENT & PLAN NOTE
Lab Results   Component Value Date    HGBA1C 8.2 (H) 05/17/2024     Prior A1c several years ago was in normal range  A1c 8.2, this is a new diagnosis for patient  Discussed with endocrinology, agree with metformin 500 mg daily on discharge  Will send diabetes supplies for checking blood glucose as well as endocrine referral on discharge  Nutrition consulted

## 2024-05-17 NOTE — DISCHARGE INSTR - AVS FIRST PAGE
Follow-up providers:  Please follow-up with PCP within 1 week of discharge for posthospital visit  Please follow-up with outpatient endocrinology as soon as you are able to make an appointment for further management of diabetes  Please follow-up with outpatient neurology within 6 weeks of discharge for posthospital visit  Please follow-up with outpatient podiatry at your next appointment    Medications:  Please begin taking atorvastatin 40 mg daily for your cholesterol  Please begin taking fish oil pills once daily for triglycerides  Please been taking metformin 500 mg by mouth once daily for diabetes    Outpatient testing:  CT chest for incidental finding of pulmonary nodule within 3 months.  Please discuss this with your PCP who will be able to order and assist you with scheduling  Please take fasting blood glucose at least once daily in the beginning of the day before you have eaten, please keep a log of this for outpatient endocrinology.  You may take blood sugar prior to each meal, 3 times a day however we recommend at least once daily if able.    Return to the hospital if you experience new headaches, fevers or chills, dizziness, confusion, numbness/tingling, or any additional concerning symptoms.

## 2024-05-17 NOTE — ASSESSMENT & PLAN NOTE
Prior tobacco and alcohol abuse disorder  Endorses consumption of medical marijuana  Presented with strokelike symptoms accompanied by acute confusional state as per patient's spouse  Currently resolved.  Suspect hyperglycemia/untreated diabetes likely contributing giving additional associated symptoms including extreme thirst, polyuria, dizziness.  UDS with THC

## 2024-05-17 NOTE — UTILIZATION REVIEW
Initial Clinical Review    Admission: Date/Time/Statement:  5/16/24 1424 observation   Admission Orders (From admission, onward)       Ordered        05/16/24 1424  Place in Observation  Once                          Orders Placed This Encounter   Procedures    Place in Observation     Standing Status:   Standing     Number of Occurrences:   1     Order Specific Question:   Level of Care     Answer:   Med Surg [16]     ED Arrival Information       Expected   -    Arrival   5/16/2024 12:20    Acuity   Emergent              Means of arrival   Walk-In    Escorted by   Spouse    Service   Hospitalist    Admission type   Emergency              Arrival complaint   fall  dizziness speech issue  no thinners             Chief Complaint   Patient presents with    Slurred Speech     Pt's wife reports that she noticed that his speech seemed a little slurred this am before she went to work around 8am this am. Fell a couple times today. Has been having balance issues and forgetfulness. Went to bed feeling ok around 9pm. Woke up in the middle of the night with dizziness and balance issues       Initial Presentation: 53 y.o. male  to ED via walk in from home.    Admitted to observation with Dx: Dysarthria/acute confusional status.  Presented to ED with slurred speech and not making sense upon awakening the day of arrival.   Has had dizziness last year but this morning with increased dizziness, unsteady and had 2 falls.   Has ongoing dizziness.  NIHSS 0.   PMHx: Crohn's disease, prior tobacco alcohol abuse disorder, hypertension, neuropathy, lumbar radiculopathy . On exam: tachycardia.   Ulcer of left foot on Keflex.   Glucose 297.   Imaging shows hepatic steatosis on ct chest and abdomen.  No acute finding on ct head.   ED treatment:  discussed with neurology and not a TNK candidate.   Loaded with ASA and Plavix.   .    Plan includes:  neuro checks.   Check orthostatic BP.  Echo.  MRI brain.   Continue asa and Plavix.    Atorvastatin.   Check A1c and lipid panel.   Telemetry.  PT/OT.  Consult neurology.   Permissive hypertension and hold home lisinopril and Lasix.   Check UDS.  Continue Keflex for ulcer of left foot.   Consult Podiatry.       5/16/24 per neurology:  recommend to rule out stroke.   Presented due to dysarthria, confusion morning of arrival.   Has had difficulty with gait and about 1 year of lightheadedness but worse night prior to arrival.  Loaded with aspirin 325 mg and Plavix 300 mg in the ED. Continue aspirin 81 mg and Plavix 105 mg daily.  Start Statin.   Telemetry,   frequent neuro checks.  MRI brain.  Check orthostatic signs.  UDS.   Echo.  Check hemoglobin A1c, lipid panel, TSH.  Permissive hpt to 200 systolic     Anticipated Length of Stay/Certification Statement: Patient will be admitted on an observation basis with an anticipated length of stay of less than 2 midnights secondary to dysarthria.     Date: 5/17/24    Day 2:     ED Triage Vitals   Temperature Pulse Respirations Blood Pressure SpO2   05/16/24 1233 05/16/24 1233 05/16/24 1233 05/16/24 1233 05/16/24 1233   98.3 °F (36.8 °C) (!) 112 18 132/86 97 %      Temp Source Heart Rate Source Patient Position - Orthostatic VS BP Location FiO2 (%)   05/16/24 1233 05/16/24 1233 05/16/24 1233 05/16/24 1233 --   Temporal Monitor Sitting Right arm       Pain Score       05/16/24 1536       No Pain          Wt Readings from Last 1 Encounters:   05/17/24 102 kg (224 lb)     Additional Vital Signs:   5/17/24 11:54:38 97.2 °F (36.2 °C) Abnormal  80 18 131/92 105 92 % -- --   05/17/24 10:48:13 97.4 °F (36.3 °C) Abnormal  97 12 129/90 103 92 % -- --   05/17/24 0901 -- 83 -- 125/89 -- -- -- --   05/17/24 08:41:45 98.5 °F (36.9 °C) 106 Abnormal  18 125/89 101 93 % -- --   05/17/24 0717 -- -- -- -- -- -- None (Room air) --   05/17/24 0607 -- 86 -- 135/94 108 91 % -- --   05/17/24 02:09:11 -- 92 -- 154/94 114 93 % -- --   05/17/24 00:18:05 98.2 °F (36.8 °C) 87 -- 136/91 106  94 % -- --   05/17/24 00:09:19 -- 85 16 136/91 106 94 % None (Room air) Lying   05/16/24 18:56:52 98 °F (36.7 °C) 89 18 136/85 102 90 % -- --   05/16/24 1730 -- 101 20 137/70 -- 93 % None (Room air) --   05/16/24 1650 -- 95 20 130/74 -- -- -- Standing for 3 minutes - Orthostatic VS   05/16/24 1647 -- 97 18 126/75 -- -- -- Standing - Orthostatic VS   05/16/24 1646 -- 98 18 142/95 -- -- -- Sitting - Orthostatic VS   05/16/24 1645 -- 89 18 140/85 -- -- -- Lying - Orthostatic VS   05/16/24 1630 -- 102 19 134/70 -- 94 % None (Room air) --     Pertinent Labs/Diagnostic Test Results:   MRI brain wo contrast   Final Result by Jeff Cosme MD (05/17 1325)      No intracranial MR abnormality and specifically no acute intracranial ischemia.      Trace bilateral mastoid effusions.      Workstation performed: [id number]         CT chest abdomen pelvis w contrast   Final Result by Antonio Busch MD (05/16 1410)      No acute finding in the chest, abdomen, or pelvis.      Hepatic steatosis.               Workstation performed: [id number]         CT stroke alert brain   Final Result by Katy Milner MD (05/16 1401)      No acute intracranial CT abnormality.               Findings were directly discussed with Juan José Vanessa at 1:27 p.m.         Workstation performed: [id number]         CTA stroke alert (head/neck)   Final Result by Katy Milner MD (05/16 1400)      1.  Patent major vessels of the [place] of cantu without high-grade stenosis.  No aneurysm.   2.  No significant stenosis or dissection of cervical carotid and vertebral arteries.   3.  pulmonary nodules noted in partially imaged lungs including 9 mm right upper lobe spiculated nodule. Based on current Fleischner Society 2017 Guidelines on incidental pulmonary nodule, either PET/CT scan evaluation, tissue sampling or short-term    interval follow-up non-contrast CT follow-up (initially in 3 months) may be considered appropriate.                  Findings  were directly discussed with Juan José Vanessa at 1:27 p.m.      This study was marked in EPIC for notification and follow-up.                                    Workstation performed: [id number]         X-ray chest 1 view portable   Final Result by René Malcolm MD (05/17 0828)      1. No active pulmonary disease.            Workstation performed: [id number]           5/16/24 ecg Sinus tachycardia  Otherwise normal ECG  No previous ECGs available  Sinus tachycardia  Otherwise normal ECG  No previous ECGs available    5/17/24 echo with strain- Strain was performed to quantify interventricular dyssynchrony and evaluate components of myocardial function due to LVH. Results from the utilization of Strain Analysis are listed in the report below.   Left Ventricle: Left ventricular cavity size is normal. Wall thickness is mildly increased. The left ventricular ejection fraction is 65% by biplane measurement. Systolic function is normal. Global longitudinal strain is normal at -24%. Wall motion is normal. Diastolic function is normal.    Right Ventricle: Right ventricular cavity size is mildly dilated. Systolic function is normal.     Results from last 7 days   Lab Units 05/16/24  1317   SARS-COV-2  Negative     Results from last 7 days   Lab Units 05/17/24 0220 05/16/24  1253   WBC Thousand/uL 6.59 6.17   HEMOGLOBIN g/dL 14.2 14.4   HEMATOCRIT % 42.2 42.9   PLATELETS Thousands/uL 183 195   TOTAL NEUT ABS Thousands/µL 3.88  --      Results from last 7 days   Lab Units 05/17/24  0220 05/16/24  1253   SODIUM mmol/L 137 139   POTASSIUM mmol/L 4.3 4.1   CHLORIDE mmol/L 103 104   CO2 mmol/L 25 27   ANION GAP mmol/L 9 8   BUN mg/dL 16 17   CREATININE mg/dL 0.99 1.15   EGFR ml/min/1.73sq m 87 73   CALCIUM mg/dL 8.9 9.2     Results from last 7 days   Lab Units 05/17/24  1152 05/16/24  1253   POC GLUCOSE mg/dl 251* 302*     Results from last 7 days   Lab Units 05/17/24  0220 05/16/24  1253   GLUCOSE RANDOM mg/dL 166* 297*      Results from last 7 days   Lab Units 05/17/24  0220   HEMOGLOBIN A1C % 8.2*   EAG mg/dl 189     Results from last 7 days   Lab Units 05/16/24  1506 05/16/24  1253   HS TNI 0HR ng/L  --  5   HS TNI 2HR ng/L 4  --    HSTNI D2 ng/L -1  --      Results from last 7 days   Lab Units 05/16/24  1253   PROTIME seconds 13.6   INR  1.00   PTT seconds 34     Results from last 7 days   Lab Units 05/16/24  1317   INFLUENZA A PCR  Negative   INFLUENZA B PCR  Negative   RSV PCR  Negative     Results from last 7 days   Lab Units 05/16/24  1654   AMPH/METH  Negative   BARBITURATE UR  Negative   BENZODIAZEPINE UR  Negative   COCAINE UR  Negative   METHADONE URINE  Negative   OPIATE UR  Negative   PCP UR  Negative   THC UR  Positive*       ED Treatment:   Medication Administration from 05/16/2024 1220 to 05/16/2024 1834         Date/Time Order Dose Route Action Comments     05/16/2024 1506 EDT aspirin tablet 325 mg 325 mg Oral Given --     05/16/2024 1506 EDT clopidogrel (PLAVIX) tablet 300 mg 300 mg Oral Given --          Past Medical History:   Diagnosis Date    Anxiety     Back pain     Callus Few months ago    Colon polyp     CPAP (continuous positive airway pressure) dependence     Crohn's disease (HCC)     Depression     GERD (gastroesophageal reflux disease)     Hypertension     Perianal fistula     Sleep apnea     Terminal ileitis (HCC)      Present on Admission:   Dysarthria   acute Confusional state   Ulcer of left foot, limited to breakdown of skin (HCC)   Tobacco use   Neuropathy   Hypertension      Admitting Diagnosis: Dizziness [R42]  Slurred speech [R47.81]  Speech disturbance [R47.9]  Pulmonary nodule [R91.1]  Multiple falls [R29.6]  Age/Sex: 53 y.o. male  Admission Orders:  Scheduled Medications:  amitriptyline, 100 mg, Oral, HS  atorvastatin, 40 mg, Oral, QPM  azaTHIOprine, 50 mg, Oral, Daily  buPROPion, 150 mg, Oral, Daily  clopidogrel, 75 mg, Oral, Daily  enoxaparin, 40 mg, Subcutaneous, Q24H [facility]  fish oil,  1,000 mg, Oral, Daily  insulin lispro, 1-5 Units, Subcutaneous, HS  insulin lispro, 1-6 Units, Subcutaneous, TID AC  pantoprazole, 40 mg, Oral, Early Morning  pregabalin, 150 mg, Oral, TID      Continuous IV Infusions: none      PRN Meds: not given  pneumococcal 20-daisy conj vacc, 0.5 mL, Intramuscular, Prior to discharge    Orthostatic BP  Telemetry  Neuro checks Every 1 hour x 4 hours, then every 2 hours x 8 hours, then every 4 hours x 72 hours     IP CONSULT TO NEUROLOGY  IP CONSULT TO PODIATRY    Network Utilization Review Department  ATTENTION: Please call with any questions or concerns to [phone] and carefully listen to the prompts so that you are directed to the right person. All voicemails are confidential.   For Discharge needs, contact Care Management DC Support Team at [phone] opt. 2  Send all requests for admission clinical reviews, approved or denied determinations and any other requests to dedicated fax number below belonging to the [facility] where the patient is receiving treatment. List of dedicated fax numbers for the Facilities:  FACILITY NAME UR FAX NUMBER   ADMISSION DENIALS (Administrative/Medical Necessity) [phone]   DISCHARGE SUPPORT TEAM (NETWORK) [phone]   PARENT CHILD HEALTH (Maternity/NICU/Pediatrics) [phone]   [facility] [phone]   [facility] [phone]   [facility] [phone]   [facility] [phone]   [facility] [phone]   [facility] [phone]   [facility] [phone]   [facility] [phone]   [facility] [phone]   [facility] [phone]   [facility] [phone]   [facility] [phone]

## 2024-05-17 NOTE — ASSESSMENT & PLAN NOTE
Patient with hypertension, prior tobacco and alcohol abuse disorder presented with strokelike symptoms  Endorses dysarthria/slurring of speech and dizziness  Reported dizziness is acute on chronic  Associated with 2 mechanical falls at home    CT head no acute changes  CTA head/neck unremarkable for stenosis, vascular obstruction/aneurysm    MRI brain negative for stroke or other acute abnormality  Echocardiogram with EF 65%, no additional acute abnormality  Orthostatic blood pressure negative  Loaded with aspirin and Plavix-Per neuro, discontinue DAPT  A1c consistent with diabetes at 8.2%, see below  Lipid panel reviewed, see below  PT/OT-no needs    Per neurology, no further inpatient recommendations, patient has returned to baseline, recommend outpatient follow-up.  Discharge to home.

## 2024-05-20 ENCOUNTER — OFFICE VISIT (OUTPATIENT)
Dept: ENDOCRINOLOGY | Facility: CLINIC | Age: 54
End: 2024-05-20
Payer: MEDICARE

## 2024-05-20 ENCOUNTER — TRANSITIONAL CARE MANAGEMENT (OUTPATIENT)
Dept: FAMILY MEDICINE CLINIC | Facility: CLINIC | Age: 54
End: 2024-05-20

## 2024-05-20 ENCOUNTER — TELEPHONE (OUTPATIENT)
Age: 54
End: 2024-05-20

## 2024-05-20 ENCOUNTER — TELEPHONE (OUTPATIENT)
Dept: NEUROLOGY | Facility: CLINIC | Age: 54
End: 2024-05-20

## 2024-05-20 VITALS
OXYGEN SATURATION: 100 % | BODY MASS INDEX: 25.69 KG/M2 | TEMPERATURE: 97.9 F | SYSTOLIC BLOOD PRESSURE: 126 MMHG | DIASTOLIC BLOOD PRESSURE: 68 MMHG | WEIGHT: 189.4 LBS | HEART RATE: 96 BPM

## 2024-05-20 DIAGNOSIS — E78.2 MIXED HYPERLIPIDEMIA: ICD-10-CM

## 2024-05-20 DIAGNOSIS — E55.9 VITAMIN D DEFICIENCY: ICD-10-CM

## 2024-05-20 DIAGNOSIS — I10 PRIMARY HYPERTENSION: ICD-10-CM

## 2024-05-20 DIAGNOSIS — E11.9 DIABETES (HCC): ICD-10-CM

## 2024-05-20 DIAGNOSIS — E11.65 TYPE 2 DIABETES MELLITUS WITH HYPERGLYCEMIA, WITHOUT LONG-TERM CURRENT USE OF INSULIN (HCC): Primary | ICD-10-CM

## 2024-05-20 PROCEDURE — 99204 OFFICE O/P NEW MOD 45 MIN: CPT | Performed by: STUDENT IN AN ORGANIZED HEALTH CARE EDUCATION/TRAINING PROGRAM

## 2024-05-20 NOTE — PATIENT INSTRUCTIONS
Metformin 500 mg twice a day for one week then 2 in the morning and 1 at night then after one week, 2 tabs twice a day.    I started Ozempic 0.25 once weekly for 4 weeks and then will increase to 0.5 mg once weekly, please call the office if you develop any side effects.

## 2024-05-20 NOTE — TELEPHONE ENCOUNTER
HF[facility]    SLUB 5/16-5/17/24  DYSARTHIA    Scheduled patient with Francesco in [place] office 6/21/24 @ 1 pm  Added patient to wait list      Per Notes:      Recommend outpatient follow up with general neurology attending or APC in about 6 weeks

## 2024-05-21 ENCOUNTER — TELEPHONE (OUTPATIENT)
Age: 54
End: 2024-05-21

## 2024-05-21 DIAGNOSIS — E11.9 DIABETES (HCC): ICD-10-CM

## 2024-05-21 DIAGNOSIS — F41.9 ANXIETY: ICD-10-CM

## 2024-05-21 NOTE — TELEPHONE ENCOUNTER
Medication refill    glucose blood (OneTouch Verio) test strip       OneTouch Delica Lancets 33G [facility] in [place]

## 2024-05-21 NOTE — TELEPHONE ENCOUNTER
Patient called the RX Refill Line. Message is being forwarded to the office.     Patient called and said that insurance will not cover Ozempic and wanted to know if there is something else that the doctor recommends.     Please contact patient at [phone] to let him know.

## 2024-05-22 ENCOUNTER — TELEPHONE (OUTPATIENT)
Age: 54
End: 2024-05-22

## 2024-05-22 ENCOUNTER — OFFICE VISIT (OUTPATIENT)
Dept: FAMILY MEDICINE CLINIC | Facility: CLINIC | Age: 54
End: 2024-05-22
Payer: MEDICARE

## 2024-05-22 ENCOUNTER — CONSULT (OUTPATIENT)
Dept: DIABETES SERVICES | Facility: CLINIC | Age: 54
End: 2024-05-22
Payer: MEDICARE

## 2024-05-22 VITALS
OXYGEN SATURATION: 95 % | TEMPERATURE: 97.2 F | BODY MASS INDEX: 28.75 KG/M2 | SYSTOLIC BLOOD PRESSURE: 110 MMHG | WEIGHT: 212 LBS | DIASTOLIC BLOOD PRESSURE: 76 MMHG | HEART RATE: 110 BPM

## 2024-05-22 VITALS — WEIGHT: 212 LBS | BODY MASS INDEX: 28.75 KG/M2

## 2024-05-22 DIAGNOSIS — R47.1 DYSARTHRIA: ICD-10-CM

## 2024-05-22 DIAGNOSIS — L97.521 ULCER OF LEFT FOOT, LIMITED TO BREAKDOWN OF SKIN (HCC): ICD-10-CM

## 2024-05-22 DIAGNOSIS — E16.2 HYPOGLYCEMIA: Primary | ICD-10-CM

## 2024-05-22 DIAGNOSIS — R91.1 PULMONARY NODULE: ICD-10-CM

## 2024-05-22 DIAGNOSIS — E11.65 TYPE 2 DIABETES MELLITUS WITH HYPERGLYCEMIA, WITHOUT LONG-TERM CURRENT USE OF INSULIN (HCC): Primary | ICD-10-CM

## 2024-05-22 DIAGNOSIS — F41.9 ANXIETY: ICD-10-CM

## 2024-05-22 DIAGNOSIS — Z76.89 ENCOUNTER FOR SUPPORT AND COORDINATION OF TRANSITION OF CARE: ICD-10-CM

## 2024-05-22 DIAGNOSIS — E08.21 DIABETES MELLITUS DUE TO UNDERLYING CONDITION WITH DIABETIC NEPHROPATHY, WITHOUT LONG-TERM CURRENT USE OF INSULIN (HCC): ICD-10-CM

## 2024-05-22 PROCEDURE — 97802 MEDICAL NUTRITION INDIV IN: CPT

## 2024-05-22 PROCEDURE — 99495 TRANSJ CARE MGMT MOD F2F 14D: CPT

## 2024-05-22 RX ORDER — BLOOD-GLUCOSE SENSOR
1 EACH MISCELLANEOUS
Qty: 6 EACH | Refills: 3 | Status: SHIPPED | OUTPATIENT
Start: 2024-05-22 | End: 2024-05-24

## 2024-05-22 RX ORDER — BUPROPION HYDROCHLORIDE 150 MG/1
TABLET, EXTENDED RELEASE ORAL
Qty: 270 TABLET | Refills: 3 | OUTPATIENT
Start: 2024-05-22

## 2024-05-22 RX ORDER — BLOOD SUGAR DIAGNOSTIC
STRIP MISCELLANEOUS
Qty: 100 EACH | Refills: 5 | Status: SHIPPED | OUTPATIENT
Start: 2024-05-22

## 2024-05-22 RX ORDER — LANCETS 33 GAUGE
EACH MISCELLANEOUS
Qty: 100 EACH | Refills: 5 | Status: SHIPPED | OUTPATIENT
Start: 2024-05-22

## 2024-05-22 RX ORDER — BUPROPION HYDROCHLORIDE 150 MG/1
150 TABLET, EXTENDED RELEASE ORAL 2 TIMES DAILY
Qty: 180 TABLET | Refills: 3 | Status: SHIPPED | OUTPATIENT
Start: 2024-05-22

## 2024-05-22 NOTE — TELEPHONE ENCOUNTER
Spoke with Anand. He is unavailable Friday 5/24 in morning at 10 am. What time would you like me to schedule him in afternoon?

## 2024-05-22 NOTE — TELEPHONE ENCOUNTER
Patients GI provider:  Dr. Hampton    Number to return call: [phone]    Reason for call: Ashok, from [employer] calling to have new order sent for vedolizumab (Entyvio) SOLR 300 mil for every four weeks.  Old order expires 5/24/24. Ashok can be reach at the number above or order can be faxed to [phone]    Scheduled procedure/appointment date if applicable: N/A

## 2024-05-22 NOTE — PROGRESS NOTES
Diabetic Foot Exam    Patient's shoes and socks removed.    Right Foot/Ankle   Right Foot Inspection  Skin Exam: skin normal, dry skin, callus, ulcer and callus. Skin not intact, no warmth, no erythema, no maceration, no abnormal color and no pre-ulcer.     Toe Exam: ROM and strength within normal limits.     Sensory   Monofilament testing: absent    Vascular  Capillary refills: < 3 seconds  The right DP pulse is 2+. The right PT pulse is 2+.     Left Foot/Ankle  Left Foot Inspection  Skin Exam: skin normal, skin intact, dry skin, ulcer and callus. No warmth, no erythema, no maceration, normal color and no pre-ulcer.     Toe Exam: ROM and strength within normal limits.     Sensory   Monofilament testing: absent    Vascular  Capillary refills: < 3 seconds  The left DP pulse is 2+. The left PT pulse is 2+.     Assign Risk Category  No deformity present  Loss of protective sensation  No weak pulses  Risk: 1  Transition of Care Visit  Name: Benito Park      : 1970      MRN: [id number]  Encounter Provider: MIKE Mane  Encounter Date: 2024   Encounter department: [facility]    Assessment & Plan   1. Hypoglycemia  -     Continuous Glucose Sensor (FreeStyle Allyn 3 Sensor) MISC; Use 1 each every 14 (fourteen) days  2. Pulmonary nodule  -     CT chest wo contrast; Future; Expected date: 2024  3. Anxiety  -     buPROPion (WELLBUTRIN SR) 150 mg 12 hr tablet; Take 1 tablet (150 mg total) by mouth 2 (two) times a day  4. Encounter for support and coordination of transition of care  5. Diabetes mellitus due to underlying condition with diabetic nephropathy, without long-term current use of insulin (Formerly [facility])  Assessment & Plan:  Patient with fluctuating blood glucose with hypoglycemic symptoms-- slurred speech, shakiness, confusion. Symptoms of hyperglycemia-- polyuria, polydipsia, and dizziness. Patient and wife having difficulty with finger sticks due to  thickening of skin of fingers. Would benefit for a CGM. Patient had an appt with diabetic educator. Follows with endo.   Lab Results   Component Value Date    HGBA1C 8.2 (H) 05/17/2024     Orders:  -     Continuous Glucose Sensor (FreeStyle Allyn 3 Sensor) MISC; Use 1 each every 14 (fourteen) days  6. Ulcer of left foot, limited to breakdown of skin (HCC)  Assessment & Plan:  Follows with WC.  7. Dysarthria  Assessment & Plan:  Continues with intermittent dysarthria. Was cleared by neuro in hospital. Has outpatient follow up with neuro 6/21.          History of Present Illness     Transitional Care Management Review:   Benito Park is a 53 y.o. male here for TCM follow up.     During the TCM phone call patient stated:  TCM Call       Date and time call was made  5/20/2024  4:09 PM    Hospital care reviewed  Records reviewed    Patient was hospitialized at  [facility]    Date of Admission  05/16/24    Date of discharge  05/17/24    Diagnosis  Dysarthria    Disposition  Home    Current Symptoms  Headache    Headache pain severity  Mild    Headache pain onset  Gradual    Headache location / laterality  Entire head    Pain Descriptors  Cramping    Headache pain level  8    Headache cause / trigger  No known event          TCM Call       Modifying factors-feels better  Acetaminophen    Modifying factors-feels worse  Sleep deprivation    Headache risk factors  Sleep Apnea    Post hospital issues  None    Scheduled for follow up?  Yes    Did you obtain your prescribed medications  Yes    Do you need help managing your prescriptions or medications  No    Is transportation to your appointment needed  No    I have advised the patient to call PCP with any new or worsening symptoms  eliot mogran    Living Arrangements  Spouse or Significiant other    Support System  None    Are you recieving any outpatient services  No    Are you recieving home care services  No    Are you using any community resources  No     Have you fallen in the last 12 months  Yes    How many times  2    Counseling  Patient; Family          Presents for TCM sp hospitalization for slurred speech, dysarthria, confusion, and falls.       Review of Systems   Constitutional:  Positive for fatigue. Negative for activity change, diaphoresis and fever.   HENT:  Negative for congestion, ear pain, rhinorrhea and sore throat.    Eyes:  Negative for pain.   Respiratory:  Negative for cough, shortness of breath and wheezing.    Cardiovascular:  Negative for chest pain, palpitations and leg swelling.   Gastrointestinal:  Negative for abdominal distention, abdominal pain, diarrhea, nausea and vomiting.   Musculoskeletal:  Negative for arthralgias and myalgias.   Skin:  Negative for rash.   Neurological:  Positive for speech difficulty. Negative for dizziness, weakness and numbness.   Psychiatric/Behavioral:  Negative for suicidal ideas. The patient is not nervous/anxious.    All other systems reviewed and are negative.    Objective     /76 (BP Location: Left arm, Patient Position: Sitting, Cuff Size: Standard)   Pulse (!) 110   Temp (!) 97.2 °F (36.2 °C) (Tympanic)   Wt 96.2 kg (212 lb)   SpO2 95%   BMI 28.75 kg/m²     Physical Exam  Vitals and nursing note reviewed.   Constitutional:       General: He is not in acute distress.     Appearance: He is well-developed.   HENT:      Head: Normocephalic and atraumatic.      Right Ear: External ear normal.      Left Ear: External ear normal.   Eyes:      Conjunctiva/sclera: Conjunctivae normal.   Cardiovascular:      Rate and Rhythm: Normal rate and regular rhythm.      Pulses: no weak pulses.           Dorsalis pedis pulses are 2+ on the right side and 2+ on the left side.        Posterior tibial pulses are 2+ on the right side and 2+ on the left side.      Heart sounds: Normal heart sounds. No murmur heard.  Pulmonary:      Effort: Pulmonary effort is normal. No respiratory distress.      Breath sounds:  Normal breath sounds.   Abdominal:      General: Bowel sounds are normal. There is no distension.      Palpations: Abdomen is soft.      Tenderness: There is no abdominal tenderness.   Musculoskeletal:         General: No swelling.      Cervical back: Neck supple.   Feet:      Right foot:      Skin integrity: Ulcer, callus and dry skin present. No skin breakdown, erythema or warmth.      Left foot:      Skin integrity: Ulcer, callus and dry skin present. No skin breakdown, erythema or warmth.   Skin:     General: Skin is warm and dry.      Capillary Refill: Capillary refill takes less than 2 seconds.   Neurological:      Mental Status: He is alert and oriented to person, place, and time. Mental status is at baseline.      GCS: GCS eye subscore is 4. GCS verbal subscore is 5. GCS motor subscore is 6.      Cranial Nerves: No dysarthria or facial asymmetry.      Motor: Motor function is intact. No weakness.      Gait: Gait is intact. Gait normal.   Psychiatric:         Mood and Affect: Mood normal.         Behavior: Behavior normal.         Thought Content: Thought content normal.         Judgment: Judgment normal.       Medications have been reviewed by provider in current encounter    Administrative Statements

## 2024-05-22 NOTE — PROGRESS NOTES
Medical Nutrition Therapy        Assessment    Visit Type: Initial visit  Chief complaint/Medical Diagnosis/reason for visit E11.65 T2DM with hyperglycemia, without long-term current use of insulin.     HPI Met with Anand and his wife for his initial MNT visit. Patient is recently diagnosed with T2DM and c/o crohn's disease. Anand c/o hypoglycemia and hyperglycemia episodes. Patient educated on the symptoms and its treatment for both. Relevant handouts were given. He is currently checking his BG 2 times a day.  Problems identified in food recall include inconsistent carbohydrate intake with meal skipping.  Provided patient with a 1800 calorie meal plan to assist with consistency, balance and portion control.  Encouraged the consumption of regular meals at regular times.  Advised patient to keep carbohydrate intake to 45 grams per meal and 15-20 grams per snack to assist with glycemic control.  Suggested keeping protein intake to 8 ounces a day and fat to 5 servings daily to assist with lipid management and calorie control. Portion booklet and food labels were used to teach basic carbohydrate counting. Patient agreed to keep daily food logs and return them in one week for assessment. RD will remain available for further dietary questions/concerns.     Ht Readings from Last 1 Encounters:   05/17/24 6' (1.829 m)     Wt Readings from Last 3 Encounters:   05/22/24 96.2 kg (212 lb)   05/20/24 85.9 kg (189 lb 6.4 oz)   05/17/24 102 kg (224 lb)     Weight Change: No    Barriers to Learning: no barriers    Do you follow any special diet presently?: No  Who shops: patient and spouse  Who cooks: spouse    Food Log: Completed via the method of food recall    Wake up: 5-5:30am                  6:00-6:30am: coffee, creamer   Breakfast 6:30am-7:00am: usually skips BF 2-3 times a week - egg whites 2, 1 egg yolk, less sugar orange juice 6 oz, banana 1-2   Morning Snack:none   Lunch 12-12:30pm: usually skips lunch - egg salad / tuna  salad with reduced calorie bread, SF pudding/ jello, grapes   Afternoon Snack: none   Dinner 5-6pm: pork, chicken, beef, turkey, salad - tomatoes, carrots, shin bits, cheese, thousand island dressing (lately avoiding carbohydrates)  Evening Snack:SF pudding/ SF jello    Beverages: orange juice 12 oz a day, water   Eating out/Take out: once a week - cheese steak, hoagie, pizza   Exercise walking seldomly    Calorie needs 1800 kcals/day Carbs: 45 g/meal, 15-20g/snack     Fat: 5 servings/day    Protein:8 ounces/day    Nutrition Diagnosis:  Food and nutrition related knowledge deficit  related to Lack of prior exposure to accurate nutrition related information as evidenced by New medical diagnosis or change in existing diagnosis or condition    Intervention: plate method, label reading, behavior modification strategies, carbohydrate counting, meal timing, weight/ BMI goals, individualized meal plan, monitoring portion control, and exercise guidelines     Treatment Goals: Patient understands education and recommendations, Patient will consume 3 meals a day, Patient will monitor portion control, Patient will consume 25-35 grams of fiber a day, Patient will count carbohydrates, and Patient will monitor blood glucose    Monitoring and evaluation:    Term code indicator  FH 4.4 Mealtime Behavior Criteria: Consume 3 meals, 4-5 hours apart. Try not to skip any meals.  Term code indicator  FH 1.6.4 Fiber Intake Criteria: Include whole grains, non starchy vegetables and 2 serving of fruits in a day.  Term code indicator  FH 1.6.3 Carbohydrate Intake Criteria: Aim 45 grams of carbohydrates per meal and 15- 20 grams of carbohydrates per snack.  Term code indicator  FH 1.3.1 Fluid/Beverage Intake Criteria: Keep up with your water intake.     Materials Provided: portion booklet, hypoglycemia handout, 15/15 rule list, hyperglycemia handout    Patient’s Response to Instruction:  Comprehensiongood  Motivationgood  Expected  Compliancegood    Start- Stop: 11:22-12:41  Total Minutes: 79 Minutes  Group or Individual Instruction: MNT-I  Other: Thiago Healy MD      Thank you for coming to the [facility] for education today.  Please feel free to call with any questions or concerns.    Melissa Whatley  [address] [id number]

## 2024-05-22 NOTE — PATIENT INSTRUCTIONS
Consume 3 meals, 4-5 hours apart. Try not to skip any meals.    Include whole grains, non starchy vegetables and 2 serving of fruits in a day.    Aim 45 grams of carbohydrates per meal and 15- 20 grams of carbohydrates per snack.    Keep up with your water intake.

## 2024-05-22 NOTE — ASSESSMENT & PLAN NOTE
Patient with fluctuating blood glucose with hypoglycemic symptoms-- slurred speech, shakiness, confusion. Symptoms of hyperglycemia-- polyuria, polydipsia, and dizziness. Patient and wife having difficulty with finger sticks due to thickening of skin of fingers. Would benefit for a CGM. Patient had an appt with diabetic educator. Follows with martha.   Lab Results   Component Value Date    HGBA1C 8.2 (H) 05/17/2024

## 2024-05-22 NOTE — TELEPHONE ENCOUNTER
Caller: Anand Park    Doctor and/or Office: Dr. Weir/[facility]#: [phone]    Escalation: Appointment Patient needs a 10 day follow up from 5-16 per Dr. Weir. Patient is unavailable on 5-24. Please return call to schedule. Thank you

## 2024-05-22 NOTE — ASSESSMENT & PLAN NOTE
Continues with intermittent dysarthria. Was cleared by neuro in hospital. Has outpatient follow up with neuro 6/21.

## 2024-05-23 ENCOUNTER — APPOINTMENT (OUTPATIENT)
Dept: LAB | Facility: HOSPITAL | Age: 54
End: 2024-05-23
Payer: MEDICARE

## 2024-05-23 ENCOUNTER — TELEPHONE (OUTPATIENT)
Age: 54
End: 2024-05-23

## 2024-05-23 DIAGNOSIS — E16.2 HYPOGLYCEMIA: ICD-10-CM

## 2024-05-23 DIAGNOSIS — E08.21 DIABETES MELLITUS DUE TO UNDERLYING CONDITION WITH DIABETIC NEPHROPATHY, WITHOUT LONG-TERM CURRENT USE OF INSULIN (HCC): ICD-10-CM

## 2024-05-23 DIAGNOSIS — E11.65 TYPE 2 DIABETES MELLITUS WITH HYPERGLYCEMIA, WITHOUT LONG-TERM CURRENT USE OF INSULIN (HCC): ICD-10-CM

## 2024-05-23 LAB — TSH SERPL DL<=0.05 MIU/L-ACNC: 4.91 UIU/ML (ref 0.45–4.5)

## 2024-05-23 PROCEDURE — 84443 ASSAY THYROID STIM HORMONE: CPT

## 2024-05-23 PROCEDURE — 36415 COLL VENOUS BLD VENIPUNCTURE: CPT

## 2024-05-23 RX ORDER — SEMAGLUTIDE 0.68 MG/ML
INJECTION, SOLUTION SUBCUTANEOUS
Refills: 0 | OUTPATIENT
Start: 2024-05-23

## 2024-05-23 NOTE — TELEPHONE ENCOUNTER
Pt's wife calling to advise new order required. Let wife know ordered was created, awaiting AP signature and will be faxed over to [facility]. Wife confirmed understanding.

## 2024-05-23 NOTE — TELEPHONE ENCOUNTER
PA for ozempic    Submitted via    []CMM-KEY    [x][employer]-Case ID # [id number]   []Faxed to plan   []Other website    []Phone call Case ID #      Office notes sent, clinical questions answered. Awaiting determination    Turnaround time for your insurance to make a decision on your Prior Authorization can take 7-21 business days.

## 2024-05-23 NOTE — TELEPHONE ENCOUNTER
Pt wife called requesting to schedule a 6 weeks follow up for the diabetes education. Pt was seen on 5/22/2024. Please contact pt.

## 2024-05-24 ENCOUNTER — OFFICE VISIT (OUTPATIENT)
Dept: PODIATRY | Facility: CLINIC | Age: 54
End: 2024-05-24
Payer: MEDICARE

## 2024-05-24 ENCOUNTER — TELEPHONE (OUTPATIENT)
Dept: NEUROLOGY | Facility: CLINIC | Age: 54
End: 2024-05-24

## 2024-05-24 ENCOUNTER — TELEPHONE (OUTPATIENT)
Age: 54
End: 2024-05-24

## 2024-05-24 VITALS
DIASTOLIC BLOOD PRESSURE: 65 MMHG | WEIGHT: 212 LBS | HEIGHT: 72 IN | SYSTOLIC BLOOD PRESSURE: 113 MMHG | HEART RATE: 103 BPM | BODY MASS INDEX: 28.71 KG/M2

## 2024-05-24 DIAGNOSIS — L97.511 DIABETIC ULCER OF OTHER PART OF RIGHT FOOT ASSOCIATED WITH TYPE 2 DIABETES MELLITUS, LIMITED TO BREAKDOWN OF SKIN (HCC): Primary | ICD-10-CM

## 2024-05-24 DIAGNOSIS — L97.521 DIABETIC ULCER OF LEFT FOOT ASSOCIATED WITH TYPE 2 DIABETES MELLITUS, LIMITED TO BREAKDOWN OF SKIN, UNSPECIFIED PART OF FOOT (HCC): ICD-10-CM

## 2024-05-24 DIAGNOSIS — E11.42 DIABETIC POLYNEUROPATHY ASSOCIATED WITH TYPE 2 DIABETES MELLITUS (HCC): ICD-10-CM

## 2024-05-24 DIAGNOSIS — E08.21 DIABETES MELLITUS DUE TO UNDERLYING CONDITION WITH DIABETIC NEPHROPATHY, WITHOUT LONG-TERM CURRENT USE OF INSULIN (HCC): ICD-10-CM

## 2024-05-24 DIAGNOSIS — E11.621 DIABETIC ULCER OF LEFT FOOT ASSOCIATED WITH TYPE 2 DIABETES MELLITUS, LIMITED TO BREAKDOWN OF SKIN, UNSPECIFIED PART OF FOOT (HCC): ICD-10-CM

## 2024-05-24 DIAGNOSIS — E11.621 DIABETIC ULCER OF OTHER PART OF RIGHT FOOT ASSOCIATED WITH TYPE 2 DIABETES MELLITUS, LIMITED TO BREAKDOWN OF SKIN (HCC): Primary | ICD-10-CM

## 2024-05-24 DIAGNOSIS — E16.2 HYPOGLYCEMIA: ICD-10-CM

## 2024-05-24 PROCEDURE — 99213 OFFICE O/P EST LOW 20 MIN: CPT | Performed by: PODIATRIST

## 2024-05-24 PROCEDURE — 97597 DBRDMT OPN WND 1ST 20 CM/<: CPT | Performed by: PODIATRIST

## 2024-05-24 RX ORDER — ACYCLOVIR 400 MG/1
1 TABLET ORAL
Qty: 9 EACH | Refills: 3 | Status: SHIPPED | OUTPATIENT
Start: 2024-05-24

## 2024-05-24 RX ORDER — ACYCLOVIR 400 MG/1
1 TABLET ORAL ONCE
Qty: 1 EACH | Refills: 0 | Status: SHIPPED | OUTPATIENT
Start: 2024-05-24 | End: 2024-05-24

## 2024-05-24 RX ORDER — BLOOD-GLUCOSE SENSOR
1 EACH MISCELLANEOUS
Qty: 3 EACH | Refills: 5 | Status: SHIPPED | OUTPATIENT
Start: 2024-05-24 | End: 2024-05-24 | Stop reason: SDUPTHER

## 2024-05-24 RX ORDER — BLOOD-GLUCOSE SENSOR
EACH MISCELLANEOUS
Qty: 3 EACH | Refills: 5 | Status: SHIPPED | OUTPATIENT
Start: 2024-05-24

## 2024-05-24 NOTE — TELEPHONE ENCOUNTER
Pt called in stating that he was approved for Ozempic but was also told that he was denied. Pt is unsure of what decision ir correct. I called the office and transferred him to [place].

## 2024-05-24 NOTE — TELEPHONE ENCOUNTER
Called patient and wife- states that the insurance just called her and let her know that they will not cover the dexcom unless he was insulin dependent. Wife is concerned due to his hand and moter skills that next week he will not be able to test his blood sugars when she isnt home. Asking for advice on what we could do.

## 2024-05-24 NOTE — TELEPHONE ENCOUNTER
Patient called regarding the Freestyle that was sent to the pharmacy. Even after insurance, this is $204. Patient is asking if there is something different that could be sent in for him that may be a lower cost.     Pharmacy: [facility]

## 2024-05-24 NOTE — TELEPHONE ENCOUNTER
Pharmacist calling regarding continuous glucose sensor to have instructions change  to Use 1 each every 10 day for insurance to be able to cover medication.    Please review.  Thank You

## 2024-05-24 NOTE — TELEPHONE ENCOUNTER
Patient called in and pod transferred him. He received a message that the approval for Ozempic went through but the pharmacy said there was a problem with the script. It was ordered for the .25 and inject .75.  The pharmacy needs a new correct with higher dosage sent in to fill     Sending to the provider

## 2024-05-24 NOTE — PROGRESS NOTES
"Patient ID: Benito Park is a 53 y.o. male Date of Birth 1970       Chief Complaint   Patient presents with    Foot Problem     Left Foot ulcer    Diabetes     [facility]             Diagnosis:  1. Diabetic ulcer of other part of right foot associated with type 2 diabetes mellitus, limited to breakdown of skin (HCC)  2. Diabetic ulcer of left foot associated with type 2 diabetes mellitus, limited to breakdown of skin, unspecified part of foot (HCC)  3. Diabetic polyneuropathy associated with type 2 diabetes mellitus (HCC)          Bilateral pedal examination with socks and shoes removed.  I personally reviewed patient's medical records from recent hospitalization to [facility] where he was diagnosed with type 2 DM.  Left first MTPJ neuropathic ulceration - debrided through  selective tissues, please see procedure note, wound was dressed with dermagran gauze, dry dressing, patient will do the same every other day do not get foot wet in shower, do not leave open to air.  Apply Betadine to eschars on first MTPJ right foot and fourth PIPJ left foot.  Continue surgical shoe which was dispensed at last visit.  Continue urea 40% cream daily to top and bottom of feet, avoid area of wound.  Continue to work with [employer] and spine and pain regarding his spinal stimulator.  Reviewed proper diabetic footcare, shoe gear and daily foot inspection.  Patient understands and agrees with the plan will follow-up in  2 weeks.       Debridement    Universal Protocol:  Consent: Verbal consent obtained.  Risks and benefits: risks, benefits and alternatives were discussed  Consent given by: patient  Time out: Immediately prior to procedure a \"time out\" was called to verify the correct patient, procedure, equipment, support staff and site/side marked as required.  Patient understanding: patient states understanding of the procedure being performed  Patient identity confirmed: verbally with patient    Debridement Details  Performed " by: physician  Debridement type: selective  Pain control: none      Post-debridement measurements  Length (cm): 0.5  Width (cm): 1  Depth (cm): 0.1  Percent debrided: 100%  Surface Area (cm^2): 0.5  Area Debrided (cm^2): 0.5  Volume (cm^3): 0.05    Devitalized tissue debrided: biofilm, callus and slough  Instrument(s) utilized: blade  Bleeding: small  Hemostasis obtained with: pressure  Procedural pain (0-10): insensate  Post-procedural pain: insensate   Response to treatment: procedure was tolerated well         Subjective:   Anand presents today for follow-up of bilateral diabetic foot ulceration, he has been changing dressing as directed and completed his cephalexin as prescribed.  He states he is feeling well with no new complaints.  He is working on getting his blood sugars under control.  Most recent HbA1c on 5/17/2024 was 8.2.  Most recent PCP visit was on 5/22/2024.          The following portions of the patient's history were reviewed and updated as appropriate: allergies, current medications, past family history, past medical history, past social history, past surgical history, and problem list.        Objective:  /65 (BP Location: Left arm, Patient Position: Sitting, Cuff Size: Adult)   Pulse 103   Ht 6' (1.829 m) Comment: verbal  Wt 96.2 kg (212 lb)   BMI 28.75 kg/m²     Review of Systems   Constitutional:  Negative for chills and fever.   HENT:  Negative for ear pain and sore throat.    Eyes:  Negative for pain and visual disturbance.   Respiratory:  Negative for cough and shortness of breath.    Cardiovascular:  Negative for chest pain and palpitations.   Gastrointestinal:  Negative for abdominal pain and vomiting.   Genitourinary:  Negative for dysuria and hematuria.   Musculoskeletal:  Negative for arthralgias and back pain.   Skin:  Positive for color change and wound. Negative for rash.   Neurological:  Positive for numbness. Negative for seizures and syncope.   All other systems reviewed  and are negative.      Physical Exam  Constitutional:       Appearance: Normal appearance. He is normal weight.   HENT:      Head: Normocephalic and atraumatic.      Right Ear: External ear normal.      Left Ear: External ear normal.      Nose: Nose normal.      Mouth/Throat:      Mouth: Mucous membranes are moist.      Pharynx: Oropharynx is clear.   Eyes:      Conjunctiva/sclera: Conjunctivae normal.      Pupils: Pupils are equal, round, and reactive to light.   Cardiovascular:      Pulses: Normal pulses. no weak pulses.           Dorsalis pedis pulses are 2+ on the right side and 2+ on the left side.        Posterior tibial pulses are 2+ on the right side and 2+ on the left side.   Pulmonary:      Effort: Pulmonary effort is normal.   Musculoskeletal:      Cervical back: Normal range of motion.      Right lower leg: No edema.      Left lower leg: No edema.   Feet:      Right foot:      Protective Sensation: 10 sites tested.  0 sites sensed.      Skin integrity: Ulcer and dry skin present.      Toenail Condition: Right toenails are normal.      Left foot:      Protective Sensation: 10 sites tested.  0 sites sensed.      Skin integrity: Ulcer and dry skin present.      Toenail Condition: Left toenails are normal.      Comments:  Patient with decrease in dry skin plantar bilateral feet with superficial and not ulcerated fissures, this is diffuse.  No pustules, no blisters noted.   Ulceration dorsal first MTPJ left foot with beefy red granular base measures 0.5 x 1.0 x 0.1, no fluctuance, no crepitus, minimal periwound erythema, no purulence.  Dorsal first MTPJ and fourth PIPJ right foot with dry stable superficial eschar  Hallux limitus bilateral  Cavus bilateral    Skin:     General: Skin is warm and dry.      Capillary Refill: Capillary refill takes less than 2 seconds.   Neurological:      General: No focal deficit present.      Mental Status: He is alert and oriented to person, place, and time. Mental status is  "at baseline.      Sensory: Sensory deficit present.      Coordination: Coordination abnormal.      Gait: Gait abnormal.      Deep Tendon Reflexes: Reflexes abnormal.   Psychiatric:         Mood and Affect: Mood normal.         Behavior: Behavior normal.         Thought Content: Thought content normal.         Judgment: Judgment normal.       Diabetic Foot Exam    Patient's shoes and socks removed.    Right Foot/Ankle   Right Foot Inspection  Skin Exam: dry skin and ulcer.     Toe Exam: ROM and strength within normal limits.     Sensory   Vibration: absent  Proprioception: diminished  Monofilament testing: absent    Vascular  Capillary refills: < 3 seconds  The right DP pulse is 2+. The right PT pulse is 2+.     Left Foot/Ankle  Left Foot Inspection  Skin Exam: dry skin and ulcer.     Toe Exam: ROM and strength within normal limits.     Sensory   Vibration: absent  Proprioception: diminished  Monofilament testing: absent    Vascular  Capillary refills: < 3 seconds  The left DP pulse is 2+. The left PT pulse is 2+.     Assign Risk Category  Deformity present  Loss of protective sensation  No weak pulses  Risk: 2         No pertinent results found.      Rosmery Weir, DPM, DPM, FACFAS    Portions of the record may have been created with voice recognition software. Occasional wrong word or \"sound a like\" substitutions may have occurred due to the inherent limitations of voice recognition software. Read the chart carefully and recognize, using context, where substitutions have occurred.  "

## 2024-05-24 NOTE — TELEPHONE ENCOUNTER
Patients wife called and stated [facility] has not received the order and if they don't receive it by 4:30 today patient wont be able to have his infusion scheduled for Tuesday please review and reach out to pts wife  thank you

## 2024-05-24 NOTE — TELEPHONE ENCOUNTER
PA for OZEMPIC Approved     Date(s) approved May 23, 2024 to May 23, 2025     Case #     Patient advised by          [x] [facility] Message  [] Phone call   []LMOM  []L/M to call office as no active Communication consent on file  []Unable to leave detailed message as VM not approved on Communication consent       Pharmacy advised by    [x]Fax  []Phone call    Approval letter scanned into [employer] Yes

## 2024-05-25 ENCOUNTER — TELEPHONE (OUTPATIENT)
Age: 54
End: 2024-05-25

## 2024-05-28 ENCOUNTER — TELEPHONE (OUTPATIENT)
Dept: ENDOCRINOLOGY | Facility: CLINIC | Age: 54
End: 2024-05-28

## 2024-05-28 DIAGNOSIS — E11.9 DIABETES (HCC): ICD-10-CM

## 2024-05-28 NOTE — TELEPHONE ENCOUNTER
Called patient and left a message with Dr Issa instructions. Stated to call us back if he had any questions or concerns

## 2024-05-28 NOTE — ASSESSMENT & PLAN NOTE
Lab Results   Component Value Date    HGBA1C 8.2 (H) 05/17/2024     We discussed pathophysiology of type 2 diabetes and importance of glycemic control to avoid complications,  We reviewed different options for treatment, I increased metformin to 1000 mg twice a day ( slowly with weekly 500 mg increment ).  counseled on adverse side effects of GLP-1 agonist including nausea, vomiting, pancreatitis, medullary thyroid cancer,   I started Ozempic 0.25 once weekly for 4 weeks and then will increase to 0.5 mg once weekly,  I have asked the patient to check blood sugars 1-2 daily and send a record to the office in few weeks for review so that changes can be made to regimen, if applicable.   Emphasized importance of daily exercise, weight loss, caloric reduction, small meals, consumption of multiple servings of fruits and vegetables and avoidance of concentrated sweets such as juice and soda.   He was referred to CDE for MNT,   Return back in 3 months   Lab prior to next visit.

## 2024-05-29 RX ORDER — BLOOD SUGAR DIAGNOSTIC
STRIP MISCELLANEOUS
Qty: 100 EACH | Refills: 5 | OUTPATIENT
Start: 2024-05-29

## 2024-05-29 NOTE — TELEPHONE ENCOUNTER
Patient spoke to his [facility] benefits. They told him this has to go through Medicare Part B not them . Please assist. Thank you .

## 2024-05-30 ENCOUNTER — TELEPHONE (OUTPATIENT)
Age: 54
End: 2024-05-30

## 2024-05-30 NOTE — TELEPHONE ENCOUNTER
Patient called in for a refill for Prdnisone 20 mg but said it was discontinued. Patient said he still had 2 refills. Please review to see if patient should still be taking this medication. If so, please send to his pharmacy [id number] - [place], [place] - [address]

## 2024-05-31 DIAGNOSIS — R52 PAIN: Primary | ICD-10-CM

## 2024-05-31 RX ORDER — PREDNISONE 20 MG/1
TABLET ORAL
Qty: 15 TABLET | Refills: 0 | Status: SHIPPED | OUTPATIENT
Start: 2024-05-31

## 2024-06-03 NOTE — TELEPHONE ENCOUNTER
Reason for call:   [x] Prior Auth  [] Other:     Caller:  [] Patient  [x] Pharmacy  Name: [id number]  Address: [address]  Callback Number: [phone]    Medication: buPROPion 150 mg 12 hr tab    Dose/Frequency: take one tab by mouth twice a day    Quantity: 180    Ordering Provider:   [x] PCP/Provider -   [] Speciality/Provider -     Has the patient tried other medications and failed? If failed, which medications did they fail?    [x] No Long time med  [] Yes -     Is the patient's insurance updated in EPIC?   [x] Yes   [] No     Is a copy of the patient's insurance scanned in EPIC?   [x] Yes   [] No

## 2024-06-06 NOTE — PROGRESS NOTES
"Patient ID: Benito Park is a 53 y.o. male Date of Birth 1970       Chief Complaint   Patient presents with    Foot Problem     Left - FU of ulcer  Right - great toe skin issue    Diabetes     DFC               Diagnosis:  1. Diabetic ulcer of other part of right foot associated with type 2 diabetes mellitus, limited to breakdown of skin (HCC)  2. Diabetic ulcer of left foot associated with type 2 diabetes mellitus, limited to breakdown of skin, unspecified part of foot (HCC)  3. Diabetic polyneuropathy associated with type 2 diabetes mellitus (HCC)  4. Onychomycosis          Bilateral pedal examination with socks and shoes removed.   Left first MTPJ neuropathic ulceration - debrided through  selective tissues, please see procedure note, wound was dressed with dermagran gauze, dry dressing, patient will do the same every other day do not get foot wet in shower, do not leave open to air.  Apply Betadine to eschars on first MTPJ right foot and fourth PIPJ left foot.  Continue surgical shoe which was dispensed at last visit.  Continue urea 40% cream daily to top and bottom of feet, avoid area of wound.  Continue to work with [employer] and spine and pain regarding his spinal stimulator.  Reviewed proper diabetic footcare, shoe gear and daily foot inspection.  Rx 1 pair diabetic shoes and 3 pair custom molded inserts given and information for Andriy.  Toe nails manually and mechanically debrided x 10 without incident.  Patient understands and agrees with the plan will follow-up in  1 week ulcer check and 3 months DFC        Debridement    Universal Protocol:  Consent: Verbal consent obtained.  Risks and benefits: risks, benefits and alternatives were discussed  Consent given by: patient  Time out: Immediately prior to procedure a \"time out\" was called to verify the correct patient, procedure, equipment, support staff and site/side marked as required.  Patient understanding: patient states " understanding of the procedure being performed  Patient identity confirmed: verbally with patient    Debridement Details  Performed by: physician  Debridement type: selective  Pain control: lidocaine 4%      Post-debridement measurements  Length (cm): 0.2  Width (cm): 0.4  Depth (cm): 0.1  Percent debrided: 100%  Surface Area (cm^2): 0.08  Area Debrided (cm^2): 0.08  Volume (cm^3): 0.01    Devitalized tissue debrided: biofilm, callus and slough  Instrument(s) utilized: blade  Bleeding: small  Hemostasis obtained with: pressure  Procedural pain (0-10): insensate  Post-procedural pain: insensate   Response to treatment: procedure was tolerated well         Subjective:   Anand presents today for follow-up of bilateral diabetic foot ulceration, he has been changing dressing as directed and completed his cephalexin as prescribed.  He states he is feeling well with no new complaints.  He is working on getting his blood sugars under control.  Most recent HbA1c on 5/17/2024 was 8.2.  Most recent PCP visit was on 5/22/2024. FBS was 167 this am.          The following portions of the patient's history were reviewed and updated as appropriate: allergies, current medications, past family history, past medical history, past social history, past surgical history, and problem list.        Objective:  /68 (BP Location: Left arm, Patient Position: Sitting, Cuff Size: Adult)   Pulse 89   Ht 6' (1.829 m) Comment: verbal  Wt 96.2 kg (212 lb) Comment: verbal  BMI 28.75 kg/m²     Review of Systems   Constitutional:  Negative for chills and fever.   HENT:  Negative for ear pain and sore throat.    Eyes:  Negative for pain and visual disturbance.   Respiratory:  Negative for cough and shortness of breath.    Cardiovascular:  Negative for chest pain and palpitations.   Gastrointestinal:  Negative for abdominal pain and vomiting.   Genitourinary:  Negative for dysuria and hematuria.   Musculoskeletal:  Negative for arthralgias and  back pain.   Skin:  Positive for color change and wound. Negative for rash.   Neurological:  Positive for numbness. Negative for seizures and syncope.   All other systems reviewed and are negative.      Physical Exam  Constitutional:       Appearance: Normal appearance. He is normal weight.   HENT:      Head: Normocephalic and atraumatic.      Right Ear: External ear normal.      Left Ear: External ear normal.      Nose: Nose normal.      Mouth/Throat:      Mouth: Mucous membranes are moist.      Pharynx: Oropharynx is clear.   Eyes:      Conjunctiva/sclera: Conjunctivae normal.      Pupils: Pupils are equal, round, and reactive to light.   Cardiovascular:      Pulses: Normal pulses.           Dorsalis pedis pulses are 2+ on the right side and 2+ on the left side.        Posterior tibial pulses are 2+ on the right side and 2+ on the left side.   Pulmonary:      Effort: Pulmonary effort is normal.   Musculoskeletal:      Cervical back: Normal range of motion.      Right lower leg: No edema.      Left lower leg: No edema.   Feet:      Right foot:      Protective Sensation: 10 sites tested.  0 sites sensed.      Skin integrity: Ulcer and dry skin present.      Toenail Condition: Right toenails are abnormally thick and long. Fungal disease present.     Left foot:      Protective Sensation: 10 sites tested.  0 sites sensed.      Skin integrity: Ulcer and dry skin present.      Toenail Condition: Left toenails are abnormally thick and long. Fungal disease present.     Comments:   Patient with decrease in dry skin plantar bilateral feet with superficial and not ulcerated fissures, this is diffuse.  No pustules, no blisters noted.   Ulceration dorsal first MTPJ left foot with beefy red granular base measures  0.2 x 0.4x 0.1, no fluctuance, no crepitus, minimal periwound erythema, no purulence.  Dorsal first MTPJ and fourth PIPJ right foot with resolved superficial eschar  Hallux limitus bilateral  Cavus bilateral    Skin:    "  General: Skin is warm and dry.      Capillary Refill: Capillary refill takes less than 2 seconds.   Neurological:      General: No focal deficit present.      Mental Status: He is alert and oriented to person, place, and time. Mental status is at baseline.      Sensory: Sensory deficit present.      Coordination: Coordination abnormal.      Gait: Gait abnormal.      Deep Tendon Reflexes: Reflexes abnormal.   Psychiatric:         Mood and Affect: Mood normal.         Behavior: Behavior normal.         Thought Content: Thought content normal.         Judgment: Judgment normal.       No pertinent results found.      Rosmery Weir DPM, DANITA, FACFAS    Portions of the record may have been created with voice recognition software. Occasional wrong word or \"sound a like\" substitutions may have occurred due to the inherent limitations of voice recognition software. Read the chart carefully and recognize, using context, where substitutions have occurred.  "

## 2024-06-07 ENCOUNTER — OFFICE VISIT (OUTPATIENT)
Dept: PODIATRY | Facility: CLINIC | Age: 54
End: 2024-06-07
Payer: MEDICARE

## 2024-06-07 ENCOUNTER — TELEPHONE (OUTPATIENT)
Dept: NEUROLOGY | Facility: CLINIC | Age: 54
End: 2024-06-07

## 2024-06-07 VITALS
HEART RATE: 89 BPM | WEIGHT: 212 LBS | SYSTOLIC BLOOD PRESSURE: 118 MMHG | HEIGHT: 72 IN | BODY MASS INDEX: 28.71 KG/M2 | DIASTOLIC BLOOD PRESSURE: 68 MMHG

## 2024-06-07 DIAGNOSIS — L97.511 DIABETIC ULCER OF OTHER PART OF RIGHT FOOT ASSOCIATED WITH TYPE 2 DIABETES MELLITUS, LIMITED TO BREAKDOWN OF SKIN (HCC): Primary | ICD-10-CM

## 2024-06-07 DIAGNOSIS — E11.42 DIABETIC POLYNEUROPATHY ASSOCIATED WITH TYPE 2 DIABETES MELLITUS (HCC): ICD-10-CM

## 2024-06-07 DIAGNOSIS — E11.621 DIABETIC ULCER OF OTHER PART OF RIGHT FOOT ASSOCIATED WITH TYPE 2 DIABETES MELLITUS, LIMITED TO BREAKDOWN OF SKIN (HCC): Primary | ICD-10-CM

## 2024-06-07 DIAGNOSIS — B35.1 ONYCHOMYCOSIS: ICD-10-CM

## 2024-06-07 DIAGNOSIS — E11.621 DIABETIC ULCER OF LEFT FOOT ASSOCIATED WITH TYPE 2 DIABETES MELLITUS, LIMITED TO BREAKDOWN OF SKIN, UNSPECIFIED PART OF FOOT (HCC): ICD-10-CM

## 2024-06-07 DIAGNOSIS — L97.521 DIABETIC ULCER OF LEFT FOOT ASSOCIATED WITH TYPE 2 DIABETES MELLITUS, LIMITED TO BREAKDOWN OF SKIN, UNSPECIFIED PART OF FOOT (HCC): ICD-10-CM

## 2024-06-07 PROCEDURE — 11721 DEBRIDE NAIL 6 OR MORE: CPT | Performed by: PODIATRIST

## 2024-06-07 PROCEDURE — 99213 OFFICE O/P EST LOW 20 MIN: CPT | Performed by: PODIATRIST

## 2024-06-07 PROCEDURE — 97597 DBRDMT OPN WND 1ST 20 CM/<: CPT | Performed by: PODIATRIST

## 2024-06-07 NOTE — TELEPHONE ENCOUNTER
Called patient to confirm upcoming appointment on 6/21/24 with Francesco Edouard in the [place] office.  Pt confirmed appt

## 2024-06-07 NOTE — PROGRESS NOTES
"Patient ID: Benito Park is a 53 y.o. male Date of Birth 1970       No chief complaint on file.            Diagnosis:  1. Diabetic ulcer of left foot associated with type 2 diabetes mellitus, limited to breakdown of skin, unspecified part of foot (HCC)  -     Debridement          Bilateral pedal examination with socks and shoes removed.  Left first MTPJ neuropathic ulceration - debrided through  selective tissues, please see procedure note, wound was dressed with dermagran gauze, dry dressing, patient will do the same every other day do not get foot wet in shower, do not leave open to air.  May discontinue  Betadine to eschars on first MTPJ right foot and fourth PIPJ left foot as they are resolved  Continue surgical shoe left foot.  Continue urea 40% cream daily to top and bottom of feet, avoid area of wound.  Continue to work with [employer] and spine and pain regarding his spinal stimulator.  Reviewed proper diabetic footcare, shoe gear and daily foot inspection.  Patient understands and agrees with the plan will follow-up in  2 weeks.       Debridement    Universal Protocol:  Consent: Verbal consent obtained.  Risks and benefits: risks, benefits and alternatives were discussed  Consent given by: patient  Time out: Immediately prior to procedure a \"time out\" was called to verify the correct patient, procedure, equipment, support staff and site/side marked as required.  Patient understanding: patient states understanding of the procedure being performed  Patient identity confirmed: verbally with patient    Debridement Details  Performed by: physician  Debridement type: selective  Pain control: none      Post-debridement measurements  Length (cm): 0.1  Width (cm): 0.1  Depth (cm): 0.1  Percent debrided: 100%  Surface Area (cm^2): 0.01  Area Debrided (cm^2): 0.01  Volume (cm^3): 0    Devitalized tissue debrided: biofilm, callus and slough  Instrument(s) utilized: blade  Bleeding: small  Hemostasis " obtained with: pressure  Procedural pain (0-10): insensate  Post-procedural pain: insensate   Response to treatment: procedure was tolerated well         Subjective:   Anand presents today for follow-up of bilateral diabetic foot ulceration, he has been changing dressing as directed.  He states he is feeling well with no new complaints.  He is working on getting his blood sugars under control.  Most recent HbA1c on 5/17/2024 was 8.2.  Most recent PCP visit was on 5/22/2024. He has  made an appointment to be measured for diabetic shoes - rx given last week, appointment is for tomorrow. FBS was 127.          The following portions of the patient's history were reviewed and updated as appropriate: allergies, current medications, past family history, past medical history, past social history, past surgical history, and problem list.        Objective:  There were no vitals taken for this visit.    Review of Systems   Constitutional:  Negative for chills and fever.   HENT:  Negative for ear pain and sore throat.    Eyes:  Negative for pain and visual disturbance.   Respiratory:  Negative for cough and shortness of breath.    Cardiovascular:  Negative for chest pain and palpitations.   Gastrointestinal:  Negative for abdominal pain and vomiting.   Genitourinary:  Negative for dysuria and hematuria.   Musculoskeletal:  Negative for arthralgias and back pain.   Skin:  Positive for color change and wound. Negative for rash.   Neurological:  Positive for numbness. Negative for seizures and syncope.   All other systems reviewed and are negative.      Physical Exam  Constitutional:       Appearance: Normal appearance. He is normal weight.   HENT:      Head: Normocephalic and atraumatic.      Right Ear: External ear normal.      Left Ear: External ear normal.      Nose: Nose normal.      Mouth/Throat:      Mouth: Mucous membranes are moist.      Pharynx: Oropharynx is clear.   Eyes:      Conjunctiva/sclera: Conjunctivae normal.       Pupils: Pupils are equal, round, and reactive to light.   Cardiovascular:      Pulses: Normal pulses.           Dorsalis pedis pulses are 2+ on the right side and 2+ on the left side.        Posterior tibial pulses are 2+ on the right side and 2+ on the left side.   Pulmonary:      Effort: Pulmonary effort is normal.   Musculoskeletal:      Cervical back: Normal range of motion.      Right lower leg: No edema.      Left lower leg: No edema.   Feet:      Right foot:      Protective Sensation: 10 sites tested.  0 sites sensed.      Skin integrity: Ulcer and dry skin present.      Toenail Condition: Right toenails are normal.      Left foot:      Protective Sensation: 10 sites tested.  0 sites sensed.      Skin integrity: Ulcer and dry skin present.      Toenail Condition: Left toenails are normal.      Comments:  Patient with decrease in dry skin plantar bilateral feet with superficial and not ulcerated fissures, this is diffuse.  No pustules, no blisters noted.   Ulceration dorsal first MTPJ left foot with beefy red granular base measures 0.1 x 0.1 x 0.1, no fluctuance, no crepitus, minimal periwound erythema, no purulence.  Dorsal first MTPJ and fourth PIPJ right foot with resolved superficial eschar  Hallux limitus bilateral  Cavus bilateral    Skin:     General: Skin is warm and dry.      Capillary Refill: Capillary refill takes less than 2 seconds.   Neurological:      General: No focal deficit present.      Mental Status: He is alert and oriented to person, place, and time. Mental status is at baseline.      Sensory: Sensory deficit present.      Coordination: Coordination abnormal.      Gait: Gait abnormal.      Deep Tendon Reflexes: Reflexes abnormal.   Psychiatric:         Mood and Affect: Mood normal.         Behavior: Behavior normal.         Thought Content: Thought content normal.         Judgment: Judgment normal.              No pertinent results found.      Rosmery Weir DPM, DANITA,  "FACFAS    Portions of the record may have been created with voice recognition software. Occasional wrong word or \"sound a like\" substitutions may have occurred due to the inherent limitations of voice recognition software. Read the chart carefully and recognize, using context, where substitutions have occurred.  "

## 2024-06-08 NOTE — TELEPHONE ENCOUNTER
Rx in question was for TID - PA was needed because max daily dose was BID.   Rx now reflects BID and was dispensed from the pharmacy on 5/22/24

## 2024-06-10 DIAGNOSIS — E78.5 HYPERLIPIDEMIA: ICD-10-CM

## 2024-06-10 RX ORDER — CHLORAL HYDRATE 500 MG
1000 CAPSULE ORAL DAILY
Qty: 30 CAPSULE | Refills: 5 | Status: SHIPPED | OUTPATIENT
Start: 2024-06-10 | End: 2024-12-07

## 2024-06-10 RX ORDER — ATORVASTATIN CALCIUM 40 MG/1
40 TABLET, FILM COATED ORAL EVERY EVENING
Qty: 30 TABLET | Refills: 0 | Status: SHIPPED | OUTPATIENT
Start: 2024-06-10 | End: 2024-06-13

## 2024-06-12 DIAGNOSIS — I10 PRIMARY HYPERTENSION: ICD-10-CM

## 2024-06-12 DIAGNOSIS — F51.01 PRIMARY INSOMNIA: ICD-10-CM

## 2024-06-12 DIAGNOSIS — R52 PAIN: ICD-10-CM

## 2024-06-12 DIAGNOSIS — E11.9 DIABETES (HCC): ICD-10-CM

## 2024-06-12 DIAGNOSIS — E78.5 HYPERLIPIDEMIA: ICD-10-CM

## 2024-06-13 ENCOUNTER — OFFICE VISIT (OUTPATIENT)
Dept: PODIATRY | Facility: CLINIC | Age: 54
End: 2024-06-13
Payer: MEDICARE

## 2024-06-13 VITALS
SYSTOLIC BLOOD PRESSURE: 115 MMHG | HEIGHT: 72 IN | HEART RATE: 92 BPM | DIASTOLIC BLOOD PRESSURE: 65 MMHG | WEIGHT: 212 LBS | BODY MASS INDEX: 28.71 KG/M2

## 2024-06-13 DIAGNOSIS — E11.621 DIABETIC ULCER OF LEFT FOOT ASSOCIATED WITH TYPE 2 DIABETES MELLITUS, LIMITED TO BREAKDOWN OF SKIN, UNSPECIFIED PART OF FOOT (HCC): Primary | ICD-10-CM

## 2024-06-13 DIAGNOSIS — L97.521 DIABETIC ULCER OF LEFT FOOT ASSOCIATED WITH TYPE 2 DIABETES MELLITUS, LIMITED TO BREAKDOWN OF SKIN, UNSPECIFIED PART OF FOOT (HCC): Primary | ICD-10-CM

## 2024-06-13 PROCEDURE — 97597 DBRDMT OPN WND 1ST 20 CM/<: CPT | Performed by: PODIATRIST

## 2024-06-13 RX ORDER — AMITRIPTYLINE HYDROCHLORIDE 100 MG/1
TABLET ORAL
Qty: 180 TABLET | Refills: 1 | Status: SHIPPED | OUTPATIENT
Start: 2024-06-13

## 2024-06-13 RX ORDER — LISINOPRIL 20 MG/1
20 TABLET ORAL DAILY
Qty: 90 TABLET | Refills: 1 | Status: SHIPPED | OUTPATIENT
Start: 2024-06-13

## 2024-06-13 RX ORDER — ATORVASTATIN CALCIUM 40 MG/1
40 TABLET, FILM COATED ORAL EVERY EVENING
Qty: 90 TABLET | Refills: 1 | Status: SHIPPED | OUTPATIENT
Start: 2024-06-13

## 2024-06-13 RX ORDER — PREDNISONE 20 MG/1
TABLET ORAL
Qty: 15 TABLET | Refills: 0 | Status: SHIPPED | OUTPATIENT
Start: 2024-06-13

## 2024-06-17 ENCOUNTER — TELEPHONE (OUTPATIENT)
Age: 54
End: 2024-06-17

## 2024-06-17 NOTE — TELEPHONE ENCOUNTER
Pt would like for the Dr to write him a letter excusing him from jury duty due to his chromes disease and driving distance. Please reach out and advise.

## 2024-06-21 ENCOUNTER — TELEPHONE (OUTPATIENT)
Age: 54
End: 2024-06-21

## 2024-06-21 ENCOUNTER — OFFICE VISIT (OUTPATIENT)
Dept: NEUROLOGY | Facility: CLINIC | Age: 54
End: 2024-06-21
Payer: MEDICARE

## 2024-06-21 VITALS
HEIGHT: 72 IN | TEMPERATURE: 97.5 F | BODY MASS INDEX: 28.25 KG/M2 | WEIGHT: 208.6 LBS | OXYGEN SATURATION: 93 % | HEART RATE: 90 BPM | SYSTOLIC BLOOD PRESSURE: 128 MMHG | DIASTOLIC BLOOD PRESSURE: 70 MMHG

## 2024-06-21 DIAGNOSIS — R47.89 EPISODE OF CHANGE IN SPEECH: Primary | ICD-10-CM

## 2024-06-21 DIAGNOSIS — R41.3 MEMORY CHANGE: ICD-10-CM

## 2024-06-21 DIAGNOSIS — R25.9 ABNORMAL MOVEMENTS: ICD-10-CM

## 2024-06-21 DIAGNOSIS — G62.9 POLYNEUROPATHY: ICD-10-CM

## 2024-06-21 PROCEDURE — 99214 OFFICE O/P EST MOD 30 MIN: CPT | Performed by: NURSE PRACTITIONER

## 2024-06-21 NOTE — TELEPHONE ENCOUNTER
Basil Ashraf call in regard to the PT diabetes management note for the PT. Please feel free to contact Basil Ashraf at [phone]. Please  fax the note to [phone] Thank you.

## 2024-06-21 NOTE — PROGRESS NOTES
Ambulatory Visit  Name: Benito Park      : 1970      MRN: [id number]  Encounter Provider: MIKE Cerda  Encounter Date: 2024   Encounter department: [facility] [place]    Assessment & Plan   1. Episode of change in speech  -     EEG Routine and awake; Future  2. Abnormal movements  -     EMG 1 Upper/1 Lower Neuropathy; Future  -     EEG Routine and awake; Future  3. Polyneuropathy  -     EMG 1 Upper/1 Lower Neuropathy; Future  4. Memory change    Patient Instructions:  EMG/EEG for further evaluation   Home memory games/puzzles  Make sure to be consistent with water intake/continue with dietary changes  We will continue to monitor memory in the office  Continue care with other medical providers  Follow up in 6 months or sooner if needed    History of Present Illness     Benito Park is a 53 y.o. male with a past medical history of HTN, hyperlipidemia, chronic pain with SCS, crohns disease, alcohol use issues in past, DM with neuropathy and ulcer, OSIRIS who presents for hospital follow up- admitted -2024 for falls and speech change. He is with his wife. He does not work. He states family history of heart attack in his father at age 51 and stroke in his brother at age 53.  Today he states he still gets periods of speech changes/confusion that last for hours at at time. He also mention he gets left hand twitching/abnormal movements but these only last seconds and these are not painful. He complains of continued pain and frequent bowel movements. He has follow up with pain management and GI. He follows with podiatry and states his wound is almost healed. [place] today is .    Hospital history:  1 year of lightheadedness upon standing and gait imbalance.  Last night he went to bed around 2100 and at around 0300 patient had significantly worsened balance difficulty and fell several times.  Per his wife at bedside, patient's speech sounded slurred this morning  before she went to work around 0800.  He also seemed somewhat confused as he stated he had let the dogs out but when she came home from work she found that this was not true.  Patient denies lateralized weakness, numbness, vision change or headache.  Per his wife, speech still sounds slurred.     Workup:  CTh and CTA head/neck were unremarkable for acute intracranial abnormality however did show incidental finding of pulmonary nodules.   MRI brain reviewed; negative for acute infarct. Unclear etiology of symptoms; possible polypharmacy or metabolic etiology with hyperglycemia. No indication to continue plavix/statin from neurology standpoint.   A1c 8.2, this is a new diagnosis for patient   Lipid panel reviewed with elevated total cholesterol, LDL, low HDL  Triglycerides elevated  Given 10-year ASCVD risk 11.3%,     Review of Systems   Constitutional:  Positive for fatigue.   HENT: Negative.     Eyes: Negative.    Respiratory: Negative.     Cardiovascular: Negative.    Gastrointestinal: Negative.    Endocrine: Negative.    Genitourinary: Negative.    Musculoskeletal: Negative.    Skin: Negative.    Allergic/Immunologic: Negative.    Neurological:  Positive for dizziness, speech difficulty (slurring speech/words), weakness and numbness (feet due to neuropathy).   Hematological: Negative.    Psychiatric/Behavioral: Negative.     Thought process is off  Balance off a little- no falls since hospital stay  Pt is concerned about his Left hand constantly twitching  ROS was reviewed and updated as appropriate    Current Outpatient Medications on File Prior to Visit   Medication Sig Dispense Refill    acetaminophen (TYLENOL) 650 mg CR tablet Take 1 tablet (650 mg total) by mouth every 8 (eight) hours as needed for mild pain 30 tablet 0    Alcohol Swabs 70 % PADS May substitute brand based on insurance coverage. Check glucose TID. 100 each 0    atorvastatin (LIPITOR) 40 mg tablet TAKE 1 TABLET BY MOUTH EVERY DAY IN THE  EVENING 90 tablet 1    azaTHIOprine (IMURAN) 50 mg tablet Take 1 tablet (50 mg total) by mouth daily 30 tablet 11    Blood Glucose Monitoring Suppl (OneTouch Verio Reflect) w/Device KIT May substitute brand based on insurance coverage. Check glucose TID. 1 kit 0    buPROPion (WELLBUTRIN SR) 150 mg 12 hr tablet Take 1 tablet (150 mg total) by mouth 2 (two) times a day 180 tablet 3    Continuous Glucose Sensor (Dexcom G7 Sensor) Use 1 Device every 10 days 9 each 3    furosemide (LASIX) 20 mg tablet Take 20 mg by mouth daily      glucose blood (OneTouch Verio) test strip May substitute brand based on insurance coverage. Check glucose TID. 100 each 5    lisinopril (ZESTRIL) 20 mg tablet TAKE 1 TABLET BY MOUTH EVERY DAY 90 tablet 1    naproxen (NAPROSYN) 250 mg tablet Take 250 mg by mouth as needed for mild pain      Omega-3 Fatty Acids (fish oil) 1,000 mg Take 1 capsule (1,000 mg total) by mouth daily 30 capsule 5    omeprazole (PriLOSEC) 40 MG capsule TAKE 1 CAPSULE (40 MG TOTAL) BY MOUTH DAILY. 90 capsule 1    OneTouch Delica Lancets 33G MISC May substitute brand based on insurance coverage. Check glucose TID. 100 each 5    pregabalin (LYRICA) 150 mg capsule TAKE 1 CAPSULE BY MOUTH 3 TIMES A DAY 90 capsule 5    RA Vitamin D-3 25 MCG (1000 UT) tablet TAKE 2 TABLETS BY MOUTH DAILY 60 tablet 2    vedolizumab (Entyvio) SOLR Inject 300 mg into a catheter in a vein every 4 weeks      Continuous Glucose Sensor (FreeStyle Allyn 3 Sensor) MISC Use 1 each every 10 days 3 each 5    predniSONE 20 mg tablet TAKE 1 TABLET TWICE DAILY FOR 5 DAYS THEN 1 TABLET DAILY FOR 5 DAYS (Patient not taking: Reported on 6/27/2024) 15 tablet 0     No current facility-administered medications on file prior to visit.      Social History     Tobacco Use    Smoking status: Former     Current packs/day: 0.00     Average packs/day: 0.5 packs/day for 30.0 years (15.0 ttl pk-yrs)     Types: Cigarettes     Start date: 1/1/2019     Quit date: 2/4/2021      Years since quitting: 3.4     Passive exposure: Past    Smokeless tobacco: Former   Vaping Use    Vaping status: Never Used   Substance and Sexual Activity    Alcohol use: Yes     Alcohol/week: 3.0 standard drinks of alcohol     Types: 3 Cans of beer per week     Comment: social    Drug use: Yes     Frequency: 7.0 times per week     Types: Marijuana     Comment: Medical marijuana-vapes    Sexual activity: Not Currently     Partners: Female     Objective     /70 (BP Location: Right arm, Patient Position: Sitting, Cuff Size: Standard)   Pulse 90   Temp 97.5 °F (36.4 °C) (Temporal)   Ht 6' (1.829 m)   Wt 94.6 kg (208 lb 9.6 oz)   SpO2 93%   BMI 28.29 kg/m²       Physical Exam  Vitals reviewed.   Constitutional:       General: He is not in acute distress.  HENT:      Head: Normocephalic and atraumatic.      Right Ear: External ear normal.      Left Ear: External ear normal.      Nose: Nose normal.      Mouth/Throat:      Pharynx: Oropharynx is clear.   Eyes:      General:         Right eye: No discharge.         Left eye: No discharge.      Extraocular Movements: Extraocular movements intact.      Pupils: Pupils are equal, round, and reactive to light.   Cardiovascular:      Rate and Rhythm: Normal rate.      Pulses: Normal pulses.      Heart sounds: Normal heart sounds.   Pulmonary:      Effort: Pulmonary effort is normal.      Breath sounds: Normal breath sounds.   Abdominal:      General: There is no distension.   Musculoskeletal:      Cervical back: Normal range of motion.   Skin:     Capillary Refill: Capillary refill takes less than 2 seconds.      Findings: Lesion present.   Neurological:      Mental Status: He is alert and oriented to person, place, and time.      Sensory: Sensory deficit present.      Coordination: Finger-Nose-Finger Test and Romberg Test normal.      Deep Tendon Reflexes: Reflexes abnormal.      Reflex Scores:       Bicep reflexes are 1+ on the right side and 1+ on the left side.        Brachioradialis reflexes are 1+ on the right side and 1+ on the left side.       Achilles reflexes are 0 on the right side and 0 on the left side.  Psychiatric:         Mood and Affect: Mood is anxious.         Cognition and Memory: Memory is impaired.       Neurologic Exam     Mental Status   Oriented to person, place, and time.   See moca-most trouble with delayed recall and visuospatial     Cranial Nerves     CN II   Right visual field deficit: none  Left visual field deficit: none     CN III, IV, VI   Pupils are equal, round, and reactive to light.  Nystagmus: none   Diplopia: none  Ophthalmoparesis: none    CN V   Facial sensation intact.     CN VII   Facial expression full, symmetric.     CN VIII   CN VIII normal.     CN IX, X   CN IX normal.   CN X normal.     CN XI   CN XI normal.     CN XII   CN XII normal.     Motor Exam   Muscle bulk: normal  Overall muscle tone: normal    Sensory Exam   Right leg light touch: decreased from knee  Left leg light touch: decreased from knee    Gait, Coordination, and Reflexes     Gait  Gait: wide-based    Coordination   Romberg: negative  Finger to nose coordination: normal    Tremor   Resting tremor: absent  Action tremor: absent    Reflexes   Right brachioradialis: 1+  Left brachioradialis: 1+  Right biceps: 1+  Left biceps: 1+  Right patellar reflex grade: trace.  Left patellar reflex grade: trace.  Right achilles: 0  Left achilles: 0  Right ankle clonus: absent  Left ankle clonus: absentNo abnormal movements noted on exam today

## 2024-06-21 NOTE — PATIENT INSTRUCTIONS
EMG/EEG for further evaluation   Home memory games/puzzles  Make sure to be consistent with water intake/continue with dietary changes  We will continue to monitor memory in the office  Continue care with other medical providers  Follow up in 6 months or sooner if needed

## 2024-06-24 ENCOUNTER — TELEPHONE (OUTPATIENT)
Dept: FAMILY MEDICINE CLINIC | Facility: CLINIC | Age: 54
End: 2024-06-24

## 2024-06-24 DIAGNOSIS — F51.01 PRIMARY INSOMNIA: ICD-10-CM

## 2024-06-24 RX ORDER — AMITRIPTYLINE HYDROCHLORIDE 100 MG/1
TABLET ORAL
Qty: 270 TABLET | Refills: 1 | Status: SHIPPED | OUTPATIENT
Start: 2024-06-24

## 2024-06-24 RX ORDER — AMITRIPTYLINE HYDROCHLORIDE 100 MG/1
TABLET ORAL
Qty: 180 TABLET | Refills: 1 | Status: CANCELLED | OUTPATIENT
Start: 2024-06-24

## 2024-06-24 RX ORDER — CYCLOBENZAPRINE HCL 10 MG
TABLET ORAL
Qty: 90 TABLET | Refills: 1 | Status: SHIPPED | OUTPATIENT
Start: 2024-06-24

## 2024-06-24 NOTE — TELEPHONE ENCOUNTER
Pt called the refill line requesting a script for his Amitriptyline 100 mg. Pt stated the pharmacy could not fill till July. I explained to pt that he got the last script on 04-20-24 and it was for 90 days he should not be due till July. Pt stated he has been taking more due to the pain and he can't sleep. Pt states he only has enough to last a week. Please advise and call the pt.

## 2024-06-26 NOTE — PROGRESS NOTES
Patient ID: Benito Park is a 53 y.o. male Date of Birth 1970       Chief Complaint   Patient presents with    Foot Pain     Left great toe burning  Right 4th toe burning & 2nd toe     Diabetes Mellitus     [facility]             Diagnosis:  1. Diabetic ulcer of left foot associated with type 2 diabetes mellitus, limited to breakdown of skin, unspecified part of foot ([facility])  -     Debridement  -     cephalexin (KEFLEX) 500 mg capsule; Take 1 capsule (500 mg total) by mouth every 6 (six) hours for 7 days  -     mupirocin (BACTROBAN) 2 % ointment; Apply topically daily  2. Diabetic ulcer of other part of right foot associated with type 2 diabetes mellitus, limited to breakdown of skin ([facility])  3. Diabetic polyneuropathy associated with type 2 diabetes mellitus ([facility])          Bilateral pedal examination with socks and shoes removed.  Left first MTPJ neuropathic ulceration - no debridement today, wound was dressed with Acticoat 3 flex, dry dressing, patient will apply mupirocin ointment covered with Acticoat Flex, dry dressing daily, do not get foot wet in shower, do not leave open to air.    Patient with cellulitic halo around the wound, this area was marked, patient is advised if there is increased redness, extending past the marked area, increased pain, drainage, purulence, malodor he is to go to the ED.  Historically patient has had MSSA, I empirically started him on cephalexin for 7 days.  Continue surgical shoe left foot -do not wear his sneaker which she had done yesterday.  Continue urea 40% cream daily to top and bottom of feet, avoid area of wound.  Continue to work with [facility] and spine and pain regarding his spinal stimulator.  Reviewed proper diabetic footcare, shoe gear and daily foot inspection.  Patient understands and agrees with the plan will follow-up in 1 week.           Subjective:   Anand presents today for follow-up of bilateral diabetic foot ulceration, he has been changing dressing  as directed.  He states he is feeling well with no new complaints.  He is working on getting his blood sugars under control.  Most recent HbA1c on 5/17/2024 was 8.2.  Most recent PCP visit was on 5/22/2024. He has  made an appointment to be measured for diabetic shoes - rx given last week, appointment is for tomorrow. FBS was 124.  He states he is concerned as he is having more pain in the left foot, he was cleaning yesterday, he did wear his sneaker, he has been leaving the foot open to air at night.          The following portions of the patient's history were reviewed and updated as appropriate: allergies, current medications, past family history, past medical history, past social history, past surgical history, and problem list.        Objective:  BP 97/65 (BP Location: Left arm, Patient Position: Sitting, Cuff Size: Adult)   Pulse 99   Ht 6' (1.829 m) Comment: verbal  Wt 93.4 kg (206 lb) Comment: verbal  BMI 27.94 kg/m²     Review of Systems   Constitutional:  Negative for chills and fever.   HENT:  Negative for ear pain and sore throat.    Eyes:  Negative for pain and visual disturbance.   Respiratory:  Negative for cough and shortness of breath.    Cardiovascular:  Negative for chest pain and palpitations.   Gastrointestinal:  Negative for abdominal pain and vomiting.   Genitourinary:  Negative for dysuria and hematuria.   Musculoskeletal:  Negative for arthralgias and back pain.   Skin:  Positive for color change and wound. Negative for rash.   Neurological:  Positive for numbness. Negative for seizures and syncope.   All other systems reviewed and are negative.      Physical Exam  Constitutional:       Appearance: Normal appearance. He is normal weight.   HENT:      Head: Normocephalic and atraumatic.      Right Ear: External ear normal.      Left Ear: External ear normal.      Nose: Nose normal.      Mouth/Throat:      Mouth: Mucous membranes are moist.      Pharynx: Oropharynx is clear.   Eyes:       Conjunctiva/sclera: Conjunctivae normal.      Pupils: Pupils are equal, round, and reactive to light.   Cardiovascular:      Pulses: Normal pulses.           Dorsalis pedis pulses are 2+ on the right side and 2+ on the left side.        Posterior tibial pulses are 2+ on the right side and 2+ on the left side.   Pulmonary:      Effort: Pulmonary effort is normal.   Musculoskeletal:      Cervical back: Normal range of motion.      Right lower leg: No edema.      Left lower leg: No edema.   Feet:      Right foot:      Protective Sensation: 10 sites tested.  0 sites sensed.      Skin integrity: Ulcer and dry skin present.      Toenail Condition: Right toenails are normal.      Left foot:      Protective Sensation: 10 sites tested.  0 sites sensed.      Skin integrity: Ulcer and dry skin present.      Toenail Condition: Left toenails are normal.      Comments:  Patient with decrease in dry skin plantar bilateral feet with superficial and not ulcerated fissures, this is diffuse.  No pustules, no blisters noted.   Ulceration dorsal first MTPJ left foot with beefy red granular base measures 0.1 x 0.1 x 0.1, no fluctuance, no crepitus, approximately 1 cm circumferential periwound erythema, no purulence.  Dorsal first MTPJ and fourth PIPJ right foot with resolved superficial eschar  Hallux limitus bilateral  Cavus bilateral    Skin:     General: Skin is warm and dry.      Capillary Refill: Capillary refill takes less than 2 seconds.   Neurological:      General: No focal deficit present.      Mental Status: He is alert and oriented to person, place, and time. Mental status is at baseline.      Sensory: Sensory deficit present.      Coordination: Coordination abnormal.      Gait: Gait abnormal.      Deep Tendon Reflexes: Reflexes abnormal.   Psychiatric:         Mood and Affect: Mood normal.         Behavior: Behavior normal.         Thought Content: Thought content normal.         Judgment: Judgment normal.              No  "pertinent results found.      Rosmery Weir, DPM, DPM, FACFAS    Portions of the record may have been created with voice recognition software. Occasional wrong word or \"sound a like\" substitutions may have occurred due to the inherent limitations of voice recognition software. Read the chart carefully and recognize, using context, where substitutions have occurred.  "

## 2024-06-27 ENCOUNTER — OFFICE VISIT (OUTPATIENT)
Dept: PODIATRY | Facility: CLINIC | Age: 54
End: 2024-06-27
Payer: MEDICARE

## 2024-06-27 ENCOUNTER — RA CDI HCC (OUTPATIENT)
Dept: OTHER | Facility: HOSPITAL | Age: 54
End: 2024-06-27

## 2024-06-27 VITALS
HEART RATE: 99 BPM | WEIGHT: 206 LBS | SYSTOLIC BLOOD PRESSURE: 97 MMHG | DIASTOLIC BLOOD PRESSURE: 65 MMHG | BODY MASS INDEX: 27.9 KG/M2 | HEIGHT: 72 IN

## 2024-06-27 DIAGNOSIS — L97.511 DIABETIC ULCER OF OTHER PART OF RIGHT FOOT ASSOCIATED WITH TYPE 2 DIABETES MELLITUS, LIMITED TO BREAKDOWN OF SKIN (HCC): ICD-10-CM

## 2024-06-27 DIAGNOSIS — E11.621 DIABETIC ULCER OF LEFT FOOT ASSOCIATED WITH TYPE 2 DIABETES MELLITUS, LIMITED TO BREAKDOWN OF SKIN, UNSPECIFIED PART OF FOOT (HCC): Primary | ICD-10-CM

## 2024-06-27 DIAGNOSIS — L97.521 DIABETIC ULCER OF LEFT FOOT ASSOCIATED WITH TYPE 2 DIABETES MELLITUS, LIMITED TO BREAKDOWN OF SKIN, UNSPECIFIED PART OF FOOT (HCC): Primary | ICD-10-CM

## 2024-06-27 DIAGNOSIS — E11.42 DIABETIC POLYNEUROPATHY ASSOCIATED WITH TYPE 2 DIABETES MELLITUS (HCC): ICD-10-CM

## 2024-06-27 DIAGNOSIS — E11.621 DIABETIC ULCER OF OTHER PART OF RIGHT FOOT ASSOCIATED WITH TYPE 2 DIABETES MELLITUS, LIMITED TO BREAKDOWN OF SKIN (HCC): ICD-10-CM

## 2024-06-27 PROCEDURE — 99213 OFFICE O/P EST LOW 20 MIN: CPT | Performed by: PODIATRIST

## 2024-06-27 RX ORDER — CEPHALEXIN 500 MG/1
500 CAPSULE ORAL EVERY 6 HOURS SCHEDULED
Qty: 28 CAPSULE | Refills: 0 | Status: SHIPPED | OUTPATIENT
Start: 2024-06-27 | End: 2024-07-04

## 2024-06-27 NOTE — PROGRESS NOTES
HCC coding opportunities          Chart Reviewed number of suggestions sent to Provider: 2     Patients Insurance   E11.65 and E11.40  Medicare Insurance: Medicare

## 2024-06-28 ENCOUNTER — HOSPITAL ENCOUNTER (OUTPATIENT)
Dept: CT IMAGING | Facility: HOSPITAL | Age: 54
Discharge: HOME | End: 2024-06-28
Payer: MEDICARE

## 2024-06-28 DIAGNOSIS — R91.1 PULMONARY NODULE: ICD-10-CM

## 2024-06-28 PROCEDURE — 71250 CT THORAX DX C-: CPT

## 2024-07-05 ENCOUNTER — OFFICE VISIT (OUTPATIENT)
Dept: PODIATRY | Facility: CLINIC | Age: 54
End: 2024-07-05
Payer: MEDICARE

## 2024-07-05 ENCOUNTER — OFFICE VISIT (OUTPATIENT)
Dept: FAMILY MEDICINE CLINIC | Facility: CLINIC | Age: 54
End: 2024-07-05
Payer: MEDICARE

## 2024-07-05 VITALS
BODY MASS INDEX: 27.43 KG/M2 | SYSTOLIC BLOOD PRESSURE: 102 MMHG | DIASTOLIC BLOOD PRESSURE: 70 MMHG | HEIGHT: 73 IN | HEART RATE: 99 BPM | WEIGHT: 207 LBS

## 2024-07-05 VITALS
DIASTOLIC BLOOD PRESSURE: 78 MMHG | HEART RATE: 95 BPM | HEIGHT: 73 IN | TEMPERATURE: 96 F | WEIGHT: 207 LBS | BODY MASS INDEX: 27.43 KG/M2 | SYSTOLIC BLOOD PRESSURE: 120 MMHG | OXYGEN SATURATION: 95 %

## 2024-07-05 DIAGNOSIS — E11.621 DIABETIC ULCER OF LEFT FOOT ASSOCIATED WITH TYPE 2 DIABETES MELLITUS, LIMITED TO BREAKDOWN OF SKIN, UNSPECIFIED PART OF FOOT (HCC): Primary | ICD-10-CM

## 2024-07-05 DIAGNOSIS — M96.1 FAILED BACK SURGICAL SYNDROME: ICD-10-CM

## 2024-07-05 DIAGNOSIS — E11.42 DIABETIC POLYNEUROPATHY ASSOCIATED WITH TYPE 2 DIABETES MELLITUS (HCC): ICD-10-CM

## 2024-07-05 DIAGNOSIS — Z00.00 MEDICARE ANNUAL WELLNESS VISIT, SUBSEQUENT: Primary | ICD-10-CM

## 2024-07-05 DIAGNOSIS — L97.521 DIABETIC ULCER OF LEFT FOOT ASSOCIATED WITH TYPE 2 DIABETES MELLITUS, LIMITED TO BREAKDOWN OF SKIN, UNSPECIFIED PART OF FOOT (HCC): Primary | ICD-10-CM

## 2024-07-05 DIAGNOSIS — K50.00 CROHN'S DISEASE OF SMALL INTESTINE WITHOUT COMPLICATION (HCC): ICD-10-CM

## 2024-07-05 DIAGNOSIS — E11.00 TYPE 2 DIABETES MELLITUS WITH HYPEROSMOLARITY WITHOUT COMA, WITHOUT LONG-TERM CURRENT USE OF INSULIN (HCC): ICD-10-CM

## 2024-07-05 PROCEDURE — 99213 OFFICE O/P EST LOW 20 MIN: CPT | Performed by: PODIATRIST

## 2024-07-05 PROCEDURE — G0438 PPPS, INITIAL VISIT: HCPCS | Performed by: FAMILY MEDICINE

## 2024-07-05 PROCEDURE — 99214 OFFICE O/P EST MOD 30 MIN: CPT | Performed by: FAMILY MEDICINE

## 2024-07-05 RX ORDER — METFORMIN HYDROCHLORIDE 500 MG/1
1000 TABLET, EXTENDED RELEASE ORAL 2 TIMES DAILY WITH MEALS
Qty: 360 TABLET | Refills: 3 | Status: SHIPPED | OUTPATIENT
Start: 2024-07-05

## 2024-07-05 NOTE — PATIENT INSTRUCTIONS
Medicare Preventive Visit Patient Instructions  Thank you for completing your Welcome to Medicare Visit or Medicare Annual Wellness Visit today. Your next wellness visit will be due in one year (7/6/2025).  The screening/preventive services that you may require over the next 5-10 years are detailed below. Some tests may not apply to you based off risk factors and/or age. Screening tests ordered at today's visit but not completed yet may show as past due. Also, please note that scanned in results may not display below.  Preventive Screenings:  Service Recommendations Previous Testing/Comments   Colorectal Cancer Screening  Colonoscopy    Fecal Occult Blood Test (FOBT)/Fecal Immunochemical Test (FIT)  Fecal DNA/Cologuard Test  Flexible Sigmoidoscopy Age: 45-75 years old   Colonoscopy: every 10 years (May be performed more frequently if at higher risk)  OR  FOBT/FIT: every 1 year  OR  Cologuard: every 3 years  OR  Sigmoidoscopy: every 5 years  Screening may be recommended earlier than age 45 if at higher risk for colorectal cancer. Also, an individualized decision between you and your healthcare provider will decide whether screening between the ages of 76-85 would be appropriate. Colonoscopy: 11/14/2023  FOBT/FIT: Not on file  Cologuard: Not on file  Sigmoidoscopy: Not on file          Prostate Cancer Screening Individualized decision between patient and health care provider in men between ages of 55-69   Medicare will cover every 12 months beginning on the day after your 50th birthday PSA: 0.65 ng/mL           Hepatitis C Screening Once for adults born between 1945 and 1965  More frequently in patients at high risk for Hepatitis C Hep C Antibody: Not on file        Diabetes Screening 1-2 times per year if you're at risk for diabetes or have pre-diabetes Fasting glucose: 114 mg/dL (6/1/2023)  A1C: 8.2 % (5/17/2024)      Cholesterol Screening Once every 5 years if you don't have a lipid disorder. May order more often  based on risk factors. Lipid panel: 05/17/2024         Other Preventive Screenings Covered by Medicare:  Abdominal Aortic Aneurysm (AAA) Screening: covered once if your at risk. You're considered to be at risk if you have a family history of AAA or a male between the age of 65-75 who smoking at least 100 cigarettes in your lifetime.  Lung Cancer Screening: covers low dose CT scan once per year if you meet all of the following conditions: (1) Age 55-77; (2) No signs or symptoms of lung cancer; (3) Current smoker or have quit smoking within the last 15 years; (4) You have a tobacco smoking history of at least 20 pack years (packs per day x number of years you smoked); (5) You get a written order from a healthcare provider.  Glaucoma Screening: covered annually if you're considered high risk: (1) You have diabetes OR (2) Family history of glaucoma OR (3)  aged 50 and older OR (4)  American aged 65 and older  Osteoporosis Screening: covered every 2 years if you meet one of the following conditions: (1) Have a vertebral abnormality; (2) On glucocorticoid therapy for more than 3 months; (3) Have primary hyperparathyroidism; (4) On osteoporosis medications and need to assess response to drug therapy.  HIV Screening: covered annually if you're between the age of 15-65. Also covered annually if you are younger than 15 and older than 65 with risk factors for HIV infection. For pregnant patients, it is covered up to 3 times per pregnancy.    Immunizations:  Immunization Recommendations   Influenza Vaccine Annual influenza vaccination during flu season is recommended for all persons aged >= 6 months who do not have contraindications   Pneumococcal Vaccine   * Pneumococcal conjugate vaccine = PCV13 (Prevnar 13), PCV15 (Vaxneuvance), PCV20 (Prevnar 20)  * Pneumococcal polysaccharide vaccine = PPSV23 (Pneumovax) Adults 19-63 yo with certain risk factors or if 65+ yo  If never received any pneumonia vaccine:  recommend Prevnar 20 (PCV20)  Give PCV20 if previously received 1 dose of PCV13 or PPSV23   Hepatitis B Vaccine 3 dose series if at intermediate or high risk (ex: diabetes, end stage renal disease, liver disease)   Respiratory syncytial virus (RSV) Vaccine - COVERED BY MEDICARE PART D  * RSVPreF3 (Arexvy) CDC recommends that adults 60 years of age and older may receive a single dose of RSV vaccine using shared clinical decision-making (SCDM)   Tetanus (Td) Vaccine - COST NOT COVERED BY MEDICARE PART B Following completion of primary series, a booster dose should be given every 10 years to maintain immunity against tetanus. Td may also be given as tetanus wound prophylaxis.   Tdap Vaccine - COST NOT COVERED BY MEDICARE PART B Recommended at least once for all adults. For pregnant patients, recommended with each pregnancy.   Shingles Vaccine (Shingrix) - COST NOT COVERED BY MEDICARE PART B  2 shot series recommended in those 19 years and older who have or will have weakened immune systems or those 50 years and older     Health Maintenance Due:      Topic Date Due   • Hepatitis C Screening  Never done   • HIV Screening  Never done   • Colorectal Cancer Screening  11/13/2025     Immunizations Due:      Topic Date Due   • Pneumococcal Vaccine: Pediatrics (0 to 5 Years) and At-Risk Patients (6 to 64 Years) (2 of 2 - PPSV23 or PCV20) 01/07/2021   • COVID-19 Vaccine (5 - 2023-24 season) 09/01/2023   • Influenza Vaccine (1) 09/01/2024     Advance Directives   What are advance directives?  Advance directives are legal documents that state your wishes and plans for medical care. These plans are made ahead of time in case you lose your ability to make decisions for yourself. Advance directives can apply to any medical decision, such as the treatments you want, and if you want to donate organs.   What are the types of advance directives?  There are many types of advance directives, and each state has rules about how to use them.  You may choose a combination of any of the following:  Living will:  This is a written record of the treatment you want. You can also choose which treatments you do not want, which to limit, and which to stop at a certain time. This includes surgery, medicine, IV fluid, and tube feedings.   Durable power of  for healthcare (DPAHC):  This is a written record that states who you want to make healthcare choices for you when you are unable to make them for yourself. This person, called a proxy, is usually a family member or a friend. You may choose more than 1 proxy.  Do not resuscitate (DNR) order:  A DNR order is used in case your heart stops beating or you stop breathing. It is a request not to have certain forms of treatment, such as CPR. A DNR order may be included in other types of advance directives.  Medical directive:  This covers the care that you want if you are in a coma, near death, or unable to make decisions for yourself. You can list the treatments you want for each condition. Treatment may include pain medicine, surgery, blood transfusions, dialysis, IV or tube feedings, and a ventilator (breathing machine).  Values history:  This document has questions about your views, beliefs, and how you feel and think about life. This information can help others choose the care that you would choose.  Why are advance directives important?  An advance directive helps you control your care. Although spoken wishes may be used, it is better to have your wishes written down. Spoken wishes can be misunderstood, or not followed. Treatments may be given even if you do not want them. An advance directive may make it easier for your family to make difficult choices about your care.   Weight Management   Why it is important to manage your weight:  Being overweight increases your risk of health conditions such as heart disease, high blood pressure, type 2 diabetes, and certain types of cancer. It can also increase your  risk for osteoarthritis, sleep apnea, and other respiratory problems. Aim for a slow, steady weight loss. Even a small amount of weight loss can lower your risk of health problems.  How to lose weight safely:  A safe and healthy way to lose weight is to eat fewer calories and get regular exercise. You can lose up about 1 pound a week by decreasing the number of calories you eat by 500 calories each day.   Healthy meal plan for weight management:  A healthy meal plan includes a variety of foods, contains fewer calories, and helps you stay healthy. A healthy meal plan includes the following:  Eat whole-grain foods more often.  A healthy meal plan should contain fiber. Fiber is the part of grains, fruits, and vegetables that is not broken down by your body. Whole-grain foods are healthy and provide extra fiber in your diet. Some examples of whole-grain foods are whole-wheat breads and pastas, oatmeal, brown rice, and bulgur.  Eat a variety of vegetables every day.  Include dark, leafy greens such as spinach, kale, luke greens, and mustard greens. Eat yellow and orange vegetables such as carrots, sweet potatoes, and winter squash.   Eat a variety of fruits every day.  Choose fresh or canned fruit (canned in its own juice or light syrup) instead of juice. Fruit juice has very little or no fiber.  Eat low-fat dairy foods.  Drink fat-free (skim) milk or 1% milk. Eat fat-free yogurt and low-fat cottage cheese. Try low-fat cheeses such as mozzarella and other reduced-fat cheeses.  Choose meat and other protein foods that are low in fat.  Choose beans or other legumes such as split peas or lentils. Choose fish, skinless poultry (chicken or turkey), or lean cuts of red meat (beef or pork). Before you cook meat or poultry, cut off any visible fat.   Use less fat and oil.  Try baking foods instead of frying them. Add less fat, such as margarine, sour cream, regular salad dressing and mayonnaise to foods. Eat fewer high-fat  foods. Some examples of high-fat foods include french fries, doughnuts, ice cream, and cakes.  Eat fewer sweets.  Limit foods and drinks that are high in sugar. This includes candy, cookies, regular soda, and sweetened drinks.  Exercise:  Exercise at least 30 minutes per day on most days of the week. Some examples of exercise include walking, biking, dancing, and swimming. You can also fit in more physical activity by taking the stairs instead of the elevator or parking farther away from stores. Ask your healthcare provider about the best exercise plan for you.      © Copyright [employer] 2018 Information is for End User's use only and may not be sold, redistributed or otherwise used for commercial purposes. All illustrations and images included in CareNotes® are the copyrighted property of [employer] Inc[employer] or [employer]

## 2024-07-05 NOTE — PROGRESS NOTES
Ambulatory Visit  Name: Benito Park      : 1970      MRN: [id number]  Encounter Provider: Ariel Sosa MD  Encounter Date: 2024   Encounter department: [facility]    Assessment & Plan   1. Medicare annual wellness visit, subsequent  2. Failed back surgical syndrome  3. Crohn's disease of small intestine without complication (HCC)  4. Type 2 diabetes mellitus with hyperosmolarity without coma, without long-term current use of insulin (HCC)    Depression Screening and Follow-up Plan: Patient was screened for depression during today's encounter. They screened negative with a PHQ-2 score of 0.      Preventive health issues were discussed with patient, and age appropriate screening tests were ordered as noted in patient's After Visit Summary. Personalized health advice and appropriate referrals for health education or preventive services given if needed, as noted in patient's After Visit Summary.    History of Present Illness     HPI   Patient Care Team:  Ariel Sosa MD as PCP - General (Family Medicine)  MD Jie Bower PA-C as Nurse Practitioner (Vascular Surgery)  MIKE Matthews as Nurse Practitioner (Neurology)  Daniel Simpson as Diabetes Educator (Dietician)    Review of Systems   Constitutional:  Negative for appetite change and fatigue.   HENT:  Negative for ear pain, postnasal drip, sinus pressure and sore throat.    Eyes:  Negative for redness and visual disturbance.   Respiratory:  Negative for cough, chest tightness, shortness of breath and wheezing.    Cardiovascular:  Negative for chest pain, palpitations and leg swelling.   Gastrointestinal:  Negative for abdominal pain, blood in stool, constipation, diarrhea, nausea and vomiting.   Genitourinary:  Negative for difficulty urinating, flank pain, hematuria and urgency.   Musculoskeletal:  Negative for arthralgias, back pain, joint swelling and myalgias.   Skin:  Negative for rash and  wound.        No suspicious skin lesions   Neurological:  Negative for light-headedness, numbness and headaches.   Psychiatric/Behavioral:  Negative for confusion, decreased concentration, sleep disturbance and suicidal ideas. The patient is not nervous/anxious.      Medical History Reviewed by provider this encounter:  Tobacco  Allergies  Meds  Problems  Med Hx  Surg Hx  Fam Hx       Annual Wellness Visit Questionnaire   Benito is here for his Subsequent Wellness visit. Last Medicare Wellness visit information reviewed, patient interviewed and updates made to the record today.      Health Risk Assessment:   Patient rates overall health as fair. Patient feels that their physical health rating is same. Patient is very satisfied with their life. Eyesight was rated as same. Hearing was rated as same. Patient feels that their emotional and mental health rating is slightly better. Patients states they are sometimes angry. Patient states they are never, rarely unusually tired/fatigued. Pain experienced in the last 7 days has been a lot. Patient's pain rating has been 9/10. Patient states that he has experienced weight loss or gain in last 6 months.     Depression Screening:   PHQ-2 Score: 0      Fall Risk Screening:   In the past year, patient has experienced: history of falling in past year    Number of falls: 2 or more  Injured during fall?: Yes    Feels unsteady when standing or walking?: Yes    Worried about falling?: No      Home Safety:  Patient does not have trouble with stairs inside or outside of their home. Patient has working smoke alarms and has working carbon monoxide detector. Home safety hazards include: none.     Nutrition:   Current diet is Regular and Diabetic.     Medications:   Patient is not currently taking any over-the-counter supplements. Patient is able to manage medications.     Activities of Daily Living (ADLs)/Instrumental Activities of Daily Living (IADLs):   Walk and transfer into and  out of bed and chair?: Yes  Dress and groom yourself?: Yes    Bathe or shower yourself?: Yes    Feed yourself? Yes  Do your laundry/housekeeping?: Yes  Manage your money, pay your bills and track your expenses?: Yes  Make your own meals?: Yes    Do your own shopping?: Yes    Previous Hospitalizations:   Any hospitalizations or ED visits within the last 12 months?: Yes    How many hospitalizations have you had in the last year?: 1-2    Advance Care Planning:   Living will: Yes    Durable POA for healthcare: Yes    Advanced directive: Yes    Provider agrees with end of life decisions: Yes      Cognitive Screening:   Provider or family/friend/caregiver concerned regarding cognition?: No    PREVENTIVE SCREENINGS      Cardiovascular Screening:    General: Screening Not Indicated and History Lipid Disorder      Diabetes Screening:     General: Screening Not Indicated and History Diabetes      Colorectal Cancer Screening:     General: Screening Current      Prostate Cancer Screening:    General: Screening Current      Osteoporosis Screening:    General: Screening Not Indicated      Abdominal Aortic Aneurysm (AAA) Screening:    Risk factors include: tobacco use        General: Risks and Benefits Discussed      Lung Cancer Screening:     General: Screening Current      Hepatitis C Screening:    General: Screening Not Indicated    Screening, Brief Intervention, and Referral to Treatment (SBIRT)    Screening  Typical number of drinks in a day: 0  Typical number of drinks in a week: 0  Interpretation: Low risk drinking behavior.    Single Item Drug Screening:  How often have you used an illegal drug (including marijuana) or a prescription medication for non-medical reasons in the past year? never    Single Item Drug Screen Score: 0  Interpretation: Negative screen for possible drug use disorder    Social Determinants of Health     Financial Resource Strain: Low Risk  (6/7/2023)    Overall Financial Resource Strain (CARDIFIFI)      "Difficulty of Paying Living Expenses: Not hard at all   Food Insecurity: No Food Insecurity (7/5/2024)    Hunger Vital Sign     Worried About Running Out of Food in the Last Year: Never true     Ran Out of Food in the Last Year: Never true   Transportation Needs: No Transportation Needs (7/5/2024)    PRAPARE - Transportation     Lack of Transportation (Medical): No     Lack of Transportation (Non-Medical): No   Housing Stability: Low Risk  (7/5/2024)    Housing Stability Vital Sign     Unable to Pay for Housing in the Last Year: No     Number of Times Moved in the Last Year: 1     Homeless in the Last Year: No   Utilities: Not At Risk (7/5/2024)    [facility] Utilities     Threatened with loss of utilities: No     No results found.    Objective     /78 (BP Location: Right arm, Patient Position: Sitting, Cuff Size: Adult)   Pulse 95   Temp (!) 96 °F (35.6 °C) (Tympanic)   Ht 6' 0.5\" (1.842 m)   Wt 93.9 kg (207 lb)   SpO2 95%   BMI 27.69 kg/m²     Physical Exam  Vitals and nursing note reviewed.   Constitutional:       General: He is not in acute distress.     Appearance: He is well-developed.   HENT:      Head: Normocephalic and atraumatic.   Eyes:      Conjunctiva/sclera: Conjunctivae normal.   Neck:      Thyroid: No thyromegaly.   Cardiovascular:      Rate and Rhythm: Normal rate and regular rhythm.      Heart sounds: No murmur heard.  Pulmonary:      Effort: Pulmonary effort is normal. No respiratory distress.      Breath sounds: Normal breath sounds.   Abdominal:      Palpations: Abdomen is soft. There is no mass.      Tenderness: There is no abdominal tenderness.   Musculoskeletal:         General: No swelling. Normal range of motion.      Cervical back: Normal range of motion and neck supple.   Lymphadenopathy:      Cervical: No cervical adenopathy.   Skin:     General: Skin is warm and dry.      Capillary Refill: Capillary refill takes less than 2 seconds.   Neurological:      Mental Status: He is alert. "   Psychiatric:         Mood and Affect: Mood normal.       Administrative Statements

## 2024-07-06 NOTE — PROGRESS NOTES
Assessment/Plan:   Left foot ulceration has completely healed 100%.  Monitor left foot closely for recurrent ulceration.  Discussed the importance of proper shoe gear selection and avoiding what ever shoe caused this wound to rub in the first place.  May discontinue postop shoe and return to a sneaker with a soft toe box.       Diagnoses and all orders for this visit:    Diabetic ulcer of left foot associated with type 2 diabetes mellitus, limited to breakdown of skin, unspecified part of foot (HCC)    Diabetic polyneuropathy associated with type 2 diabetes mellitus (HCC)          Subjective:     Patient ID: Benito Park is a 53 y.o. male.    HPI    Review of Systems   Constitutional: Negative.    HENT: Negative.     Eyes: Negative.    Respiratory: Negative.     Cardiovascular: Negative.    Gastrointestinal: Negative.    Endocrine: Negative.    Genitourinary: Negative.    Musculoskeletal: Negative.    Skin:  Positive for wound.        Skin ulcer has healed left foot   Allergic/Immunologic: Negative.    Neurological: Negative.    Hematological: Negative.    Psychiatric/Behavioral: Negative.           Objective:     Physical Exam  Constitutional:       Appearance: Normal appearance.   HENT:      Head: Normocephalic.      Right Ear: Tympanic membrane normal.      Left Ear: Tympanic membrane normal.      Nose: No congestion.      Mouth/Throat:      Pharynx: No oropharyngeal exudate or posterior oropharyngeal erythema.   Eyes:      Conjunctiva/sclera: Conjunctivae normal.      Pupils: Pupils are equal, round, and reactive to light.   Cardiovascular:      Rate and Rhythm: Normal rate and regular rhythm.      Pulses: Normal pulses.   Pulmonary:      Effort: Pulmonary effort is normal.   Musculoskeletal:         General: No swelling or tenderness. Normal range of motion.   Feet:      Right foot:      Protective Sensation: 10 sites tested.        Skin integrity: Skin integrity normal.      Left foot:      Protective  Sensation: 10 sites tested.        Skin integrity: Ulcer (Ulcer closed 100% with stable eschar) present.   Skin:     General: Skin is warm and dry.      Capillary Refill: Capillary refill takes 2 to 3 seconds.      Coloration: Skin is not pale.      Findings: No bruising, erythema, lesion or rash.      Comments: Dorsal medial left first MPJ ulceration site closed with stable eschar.  No erythema or edema noted.  No signs of infection.   Neurological:      General: No focal deficit present.      Mental Status: He is alert.      Cranial Nerves: No cranial nerve deficit.      Sensory: Sensory deficit present.      Motor: No weakness.      Gait: Gait normal.      Deep Tendon Reflexes: Reflexes normal.      Comments: Diminished epicritic sensations consistent with diabetic neuropathy.   Psychiatric:         Mood and Affect: Mood normal.         Behavior: Behavior normal.         Judgment: Judgment normal.

## 2024-07-08 ENCOUNTER — OFFICE VISIT (OUTPATIENT)
Dept: FAMILY MEDICINE CLINIC | Facility: CLINIC | Age: 54
End: 2024-07-08
Payer: MEDICARE

## 2024-07-08 ENCOUNTER — OFFICE VISIT (OUTPATIENT)
Dept: DIABETES SERVICES | Facility: CLINIC | Age: 54
End: 2024-07-08
Payer: MEDICARE

## 2024-07-08 VITALS
BODY MASS INDEX: 27.55 KG/M2 | SYSTOLIC BLOOD PRESSURE: 110 MMHG | OXYGEN SATURATION: 98 % | DIASTOLIC BLOOD PRESSURE: 72 MMHG | TEMPERATURE: 99 F | HEART RATE: 103 BPM | WEIGHT: 206 LBS

## 2024-07-08 VITALS — BODY MASS INDEX: 27.82 KG/M2 | WEIGHT: 208 LBS

## 2024-07-08 DIAGNOSIS — J03.90 EXUDATIVE TONSILLITIS: Primary | ICD-10-CM

## 2024-07-08 DIAGNOSIS — R52 PAIN: ICD-10-CM

## 2024-07-08 DIAGNOSIS — R06.2 WHEEZING: ICD-10-CM

## 2024-07-08 DIAGNOSIS — R11.0 NAUSEA: ICD-10-CM

## 2024-07-08 DIAGNOSIS — E11.65 TYPE 2 DIABETES MELLITUS WITH HYPERGLYCEMIA, WITHOUT LONG-TERM CURRENT USE OF INSULIN (HCC): Primary | ICD-10-CM

## 2024-07-08 PROCEDURE — 97803 MED NUTRITION INDIV SUBSEQ: CPT | Performed by: DIETITIAN, REGISTERED

## 2024-07-08 PROCEDURE — 99213 OFFICE O/P EST LOW 20 MIN: CPT

## 2024-07-08 PROCEDURE — G2211 COMPLEX E/M VISIT ADD ON: HCPCS

## 2024-07-08 RX ORDER — AZITHROMYCIN 250 MG/1
TABLET, FILM COATED ORAL
Qty: 6 TABLET | Refills: 0 | Status: SHIPPED | OUTPATIENT
Start: 2024-07-08 | End: 2024-07-12

## 2024-07-08 RX ORDER — ALBUTEROL SULFATE 90 UG/1
2 AEROSOL, METERED RESPIRATORY (INHALATION) EVERY 6 HOURS PRN
Qty: 6.7 G | Refills: 2 | Status: SHIPPED | OUTPATIENT
Start: 2024-07-08

## 2024-07-08 RX ORDER — PREDNISONE 20 MG/1
TABLET ORAL
Qty: 15 TABLET | Refills: 0 | Status: SHIPPED | OUTPATIENT
Start: 2024-07-08 | End: 2024-07-12

## 2024-07-08 RX ORDER — ONDANSETRON 4 MG/1
4 TABLET, FILM COATED ORAL EVERY 8 HOURS PRN
Qty: 20 TABLET | Refills: 1 | Status: SHIPPED | OUTPATIENT
Start: 2024-07-08

## 2024-07-08 NOTE — PROGRESS NOTES
Patient ID: Benito Park is a 53 y.o. male Date of Birth 1970       Chief Complaint   Patient presents with    Foot Problem     Left - ulcer      Diabetes Mellitus             Diagnosis:  1. Diabetic ulcer of left foot associated with type 2 diabetes mellitus, limited to breakdown of skin, unspecified part of foot (HCC)  2. Diabetic polyneuropathy associated with type 2 diabetes mellitus (HCC)          Bilateral pedal examination with socks and shoes removed.  Left first MTPJ neuropathic ulceration - is now well healed  - continue to monitor area, wash, moisturize daily and avoid pressure or friction.  Transition to  soft top shoes and to follow up on diabetic shoes/inserts  Continue urea 40% cream daily to top and bottom of feet, avoid area of wound.  Continue to work with Part[facility] and spine and pain regarding his spinal stimulator.  Reviewed proper diabetic footcare, shoe gear and daily foot inspection.  Patient understands and agrees with the plan will follow-up in 2-3 weeks           Subjective:   Anand presents today for follow-up of left diabetic foot ulceration, he states the foot looks good, he completed all antibiotics, saw Dr. Rodas last week and wound was healed and he  started wearing soft sneakers. He states he is feeling well with no new complaints.  He is working on getting his blood sugars under control.  Most recent HbA1c on 5/17/2024 was 8.2.  Most recent PCP visit was on 5/22/2024. He has  been measured for diabetic shoes - 2 weeks ago. FBS was  126.            The following portions of the patient's history were reviewed and updated as appropriate: allergies, current medications, past family history, past medical history, past social history, past surgical history, and problem list.        Objective:  /74 (BP Location: Left arm, Patient Position: Sitting, Cuff Size: Adult)   Pulse 92   Temp 97.9 °F (36.6 °C) (Temporal)   Ht 6' (1.829 m) Comment: verbal  Wt 93 kg  (205 lb) Comment: verbal  BMI 27.80 kg/m²     Review of Systems   Constitutional:  Negative for chills and fever.   HENT:  Negative for ear pain and sore throat.    Eyes:  Negative for pain and visual disturbance.   Respiratory:  Negative for cough and shortness of breath.    Cardiovascular:  Negative for chest pain and palpitations.   Gastrointestinal:  Negative for abdominal pain and vomiting.   Genitourinary:  Negative for dysuria and hematuria.   Musculoskeletal:  Negative for arthralgias and back pain.   Skin:  Positive for color change and wound. Negative for rash.   Neurological:  Positive for numbness. Negative for seizures and syncope.   All other systems reviewed and are negative.      Physical Exam  Constitutional:       Appearance: Normal appearance. He is normal weight.   HENT:      Head: Normocephalic and atraumatic.      Right Ear: External ear normal.      Left Ear: External ear normal.      Nose: Nose normal.      Mouth/Throat:      Mouth: Mucous membranes are moist.      Pharynx: Oropharynx is clear.   Eyes:      Conjunctiva/sclera: Conjunctivae normal.      Pupils: Pupils are equal, round, and reactive to light.   Cardiovascular:      Pulses: Normal pulses.           Dorsalis pedis pulses are 2+ on the right side and 2+ on the left side.        Posterior tibial pulses are 2+ on the right side and 2+ on the left side.   Pulmonary:      Effort: Pulmonary effort is normal.   Musculoskeletal:      Cervical back: Normal range of motion.      Right lower leg: No edema.      Left lower leg: No edema.   Feet:      Right foot:      Protective Sensation: 10 sites tested.  0 sites sensed.      Skin integrity: Ulcer and dry skin present.      Toenail Condition: Right toenails are normal.      Left foot:      Protective Sensation: 10 sites tested.  0 sites sensed.      Skin integrity: Ulcer and dry skin present.      Toenail Condition: Left toenails are normal.      Comments:  Patient with decrease in dry  "skin plantar bilateral feet with superficial and not ulcerated fissures, this is diffuse.  No pustules, no blisters noted.   Ulceration dorsal first MTPJ left foot well epithelialized  Dorsal first MTPJ and fourth PIPJ right foot with resolved superficial eschar  Hallux limitus bilateral  Cavus bilateral    Skin:     General: Skin is warm and dry.      Capillary Refill: Capillary refill takes less than 2 seconds.   Neurological:      General: No focal deficit present.      Mental Status: He is alert and oriented to person, place, and time. Mental status is at baseline.      Sensory: Sensory deficit present.      Coordination: Coordination abnormal.      Gait: Gait abnormal.      Deep Tendon Reflexes: Reflexes abnormal.   Psychiatric:         Mood and Affect: Mood normal.         Behavior: Behavior normal.         Thought Content: Thought content normal.         Judgment: Judgment normal.              No pertinent results found.      Rosmery Weir DPM, DPM, FACFAS    Portions of the record may have been created with voice recognition software. Occasional wrong word or \"sound a like\" substitutions may have occurred due to the inherent limitations of voice recognition software. Read the chart carefully and recognize, using context, where substitutions have occurred.  "

## 2024-07-08 NOTE — PATIENT INSTRUCTIONS
Discussed viral vs bacterial etiology.  Take entire course of azithromycin.  Prednisone for inflammation.  Ondansetron as needed for nausea.  Albuterol inhaler as needed for SOB/wheezing.   Return if symptoms persist or fail to improve.

## 2024-07-08 NOTE — PROGRESS NOTES
Ambulatory Visit  Name: Benito Park      : 1970      MRN: [id number]  Encounter Provider: MIKE Mane  Encounter Date: 2024   Encounter department: [facility]    Assessment & Plan   1. Exudative tonsillitis  -     predniSONE 20 mg tablet; TAKE 1 TABLET TWICE DAILY FOR 5 DAYS THEN 1 TABLET DAILY FOR 5 DAYS  -     azithromycin (ZITHROMAX) 250 mg tablet; 2 tabs Day 1, then 1 tab daily X 4 days  2. Pain  3. Nausea  -     ondansetron (ZOFRAN) 4 mg tablet; Take 1 tablet (4 mg total) by mouth every 8 (eight) hours as needed for nausea or vomiting  4. Wheezing  -     albuterol (ProAir HFA) 90 mcg/act inhaler; Inhale 2 puffs every 6 (six) hours as needed for wheezing or shortness of breath      Depression Screening and Follow-up Plan: Patient was screened for depression during today's encounter. They screened negative with a PHQ-2 score of 0.      History of Present Illness     Nausea  This is a new problem. The current episode started in the past 7 days. The problem occurs constantly. Associated symptoms include anorexia, arthralgias, myalgias and nausea. Pertinent negatives include no abdominal pain, change in bowel habit, chills, congestion, coughing, diaphoresis, fever, joint swelling, neck pain, numbness, rash, sore throat, swollen glands, urinary symptoms, vertigo or vomiting. Nothing aggravates the symptoms. He has tried nothing for the symptoms.       Review of Systems   Constitutional:  Negative for activity change, chills, diaphoresis and fever.   HENT:  Negative for congestion, ear pain, rhinorrhea and sore throat.    Eyes:  Negative for pain.   Respiratory:  Negative for cough, shortness of breath and wheezing.    Cardiovascular:  Negative for leg swelling.   Gastrointestinal:  Positive for anorexia and nausea. Negative for abdominal pain, change in bowel habit, diarrhea and vomiting.   Musculoskeletal:  Positive for arthralgias and myalgias.  Negative for joint swelling and neck pain.   Skin:  Negative for rash.   Neurological:  Negative for vertigo and numbness.   All other systems reviewed and are negative.      Objective     /72 (BP Location: Left arm, Patient Position: Sitting, Cuff Size: Standard)   Pulse 103   Temp 99 °F (37.2 °C)   Wt 93.4 kg (206 lb)   SpO2 98%   BMI 27.55 kg/m²     Physical Exam  Vitals and nursing note reviewed.   Constitutional:       General: He is not in acute distress.     Appearance: He is well-developed. He is not ill-appearing.   HENT:      Head: Normocephalic and atraumatic.      Right Ear: Tympanic membrane and ear canal normal. No drainage, swelling or tenderness. No middle ear effusion. Tympanic membrane is not erythematous.      Left Ear: Tympanic membrane and ear canal normal. No drainage, swelling or tenderness.  No middle ear effusion. Tympanic membrane is not erythematous.      Nose: Congestion present.      Mouth/Throat:      Mouth: Mucous membranes are moist.      Pharynx: Uvula midline.      Tonsils: Tonsillar exudate present.   Eyes:      Conjunctiva/sclera: Conjunctivae normal.   Cardiovascular:      Rate and Rhythm: Normal rate and regular rhythm.      Heart sounds: Normal heart sounds. No murmur heard.  Pulmonary:      Effort: Pulmonary effort is normal. No respiratory distress.      Breath sounds: Normal breath sounds.   Abdominal:      General: Bowel sounds are normal. There is no distension.      Palpations: Abdomen is soft.      Tenderness: There is no abdominal tenderness.   Musculoskeletal:         General: No swelling.      Cervical back: Neck supple.   Skin:     General: Skin is warm and dry.      Capillary Refill: Capillary refill takes less than 2 seconds.   Neurological:      Mental Status: He is alert and oriented to person, place, and time.   Psychiatric:         Mood and Affect: Mood normal.       Administrative Statements

## 2024-07-08 NOTE — PATIENT INSTRUCTIONS
Consume 3 meals a day about 4-5 hours apart--no skipping.  45 grams of carbs per meal (may go to 30 grams per meal if unable to take in 45 grams).  Consider non-weight bearing exercise.  Complete 3-day food record and bring to next visit.

## 2024-07-08 NOTE — PROGRESS NOTES
"Medical Nutrition Therapy        Assessment    Visit Type: Follow-up visit  Chief complaint/Medical Diagnosis/reason for visit Type 2 diabetes mellitus with hyperglycemia, without long-term current use of insulin (HCC)     HPI Anand was seen today for a follow-up MNT visit. FBS reported as 115-120, post prandial ~130 and bedtime 130-135. Patient reports one episode of hypoglycemia with a BG of 40. He states that he was very active at the time of the low BG and felt dizzy. When he checked the BG was 40. Explained that Metformin does not typically cause hypoglycemia. Advised him to speak with his physician about this situation. Anand feels that he is doing better with portion control. He states that with Crohn's disease he sometimes doesn't feel like eating or can't finish his meal. Problems identified in food recall include inconsistent carbohydrate intake at meals and meal skipping. Encouraged the consumption of regular meals at regular times 4-5 hours apart--no skipping.  Advised patient to keep carbohydrate intake to 45 grams per meal (30 if he is unable to consume 45) and 15 grams per snack to assist with glycemic control.  Suggested planning meals ahead of time to ensure adequate/consistent carbohydrate intake. Anand may benefit from non-weight bearing exercise given his h/o neuropathy and back problems. Patient agreed to keep daily food logs. He is scheduled to return for follow-up in 8 weeks. RD will remain available for further dietary questions/concerns.     Ht Readings from Last 1 Encounters:   07/05/24 6' 0.5\" (1.842 m)     Wt Readings from Last 3 Encounters:   07/08/24 94.3 kg (208 lb)   07/05/24 93.9 kg (207 lb)   07/05/24 93.9 kg (207 lb)     Weight Change: Yes weight down 4 pounds since his initial visit    Food Log: Completed via the method of food recall    Breakfast:6:30-7:00AM 4 ounces of low sugar OJ with his pills, 1 cup Cheerios or Life cereal, 1/2 cup milk OR fruit and a Greek yogurt OR eggs " and 1 slice of wheat toast, coffee with 2+ regular flavored creamer, water  Morning Snack:rare, but sometimes a diet Jell-O  Lunch:12:00-12:30 still skips 1-2 times a week; ham and cheese or tuna or egg salad sandwich made with light mayonnaise, maybe a zero sugar Jell-O, water  Afternoon Snack: rarely, but occasionally a SF oatmeal cookie  Dinner:5:00-6:00PM salads 3 times a week with a protein (tuna, chicken or HC eggs) usually no starch OR pork chops, chicken brussels sprouts, occasionally 1/2-1 cup rice, diet iced tea or a diet Pepsi or Fresca  Evening Snack:none, diet Jell-O or 1/2 cup low carb ice cream  Beverages: water, diet beverages  Eating out/Take out:two-three times a month  Exercise has neuropathy and back issues; seldom exercises    Intervention: behavior modification strategies, carbohydrate counting, meal timing, exercise guidelines, and food diary     Treatment Goals: Patient will monitor food intake daily with tracker, Patient will consume 3 meals a day, Patient will count carbohydrates, and Patient will exercise    Monitoring and evaluation:    Term code indicator  FH 1.3.2 Food Intake Criteria: Consume 3 meals a day about 4-5 hours apart--no skipping.  Term code indicator  FH 1.6.3 Carbohydrate Intake Criteria: 45 grams of carbs per meal (may go to 30 grams per meal if unable to take in 45 grams).  Term code indicator  CH 2.2 Treatments/Therapy/Alternative Medicine Criteria: Consider non-weight bearing exercise.  Term code indicator  CH 1.1 Personal Data Criteria: Complete 3-day food record and bring to next visit.      Materials Provided: food logs    Patient’s Response to Instruction:  Comprehensiongood  Motivationgood  Expected Compliancegood    Start- Stop: 9:10-9:45  Total Minutes: 35 Minutes  Group or Individual Instruction: MNT-I  Other: Dr. Healy    Thank you for coming to the [facility] for education today.  Please feel free to call with any questions or  concerns.    Daniel Simpson  [address] [id number]

## 2024-07-10 ENCOUNTER — TELEPHONE (OUTPATIENT)
Age: 54
End: 2024-07-10

## 2024-07-10 NOTE — TELEPHONE ENCOUNTER
Caller: Denisa Yepez     Doctor: Carmella     Reason for call: Andriy sent an order script on 6/20/24 and have not received it back calling to confirm if it was yet received     Call back#: [phone]

## 2024-07-11 ENCOUNTER — OFFICE VISIT (OUTPATIENT)
Dept: PODIATRY | Facility: CLINIC | Age: 54
End: 2024-07-11
Payer: MEDICARE

## 2024-07-11 VITALS
TEMPERATURE: 97.9 F | HEIGHT: 72 IN | HEART RATE: 92 BPM | DIASTOLIC BLOOD PRESSURE: 74 MMHG | SYSTOLIC BLOOD PRESSURE: 126 MMHG | WEIGHT: 205 LBS | BODY MASS INDEX: 27.77 KG/M2

## 2024-07-11 DIAGNOSIS — E11.621 DIABETIC ULCER OF LEFT FOOT ASSOCIATED WITH TYPE 2 DIABETES MELLITUS, LIMITED TO BREAKDOWN OF SKIN, UNSPECIFIED PART OF FOOT (HCC): Primary | ICD-10-CM

## 2024-07-11 DIAGNOSIS — L97.521 DIABETIC ULCER OF LEFT FOOT ASSOCIATED WITH TYPE 2 DIABETES MELLITUS, LIMITED TO BREAKDOWN OF SKIN, UNSPECIFIED PART OF FOOT (HCC): Primary | ICD-10-CM

## 2024-07-11 DIAGNOSIS — E11.42 DIABETIC POLYNEUROPATHY ASSOCIATED WITH TYPE 2 DIABETES MELLITUS (HCC): ICD-10-CM

## 2024-07-11 PROCEDURE — 99213 OFFICE O/P EST LOW 20 MIN: CPT | Performed by: PODIATRIST

## 2024-07-12 ENCOUNTER — OFFICE VISIT (OUTPATIENT)
Dept: GASTROENTEROLOGY | Facility: CLINIC | Age: 54
End: 2024-07-12
Payer: MEDICARE

## 2024-07-12 ENCOUNTER — TELEPHONE (OUTPATIENT)
Age: 54
End: 2024-07-12

## 2024-07-12 VITALS
BODY MASS INDEX: 27.71 KG/M2 | WEIGHT: 204.6 LBS | HEIGHT: 72 IN | DIASTOLIC BLOOD PRESSURE: 68 MMHG | SYSTOLIC BLOOD PRESSURE: 119 MMHG

## 2024-07-12 DIAGNOSIS — K59.1 FUNCTIONAL DIARRHEA: ICD-10-CM

## 2024-07-12 DIAGNOSIS — Z86.010 HISTORY OF COLON POLYPS: ICD-10-CM

## 2024-07-12 DIAGNOSIS — K50.814 CROHN'S DISEASE OF BOTH SMALL AND LARGE INTESTINE WITH ABSCESS (HCC): Primary | ICD-10-CM

## 2024-07-12 DIAGNOSIS — F17.200 SMOKING: ICD-10-CM

## 2024-07-12 DIAGNOSIS — K21.9 GASTROESOPHAGEAL REFLUX DISEASE, UNSPECIFIED WHETHER ESOPHAGITIS PRESENT: ICD-10-CM

## 2024-07-12 PROCEDURE — G2211 COMPLEX E/M VISIT ADD ON: HCPCS | Performed by: INTERNAL MEDICINE

## 2024-07-12 PROCEDURE — 99214 OFFICE O/P EST MOD 30 MIN: CPT | Performed by: INTERNAL MEDICINE

## 2024-07-12 RX ORDER — LOPERAMIDE HYDROCHLORIDE 2 MG/1
TABLET ORAL
Qty: 10 TABLET | Refills: 0 | Status: SHIPPED | OUTPATIENT
Start: 2024-07-12 | End: 2024-07-26

## 2024-07-12 NOTE — Clinical Note
Can we have this patient see one of our IBD guys at least once just to see if there are other ways for us to optimize his care?

## 2024-07-12 NOTE — TELEPHONE ENCOUNTER
Pt called in regard to scheduling an appointment with Dr Maloney. Call was transferred to Kayleigh to assist pt.

## 2024-07-12 NOTE — PATIENT INSTRUCTIONS
Start Imodium - one tab every other day.   After 1 week, if still having diarrhea, take one tab every day.   After 2nd week, send Dr Hampton a message on [employer] and give an update.     Make an appointment with colorectal surgery.

## 2024-07-12 NOTE — PROGRESS NOTES
[facility] - Outpatient Follow-up Note  [place] 53 y.o. male MRN: [id number]  Encounter: [id number]    ASSESSMENT AND PLAN:      1. Crohn's disease of both small and large intestine with abscess (HCC)  53-year-old male here today for follow-up of Crohn's.    EGD and colonoscopy - November 2023.  Good drug levels on Entyvio every 4 weeks since May 2023 although he still has abdominal pain with diarrhea.  MR enteroscopy showing some terminal ileal scarring.    -Discussed with the patient importance of dietary control.  He and his wife state that they have been really trying hard to avoid overeating, eating late at night.  Patient has limited his beer to just 1 or 2 a weekend.  No longer smoking.  -Repeat stool studies and blood work otherwise had been okay.  -At this point, given the ongoing symptoms and the MR E findings, we will go ahead and refer to colorectal surgery for terminal ileal resection consideration.    - Ambulatory Referral to Colorectal Surgery; Future    2. Functional diarrhea  Multifactorial.  Likely secondary to diabetes, medication side effects, poor dietary indiscretion.  From a Crohn's perspective however, the mucosa has been negative from any active inflammation on biopsies.    -Will start some Imodium at this point.  Instead of taking it as needed, I asked him to start titrating the Imodium on a regular basis.  Will start with 1 tab every other day for the first week.  As long as he is not getting constipated, if the symptoms persist, on the second week I asked him to take 1 tab every day.  At that point, I would like him to give me an update.    - loperamide (IMODIUM A-D) 2 MG tablet; Take 1 tablet (2 mg total) by mouth every other day for 7 days, THEN 1 tablet (2 mg total) in the morning for 7 days.  Dispense: 10 tablet; Refill: 0    3. Smoking  Quit smoking.  Advised continual smoking cessation at this point.    4. Gastroesophageal reflux disease,  unspecified whether esophagitis present  Well-controlled on omeprazole    5. History of colon polyps  Recall November 2025      Followup Appointment: 3 months  ______________________________________________________________________    Chief Complaint   Patient presents with   • Follow-up   • Diarrhea     All the time/ after infusions/ no appetite      HPI: 53-year-old male here today for follow-up.  Recently diagnosed with diabetes and started metformin.  Diarrhea is getting worse.  Appetite is poor.  Lost about 18 pounds.  Still having diarrhea and abdominal cramping.  Taking his Entyvio.  Only taking the Imodium as needed.  Blood work is normal and stool studies have been negative.  No bleeding.  No longer drinking beer on a regular basis.  Did quit smoking.    Historical Information   Past Medical History:   Diagnosis Date   • Anxiety    • Back pain    • Callus Few months ago   • Colon polyp    • CPAP (continuous positive airway pressure) dependence    • Crohn's disease (HCC)    • Depression    • Diabetes mellitus (HCC)    • GERD (gastroesophageal reflux disease)    • Hyperlipidemia    • Hypertension    • Perianal fistula    • Sleep apnea    • Terminal ileitis (HCC)      Past Surgical History:   Procedure Laterality Date   • BACK SURGERY  2019   • CAST APPLICATION Right 11/17/2022    Procedure: Application short-arm splint;  Surgeon: Sarthak Villatoro MD;  Location:  MAIN OR;  Service: Orthopedics   • COLONOSCOPY     • EGD     • HAND CONTRACTURE RELEASE Left 06/01/2023    Procedure: left ring and small finger dupuytrens excision and ring finger Metacarpophalangeal joint release and small finger Metacarpophalangeal and proximal interphalangeal joint release;  Surgeon: Sarthak Villatoro MD;  Location:  MAIN OR;  Service: Orthopedics   • HERNIA REPAIR      umbilical hernia   • MO ANRCT XM SURG REQ ANES GENERAL SPI/EDRL DX N/A 11/17/2021    Procedure: EXAM UNDER ANESTHESIA (EUA);  Surgeon: Davi Thao,  MD;  Location: [facility];  Service: Colorectal   • CO APPLICATION SHORT ARM SPLINT FOREARM-HAND STATIC Right 1/11/2024    Procedure: Application short arm splint;  Surgeon: Sarthak Villatoro MD;  Location: [facility];  Service: Orthopedics   • CO CAPSULECTOMY/CAPSULOTOMY IPHAL JOINT EACH Right 1/11/2024    Procedure: Contracture release right hand small finger proximal interphalangeal joint;  Surgeon: Sarthak Villatoro MD;  Location: [facility];  Service: Orthopedics   • CO FASCIOTOMY PALMAR OPEN PARTIAL Right 11/17/2022    Procedure: Dupuytren's excision right palm extending into the small finger with release of the metacarpophalangeal joint and proximal interphalangeal joint.  Dupuytren's excision right palm with release of the right ring finger metacarpophalangeal joint.;  Surgeon: Sarthak Villatoro MD;  Location: [facility];  Service: Orthopedics   • CO FASCIOTOMY PALMAR OPEN PARTIAL Right 1/11/2024    Procedure: Right hand small finger duppuytrens excision with release of the metacarpophalangeal joint and proximal interphalangeal joint;  Surgeon: Sarthak Villatoro MD;  Location: [facility];  Service: Orthopedics   • CO FASCT PALM W/WO Z-PLASTY TISSUE REARGMT/SKN GRFT Right 1/11/2024    Procedure: Right hand small finger duppuytrens excision with release of the metacarpophalangeal joint and proximal interphalangeal joint;  Surgeon: Sarthak Villatoro MD;  Location: [facility];  Service: Orthopedics   • CO FASCT PRTL PALMAR 1 DGT PROX IPHAL JT W/WO RPR Right 1/11/2024    Procedure: Right hand small finger duppuytrens excision with release of the metacarpophalangeal joint and proximal interphalangeal joint;  Surgeon: Sarthak Villatoro MD;  Location: [facility];  Service: Orthopedics   • CO I&D ISCHIORECTAL&/PERIRECTAL ABSCESS SPX Right 10/24/2021    Procedure: excisional debredement right gluteal cheek ;  Surgeon: Jeana Bustamante MD;  Location: [facility];  Service: General   • CO PLACEMENT SETON N/A  11/17/2021    Procedure: PLACEMENT SETON;  Surgeon: Davi Thao MD;  [facility]: [facility];  Service: Colorectal   • SD PRQ IMPLTJ NSTIM ELECTRODE ARRAY EPIDURAL Right 03/26/2021    Procedure: INSERTION THORACIC DORSAL COLUMN SPINAL CORD STIMULATOR PERCUTANEOUS W IMPLANTABLE PULSE GENERATOR, RIGHT;  Surgeon: Akshat Witt MD;  Location: [facility];  Service: Neurosurgery     Social History     Substance and Sexual Activity   Alcohol Use Not Currently   • Alcohol/week: 3.0 standard drinks of alcohol   • Types: 3 Cans of beer per week    Comment: social     Social History     Substance and Sexual Activity   Drug Use Yes   • Frequency: 7.0 times per week   • Types: Marijuana    Comment: Medical marijuana-vapes     Social History     Tobacco Use   Smoking Status Former   • Current packs/day: 0.00   • Average packs/day: 0.5 packs/day for 30.0 years (15.0 ttl pk-yrs)   • Types: Cigarettes   • Start date: 1/1/2019   • Quit date: 2/4/2021   • Years since quitting: 3.4   • Passive exposure: Past   Smokeless Tobacco Former     Family History   Problem Relation Age of Onset   • Heart disease Father    • Heart attack Father    • Colon cancer Neg Hx    • Colon polyps Neg Hx    • Inflammatory bowel disease Neg Hx          Current Outpatient Medications:   •  acetaminophen (TYLENOL) 650 mg CR tablet  •  albuterol (ProAir HFA) 90 mcg/act inhaler  •  Alcohol Swabs 70 % PADS  •  amitriptyline (ELAVIL) 100 mg tablet  •  atorvastatin (LIPITOR) 40 mg tablet  •  azaTHIOprine (IMURAN) 50 mg tablet  •  Blood Glucose Monitoring Suppl (OneTouch Verio Reflect) w/Device KIT  •  buPROPion (WELLBUTRIN SR) 150 mg 12 hr tablet  •  Continuous Glucose Sensor (Dexcom G7 Sensor)  •  Continuous Glucose Sensor (FreeStyle Allyn 3 Sensor) MISC  •  cyclobenzaprine (FLEXERIL) 10 mg tablet  •  furosemide (LASIX) 20 mg tablet  •  glucose blood (OneTouch Verio) test strip  •  lisinopril (ZESTRIL) 20 mg tablet  •  loperamide (IMODIUM A-D) 2 MG tablet  •   metFORMIN (GLUCOPHAGE-XR) 500 mg 24 hr tablet  •  mupirocin (BACTROBAN) 2 % ointment  •  omeprazole (PriLOSEC) 40 MG capsule  •  ondansetron (ZOFRAN) 4 mg tablet  •  pregabalin (LYRICA) 150 mg capsule  •  vedolizumab (Entyvio) SOLR  •  OneTouch Delica Lancets 33G MISC  •  RA Vitamin D-3 25 MCG (1000 UT) tablet  No Known Allergies  Reviewed medications and allergies and updated as indicated    PHYSICAL EXAM:    Blood pressure 119/68, height 6' (1.829 m), weight 92.8 kg (204 lb 9.6 oz). Body mass index is 27.75 kg/m².  General Appearance: NAD, cooperative, alert  Eyes: Anicteric, conjunctiva pink  ENT:  Normocephalic, atraumatic, normal mucosa.    Neck:  Supple, symmetrical, trachea midline  Resp:  Clear to auscultation bilaterally; no rales, rhonchi or wheezing; respirations unlabored   CV:  S1 S2, Regular rate and rhythm; no murmur, rub, or gallop.  GI:  Soft, non-tender, non-distended; normal bowel sounds; no masses, no organomegaly   Rectal: Deferred  Musculoskeletal: No cyanosis, clubbing or edema. Normal ROM.  Skin:  No jaundice, rashes, or lesions   Heme/Lymph: No palpable cervical lymphadenopathy  Psych: Normal affect, good eye contact  Neuro: No gross deficits, AAOx3    Lab Results:   Lab Results   Component Value Date    WBC 6.59 05/17/2024    HGB 14.2 05/17/2024    HCT 42.2 05/17/2024    MCV 99 (H) 05/17/2024     05/17/2024     Lab Results   Component Value Date     10/03/2016    K 4.3 05/17/2024     05/17/2024    CO2 25 05/17/2024    ANIONGAP 23 (H) 02/20/2015    BUN 16 05/17/2024    CREATININE 0.99 05/17/2024    GLUCOSE 110 02/20/2015    GLUF 114 (H) 06/01/2023    CALCIUM 8.9 05/17/2024    AST 22 04/01/2024    ALT 32 04/01/2024    ALKPHOS 97 04/01/2024    PROT 7.3 10/03/2016    BILITOT 0.8 10/03/2016    EGFR 87 05/17/2024       Radiology Results:   CT chest wo contrast    Result Date: 7/5/2024  Narrative: CT CHEST WITHOUT IV CONTRAST INDICATION: R91.1: Solitary pulmonary nodule.  COMPARISON: CT of the chest abdomen and pelvis 5/16/2024. TECHNIQUE: CT examination of the chest was performed without intravenous contrast. Multiplanar 2D reformatted images were created from the source data. This examination, like all CT scans performed in the [facility], was performed utilizing techniques to minimize radiation dose exposure, including the use of iterative reconstruction and automated exposure control. Radiation dose length product (DLP) for this visit: 564.47 mGy-cm FINDINGS: LUNGS: 5 mm irregular nodule in the right upper lobe (series 302, image 103), previously 6 mm when measured similarly. Biapical pleural/parenchymal scarring. Moderate upper lobe prominent centrilobular emphysema. Diffuse bronchial wall thickening in keeping with bronchitis. No new or enlarging suspicious pulmonary nodule. No tracheal or endobronchial lesion. PLEURA: No pleural effusion. No pneumothorax. HEART/GREAT VESSELS: Normal heart size. Mild coronary artery calcification. No thoracic aortic aneurysm. MEDIASTINUM AND TIAGO: Unremarkable. CHEST WALL AND LOWER NECK: Unremarkable. VISUALIZED STRUCTURES IN THE UPPER ABDOMEN: Visualized hepatic parenchyma is diffusely decreased in density consistent with hepatic steatosis. Otherwise no clinical significant abnormality identified in the visualized upper abdomen. Spinal cord stimulator. OSSEOUS STRUCTURES: No acute fracture or destructive osseous lesion.     Impression: 5 mm right upper lobe nodule is unchanged or slightly smaller. Based on current Fleischner Society 2017 Guidelines on incidental pulmonary nodule, recommend follow-up chest CT in 1 year. Workstation performed: [id number]

## 2024-07-15 ENCOUNTER — TELEPHONE (OUTPATIENT)
Age: 54
End: 2024-07-15

## 2024-07-15 ENCOUNTER — PREP FOR PROCEDURE (OUTPATIENT)
Age: 54
End: 2024-07-15

## 2024-07-15 ENCOUNTER — OFFICE VISIT (OUTPATIENT)
Age: 54
End: 2024-07-15
Payer: MEDICARE

## 2024-07-15 VITALS
HEIGHT: 72 IN | SYSTOLIC BLOOD PRESSURE: 102 MMHG | BODY MASS INDEX: 27.5 KG/M2 | WEIGHT: 203 LBS | DIASTOLIC BLOOD PRESSURE: 68 MMHG

## 2024-07-15 DIAGNOSIS — K50.814 CROHN'S DISEASE OF BOTH SMALL AND LARGE INTESTINE WITH ABSCESS (HCC): ICD-10-CM

## 2024-07-15 DIAGNOSIS — K59.1 FUNCTIONAL DIARRHEA: Primary | ICD-10-CM

## 2024-07-15 PROCEDURE — 99215 OFFICE O/P EST HI 40 MIN: CPT | Performed by: COLON & RECTAL SURGERY

## 2024-07-15 NOTE — TELEPHONE ENCOUNTER
TR OV 7/15 functional diarrhea, last fc 11/14/23 [place], BMI 27     Scheduled TR 7/19 EA   Handed PW to patient   Diabetic     ----- Message from ROHINI Maloney MD sent at 7/15/2024  3:46 PM EDT -----  colonoscopy

## 2024-07-15 NOTE — H&P (VIEW-ONLY)
Colon and Rectal Surgery   Benito Park 53 y.o. male MRN [id number]  Encounter: [id number]  07/15/24 3:45 PM            Assessment: Benito Park is a 53 y.o. male who has diarrhea.    Plan:   Functional diarrhea  The patient presents for evaluation of diarrhea.  This is quite severe.  It seems every time he eats, he has diarrhea.  He has a history of Crohn's disease.  This seems to be inactive based on recent colonoscopy.  Also, a recent MRI shows no active inflammatory changes.  There is a reproduced finding of terminal ileal tortuosity but this is without acute inflammation.  There is no proximal distention.    I viewed the MRI and colonoscopy photos.  Laboratory studies were reviewed.  He has not had a celiac profile and I ordered 1.    I understand Dr. Mackay's concern over the patient's symptoms.  However, I do not know whether they are directly associated with Crohn's disease or not.  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    It is noted that the patient's diarrhea is associated with large episodes of large episodes of incontinence of liquid stool.  His diarrhea tends to occur in the surges with urgent bowel movements.  I suspect this may respond to Pamelor, 25 mg, every night before bed.  I recommend he trial this.  However, he is already on Wellbutrin.  I will not add to the Wellbutrin at this time.  I recommend he discuss the Pamelor with his primary care doctor.  The Pamelor can be directly associated with control of urgent bowel movements I recommend a trial to see if this could help him.  He could either go off the Wellbutrin or add the Pamelor.  I defer to the primary care doctor for this management.    Because of the findings in the terminal ileum, consideration could be given to colonoscopy with intubation of this terminal ileum.  I suspect this will be a low yield test but it would help to define relatively normal anatomy  in the terminal ileum.  If there was no stricture, surgery would certainly not be indicated.  If there were a stricture, consideration could be given of terminal ileal resection.    Because of the potential surgical impact, I have scheduled colonoscopy with intubation of the terminal ileum.  This may help us to make our final decisions but, at this time, I think that his management could be nonsurgical with Dr. Mackay.  I am glad to speak with her at any time if she disagrees.      Subjective     HPI    Benito Park is a 53 y.o. male, with a history of Chron's disease, who is referred by Dr. Sharon Hampton for a terminal ileal resection consultation. The patient was last seen in the office by Dr. Thao on 2/16/23.     MRI enterography 3/12/24  IMPRESSION:  No MR findings of active inflammatory bowel disease.  Angulation/distortion of terminal ileum and ileocecal valve suggesting probable chronic stricturing without obstructive upstream dilatation, similar when compared to previous examination.  Findings suggestive of chronic sigmoid diverticular disease. No perianal abscess.  Hepatic steatosis.    Last CT chest abdomen pelvis with contrast 5/16/24  IMPRESSION:  No acute finding in the chest, abdomen, or pelvis.  Hepatic steatosis.    The patient reports daily abdominal pain. He states that the pain can cause loss of appetite. He has gone a day without eating due to the pain.     He can have 3-7 bowel movements daily. The consistency of the stool varies. He has been experiencing loose and watery bowel movements for the past 7-8 months.     He sometimes observes bright red blood upon wiping after bowel movements. This occurs every 4-5 days. He also sometimes observes bright red blood in the toilet bowl. This occurs less frequently.      Last colonoscopy and EGD were performed on 11/14/2023 by Dr. Hampton with a 2 year recall.    The patient is on Entyvio q4w    Lab Results   Component Value Date    CRP 15.9 (H) 04/01/2024      Lab Results   Component Value Date    WBC 6.59 05/17/2024    HGB 14.2 05/17/2024    HCT 42.2 05/17/2024    MCV 99 (H) 05/17/2024     05/17/2024     Lab Results   Component Value Date    SODIUM 137 05/17/2024    K 4.3 05/17/2024     05/17/2024    CO2 25 05/17/2024    AGAP 9 05/17/2024    BUN 16 05/17/2024    CREATININE 0.99 05/17/2024    GLUC 166 (H) 05/17/2024    CALCIUM 8.9 05/17/2024    AST 22 04/01/2024    ALT 32 04/01/2024    ALKPHOS 97 04/01/2024    TP 7.0 04/01/2024    TBILI 0.47 04/01/2024    EGFR 87 05/17/2024     Historical Information   Past Medical History:   Diagnosis Date    Anxiety     Back pain     Callus Few months ago    Colon polyp     CPAP (continuous positive airway pressure) dependence     Crohn's disease (HCC)     Depression     Diabetes mellitus (HCC)     GERD (gastroesophageal reflux disease)     Hyperlipidemia     Hypertension     Perianal fistula     Sleep apnea     Terminal ileitis (HCC)      Past Surgical History:   Procedure Laterality Date    BACK SURGERY  2019    CAST APPLICATION Right 11/17/2022    Procedure: Application short-arm splint;  Surgeon: Sarthak Villatoro MD;  Location: [facility];  Service: Orthopedics    COLONOSCOPY      EGD      HAND CONTRACTURE RELEASE Left 06/01/2023    Procedure: left ring and small finger dupuytrens excision and ring finger Metacarpophalangeal joint release and small finger Metacarpophalangeal and proximal interphalangeal joint release;  Surgeon: Sarthak Villatoro MD;  Location: [facility];  Service: Orthopedics    HERNIA REPAIR      umbilical hernia    OH ANRCT XM SURG REQ ANES GENERAL SPI/EDRL DX N/A 11/17/2021    Procedure: EXAM UNDER ANESTHESIA (EUA);  Surgeon: Davi Thao MD;  Location: [facility];  Service: Colorectal    OH APPLICATION SHORT ARM SPLINT FOREARM-HAND STATIC Right 1/11/2024    Procedure: Application short arm splint;  Surgeon: Sarthak Villatoro MD;  Location: [facility];  Service: Orthopedics    OH  CAPSULECTOMY/CAPSULOTOMY IPHAL JOINT EACH Right 1/11/2024    Procedure: Contracture release right hand small finger proximal interphalangeal joint;  Surgeon: Sarthak Villatoro MD;  Location: [facility];  Service: Orthopedics    WV FASCIOTOMY PALMAR OPEN PARTIAL Right 11/17/2022    Procedure: Dupuytren's excision right palm extending into the small finger with release of the metacarpophalangeal joint and proximal interphalangeal joint.  Dupuytren's excision right palm with release of the right ring finger metacarpophalangeal joint.;  Surgeon: Sarthak Villatoro MD;  Location: [facility];  Service: Orthopedics    WV FASCIOTOMY PALMAR OPEN PARTIAL Right 1/11/2024    Procedure: Right hand small finger duppuytrens excision with release of the metacarpophalangeal joint and proximal interphalangeal joint;  Surgeon: Sarthak Villatoro MD;  Location: [facility];  Service: Orthopedics    WV FASCT PALM W/WO Z-PLASTY TISSUE REARGMT/SKN GRFT Right 1/11/2024    Procedure: Right hand small finger duppuytrens excision with release of the metacarpophalangeal joint and proximal interphalangeal joint;  Surgeon: Sarthak Villatoro MD;  Location:  MAIN OR;  Service: Orthopedics    WV FASCT PRTL PALMAR 1 DGT PROX IPHAL JT W/WO RPR Right 1/11/2024    Procedure: Right hand small finger duppuytrens excision with release of the metacarpophalangeal joint and proximal interphalangeal joint;  Surgeon: Sarthak Villatoro MD;  Location: [facility];  Service: Orthopedics    WV I&D ISCHIORECTAL&/PERIRECTAL ABSCESS SPX Right 10/24/2021    Procedure: excisional debredement right gluteal cheek ;  Surgeon: Jeana Bustamante MD;  Location: [facility];  Service: General    WV PLACEMENT SETON N/A 11/17/2021    Procedure: PLACEMENT SETON;  Surgeon: Davi Thao MD;  Location:  MAIN OR;  Service: Colorectal    WV PRQ IMPLTJ NSTIM ELECTRODE ARRAY EPIDURAL Right 03/26/2021    Procedure: INSERTION THORACIC DORSAL COLUMN SPINAL CORD STIMULATOR  PERCUTANEOUS W IMPLANTABLE PULSE GENERATOR, RIGHT;  Surgeon: Akshat Witt MD;  Location:  MAIN OR;  Service: Neurosurgery       Meds/Allergies       Current Outpatient Medications:     acetaminophen (TYLENOL) 650 mg CR tablet, Take 1 tablet (650 mg total) by mouth every 8 (eight) hours as needed for mild pain, Disp: 30 tablet, Rfl: 0    albuterol (ProAir HFA) 90 mcg/act inhaler, Inhale 2 puffs every 6 (six) hours as needed for wheezing or shortness of breath, Disp: 6.7 g, Rfl: 2    Alcohol Swabs 70 % PADS, May substitute brand based on insurance coverage. Check glucose TID., Disp: 100 each, Rfl: 0    amitriptyline (ELAVIL) 100 mg tablet, Take 300mg HS, Disp: 270 tablet, Rfl: 1    atorvastatin (LIPITOR) 40 mg tablet, TAKE 1 TABLET BY MOUTH EVERY DAY IN THE EVENING, Disp: 90 tablet, Rfl: 1    azaTHIOprine (IMURAN) 50 mg tablet, Take 1 tablet (50 mg total) by mouth daily, Disp: 30 tablet, Rfl: 11    Blood Glucose Monitoring Suppl (OneTouch Verio Reflect) w/Device KIT, May substitute brand based on insurance coverage. Check glucose TID., Disp: 1 kit, Rfl: 0    buPROPion (WELLBUTRIN SR) 150 mg 12 hr tablet, Take 1 tablet (150 mg total) by mouth 2 (two) times a day, Disp: 180 tablet, Rfl: 3    Continuous Glucose Sensor (Dexcom G7 Sensor), Use 1 Device every 10 days, Disp: 9 each, Rfl: 3    Continuous Glucose Sensor (FreeStyle Allyn 3 Sensor) MISC, Use 1 each every 10 days, Disp: 3 each, Rfl: 5    cyclobenzaprine (FLEXERIL) 10 mg tablet, 1 HS, Disp: 90 tablet, Rfl: 1    furosemide (LASIX) 20 mg tablet, Take 20 mg by mouth daily, Disp: , Rfl:     glucose blood (OneTouch Verio) test strip, May substitute brand based on insurance coverage. Check glucose TID., Disp: 100 each, Rfl: 5    lisinopril (ZESTRIL) 20 mg tablet, TAKE 1 TABLET BY MOUTH EVERY DAY, Disp: 90 tablet, Rfl: 1    loperamide (IMODIUM A-D) 2 MG tablet, Take 1 tablet (2 mg total) by mouth every other day for 7 days, THEN 1 tablet (2 mg total) in the morning for  7 days., Disp: 10 tablet, Rfl: 0    metFORMIN (GLUCOPHAGE-XR) 500 mg 24 hr tablet, Take 2 tablets (1,000 mg total) by mouth 2 (two) times a day with meals, Disp: 360 tablet, Rfl: 3    mupirocin (BACTROBAN) 2 % ointment, Apply topically daily, Disp: 15 g, Rfl: 0    omeprazole (PriLOSEC) 40 MG capsule, TAKE 1 CAPSULE (40 MG TOTAL) BY MOUTH DAILY., Disp: 90 capsule, Rfl: 1    ondansetron (ZOFRAN) 4 mg tablet, Take 1 tablet (4 mg total) by mouth every 8 (eight) hours as needed for nausea or vomiting, Disp: 20 tablet, Rfl: 1    OneTouch Delica Lancets 33G MISC, May substitute brand based on insurance coverage. Check glucose TID., Disp: 100 each, Rfl: 5    pregabalin (LYRICA) 150 mg capsule, TAKE 1 CAPSULE BY MOUTH 3 TIMES A DAY, Disp: 90 capsule, Rfl: 5    RA Vitamin D-3 25 MCG (1000 UT) tablet, TAKE 2 TABLETS BY MOUTH DAILY, Disp: 60 tablet, Rfl: 2    vedolizumab (Entyvio) SOLR, Inject 300 mg into a catheter in a vein every 4 weeks, Disp: , Rfl:   No Known Allergies    Social History   Social History     Substance and Sexual Activity   Drug Use Yes    Frequency: 7.0 times per week    Types: Marijuana    Comment: Medical marijuana-vapes     Social History     Tobacco Use   Smoking Status Former    Current packs/day: 0.00    Average packs/day: 0.5 packs/day for 30.0 years (15.0 ttl pk-yrs)    Types: Cigarettes    Start date: 1/1/2019    Quit date: 2/4/2021    Years since quitting: 3.4    Passive exposure: Past   Smokeless Tobacco Former         Family History   Problem Relation Age of Onset    Heart disease Father     Heart attack Father     Colon cancer Neg Hx     Colon polyps Neg Hx     Inflammatory bowel disease Neg Hx          Review of Systems   Constitutional: Negative.    Respiratory: Negative.     Cardiovascular: Negative.    Gastrointestinal:  Positive for abdominal distention, abdominal pain, anal bleeding, blood in stool and diarrhea.       Objective   Current Vitals:  Vitals:    07/15/24 1454   BP: 102/68    Weight: 92.1 kg (203 lb)   Height: 6' (1.829 m)         Physical Exam  Constitutional:       Appearance: Normal appearance.   Cardiovascular:      Rate and Rhythm: Normal rate and regular rhythm.   Pulmonary:      Effort: Pulmonary effort is normal.      Breath sounds: Normal breath sounds.   Abdominal:      General: Abdomen is flat.      Palpations: Abdomen is soft. There is no mass.      Tenderness: There is abdominal tenderness. There is no guarding.      Comments: There is mild left lower quadrant tenderness that is not associated with peritonitis or mass.   Neurological:      General: No focal deficit present.      Mental Status: He is alert and oriented to person, place, and time.   Psychiatric:         Mood and Affect: Mood normal.         Behavior: Behavior normal.

## 2024-07-15 NOTE — ASSESSMENT & PLAN NOTE
The patient presents for evaluation of diarrhea.  This is quite severe.  It seems every time he eats, he has diarrhea.  He has a history of Crohn's disease.  This seems to be inactive based on recent colonoscopy.  Also, a recent MRI shows no active inflammatory changes.  There is a reproduced finding of terminal ileal tortuosity but this is without acute inflammation.  There is no proximal distention.    I viewed the MRI and colonoscopy photos.  Laboratory studies were reviewed.  He has not had a celiac profile and I ordered 1.    I understand Dr. Mackay's concern over the patient's symptoms.  However, I do not know whether they are directly associated with Crohn's disease or not.  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    It is noted that the patient's diarrhea is associated with large episodes of large episodes of incontinence of liquid stool.  His diarrhea tends to occur in the surges with urgent bowel movements.  I suspect this may respond to Pamelor, 25 mg, every night before bed.  I recommend he trial this.  However, he is already on Wellbutrin.  I will not add to the Wellbutrin at this time.  I recommend he discuss the Pamelor with his primary care doctor.  The Pamelor can be directly associated with control of urgent bowel movements I recommend a trial to see if this could help him.  He could either go off the Wellbutrin or add the Pamelor.  I defer to the primary care doctor for this management.    Because of the findings in the terminal ileum, consideration could be given to colonoscopy with intubation of this terminal ileum.  I suspect this will be a low yield test but it would help to define relatively normal anatomy in the terminal ileum.  If there was no stricture, surgery would certainly not be indicated.  If there were a stricture, consideration could be given of terminal ileal resection.    Because of the potential surgical  impact, I have scheduled colonoscopy with intubation of the terminal ileum.  This may help us to make our final decisions but, at this time, I think that his management could be nonsurgical with Dr. Mackay.  I am glad to speak with her at any time if she disagrees.

## 2024-07-15 NOTE — PROGRESS NOTES
Colon and Rectal Surgery   Benito Park 53 y.o. male MRN [id number]  Encounter: [id number]  07/15/24 3:45 PM            Assessment: Benito Park is a 53 y.o. male who has diarrhea.    Plan:   Functional diarrhea  The patient presents for evaluation of diarrhea.  This is quite severe.  It seems every time he eats, he has diarrhea.  He has a history of Crohn's disease.  This seems to be inactive based on recent colonoscopy.  Also, a recent MRI shows no active inflammatory changes.  There is a reproduced finding of terminal ileal tortuosity but this is without acute inflammation.  There is no proximal distention.    I viewed the MRI and colonoscopy photos.  Laboratory studies were reviewed.  He has not had a celiac profile and I ordered 1.    I understand Dr. Mackay's concern over the patient's symptoms.  However, I do not know whether they are directly associated with Crohn's disease or not.  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    It is noted that the patient's diarrhea is associated with large episodes of large episodes of incontinence of liquid stool.  His diarrhea tends to occur in the surges with urgent bowel movements.  I suspect this may respond to Pamelor, 25 mg, every night before bed.  I recommend he trial this.  However, he is already on Wellbutrin.  I will not add to the Wellbutrin at this time.  I recommend he discuss the Pamelor with his primary care doctor.  The Pamelor can be directly associated with control of urgent bowel movements I recommend a trial to see if this could help him.  He could either go off the Wellbutrin or add the Pamelor.  I defer to the primary care doctor for this management.    Because of the findings in the terminal ileum, consideration could be given to colonoscopy with intubation of this terminal ileum.  I suspect this will be a low yield test but it would help to define relatively normal anatomy  in the terminal ileum.  If there was no stricture, surgery would certainly not be indicated.  If there were a stricture, consideration could be given of terminal ileal resection.    Because of the potential surgical impact, I have scheduled colonoscopy with intubation of the terminal ileum.  This may help us to make our final decisions but, at this time, I think that his management could be nonsurgical with Dr. Mackay.  I am glad to speak with her at any time if she disagrees.      Subjective     HPI    Benito Park is a 53 y.o. male, with a history of Chron's disease, who is referred by Dr. Sharon Hampton for a terminal ileal resection consultation. The patient was last seen in the office by Dr. Thao on 2/16/23.     MRI enterography 3/12/24  IMPRESSION:  No MR findings of active inflammatory bowel disease.  Angulation/distortion of terminal ileum and ileocecal valve suggesting probable chronic stricturing without obstructive upstream dilatation, similar when compared to previous examination.  Findings suggestive of chronic sigmoid diverticular disease. No perianal abscess.  Hepatic steatosis.    Last CT chest abdomen pelvis with contrast 5/16/24  IMPRESSION:  No acute finding in the chest, abdomen, or pelvis.  Hepatic steatosis.    The patient reports daily abdominal pain. He states that the pain can cause loss of appetite. He has gone a day without eating due to the pain.     He can have 3-7 bowel movements daily. The consistency of the stool varies. He has been experiencing loose and watery bowel movements for the past 7-8 months.     He sometimes observes bright red blood upon wiping after bowel movements. This occurs every 4-5 days. He also sometimes observes bright red blood in the toilet bowl. This occurs less frequently.      Last colonoscopy and EGD were performed on 11/14/2023 by Dr. Hampton with a 2 year recall.    The patient is on Entyvio q4w    Lab Results   Component Value Date    CRP 15.9 (H) 04/01/2024      Lab Results   Component Value Date    WBC 6.59 05/17/2024    HGB 14.2 05/17/2024    HCT 42.2 05/17/2024    MCV 99 (H) 05/17/2024     05/17/2024     Lab Results   Component Value Date    SODIUM 137 05/17/2024    K 4.3 05/17/2024     05/17/2024    CO2 25 05/17/2024    AGAP 9 05/17/2024    BUN 16 05/17/2024    CREATININE 0.99 05/17/2024    GLUC 166 (H) 05/17/2024    CALCIUM 8.9 05/17/2024    AST 22 04/01/2024    ALT 32 04/01/2024    ALKPHOS 97 04/01/2024    TP 7.0 04/01/2024    TBILI 0.47 04/01/2024    EGFR 87 05/17/2024     Historical Information   Past Medical History:   Diagnosis Date    Anxiety     Back pain     Callus Few months ago    Colon polyp     CPAP (continuous positive airway pressure) dependence     Crohn's disease (HCC)     Depression     Diabetes mellitus (HCC)     GERD (gastroesophageal reflux disease)     Hyperlipidemia     Hypertension     Perianal fistula     Sleep apnea     Terminal ileitis (HCC)      Past Surgical History:   Procedure Laterality Date    BACK SURGERY  2019    CAST APPLICATION Right 11/17/2022    Procedure: Application short-arm splint;  Surgeon: Sarthak Villatoro MD;  Location: [facility];  Service: Orthopedics    COLONOSCOPY      EGD      HAND CONTRACTURE RELEASE Left 06/01/2023    Procedure: left ring and small finger dupuytrens excision and ring finger Metacarpophalangeal joint release and small finger Metacarpophalangeal and proximal interphalangeal joint release;  Surgeon: Sarthak Villatoro MD;  Location: [facility];  Service: Orthopedics    HERNIA REPAIR      umbilical hernia    MI ANRCT XM SURG REQ ANES GENERAL SPI/EDRL DX N/A 11/17/2021    Procedure: EXAM UNDER ANESTHESIA (EUA);  Surgeon: Davi Thao MD;  Location: [facility];  Service: Colorectal    MI APPLICATION SHORT ARM SPLINT FOREARM-HAND STATIC Right 1/11/2024    Procedure: Application short arm splint;  Surgeon: Sarthak Villatoro MD;  Location: [facility];  Service: Orthopedics    MI  CAPSULECTOMY/CAPSULOTOMY IPHAL JOINT EACH Right 1/11/2024    Procedure: Contracture release right hand small finger proximal interphalangeal joint;  Surgeon: Sarthak Villatoro MD;  Location: [facility];  Service: Orthopedics    OH FASCIOTOMY PALMAR OPEN PARTIAL Right 11/17/2022    Procedure: Dupuytren's excision right palm extending into the small finger with release of the metacarpophalangeal joint and proximal interphalangeal joint.  Dupuytren's excision right palm with release of the right ring finger metacarpophalangeal joint.;  Surgeon: Sarthak Villatoro MD;  Location: [facility];  Service: Orthopedics    OH FASCIOTOMY PALMAR OPEN PARTIAL Right 1/11/2024    Procedure: Right hand small finger duppuytrens excision with release of the metacarpophalangeal joint and proximal interphalangeal joint;  Surgeon: Sarthak Villatoro MD;  Location: [facility];  Service: Orthopedics    OH FASCT PALM W/WO Z-PLASTY TISSUE REARGMT/SKN GRFT Right 1/11/2024    Procedure: Right hand small finger duppuytrens excision with release of the metacarpophalangeal joint and proximal interphalangeal joint;  Surgeon: Sarthak Villatoro MD;  Location:  MAIN OR;  Service: Orthopedics    OH FASCT PRTL PALMAR 1 DGT PROX IPHAL JT W/WO RPR Right 1/11/2024    Procedure: Right hand small finger duppuytrens excision with release of the metacarpophalangeal joint and proximal interphalangeal joint;  Surgeon: Sarthak Villatoro MD;  Location: [facility];  Service: Orthopedics    OH I&D ISCHIORECTAL&/PERIRECTAL ABSCESS SPX Right 10/24/2021    Procedure: excisional debredement right gluteal cheek ;  Surgeon: Jeana Bustamante MD;  Location: [facility];  Service: General    OH PLACEMENT SETON N/A 11/17/2021    Procedure: PLACEMENT SETON;  Surgeon: Davi Thao MD;  Location:  MAIN OR;  Service: Colorectal    OH PRQ IMPLTJ NSTIM ELECTRODE ARRAY EPIDURAL Right 03/26/2021    Procedure: INSERTION THORACIC DORSAL COLUMN SPINAL CORD STIMULATOR  PERCUTANEOUS W IMPLANTABLE PULSE GENERATOR, RIGHT;  Surgeon: Akshat Witt MD;  Location:  MAIN OR;  Service: Neurosurgery       Meds/Allergies       Current Outpatient Medications:     acetaminophen (TYLENOL) 650 mg CR tablet, Take 1 tablet (650 mg total) by mouth every 8 (eight) hours as needed for mild pain, Disp: 30 tablet, Rfl: 0    albuterol (ProAir HFA) 90 mcg/act inhaler, Inhale 2 puffs every 6 (six) hours as needed for wheezing or shortness of breath, Disp: 6.7 g, Rfl: 2    Alcohol Swabs 70 % PADS, May substitute brand based on insurance coverage. Check glucose TID., Disp: 100 each, Rfl: 0    amitriptyline (ELAVIL) 100 mg tablet, Take 300mg HS, Disp: 270 tablet, Rfl: 1    atorvastatin (LIPITOR) 40 mg tablet, TAKE 1 TABLET BY MOUTH EVERY DAY IN THE EVENING, Disp: 90 tablet, Rfl: 1    azaTHIOprine (IMURAN) 50 mg tablet, Take 1 tablet (50 mg total) by mouth daily, Disp: 30 tablet, Rfl: 11    Blood Glucose Monitoring Suppl (OneTouch Verio Reflect) w/Device KIT, May substitute brand based on insurance coverage. Check glucose TID., Disp: 1 kit, Rfl: 0    buPROPion (WELLBUTRIN SR) 150 mg 12 hr tablet, Take 1 tablet (150 mg total) by mouth 2 (two) times a day, Disp: 180 tablet, Rfl: 3    Continuous Glucose Sensor (Dexcom G7 Sensor), Use 1 Device every 10 days, Disp: 9 each, Rfl: 3    Continuous Glucose Sensor (FreeStyle Allyn 3 Sensor) MISC, Use 1 each every 10 days, Disp: 3 each, Rfl: 5    cyclobenzaprine (FLEXERIL) 10 mg tablet, 1 HS, Disp: 90 tablet, Rfl: 1    furosemide (LASIX) 20 mg tablet, Take 20 mg by mouth daily, Disp: , Rfl:     glucose blood (OneTouch Verio) test strip, May substitute brand based on insurance coverage. Check glucose TID., Disp: 100 each, Rfl: 5    lisinopril (ZESTRIL) 20 mg tablet, TAKE 1 TABLET BY MOUTH EVERY DAY, Disp: 90 tablet, Rfl: 1    loperamide (IMODIUM A-D) 2 MG tablet, Take 1 tablet (2 mg total) by mouth every other day for 7 days, THEN 1 tablet (2 mg total) in the morning for  7 days., Disp: 10 tablet, Rfl: 0    metFORMIN (GLUCOPHAGE-XR) 500 mg 24 hr tablet, Take 2 tablets (1,000 mg total) by mouth 2 (two) times a day with meals, Disp: 360 tablet, Rfl: 3    mupirocin (BACTROBAN) 2 % ointment, Apply topically daily, Disp: 15 g, Rfl: 0    omeprazole (PriLOSEC) 40 MG capsule, TAKE 1 CAPSULE (40 MG TOTAL) BY MOUTH DAILY., Disp: 90 capsule, Rfl: 1    ondansetron (ZOFRAN) 4 mg tablet, Take 1 tablet (4 mg total) by mouth every 8 (eight) hours as needed for nausea or vomiting, Disp: 20 tablet, Rfl: 1    OneTouch Delica Lancets 33G MISC, May substitute brand based on insurance coverage. Check glucose TID., Disp: 100 each, Rfl: 5    pregabalin (LYRICA) 150 mg capsule, TAKE 1 CAPSULE BY MOUTH 3 TIMES A DAY, Disp: 90 capsule, Rfl: 5    RA Vitamin D-3 25 MCG (1000 UT) tablet, TAKE 2 TABLETS BY MOUTH DAILY, Disp: 60 tablet, Rfl: 2    vedolizumab (Entyvio) SOLR, Inject 300 mg into a catheter in a vein every 4 weeks, Disp: , Rfl:   No Known Allergies    Social History   Social History     Substance and Sexual Activity   Drug Use Yes    Frequency: 7.0 times per week    Types: Marijuana    Comment: Medical marijuana-vapes     Social History     Tobacco Use   Smoking Status Former    Current packs/day: 0.00    Average packs/day: 0.5 packs/day for 30.0 years (15.0 ttl pk-yrs)    Types: Cigarettes    Start date: 1/1/2019    Quit date: 2/4/2021    Years since quitting: 3.4    Passive exposure: Past   Smokeless Tobacco Former         Family History   Problem Relation Age of Onset    Heart disease Father     Heart attack Father     Colon cancer Neg Hx     Colon polyps Neg Hx     Inflammatory bowel disease Neg Hx          Review of Systems   Constitutional: Negative.    Respiratory: Negative.     Cardiovascular: Negative.    Gastrointestinal:  Positive for abdominal distention, abdominal pain, anal bleeding, blood in stool and diarrhea.       Objective   Current Vitals:  Vitals:    07/15/24 1454   BP: 102/68    Weight: 92.1 kg (203 lb)   Height: 6' (1.829 m)         Physical Exam  Constitutional:       Appearance: Normal appearance.   Cardiovascular:      Rate and Rhythm: Normal rate and regular rhythm.   Pulmonary:      Effort: Pulmonary effort is normal.      Breath sounds: Normal breath sounds.   Abdominal:      General: Abdomen is flat.      Palpations: Abdomen is soft. There is no mass.      Tenderness: There is abdominal tenderness. There is no guarding.      Comments: There is mild left lower quadrant tenderness that is not associated with peritonitis or mass.   Neurological:      General: No focal deficit present.      Mental Status: He is alert and oriented to person, place, and time.   Psychiatric:         Mood and Affect: Mood normal.         Behavior: Behavior normal.

## 2024-07-15 NOTE — LETTER
Benito Park  [address], [place], [facility] [id number]        FLEXIBLE COLONOSCOPY INSTRUCTIONS  PLEASE NOTE...  AS OF JUNE 1, 2014, OUR OFFICE REQUIRES 72 HOURS NOTICE OF CANCELLATION/RESCHEDULE OF A PROCEDURE TO AVOID INCURRING A MISSED APPOINTMENT FEE.     Your Colonoscopy Procedure has been scheduled at:[address] [address]     The Date of your Procedure is: July 19, 2024         The hospital facility will contact you the evening prior to your scheduled procedure with your arrival time.      Use the bowel preparation as directed.     Check with your family doctor if you are taking a blood thinner (Coumadin, Plavix, Xarelto, Pradaxa, Gingko biloba, Ginseng, Feverfew, Mae's Wort).  We suggest stopping these for 3 days. Special instructions may be needed if you are taking aspirin or any aspirin-containing medication.  Check with your family physician.       If you are on DIABETIC MEDICATION (tablets or insulin) your doctor may make changes in your preparation.     Take all medications usual unless otherwise instructed.     As always, if you have any questions or concerns, feel free to call the office.  Our  staff will be happy to connect you with one of the nurses to answer any questions or address any concerns you have regarding your procedure.       Do not wear any jewelry the day of your procedure including wedding rings.     Arrangements must be made for a responsible party to drive you home from the procedure.  If you do not have a responsible family member or friend to drive you home, the procedure will not be done.  Vast or a taxi is not acceptable.     It is important you notify our office of any insurance changes prior to your procedure and, if necessary, supply us with referrals from your primary care physician.              COLONOSCOPY PREPARATION INSTRUCTIONS    Purchase (prescription not required):  · 238 gram bottle of Miralax®  (Glycolax®)  · 4 Dulcolax® (Bisacodyl) Laxative Tablets  · 64 oz. bottle of Gatorade® or your preference of a non-carbonated clear liquid - NOT RED OR PURPLE     One Day Prior to Colonoscopy Procedure  · Nothing to eat the day before your procedure, only clear liquids.  · It is important that you drink plenty of clear liquids throughout the day to prevent dehydration.    Clear Liquids include:  o Water/Iced Tea/Lemonade/Gatorade®/Black Coffee or tea (no milk or creamers  o Soft drinks: orange, ginger ale, cola, Pepsi®, Sprite®, 7Up®  o Stephane-Aid® (lemonade or orange flavors only)  o Strained fruit juices without pulp such as apple, white grape, white cranberry  o Jell-O®, lemon, lime or orange (no fruit or toppings)  o Popsicles, Italian Ice (No Ice Cream, sherbets, or fruit bars)  o Chicken or beef bouillon/broth  DO NOT EAT OR DRINK ANYTHING RED OR PURPLE  DO NOT DRINK ANY ALCOHOLIC BEVERAGES  DIABETIC PATIENTS: Consult your physician    At 4:00 pm, take (2) Dulcolax® (Bisacodyl) Laxative Tablets. Swallow the tablets whole with an 8 oz. glass of water. At 8:00 pm, take the additional (2) Dulcolax® (Bisacodyl) Laxative Tablets with 8 oz. of water. The package may direct you not to exceed (2) tablets at any time but for the purpose of this examination, you should take (4) total.    Mix the 238 gm of Miralax® in 64 oz. of Gatorade® and shake the solution until the Miralax® is dissolved. You will drink half (32 oz) of this solution this evening, beginning between 4 and 6 o'clock. Drink 8 oz glassfuls at your own pace. It may take several hours to drink the solution.    Remember to stay close to toilet facilities.    DAY OF COLONOSCOPY PROCEDURE    Five (5) hours before your procedure, drink the other half (32 oz) of the Miralax®/Gatorade® mixture within a two (2) hour period. This may require you to get up very early if you are scheduled for an early procedure.    NOTHING IS TO BE TAKEN BY MOUTH 3 HOURS PRIOR TO  PROCEDURE.     If you use an inhaler, please bring it with you to your procedure.

## 2024-07-16 ENCOUNTER — TELEPHONE (OUTPATIENT)
Dept: GASTROENTEROLOGY | Facility: CLINIC | Age: 54
End: 2024-07-16

## 2024-07-16 NOTE — TELEPHONE ENCOUNTER
Spoke to pt - explained referral from Dr Hampton to IBD    Pt agreeable to see Dr Woodward Sept 5th at [facility]

## 2024-07-17 ENCOUNTER — APPOINTMENT (OUTPATIENT)
Dept: LAB | Facility: HOSPITAL | Age: 54
End: 2024-07-17
Payer: MEDICARE

## 2024-07-17 ENCOUNTER — OFFICE VISIT (OUTPATIENT)
Dept: PULMONOLOGY | Facility: CLINIC | Age: 54
End: 2024-07-17
Payer: MEDICARE

## 2024-07-17 ENCOUNTER — TELEPHONE (OUTPATIENT)
Age: 54
End: 2024-07-17

## 2024-07-17 VITALS
BODY MASS INDEX: 27.22 KG/M2 | HEART RATE: 97 BPM | SYSTOLIC BLOOD PRESSURE: 94 MMHG | DIASTOLIC BLOOD PRESSURE: 92 MMHG | OXYGEN SATURATION: 97 % | WEIGHT: 201 LBS | HEIGHT: 72 IN | TEMPERATURE: 98.3 F

## 2024-07-17 DIAGNOSIS — E66.3 OVERWEIGHT (BMI 25.0-29.9): ICD-10-CM

## 2024-07-17 DIAGNOSIS — R91.1 LUNG NODULE: ICD-10-CM

## 2024-07-17 DIAGNOSIS — G47.19 EXCESSIVE DAYTIME SLEEPINESS: ICD-10-CM

## 2024-07-17 DIAGNOSIS — K59.1 FUNCTIONAL DIARRHEA: ICD-10-CM

## 2024-07-17 DIAGNOSIS — G47.33 OBSTRUCTIVE SLEEP APNEA SYNDROME: Primary | ICD-10-CM

## 2024-07-17 DIAGNOSIS — Z72.0 TOBACCO USE: ICD-10-CM

## 2024-07-17 LAB
GLIADIN PEPTIDE IGA SER-ACNC: <0.2 U/ML
GLIADIN PEPTIDE IGA SER-ACNC: NEGATIVE
GLIADIN PEPTIDE IGG SER-ACNC: <0.4 U/ML
GLIADIN PEPTIDE IGG SER-ACNC: NEGATIVE
IGA SERPL-MCNC: 206 MG/DL (ref 66–433)
TTG IGA SER-ACNC: <0.5 U/ML
TTG IGA SER-ACNC: NEGATIVE
TTG IGG SER-ACNC: <0.8 U/ML
TTG IGG SER-ACNC: NEGATIVE

## 2024-07-17 PROCEDURE — 99215 OFFICE O/P EST HI 40 MIN: CPT | Performed by: INTERNAL MEDICINE

## 2024-07-17 PROCEDURE — 82784 ASSAY IGA/IGD/IGG/IGM EACH: CPT

## 2024-07-17 PROCEDURE — 36415 COLL VENOUS BLD VENIPUNCTURE: CPT

## 2024-07-17 PROCEDURE — 86364 TISS TRNSGLTMNASE EA IG CLAS: CPT

## 2024-07-17 PROCEDURE — 86258 DGP ANTIBODY EACH IG CLASS: CPT

## 2024-07-17 PROCEDURE — G2211 COMPLEX E/M VISIT ADD ON: HCPCS | Performed by: INTERNAL MEDICINE

## 2024-07-17 NOTE — PATIENT INSTRUCTIONS
Change the Welbutrin to once 150 SR in the morning, and stop taking the night dose, to help with sleep quality

## 2024-07-17 NOTE — PROGRESS NOTES
Pulmonary/Sleep Follow Up Note   Benito Park 53 y.o. male MRN: [id number]  7/17/2024      Assessment and Plan:    1. Obstructive sleep apnea syndrome  Moderate obstructive sleep apnea has been using his CPAP for the most part consistently however more recently because of issues with pain has been waking up more frequently and not consistently continuing more than 4 hours.  I have noticed that he has been taking Wellbutrin 150 mg extended release 2 times daily I discussed that with him and it seems like this has been prescribed for anxiety while this is not the best medication for anxiety I would not necessarily recommend changing it at this point however I would definitely recommend only giving it during the daytime because Wellbutrin in general is a CNS stimulant it might affect his sleep quality significantly at nighttime so I counseled him to stop taking the nightly dose and start taking only the morning dose potentially if that was not under treatment for his underlying psychiatric problems he could take 300 mg extended release in the morning to be addressed more with his primary care physician.  I counseled him extensively for the importance of continuously using the CPAP more regularly more than 4 hours more consistently.      2. Overweight (BMI 25.0-29.9)  BMI of 27.26, noted he would benefit from weight loss    3. Excessive daytime sleepiness  Persistent in part because of the lack of consistently using the CPAP more than 4 hours at night as detailed above but also he is got sleep hygiene issues where he is tending to take naps almost every day    4. Tobacco use  He is former smoker currently in remission    5. Lung nodule  Has been stable just needs an annual CT scan which I have ordered  - CT chest without contrast; Future    6.  Emphysema  Has been only using albuterol once daily and has been doing well      History of Present Illness   HPI:  Benito Park is a 53 y.o. male who is here for  follow-up appointment and compliance visit he was last seen in December of last year to evaluate for possible obstructive sleep apnea as he had a reported history of loud snoring witnessed by his wife, excessive daytime sleepiness requiring daytime naps almost every day  His sleep study found to have moderate OSIRIS with significant nocturnal hypoxemia started on a CPAP after having a CPAP titration that showed adequate treatment of his sleep disordered breathing events as well as his oxygen requirements.  More recently has been having sleep interruptions at night precluding him from having the full benefit from the CPAP with resulting in more resistant to treat excessive daytime sleepiness, managed as detailed in assessment plan above.  COPD has been well-controlled is only using albuterol rescue inhaler in the morning      Historical Information   Past Medical History:   Diagnosis Date    Anxiety     Back pain     Callus Few months ago    Colon polyp     CPAP (continuous positive airway pressure) dependence     Crohn's disease (HCC)     Depression     Diabetes mellitus (HCC)     GERD (gastroesophageal reflux disease)     Hyperlipidemia     Hypertension     Perianal fistula     Sleep apnea     uses cpap no O2    Sleep apnea, obstructive     Terminal ileitis (HCC)      Past Surgical History:   Procedure Laterality Date    BACK SURGERY  2019    CAST APPLICATION Right 11/17/2022    Procedure: Application short-arm splint;  Surgeon: Sarthak Villatoro MD;  Location:  MAIN OR;  Service: Orthopedics    COLONOSCOPY      EGD      HAND CONTRACTURE RELEASE Left 06/01/2023    Procedure: left ring and small finger dupuytrens excision and ring finger Metacarpophalangeal joint release and small finger Metacarpophalangeal and proximal interphalangeal joint release;  Surgeon: Sarthak Villatoro MD;  Location:  MAIN OR;  Service: Orthopedics    HERNIA REPAIR      umbilical hernia    OR ANRCT XM SURG REQ ANES GENERAL SPI/EDRL DX  N/A 11/17/2021    Procedure: EXAM UNDER ANESTHESIA (EUA);  Surgeon: Davi Thao MD;  Location: [facility];  Service: Colorectal    OK APPLICATION SHORT ARM SPLINT FOREARM-HAND STATIC Right 1/11/2024    Procedure: Application short arm splint;  Surgeon: Sarthak Villatoro MD;  Location: [facility];  Service: Orthopedics    OK CAPSULECTOMY/CAPSULOTOMY IPHAL JOINT EACH Right 1/11/2024    Procedure: Contracture release right hand small finger proximal interphalangeal joint;  Surgeon: Sarthak Villatoro MD;  Location: [facility];  Service: Orthopedics    OK FASCIOTOMY PALMAR OPEN PARTIAL Right 11/17/2022    Procedure: Dupuytren's excision right palm extending into the small finger with release of the metacarpophalangeal joint and proximal interphalangeal joint.  Dupuytren's excision right palm with release of the right ring finger metacarpophalangeal joint.;  Surgeon: Sarthak Villatoro MD;  Location: [facility];  Service: Orthopedics    OK FASCIOTOMY PALMAR OPEN PARTIAL Right 1/11/2024    Procedure: Right hand small finger duppuytrens excision with release of the metacarpophalangeal joint and proximal interphalangeal joint;  Surgeon: Sarthak Villatoro MD;  Location: [facility];  Service: Orthopedics    OK FASCT PALM W/WO Z-PLASTY TISSUE REARGMT/SKN GRFT Right 1/11/2024    Procedure: Right hand small finger duppuytrens excision with release of the metacarpophalangeal joint and proximal interphalangeal joint;  Surgeon: Sarthak Villatoro MD;  Location: [facility];  Service: Orthopedics    OK FASCT PRTL PALMAR 1 DGT PROX IPHAL JT W/WO RPR Right 1/11/2024    Procedure: Right hand small finger duppuytrens excision with release of the metacarpophalangeal joint and proximal interphalangeal joint;  Surgeon: Sarthak Villatoro MD;  Location: [facility];  Service: Orthopedics    OK I&D ISCHIORECTAL&/PERIRECTAL ABSCESS SPX Right 10/24/2021    Procedure: excisional debredement right gluteal cheek ;  Surgeon: Jeana  MD Dianna;  Location: [facility];  Service: General    WI PLACEMENT SETON N/A 11/17/2021    Procedure: PLACEMENT SETON;  Surgeon: Davi Thao MD;  [facility];  Service: Colorectal    WI PRQ IMPLTJ NSTIM ELECTRODE ARRAY EPIDURAL Right 03/26/2021    Procedure: INSERTION THORACIC DORSAL COLUMN SPINAL CORD STIMULATOR PERCUTANEOUS W IMPLANTABLE PULSE GENERATOR, RIGHT;  Surgeon: Akshat Witt MD;  Location: [facility];  Service: Neurosurgery     Family History   Problem Relation Age of Onset    Heart disease Father     Heart attack Father     Colon cancer Neg Hx     Colon polyps Neg Hx     Inflammatory bowel disease Neg Hx          Meds/Allergies     Current Outpatient Medications:     acetaminophen (TYLENOL) 650 mg CR tablet, Take 1 tablet (650 mg total) by mouth every 8 (eight) hours as needed for mild pain, Disp: 30 tablet, Rfl: 0    albuterol (ProAir HFA) 90 mcg/act inhaler, Inhale 2 puffs every 6 (six) hours as needed for wheezing or shortness of breath, Disp: 6.7 g, Rfl: 2    Alcohol Swabs 70 % PADS, May substitute brand based on insurance coverage. Check glucose TID., Disp: 100 each, Rfl: 0    amitriptyline (ELAVIL) 100 mg tablet, Take 300mg HS, Disp: 270 tablet, Rfl: 1    atorvastatin (LIPITOR) 40 mg tablet, TAKE 1 TABLET BY MOUTH EVERY DAY IN THE EVENING, Disp: 90 tablet, Rfl: 1    azaTHIOprine (IMURAN) 50 mg tablet, Take 1 tablet (50 mg total) by mouth daily, Disp: 30 tablet, Rfl: 11    Blood Glucose Monitoring Suppl (OneTouch Verio Reflect) w/Device KIT, May substitute brand based on insurance coverage. Check glucose TID., Disp: 1 kit, Rfl: 0    buPROPion (WELLBUTRIN SR) 150 mg 12 hr tablet, Take 1 tablet (150 mg total) by mouth 2 (two) times a day, Disp: 180 tablet, Rfl: 3    Continuous Glucose Sensor (Dexcom G7 Sensor), Use 1 Device every 10 days, Disp: 9 each, Rfl: 3    Continuous Glucose Sensor (FreeStyle Allyn 3 Sensor) MISC, Use 1 each every 10 days, Disp: 3 each, Rfl: 5     cyclobenzaprine (FLEXERIL) 10 mg tablet, 1 HS (Patient taking differently: Take 10 mg by mouth daily at bedtime 1 HS), Disp: 90 tablet, Rfl: 1    furosemide (LASIX) 20 mg tablet, Take 20 mg by mouth daily, Disp: , Rfl:     glucose blood (OneTouch Verio) test strip, May substitute brand based on insurance coverage. Check glucose TID., Disp: 100 each, Rfl: 5    lisinopril (ZESTRIL) 20 mg tablet, TAKE 1 TABLET BY MOUTH EVERY DAY, Disp: 90 tablet, Rfl: 1    loperamide (IMODIUM A-D) 2 MG tablet, Take 1 tablet (2 mg total) by mouth every other day for 7 days, THEN 1 tablet (2 mg total) in the morning for 7 days., Disp: 10 tablet, Rfl: 0    metFORMIN (GLUCOPHAGE-XR) 500 mg 24 hr tablet, Take 2 tablets (1,000 mg total) by mouth 2 (two) times a day with meals, Disp: 360 tablet, Rfl: 3    mupirocin (BACTROBAN) 2 % ointment, Apply topically daily, Disp: 15 g, Rfl: 0    omeprazole (PriLOSEC) 40 MG capsule, TAKE 1 CAPSULE (40 MG TOTAL) BY MOUTH DAILY., Disp: 90 capsule, Rfl: 1    ondansetron (ZOFRAN) 4 mg tablet, Take 1 tablet (4 mg total) by mouth every 8 (eight) hours as needed for nausea or vomiting, Disp: 20 tablet, Rfl: 1    OneTouch Delica Lancets 33G MISC, May substitute brand based on insurance coverage. Check glucose TID., Disp: 100 each, Rfl: 5    pregabalin (LYRICA) 150 mg capsule, TAKE 1 CAPSULE BY MOUTH 3 TIMES A DAY, Disp: 90 capsule, Rfl: 5    RA Vitamin D-3 25 MCG (1000 UT) tablet, TAKE 2 TABLETS BY MOUTH DAILY, Disp: 60 tablet, Rfl: 2    vedolizumab (Entyvio) SOLR, Inject 300 mg into a catheter in a vein every 4 weeks, Disp: , Rfl:   No Known Allergies    Vitals: Blood pressure 94/92, pulse 97, temperature 98.3 °F (36.8 °C), temperature source Tympanic, height 6' (1.829 m), weight 91.2 kg (201 lb), SpO2 97%. Body mass index is 27.26 kg/m². Oxygen Therapy  SpO2: 97 %  Oxygen Therapy: None (Room air)      Physical Exam  General:  Awake alert and oriented x 3, conversant without conversational dyspnea, NAD, normal  "affect  HEENT:   Sclera noninjected, nonicteric OU, Nares patent,  no craniofacial abnormalities, Mucous membranes, moist, no oral lesions, normal dentition  NECK:  Trachea midline, no accessory muscle use, no stridor,  JVP not elevated  CARDIAC: Reg, single s1/S2, no m/r/g  PULM: CTA bilaterally no wheezing, rhonchi or rales  EXT: No cyanosis, no clubbing, no edema  NEURO: no focal neurologic deficits, AAOx3, moving all extremities appropriately    Labs: I have personally reviewed pertinent lab results., ABG: No results found for: \"PHART\", \"FLP8DZG\", \"PO2ART\", \"NDF8DXE\", \"Y6TXSXNT\", \"BEART\", \"SOURCE\", BNP: No results found for: \"BNP\", CBC: No results found for: \"WBC\", \"HGB\", \"HCT\", \"MCV\", \"PLT\", \"ADJUSTEDWBC\", \"RBC\", \"MCH\", \"MCHC\", \"RDW\", \"MPV\", \"NRBC\", CMP: No results found for: \"SODIUM\", \"K\", \"CL\", \"CO2\", \"ANIONGAP\", \"BUN\", \"CREATININE\", \"GLUCOSE\", \"CALCIUM\", \"AST\", \"ALT\", \"ALKPHOS\", \"PROT\", \"BILITOT\", \"EGFR\", PT/INR: No results found for: \"PT\", \"INR\", Troponin: No results found for: \"TROPONINI\"  Lab Results   Component Value Date    WBC 6.59 05/17/2024    HGB 14.2 05/17/2024    HCT 42.2 05/17/2024    MCV 99 (H) 05/17/2024     05/17/2024     Lab Results   Component Value Date    GLUCOSE 110 02/20/2015    CALCIUM 8.9 05/17/2024     10/03/2016    K 4.3 05/17/2024    CO2 25 05/17/2024     05/17/2024    BUN 16 05/17/2024    CREATININE 0.99 05/17/2024     No results found for: \"IGE\"  Lab Results   Component Value Date    ALT 32 04/01/2024    AST 22 04/01/2024    ALKPHOS 97 04/01/2024    BILITOT 0.8 10/03/2016           Sleep studies: I have personally reviewed pertinent reports.      HST 7/2023:   RECOMMENDATION:  1. CPAP titration study for moderate OSIRIS given degree of nocturnal hypoxemia out of proportion to his respiratory events  2. Positional therapy as tolerated could be an alternative therapy.  3. Nocturnal hypoxemia possible secondary to hypoventilation in the setting of hx of tobacco and " "marijuana use, follow-up with pulmonology  4. Follow-up with sleep medicine      CPAP titration 8/2023:  IMPRESSION:        This was an effective PAP titration study. The CPAP setting of 14 cm h20  was optimal, resulting in an AHI of 0.0. This setting was observed in REM sleep.   Previously noted hypoxia was sufficiently treated with the CPAP  Sleep efficiency was normal. Stage N1 sleep (light sleep) was normal.. Stage N3 sleep (deep sleep) was absent.  REM sleep percentage was markedly decreased on this test.   Increased periodic limb movements during sleep.    Compliance report:I have personally reviewed pertinent reports.      Type of CPAP:  fixed 14 cmH2O                                   Percent usage: 90%                                   Average time used: 3 hrs 49 min                                  Time in large leak: not sig                                   Residual AHI: 1.2                Jovanni Lopez MD  [facility]       Portions of the record may have been created with voice recognition software. Occasional wrong word or \"sound a like\" substitutions may have occurred due to the inherent limitations of voice recognition software. Read the chart carefully and recognize, using context, where substitutions have occurred.  "

## 2024-07-19 ENCOUNTER — ANESTHESIA (OUTPATIENT)
Dept: GASTROENTEROLOGY | Facility: HOSPITAL | Age: 54
End: 2024-07-19

## 2024-07-19 ENCOUNTER — ANESTHESIA EVENT (OUTPATIENT)
Dept: GASTROENTEROLOGY | Facility: HOSPITAL | Age: 54
End: 2024-07-19

## 2024-07-19 ENCOUNTER — HOSPITAL ENCOUNTER (OUTPATIENT)
Dept: GASTROENTEROLOGY | Facility: HOSPITAL | Age: 54
Setting detail: OUTPATIENT SURGERY
End: 2024-07-19
Attending: COLON & RECTAL SURGERY
Payer: MEDICARE

## 2024-07-19 VITALS
HEART RATE: 77 BPM | OXYGEN SATURATION: 95 % | RESPIRATION RATE: 18 BRPM | DIASTOLIC BLOOD PRESSURE: 74 MMHG | TEMPERATURE: 97.4 F | HEIGHT: 72 IN | BODY MASS INDEX: 27.36 KG/M2 | SYSTOLIC BLOOD PRESSURE: 104 MMHG | WEIGHT: 202 LBS

## 2024-07-19 DIAGNOSIS — K59.1 FUNCTIONAL DIARRHEA: ICD-10-CM

## 2024-07-19 LAB — GLUCOSE SERPL-MCNC: 104 MG/DL (ref 65–140)

## 2024-07-19 PROCEDURE — 45380 COLONOSCOPY AND BIOPSY: CPT | Performed by: COLON & RECTAL SURGERY

## 2024-07-19 PROCEDURE — 82948 REAGENT STRIP/BLOOD GLUCOSE: CPT

## 2024-07-19 RX ORDER — PROPOFOL 10 MG/ML
INJECTION, EMULSION INTRAVENOUS AS NEEDED
Status: DISCONTINUED | OUTPATIENT
Start: 2024-07-19 | End: 2024-07-19

## 2024-07-19 RX ORDER — LIDOCAINE HYDROCHLORIDE 10 MG/ML
INJECTION, SOLUTION EPIDURAL; INFILTRATION; INTRACAUDAL; PERINEURAL AS NEEDED
Status: DISCONTINUED | OUTPATIENT
Start: 2024-07-19 | End: 2024-07-19

## 2024-07-19 RX ORDER — SODIUM CHLORIDE, SODIUM LACTATE, POTASSIUM CHLORIDE, CALCIUM CHLORIDE 600; 310; 30; 20 MG/100ML; MG/100ML; MG/100ML; MG/100ML
INJECTION, SOLUTION INTRAVENOUS CONTINUOUS PRN
Status: DISCONTINUED | OUTPATIENT
Start: 2024-07-19 | End: 2024-07-19

## 2024-07-19 RX ADMIN — PROPOFOL 20 MG: 10 INJECTION, EMULSION INTRAVENOUS at 13:52

## 2024-07-19 RX ADMIN — SODIUM CHLORIDE, SODIUM LACTATE, POTASSIUM CHLORIDE, AND CALCIUM CHLORIDE: .6; .31; .03; .02 INJECTION, SOLUTION INTRAVENOUS at 13:15

## 2024-07-19 RX ADMIN — PROPOFOL 50 MG: 10 INJECTION, EMULSION INTRAVENOUS at 13:32

## 2024-07-19 RX ADMIN — PROPOFOL 20 MG: 10 INJECTION, EMULSION INTRAVENOUS at 13:48

## 2024-07-19 RX ADMIN — PROPOFOL 10 MG: 10 INJECTION, EMULSION INTRAVENOUS at 13:40

## 2024-07-19 RX ADMIN — PROPOFOL 30 MG: 10 INJECTION, EMULSION INTRAVENOUS at 13:42

## 2024-07-19 RX ADMIN — PROPOFOL 20 MG: 10 INJECTION, EMULSION INTRAVENOUS at 13:35

## 2024-07-19 RX ADMIN — PROPOFOL 20 MG: 10 INJECTION, EMULSION INTRAVENOUS at 13:39

## 2024-07-19 RX ADMIN — LIDOCAINE HYDROCHLORIDE 50 MG: 10 INJECTION, SOLUTION EPIDURAL; INFILTRATION; INTRACAUDAL at 13:31

## 2024-07-19 RX ADMIN — PROPOFOL 20 MG: 10 INJECTION, EMULSION INTRAVENOUS at 13:44

## 2024-07-19 RX ADMIN — PROPOFOL 20 MG: 10 INJECTION, EMULSION INTRAVENOUS at 13:46

## 2024-07-19 RX ADMIN — PROPOFOL 20 MG: 10 INJECTION, EMULSION INTRAVENOUS at 13:37

## 2024-07-19 RX ADMIN — PROPOFOL 20 MG: 10 INJECTION, EMULSION INTRAVENOUS at 13:50

## 2024-07-19 RX ADMIN — PROPOFOL 150 MG: 10 INJECTION, EMULSION INTRAVENOUS at 13:31

## 2024-07-19 RX ADMIN — PROPOFOL 20 MG: 10 INJECTION, EMULSION INTRAVENOUS at 13:33

## 2024-07-19 RX ADMIN — PROPOFOL 20 MG: 10 INJECTION, EMULSION INTRAVENOUS at 13:34

## 2024-07-19 NOTE — INTERVAL H&P NOTE
H&P reviewed. After examining the patient I find no changes in the patients condition since the H&P had been written.    Vitals:    07/19/24 1126   BP: 114/72   Pulse: 86   Resp: 18   Temp: (!) 97.3 °F (36.3 °C)   SpO2: 96%

## 2024-07-19 NOTE — ANESTHESIA POSTPROCEDURE EVALUATION
Post-Op Assessment Note    CV Status:  Stable  Pain Score: 0    Pain management: adequate       Mental Status:  Alert and awake   Hydration Status:  Euvolemic   PONV Controlled:  Controlled   Airway Patency:  Patent  Two or more mitigation strategies used for obstructive sleep apnea   Post Op Vitals Reviewed: Yes    No anethesia notable event occurred.    Staff: CRNA               BP   111/78   Temp   97.8   Pulse  82   Resp   14   SpO2   98

## 2024-07-19 NOTE — ANESTHESIA PREPROCEDURE EVALUATION
Procedure:  COLONOSCOPY    Relevant Problems   CARDIO   (+) Hyperlipidemia   (+) Hypertension      GI/HEPATIC   (+) GERD (gastroesophageal reflux disease)      MUSCULOSKELETAL   (+) Chronic bilateral low back pain with bilateral sciatica   (+) Degenerative disc disease at L5-S1 level   (+) Failed back surgical syndrome   (+) Lumbar spondylosis   (+) Myofascial pain syndrome      NEURO/PSYCH   (+) Chronic bilateral low back pain with bilateral sciatica   (+) Chronic pain syndrome   (+) Chronic right shoulder pain   (+) Diabetic polyneuropathy associated with type 2 diabetes mellitus (HCC)   (+) Myofascial pain syndrome   (+) Numbness and tingling in right hand   (+) Numbness and tingling of both lower extremities      PULMONARY   (+) Obstructive sleep apnea syndrome   (+) Sleep apnea   (+) Smoker      Endocrine   (+) Diabetes mellitus (HCC)      Behavioral Health   (+) Tobacco use      Neurology/Sleep   (+) Dysarthria   (+) Neuropathy   (+) S/P insertion of spinal cord stimulator      Rheumatology   (+) Dupuytren contracture      FEN/Gastrointestinal   (+) Terminal ileitis (HCC)      Other   (+) Crohn's disease (HCC)   (+) History of colon polyps   (+) Overweight (BMI 25.0-29.9)   (+) Perianal abscess   (+) Perianal fistula   (+) 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 (HCC)   (+) Snoring        Physical Exam    Airway    Mallampati score: II  TM Distance: >3 FB  Neck ROM: full     Dental        Cardiovascular      Pulmonary      Other Findings        Anesthesia Plan  ASA Score- 3     Anesthesia Type- IV sedation with anesthesia with ASA Monitors.         Additional Monitors:     Airway Plan:            Plan Factors-Exercise tolerance (METS): >4 METS.    Chart reviewed.    Patient summary reviewed.    Patient is not a current smoker.  Patient did not smoke on day of surgery.            Induction- intravenous.    Postoperative Plan-     Perioperative Resuscitation Plan - Level 1 - Full Code.       Informed Consent-  Anesthetic plan and risks discussed with patient.  I personally reviewed this patient with the CRNA. Discussed and agreed on the Anesthesia Plan with the CRNA..

## 2024-07-28 DIAGNOSIS — K50.814 CROHN'S DISEASE OF BOTH SMALL AND LARGE INTESTINE WITH ABSCESS (HCC): ICD-10-CM

## 2024-07-30 DIAGNOSIS — K21.9 GASTROESOPHAGEAL REFLUX DISEASE, UNSPECIFIED WHETHER ESOPHAGITIS PRESENT: ICD-10-CM

## 2024-07-30 RX ORDER — OMEPRAZOLE 40 MG/1
40 CAPSULE, DELAYED RELEASE ORAL DAILY
Qty: 100 CAPSULE | Refills: 1 | Status: SHIPPED | OUTPATIENT
Start: 2024-07-30

## 2024-07-30 RX ORDER — AZATHIOPRINE 50 MG/1
50 TABLET ORAL DAILY
Qty: 90 TABLET | Refills: 4 | Status: SHIPPED | OUTPATIENT
Start: 2024-07-30

## 2024-08-07 NOTE — PROGRESS NOTES
Patient ID: Benito Park is a 53 y.o. male Date of Birth 1970       Chief Complaint   Patient presents with    Foot Problem     Left ulcer             Diagnosis:  1. Diabetic ulcer of left foot associated with type 2 diabetes mellitus, limited to breakdown of skin, unspecified part of foot (HCC)          Bilateral pedal examination with socks and shoes removed.  Left first MTPJ neuropathic ulceration continues to be well-healed.  As it can take approximately 3 months for him to get diabetic shoes, I recommend he go to [facility] in [place] or the [employer], cards and shoe recommendations were given.  Continue to monitor the area, if any reoccurrence is noted he is to resume using Dermagran gauze or dressing and call us immediately.  Patient understands and agrees with the plan will follow-up as scheduled for [facility] on 9/6/24           Subjective:   Anand presents today for follow-up of left diabetic foot ulceration, he has been monitoring his feet and has not noted any re ulceration, he has been wearing soft sneakers. He states he is feeling well with no new complaints.  He is working on getting his blood sugars under control.  Most recent HbA1c on 5/17/2024 was 8.2.  Most recent PCP visit was on 5/22/2024. He has  been measured for diabetic shoes - 2 weeks ago. FBS was  126.            The following portions of the patient's history were reviewed and updated as appropriate: allergies, current medications, past family history, past medical history, past social history, past surgical history, and problem list.        Objective:  /67 (BP Location: Right arm, Patient Position: Sitting, Cuff Size: Adult)   Pulse 90   Ht 6' (1.829 m) Comment: verbal  Wt 91.6 kg (202 lb)   BMI 27.40 kg/m²     Review of Systems   Constitutional:  Negative for chills and fever.   HENT:  Negative for ear pain and sore throat.    Eyes:  Negative for pain and visual disturbance.   Respiratory:  Negative for cough and  shortness of breath.    Cardiovascular:  Negative for chest pain and palpitations.   Gastrointestinal:  Negative for abdominal pain and vomiting.   Genitourinary:  Negative for dysuria and hematuria.   Musculoskeletal:  Negative for arthralgias and back pain.   Skin:  Positive for color change and wound. Negative for rash.   Neurological:  Positive for numbness. Negative for seizures and syncope.   All other systems reviewed and are negative.      Physical Exam  Constitutional:       Appearance: Normal appearance. He is normal weight.   HENT:      Head: Normocephalic and atraumatic.      Right Ear: External ear normal.      Left Ear: External ear normal.      Nose: Nose normal.      Mouth/Throat:      Mouth: Mucous membranes are moist.      Pharynx: Oropharynx is clear.   Eyes:      Conjunctiva/sclera: Conjunctivae normal.      Pupils: Pupils are equal, round, and reactive to light.   Cardiovascular:      Pulses: Normal pulses.           Dorsalis pedis pulses are 2+ on the right side and 2+ on the left side.        Posterior tibial pulses are 2+ on the right side and 2+ on the left side.   Pulmonary:      Effort: Pulmonary effort is normal.   Musculoskeletal:      Cervical back: Normal range of motion.      Right lower leg: No edema.      Left lower leg: No edema.   Feet:      Right foot:      Protective Sensation: 10 sites tested.  0 sites sensed.      Skin integrity: Ulcer and dry skin present.      Toenail Condition: Right toenails are normal.      Left foot:      Protective Sensation: 10 sites tested.  0 sites sensed.      Skin integrity: Ulcer and dry skin present.      Toenail Condition: Left toenails are normal.      Comments:  Patient with decrease in dry skin plantar bilateral feet with superficial and not ulcerated fissures, this is diffuse.  No pustules, no blisters noted.   Ulceration dorsal first MTPJ left foot well epithelialized  Dorsal first MTPJ and fourth PIPJ right foot with resolved superficial  "eschar  Hallux limitus bilateral  Cavus bilateral    Skin:     General: Skin is warm and dry.      Capillary Refill: Capillary refill takes less than 2 seconds.   Neurological:      General: No focal deficit present.      Mental Status: He is alert and oriented to person, place, and time. Mental status is at baseline.      Sensory: Sensory deficit present.      Coordination: Coordination abnormal.      Gait: Gait abnormal.      Deep Tendon Reflexes: Reflexes abnormal.   Psychiatric:         Mood and Affect: Mood normal.         Behavior: Behavior normal.         Thought Content: Thought content normal.         Judgment: Judgment normal.              No pertinent results found.      Rosmery Weir, DANITA, DPM, FACFAS    Portions of the record may have been created with voice recognition software. Occasional wrong word or \"sound a like\" substitutions may have occurred due to the inherent limitations of voice recognition software. Read the chart carefully and recognize, using context, where substitutions have occurred.  "

## 2024-08-08 ENCOUNTER — OFFICE VISIT (OUTPATIENT)
Dept: PODIATRY | Facility: CLINIC | Age: 54
End: 2024-08-08
Payer: MEDICARE

## 2024-08-08 ENCOUNTER — APPOINTMENT (OUTPATIENT)
Dept: LAB | Facility: HOSPITAL | Age: 54
End: 2024-08-08
Payer: MEDICARE

## 2024-08-08 VITALS
HEIGHT: 72 IN | HEART RATE: 90 BPM | WEIGHT: 202 LBS | DIASTOLIC BLOOD PRESSURE: 67 MMHG | SYSTOLIC BLOOD PRESSURE: 112 MMHG | BODY MASS INDEX: 27.36 KG/M2

## 2024-08-08 DIAGNOSIS — E11.621 DIABETIC ULCER OF LEFT FOOT ASSOCIATED WITH TYPE 2 DIABETES MELLITUS, LIMITED TO BREAKDOWN OF SKIN, UNSPECIFIED PART OF FOOT (HCC): Primary | ICD-10-CM

## 2024-08-08 DIAGNOSIS — L97.521 DIABETIC ULCER OF LEFT FOOT ASSOCIATED WITH TYPE 2 DIABETES MELLITUS, LIMITED TO BREAKDOWN OF SKIN, UNSPECIFIED PART OF FOOT (HCC): Primary | ICD-10-CM

## 2024-08-08 LAB
25(OH)D3 SERPL-MCNC: 71.6 NG/ML (ref 30–100)
ALBUMIN SERPL BCG-MCNC: 4.1 G/DL (ref 3.5–5)
ALP SERPL-CCNC: 78 U/L (ref 34–104)
ALT SERPL W P-5'-P-CCNC: 33 U/L (ref 7–52)
ANION GAP SERPL CALCULATED.3IONS-SCNC: 7 MMOL/L (ref 4–13)
AST SERPL W P-5'-P-CCNC: 26 U/L (ref 13–39)
BILIRUB SERPL-MCNC: 0.39 MG/DL (ref 0.2–1)
BUN SERPL-MCNC: 17 MG/DL (ref 5–25)
CALCIUM SERPL-MCNC: 9.4 MG/DL (ref 8.4–10.2)
CHLORIDE SERPL-SCNC: 108 MMOL/L (ref 96–108)
CHOLEST SERPL-MCNC: 103 MG/DL
CO2 SERPL-SCNC: 27 MMOL/L (ref 21–32)
CREAT SERPL-MCNC: 0.9 MG/DL (ref 0.6–1.3)
CREAT UR-MCNC: 137.4 MG/DL
EST. AVERAGE GLUCOSE BLD GHB EST-MCNC: 131 MG/DL
GFR SERPL CREATININE-BSD FRML MDRD: 98 ML/MIN/1.73SQ M
GLUCOSE P FAST SERPL-MCNC: 115 MG/DL (ref 65–99)
HBA1C MFR BLD: 6.2 %
HDLC SERPL-MCNC: 36 MG/DL
LDLC SERPL CALC-MCNC: 32 MG/DL (ref 0–100)
MICROALBUMIN UR-MCNC: 7.8 MG/L
MICROALBUMIN/CREAT 24H UR: 6 MG/G CREATININE (ref 0–30)
POTASSIUM SERPL-SCNC: 4.3 MMOL/L (ref 3.5–5.3)
PROT SERPL-MCNC: 6.4 G/DL (ref 6.4–8.4)
SODIUM SERPL-SCNC: 142 MMOL/L (ref 135–147)
TRIGL SERPL-MCNC: 176 MG/DL

## 2024-08-08 PROCEDURE — 99213 OFFICE O/P EST LOW 20 MIN: CPT | Performed by: PODIATRIST

## 2024-08-11 DIAGNOSIS — R60.0 LOCALIZED EDEMA: Primary | ICD-10-CM

## 2024-08-12 RX ORDER — FUROSEMIDE 20 MG
20 TABLET ORAL DAILY
Qty: 90 TABLET | Refills: 3 | Status: SHIPPED | OUTPATIENT
Start: 2024-08-12

## 2024-08-20 ENCOUNTER — OFFICE VISIT (OUTPATIENT)
Dept: ENDOCRINOLOGY | Facility: CLINIC | Age: 54
End: 2024-08-20
Payer: MEDICARE

## 2024-08-20 VITALS
OXYGEN SATURATION: 94 % | HEART RATE: 94 BPM | TEMPERATURE: 98.1 F | WEIGHT: 199.6 LBS | SYSTOLIC BLOOD PRESSURE: 100 MMHG | DIASTOLIC BLOOD PRESSURE: 62 MMHG | HEIGHT: 72 IN | BODY MASS INDEX: 27.04 KG/M2

## 2024-08-20 DIAGNOSIS — I10 PRIMARY HYPERTENSION: ICD-10-CM

## 2024-08-20 DIAGNOSIS — E55.9 VITAMIN D DEFICIENCY: ICD-10-CM

## 2024-08-20 DIAGNOSIS — E11.9 TYPE 2 DIABETES MELLITUS WITHOUT COMPLICATION, WITHOUT LONG-TERM CURRENT USE OF INSULIN (HCC): Primary | ICD-10-CM

## 2024-08-20 DIAGNOSIS — E78.2 MIXED HYPERLIPIDEMIA: ICD-10-CM

## 2024-08-20 PROCEDURE — 99214 OFFICE O/P EST MOD 30 MIN: CPT | Performed by: STUDENT IN AN ORGANIZED HEALTH CARE EDUCATION/TRAINING PROGRAM

## 2024-08-20 NOTE — PROGRESS NOTES
Established patient Progress Note      Cc: diabetes    Referring Provider  No referring provider defined for this encounter.     History of Present Illness:   Benito Park is a 53 y.o. male with a history of type 2 diabetes without long term use of insulin who presented for follow up.      Denies recent illness or hospitalizations.    Current regimen:     SMBG :   Metformin XL 1000 mg bid  GLP-1 was not covered.      Blood sugars ranging from 90 to 120 mg/dl    Opthamology:  UTD;   Podiatry: No    on ACE inhibitor or ARB: on lisinopril 20 mg daily   on statin: on Lipitor 40 mg daily     Thyroid disorders: no  History of pancreatitis: no    Patient Active Problem List   Diagnosis    Sprain of anterior talofibular ligament of right ankle    Perianal abscess    Neuropathy    Chronic bilateral low back pain with bilateral sciatica    Bilateral lower extremity edema    Chronic pain syndrome    Failed back surgical syndrome    Lumbar spondylosis    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 (HCC)    Congenital fusion of sacroiliac joint    Terminal ileitis (HCC)    Viral enteritis    Chronic foot pain    Lumbar radiculopathy    GERD (gastroesophageal reflux disease)    History of colon polyps    Perianal fistula    Functional diarrhea    Blepharitis, left eye    Closed fracture of radial styloid    Compression of right ulnar nerve at multiple levels    Degenerative disc disease at L5-S1 level    History of lumbar fusion    Numbness and tingling of both lower extremities    Periorbital cellulitis of left eye    Right leg swelling    Tobacco use    Vitamin D deficiency    Arthritis of right shoulder region    Chronic right shoulder pain    Myofascial pain syndrome    S/P insertion of spinal cord stimulator    Crohn's disease (HCC)    Numbness and tingling in right hand    Neck pain    Cervical radiculopathy     Herniated nucleus pulposus, C3-4    Hypertension    Smoker    Heavy alcohol consumption    Sleep apnea    Snoring    Excessive daytime sleepiness    Overweight (BMI 25.0-29.9)    Dupuytren contracture    Capillary disorder    Epididymitis, right    Obstructive sleep apnea syndrome    Pes anserinus bursitis of left knee    Arthralgia    Ulcer of left foot, limited to breakdown of skin (HCC)    Dysarthria    acute Confusional state    Hyperlipidemia    Diabetes mellitus (HCC)    Diabetic polyneuropathy associated with type 2 diabetes mellitus (HCC)    Lung nodule      Past Medical History:   Diagnosis Date    Anxiety     Back pain     Callus Few months ago    Colon polyp     CPAP (continuous positive airway pressure) dependence     Crohn's disease (HCC)     Depression     Diabetes mellitus (HCC)     GERD (gastroesophageal reflux disease)     Hyperlipidemia     Hypertension     Perianal fistula     Sleep apnea     uses cpap no O2    Sleep apnea, obstructive     Terminal ileitis (HCC)       Past Surgical History:   Procedure Laterality Date    BACK SURGERY  2019    CAST APPLICATION Right 11/17/2022    Procedure: Application short-arm splint;  Surgeon: Sarthak Villatoro MD;  Location: [facility];  Service: Orthopedics    COLONOSCOPY      EGD      HAND CONTRACTURE RELEASE Left 06/01/2023    Procedure: left ring and small finger dupuytrens excision and ring finger Metacarpophalangeal joint release and small finger Metacarpophalangeal and proximal interphalangeal joint release;  Surgeon: Sarthak Villatoro MD;  Location: [facility];  Service: Orthopedics    HERNIA REPAIR      umbilical hernia    WV ANRCT XM SURG REQ ANES GENERAL SPI/EDRL DX N/A 11/17/2021    Procedure: EXAM UNDER ANESTHESIA (EUA);  Surgeon: Davi Thao MD;  Location: [facility];  Service: Colorectal    WV APPLICATION SHORT ARM SPLINT FOREARM-HAND STATIC Right 1/11/2024    Procedure: Application short arm splint;  Surgeon: Sarthak Villatoro  MD;  Location: [facility];  Service: Orthopedics    VT CAPSULECTOMY/CAPSULOTOMY IPHAL JOINT EACH Right 1/11/2024    Procedure: Contracture release right hand small finger proximal interphalangeal joint;  Surgeon: Sarthak Villatoro MD;  Location: [facility];  Service: Orthopedics    VT FASCIOTOMY PALMAR OPEN PARTIAL Right 11/17/2022    Procedure: Dupuytren's excision right palm extending into the small finger with release of the metacarpophalangeal joint and proximal interphalangeal joint.  Dupuytren's excision right palm with release of the right ring finger metacarpophalangeal joint.;  Surgeon: Sarthak Villatoro MD;  Location: [facility];  Service: Orthopedics    VT FASCIOTOMY PALMAR OPEN PARTIAL Right 1/11/2024    Procedure: Right hand small finger duppuytrens excision with release of the metacarpophalangeal joint and proximal interphalangeal joint;  Surgeon: Sarthak Villatoro MD;  Location: [facility];  Service: Orthopedics    VT FASCT PALM W/WO Z-PLASTY TISSUE REARGMT/SKN GRFT Right 1/11/2024    Procedure: Right hand small finger duppuytrens excision with release of the metacarpophalangeal joint and proximal interphalangeal joint;  Surgeon: Sarthak Villatoro MD;  Location:  MAIN OR;  Service: Orthopedics    VT FASCT PRTL PALMAR 1 DGT PROX IPHAL JT W/WO RPR Right 1/11/2024    Procedure: Right hand small finger duppuytrens excision with release of the metacarpophalangeal joint and proximal interphalangeal joint;  Surgeon: Sarthak Villatoro MD;  Location:  MAIN OR;  Service: Orthopedics    VT I&D ISCHIORECTAL&/PERIRECTAL ABSCESS SPX Right 10/24/2021    Procedure: excisional debredement right gluteal cheek ;  Surgeon: Jeana Bustamante MD;  Location:  MAIN OR;  Service: General    VT PLACEMENT SETON N/A 11/17/2021    Procedure: PLACEMENT SETON;  Surgeon: Davi Thao MD;  Location:  MAIN OR;  Service: Colorectal    VT PRQ IMPLTJ NSTIM ELECTRODE ARRAY EPIDURAL Right 03/26/2021    Procedure:  INSERTION THORACIC DORSAL COLUMN SPINAL CORD STIMULATOR PERCUTANEOUS W IMPLANTABLE PULSE GENERATOR, RIGHT;  Surgeon: Akshat Witt MD;  Location:  MAIN OR;  Service: Neurosurgery      Family History   Problem Relation Age of Onset    Heart disease Father     Heart attack Father     Colon cancer Neg Hx     Colon polyps Neg Hx     Inflammatory bowel disease Neg Hx      Social History     Tobacco Use    Smoking status: Former     Current packs/day: 0.00     Average packs/day: 0.5 packs/day for 30.0 years (15.0 ttl pk-yrs)     Types: Cigarettes     Start date: 1/1/2019     Quit date: 2/4/2021     Years since quitting: 3.5     Passive exposure: Past    Smokeless tobacco: Never   Substance Use Topics    Alcohol use: Yes     Alcohol/week: 3.0 standard drinks of alcohol     Types: 3 Cans of beer per week     No Known Allergies      Current Outpatient Medications:     acetaminophen (TYLENOL) 650 mg CR tablet, Take 1 tablet (650 mg total) by mouth every 8 (eight) hours as needed for mild pain, Disp: 30 tablet, Rfl: 0    albuterol (ProAir HFA) 90 mcg/act inhaler, Inhale 2 puffs every 6 (six) hours as needed for wheezing or shortness of breath, Disp: 6.7 g, Rfl: 2    Alcohol Swabs 70 % PADS, May substitute brand based on insurance coverage. Check glucose TID., Disp: 100 each, Rfl: 0    amitriptyline (ELAVIL) 100 mg tablet, Take 300mg HS, Disp: 270 tablet, Rfl: 1    atorvastatin (LIPITOR) 40 mg tablet, TAKE 1 TABLET BY MOUTH EVERY DAY IN THE EVENING, Disp: 90 tablet, Rfl: 1    azaTHIOprine (IMURAN) 50 mg tablet, TAKE 1 TABLET BY MOUTH EVERY DAY, Disp: 90 tablet, Rfl: 4    Blood Glucose Monitoring Suppl (OneTouch Verio Reflect) w/Device KIT, May substitute brand based on insurance coverage. Check glucose TID., Disp: 1 kit, Rfl: 0    buPROPion (WELLBUTRIN SR) 150 mg 12 hr tablet, Take 1 tablet (150 mg total) by mouth 2 (two) times a day, Disp: 180 tablet, Rfl: 3    cyclobenzaprine (FLEXERIL) 10 mg tablet, 1 HS (Patient taking  differently: Take 10 mg by mouth daily at bedtime 1 HS), Disp: 90 tablet, Rfl: 1    glucose blood (OneTouch Verio) test strip, May substitute brand based on insurance coverage. Check glucose TID., Disp: 100 each, Rfl: 5    lisinopril (ZESTRIL) 20 mg tablet, TAKE 1 TABLET BY MOUTH EVERY DAY, Disp: 90 tablet, Rfl: 1    metFORMIN (GLUCOPHAGE-XR) 500 mg 24 hr tablet, Take 2 tablets (1,000 mg total) by mouth 2 (two) times a day with meals, Disp: 360 tablet, Rfl: 3    mupirocin (BACTROBAN) 2 % ointment, Apply topically daily, Disp: 15 g, Rfl: 0    omeprazole (PriLOSEC) 40 MG capsule, TAKE 1 CAPSULE (40 MG TOTAL) BY MOUTH DAILY., Disp: 100 capsule, Rfl: 1    ondansetron (ZOFRAN) 4 mg tablet, Take 1 tablet (4 mg total) by mouth every 8 (eight) hours as needed for nausea or vomiting, Disp: 20 tablet, Rfl: 1    OneTouch Delica Lancets 33G MISC, May substitute brand based on insurance coverage. Check glucose TID., Disp: 100 each, Rfl: 5    pregabalin (LYRICA) 150 mg capsule, TAKE 1 CAPSULE BY MOUTH 3 TIMES A DAY, Disp: 90 capsule, Rfl: 5    RA Vitamin D-3 25 MCG (1000 UT) tablet, TAKE 2 TABLETS BY MOUTH DAILY, Disp: 60 tablet, Rfl: 2    vedolizumab (Entyvio) SOLR, Inject 300 mg into a catheter in a vein every 4 weeks, Disp: , Rfl:     Continuous Glucose Sensor (Dexcom G7 Sensor), Use 1 Device every 10 days (Patient not taking: Reported on 8/20/2024), Disp: 9 each, Rfl: 3    Continuous Glucose Sensor (FreeStyle Allyn 3 Sensor) MISC, Use 1 each every 10 days (Patient not taking: Reported on 8/20/2024), Disp: 3 each, Rfl: 5    furosemide (LASIX) 20 mg tablet, TAKE 1 TABLET BY MOUTH ONCE DAILY (Patient not taking: Reported on 8/20/2024), Disp: 90 tablet, Rfl: 3  Review of Systems   Constitutional:  Negative for appetite change, fatigue and unexpected weight change.   HENT:  Negative for trouble swallowing.    Eyes:  Negative for visual disturbance.   Cardiovascular:  Negative for chest pain and palpitations.   Gastrointestinal:   "Negative for abdominal pain, constipation, diarrhea, nausea and vomiting.   Endocrine: Negative for polydipsia, polyphagia and polyuria.       Physical Exam:  Body mass index is 27.07 kg/m².  /62 (BP Location: Right arm, Patient Position: Sitting, Cuff Size: Adult)   Pulse 94   Temp 98.1 °F (36.7 °C)   Ht 6' (1.829 m)   Wt 90.5 kg (199 lb 9.6 oz)   SpO2 94%   BMI 27.07 kg/m²    Wt Readings from Last 3 Encounters:   08/20/24 90.5 kg (199 lb 9.6 oz)   08/08/24 91.6 kg (202 lb)   07/19/24 91.6 kg (202 lb)       GEN: NAD  E/n/m nl facies,   Eyes: no stare or proptosis,   Neck: trachea midline, thyroid NT to palpation, nl in size, no nodules or neck masses noted,   CV; heart reg rate s1s2 nl, no Murmur   Resp: CTAB, good effort  Ab+BS  Neuro: no tremor,   Skin: warm and dry,   Ext:  no edema bilaterally,   Psych: nl mood and affect, no gross lapses in memory      Labs:   No components found for: \"HA1C\"  No components found for: \"GLU\"    Lab Results   Component Value Date    CREATININE 0.90 08/08/2024    CREATININE 0.99 05/17/2024    CREATININE 1.15 05/16/2024    BUN 17 08/08/2024     10/03/2016    K 4.3 08/08/2024     08/08/2024    CO2 27 08/08/2024     GFR, Calculated   Date Value Ref Range Status   12/12/2019 90 >60 mL/min/1.73m2 Final     Comment:     mL/min per 1.73 square meters                                            Normal Function or Mild Renal    Disease (if clinically at risk):  >or=60  Moderately Decreased:                30-59  Severely Decreased:                  15-29  Renal Failure:                         <15                                            -American GFR: multiply reported GFR by 1.16    Please note that the eGFR is based on the CKD-EPI calculation, and is not intended to be used for drug dosing.     eGFR   Date Value Ref Range Status   08/08/2024 98 ml/min/1.73sq m Final     No components found for: \"MALBCRER\"    Lab Results   Component Value Date    CHOL 208 " "(H) 10/03/2016    HDL 36 (L) 08/08/2024    TRIG 176 (H) 08/08/2024       Lab Results   Component Value Date    ALT 33 08/08/2024    AST 26 08/08/2024    ALKPHOS 78 08/08/2024    BILITOT 0.8 10/03/2016     Component      Latest Ref Rng 8/8/2024   Sodium      135 - 147 mmol/L 142    Potassium      3.5 - 5.3 mmol/L 4.3    Chloride      96 - 108 mmol/L 108    Carbon Dioxide      21 - 32 mmol/L 27    ANION GAP      4 - 13 mmol/L 7    BUN      5 - 25 mg/dL 17    Creatinine      0.60 - 1.30 mg/dL 0.90    GLUCOSE, FASTING      65 - 99 mg/dL 115 (H)    Calcium      8.4 - 10.2 mg/dL 9.4    AST      13 - 39 U/L 26    ALT      7 - 52 U/L 33    ALK PHOS      34 - 104 U/L 78    Total Protein      6.4 - 8.4 g/dL 6.4    Albumin      3.5 - 5.0 g/dL 4.1    Total Bilirubin      0.20 - 1.00 mg/dL 0.39    GFR, Calculated      ml/min/1.73sq m 98    Cholesterol      See Comment mg/dL 103    Triglycerides      See Comment mg/dL 176 (H)    HDL      >=40 mg/dL 36 (L)    LDL Calculated      0 - 100 mg/dL 32    EXT Creatinine Urine      Reference range not established. mg/dL 137.4    Albumin,U,Random      <20.0 mg/L 7.8    Albumin Creat Ratio      0 - 30 mg/g creatinine 6    Hemoglobin A1C      Normal 4.0-5.6%; PreDiabetic 5.7-6.4%; Diabetic >=6.5%; Glycemic control for adults with diabetes <7.0% % 6.2 (H)    eAG, EST AVG Glucose      mg/dl 131    VITAMIN D, 25-HYDROXY      30.0 - 100.0 ng/mL 71.6       Legend:  (H) High  (L) Low  No results found for: \"TSH\", \"FREET4\", \"TSI\"    Impression:  1. Type 2 diabetes mellitus without complication, without long-term current use of insulin (HCC)    2. Primary hypertension    3. Mixed hyperlipidemia    4. Vitamin D deficiency           Plan:    Problem List Items Addressed This Visit          Cardiovascular and Mediastinum    Hypertension     Well-controlled.  Continue on lisinopril.         Relevant Orders    Hemoglobin A1C    Comprehensive metabolic panel       Endocrine    Diabetes mellitus (HCC) - " Primary       Lab Results   Component Value Date    HGBA1C 6.2 (H) 08/08/2024     Glycemic control improved significantly with A1c of 6.2% he was congratulated on his hard work managing diabetes and he was encouraged to continue to do so.  He is currently on metformin extended release 1000 mg twice a day, never received GLP-1 due to lack of coverage.  We will maintain current regimen.  He was instructed to check his blood sugar once a different times and bring sugar log to next visit.  He will continue following up with our colleagues in [place] office due to proximity.  Labs prior to next visit.  Ophthalmology and podiatry follow-up.         Relevant Orders    Hemoglobin A1C    Comprehensive metabolic panel       Other    Vitamin D deficiency     continue vitamin D supplementation         Hyperlipidemia     Continue Lipitor.                  Discussed with the patient and all questioned fully answered. He will call me if any problems arise.    Counseled patient on diagnostic results, prognosis, risk and benefit of treatment options, instruction for management, importance of treatment compliance, Risk  factor reduction and impressions      Thiago Healy MD

## 2024-08-20 NOTE — ASSESSMENT & PLAN NOTE
Lab Results   Component Value Date    HGBA1C 6.2 (H) 08/08/2024     Glycemic control improved significantly with A1c of 6.2% he was congratulated on his hard work managing diabetes and he was encouraged to continue to do so.  He is currently on metformin extended release 1000 mg twice a day, never received GLP-1 due to lack of coverage.  We will maintain current regimen.  He was instructed to check his blood sugar once a different times and bring sugar log to next visit.  He will continue following up with our colleagues in [facility] due to proximity.  Labs prior to next visit.  Ophthalmology and podiatry follow-up.

## 2024-08-21 ENCOUNTER — HOSPITAL ENCOUNTER (OUTPATIENT)
Dept: NEUROLOGY | Facility: HOSPITAL | Age: 54
Discharge: HOME | End: 2024-08-21
Payer: MEDICARE

## 2024-08-21 DIAGNOSIS — R25.9 ABNORMAL MOVEMENTS: ICD-10-CM

## 2024-08-21 DIAGNOSIS — R47.89 EPISODE OF CHANGE IN SPEECH: ICD-10-CM

## 2024-08-21 PROCEDURE — 95816 EEG AWAKE AND DROWSY: CPT | Performed by: PSYCHIATRY & NEUROLOGY

## 2024-08-21 PROCEDURE — 95816 EEG AWAKE AND DROWSY: CPT

## 2024-09-05 ENCOUNTER — OFFICE VISIT (OUTPATIENT)
Dept: GASTROENTEROLOGY | Facility: CLINIC | Age: 54
End: 2024-09-05
Payer: MEDICARE

## 2024-09-05 ENCOUNTER — APPOINTMENT (OUTPATIENT)
Dept: LAB | Facility: HOSPITAL | Age: 54
End: 2024-09-05

## 2024-09-05 VITALS
HEIGHT: 72 IN | OXYGEN SATURATION: 93 % | SYSTOLIC BLOOD PRESSURE: 110 MMHG | TEMPERATURE: 97.5 F | BODY MASS INDEX: 27.41 KG/M2 | DIASTOLIC BLOOD PRESSURE: 71 MMHG | HEART RATE: 90 BPM | WEIGHT: 202.4 LBS

## 2024-09-05 DIAGNOSIS — K50.811 CROHN'S DISEASE OF BOTH SMALL AND LARGE INTESTINE WITH RECTAL BLEEDING (HCC): Primary | ICD-10-CM

## 2024-09-05 DIAGNOSIS — K50.913 PERIANAL FISTULA DUE TO CROHN'S DISEASE (HCC): ICD-10-CM

## 2024-09-05 DIAGNOSIS — R19.7 DIARRHEA, UNSPECIFIED TYPE: ICD-10-CM

## 2024-09-05 PROCEDURE — 99215 OFFICE O/P EST HI 40 MIN: CPT | Performed by: INTERNAL MEDICINE

## 2024-09-05 NOTE — Clinical Note
Pily Herrera, this was a pretty confusing patient for me. I am not sure why he has diarrhea or if he has Crohn's.  Did he ever have a VCE?  Thanks, Leonardo

## 2024-09-05 NOTE — PROGRESS NOTES
Patient ID: Benito Park is a 53 y.o. male Date of Birth 1970       No chief complaint on file.            Diagnosis:  1. Diabetic polyneuropathy associated with type 2 diabetes mellitus (HCC)  -     VAS ARTERIAL DUPLEX- LOWER LIMB BILATERAL; Future; Expected date: 09/06/2024  2. Onychomycosis       Patient presents for at-risk diabetic foot care.  Patient has no acute concerns today.  Patient has significant lower extremity risk due to neuropathy, parasthesia, edema, and trophic skin changes to the lower extremity. Most recent HBA1c was 6.2 on 8/8/24  FBS this am 126  Last visit with PCP 7/8/24 and endocrinology was 8/20/24.  Diabetic shoes : Received end of August 2024    On exam patient has thickened, hypertrophic, discolored, brittle toenails with subungual debris and tenderness x10   Callus: submet 1 bilaterally  Patient does not have  BL lower extremity edema  Patient's skin is atrophic, thickened nails, and decreased pedal hair  Patient has decreased pinprick and vibratory sensation to his feet and parasthesia  Patient does not have any  pedal ulcerations.  DP and PT pulses are monophasic on Doppler    Today's treatment includes:  Debridement of toenails. Using nail nipper, demarcus, and curette, nails were sharply debrided, reduced in thickness and length. Devitalized nail tissue and fungal debris excised and removed. Patient tolerated well.      Hyperkeratosis were trimmed x 2 without incident    Lower extremity arterial Dopplers were ordered today to establish baseline and rule out any peripheral arterial disease.  Patient will schedule at his convenience.    Discussed proper shoe gear, daily inspections of feet, and general foot health with patient. Patient has Q9 findings and is recommended for at risk foot care every 9-10 weeks.    Patients most recent complete clinical foot exam was on: 5/24/24     The following portions of the patient's history were reviewed and updated as appropriate:  "allergies, current medications, past family history, past medical history, past social history, past surgical history, and problem list.        Objective:  /72 (BP Location: Left arm, Patient Position: Sitting, Cuff Size: Adult)   Pulse 86   Temp (!) 97.3 °F (36.3 °C) (Temporal)   Ht 6' (1.829 m)   Wt 90.7 kg (200 lb)   BMI 27.12 kg/m²         No pertinent results found.      Rosmery Weir DPM, DANITA, FACFAS    Portions of the record may have been created with voice recognition software. Occasional wrong word or \"sound a like\" substitutions may have occurred due to the inherent limitations of voice recognition software. Read the chart carefully and recognize, using context, where substitutions have occurred.  "

## 2024-09-05 NOTE — PROGRESS NOTES
Gastroenterology Outpatient Follow-up - Crohn's Disease  Benito Park 53 y.o. male MRN: [id number]  Encounter: [id number]    Benito Park is a 53 y.o. male with Crohn's disease.    Symptom onset:  2019  Diagnosis:  Crohns disease  Year of diagnosis:  2020    IBD Summary: Anand his history of perianal abscess and fistula and chronic diarrhea.  He had CT evidence of ileitis in 2020, but has had several normal colonoscopies and MRI enterography since.  There is no tissue diagnosis of Crohn's disease.  He was treated with adalimumab, azathioprine, ustekinumab, and vedolizumab with partial improvement in diarrhea at best.    CD Summary  Current Crohn's disease phenotype:  penetrating    Involved sites since disease onset   Esophagus: no   Stomach: no     Duodenum: no   Jejunum: no   Ileum: yes   Right colon: no   Transverse colon: no   Left colon: no   Rectum: no   Anus: yes     History of stricture  Esophagus: no   Stomach: no   Duodenum: no   Jejunum: no   Ileum: no   Right colon: no   Transverse colon: no   Left colon: no   Rectum: no   Anus: no     History of fistula other than perianal:  Intra-abdominal   abcess: no      Enteroenteric fistula: no      Enterocutaneous fistula: no      Enterovesical fistula: no      Other fistula: no            Surgical History  Number of IBD surgeries: 0      First IBD surgery:    Most Recent IBD surgery:    Esophageal:  0    Gastroduodenal:  0    Small bowel resection(s):  0    Ileocolonic resection(s): 0      Colonic resection(s):  0    Ileostomy or colostomy:  no previous ostomy    Complete colectomy:  No         Medications     Year last used Reason for discontinuation   Corticosteroids   never         Thiopurines prior           Methotrexate   never         Infliximab   never         Adalimumab prior   2021 MARYLU antibodies   Certolizumab   never         Golimumab   never         Natalizumab   never         Mesalamine   never         Sulfasalzine   never         Vedolizumab  prior     TX     Ustekinumab prior   2022 TX     Tofacitinib   never         Other biologic   never             Extraintestinal Manifestations    IBD-associated arthropathy No    Uveitis No    Oral aphthous ulcers No   Erythema nodosum No    Pyoderma gangrenosum No    Primary sclerosing cholangitis No    Thrombotic complications No          Cancer / Dysplasia History  History of IBD-associated dysplasia: none    Date of diagnosis (Year):     History of colorectal cancer: No   History of cervical dysplasia: No   History of skin cancer: none         Laboratory Data   Most recent (date) Result   PPD   unknown   Quanitferon gold 2/2/2024 negative   TPMT 2/2/2024 normal   Hepatitis A   unknown   HBsAb   unknown   HBcAb   unknown   HBsAg   unknown   HCV Ab   unknown       Imaging / Diagnostic Procedures   Most recent (date) Findings   Colonoscopy or sigmoidoscopy #1              Ulcers/Erosions              Strictures              Stricture Severity              Endoscopic Score Polo Score:    Rutgeert's:               Findings              Pathology      Colonoscopy or sigmoidoscopy #2 7/19/2024            Ulcers/Erosions  no erosions              Strictures  none              Stricture Severity  N/A           Endoscopic Score Polo Score:    Rugeert's:  N/A           Findings  Normal colon and TI.           Pathology      EGD       Small bowel follow-through       CT enterography       CT without enterography       MRI enterography 3/12/2024 (1) No findings of active IBD. (2) Angulation/distortion of TI and ICV valve suggesting probable chronic stricturing without obstructive upstream dilatation.   Capsule study       DEXA scan   Lowest Z-score:      CXR (for TB)           HPI:   Interval History:  9/5/2024 Initial visit  Benito Park is establishing care with me for diagnosis of ileal and perianal Crohn's disease.  The patient was a poor historian and could not remember many details of his history.  I have done  extensive review of the medical record to put together his history.  This involved going back to 2019 and reading through many GI office notes, telephone encounters, colonoscopy reports, biopsy reports, and reviewing imaging.  I have spent at least 90 minutes reviewing his case and preparing this note.    It appears that the first sign of Crohn's disease was in 2019 when he developed a recurrent perianal abscess requiring several I&Ds.  He did not have bowel issues at the time.  In August 2020, he was hospitalized at [facility] with 3 days of severe abdominal pain and diarrhea.  CT from that admission, which I reviewed, showed a long segment of inflamed distal ileum and terminal ileum, as well as some inflammation of the cecum and ascending colon.  Colonoscopy was done during that admission but showed only mildly and locally edematous and petechial mucosa in the TI with normal biopsies.  Random biopsies from the colon were also normal.  There was a 15 mm adenoma in the ascending colon and a small inflammatory polyp in the rectum.  He then had MRI enterography in October 2020, which was normal and showed resolution of the previously seen ileitis.      However, he continued to have diarrhea and a decision was made to treat him with adalimumab starting in November 2020.  His diarrhea partially improved over the following several months, although he was also taking cholestyramine and his stools remained loose at times.  Another colonoscopy in August 2021 was normal with biopsies from the TI only showing focal active cryptitis.    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  He also needed another abscess drainage and seton placement for perianal fistula in November 2021.  According to the op report, there was active inflammation at the dentate line on anoscopy.  Apparently, he continued to have diarrhea so cholestyramine was  increased.  Another colonoscopy was done in March 2022, again normal except for seton.    In June 2022, he switched to vedolizumab because insurance stopped covering ustekinumab.  He remained on azathioprine.  Eventually, vedolizumab dosing was escalated to every 4 weeks because of worsening symptoms at around week 5 or 6.  Vedolizumab level was 53 mcg/mL on 2/2/2024.  I am not sure if this was a true trough.  Another colonoscopy was done in November 2023, again normal.  Another MRI enterography was done in March 2024, again normal.  Another colonoscopy in July 2024, again normal.  C. difficile in April negative.  No other recent infectious studies.    Despite these many normal studies, he complains of 6 loose to watery bowel movements per day.  He rarely sees blood in the stool.  He has moderate abdominal cramping.  He has severe urgency with less than 2 minutes time to get to the bathroom.  He rates his quality of life as poor.  He has joint pain.  He is a former smoker.  Celiac panel negative.  CMP from August 2024 was normal.    CRP has been persistently elevated, most recently 15.9 on 4/1/2024.  Calprotectin was only mildly elevated in April at 157.  Previously, calprotectin was 291 in February, and other values have been normal.  CBC from May 2024 was normal with hemoglobin 14.2 and mildly increased MCV 99 consistent with azathioprine use.    He has diabetes and is on metformin.  He remains on vedolizumab and azathioprine 50 mg daily.  He is also on pregabalin, Wellbutrin, and amitriptyline.    Recently, he was referred to colorectal surgery for consideration of terminal ileal resection because of ongoing diarrhea and questionable scarring on MRI enteroscopy.  He is seeing me for second opinion.    Benito reports the following symptoms over the last 7 days:    Stool Frequency >4 stools/day more than normal   Average stools per day: 6   Average liquid stools per day: 3   Consistency of bowel: soft or  semi-formed   Awakening from sleep to move bowels? Yes   Urgency moderately severe fecal urgency   Visible blood in stool? visible blood in stool less than half the time   Abdominal pain? moderate   General Wellbeing? poor     Benito also reports the following symptoms in the last month:    Leakage of stool while sleeping? No   Leakage of stool while awake? Yes       REVIEW OF SYSTEMS:  During the last 7 days, Benito experienced the following symptoms:  Unintentional Weight Loss (in the last month) No   Fever No   Eye irritation No   Mouth sores No   Sore throat No   Chest pain No   Shortness of breath No   Numbness or tingling in hands or feet Yes   Skin rash No   Pain or swelling in joints Yes   Bruising or bleeding No   Felt depressed or blue No   Fatigue Yes   Dysuria No   Please see HPI for additional pertinent review of systems; otherwise remainder of ROS was unremarkable    MEDICATIONS:    Current Outpatient Medications:     albuterol (ProAir HFA) 90 mcg/act inhaler    Alcohol Swabs 70 % PADS    amitriptyline (ELAVIL) 100 mg tablet    atorvastatin (LIPITOR) 40 mg tablet    azaTHIOprine (IMURAN) 50 mg tablet    Blood Glucose Monitoring Suppl (OneTouch Verio Reflect) w/Device KIT    buPROPion (WELLBUTRIN SR) 150 mg 12 hr tablet    cyclobenzaprine (FLEXERIL) 10 mg tablet    furosemide (LASIX) 20 mg tablet    glucose blood (OneTouch Verio) test strip    lisinopril (ZESTRIL) 20 mg tablet    metFORMIN (GLUCOPHAGE-XR) 500 mg 24 hr tablet    mupirocin (BACTROBAN) 2 % ointment    omeprazole (PriLOSEC) 40 MG capsule    ondansetron (ZOFRAN) 4 mg tablet    OneTouch Delica Lancets 33G MISC    pregabalin (LYRICA) 150 mg capsule    RA Vitamin D-3 25 MCG (1000 UT) tablet    vedolizumab (Entyvio) SOLR    acetaminophen (TYLENOL) 650 mg CR tablet    Continuous Glucose Sensor (Dexcom G7 Sensor)    Continuous Glucose Sensor (FreeStyle Allyn 3 Sensor) MISC    ALLERGIES:  No Known Allergies    OBJECTIVE:  /71 (BP Location:  "Left arm, Patient Position: Sitting, Cuff Size: Standard)   Pulse 90   Temp 97.5 °F (36.4 °C) (Tympanic)   Ht 6' (1.829 m)   Wt 91.8 kg (202 lb 6.4 oz)   SpO2 93%   BMI 27.45 kg/m²      PHYSICAL EXAM:    General Appearance:   Alert, cooperative, no distress   HEENT:   Normocephalic, atraumatic, anicteric.     Neck:  Supple, symmetrical, trachea midline   Lungs:   Clear to auscultation bilaterally; no rales, rhonchi or wheezing; respirations unlabored    Heart::   Regular rate and rhythm; no murmur, rub, or gallop.   Abdomen:   Soft, non-distended; normal bowel sounds    Abdominal exam was notable for no tenderness with no mass.     Genitalia:   Deferred    Rectal:   Perirectal assessment was not performed.                     Extremities:  No cyanosis, clubbing or edema    Pulses:  2+ and symmetric    Skin:  No jaundice, rashes, or lesions    Lymph nodes:  No palpable cervical lymphadenopathy        Lab Results   Component Value Date    WBC 6.59 05/17/2024    HGB 14.2 05/17/2024    HCT 42.2 05/17/2024    MCV 99 (H) 05/17/2024     05/17/2024     Lab Results   Component Value Date     10/03/2016    SODIUM 142 08/08/2024    K 4.3 08/08/2024     08/08/2024    CO2 27 08/08/2024    ANIONGAP 23 (H) 02/20/2015    AGAP 7 08/08/2024    BUN 17 08/08/2024    CREATININE 0.90 08/08/2024    GLUC 166 (H) 05/17/2024    GLUF 115 (H) 08/08/2024    CALCIUM 9.4 08/08/2024    AST 26 08/08/2024    ALT 33 08/08/2024    ALKPHOS 78 08/08/2024    PROT 7.3 10/03/2016    TP 6.4 08/08/2024    BILITOT 0.8 10/03/2016    TBILI 0.39 08/08/2024    EGFR 98 08/08/2024     Lab Results   Component Value Date    CRP 15.9 (H) 04/01/2024     Lab Results   Component Value Date    TSXMOEFL27 440 05/20/2020     No results found for: \"FERRITIN\"    ASSESSMENT AND PLAN:    Benito has a history of penetrating Crohn’s disease diagnosed in 2020 with involvement in the following areas:      Ileum,     Anus, with perianal fistula . Surgical " history includes no small bowel resections, no colon resections, and no prior ostomy. Current medical therapy is with vedolizumab 300 mg every 4 weeks and azathioprine 50 mg daily. My global assessment is that the clinical disease activity is currently quiescent.    The 6-point Polo score was 4 and the 9-point Polo score was 4.  The short CDAI was 254.      Anand has history of perianal abscess and fistula and chronic diarrhea.  He does not have tissue diagnosis of Crohn's disease.  He had 1 self-limited episode of ileitis based on CT scan, but multiple subsequent colonoscopies and small bowel imaging studies have shown terminal ileum with normal histology.  While he could have Crohn's disease based on the presence of perianal abscess and fistula, I cannot explain his persistent diarrhea with this diagnosis.  He has been treated with adalimumab, azathioprine, ustekinumab, and vedolizumab and my understanding is that these medications have had limited impact on his symptoms.  I do not think that he has had video capsule endoscopy to examine the rest of the small bowel.  He has also not been testing for chronic parasitic infections like giardiasis.  He is on metformin which could be contributing to diarrhea.  He could have IBS, SIBO, lactose intolerance, pancreatic insufficiency, etc.  I do not think that he should have ileocecal resection because his terminal ileum is normal and he will likely have worsening of diarrhea after such surgery.  He does have persistently elevated CRP, which I cannot explain unless he has ongoing active perianal disease.  1.  Check stool tests for O&P, Giardia, pathogen panel, C. difficile, calprotectin.  2.  Schedule video capsule endoscopy.  3.  I think he should stop azathioprine.  4.  I told Anand that I will call him to discuss my plan because they needed more time to review his records.  I will also ask him about metformin and any other medications he may be taking which could  contribute to diarrhea.  5.  Stay on vedolizumab for now.  6. Dermatology follow up.  Return in 3 months.     I have spent a total time of 90 minutes in caring for this patient on the day of the visit/encounter including Diagnostic results, Impressions, Documenting in the medical record, Reviewing / ordering tests, medicine, procedures  , Obtaining or reviewing history  , and Communicating with other healthcare professionals .        Health Maintenance Recommendations:  Vaccines & Infections  COVID-19 vaccination and boosters are recommended. There is no evidence that the COVID-19 vaccine would cause an IBD flare.  Avoid live vaccines if on immunosuppressive therapy.  Yearly influenza vaccine (flu shot).  Pneumonia vaccines for patients on immunosuppression. These include Prevnar 20, followed by Pneumovax 23 at least 8 weeks later.  Shingrix vaccine (series of 2 injections) for al patients 65 and older. Patients on tofacitinib or upadacitinib should be vaccinated regardless of age.  If not immune to measles mumps or rubella, MMR vaccine is recommended. However, this is a live vaccine and should be given prior to immunosuppressive therapy.  HPV vaccination as per national guidelines.  Hepatitis A and B vaccinations if not previously vaccinated.  Testing for tuberculosis with QuantiFERON Gold blood test and/or chest xray prior to starting immunosuppressive medications, and then annually    Cancer screening  Dysplasia surveillance for colorectal cancer. Colonoscopy in all patients with extensive colitis (more than 1/3 of the colon involved) who had disease for at least 8 or more years.  Repeat colonoscopy approximately every 12-24 months.  In patients with concurrent primary sclerosing cholangitis, history of dysplasia, or family history of colon cancer, repeat colonoscopy annually.    Females: Pap smear annually for woman on immunosuppression.  Annual dermatologic/skin exam in all patients with IBD, especially those  on immunosuppression with thiopurines or ZAIRE inhibitors.    Miscellaneous  DEXA scan, once off steroids for 3 months  Depression screening recommended annually  Routine dental and ophthalmology examinations    Problem List Items Addressed This Visit       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 (HCC)    Crohn's disease (HCC) - Primary    Relevant Orders    Calprotectin,Fecal    Giardia antigen    Ova and parasite examination    Clostridium difficile toxin by PCR with EIA    Stool Enteric Bacterial Panel by PCR     Other Visit Diagnoses       Diarrhea, unspecified type        Relevant Orders    Calprotectin,Fecal    Giardia antigen    Ova and parasite examination    Clostridium difficile toxin by PCR with EIA    Stool Enteric Bacterial Panel by PCR            Bernardino Woodward MD

## 2024-09-06 ENCOUNTER — OFFICE VISIT (OUTPATIENT)
Dept: PODIATRY | Facility: CLINIC | Age: 54
End: 2024-09-06
Payer: MEDICARE

## 2024-09-06 VITALS
TEMPERATURE: 97.3 F | WEIGHT: 200 LBS | SYSTOLIC BLOOD PRESSURE: 108 MMHG | DIASTOLIC BLOOD PRESSURE: 72 MMHG | HEART RATE: 86 BPM | BODY MASS INDEX: 27.09 KG/M2 | HEIGHT: 72 IN

## 2024-09-06 DIAGNOSIS — E11.42 DIABETIC POLYNEUROPATHY ASSOCIATED WITH TYPE 2 DIABETES MELLITUS (HCC): Primary | ICD-10-CM

## 2024-09-06 DIAGNOSIS — B35.1 ONYCHOMYCOSIS: ICD-10-CM

## 2024-09-06 PROCEDURE — 99212 OFFICE O/P EST SF 10 MIN: CPT | Performed by: PODIATRIST

## 2024-09-06 PROCEDURE — 11721 DEBRIDE NAIL 6 OR MORE: CPT | Performed by: PODIATRIST

## 2024-09-06 PROCEDURE — 11056 PARNG/CUTG B9 HYPRKR LES 2-4: CPT | Performed by: PODIATRIST

## 2024-09-07 ENCOUNTER — APPOINTMENT (OUTPATIENT)
Dept: LAB | Facility: HOSPITAL | Age: 54
End: 2024-09-07
Payer: MEDICARE

## 2024-09-07 DIAGNOSIS — R19.7 DIARRHEA, UNSPECIFIED TYPE: ICD-10-CM

## 2024-09-07 DIAGNOSIS — K50.811 CROHN'S DISEASE OF BOTH SMALL AND LARGE INTESTINE WITH RECTAL BLEEDING (HCC): ICD-10-CM

## 2024-09-07 PROCEDURE — 83993 ASSAY FOR CALPROTECTIN FECAL: CPT

## 2024-09-07 PROCEDURE — 87209 SMEAR COMPLEX STAIN: CPT

## 2024-09-07 PROCEDURE — 87177 OVA AND PARASITES SMEARS: CPT

## 2024-09-07 PROCEDURE — 87506 IADNA-DNA/RNA PROBE TQ 6-11: CPT

## 2024-09-08 LAB
C COLI+JEJUNI TUF STL QL NAA+PROBE: NEGATIVE
C DIFF TOX GENS STL QL NAA+PROBE: NEGATIVE
EC STX1+STX2 GENES STL QL NAA+PROBE: NEGATIVE
SALMONELLA SP SPAO STL QL NAA+PROBE: NEGATIVE
SHIGELLA SP+EIEC IPAH STL QL NAA+PROBE: NEGATIVE

## 2024-09-09 ENCOUNTER — OFFICE VISIT (OUTPATIENT)
Dept: DIABETES SERVICES | Facility: CLINIC | Age: 54
End: 2024-09-09
Payer: MEDICARE

## 2024-09-09 VITALS — WEIGHT: 205 LBS | BODY MASS INDEX: 27.8 KG/M2

## 2024-09-09 DIAGNOSIS — E11.65 TYPE 2 DIABETES MELLITUS WITH HYPERGLYCEMIA, WITHOUT LONG-TERM CURRENT USE OF INSULIN (HCC): Primary | ICD-10-CM

## 2024-09-09 LAB
CALPROTECTIN STL-MCNC: 109 ÂΜG/G
G LAMBLIA AG STL QL IA: NEGATIVE

## 2024-09-09 PROCEDURE — 97803 MED NUTRITION INDIV SUBSEQ: CPT

## 2024-09-09 NOTE — PATIENT INSTRUCTIONS
Consume 3 meals a day about 4-5 hours apart--no skipping.  45 grams of carbs per meal (may go to 30 grams per meal if unable to take in 45 grams).  Consider non-weight bearing exercise.  Complete 3-day food record and bring your glucometer to next visit.

## 2024-09-09 NOTE — PROGRESS NOTES
Medical Nutrition Therapy        Assessment    Visit Type: Follow-up visit  Chief complaint/Medical Diagnosis/reason for visit Type 2 diabetes mellitus with hyperglycemia, without long-term current use of insulin ([facility])     HPI Met with Anand for his follow up visit today. Patient informed that he has been adhering to 3 meals a day with some healthy snacks in between and is checking his BG twice a day, its averaging in between 119-126 mg/dl most of the times. His most recent hba1c was 6.2() and has improved from 8.2. Anand c/o of poor sleep due to his back pain and neuropathy and has diarrhea. Patient not able to exercise due to pain. We did speak about his diet regimen in detail today. Patient encouraged to have small and frequent meals as tolerated. Advised patient to keep his carbohydrate intake to 45 grams per meal (30 if he is unable to consume 45) and 15 grams per snack to assist with glycemic control.  Suggested planning meals ahead of time to ensure adequate/consistent carbohydrate intake and to continue making healthy food choices for overall health. We also discussed ways to improve his TG levels. Educated patient on hypoglycemia symptoms and its treatment. Relevant handouts were given. RD will remain available for further dietary questions/concerns.     Ht Readings from Last 1 Encounters:   24 6' (1.829 m)     Wt Readings from Last 3 Encounters:   24 93 kg (205 lb)   24 90.7 kg (200 lb)   24 91.8 kg (202 lb 6.4 oz)     Weight Change: Yes lost 3 lbs since     Food Log: Completed via the method of food recall    Early mornin ounces of low sugar orange juice with his pills   Breakfast:coffee with creamer 2 tbsp, 1 cup cereal (cheerios mostly), 1 cup unsweetened almond milk, 1 small banana OR 2 scrambled eggs with coffee.   Morning Snack:diet jell-O very seldomly  Lunch 12:00-1:00pm: 1 sandwich (mostly cheese and bologna OR egg and cherry), water OR diet soda 1 small  can  Afternoon Snack: greek yogurt 3-4 times a week   Dinner 5-6pm: salad incl(lettuce, tomato, cheese and some dressing), vegetable like- brussel sprouts, broccoli, cabbage with pork chops, chicken (mostly), rice (2-3 times a week) 1 handful OR with mashed potato 1 cup   Evening Snack:8-8:30pm: SF jell-O OR 1/2 cup low carb ice cream   Beverages: mostly water 40-48oz a day, SF soda 1-2 cans a day, 4 oz of OJ, coffee with almond milk in the morning  Eating out/Take out:2-3 times in a month   Exercise none due to neuropathy and back pain     Calorie needs 1800 kcals/day Carbs: 45 g/meal, 15 g/snack     Fat: 4-5 servings/day    Protein:8 ounces/day    Monitoring and evaluation:    Term code indicator  FH 4.4 Mealtime Behavior Criteria: Consume 3 meals a day about 4-5 hours apart--no skipping.  Term code indicator  FH 1.6.3 Carbohydrate Intake Criteria: 45 grams of carbs per meal (may go to 30 grams per meal if unable to take in 45 grams).  Term code indicator  CH 2.2 Treatments/Therapy/Alternative Medicine Criteria: Consider non-weight bearing exercise.  Term code indicator  CH 2.2 Treatments/Therapy/Alternative Medicine Criteria: Complete 3-day food record and bring your glucometer to next visit.      Materials Provided: hypoglycemia handout, 15/15 rule list     Patient’s Response to Instruction:  Comprehensiongood  Motivationgood  Expected Compliancegood    Start- Stop: 8:05-8:41  Total Minutes: 36 Minutes  Group or Individual Instruction: JENNIFER DIXONT  Other: Thiago Healy MD    Thank you for coming to the [facility] for education today.  Please feel free to call with any questions or concerns.    Melissa Whatley  [address] [id number]

## 2024-09-18 ENCOUNTER — OFFICE VISIT (OUTPATIENT)
Dept: FAMILY MEDICINE CLINIC | Facility: CLINIC | Age: 54
End: 2024-09-18
Payer: MEDICARE

## 2024-09-18 VITALS
OXYGEN SATURATION: 94 % | HEART RATE: 99 BPM | BODY MASS INDEX: 27.26 KG/M2 | WEIGHT: 201 LBS | DIASTOLIC BLOOD PRESSURE: 80 MMHG | SYSTOLIC BLOOD PRESSURE: 120 MMHG

## 2024-09-18 DIAGNOSIS — E11.00 TYPE 2 DIABETES MELLITUS WITH HYPEROSMOLARITY WITHOUT COMA, WITHOUT LONG-TERM CURRENT USE OF INSULIN (HCC): Primary | ICD-10-CM

## 2024-09-18 DIAGNOSIS — R49.0 HOARSENESS: ICD-10-CM

## 2024-09-18 DIAGNOSIS — R52 PAIN: ICD-10-CM

## 2024-09-18 DIAGNOSIS — I10 PRIMARY HYPERTENSION: ICD-10-CM

## 2024-09-18 LAB
LEFT EYE DIABETIC RETINOPATHY: NORMAL
LEFT EYE IMAGE QUALITY: NORMAL
LEFT EYE MACULAR EDEMA: NORMAL
LEFT EYE OTHER RETINOPATHY: NORMAL
RIGHT EYE DIABETIC RETINOPATHY: NORMAL
RIGHT EYE IMAGE QUALITY: NORMAL
RIGHT EYE MACULAR EDEMA: NORMAL
RIGHT EYE OTHER RETINOPATHY: NORMAL
SEVERITY (EYE EXAM): NORMAL

## 2024-09-18 PROCEDURE — G2211 COMPLEX E/M VISIT ADD ON: HCPCS | Performed by: FAMILY MEDICINE

## 2024-09-18 PROCEDURE — 99214 OFFICE O/P EST MOD 30 MIN: CPT | Performed by: FAMILY MEDICINE

## 2024-09-18 RX ORDER — LISINOPRIL 20 MG/1
20 TABLET ORAL DAILY
Qty: 90 TABLET | Refills: 1 | Status: SHIPPED | OUTPATIENT
Start: 2024-09-18

## 2024-09-18 RX ORDER — NAPROXEN 500 MG/1
500 TABLET ORAL 2 TIMES DAILY WITH MEALS
Qty: 180 TABLET | Refills: 3 | Status: SHIPPED | OUTPATIENT
Start: 2024-09-18

## 2024-09-18 NOTE — PROGRESS NOTES
Assessment/Plan:    No problem-specific Assessment & Plan notes found for this encounter.       Diagnoses and all orders for this visit:    Type 2 diabetes mellitus with hyperosmolarity without coma, without long-term current use of insulin ([facility])  -     IRIS Diabetic eye exam          Subjective:   Chief Complaint   Patient presents with    Laryngitis    Diabetes     DM Eye exam    Flu Vaccine    Cough        Patient ID: Benito Park is a 53 y.o. male.    Diabetes  Pertinent negatives for hypoglycemia include no confusion. Pertinent negatives for diabetes include no chest pain, no fatigue and no weakness.   Cough  Pertinent negatives include no chest pain, fever, myalgias, sore throat, shortness of breath or wheezing.       The following portions of the patient's history were reviewed and updated as appropriate: allergies, current medications, past family history, past medical history, past social history, past surgical history and problem list.    Review of Systems   Constitutional:  Negative for fatigue, fever and unexpected weight change.   HENT:  Negative for congestion, sinus pain and sore throat.    Eyes:  Negative for visual disturbance.   Respiratory:  Positive for cough. Negative for shortness of breath and wheezing.    Cardiovascular:  Negative for chest pain and palpitations.   Gastrointestinal:  Negative for abdominal pain, nausea and vomiting.   Musculoskeletal: Negative.  Negative for arthralgias and myalgias.   Neurological:  Negative for syncope, weakness and numbness.   Psychiatric/Behavioral: Negative.  Negative for confusion, dysphoric mood and suicidal ideas.          Objective:  Vitals:    09/18/24 1517   BP: 120/80   BP Location: Left arm   Patient Position: Sitting   Cuff Size: Standard   Pulse: 99   SpO2: 94%   Weight: 91.2 kg (201 lb)      Physical Exam  Constitutional:       Appearance: He is well-developed.   HENT:      Right Ear: Ear canal normal. Tympanic membrane is not injected.       Left Ear: Ear canal normal. Tympanic membrane is not injected.      Nose: Nose normal.   Eyes:      General:         Right eye: No discharge.         Left eye: No discharge.      Conjunctiva/sclera: Conjunctivae normal.      Pupils: Pupils are equal, round, and reactive to light.   Neck:      Thyroid: No thyromegaly.   Cardiovascular:      Rate and Rhythm: Normal rate and regular rhythm.      Heart sounds: Normal heart sounds. No murmur heard.  Pulmonary:      Effort: Pulmonary effort is normal. No respiratory distress.      Breath sounds: Normal breath sounds. No wheezing.   Abdominal:      General: Bowel sounds are normal. There is no distension.      Palpations: Abdomen is soft.      Tenderness: There is no abdominal tenderness.   Musculoskeletal:         General: Normal range of motion.      Cervical back: Normal range of motion and neck supple.   Lymphadenopathy:      Cervical: No cervical adenopathy.   Skin:     General: Skin is warm and dry.   Neurological:      Mental Status: He is alert and oriented to person, place, and time. He is not disoriented.      Sensory: No sensory deficit.      Motor: No weakness.      Coordination: Coordination normal.      Gait: Gait normal.      Deep Tendon Reflexes: Reflexes are normal and symmetric.   Psychiatric:         Speech: Speech normal.         Behavior: Behavior normal.         Thought Content: Thought content normal.         Judgment: Judgment normal.

## 2024-09-19 RX ORDER — LOPERAMIDE HCL 2 MG
CAPSULE ORAL
COMMUNITY
Start: 2024-07-12

## 2024-09-23 DIAGNOSIS — R06.2 WHEEZING: ICD-10-CM

## 2024-09-23 RX ORDER — ALBUTEROL SULFATE 90 UG/1
INHALANT RESPIRATORY (INHALATION)
Qty: 6.7 G | Refills: 2 | Status: SHIPPED | OUTPATIENT
Start: 2024-09-23

## 2024-09-24 ENCOUNTER — OFFICE VISIT (OUTPATIENT)
Dept: PULMONOLOGY | Facility: CLINIC | Age: 54
End: 2024-09-24
Payer: MEDICARE

## 2024-09-24 VITALS
HEIGHT: 72 IN | SYSTOLIC BLOOD PRESSURE: 118 MMHG | DIASTOLIC BLOOD PRESSURE: 80 MMHG | WEIGHT: 200 LBS | HEART RATE: 97 BPM | BODY MASS INDEX: 27.09 KG/M2 | TEMPERATURE: 98 F | OXYGEN SATURATION: 97 % | RESPIRATION RATE: 16 BRPM

## 2024-09-24 DIAGNOSIS — E66.3 OVERWEIGHT (BMI 25.0-29.9): ICD-10-CM

## 2024-09-24 DIAGNOSIS — G47.33 OBSTRUCTIVE SLEEP APNEA SYNDROME: Primary | ICD-10-CM

## 2024-09-24 PROCEDURE — 99214 OFFICE O/P EST MOD 30 MIN: CPT | Performed by: INTERNAL MEDICINE

## 2024-09-24 PROCEDURE — G2211 COMPLEX E/M VISIT ADD ON: HCPCS | Performed by: INTERNAL MEDICINE

## 2024-09-24 NOTE — PROGRESS NOTES
Pulmonary/Sleep Follow Up Note   Benito Park 53 y.o. male MRN: [id number]  9/24/2024      Assessment and Plan:    1. Obstructive sleep apnea syndrome  Anand has failed using CPAP and and tolerated it despite multiple many trials of masks at this point given the risk-benefit ratio I think I would refer him to ENT for an inspire/hypoglossal nerve stimulation we spoke with in details about the nature of that surgery but definitely will have more discussion with ENT surgery       2. Overweight (BMI 25.0-29.9)  BMI of 27.12, noted he would benefit from weight loss    3. Excessive daytime sleepiness  Suboptimally treated OSIRIS    4. Tobacco use  He is former smoker currently in remission    5. Lung nodule  Has been stable just needs an annual CT scan which I have ordered  - CT chest without contrast; Future    6.  Emphysema  Hasn't been using inhalers       History of Present Illness   HPI:  Benito Park is a 53 y.o. male who is here for follow-up appointment and compliance visit he was last seen in December of last year to evaluate for possible obstructive sleep apnea as he had a reported history of loud snoring witnessed by his wife, excessive daytime sleepiness requiring daytime naps almost every day  His sleep study found to have moderate OSIRIS with significant nocturnal hypoxemia started on a CPAP after having a CPAP titration that showed adequate treatment of his sleep disordered breathing events as well as his oxygen requirements.  He hasn't been able to use CPAP and intolerated it despite multiple mask trials   COPD has been well-controlled is only using albuterol rescue inhaler in the morning      Historical Information   Past Medical History:   Diagnosis Date    Anxiety     Back pain     Callus Few months ago    Colon polyp     CPAP (continuous positive airway pressure) dependence     Crohn's disease (HCC)     Depression     Diabetes mellitus (HCC)     GERD (gastroesophageal reflux disease)      Hyperlipidemia     Hypertension     Perianal fistula     Sleep apnea     uses cpap no O2    Sleep apnea, obstructive     Terminal ileitis (HCC)      Past Surgical History:   Procedure Laterality Date    BACK SURGERY  2019    CAST APPLICATION Right 11/17/2022    Procedure: Application short-arm splint;  Surgeon: Sarthak Villatoro MD;  Location:  MAIN OR;  Service: Orthopedics    COLONOSCOPY      EGD      HAND CONTRACTURE RELEASE Left 06/01/2023    Procedure: left ring and small finger dupuytrens excision and ring finger Metacarpophalangeal joint release and small finger Metacarpophalangeal and proximal interphalangeal joint release;  Surgeon: Sarthak Villatoro MD;  Location:  MAIN OR;  Service: Orthopedics    HERNIA REPAIR      umbilical hernia    WV ANRCT XM SURG REQ ANES GENERAL SPI/EDRL DX N/A 11/17/2021    Procedure: EXAM UNDER ANESTHESIA (EUA);  Surgeon: Davi Thao MD;  Location:  MAIN OR;  Service: Colorectal    WV APPLICATION SHORT ARM SPLINT FOREARM-HAND STATIC Right 1/11/2024    Procedure: Application short arm splint;  Surgeon: Sarthak Villatoro MD;  Location:  MAIN OR;  Service: Orthopedics    WV CAPSULECTOMY/CAPSULOTOMY IPHAL JOINT EACH Right 1/11/2024    Procedure: Contracture release right hand small finger proximal interphalangeal joint;  Surgeon: Sarthak Villatoro MD;  Location:  MAIN OR;  Service: Orthopedics    WV FASCIOTOMY PALMAR OPEN PARTIAL Right 11/17/2022    Procedure: Dupuytren's excision right palm extending into the small finger with release of the metacarpophalangeal joint and proximal interphalangeal joint.  Dupuytren's excision right palm with release of the right ring finger metacarpophalangeal joint.;  Surgeon: Sarthak Villatoro MD;  Location:  MAIN OR;  Service: Orthopedics    WV FASCIOTOMY PALMAR OPEN PARTIAL Right 1/11/2024    Procedure: Right hand small finger duppuytrens excision with release of the metacarpophalangeal joint and proximal interphalangeal  joint;  Surgeon: Sarthak Villatoro MD;  Location: [facility];  Service: Orthopedics    DE FASCT PALM W/WO Z-PLASTY TISSUE REARGMT/SKN GRFT Right 1/11/2024    Procedure: Right hand small finger duppuytrens excision with release of the metacarpophalangeal joint and proximal interphalangeal joint;  Surgeon: Sarthak Villatoro MD;  Location: [facility];  Service: Orthopedics    DE FASCT PRTL PALMAR 1 DGT PROX IPHAL JT W/WO RPR Right 1/11/2024    Procedure: Right hand small finger duppuytrens excision with release of the metacarpophalangeal joint and proximal interphalangeal joint;  Surgeon: Sarthak Villatoro MD;  Location: [facility];  Service: Orthopedics    DE I&D ISCHIORECTAL&/PERIRECTAL ABSCESS SPX Right 10/24/2021    Procedure: excisional debredement right gluteal cheek ;  Surgeon: Jeana Bustamante MD;  Location: [facility];  Service: General    DE PLACEMENT SETON N/A 11/17/2021    Procedure: PLACEMENT SETON;  Surgeon: Davi Thao MD;  Location: [facility];  Service: Colorectal    DE PRQ IMPLTJ NSTIM ELECTRODE ARRAY EPIDURAL Right 03/26/2021    Procedure: INSERTION THORACIC DORSAL COLUMN SPINAL CORD STIMULATOR PERCUTANEOUS W IMPLANTABLE PULSE GENERATOR, RIGHT;  Surgeon: Akshat Witt MD;  Location: [facility];  Service: Neurosurgery     Family History   Problem Relation Age of Onset    Heart disease Father     Heart attack Father     Colon cancer Neg Hx     Colon polyps Neg Hx     Inflammatory bowel disease Neg Hx          Meds/Allergies     Current Outpatient Medications:     albuterol (PROVENTIL HFA,VENTOLIN HFA) 90 mcg/act inhaler, TAKE 2 PUFFS BY MOUTH EVERY 6 HOURS AS NEEDED FOR WHEEZE OR FOR SHORTNESS OF BREATH, Disp: 6.7 g, Rfl: 2    Alcohol Swabs 70 % PADS, May substitute brand based on insurance coverage. Check glucose TID., Disp: 100 each, Rfl: 0    amitriptyline (ELAVIL) 100 mg tablet, Take 300mg HS, Disp: 270 tablet, Rfl: 1    atorvastatin (LIPITOR) 40 mg tablet, TAKE 1 TABLET BY MOUTH EVERY  DAY IN THE EVENING, Disp: 90 tablet, Rfl: 1    azaTHIOprine (IMURAN) 50 mg tablet, TAKE 1 TABLET BY MOUTH EVERY DAY, Disp: 90 tablet, Rfl: 4    Blood Glucose Monitoring Suppl (OneTouch Verio Reflect) w/Device KIT, May substitute brand based on insurance coverage. Check glucose TID., Disp: 1 kit, Rfl: 0    buPROPion (WELLBUTRIN SR) 150 mg 12 hr tablet, Take 1 tablet (150 mg total) by mouth 2 (two) times a day, Disp: 180 tablet, Rfl: 3    cyclobenzaprine (FLEXERIL) 10 mg tablet, 1 HS (Patient taking differently: Take 10 mg by mouth daily at bedtime 1 HS), Disp: 90 tablet, Rfl: 1    furosemide (LASIX) 20 mg tablet, TAKE 1 TABLET BY MOUTH ONCE DAILY, Disp: 90 tablet, Rfl: 3    glucose blood (OneTouch Verio) test strip, May substitute brand based on insurance coverage. Check glucose TID., Disp: 100 each, Rfl: 5    lisinopril (ZESTRIL) 20 mg tablet, Take 1 tablet (20 mg total) by mouth daily, Disp: 90 tablet, Rfl: 1    loperamide (IMODIUM) 2 mg capsule, PLEASE SEE ATTACHED FOR DETAILED DIRECTIONS, Disp: , Rfl:     metFORMIN (GLUCOPHAGE-XR) 500 mg 24 hr tablet, Take 2 tablets (1,000 mg total) by mouth 2 (two) times a day with meals, Disp: 360 tablet, Rfl: 3    naproxen (Naprosyn) 500 mg tablet, Take 1 tablet (500 mg total) by mouth 2 (two) times a day with meals, Disp: 180 tablet, Rfl: 3    omeprazole (PriLOSEC) 40 MG capsule, TAKE 1 CAPSULE (40 MG TOTAL) BY MOUTH DAILY., Disp: 100 capsule, Rfl: 1    ondansetron (ZOFRAN) 4 mg tablet, Take 1 tablet (4 mg total) by mouth every 8 (eight) hours as needed for nausea or vomiting, Disp: 20 tablet, Rfl: 1    OneTouch Delica Lancets 33G MISC, May substitute brand based on insurance coverage. Check glucose TID., Disp: 100 each, Rfl: 5    pregabalin (LYRICA) 150 mg capsule, TAKE 1 CAPSULE BY MOUTH 3 TIMES A DAY, Disp: 90 capsule, Rfl: 5    RA Vitamin D-3 25 MCG (1000 UT) tablet, TAKE 2 TABLETS BY MOUTH DAILY, Disp: 60 tablet, Rfl: 2    vedolizumab (Entyvio) SOLR, Inject 300 mg into a  "catheter in a vein every 4 weeks, Disp: , Rfl:     acetaminophen (TYLENOL) 650 mg CR tablet, Take 1 tablet (650 mg total) by mouth every 8 (eight) hours as needed for mild pain, Disp: 30 tablet, Rfl: 0    Continuous Glucose Sensor (Dexcom G7 Sensor), Use 1 Device every 10 days (Patient not taking: Reported on 8/20/2024), Disp: 9 each, Rfl: 3    Continuous Glucose Sensor (FreeStyle Allyn 3 Sensor) MISC, Use 1 each every 10 days (Patient not taking: Reported on 8/20/2024), Disp: 3 each, Rfl: 5    mupirocin (BACTROBAN) 2 % ointment, Apply topically daily (Patient not taking: Reported on 9/18/2024), Disp: 15 g, Rfl: 0  No Known Allergies    Vitals: Blood pressure 118/80, pulse 97, temperature 98 °F (36.7 °C), temperature source Tympanic, resp. rate 16, height 6' (1.829 m), weight 90.7 kg (200 lb), SpO2 97%. Body mass index is 27.12 kg/m². Oxygen Therapy  SpO2: 97 %      Physical Exam  General:  Awake alert and oriented x 3, conversant without conversational dyspnea, NAD, normal affect  HEENT:   Sclera noninjected, nonicteric OU, Nares patent,  no craniofacial abnormalities, Mucous membranes, moist, no oral lesions, normal dentition  NECK:  Trachea midline, no accessory muscle use, no stridor,  JVP not elevated  CARDIAC: Reg, single s1/S2, no m/r/g  PULM: CTA bilaterally no wheezing, rhonchi or rales  EXT: No cyanosis, no clubbing, no edema  NEURO: no focal neurologic deficits, AAOx3, moving all extremities appropriately    Labs: I have personally reviewed pertinent lab results., ABG: No results found for: \"PHART\", \"PEM5GEP\", \"PO2ART\", \"HXH9DLZ\", \"N9IEGDUZ\", \"BEART\", \"SOURCE\", BNP: No results found for: \"BNP\", CBC: No results found for: \"WBC\", \"HGB\", \"HCT\", \"MCV\", \"PLT\", \"ADJUSTEDWBC\", \"RBC\", \"MCH\", \"MCHC\", \"RDW\", \"MPV\", \"NRBC\", CMP: No results found for: \"SODIUM\", \"K\", \"CL\", \"CO2\", \"ANIONGAP\", \"BUN\", \"CREATININE\", \"GLUCOSE\", \"CALCIUM\", \"AST\", \"ALT\", \"ALKPHOS\", \"PROT\", \"BILITOT\", \"EGFR\", PT/INR: No results found for: " "\"PT\", \"INR\", Troponin: No results found for: \"TROPONINI\"  Lab Results   Component Value Date    WBC 6.59 05/17/2024    HGB 14.2 05/17/2024    HCT 42.2 05/17/2024    MCV 99 (H) 05/17/2024     05/17/2024     Lab Results   Component Value Date    GLUCOSE 110 02/20/2015    CALCIUM 9.4 08/08/2024     10/03/2016    K 4.3 08/08/2024    CO2 27 08/08/2024     08/08/2024    BUN 17 08/08/2024    CREATININE 0.90 08/08/2024     No results found for: \"IGE\"  Lab Results   Component Value Date    ALT 33 08/08/2024    AST 26 08/08/2024    ALKPHOS 78 08/08/2024    BILITOT 0.8 10/03/2016           Sleep studies: I have personally reviewed pertinent reports.      HST 7/2023:   RECOMMENDATION:  1. CPAP titration study for moderate OSIRIS given degree of nocturnal hypoxemia out of proportion to his respiratory events  2. Positional therapy as tolerated could be an alternative therapy.  3. Nocturnal hypoxemia possible secondary to hypoventilation in the setting of hx of tobacco and marijuana use, follow-up with pulmonology  4. Follow-up with sleep medicine      CPAP titration 8/2023:  IMPRESSION:        This was an effective PAP titration study. The CPAP setting of 14 cm h20  was optimal, resulting in an AHI of 0.0. This setting was observed in REM sleep.   Previously noted hypoxia was sufficiently treated with the CPAP  Sleep efficiency was normal. Stage N1 sleep (light sleep) was normal.. Stage N3 sleep (deep sleep) was absent.  REM sleep percentage was markedly decreased on this test.   Increased periodic limb movements during sleep.    Compliance report:I have personally reviewed pertinent reports.      Type of CPAP:  fixed 14 cmH2O                                   Percent usage: 40%                                   Average time used: 1 hrs 53 min                                  Time in large leak: high                                   Residual AHI: 4.2                Jovanni Lopez MD  [facility] " "[facility]       Portions of the record may have been created with voice recognition software. Occasional wrong word or \"sound a like\" substitutions may have occurred due to the inherent limitations of voice recognition software. Read the chart carefully and recognize, using context, where substitutions have occurred.  "

## 2024-10-12 DIAGNOSIS — F51.01 PRIMARY INSOMNIA: ICD-10-CM

## 2024-10-14 RX ORDER — AMITRIPTYLINE HYDROCHLORIDE 100 MG/1
TABLET ORAL
Qty: 270 TABLET | Refills: 1 | Status: SHIPPED | OUTPATIENT
Start: 2024-10-14

## 2024-10-14 RX ORDER — CYCLOBENZAPRINE HCL 10 MG
TABLET ORAL
Qty: 90 TABLET | Refills: 1 | Status: SHIPPED | OUTPATIENT
Start: 2024-10-14

## 2024-10-29 ENCOUNTER — TELEPHONE (OUTPATIENT)
Age: 54
End: 2024-10-29

## 2024-10-29 NOTE — TELEPHONE ENCOUNTER
Caller: Anand Park    Doctor and/or Office: Dr. Weir/[facility]#: [phone]    Escalation: Care/Yesterday the bottom of his left great toe split open and was bleeding and now today the right great toe did the same thing. He is diabetic and cannot feel if he did somehow cut them but he is keeping them wrapped. Asking to be examined or advice until seen by Dr. -please call back and advise/schedule to be seen. Thanks

## 2024-10-30 NOTE — PROGRESS NOTES
Patient ID: Benito Park is a 54 y.o. male Date of Birth 1970       Chief Complaint   Patient presents with    [facility]    Foot Problem     Cuts on bottoms of both great toes              Diagnosis:  1. Diabetic polyneuropathy associated with type 2 diabetes mellitus (HCC)  -     Diabetic Shoe Inserts  -     Ambulatory Referral to Wound Care; Future; Expected date: 11/07/2024  2. Diabetic ulcer of left foot associated with type 2 diabetes mellitus, limited to breakdown of skin, unspecified part of foot (HCC)  -     Diabetic Shoe Inserts  -     Ambulatory Referral to Wound Care; Future; Expected date: 11/07/2024  -     Debridement  3. Diabetic ulcer of right great toe (HCC)  -     Debridement    Bilateral pedal examination with socks and shoes removed.  Diabetic Loza grade 1 ulcerations plantar aspect bilateral great toes, please see procedure note for debridement, left was debris through subcuticular tissues, right through selective tissues, both were dressed with Dermagran gauze dry dressing, he will do the same 3 times a week, supplies were given.  I inspected patient's diabetic inserts, they are well-worn underneath his hallux which is likely contributing to his ulcerations, patient was given a prescription to have his custom molded inserts adjusted to remove pressure from plantar bilateral hallux as he does have a hammered hallux.  Referral was placed for patient to follow at [facility] wound management center in [place].  Proper diabetic footcare, shoe gear, daily foot inspection, frequent elevation, low-sodium diet, proper protein intake, minimal weight-bear only for ADLs, proper glycemic control was reviewed with patient, he understands and agrees with the plan.      Debridement    Universal Protocol:  procedure performed by consultantConsent: Verbal consent obtained.  Risks and benefits: risks, benefits and alternatives were discussed  Consent given by: patient  Time out: Immediately prior to  "procedure a \"time out\" was called to verify the correct patient, procedure, equipment, support staff and site/side marked as required.  Patient understanding: patient states understanding of the procedure being performed  Patient identity confirmed: verbally with patient    Debridement Details  Performed by: physician  Debridement type: surgical  Level of debridement: subcutaneous tissue  Pain control: none      Post-debridement measurements  Length (cm): 0.9  Width (cm): 1  Depth (cm): 0.2  Percent debrided: 100%  Surface Area (cm^2): 0.9  Area Debrided (cm^2): 0.9  Volume (cm^3): 0.18    Tissue and other material debrided: subcutaneous tissue  Devitalized tissue debrided: biofilm, callus, fibrin, necrotic debris and slough  Instrument(s) utilized: blade  Bleeding: small  Hemostasis obtained with: pressure  Procedural pain (0-10): insensate  Post-procedural pain: insensate   Response to treatment: procedure was tolerated well  Debridement Comments: Left great toe  Debridement    Universal Protocol:  procedure performed by consultantConsent: Verbal consent obtained.  Risks and benefits: risks, benefits and alternatives were discussed  Consent given by: patient  Time out: Immediately prior to procedure a \"time out\" was called to verify the correct patient, procedure, equipment, support staff and site/side marked as required.  Patient understanding: patient states understanding of the procedure being performed  Patient identity confirmed: verbally with patient    Debridement Details  Performed by: physician  Debridement type: selective  Pain control: lidocaine 4%      Post-debridement measurements  Length (cm): 0.8  Width (cm): 0.8  Depth (cm): 0.1  Percent debrided: 100%  Surface Area (cm^2): 0.64  Area Debrided (cm^2): 0.64  Volume (cm^3): 0.06    Devitalized tissue debrided: biofilm, callus and slough  Instrument(s) utilized: blade  Bleeding: small  Hemostasis obtained with: pressure  Procedural pain (0-10): " insensate  Post-procedural pain: insensate   Response to treatment: procedure was tolerated well  Debridement Comments: Right great toe       Subjective:   Naand states Monday he noticed the  bottom of his left great toe split open and was bleeding and then Tuesday the right great toe did the same thing. He is diabetic and cannot feel if he did somehow cut them but he is keeping them wrapped.  Most recent HbA1c was 6.2 on 8/8/2024          The following portions of the patient's history were reviewed and updated as appropriate: allergies, current medications, past family history, past medical history, past social history, past surgical history, and problem list.        Objective:  /85 (BP Location: Left arm, Patient Position: Sitting, Cuff Size: Standard)   Pulse 90   Temp 97.8 °F (36.6 °C)   Ht 6' (1.829 m) Comment: verbal  Wt 90.3 kg (199 lb)   BMI 26.99 kg/m²     Review of Systems   Constitutional:  Negative for chills and fever.   HENT:  Negative for ear pain and sore throat.    Eyes:  Negative for pain and visual disturbance.   Respiratory:  Negative for cough and shortness of breath.    Cardiovascular:  Negative for chest pain and palpitations.   Gastrointestinal:  Negative for abdominal pain and vomiting.   Genitourinary:  Negative for dysuria and hematuria.   Musculoskeletal:  Negative for arthralgias and back pain.   Skin:  Positive for color change and wound. Negative for rash.        Cuts on both big toes   Neurological:  Positive for numbness. Negative for seizures and syncope.   All other systems reviewed and are negative.      Physical Exam  Constitutional:       Appearance: Normal appearance. He is normal weight.   HENT:      Head: Normocephalic and atraumatic.      Right Ear: External ear normal.      Left Ear: External ear normal.      Nose: Nose normal.      Mouth/Throat:      Mouth: Mucous membranes are moist.      Pharynx: Oropharynx is clear.   Eyes:      Conjunctiva/sclera:  "Conjunctivae normal.      Pupils: Pupils are equal, round, and reactive to light.   Cardiovascular:      Pulses: Normal pulses.           Dorsalis pedis pulses are 2+ on the right side and 2+ on the left side.        Posterior tibial pulses are 2+ on the right side and 2+ on the left side.   Pulmonary:      Effort: Pulmonary effort is normal.   Musculoskeletal:      Cervical back: Normal range of motion.      Right lower leg: No edema.      Left lower leg: No edema.   Feet:      Right foot:      Protective Sensation: 10 sites tested.  0 sites sensed.      Skin integrity: Ulcer present.      Toenail Condition: Right toenails are normal.      Left foot:      Protective Sensation: 10 sites tested.  0 sites sensed.      Skin integrity: Ulcer present.      Toenail Condition: Left toenails are normal.      Comments: Plantar bilateral great toes diabetic Loza grade 1 ulceration, predebridement both measures 0.8 x 0.8 x 0.1, fibrogranular base is present, hyperkeratosis to the periwound, wounds do not probe to muscle, tendon, bone, no purulence, no malodor, no periwound erythema is noted no signs of infection.  Skin:     General: Skin is warm and dry.      Capillary Refill: Capillary refill takes less than 2 seconds.   Neurological:      General: No focal deficit present.      Mental Status: He is alert and oriented to person, place, and time. Mental status is at baseline.      Sensory: Sensory deficit present.      Coordination: Coordination abnormal.      Gait: Gait abnormal.      Deep Tendon Reflexes: Reflexes abnormal.   Psychiatric:         Mood and Affect: Mood normal.         Behavior: Behavior normal.         Thought Content: Thought content normal.         Judgment: Judgment normal.                 No pertinent results found.      Rosmery Weir DPM, DPYAN, FACFAS    Portions of the record may have been created with voice recognition software. Occasional wrong word or \"sound a like\" substitutions may have occurred " due to the inherent limitations of voice recognition software. Read the chart carefully and recognize, using context, where substitutions have occurred.

## 2024-10-31 ENCOUNTER — OFFICE VISIT (OUTPATIENT)
Dept: PODIATRY | Facility: CLINIC | Age: 54
End: 2024-10-31
Payer: MEDICARE

## 2024-10-31 VITALS
WEIGHT: 199 LBS | HEIGHT: 72 IN | HEART RATE: 90 BPM | SYSTOLIC BLOOD PRESSURE: 129 MMHG | TEMPERATURE: 97.8 F | DIASTOLIC BLOOD PRESSURE: 85 MMHG | BODY MASS INDEX: 26.95 KG/M2

## 2024-10-31 DIAGNOSIS — E11.42 DIABETIC POLYNEUROPATHY ASSOCIATED WITH TYPE 2 DIABETES MELLITUS (HCC): Primary | ICD-10-CM

## 2024-10-31 DIAGNOSIS — L97.521 DIABETIC ULCER OF LEFT FOOT ASSOCIATED WITH TYPE 2 DIABETES MELLITUS, LIMITED TO BREAKDOWN OF SKIN, UNSPECIFIED PART OF FOOT (HCC): ICD-10-CM

## 2024-10-31 DIAGNOSIS — E11.621 DIABETIC ULCER OF LEFT FOOT ASSOCIATED WITH TYPE 2 DIABETES MELLITUS, LIMITED TO BREAKDOWN OF SKIN, UNSPECIFIED PART OF FOOT (HCC): ICD-10-CM

## 2024-10-31 DIAGNOSIS — E11.621 DIABETIC ULCER OF RIGHT GREAT TOE (HCC): ICD-10-CM

## 2024-10-31 DIAGNOSIS — L97.519 DIABETIC ULCER OF RIGHT GREAT TOE (HCC): ICD-10-CM

## 2024-10-31 PROCEDURE — 99213 OFFICE O/P EST LOW 20 MIN: CPT | Performed by: PODIATRIST

## 2024-10-31 PROCEDURE — 11042 DBRDMT SUBQ TIS 1ST 20SQCM/<: CPT | Performed by: PODIATRIST

## 2024-10-31 PROCEDURE — 97597 DBRDMT OPN WND 1ST 20 CM/<: CPT | Performed by: PODIATRIST

## 2024-11-04 NOTE — PRE-PROCEDURE INSTRUCTIONS
Pre-Surgery Instructions:   Medication Instructions    albuterol (PROVENTIL HFA,VENTOLIN HFA) 90 mcg/act inhaler Uses PRN- OK to take day of surgery    amitriptyline (ELAVIL) 100 mg tablet Take night before surgery    atorvastatin (LIPITOR) 40 mg tablet Take night before surgery    azaTHIOprine (IMURAN) 50 mg tablet Take day of surgery.    buPROPion (WELLBUTRIN SR) 150 mg 12 hr tablet Take day of surgery.    cyclobenzaprine (FLEXERIL) 10 mg tablet Take night before surgery    furosemide (LASIX) 20 mg tablet Hold day of surgery.    lisinopril (ZESTRIL) 20 mg tablet Hold day of surgery.    loperamide (IMODIUM) 2 mg capsule Uses PRN- OK to take day of surgery    metFORMIN (GLUCOPHAGE-XR) 500 mg 24 hr tablet Hold day of surgery.    naproxen (Naprosyn) 500 mg tablet Stop taking 3 days prior to surgery.    omeprazole (PriLOSEC) 40 MG capsule Take day of surgery.    ondansetron (ZOFRAN) 4 mg tablet Uses PRN- OK to take day of surgery    pregabalin (LYRICA) 150 mg capsule Take day of surgery.    RA Vitamin D-3 25 MCG (1000 UT) tablet Hold day of surgery.    vedolizumab (Entyvio) SOLR Q 4 weeks     Medication instructions for day surgery reviewed. Please use only a sip of water to take your instructed medications. Avoid all over the counter vitamins, supplements and NSAIDS for one week prior to surgery per anesthesia guidelines. Tylenol is ok to take as needed.     You will receive a call one business day prior to surgery with an arrival time and hospital directions. If your surgery is scheduled on a Monday, the hospital will be calling you on the Friday prior to your surgery. If you have not heard from anyone by 8pm, please call the hospital supervisor through the hospital  at [phone]. ([place] [phone] or [place] [phone]).    Do not eat or drink anything after midnight the night before your surgery, including candy, mints, lifesavers, or chewing gum. Do not drink alcohol 24hrs before your surgery.  Try not to smoke at least 24hrs before your surgery.       Follow the pre surgery showering instructions as listed in the “My Surgical Experience Booklet” or otherwise provided by your surgeon's office. Do not use a blade to shave the surgical area 1 week before surgery. It is okay to use a clean electric clippers up to 24 hours before surgery. Do not apply any lotions, creams, including makeup, cologne, deodorant, or perfumes after showering on the day of your surgery. Do not use dry shampoo, hair spray, hair gel, or any type of hair products.     No contact lenses, eye make-up, or artificial eyelashes. Remove nail polish, including gel polish, and any artificial, gel, or acrylic nails if possible. Remove all jewelry including rings and body piercing jewelry.     Wear causal clothing that is easy to take on and off. Consider your type of surgery.    Keep any valuables, jewelry, piercings at home. Please bring any specially ordered equipment (sling, braces) if indicated.    Arrange for a responsible person to drive you to and from the hospital on the day of your surgery. Please confirm the visitor policy for the day of your procedure when you receive your phone call with an arrival time.     Call the surgeon's office with any new illnesses, exposures, or additional questions prior to surgery.    Please reference your “My Surgical Experience Booklet” for additional information to prepare for your upcoming surgery.

## 2024-11-05 ENCOUNTER — APPOINTMENT (OUTPATIENT)
Dept: LAB | Facility: HOSPITAL | Age: 54
End: 2024-11-05
Payer: MEDICARE

## 2024-11-05 DIAGNOSIS — G47.33 OSA (OBSTRUCTIVE SLEEP APNEA): ICD-10-CM

## 2024-11-05 LAB
ANION GAP SERPL CALCULATED.3IONS-SCNC: 7 MMOL/L (ref 4–13)
BASOPHILS # BLD AUTO: 0.02 THOUSANDS/ÂΜL (ref 0–0.1)
BASOPHILS NFR BLD AUTO: 0 % (ref 0–1)
BUN SERPL-MCNC: 18 MG/DL (ref 5–25)
CALCIUM SERPL-MCNC: 9.1 MG/DL (ref 8.4–10.2)
CHLORIDE SERPL-SCNC: 105 MMOL/L (ref 96–108)
CO2 SERPL-SCNC: 31 MMOL/L (ref 21–32)
CREAT SERPL-MCNC: 0.9 MG/DL (ref 0.6–1.3)
EOSINOPHIL # BLD AUTO: 0.31 THOUSAND/ÂΜL (ref 0–0.61)
EOSINOPHIL NFR BLD AUTO: 4 % (ref 0–6)
ERYTHROCYTE [DISTWIDTH] IN BLOOD BY AUTOMATED COUNT: 13.1 % (ref 11.6–15.1)
GFR SERPL CREATININE-BSD FRML MDRD: 97 ML/MIN/1.73SQ M
GLUCOSE P FAST SERPL-MCNC: 108 MG/DL (ref 65–99)
HCT VFR BLD AUTO: 44.7 % (ref 36.5–49.3)
HGB BLD-MCNC: 14.8 G/DL (ref 12–17)
IMM GRANULOCYTES # BLD AUTO: 0.02 THOUSAND/UL (ref 0–0.2)
IMM GRANULOCYTES NFR BLD AUTO: 0 % (ref 0–2)
LYMPHOCYTES # BLD AUTO: 1.78 THOUSANDS/ÂΜL (ref 0.6–4.47)
LYMPHOCYTES NFR BLD AUTO: 25 % (ref 14–44)
MCH RBC QN AUTO: 33.6 PG (ref 26.8–34.3)
MCHC RBC AUTO-ENTMCNC: 33.1 G/DL (ref 31.4–37.4)
MCV RBC AUTO: 102 FL (ref 82–98)
MONOCYTES # BLD AUTO: 0.77 THOUSAND/ÂΜL (ref 0.17–1.22)
MONOCYTES NFR BLD AUTO: 11 % (ref 4–12)
NEUTROPHILS # BLD AUTO: 4.31 THOUSANDS/ÂΜL (ref 1.85–7.62)
NEUTS SEG NFR BLD AUTO: 60 % (ref 43–75)
NRBC BLD AUTO-RTO: 0 /100 WBCS
PLATELET # BLD AUTO: 224 THOUSANDS/UL (ref 149–390)
PMV BLD AUTO: 9.5 FL (ref 8.9–12.7)
POTASSIUM SERPL-SCNC: 4 MMOL/L (ref 3.5–5.3)
RBC # BLD AUTO: 4.4 MILLION/UL (ref 3.88–5.62)
SODIUM SERPL-SCNC: 143 MMOL/L (ref 135–147)
WBC # BLD AUTO: 7.21 THOUSAND/UL (ref 4.31–10.16)

## 2024-11-05 PROCEDURE — 85025 COMPLETE CBC W/AUTO DIFF WBC: CPT

## 2024-11-05 PROCEDURE — 80048 BASIC METABOLIC PNL TOTAL CA: CPT

## 2024-11-05 PROCEDURE — 36415 COLL VENOUS BLD VENIPUNCTURE: CPT

## 2024-11-06 ENCOUNTER — OFFICE VISIT (OUTPATIENT)
Dept: WOUND CARE | Facility: HOSPITAL | Age: 54
End: 2024-11-06
Payer: MEDICARE

## 2024-11-06 VITALS
BODY MASS INDEX: 26.68 KG/M2 | HEIGHT: 72 IN | DIASTOLIC BLOOD PRESSURE: 64 MMHG | TEMPERATURE: 98.1 F | SYSTOLIC BLOOD PRESSURE: 112 MMHG | HEART RATE: 88 BPM | RESPIRATION RATE: 16 BRPM | WEIGHT: 197 LBS

## 2024-11-06 DIAGNOSIS — L97.529 DIABETIC ULCER OF LEFT GREAT TOE (HCC): ICD-10-CM

## 2024-11-06 DIAGNOSIS — E11.621 DIABETIC ULCER OF RIGHT GREAT TOE (HCC): Primary | ICD-10-CM

## 2024-11-06 DIAGNOSIS — E11.42 DIABETIC POLYNEUROPATHY ASSOCIATED WITH TYPE 2 DIABETES MELLITUS (HCC): ICD-10-CM

## 2024-11-06 DIAGNOSIS — E11.621 DIABETIC ULCER OF LEFT GREAT TOE (HCC): ICD-10-CM

## 2024-11-06 DIAGNOSIS — L97.519 DIABETIC ULCER OF RIGHT GREAT TOE (HCC): Primary | ICD-10-CM

## 2024-11-06 PROCEDURE — 11042 DBRDMT SUBQ TIS 1ST 20SQCM/<: CPT | Performed by: PODIATRIST

## 2024-11-06 PROCEDURE — 99213 OFFICE O/P EST LOW 20 MIN: CPT | Performed by: PODIATRIST

## 2024-11-06 NOTE — PROGRESS NOTES
"Patient ID: Benito Park is a 54 y.o. male Date of Birth 1970       Chief Complaint   Patient presents with    New Patient Visit     Left and right great toe wounds       Allergies  Patient has no known allergies.    Diagnosis:  1. Diabetic ulcer of right great toe (HCC)  -     Wound cleansing and dressings; Future  2. Diabetic ulcer of left great toe (HCC)  -     Wound cleansing and dressings; Future  3. Diabetic polyneuropathy associated with type 2 diabetes mellitus (HCC)  -     Wound cleansing and dressings; Future      Diagnosis ICD-10-CM Associated Orders   1. Diabetic ulcer of right great toe (HCC)  E11.621 Wound cleansing and dressings    L97.519       2. Diabetic ulcer of left great toe (HCC)  E11.621 Wound cleansing and dressings    L97.529       3. Diabetic polyneuropathy associated with type 2 diabetes mellitus (HCC)  E11.42 Wound cleansing and dressings           Assessment & Plan:  Diabetic Loza grade 1 ulceration plantar bilateral hallux, wounds were debrided through subcuticular tissues and dressed with Dermagran gauze dry dressing.    Football dressing was applied on the bilaterally he will leave this dry clean and intact for 1 week, use surgical shoe to ambulate for ADLs only.  Use cast cover, do not get foot wet in shower.  NO driving  I personally viewed patient's medical records, previous podiatry notes, pertinent blood work and imaging.  Patient understands and agrees with the plan and will follow-up in 1 week.    Debridement   Wound 11/06/24 Diabetic Ulcer Plantar Left    Universal Protocol:  procedure performed by consultantConsent: Verbal consent obtained.  Risks and benefits: risks, benefits and alternatives were discussed  Consent given by: patient  Time out: Immediately prior to procedure a \"time out\" was called to verify the correct patient, procedure, equipment, support staff and site/side marked as required.  Patient understanding: patient states understanding of the " "procedure being performed  Patient identity confirmed: verbally with patient    Debridement Details  Performed by: physician  Debridement type: surgical  Level of debridement: subcutaneous tissue  Pain control: lidocaine 4%      Post-debridement measurements  Length (cm): 1  Width (cm): 1  Depth (cm): 0.3  Percent debrided: 100%  Surface Area (cm^2): 1  Area Debrided (cm^2): 1  Volume (cm^3): 0.3    Tissue and other material debrided: subcutaneous tissue  Devitalized tissue debrided: biofilm, fibrin, necrotic debris and slough  Instrument(s) utilized: blade  Bleeding: small  Hemostasis obtained with: pressure  Procedural pain (0-10): insensate  Post-procedural pain: insensate   Response to treatment: procedure was tolerated well    Debridement   Wound 11/06/24 Diabetic Ulcer Plantar Right    Universal Protocol:  procedure performed by consultantConsent: Verbal consent obtained.  Risks and benefits: risks, benefits and alternatives were discussed  Consent given by: patient  Time out: Immediately prior to procedure a \"time out\" was called to verify the correct patient, procedure, equipment, support staff and site/side marked as required.  Patient understanding: patient states understanding of the procedure being performed  Patient identity confirmed: verbally with patient    Debridement Details  Performed by: physician  Debridement type: surgical  Level of debridement: subcutaneous tissue  Pain control: lidocaine 4%      Post-debridement measurements  Length (cm): 0.7  Width (cm): 0.4  Depth (cm): 0.3  Percent debrided: 100%  Surface Area (cm^2): 0.28  Area Debrided (cm^2): 0.28  Volume (cm^3): 0.08    Tissue and other material debrided: subcutaneous tissue  Devitalized tissue debrided: biofilm, callus, fibrin, necrotic debris and slough  Instrument(s) utilized: blade  Bleeding: small  Hemostasis obtained with: pressure  Procedural pain (0-10): insensate  Post-procedural pain: insensate   Response to treatment: " procedure was tolerated well                       Subjective:   Anand presents today upon referral from my office for evaluation care of bilateral great toe diabetic ulcerations which he developed when he was in [place].  He has been changing dressings with Dermagran gauze dry dressing as I directed him, he does not complain of any nausea, vomiting, fever, chills.  States his blood sugars are okay.        The following portions of the patient's history were reviewed and updated as appropriate:   Patient Active Problem List   Diagnosis    Sprain of anterior talofibular ligament of right ankle    Perianal abscess    Neuropathy    Chronic bilateral low back pain with bilateral sciatica    Bilateral lower extremity edema    Chronic pain syndrome    Failed back surgical syndrome    Lumbar spondylosis    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 (HCC)    Congenital fusion of sacroiliac joint    Terminal ileitis (HCC)    Viral enteritis    Chronic foot pain    Lumbar radiculopathy    GERD (gastroesophageal reflux disease)    History of colon polyps    Perianal fistula    Functional diarrhea    Blepharitis, left eye    Closed fracture of radial styloid    Compression of right ulnar nerve at multiple levels    Degenerative disc disease at L5-S1 level    History of lumbar fusion    Numbness and tingling of both lower extremities    Periorbital cellulitis of left eye    Right leg swelling    Tobacco use    Vitamin D deficiency    Arthritis of right shoulder region    Chronic right shoulder pain    Myofascial pain syndrome    S/P insertion of spinal cord stimulator    Crohn's disease (HCC)    Numbness and tingling in right hand    Neck pain    Cervical radiculopathy    Herniated nucleus pulposus, C3-4    Hypertension    Smoker    Heavy alcohol consumption    Sleep apnea    Snoring    Excessive daytime sleepiness    Overweight (BMI 25.0-29.9)    Dupuytren contracture    Capillary disorder    Epididymitis, right    Obstructive  sleep apnea syndrome    Pes anserinus bursitis of left knee    Arthralgia    Ulcer of left foot, limited to breakdown of skin (HCC)    Dysarthria    acute Confusional state    Hyperlipidemia    Diabetes mellitus (HCC)    Diabetic polyneuropathy associated with type 2 diabetes mellitus (HCC)    Lung nodule    Abnormal movements    Episode of change in speech    Hoarseness    Diabetic ulcer of left foot associated with type 2 diabetes mellitus, limited to breakdown of skin, unspecified part of foot (HCC)     Past Medical History:   Diagnosis Date    Anxiety     Back pain     Callus Few months ago    Colon polyp     COPD (chronic obstructive pulmonary disease) (HCC)     Crohn's disease (HCC)     Depression     Diabetes mellitus (HCC)     GERD (gastroesophageal reflux disease)     Hyperlipidemia     Hypertension     Perianal fistula     Sleep apnea     no current CPAP use    Sleep apnea, obstructive     Terminal ileitis (HCC)      Past Surgical History:   Procedure Laterality Date    BACK SURGERY  2019    CAST APPLICATION Right 11/17/2022    Procedure: Application short-arm splint;  Surgeon: Sarthak Villatoro MD;  Location: [facility];  Service: Orthopedics    COLONOSCOPY      EGD      HAND CONTRACTURE RELEASE Left 06/01/2023    Procedure: left ring and small finger dupuytrens excision and ring finger Metacarpophalangeal joint release and small finger Metacarpophalangeal and proximal interphalangeal joint release;  Surgeon: Sarthak Villatoro MD;  Location: [facility];  Service: Orthopedics    HERNIA REPAIR      umbilical hernia    NM ANRCT XM SURG REQ ANES GENERAL SPI/EDRL DX N/A 11/17/2021    Procedure: EXAM UNDER ANESTHESIA (EUA);  Surgeon: Davi Thao MD;  Location: [facility];  Service: Colorectal    NM APPLICATION SHORT ARM SPLINT FOREARM-HAND STATIC Right 1/11/2024    Procedure: Application short arm splint;  Surgeon: Sarthak Villatoro MD;  Location: [facility];  Service: Orthopedics    NM  CAPSULECTOMY/CAPSULOTOMY IPHAL JOINT EACH Right 1/11/2024    Procedure: Contracture release right hand small finger proximal interphalangeal joint;  Surgeon: Sarthak Villatoro MD;  Location: [facility];  Service: Orthopedics    GA FASCIOTOMY PALMAR OPEN PARTIAL Right 11/17/2022    Procedure: Dupuytren's excision right palm extending into the small finger with release of the metacarpophalangeal joint and proximal interphalangeal joint.  Dupuytren's excision right palm with release of the right ring finger metacarpophalangeal joint.;  Surgeon: Sarthak Villatoro MD;  Location: [facility];  Service: Orthopedics    GA FASCIOTOMY PALMAR OPEN PARTIAL Right 1/11/2024    Procedure: Right hand small finger duppuytrens excision with release of the metacarpophalangeal joint and proximal interphalangeal joint;  Surgeon: Sarthak Villatoro MD;  Location: [facility];  Service: Orthopedics    GA FASCT PALM W/WO Z-PLASTY TISSUE REARGMT/SKN GRFT Right 1/11/2024    Procedure: Right hand small finger duppuytrens excision with release of the metacarpophalangeal joint and proximal interphalangeal joint;  Surgeon: Sarthak Villatoro MD;  Location:  MAIN OR;  Service: Orthopedics    GA FASCT PRTL PALMAR 1 DGT PROX IPHAL JT W/WO RPR Right 1/11/2024    Procedure: Right hand small finger duppuytrens excision with release of the metacarpophalangeal joint and proximal interphalangeal joint;  Surgeon: Sarthak Villatoro MD;  Location: [facility];  Service: Orthopedics    GA I&D ISCHIORECTAL&/PERIRECTAL ABSCESS SPX Right 10/24/2021    Procedure: excisional debredement right gluteal cheek ;  Surgeon: Jeana Bustamante MD;  Location: [facility];  Service: General    GA PLACEMENT SETON N/A 11/17/2021    Procedure: PLACEMENT SETON;  Surgeon: Davi Thao MD;  Location:  MAIN OR;  Service: Colorectal    GA PRQ IMPLTJ NSTIM ELECTRODE ARRAY EPIDURAL Right 03/26/2021    Procedure: INSERTION THORACIC DORSAL COLUMN SPINAL CORD STIMULATOR  PERCUTANEOUS W IMPLANTABLE PULSE GENERATOR, RIGHT;  Surgeon: Akshat Witt MD;  Location:  MAIN OR;  Service: Neurosurgery     Social History     Socioeconomic History    Marital status: /Civil Union     Spouse name: None    Number of children: None    Years of education: None    Highest education level: None   Occupational History    None   Tobacco Use    Smoking status: Former     Current packs/day: 0.00     Average packs/day: 0.5 packs/day for 30.0 years (15.0 ttl pk-yrs)     Types: Cigarettes     Start date: 1/1/2019     Quit date: 2/4/2021     Years since quitting: 3.7     Passive exposure: Past    Smokeless tobacco: Never   Vaping Use    Vaping status: Every Day    Substances: THC   Substance and Sexual Activity    Alcohol use: Not Currently     Alcohol/week: 1.0 standard drink of alcohol     Types: 1 Cans of beer per week    Drug use: Yes     Frequency: 7.0 times per week     Types: Marijuana     Comment: Medical marijuana-vapes    Sexual activity: Yes     Partners: Female, Male   Other Topics Concern    None   Social History Narrative    None     Social Determinants of Health     Financial Resource Strain: Low Risk  (6/7/2023)    Overall Financial Resource Strain (CARDIA)     Difficulty of Paying Living Expenses: Not hard at all   Food Insecurity: No Food Insecurity (7/5/2024)    Nursing - Inadequate Food Risk Classification     Worried About Running Out of Food in the Last Year: Never true     Ran Out of Food in the Last Year: Never true     Ran Out of Food in the Last Year: Not on file   Transportation Needs: No Transportation Needs (7/5/2024)    PRAPARE - Transportation     Lack of Transportation (Medical): No     Lack of Transportation (Non-Medical): No   Physical Activity: Sufficiently Active (4/11/2023)    Received from [facility]    Physical Activity   Stress: No Stress Concern Present (4/11/2023)    [facility] from [facility], [facility] -  Occupational Stress Questionnaire     Feeling of Stress : Only a little   Social Connections: Moderately Integrated (4/11/2023)    Received from [facility]    Social Connection and Isolation Panel [NHANES]   Intimate Partner Violence: Not At Risk (4/11/2023)    [facility] from [facility], [facility]    Humiliation, Afraid, Rape, and Kick questionnaire     Fear of Current or Ex-Partner: No     Emotionally Abused: No     Physically Abused: No     Sexually Abused: No   Housing Stability: Low Risk  (7/5/2024)    Housing Stability Vital Sign     Unable to Pay for Housing in the Last Year: No     Number of Times Moved in the Last Year: 1     Homeless in the Last Year: No        Current Outpatient Medications:     acetaminophen (TYLENOL) 650 mg CR tablet, Take 1 tablet (650 mg total) by mouth every 8 (eight) hours as needed for mild pain, Disp: 30 tablet, Rfl: 0    albuterol (PROVENTIL HFA,VENTOLIN HFA) 90 mcg/act inhaler, TAKE 2 PUFFS BY MOUTH EVERY 6 HOURS AS NEEDED FOR WHEEZE OR FOR SHORTNESS OF BREATH, Disp: 6.7 g, Rfl: 2    Alcohol Swabs 70 % PADS, May substitute brand based on insurance coverage. Check glucose TID., Disp: 100 each, Rfl: 0    amitriptyline (ELAVIL) 100 mg tablet, TAKE 3 TABLETS BY MOUTH AT BEDTIME, Disp: 270 tablet, Rfl: 1    atorvastatin (LIPITOR) 40 mg tablet, TAKE 1 TABLET BY MOUTH EVERY DAY IN THE EVENING, Disp: 90 tablet, Rfl: 1    azaTHIOprine (IMURAN) 50 mg tablet, TAKE 1 TABLET BY MOUTH EVERY DAY, Disp: 90 tablet, Rfl: 4    Blood Glucose Monitoring Suppl (OneTouch Verio Reflect) w/Device KIT, May substitute brand based on insurance coverage. Check glucose TID., Disp: 1 kit, Rfl: 0    buPROPion (WELLBUTRIN SR) 150 mg 12 hr tablet, Take 1 tablet (150 mg total) by mouth 2 (two) times a day, Disp: 180 tablet, Rfl: 3    Continuous Glucose Sensor (Dexcom G7 Sensor), Use 1 Device every 10 days (Patient not taking: Reported on 8/20/2024), Disp: 9 each, Rfl: 3    Continuous Glucose Sensor  (FreeStyle Allyn 3 Sensor) MISC, Use 1 each every 10 days (Patient not taking: Reported on 8/20/2024), Disp: 3 each, Rfl: 5    cyclobenzaprine (FLEXERIL) 10 mg tablet, TAKE 1 TABLET BY MOUTH EVERYDAY AT BEDTIME, Disp: 90 tablet, Rfl: 1    furosemide (LASIX) 20 mg tablet, TAKE 1 TABLET BY MOUTH ONCE DAILY, Disp: 90 tablet, Rfl: 3    glucose blood (OneTouch Verio) test strip, May substitute brand based on insurance coverage. Check glucose TID., Disp: 100 each, Rfl: 5    lisinopril (ZESTRIL) 20 mg tablet, Take 1 tablet (20 mg total) by mouth daily, Disp: 90 tablet, Rfl: 1    loperamide (IMODIUM) 2 mg capsule, TAKE 1 CAPSULE (2 MG TOTAL) BY MOUTH EVERY OTHER DAY FOR 7 DAYS, THEN 1 CAPSULE (2 MG TOTAL) IN THE MORNING FOR 7 DAYS., Disp: 30 capsule, Rfl: 2    metFORMIN (GLUCOPHAGE-XR) 500 mg 24 hr tablet, Take 2 tablets (1,000 mg total) by mouth 2 (two) times a day with meals, Disp: 360 tablet, Rfl: 3    mupirocin (BACTROBAN) 2 % ointment, Apply topically daily (Patient not taking: Reported on 9/18/2024), Disp: 15 g, Rfl: 0    naproxen (Naprosyn) 500 mg tablet, Take 1 tablet (500 mg total) by mouth 2 (two) times a day with meals, Disp: 180 tablet, Rfl: 3    omeprazole (PriLOSEC) 40 MG capsule, TAKE 1 CAPSULE (40 MG TOTAL) BY MOUTH DAILY., Disp: 100 capsule, Rfl: 1    ondansetron (ZOFRAN) 4 mg tablet, Take 1 tablet (4 mg total) by mouth every 8 (eight) hours as needed for nausea or vomiting, Disp: 20 tablet, Rfl: 1    OneTouch Delica Lancets 33G MISC, May substitute brand based on insurance coverage. Check glucose TID., Disp: 100 each, Rfl: 5    pregabalin (LYRICA) 150 mg capsule, TAKE 1 CAPSULE BY MOUTH 3 TIMES A DAY, Disp: 90 capsule, Rfl: 5    RA Vitamin D-3 25 MCG (1000 UT) tablet, TAKE 2 TABLETS BY MOUTH DAILY, Disp: 60 tablet, Rfl: 2    vedolizumab (Entyvio) SOLR, Inject 300 mg into a catheter in a vein every 4 weeks, Disp: , Rfl:   Family History   Problem Relation Age of Onset    Heart disease Father     Heart  attack Father     Colon cancer Neg Hx     Colon polyps Neg Hx     Inflammatory bowel disease Neg Hx        Review of Systems   Constitutional:  Negative for chills and fever.   HENT:  Negative for ear pain and sore throat.    Eyes:  Negative for pain and visual disturbance.   Respiratory:  Negative for cough and shortness of breath.    Cardiovascular:  Negative for chest pain and palpitations.   Gastrointestinal:  Negative for abdominal pain and vomiting.   Genitourinary:  Negative for dysuria and hematuria.   Musculoskeletal:  Positive for gait problem. Negative for arthralgias and back pain.   Skin:  Positive for color change and wound. Negative for rash.   Neurological:  Positive for numbness. Negative for seizures and syncope.   Psychiatric/Behavioral: Negative.     All other systems reviewed and are negative.      Objective:  /64   Pulse 88   Temp 98.1 °F (36.7 °C)   Resp 16   Ht 6' (1.829 m)   Wt 89.4 kg (197 lb)   BMI 26.72 kg/m²     Physical Exam  Constitutional:       Appearance: Normal appearance. He is normal weight.   HENT:      Head: Normocephalic and atraumatic.      Right Ear: External ear normal.      Left Ear: External ear normal.      Nose: Nose normal.      Mouth/Throat:      Mouth: Mucous membranes are moist.      Pharynx: Oropharynx is clear.   Eyes:      Conjunctiva/sclera: Conjunctivae normal.      Pupils: Pupils are equal, round, and reactive to light.   Cardiovascular:      Pulses: Normal pulses.           Dorsalis pedis pulses are 2+ on the right side and 2+ on the left side.        Posterior tibial pulses are 2+ on the right side and 2+ on the left side.   Pulmonary:      Effort: Pulmonary effort is normal.   Musculoskeletal:      Cervical back: Normal range of motion.      Right lower leg: No edema.      Left lower leg: No edema.   Skin:     General: Skin is warm and dry.      Capillary Refill: Capillary refill takes less than 2 seconds.   Neurological:      General: No focal  deficit present.      Mental Status: He is alert and oriented to person, place, and time. Mental status is at baseline.      Coordination: Coordination abnormal.      Gait: Gait abnormal.      Deep Tendon Reflexes: Reflexes abnormal.   Psychiatric:         Mood and Affect: Mood normal.         Behavior: Behavior normal.         Thought Content: Thought content normal.         Judgment: Judgment normal.             Wound 11/06/24 Diabetic Ulcer Plantar Left (Active)   Enter Loza score: Loza Grade 1: Partial or full-thickness ulcer (superficial) 11/06/24 1521   Wound Image Images linked 11/06/24 1552   Wound Description Pink;Yellow;Slough;Dry 11/06/24 1521   [place]-wound Assessment Maceration;Dry;Callus 11/06/24 1521   Wound Length (cm) 0.8 cm 11/06/24 1521   Wound Width (cm) 0.8 cm 11/06/24 1521   Wound Depth (cm) 0.2 cm 11/06/24 1521   Wound Surface Area (cm^2) 0.64 cm^2 11/06/24 1521   Wound Volume (cm^3) 0.128 cm^3 11/06/24 1521   Calculated Wound Volume (cm^3) 0.13 cm^3 11/06/24 1521   Drainage Amount Small 11/06/24 1521   Drainage Description Serosanguineous 11/06/24 1521   Non-staged Wound Description Full thickness 11/06/24 1521   Dressing Status Other (Comment) (no dressing upon visit) 11/06/24 1521       Wound 11/06/24 Diabetic Ulcer Plantar Right (Active)   Enter Loza score: Loza Grade 1: Partial or full-thickness ulcer (superficial) 11/06/24 1520   Wound Image Images linked 11/06/24 1551   Wound Description Pink;Dry 11/06/24 1520   [place]ydney-wound Assessment Dry;Callus 11/06/24 1520   Wound Length (cm) 0.6 cm 11/06/24 1520   Wound Width (cm) 0.2 cm 11/06/24 1520   Wound Depth (cm) 0.2 cm 11/06/24 1520   Wound Surface Area (cm^2) 0.12 cm^2 11/06/24 1520   Wound Volume (cm^3) 0.024 cm^3 11/06/24 1520   Calculated Wound Volume (cm^3) 0.02 cm^3 11/06/24 1520   Drainage Amount Small 11/06/24 1520   Drainage Description Serosanguineous 11/06/24 1520   Non-staged Wound Description Full thickness 11/06/24 1520  "  Dressing Status Other (Comment) (no dressing upon visit) 11/06/24 1520                 No results found.    Wound Instructions:  Orders Placed This Encounter   Procedures    Wound cleansing and dressings     Bilateral great toe wounds:    Keep dressings clean dry and intact until next wound care visit--if dressings become wet call wound care center immediately.  You may shower if you purchase cast cover    Off-loading Instructions:    Wear off-loading device as directed by your physician (surgical shoe). Put on immediately when rising in the morning and remove when going to bed.     Standing Status:   Future     Standing Expiration Date:   11/13/2024    Debridement     This order was created via procedure documentation    Debridement     This order was created via procedure documentation         Rosmery Weir, DANITA,DPM, FACFAS    Portions of the record may have been created with voice recognition software. Occasional wrong word or \"sound a like\" substitutions may have occurred due to the inherent limitations of voice recognition software. Read the chart carefully and recognize, using context, where substitutions have occurred.    "

## 2024-11-06 NOTE — PATIENT INSTRUCTIONS
Orders Placed This Encounter   Procedures    Wound cleansing and dressings     Bilateral great toe wounds:    Keep dressings clean dry and intact until next wound care visit--if dressings become wet call wound care center immediately.  You may shower if you purchase cast cover    Off-loading Instructions:    Wear off-loading device as directed by your physician (surgical shoe). Put on immediately when rising in the morning and remove when going to bed.     Standing Status:   Future     Standing Expiration Date:   11/13/2024

## 2024-11-11 ENCOUNTER — OFFICE VISIT (OUTPATIENT)
Dept: WOUND CARE | Facility: HOSPITAL | Age: 54
End: 2024-11-11
Payer: MEDICARE

## 2024-11-11 VITALS
HEART RATE: 88 BPM | TEMPERATURE: 99.2 F | DIASTOLIC BLOOD PRESSURE: 78 MMHG | SYSTOLIC BLOOD PRESSURE: 124 MMHG | RESPIRATION RATE: 18 BRPM

## 2024-11-11 DIAGNOSIS — E11.42 DIABETIC POLYNEUROPATHY ASSOCIATED WITH TYPE 2 DIABETES MELLITUS (HCC): ICD-10-CM

## 2024-11-11 DIAGNOSIS — E11.621 DIABETIC ULCER OF RIGHT GREAT TOE (HCC): Primary | ICD-10-CM

## 2024-11-11 DIAGNOSIS — L97.529 DIABETIC ULCER OF LEFT GREAT TOE (HCC): ICD-10-CM

## 2024-11-11 DIAGNOSIS — L97.519 DIABETIC ULCER OF RIGHT GREAT TOE (HCC): Primary | ICD-10-CM

## 2024-11-11 DIAGNOSIS — E11.621 DIABETIC ULCER OF LEFT GREAT TOE (HCC): ICD-10-CM

## 2024-11-11 PROCEDURE — 97597 DBRDMT OPN WND 1ST 20 CM/<: CPT

## 2024-11-11 PROCEDURE — 99204 OFFICE O/P NEW MOD 45 MIN: CPT

## 2024-11-11 NOTE — PROGRESS NOTES
Wound Procedure Treatment    Performed by: Keisha Nelson RN  Authorized by: MIKE Girard    Associated wounds:   Wound 11/06/24 Diabetic Ulcer Plantar Left  Wound 11/06/24 Diabetic Ulcer Plantar Right  Wound cleansed with:  Soap and water and NSS  Applied primary dressing:  Dermagran  Applied secondary dressing:  Gauze  Dressing secured with:  Rosie and Tape  Offloading device appllied:  Surgical shoe  Comments:  Bulky dressing applied with ABD, cast padding, kerlix, coban

## 2024-11-11 NOTE — PROGRESS NOTES
Patient ID: Benito Park is a 54 y.o. male Date of Birth 1970       Chief Complaint   Patient presents with    Wrap Change/Nurse Visit     Right great toe wound    Follow Up Wound Care Visit     Bilateral foot toe wounds       Allergies:  Patient has no known allergies.    Diagnosis:      Diagnosis ICD-10-CM Associated Orders   1. Diabetic ulcer of right great toe ([facility])  E11.621 Wound cleansing and dressings    L97.519 Wound Procedure Treatment     Debridement Diabetic Ulcer Right Plantar      2. Diabetic ulcer of left great toe ([facility])  E11.621 Wound cleansing and dressings    L97.529 Wound Procedure Treatment     Debridement Diabetic Ulcer Left Plantar      3. Diabetic polyneuropathy associated with type 2 diabetes mellitus ([facility])  E11.42 Wound cleansing and dressings     Wound Procedure Treatment              Assessment & Plan:  B/l plantar great toe diabetic foot ulcers. Left foot wound is stable in size, and right foot wound is decreased in size. Will recommend to continue with current wound management as per podiatry. See wound orders below.  Wounds are not showing any clinical signs and symptoms of infection.  Debrided as below.  Pressure relief, offload pressure to wound as much as possible- continue with offloading surgical shoe.  Counseled patient on the importance of tight glycemic control and adequate protein intake (3-4 servings per day) to promote wound healing.   A1C results reviewed with the patient today.   Follow-up in 1 week(s) with Dr. Weir. Call sooner with questions or concerns or any signs of infection such as redness, swelling, increased/purulent drainage, fever or chills, and increased pain.  Patient verbalized understanding and agrees with plan of care.          Subjective:   11/11/24: Patient presents to wound center for follow-up visit of bilateral foot diabetic ulcers.  Patient follows with podiatry.  Patient has been bilateral football bulky dressings for the past week which he  has tolerated well.  Patient states that he has not gotten the dressings wet and has not been driving.  Patient denies increased pain or drainage.  No fever or chills.        The following portions of the patient's history were reviewed and updated as appropriate:   Patient Active Problem List   Diagnosis    Sprain of anterior talofibular ligament of right ankle    Perianal abscess    Neuropathy    Chronic bilateral low back pain with bilateral sciatica    Bilateral lower extremity edema    Chronic pain syndrome    Failed back surgical syndrome    Lumbar spondylosis    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 (HCC)    Congenital fusion of sacroiliac joint    Terminal ileitis (HCC)    Viral enteritis    Chronic foot pain    Lumbar radiculopathy    GERD (gastroesophageal reflux disease)    History of colon polyps    Perianal fistula    Functional diarrhea    Blepharitis, left eye    Closed fracture of radial styloid    Compression of right ulnar nerve at multiple levels    Degenerative disc disease at L5-S1 level    History of lumbar fusion    Numbness and tingling of both lower extremities    Periorbital cellulitis of left eye    Right leg swelling    Tobacco use    Vitamin D deficiency    Arthritis of right shoulder region    Chronic right shoulder pain    Myofascial pain syndrome    S/P insertion of spinal cord stimulator    Crohn's disease ([facility])    Numbness and tingling in right hand    Neck pain    Cervical radiculopathy    Herniated nucleus pulposus, C3-4    Hypertension    Smoker    Heavy alcohol consumption    Sleep apnea    Snoring    Excessive daytime sleepiness    Overweight (BMI 25.0-29.9)    Dupuytren contracture    Capillary disorder    Epididymitis, right    Obstructive sleep apnea syndrome    Pes anserinus bursitis of left knee    Arthralgia    Ulcer of left foot, limited to breakdown of skin (HCC)    Dysarthria    acute Confusional state    Hyperlipidemia    Diabetes mellitus (HCC)    Diabetic  polyneuropathy associated with type 2 diabetes mellitus (HCC)    Lung nodule    Abnormal movements    Episode of change in speech    Hoarseness    Diabetic ulcer of left foot associated with type 2 diabetes mellitus, limited to breakdown of skin, unspecified part of foot (HCC)     Past Medical History:   Diagnosis Date    Anxiety     Back pain     Callus Few months ago    Colon polyp     COPD (chronic obstructive pulmonary disease) (HCC)     Crohn's disease (HCC)     Depression     Diabetes mellitus (HCC)     GERD (gastroesophageal reflux disease)     Hyperlipidemia     Hypertension     Perianal fistula     Sleep apnea     no current CPAP use    Sleep apnea, obstructive     Terminal ileitis (HCC)      Past Surgical History:   Procedure Laterality Date    BACK SURGERY  2019    CAST APPLICATION Right 11/17/2022    Procedure: Application short-arm splint;  Surgeon: Sarthak Villatoro MD;  Location: [facility];  Service: Orthopedics    COLONOSCOPY      EGD      HAND CONTRACTURE RELEASE Left 06/01/2023    Procedure: left ring and small finger dupuytrens excision and ring finger Metacarpophalangeal joint release and small finger Metacarpophalangeal and proximal interphalangeal joint release;  Surgeon: Sarthak Villatoro MD;  Location: [facility];  Service: Orthopedics    HERNIA REPAIR      umbilical hernia    MO ANRCT XM SURG REQ ANES GENERAL SPI/EDRL DX N/A 11/17/2021    Procedure: EXAM UNDER ANESTHESIA (EUA);  Surgeon: Davi Thao MD;  Location: [facility];  Service: Colorectal    MO APPLICATION SHORT ARM SPLINT FOREARM-HAND STATIC Right 1/11/2024    Procedure: Application short arm splint;  Surgeon: Sarthak Villatoro MD;  Location: [facility];  Service: Orthopedics    MO CAPSULECTOMY/CAPSULOTOMY IPHAL JOINT EACH Right 1/11/2024    Procedure: Contracture release right hand small finger proximal interphalangeal joint;  Surgeon: Sarthak Villatoro MD;  Location: [facility];  Service: Orthopedics    MO  FASCIOTOMY PALMAR OPEN PARTIAL Right 11/17/2022    Procedure: Dupuytren's excision right palm extending into the small finger with release of the metacarpophalangeal joint and proximal interphalangeal joint.  Dupuytren's excision right palm with release of the right ring finger metacarpophalangeal joint.;  Surgeon: Sarthak Villatoro MD;  Location: [facility];  Service: Orthopedics    NY FASCIOTOMY PALMAR OPEN PARTIAL Right 1/11/2024    Procedure: Right hand small finger duppuytrens excision with release of the metacarpophalangeal joint and proximal interphalangeal joint;  Surgeon: Sarthak Villatoro MD;  Location: [facility];  Service: Orthopedics    NY FASCT PALM W/WO Z-PLASTY TISSUE REARGMT/SKN GRFT Right 1/11/2024    Procedure: Right hand small finger duppuytrens excision with release of the metacarpophalangeal joint and proximal interphalangeal joint;  Surgeon: Sarthak Villatoro MD;  Location: [facility];  Service: Orthopedics    NY FASCT PRTL PALMAR 1 DGT PROX IPHAL JT W/WO RPR Right 1/11/2024    Procedure: Right hand small finger duppuytrens excision with release of the metacarpophalangeal joint and proximal interphalangeal joint;  Surgeon: Sarthak Villatoro MD;  Location: [facility];  Service: Orthopedics    NY I&D ISCHIORECTAL&/PERIRECTAL ABSCESS SPX Right 10/24/2021    Procedure: excisional debredement right gluteal cheek ;  Surgeon: Jeana Bustamante MD;  Location: [facility];  Service: General    NY PLACEMENT SETON N/A 11/17/2021    Procedure: PLACEMENT SETON;  Surgeon: Davi Thao MD;  [facility];  Service: Colorectal    NY PRQ IMPLTJ NSTIM ELECTRODE ARRAY EPIDURAL Right 03/26/2021    Procedure: INSERTION THORACIC DORSAL COLUMN SPINAL CORD STIMULATOR PERCUTANEOUS W IMPLANTABLE PULSE GENERATOR, RIGHT;  Surgeon: Akshat Witt MD;  Location: [facility];  Service: Neurosurgery     Family History   Problem Relation Age of Onset    Heart disease Father     Heart attack Father     Colon  cancer Neg Hx     Colon polyps Neg Hx     Inflammatory bowel disease Neg Hx       Social History     Socioeconomic History    Marital status: /Civil Union     Spouse name: None    Number of children: None    Years of education: None    Highest education level: None   Occupational History    None   Tobacco Use    Smoking status: Former     Current packs/day: 0.00     Average packs/day: 0.5 packs/day for 30.0 years (15.0 ttl pk-yrs)     Types: Cigarettes     Start date: 1/1/2019     Quit date: 2/4/2021     Years since quitting: 3.7     Passive exposure: Past    Smokeless tobacco: Never   Vaping Use    Vaping status: Every Day    Substances: THC   Substance and Sexual Activity    Alcohol use: Not Currently     Alcohol/week: 1.0 standard drink of alcohol     Types: 1 Cans of beer per week    Drug use: Yes     Frequency: 7.0 times per week     Types: Marijuana     Comment: Medical marijuana-vapes    Sexual activity: Yes     Partners: Female, Male   Other Topics Concern    None   Social History Narrative    None     Social Determinants of Health     Financial Resource Strain: Low Risk  (6/7/2023)    Overall Financial Resource Strain (CARDIA)     Difficulty of Paying Living Expenses: Not hard at all   Food Insecurity: No Food Insecurity (7/5/2024)    Nursing - Inadequate Food Risk Classification     Worried About Running Out of Food in the Last Year: Never true     Ran Out of Food in the Last Year: Never true     Ran Out of Food in the Last Year: Not on file   Transportation Needs: No Transportation Needs (7/5/2024)    PRAPARE - Transportation     Lack of Transportation (Medical): No     Lack of Transportation (Non-Medical): No   Physical Activity: Sufficiently Active (4/11/2023)    Received from [facility]    Physical Activity   Stress: No Stress Concern Present (4/11/2023)    Received from [facility], Essential TestingCounts include 234 beds at the [facility] of Occupational Health - Occupational Stress Questionnaire     Feeling of  Stress : Only a little   Social Connections: Moderately Integrated (4/11/2023)    Received from [facility]    Social Connection and Isolation Panel [NHANES]   Intimate Partner Violence: Not At Risk (4/11/2023)    [facility] from [facility]    Humiliation, Afraid, Rape, and Kick questionnaire     Fear of Current or Ex-Partner: No     Emotionally Abused: No     Physically Abused: No     Sexually Abused: No   Housing Stability: Low Risk  (7/5/2024)    Housing Stability Vital Sign     Unable to Pay for Housing in the Last Year: No     Number of Times Moved in the Last Year: 1     Homeless in the Last Year: No        Current Outpatient Medications:     acetaminophen (TYLENOL) 650 mg CR tablet, Take 1 tablet (650 mg total) by mouth every 8 (eight) hours as needed for mild pain, Disp: 30 tablet, Rfl: 0    albuterol (PROVENTIL HFA,VENTOLIN HFA) 90 mcg/act inhaler, TAKE 2 PUFFS BY MOUTH EVERY 6 HOURS AS NEEDED FOR WHEEZE OR FOR SHORTNESS OF BREATH, Disp: 6.7 g, Rfl: 2    Alcohol Swabs 70 % PADS, May substitute brand based on insurance coverage. Check glucose TID., Disp: 100 each, Rfl: 0    amitriptyline (ELAVIL) 100 mg tablet, TAKE 3 TABLETS BY MOUTH AT BEDTIME, Disp: 270 tablet, Rfl: 1    atorvastatin (LIPITOR) 40 mg tablet, TAKE 1 TABLET BY MOUTH EVERY DAY IN THE EVENING, Disp: 90 tablet, Rfl: 1    azaTHIOprine (IMURAN) 50 mg tablet, TAKE 1 TABLET BY MOUTH EVERY DAY, Disp: 90 tablet, Rfl: 4    Blood Glucose Monitoring Suppl (OneTouch Verio Reflect) w/Device KIT, May substitute brand based on insurance coverage. Check glucose TID., Disp: 1 kit, Rfl: 0    buPROPion (WELLBUTRIN SR) 150 mg 12 hr tablet, Take 1 tablet (150 mg total) by mouth 2 (two) times a day, Disp: 180 tablet, Rfl: 3    Continuous Glucose Sensor (Dexcom G7 Sensor), Use 1 Device every 10 days (Patient not taking: Reported on 8/20/2024), Disp: 9 each, Rfl: 3    Continuous Glucose Sensor (FreeStyle Allyn 3 Sensor) MISC, Use 1 each every 10 days  (Patient not taking: Reported on 8/20/2024), Disp: 3 each, Rfl: 5    cyclobenzaprine (FLEXERIL) 10 mg tablet, TAKE 1 TABLET BY MOUTH EVERYDAY AT BEDTIME, Disp: 90 tablet, Rfl: 1    furosemide (LASIX) 20 mg tablet, TAKE 1 TABLET BY MOUTH ONCE DAILY, Disp: 90 tablet, Rfl: 3    glucose blood (OneTouch Verio) test strip, May substitute brand based on insurance coverage. Check glucose TID., Disp: 100 each, Rfl: 5    lisinopril (ZESTRIL) 20 mg tablet, Take 1 tablet (20 mg total) by mouth daily, Disp: 90 tablet, Rfl: 1    loperamide (IMODIUM) 2 mg capsule, TAKE 1 CAPSULE (2 MG TOTAL) BY MOUTH EVERY OTHER DAY FOR 7 DAYS, THEN 1 CAPSULE (2 MG TOTAL) IN THE MORNING FOR 7 DAYS., Disp: 30 capsule, Rfl: 2    metFORMIN (GLUCOPHAGE-XR) 500 mg 24 hr tablet, Take 2 tablets (1,000 mg total) by mouth 2 (two) times a day with meals, Disp: 360 tablet, Rfl: 3    mupirocin (BACTROBAN) 2 % ointment, Apply topically daily (Patient not taking: Reported on 9/18/2024), Disp: 15 g, Rfl: 0    naproxen (Naprosyn) 500 mg tablet, Take 1 tablet (500 mg total) by mouth 2 (two) times a day with meals, Disp: 180 tablet, Rfl: 3    omeprazole (PriLOSEC) 40 MG capsule, TAKE 1 CAPSULE (40 MG TOTAL) BY MOUTH DAILY., Disp: 100 capsule, Rfl: 1    ondansetron (ZOFRAN) 4 mg tablet, Take 1 tablet (4 mg total) by mouth every 8 (eight) hours as needed for nausea or vomiting, Disp: 20 tablet, Rfl: 1    OneTouch Delica Lancets 33G MISC, May substitute brand based on insurance coverage. Check glucose TID., Disp: 100 each, Rfl: 5    pregabalin (LYRICA) 150 mg capsule, TAKE 1 CAPSULE BY MOUTH 3 TIMES A DAY, Disp: 90 capsule, Rfl: 5    RA Vitamin D-3 25 MCG (1000 UT) tablet, TAKE 2 TABLETS BY MOUTH DAILY, Disp: 60 tablet, Rfl: 2    vedolizumab (Entyvio) SOLR, Inject 300 mg into a catheter in a vein every 4 weeks, Disp: , Rfl:     Review of Systems   Constitutional:  Negative for chills and fever.   HENT:  Negative for congestion and sneezing.    Respiratory:  Negative  for cough and shortness of breath.    Cardiovascular:  Negative for leg swelling.   Skin:  Positive for wound.   Psychiatric/Behavioral:  Negative for agitation.        Objective:  /78   Pulse 88   Temp 99.2 °F (37.3 °C) (Temporal)   Resp 18   Pain Score: 0-No pain     Physical Exam  Constitutional:       General: He is awake. He is not in acute distress.     Appearance: He is not ill-appearing, toxic-appearing or diaphoretic.   HENT:      Head: Normocephalic and atraumatic.      Right Ear: External ear normal.      Left Ear: External ear normal.   Eyes:      Conjunctiva/sclera: Conjunctivae normal.   Pulmonary:      Effort: Pulmonary effort is normal. No respiratory distress.   Musculoskeletal:      Right lower leg: No edema.      Left lower leg: No edema.   Skin:     General: Skin is warm and dry.      Findings: Wound present. No erythema.      Comments: 1. B/l plantar great toe diabetic ulcers.  No callous to sarah-wound. No purulent drainage or malodor. No erythema, warmth or lymphangitic streaking to suggest cellulitis. Refer to wound assessment for additional details.   Neurological:      Mental Status: He is alert.   Psychiatric:         Mood and Affect: Mood normal.         Behavior: Behavior normal. Behavior is cooperative.         Wound 11/06/24 Diabetic Ulcer Plantar Left (Active)   Enter Loza score: Loza Grade 1: Partial or full-thickness ulcer (superficial) 11/11/24 1118   Wound Image   11/11/24 1118   Wound Description Pink;Slough;Granulation tissue 11/11/24 1118   Sarah-wound Assessment Maceration 11/11/24 1118   Wound Length (cm) 0.8 cm 11/11/24 1118   Wound Width (cm) 0.8 cm 11/11/24 1118   Wound Depth (cm) 0.2 cm 11/11/24 1118   Wound Surface Area (cm^2) 0.64 cm^2 11/11/24 1118   Wound Volume (cm^3) 0.128 cm^3 11/11/24 1118   Calculated Wound Volume (cm^3) 0.13 cm^3 11/11/24 1118   Change in Wound Size % 0 11/11/24 1118   Drainage Amount Small 11/11/24 1118   Drainage Description  "Serosanguineous 11/11/24 1118   Non-staged Wound Description Full thickness 11/11/24 1118   Dressing Status Other (Comment) 11/06/24 1521       Wound 11/06/24 Diabetic Ulcer Plantar Right (Active)   Enter Loza score: Loza Grade 1: Partial or full-thickness ulcer (superficial) 11/11/24 1121   Wound Image   11/11/24 1121   Wound Description Pink;Granulation tissue 11/11/24 1121   Sydney-wound Assessment Dry 11/11/24 1121   Wound Length (cm) 0.4 cm 11/11/24 1121   Wound Width (cm) 0.2 cm 11/11/24 1121   Wound Depth (cm) 0.1 cm 11/11/24 1121   Wound Surface Area (cm^2) 0.08 cm^2 11/11/24 1121   Wound Volume (cm^3) 0.008 cm^3 11/11/24 1121   Calculated Wound Volume (cm^3) 0.01 cm^3 11/11/24 1121   Change in Wound Size % 50 11/11/24 1121   Drainage Amount Scant 11/11/24 1121   Drainage Description Serosanguineous 11/11/24 1121   Non-staged Wound Description Full thickness 11/11/24 1121   Dressing Status Other (Comment) 11/06/24 1520       @WOUNDINFO    Debridement   Wound 11/06/24 Diabetic Ulcer Plantar Left    Universal Protocol:  Consent: Verbal consent obtained.  Risks and benefits: risks, benefits and alternatives were discussed  Consent given by: patient  Time out: Immediately prior to procedure a \"time out\" was called to verify the correct patient, procedure, equipment, support staff and site/side marked as required.  Timeout called at: 11/11/2024 11:30 AM.  Patient understanding: patient states understanding of the procedure being performed  Patient identity confirmed: verbally with patient    Debridement Details  Performed by: NP  Debridement type: selective  Pain control: lidocaine 4%      Post-debridement measurements  Length (cm): 0.8  Width (cm): 0.8  Depth (cm): 0.2  Percent debrided: 100%  Surface Area (cm^2): 0.64  Area Debrided (cm^2): 0.64  Volume (cm^3): 0.13    Devitalized tissue debrided: biofilm, exudate and slough  Instrument(s) utilized: curette  Bleeding: small  Hemostasis obtained with: " "pressure  Procedural pain (0-10): insensate  Post-procedural pain: insensate   Response to treatment: procedure was tolerated well  Debridement Comments: No palpable or exposed bone or underlying structures pre or post debridement.  Debridement   Wound 11/06/24 Diabetic Ulcer Plantar Right    Universal Protocol:  Consent: Verbal consent obtained.  Risks and benefits: risks, benefits and alternatives were discussed  Consent given by: patient  Time out: Immediately prior to procedure a \"time out\" was called to verify the correct patient, procedure, equipment, support staff and site/side marked as required.  Timeout called at: 11/11/2024 11:30 AM.  Patient understanding: patient states understanding of the procedure being performed  Patient identity confirmed: verbally with patient    Debridement Details  Performed by: NP  Debridement type: selective  Pain control: lidocaine 4%      Post-debridement measurements  Length (cm): 0.4  Width (cm): 0.2  Depth (cm): 0.1  Percent debrided: 100%  Surface Area (cm^2): 0.08  Area Debrided (cm^2): 0.08  Volume (cm^3): 0.01    Devitalized tissue debrided: biofilm and exudate  Instrument(s) utilized: curette  Bleeding: small  Hemostasis obtained with: pressure  Procedural pain (0-10): insensate  Post-procedural pain: insensate   Response to treatment: procedure was tolerated well  Debridement Comments: No palpable or exposed bone or underlying structures pre or post debridement.             Lab Results   Component Value Date    HGBA1C 6.2 (H) 08/08/2024       Wound Instructions:  Orders Placed This Encounter   Procedures    Wound cleansing and dressings     Wound cleansing and dressings       Bilateral great toe wounds:     Keep dressings clean dry and intact until next wound care visit--if dressings become wet call wound care center immediately.  You may shower if you purchase cast cover     Off-loading Instructions:     Wear off-loading device as directed by your physician " "(surgical shoe). Put on immediately when rising in the morning and remove when going to bed.     Standing Status:   Future     Standing Expiration Date:   11/18/2024    Wound Procedure Treatment     This order was created via procedure documentation    Debridement Diabetic Ulcer Left Plantar     This order was created via procedure documentation    Debridement Diabetic Ulcer Right Plantar     This order was created via procedure documentation           MIKE Healy, FERNANDO, GURU    Portions of the record may have been created with voice recognition software. Occasional wrong word or \"sound alike\" substitutions may have occurred due to the inherent limitations of voice recognition software. Read the chart carefully and recognize, using context, where substitutions have occurred.          Total time spent today:    Total time (face-to-face and non-face-to-face) spent on today's visit was 12 minutes. This includes preparation for the visits (i.e. reviewing test results from date recent hospitalizations, ER/Urgent Care/primary care visits and recent consultant office visits), performance of a medically appropriate history and examination, and orders for medications or testing.     "

## 2024-11-12 ENCOUNTER — TELEPHONE (OUTPATIENT)
Age: 54
End: 2024-11-12

## 2024-11-12 ENCOUNTER — HOSPITAL ENCOUNTER (OUTPATIENT)
Dept: NEUROLOGY | Facility: CLINIC | Age: 54
Discharge: HOME | End: 2024-11-12
Payer: MEDICARE

## 2024-11-12 DIAGNOSIS — R29.818 TRANSIENT NEUROLOGICAL SYMPTOMS: Primary | ICD-10-CM

## 2024-11-12 DIAGNOSIS — G62.9 POLYNEUROPATHY: ICD-10-CM

## 2024-11-12 DIAGNOSIS — R41.3 MEMORY LOSS: ICD-10-CM

## 2024-11-12 DIAGNOSIS — R25.9 ABNORMAL MOVEMENTS: ICD-10-CM

## 2024-11-12 PROBLEM — G56.01 CARPAL TUNNEL SYNDROME OF RIGHT WRIST: Status: ACTIVE | Noted: 2024-11-12

## 2024-11-12 PROCEDURE — 95912 NRV CNDJ TEST 11-12 STUDIES: CPT | Performed by: PSYCHIATRY & NEUROLOGY

## 2024-11-12 PROCEDURE — 95886 MUSC TEST DONE W/N TEST COMP: CPT | Performed by: PSYCHIATRY & NEUROLOGY

## 2024-11-12 NOTE — TELEPHONE ENCOUNTER
MIKE Matthews      Based off the symptoms described we do not need ED evaluation at this time; I will order labs and ambulatory (48 hour) EEG testing to be done hopefully before planned follow up. If symptoms worsen they are encouraged to call back or be seen in the ED. We will monitor memory at upcoming follow up and decide if repeat imaging is required.  -Francesco    ________________________________________    I called Elisabeth (wife) had to leave a detailed message  per request and I read provider's note above. I provided Central Scheduling number and informed of the labs that were ordered per Francesco MCKEON. I informed if she has any questions to call our office back.

## 2024-11-12 NOTE — TELEPHONE ENCOUNTER
Wife Elisabeth called in to see if Francesco MCKEON would consider repeating any imaging per patients symptoms see below.    Patient was hospitalized 5/16- 5/17/24 as he had slurred speech and confusion and they ruled out a stroke. He was seen by Francesco MCKEON on 6/21/24 for HFU. Wife confirmed that since office visit she seen slight improvement in July and Aug with speech improving at times, but it wasn't a daily improvement , the improvement varied at times. Then in Sept and Oct , wife noticed he had some more speech and confusion, which looked more like his months prior to July. His good and bad days varies. Wife works with patient at home, to give him enough time to try to explain what he is trying to say, as he still has brief pausing at times, as he had after hospitalization. But she notices having to repeat herself 5 times on occasion if pt is having a bad day (as he continues to have the memory recall concern). Wife informed she noticed these traits since after he was hospitalized, now wanted to update provider as pt had very slight improvement in July and Aug, and now wife concern pt is regressing back to how he was after hospitalization. Wife informed This past Sunday he had a great day with no concerns. But Mon and today, she noticed him needing to pause again and she having to repeat herself 5 times for patient to understand. Wife not sure if this is expected or should she be concern.     We discussed sooner appt vs patient going to the ED to be further evaluated. Wife confirmed this isnt a new changed as his symptoms varies since he was hospitalized and she prefer to get update from provider.    Patient Next OV 12/18/24 Francesco MCKEON    I offered wife a sooner appt as for patient wife prefers Mondays or Thursdays. I offered  first available which is  this Thursday 11/14/24. However, wife can't make this Thursday, as she needs to give her work notice. She also requested 12/2/24 as she has off that day, but I informed I  do not see any appts I am able to schedule for on that day. The next available appt isnt until Feb 2025. I informed wife, I will keep his appt scheduled for Dec and reach out to provider.      Wife requesting advise from provider, can they just repeat Imaging or labs if necessary or does he need a sooner appt, or must he go to ED to be evaluated? Or if there is a sooner appt slot, to advise, wife willing to do VV or in person, whichever she is able to schedule (prefers Mondays, Thursdays or she is off and can do 12/2/24), if provider prefers an appt to evaluate pt (would need to be scheduled by office staff, as it may need manager approval to fit patient into schedule).    Routed to provider to advise as wife Elisabeth requesting a call back at , may leave a detailed message.

## 2024-11-13 ENCOUNTER — RESULTS FOLLOW-UP (OUTPATIENT)
Dept: ENDOCRINOLOGY | Facility: CLINIC | Age: 54
End: 2024-11-13

## 2024-11-13 ENCOUNTER — HOSPITAL ENCOUNTER (OUTPATIENT)
Facility: HOSPITAL | Age: 54
Setting detail: OUTPATIENT SURGERY
Discharge: HOME | End: 2024-11-13
Attending: OTOLARYNGOLOGY | Admitting: OTOLARYNGOLOGY
Payer: MEDICARE

## 2024-11-13 ENCOUNTER — ANESTHESIA EVENT (OUTPATIENT)
Dept: PERIOP | Facility: HOSPITAL | Age: 54
End: 2024-11-13
Payer: MEDICARE

## 2024-11-13 ENCOUNTER — ANESTHESIA (OUTPATIENT)
Dept: PERIOP | Facility: HOSPITAL | Age: 54
End: 2024-11-13
Payer: MEDICARE

## 2024-11-13 ENCOUNTER — APPOINTMENT (OUTPATIENT)
Dept: LAB | Facility: HOSPITAL | Age: 54
End: 2024-11-13
Payer: MEDICARE

## 2024-11-13 VITALS
SYSTOLIC BLOOD PRESSURE: 135 MMHG | RESPIRATION RATE: 20 BRPM | HEIGHT: 72 IN | HEART RATE: 87 BPM | BODY MASS INDEX: 26.68 KG/M2 | DIASTOLIC BLOOD PRESSURE: 68 MMHG | TEMPERATURE: 97.8 F | WEIGHT: 197 LBS | OXYGEN SATURATION: 94 %

## 2024-11-13 DIAGNOSIS — R41.3 MEMORY LOSS: ICD-10-CM

## 2024-11-13 DIAGNOSIS — E11.9 TYPE 2 DIABETES MELLITUS WITHOUT COMPLICATION, WITHOUT LONG-TERM CURRENT USE OF INSULIN (HCC): ICD-10-CM

## 2024-11-13 DIAGNOSIS — I10 PRIMARY HYPERTENSION: ICD-10-CM

## 2024-11-13 LAB
ALBUMIN SERPL BCG-MCNC: 4.1 G/DL (ref 3.5–5)
ALP SERPL-CCNC: 100 U/L (ref 34–104)
ALT SERPL W P-5'-P-CCNC: 29 U/L (ref 7–52)
ANION GAP SERPL CALCULATED.3IONS-SCNC: 9 MMOL/L (ref 4–13)
AST SERPL W P-5'-P-CCNC: 23 U/L (ref 13–39)
BILIRUB SERPL-MCNC: 0.42 MG/DL (ref 0.2–1)
BUN SERPL-MCNC: 20 MG/DL (ref 5–25)
CALCIUM SERPL-MCNC: 8.9 MG/DL (ref 8.4–10.2)
CHLORIDE SERPL-SCNC: 106 MMOL/L (ref 96–108)
CO2 SERPL-SCNC: 29 MMOL/L (ref 21–32)
CREAT SERPL-MCNC: 0.94 MG/DL (ref 0.6–1.3)
EST. AVERAGE GLUCOSE BLD GHB EST-MCNC: 131 MG/DL
FOLATE SERPL-MCNC: >22.3 NG/ML
GFR SERPL CREATININE-BSD FRML MDRD: 92 ML/MIN/1.73SQ M
GLUCOSE P FAST SERPL-MCNC: 129 MG/DL (ref 65–99)
GLUCOSE SERPL-MCNC: 114 MG/DL (ref 65–140)
HBA1C MFR BLD: 6.2 %
POTASSIUM SERPL-SCNC: 4.4 MMOL/L (ref 3.5–5.3)
PROT SERPL-MCNC: 6.7 G/DL (ref 6.4–8.4)
SODIUM SERPL-SCNC: 144 MMOL/L (ref 135–147)
TSH SERPL DL<=0.05 MIU/L-ACNC: 1.48 UIU/ML (ref 0.45–4.5)
VIT B12 SERPL-MCNC: 284 PG/ML (ref 180–914)

## 2024-11-13 PROCEDURE — 36415 COLL VENOUS BLD VENIPUNCTURE: CPT

## 2024-11-13 PROCEDURE — 82746 ASSAY OF FOLIC ACID SERUM: CPT

## 2024-11-13 PROCEDURE — 82948 REAGENT STRIP/BLOOD GLUCOSE: CPT

## 2024-11-13 PROCEDURE — 83036 HEMOGLOBIN GLYCOSYLATED A1C: CPT

## 2024-11-13 PROCEDURE — 42975 DISE EVAL SLP DO BRTH FLX DX: CPT | Performed by: OTOLARYNGOLOGY

## 2024-11-13 PROCEDURE — 82607 VITAMIN B-12: CPT

## 2024-11-13 PROCEDURE — 80053 COMPREHEN METABOLIC PANEL: CPT

## 2024-11-13 PROCEDURE — 84443 ASSAY THYROID STIM HORMONE: CPT

## 2024-11-13 RX ORDER — ACETAMINOPHEN 325 MG/1
650 TABLET ORAL EVERY 6 HOURS PRN
Status: DISCONTINUED | OUTPATIENT
Start: 2024-11-13 | End: 2024-11-13 | Stop reason: HOSPADM

## 2024-11-13 RX ORDER — SODIUM CHLORIDE, SODIUM LACTATE, POTASSIUM CHLORIDE, CALCIUM CHLORIDE 600; 310; 30; 20 MG/100ML; MG/100ML; MG/100ML; MG/100ML
50 INJECTION, SOLUTION INTRAVENOUS CONTINUOUS
Status: DISCONTINUED | OUTPATIENT
Start: 2024-11-13 | End: 2024-11-13 | Stop reason: HOSPADM

## 2024-11-13 RX ORDER — IBUPROFEN 600 MG/1
600 TABLET, FILM COATED ORAL EVERY 6 HOURS PRN
Status: DISCONTINUED | OUTPATIENT
Start: 2024-11-13 | End: 2024-11-13 | Stop reason: HOSPADM

## 2024-11-13 RX ORDER — SODIUM CHLORIDE, SODIUM LACTATE, POTASSIUM CHLORIDE, CALCIUM CHLORIDE 600; 310; 30; 20 MG/100ML; MG/100ML; MG/100ML; MG/100ML
INJECTION, SOLUTION INTRAVENOUS CONTINUOUS PRN
Status: DISCONTINUED | OUTPATIENT
Start: 2024-11-13 | End: 2024-11-13

## 2024-11-13 RX ORDER — ONDANSETRON 2 MG/ML
4 INJECTION INTRAMUSCULAR; INTRAVENOUS ONCE AS NEEDED
Status: DISCONTINUED | OUTPATIENT
Start: 2024-11-13 | End: 2024-11-13 | Stop reason: HOSPADM

## 2024-11-13 RX ORDER — PROPOFOL 10 MG/ML
INJECTION, EMULSION INTRAVENOUS CONTINUOUS PRN
Status: DISCONTINUED | OUTPATIENT
Start: 2024-11-13 | End: 2024-11-13

## 2024-11-13 RX ORDER — PROPOFOL 10 MG/ML
INJECTION, EMULSION INTRAVENOUS AS NEEDED
Status: DISCONTINUED | OUTPATIENT
Start: 2024-11-13 | End: 2024-11-13

## 2024-11-13 RX ADMIN — SODIUM CHLORIDE, SODIUM LACTATE, POTASSIUM CHLORIDE, AND CALCIUM CHLORIDE: .6; .31; .03; .02 INJECTION, SOLUTION INTRAVENOUS at 15:00

## 2024-11-13 RX ADMIN — PROPOFOL 100 MCG/KG/MIN: 10 INJECTION, EMULSION INTRAVENOUS at 15:18

## 2024-11-13 RX ADMIN — PROPOFOL 55 MG: 10 INJECTION, EMULSION INTRAVENOUS at 15:18

## 2024-11-13 NOTE — OP NOTE
OPERATIVE REPORT  PATIENT NAME: Benito Park    :  1970  MRN: [id number]  Pt Location: AN OR ROOM 03    SURGERY DATE: 2024    Surgeons and Role:     * Jeff Arndt MD - Primary     * Javier Salgado MD - Assisting     * Sivakumar Conklin MD - Assisting    Preop Diagnosis:  OSIRIS (obstructive sleep apnea) [G47.33]    Post-Op Diagnosis Codes:     * OSIRIS (obstructive sleep apnea) [G47.33]    Procedure(s):  DRUG INDUCED SLEEP ENDOSCOPY    Specimen(s):  * No specimens in log *    Estimated Blood Loss:   Minimal    Anesthesia Type:   General    Operative Indications:  OSIRIS (obstructive sleep apnea) [G47.33]  BMI 26.72    Operative Findings:  V 1, O 1, T 2, E 2  All AP collapse. No concentric collapse.       Complications:   None    Procedure and Technique:  Benito Park was brought to the operating room and was anesthetized via the standard drug-induced sleep endoscopy protocol. The propofol infusion rate was startedand gradually increased until conditions that mimic sleep were gradually observed.     With the patient not responsive to verbal commands, but still with spontaneous respiration, sleep disordered breathing events and associated desaturations were clearly observed    Under these conditions, the flexible endoscope was inserted to examine both sides of the nose as well as the pharynx and larynx.     The VOTE score at baseline was V: 1 partial AP; O: 1 partial AP; T: 2 complete AP; E: 2 complete AP. With simulated jaw advancement and tongue advancement, the hypopharyngeal obstruction and secondarily the palatal collapse also improved.     In summary, there was no evidence of complete concentric palatal obstruction and he does appear to be a candidate anatomically for hypoglossal nerve stimulation therapy.     The patient was then allowed to awaken while monitoring by anesthesia was performed until he was awake and able to maintain his own saturations. The patient was taken to the recovery area in  stable condition. All instruments and sponge counts were correct at the end of the procedure.       Patient Disposition:  PACU              SIGNATURE: Javier Salgado MD  DATE: November 13, 2024  TIME: 3:14 PM

## 2024-11-13 NOTE — H&P
Surgery Pre-op note/Updated History and Physical    Date of service: 11/13/20241:39 PM      No changes from most recent clinic H&P note. Patient to OR for DISE.      PMH: Reviewed  PSH: Reviewed  Social history: Reviewed  Medications: Reviewed  ROS: as above      Vitals:    11/13/24 1311   BP: 144/91   Pulse: 80   Resp: 20   Temp: (!) 97 °F (36.1 °C)   SpO2: 95%       ENT: No changes from most recent clinic visit  Chest/Heart/Lungs: Breathing, perfused, unremarkable  Abd: Unremarkable  Ext: Unremarkable        The procedure was discussed with the patient/guardian, including risks, benefits, and alternatives, and all questions were answered. Consent signed and in the chart.    Sivakumar Conklin MD  Otolaryngology - Head and Neck Surgery  11/13/2024 1:39 PM

## 2024-11-13 NOTE — DISCHARGE INSTR - AVS FIRST PAGE
Drug Induced Sleep Endoscopy     WHAT YOU SHOULD KNOW:   During a drug induced sleep endsocopy (DISE), your caregiver places a scope into your nose to see inside your nose and throat. You may need a DISE to find the cause of your sleep apnea. DISE helps your caregiver diagnose your condition and create a treatment plan. You might also have surgery or other treatments during a DISE.    AFTER YOU LEAVE:     Follow up with your primary healthcare provider or specialist as directed:  Write down your questions so you remember to ask them during your visits.     Medicines:   Keep a current list of your medicines:  Include the amounts, and when, how, and why you take them. Take the list or the pill bottles to follow-up visits. Carry your medicine list with you in case of an emergency. Throw away old medicine lists. Use vitamins, herbs, or food supplements only as directed.    Take your medicine as directed:  Call your healthcare provider if you think your medicine is not working as expected. Tell him about any medicine allergies, and if you want to quit taking or change your medicine.    Nutrition:  Most people eat and drink as usual after a laryngoscopy. Ask your primary healthcare provider if you are not sure.    Contact your primary healthcare provider if:  You have problems breathing or talking.    You see new injuries to your teeth, lips, or tongue.    Your pain does not go away or gets worse.    You have questions about your procedure, medicine, or care.

## 2024-11-13 NOTE — ANESTHESIA POSTPROCEDURE EVALUATION
Post-Op Assessment Note    CV Status:  Stable  Pain Score: 0    Pain management: adequate       Mental Status:  Alert and awake   Hydration Status:  Euvolemic   PONV Controlled:  Controlled   Airway Patency:  Patent     Post Op Vitals Reviewed: Yes    No anethesia notable event occurred.    Staff: Anesthesiologist, CRNA           Last Filed PACU Vitals:  Vitals Value Taken Time   Temp 97.8    Pulse 83    /84    Resp 12    SpO2 93        Modified Alo:  No data recorded

## 2024-11-14 ENCOUNTER — RESULTS FOLLOW-UP (OUTPATIENT)
Dept: NEUROLOGY | Facility: CLINIC | Age: 54
End: 2024-11-14

## 2024-11-15 NOTE — ANESTHESIA POSTPROCEDURE EVALUATION
Post-Op Assessment Note    CV Status:  Stable    Pain management: adequate       Mental Status:  Alert and awake   Hydration Status:  Euvolemic   PONV Controlled:  Controlled   Airway Patency:  Patent     Post Op Vitals Reviewed: Yes    No anethesia notable event occurred.    Staff: Anesthesiologist           Last Filed PACU Vitals:  Vitals Value Taken Time   Temp 97 °F (36.1 °C) 11/13/24 1530   Pulse 78 11/13/24 1546   /85 11/13/24 1545   Resp 18 11/13/24 1530   SpO2 93 % 11/13/24 1546   Vitals shown include unfiled device data.    Modified Alo:  No data recorded

## 2024-11-16 DIAGNOSIS — G62.9 NEUROPATHY: ICD-10-CM

## 2024-11-18 ENCOUNTER — HOSPITAL ENCOUNTER (OUTPATIENT)
Dept: NON INVASIVE DIAGNOSTICS | Facility: HOSPITAL | Age: 54
Discharge: HOME | End: 2024-11-18
Attending: PODIATRIST
Payer: MEDICARE

## 2024-11-18 DIAGNOSIS — E11.42 DIABETIC POLYNEUROPATHY ASSOCIATED WITH TYPE 2 DIABETES MELLITUS (HCC): ICD-10-CM

## 2024-11-18 PROCEDURE — 93923 UPR/LXTR ART STDY 3+ LVLS: CPT

## 2024-11-18 PROCEDURE — 93922 UPR/L XTREMITY ART 2 LEVELS: CPT | Performed by: SURGERY

## 2024-11-18 PROCEDURE — 93925 LOWER EXTREMITY STUDY: CPT

## 2024-11-18 PROCEDURE — 93925 LOWER EXTREMITY STUDY: CPT | Performed by: SURGERY

## 2024-11-18 RX ORDER — PREGABALIN 150 MG/1
150 CAPSULE ORAL 3 TIMES DAILY
Qty: 90 CAPSULE | Refills: 0 | Status: SHIPPED | OUTPATIENT
Start: 2024-11-18

## 2024-11-20 ENCOUNTER — OFFICE VISIT (OUTPATIENT)
Dept: ENDOCRINOLOGY | Facility: HOSPITAL | Age: 54
End: 2024-11-20
Payer: MEDICARE

## 2024-11-20 ENCOUNTER — OFFICE VISIT (OUTPATIENT)
Dept: WOUND CARE | Facility: HOSPITAL | Age: 54
End: 2024-11-20
Payer: MEDICARE

## 2024-11-20 VITALS
DIASTOLIC BLOOD PRESSURE: 68 MMHG | RESPIRATION RATE: 16 BRPM | SYSTOLIC BLOOD PRESSURE: 102 MMHG | TEMPERATURE: 97.1 F | HEART RATE: 96 BPM

## 2024-11-20 VITALS
BODY MASS INDEX: 27.12 KG/M2 | WEIGHT: 200.2 LBS | SYSTOLIC BLOOD PRESSURE: 120 MMHG | HEART RATE: 98 BPM | HEIGHT: 72 IN | DIASTOLIC BLOOD PRESSURE: 80 MMHG

## 2024-11-20 DIAGNOSIS — E78.2 MIXED HYPERLIPIDEMIA: ICD-10-CM

## 2024-11-20 DIAGNOSIS — L97.519 DIABETIC ULCER OF RIGHT GREAT TOE (HCC): Primary | ICD-10-CM

## 2024-11-20 DIAGNOSIS — E11.42 DIABETIC POLYNEUROPATHY ASSOCIATED WITH TYPE 2 DIABETES MELLITUS (HCC): ICD-10-CM

## 2024-11-20 DIAGNOSIS — E11.621 DIABETIC ULCER OF RIGHT GREAT TOE (HCC): Primary | ICD-10-CM

## 2024-11-20 DIAGNOSIS — I10 PRIMARY HYPERTENSION: ICD-10-CM

## 2024-11-20 DIAGNOSIS — E11.621 DIABETIC ULCER OF LEFT GREAT TOE (HCC): ICD-10-CM

## 2024-11-20 DIAGNOSIS — E11.65 TYPE 2 DIABETES MELLITUS WITH HYPERGLYCEMIA, WITHOUT LONG-TERM CURRENT USE OF INSULIN (HCC): Primary | ICD-10-CM

## 2024-11-20 DIAGNOSIS — E11.00 TYPE 2 DIABETES MELLITUS WITH HYPEROSMOLARITY WITHOUT COMA, WITHOUT LONG-TERM CURRENT USE OF INSULIN (HCC): ICD-10-CM

## 2024-11-20 DIAGNOSIS — L97.529 DIABETIC ULCER OF LEFT GREAT TOE (HCC): ICD-10-CM

## 2024-11-20 PROCEDURE — 99215 OFFICE O/P EST HI 40 MIN: CPT | Performed by: INTERNAL MEDICINE

## 2024-11-20 PROCEDURE — 11042 DBRDMT SUBQ TIS 1ST 20SQCM/<: CPT | Performed by: PODIATRIST

## 2024-11-20 RX ORDER — METFORMIN HYDROCHLORIDE 500 MG/1
TABLET, EXTENDED RELEASE ORAL
Qty: 360 TABLET | Refills: 3 | Status: SHIPPED | OUTPATIENT
Start: 2024-11-20

## 2024-11-20 NOTE — PATIENT INSTRUCTIONS
Orders Placed This Encounter   Procedures    Wound cleansing and dressings     Bulky Football dressings to bilateral feet. Leave intact until follow up at [facility].     Standing Status:   Future     Expiration Date:   11/27/2024

## 2024-11-20 NOTE — PROGRESS NOTES
Wound Procedure Treatment    Performed by: Dior Li RN  Authorized by: Rosmery Weir DPM    Associated wounds:   Wound 11/06/24 Diabetic Ulcer Plantar Left  Wound 11/06/24 Diabetic Ulcer Plantar Right  Wound cleansed with:  Soap and water  Applied primary dressing:  Dermagran  Applied secondary dressing:  Cast padding  Dressing secured with:  Coban, Kerlix and Tubifast  Offloading device appllied:  Felt padding 1/4 inch

## 2024-11-20 NOTE — PROGRESS NOTES
11/21/2024    Assessment & Plan      Diagnoses and all orders for this visit:    Type 2 diabetes mellitus with hyperglycemia, without long-term current use of insulin ([facility])  -     Comprehensive metabolic panel; Future  -     Hemoglobin A1C; Future  -     CBC and differential; Future    Diabetic polyneuropathy associated with type 2 diabetes mellitus (HCC)  -     Comprehensive metabolic panel; Future  -     Hemoglobin A1C; Future  -     CBC and differential; Future    Primary hypertension  -     Comprehensive metabolic panel; Future  -     Hemoglobin A1C; Future  -     CBC and differential; Future    Mixed hyperlipidemia  -     Comprehensive metabolic panel; Future  -     Hemoglobin A1C; Future  -     CBC and differential; Future    Type 2 diabetes mellitus with hyperosmolarity without coma, without long-term current use of insulin ([facility])  -     metFORMIN (GLUCOPHAGE-XR) 500 mg 24 hr tablet; 1 in am and 2 in pm          Assessment & Plan  1. Type 2 diabetes.  Most recent hemoglobin A1c is 6.2%, demonstrating excellent control of his diabetes. Given his diarrhea and his wife's interest to minimize medications if possible, and considering his mental status changes, metformin XR will be decreased to 500 mg 1 in the morning and 2 in the evening. He is to call and report if his blood sugars increase and continue testing blood sugars three times a day. Another diabetes agent may be considered if needed.    2. Diabetic neuropathy.  He has significant neuropathic symptoms and is seeing a neurologist. Vitamin B12 1000 mcg daily will be added since his vitamin B12 is low normal. The neurologist may consider decreasing Lyrica (pregabalin) as it is more for painful neuropathy than numbness and tingling, and it might be affecting his mental status. They could also discuss decreasing amitriptyline as many of these medications can impact mental status.    3. Hypertension.  He is normotensive in the office. The current doses of  furosemide 20 mg daily and lisinopril 20 mg daily will be continued.    4. Hyperlipidemia.  Last lipid profile in August 2024 was quite good. He will continue atorvastatin 40 mg daily.    5. Crohn's disease.  He reports that his Crohn's disease is in remission but still experiences significant diarrhea. He is currently on Entyvio for maintenance.    6. Memory and mental status changes.  He has been experiencing memory processing issues and confusion. Neurology is conducting further testing, and he has a follow-up appointment on 11/18/2024. The potential impact of Lyrica and amitriptyline on his mental status will be discussed with his neurologist.    Follow-up  Return in 3 to 4 months with preceding hemoglobin A1c, CBC, and CMP.    I have spent a total time of 40 minutes in caring for this patient on the day of the visit/encounter including Diagnostic results, Prognosis, Risks and benefits of tx options, Instructions for management, Patient and family education, Importance of tx compliance, Risk factor reductions, Impressions, Counseling / Coordination of care, Documenting in the medical record, Reviewing / ordering tests, medicine, procedures  , and Obtaining or reviewing history  .      CC: Diabetes Consult      History of Present Illness    HPI: Benito Park is a 54-year-old male with a history of type 2 diabetes with neuropathy, hypertension, hyperlipidemia, and Crohn's disease, here for a follow-up visit. He was previously seen at another endocrinology office but is transferring care to this office as it is closer to his home. His wife is present on the phone.    He was diagnosed with diabetes earlier this year during a hospital stay. His current treatment includes metformin  mg, taken as two pills in the morning and two in the evening. His wife has expressed concerns about potential overmedication and is seeking if metformin dosage can be reduced. He reports frequent urination, including once at  night, but does not experience excessive thirst or hunger.     He has no blurry vision and has had his eyes checked by his primary care doctor.  He has not had a recent ophthalmologic exam via ophthalmologist.    He experiences numbness in his feet and has ulcers on both toes. He is under the care of Dr. Weir for these issues. He has been taking pregabalin 150 mg three times a day for about a year and a half.     He is currently on atorvastatin 40 mg daily for cholesterol management. He is also taking lisinopril 20 mg daily and furosemide 20 mg daily. The furosemide was prescribed due to swelling in his legs.He does not experience chest pain or shortness of breath. He occasionally experiences dizziness when standing up after sitting for extended periods. He is making an effort to stay hydrated.    He has a history of Crohn's disease, which sometimes affects his appetite. He has been working with a nutritionist to manage this. His Crohn's is in remission, and he is still on Entyvio. He still has diarrhea.    He is also working with neurology. They have ruled out a stroke, but he is experiencing memory processing issues and confusion. He had two falls and his speech was off. They observed some white spots on the brain, which they said is not uncommon. Further testing is being conducted, and he has a follow-up appointment with neurology on 12/18/2024.    Blood Sugar/Glucometer/Pump/CGM review: He monitors his blood sugar three times daily. He has experienced a few episodes of low blood sugar, particularly during periods of high activity. His blood sugar levels have been stable, ranging between 100 and 120 throughout the day.      Historical Information   Past Medical History:   Diagnosis Date    Anxiety     Back pain     Callus Few months ago    Colon polyp     COPD (chronic obstructive pulmonary disease) (HCC)     Crohn's disease (HCC)     Depression     Diabetes mellitus (HCC)     GERD (gastroesophageal reflux  disease)     Hyperlipidemia     Hypertension     Perianal fistula     Sleep apnea     no current CPAP use    Terminal ileitis (HCC)      Past Surgical History:   Procedure Laterality Date    BACK SURGERY  2019    CAST APPLICATION Right 11/17/2022    Procedure: Application short-arm splint;  Surgeon: Sarthak Villatoro MD;  Location: [facility];  Service: Orthopedics    COLONOSCOPY      EGD      HAND CONTRACTURE RELEASE Left 06/01/2023    Procedure: left ring and small finger dupuytrens excision and ring finger Metacarpophalangeal joint release and small finger Metacarpophalangeal and proximal interphalangeal joint release;  Surgeon: Sarthak Villatoro MD;  Location: [facility];  Service: Orthopedics    HERNIA REPAIR      umbilical hernia    WI ANRCT XM SURG REQ ANES GENERAL SPI/EDRL DX N/A 11/17/2021    Procedure: EXAM UNDER ANESTHESIA (EUA);  Surgeon: Davi Thao MD;  Location: [facility];  Service: Colorectal    WI APPLICATION SHORT ARM SPLINT FOREARM-HAND STATIC Right 1/11/2024    Procedure: Application short arm splint;  Surgeon: Sarthak Villatoro MD;  Location: [facility];  Service: Orthopedics    WI CAPSULECTOMY/CAPSULOTOMY IPHAL JOINT EACH Right 1/11/2024    Procedure: Contracture release right hand small finger proximal interphalangeal joint;  Surgeon: Sarthak Villatoro MD;  Location: [facility];  Service: Orthopedics    WI DISE DYN EVAL SLEEP DISORDERED BREATHING FLX DX N/A 11/13/2024    Procedure: DRUG INDUCED SLEEP ENDOSCOPY;  Surgeon: Jeff Arndt MD;  Location: [facility];  Service: ENT    WI FASCIOTOMY PALMAR OPEN PARTIAL Right 11/17/2022    Procedure: Dupuytren's excision right palm extending into the small finger with release of the metacarpophalangeal joint and proximal interphalangeal joint.  Dupuytren's excision right palm with release of the right ring finger metacarpophalangeal joint.;  Surgeon: Sarthak Vlilatoro MD;  Location: [facility];  Service: Orthopedics    WI FASCIOTOMY  PALMAR OPEN PARTIAL Right 1/11/2024    Procedure: Right hand small finger duppuytrens excision with release of the metacarpophalangeal joint and proximal interphalangeal joint;  Surgeon: Sarthak Villatoro MD;  Location: [facility];  Service: Orthopedics    HI FASCT PALM W/WO Z-PLASTY TISSUE REARGMT/SKN GRFT Right 1/11/2024    Procedure: Right hand small finger duppuytrens excision with release of the metacarpophalangeal joint and proximal interphalangeal joint;  Surgeon: Sarthak Villatoro MD;  Location: [facility];  Service: Orthopedics    HI FASCT PRTL PALMAR 1 DGT PROX IPHAL JT W/WO RPR Right 1/11/2024    Procedure: Right hand small finger duppuytrens excision with release of the metacarpophalangeal joint and proximal interphalangeal joint;  Surgeon: Sarthak Villatoro MD;  Location: [facility];  Service: Orthopedics    HI I&D ISCHIORECTAL&/PERIRECTAL ABSCESS SPX Right 10/24/2021    Procedure: excisional debredement right gluteal cheek ;  Surgeon: Jeana Bustamante MD;  Location: [facility];  Service: General    HI PLACEMENT SETON N/A 11/17/2021    Procedure: PLACEMENT SETON;  Surgeon: Davi Thao MD;  [facility];  Service: Colorectal    HI PRQ IMPLTJ NSTIM ELECTRODE ARRAY EPIDURAL Right 03/26/2021    Procedure: INSERTION THORACIC DORSAL COLUMN SPINAL CORD STIMULATOR PERCUTANEOUS W IMPLANTABLE PULSE GENERATOR, RIGHT;  Surgeon: Akshat Witt MD;  Location: [facility];  Service: Neurosurgery     Social History   Social History     Substance and Sexual Activity   Alcohol Use Not Currently    Alcohol/week: 1.0 standard drink of alcohol    Types: 1 Cans of beer per week     Social History     Substance and Sexual Activity   Drug Use Yes    Frequency: 7.0 times per week    Types: Marijuana    Comment: Medical marijuana-vapes, bedtime     Social History     Tobacco Use   Smoking Status Former    Current packs/day: 0.00    Average packs/day: 0.5 packs/day for 30.0 years (15.0 ttl pk-yrs)    Types:  Cigarettes    Start date: 1/1/2019    Quit date: 2/4/2021    Years since quitting: 3.7    Passive exposure: Past   Smokeless Tobacco Never     Family History:   Family History   Problem Relation Age of Onset    COPD Mother     Lung cancer Mother     Heart disease Father     Heart attack Father     Hypertension Sister     Diabetes type II Sister     No Known Problems Sister     No Known Problems Sister     No Known Problems Sister     No Known Problems Brother     Heart attack Brother     No Known Problems Son     No Known Problems Son     Colon cancer Neg Hx     Colon polyps Neg Hx     Inflammatory bowel disease Neg Hx        Meds/Allergies   Current Outpatient Medications   Medication Sig Dispense Refill    albuterol (PROVENTIL HFA,VENTOLIN HFA) 90 mcg/act inhaler TAKE 2 PUFFS BY MOUTH EVERY 6 HOURS AS NEEDED FOR WHEEZE OR FOR SHORTNESS OF BREATH 6.7 g 2    Alcohol Swabs 70 % PADS May substitute brand based on insurance coverage. Check glucose TID. 100 each 0    amitriptyline (ELAVIL) 100 mg tablet TAKE 3 TABLETS BY MOUTH AT BEDTIME 270 tablet 1    atorvastatin (LIPITOR) 40 mg tablet TAKE 1 TABLET BY MOUTH EVERY DAY IN THE EVENING 90 tablet 1    azaTHIOprine (IMURAN) 50 mg tablet TAKE 1 TABLET BY MOUTH EVERY DAY 90 tablet 4    Blood Glucose Monitoring Suppl (OneTouch Verio Reflect) w/Device KIT May substitute brand based on insurance coverage. Check glucose TID. 1 kit 0    buPROPion (WELLBUTRIN SR) 150 mg 12 hr tablet Take 1 tablet (150 mg total) by mouth 2 (two) times a day 180 tablet 3    cyclobenzaprine (FLEXERIL) 10 mg tablet TAKE 1 TABLET BY MOUTH EVERYDAY AT BEDTIME 90 tablet 1    furosemide (LASIX) 20 mg tablet TAKE 1 TABLET BY MOUTH ONCE DAILY 90 tablet 3    glucose blood (OneTouch Verio) test strip May substitute brand based on insurance coverage. Check glucose TID. 100 each 5    lisinopril (ZESTRIL) 20 mg tablet Take 1 tablet (20 mg total) by mouth daily 90 tablet 1    loperamide (IMODIUM) 2 mg capsule  TAKE 1 CAPSULE (2 MG TOTAL) BY MOUTH EVERY OTHER DAY FOR 7 DAYS, THEN 1 CAPSULE (2 MG TOTAL) IN THE MORNING FOR 7 DAYS. 30 capsule 2    metFORMIN (GLUCOPHAGE-XR) 500 mg 24 hr tablet 1 in am and 2 in pm 360 tablet 3    naproxen (Naprosyn) 500 mg tablet Take 1 tablet (500 mg total) by mouth 2 (two) times a day with meals 180 tablet 3    omeprazole (PriLOSEC) 40 MG capsule TAKE 1 CAPSULE (40 MG TOTAL) BY MOUTH DAILY. 100 capsule 1    ondansetron (ZOFRAN) 4 mg tablet Take 1 tablet (4 mg total) by mouth every 8 (eight) hours as needed for nausea or vomiting 20 tablet 1    OneTouch Delica Lancets 33G MISC May substitute brand based on insurance coverage. Check glucose TID. 100 each 5    pregabalin (LYRICA) 150 mg capsule TAKE 1 CAPSULE BY MOUTH THREE TIMES A DAY 90 capsule 0    RA Vitamin D-3 25 MCG (1000 UT) tablet TAKE 2 TABLETS BY MOUTH DAILY 60 tablet 2    vedolizumab (Entyvio) SOLR Inject 300 mg into a catheter in a vein every 4 weeks      acetaminophen (TYLENOL) 650 mg CR tablet Take 1 tablet (650 mg total) by mouth every 8 (eight) hours as needed for mild pain 30 tablet 0     No current facility-administered medications for this visit.     No Known Allergies    Objective   Vitals: Blood pressure 120/80, pulse 98, height 6' (1.829 m), weight 90.8 kg (200 lb 3.2 oz).  Invasive Devices       None                   Physical Exam    Thyroid is normal in size. No palpable thyroid nodules.  Lungs are clear to auscultation.  Heart has a regular rate and rhythm. No murmurs.  No lower extremity edema in the musculoskeletal system.      The history was obtained from the review of the chart and from the patient.    Lab Results:    Most recent Alc is  Lab Results   Component Value Date    HGBA1C 6.2 (H) 11/13/2024           Blood work performed on 11/13/2024 showed a TSH of 1.48.    Vitamin B12 is 284.    CMP showed a glucose of 129 fasting but was otherwise normal.    Blood work performed on 11/5/2024 showed a CBC with an  elevated MCV but was otherwise normal.    Blood work performed on 8/8/2024 showed a urine microalbumin to creatinine ratio of 6.  25-hydroxy vitamin D level of 71.6.  Total cholesterol 103, triglyceride 176, HDL 36, LDL 32.    Lab Results   Component Value Date    CREATININE 0.94 11/13/2024    CREATININE 0.90 11/05/2024    CREATININE 0.90 08/08/2024    BUN 20 11/13/2024     10/03/2016    K 4.4 11/13/2024     11/13/2024    CO2 29 11/13/2024     GFR, Calculated   Date Value Ref Range Status   12/12/2019 90 >60 mL/min/1.73m2 Final     Comment:     mL/min per 1.73 square meters                                            Normal Function or Mild Renal    Disease (if clinically at risk):  >or=60  Moderately Decreased:                30-59  Severely Decreased:                  15-29  Renal Failure:                         <15                                            -American GFR: multiply reported GFR by 1.16    Please note that the eGFR is based on the CKD-EPI calculation, and is not intended to be used for drug dosing.     eGFR   Date Value Ref Range Status   11/13/2024 92 ml/min/1.73sq m Final         Lab Results   Component Value Date    CHOL 208 (H) 10/03/2016    HDL 36 (L) 08/08/2024    TRIG 176 (H) 08/08/2024       Lab Results   Component Value Date    ALT 29 11/13/2024    AST 23 11/13/2024    ALKPHOS 100 11/13/2024    BILITOT 0.8 10/03/2016                 Future Appointments   Date Time Provider Department Center   11/27/2024  8:45 AM Jovanni Rodas DPM [facility]   12/6/2024  7:45 AM Rosmery Weir DPM POD [facility]   12/17/2024  1:00 PM Sharon Hampton MD GI BUX [facility]   12/18/2024  8:00 AM MIKE Matthews NEURO ALL [facility]   1/6/2025  7:30 AM Ariel Sosa MD UP BKS [facility]   2/10/2025  8:45 AM Melissa Whatley RD DIAB ED [facility]   3/5/2025  9:15 AM MIKE Garcias ENDO [facility]   3/20/2025  1:00 PM Bernardino Woodward MD GI BUX QU  [facility]   7/8/2025  7:30 AM MD LETITIA [facility]

## 2024-11-20 NOTE — PROGRESS NOTES
Patient ID: Benito Park is a 54 y.o. male Date of Birth 1970       Chief Complaint   Patient presents with    Follow Up Wound Care Visit     Bilateral feet wounds       Allergies:  Patient has no known allergies.    Diagnosis:  1. Diabetic ulcer of right great toe (HCC)  -     Wound cleansing and dressings; Future  -     Wound Procedure Treatment  2. Diabetic ulcer of left great toe (HCC)  -     Wound cleansing and dressings; Future  -     Wound Procedure Treatment  3. Diabetic polyneuropathy associated with type 2 diabetes mellitus (HCC)     Diagnosis ICD-10-CM Associated Orders   1. Diabetic ulcer of right great toe (HCC)  E11.621 Wound cleansing and dressings    L97.519 Wound Procedure Treatment      2. Diabetic ulcer of left great toe (HCC)  E11.621 Wound cleansing and dressings    L97.529 Wound Procedure Treatment      3. Diabetic polyneuropathy associated with type 2 diabetes mellitus (HCC)  E11.42            Assessment & Plan:  Diabetic Loza grade 1 ulceration plantar bilateral hallux, wounds were debrided through subcuticular tissues, right great toe was dressed with Dermagran gauze dry dressing, left great toe was dressed with endoform covered with Dermagran gauze dry dressing.  Football dressing was applied bilaterally, he will leave them dry clean and intact for 1 week, he will use surgical shoe to ambulate for ADLs only, use cast cover, do not get feet wet in shower.  No driving  I personally reviewed patient's lower extremity arterial Doppler results, no evidence of arterial occlusive disease, SHON, great toe and metatarsal pressures are all above healing.  Today I reviewed frequent elevation, low-sodium diet, proper protein intake, proper glycemic control, strict pressure and friction reduction.      Debridement   Wound 11/06/24 Diabetic Ulcer Plantar Left    Universal Protocol:  procedure performed by consultantConsent: Verbal consent obtained.  Risks and benefits: risks, benefits and  "alternatives were discussed  Consent given by: patient  Time out: Immediately prior to procedure a \"time out\" was called to verify the correct patient, procedure, equipment, support staff and site/side marked as required.  Patient understanding: patient states understanding of the procedure being performed  Patient identity confirmed: verbally with patient    Debridement Details  Performed by: physician  Debridement type: surgical  Level of debridement: subcutaneous tissue  Pain control: lidocaine 4%      Post-debridement measurements  Length (cm): 0.6  Width (cm): 0.6  Depth (cm): 0.2  Percent debrided: 100%  Surface Area (cm^2): 0.36  Area Debrided (cm^2): 0.36  Volume (cm^3): 0.07    Tissue and other material debrided: subcutaneous tissue  Devitalized tissue debrided: biofilm, callus, fibrin, necrotic debris and slough  Instrument(s) utilized: blade  Bleeding: small  Hemostasis obtained with: pressure  Procedural pain (0-10): insensate  Post-procedural pain: insensate   Response to treatment: procedure was tolerated well    Debridement   Wound 11/06/24 Diabetic Ulcer Plantar Right    Universal Protocol:  procedure performed by consultantConsent: Verbal consent obtained.  Risks and benefits: risks, benefits and alternatives were discussed  Consent given by: patient  Time out: Immediately prior to procedure a \"time out\" was called to verify the correct patient, procedure, equipment, support staff and site/side marked as required.  Patient understanding: patient states understanding of the procedure being performed  Patient identity confirmed: verbally with patient    Debridement Details  Performed by: physician  Debridement type: surgical  Level of debridement: subcutaneous tissue  Pain control: none      Post-debridement measurements  Length (cm): 0.4  Width (cm): 0.2  Depth (cm): 0.1  Percent debrided: 100%  Surface Area (cm^2): 0.08  Area Debrided (cm^2): 0.08  Volume (cm^3): 0.01    Tissue and other material " debrided: subcutaneous tissue  Devitalized tissue debrided: biofilm, callus, fibrin, necrotic debris and slough  Instrument(s) utilized: blade  Bleeding: small  Hemostasis obtained with: pressure  Procedural pain (0-10): insensate  Post-procedural pain: insensate   Response to treatment: procedure was tolerated well               Subjective:   Presents today for evaluation and care of bilateral great toe diabetic ulcerations, he is tolerating football dressing well although he did have to remove it on Monday for his lower extremity arterial Dopplers.          The following portions of the patient's history were reviewed and updated as appropriate:   Patient Active Problem List   Diagnosis    Sprain of anterior talofibular ligament of right ankle    Perianal abscess    Polyneuropathy    Chronic bilateral low back pain with bilateral sciatica    Bilateral lower extremity edema    Chronic pain syndrome    Failed back surgical syndrome    Lumbar spondylosis    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 ([facility])    Congenital fusion of sacroiliac joint    Terminal ileitis ([facility])    Viral enteritis    Chronic foot pain    Lumbar radiculopathy    GERD (gastroesophageal reflux disease)    History of colon polyps    Perianal fistula    Functional diarrhea    Blepharitis, left eye    Closed fracture of radial styloid    Compression of right ulnar nerve at multiple levels    Degenerative disc disease at L5-S1 level    History of lumbar fusion    Numbness and tingling of both lower extremities    Periorbital cellulitis of left eye    Right leg swelling    Tobacco use    Vitamin D deficiency    Arthritis of right shoulder region    Chronic right shoulder pain    Myofascial pain syndrome    S/P insertion of spinal cord stimulator    Crohn's disease ([facility])    Numbness and tingling in right hand    Neck pain    Cervical radiculopathy    Herniated nucleus pulposus, C3-4    Hypertension    Smoker    Heavy alcohol consumption    Sleep apnea     Snoring    Excessive daytime sleepiness    Overweight (BMI 25.0-29.9)    Dupuytren contracture    Capillary disorder    Epididymitis, right    Obstructive sleep apnea syndrome    Pes anserinus bursitis of left knee    Arthralgia    Ulcer of left foot, limited to breakdown of skin (HCC)    Dysarthria    acute Confusional state    Hyperlipidemia    Type 2 diabetes mellitus with hyperglycemia, without long-term current use of insulin (HCC)    Diabetic polyneuropathy associated with type 2 diabetes mellitus (HCC)    Lung nodule    Abnormal movements    Episode of change in speech    Hoarseness    Diabetic ulcer of left foot associated with type 2 diabetes mellitus, limited to breakdown of skin, unspecified part of foot (HCC)    Carpal tunnel syndrome of right wrist     Past Medical History:   Diagnosis Date    Anxiety     Back pain     Callus Few months ago    Colon polyp     COPD (chronic obstructive pulmonary disease) ([facility])     Crohn's disease (HCC)     Depression     Diabetes mellitus ([facility])     GERD (gastroesophageal reflux disease)     Hyperlipidemia     Hypertension     Perianal fistula     Sleep apnea     no current CPAP use    Terminal ileitis ([facility])      Past Surgical History:   Procedure Laterality Date    BACK SURGERY  2019    CAST APPLICATION Right 11/17/2022    Procedure: Application short-arm splint;  Surgeon: Sarthak Villatoro MD;  Location:  MAIN OR;  Service: Orthopedics    COLONOSCOPY      EGD      HAND CONTRACTURE RELEASE Left 06/01/2023    Procedure: left ring and small finger dupuytrens excision and ring finger Metacarpophalangeal joint release and small finger Metacarpophalangeal and proximal interphalangeal joint release;  Surgeon: Sarthak Villatoro MD;  Location:  MAIN OR;  Service: Orthopedics    HERNIA REPAIR      umbilical hernia    RI ANRCT XM SURG REQ ANES GENERAL SPI/EDRL DX N/A 11/17/2021    Procedure: EXAM UNDER ANESTHESIA (EUA);  Surgeon: Davi Thao MD;  Location:   MAIN OR;  Service: Colorectal    TX APPLICATION SHORT ARM SPLINT FOREARM-HAND STATIC Right 1/11/2024    Procedure: Application short arm splint;  Surgeon: Sarthak Villatoro MD;  Location: [facility];  Service: Orthopedics    TX CAPSULECTOMY/CAPSULOTOMY IPHAL JOINT EACH Right 1/11/2024    Procedure: Contracture release right hand small finger proximal interphalangeal joint;  Surgeon: Sarthak Villatoro MD;  Location: [facility];  Service: Orthopedics    TX DISE DYN EVAL SLEEP DISORDERED BREATHING FLX DX N/A 11/13/2024    Procedure: DRUG INDUCED SLEEP ENDOSCOPY;  Surgeon: Jeff Arndt MD;  Location: [facility] Main OR;  Service: ENT    TX FASCIOTOMY PALMAR OPEN PARTIAL Right 11/17/2022    Procedure: Dupuytren's excision right palm extending into the small finger with release of the metacarpophalangeal joint and proximal interphalangeal joint.  Dupuytren's excision right palm with release of the right ring finger metacarpophalangeal joint.;  Surgeon: Sarthak Villatoro MD;  Location: [facility];  Service: Orthopedics    TX FASCIOTOMY PALMAR OPEN PARTIAL Right 1/11/2024    Procedure: Right hand small finger duppuytrens excision with release of the metacarpophalangeal joint and proximal interphalangeal joint;  Surgeon: Sarthak Villatoro MD;  Location: [facility];  Service: Orthopedics    TX FASCT PALM W/WO Z-PLASTY TISSUE REARGMT/SKN GRFT Right 1/11/2024    Procedure: Right hand small finger duppuytrens excision with release of the metacarpophalangeal joint and proximal interphalangeal joint;  Surgeon: Sarthak Villatoro MD;  Location: [facility];  Service: Orthopedics    TX FASCT PRTL PALMAR 1 DGT PROX IPHAL JT W/WO RPR Right 1/11/2024    Procedure: Right hand small finger duppuytrens excision with release of the metacarpophalangeal joint and proximal interphalangeal joint;  Surgeon: Sarthak Villatoro MD;  Location: [facility];  Service: Orthopedics    TX I&D ISCHIORECTAL&/PERIRECTAL ABSCESS SPX Right 10/24/2021     Procedure: excisional debredement right gluteal cheek ;  Surgeon: Jeana Bustamante MD;  Location: [facility];  Service: General    MT PLACEMENT SETON N/A 11/17/2021    Procedure: PLACEMENT SETON;  Surgeon: Davi Thao MD;  Location: [facility];  Service: Colorectal    MT PRQ IMPLTJ NSTIM ELECTRODE ARRAY EPIDURAL Right 03/26/2021    Procedure: INSERTION THORACIC DORSAL COLUMN SPINAL CORD STIMULATOR PERCUTANEOUS W IMPLANTABLE PULSE GENERATOR, RIGHT;  Surgeon: Akshat Witt MD;  Location: [facility];  Service: Neurosurgery     Social History     Socioeconomic History    Marital status: /Civil Union     Spouse name: Not on file    Number of children: Not on file    Years of education: Not on file    Highest education level: Not on file   Occupational History    Not on file   Tobacco Use    Smoking status: Former     Current packs/day: 0.00     Average packs/day: 0.5 packs/day for 30.0 years (15.0 ttl pk-yrs)     Types: Cigarettes     Start date: 1/1/2019     Quit date: 2/4/2021     Years since quitting: 3.7     Passive exposure: Past    Smokeless tobacco: Never   Vaping Use    Vaping status: Every Day    Substances: THC   Substance and Sexual Activity    Alcohol use: Not Currently     Alcohol/week: 1.0 standard drink of alcohol     Types: 1 Cans of beer per week    Drug use: Yes     Frequency: 7.0 times per week     Types: Marijuana     Comment: Medical marijuana-vapes, bedtime    Sexual activity: Yes     Partners: Female, Male   Other Topics Concern    Not on file   Social History Narrative    Not on file     Social Drivers of Health     Financial Resource Strain: Low Risk  (6/7/2023)    Overall Financial Resource Strain (CARDIA)     Difficulty of Paying Living Expenses: Not hard at all   Food Insecurity: No Food Insecurity (7/5/2024)    Nursing - Inadequate Food Risk Classification     Worried About Running Out of Food in the Last Year: Never true     Ran Out of Food in the Last Year: Never true      Ran Out of Food in the Last Year: Not on file   Transportation Needs: No Transportation Needs (7/5/2024)    PRAPARE - Transportation     Lack of Transportation (Medical): No     Lack of Transportation (Non-Medical): No   Physical Activity: Sufficiently Active (4/11/2023)    Received from [employer]    Physical Activity   Stress: No Stress Concern Present (4/11/2023)    Received from [facility], [facility]    [employer] - Occupational Stress Questionnaire     Feeling of Stress : Only a little   Social Connections: Moderately Integrated (4/11/2023)    Received from [facility]    Social Connection and Isolation Panel [NHANES]   Intimate Partner Violence: Not At Risk (4/11/2023)    Received from [facility], [facility]    Humiliation, Afraid, Rape, and Kick questionnaire     Fear of Current or Ex-Partner: No     Emotionally Abused: No     Physically Abused: No     Sexually Abused: No   Housing Stability: Low Risk  (7/5/2024)    Housing Stability Vital Sign     Unable to Pay for Housing in the Last Year: No     Number of Times Moved in the Last Year: 1     Homeless in the Last Year: No        Current Outpatient Medications:     acetaminophen (TYLENOL) 650 mg CR tablet, Take 1 tablet (650 mg total) by mouth every 8 (eight) hours as needed for mild pain, Disp: 30 tablet, Rfl: 0    albuterol (PROVENTIL HFA,VENTOLIN HFA) 90 mcg/act inhaler, TAKE 2 PUFFS BY MOUTH EVERY 6 HOURS AS NEEDED FOR WHEEZE OR FOR SHORTNESS OF BREATH, Disp: 6.7 g, Rfl: 2    Alcohol Swabs 70 % PADS, May substitute brand based on insurance coverage. Check glucose TID., Disp: 100 each, Rfl: 0    amitriptyline (ELAVIL) 100 mg tablet, TAKE 3 TABLETS BY MOUTH AT BEDTIME, Disp: 270 tablet, Rfl: 1    atorvastatin (LIPITOR) 40 mg tablet, TAKE 1 TABLET BY MOUTH EVERY DAY IN THE EVENING, Disp: 90 tablet, Rfl: 1    azaTHIOprine (IMURAN) 50 mg tablet, TAKE 1 TABLET BY MOUTH EVERY DAY, Disp: 90 tablet, Rfl: 4    Blood Glucose  Monitoring Suppl (OneTouch Verio Reflect) w/Device KIT, May substitute brand based on insurance coverage. Check glucose TID., Disp: 1 kit, Rfl: 0    buPROPion (WELLBUTRIN SR) 150 mg 12 hr tablet, Take 1 tablet (150 mg total) by mouth 2 (two) times a day, Disp: 180 tablet, Rfl: 3    cyclobenzaprine (FLEXERIL) 10 mg tablet, TAKE 1 TABLET BY MOUTH EVERYDAY AT BEDTIME, Disp: 90 tablet, Rfl: 1    furosemide (LASIX) 20 mg tablet, TAKE 1 TABLET BY MOUTH ONCE DAILY, Disp: 90 tablet, Rfl: 3    glucose blood (OneTouch Verio) test strip, May substitute brand based on insurance coverage. Check glucose TID., Disp: 100 each, Rfl: 5    lisinopril (ZESTRIL) 20 mg tablet, Take 1 tablet (20 mg total) by mouth daily, Disp: 90 tablet, Rfl: 1    loperamide (IMODIUM) 2 mg capsule, TAKE 1 CAPSULE (2 MG TOTAL) BY MOUTH EVERY OTHER DAY FOR 7 DAYS, THEN 1 CAPSULE (2 MG TOTAL) IN THE MORNING FOR 7 DAYS., Disp: 30 capsule, Rfl: 2    metFORMIN (GLUCOPHAGE-XR) 500 mg 24 hr tablet, 1 in am and 2 in pm, Disp: 360 tablet, Rfl: 3    naproxen (Naprosyn) 500 mg tablet, Take 1 tablet (500 mg total) by mouth 2 (two) times a day with meals, Disp: 180 tablet, Rfl: 3    omeprazole (PriLOSEC) 40 MG capsule, TAKE 1 CAPSULE (40 MG TOTAL) BY MOUTH DAILY., Disp: 100 capsule, Rfl: 1    ondansetron (ZOFRAN) 4 mg tablet, Take 1 tablet (4 mg total) by mouth every 8 (eight) hours as needed for nausea or vomiting, Disp: 20 tablet, Rfl: 1    OneTouch Delica Lancets 33G MISC, May substitute brand based on insurance coverage. Check glucose TID., Disp: 100 each, Rfl: 5    pregabalin (LYRICA) 150 mg capsule, TAKE 1 CAPSULE BY MOUTH THREE TIMES A DAY, Disp: 90 capsule, Rfl: 0    RA Vitamin D-3 25 MCG (1000 UT) tablet, TAKE 2 TABLETS BY MOUTH DAILY, Disp: 60 tablet, Rfl: 2    vedolizumab (Entyvio) SOLR, Inject 300 mg into a catheter in a vein every 4 weeks, Disp: , Rfl:   Family History   Problem Relation Age of Onset    COPD Mother     Lung cancer Mother     Heart disease  Father     Heart attack Father     Hypertension Sister     Diabetes type II Sister     No Known Problems Sister     No Known Problems Sister     No Known Problems Sister     No Known Problems Brother     Heart attack Brother     No Known Problems Son     No Known Problems Son     Colon cancer Neg Hx     Colon polyps Neg Hx     Inflammatory bowel disease Neg Hx        Review of Systems   Constitutional:  Negative for chills and fever.   HENT:  Negative for ear pain and sore throat.    Eyes:  Negative for pain and visual disturbance.   Respiratory:  Negative for cough and shortness of breath.    Cardiovascular:  Negative for chest pain and palpitations.   Gastrointestinal:  Negative for abdominal pain and vomiting.   Genitourinary:  Negative for dysuria and hematuria.   Musculoskeletal:  Positive for gait problem. Negative for arthralgias and back pain.   Skin:  Positive for color change and wound. Negative for rash.   Neurological:  Positive for numbness. Negative for seizures and syncope.   Psychiatric/Behavioral: Negative.     All other systems reviewed and are negative.        Objective:  /68   Pulse 96   Temp (!) 97.1 °F (36.2 °C)   Resp 16     Physical Exam  Constitutional:       Appearance: Normal appearance. He is normal weight.   HENT:      Head: Normocephalic and atraumatic.      Right Ear: External ear normal.      Left Ear: External ear normal.      Nose: Nose normal.      Mouth/Throat:      Mouth: Mucous membranes are moist.      Pharynx: Oropharynx is clear.   Eyes:      Conjunctiva/sclera: Conjunctivae normal.      Pupils: Pupils are equal, round, and reactive to light.   Cardiovascular:      Pulses: Normal pulses.           Dorsalis pedis pulses are 2+ on the right side and 2+ on the left side.        Posterior tibial pulses are 2+ on the right side and 2+ on the left side.   Pulmonary:      Effort: Pulmonary effort is normal.   Musculoskeletal:      Cervical back: Normal range of motion.       Right lower leg: No edema.      Left lower leg: No edema.   Feet:      Right foot:      Protective Sensation: 10 sites tested.  0 sites sensed.      Skin integrity: Ulcer present.      Toenail Condition: Right toenails are normal.      Left foot:      Protective Sensation: 10 sites tested.  0 sites sensed.      Skin integrity: Ulcer present.      Toenail Condition: Left toenails are normal.   Neurological:      Mental Status: He is alert. Mental status is at baseline.   Psychiatric:         Mood and Affect: Mood normal.         Behavior: Behavior normal.           Wound 11/06/24 Diabetic Ulcer Plantar Left (Active)   Enter Loza score: Loza Grade 1: Partial or full-thickness ulcer (superficial) 11/20/24 1537   Wound Image Images linked 11/20/24 1537   Wound Description Pink 11/20/24 1537   Sydney-wound Assessment Callus;Dry 11/20/24 1537   Wound Length (cm) 0.5 cm 11/20/24 1537   Wound Width (cm) 0.5 cm 11/20/24 1537   Wound Depth (cm) 0.1 cm 11/20/24 1537   Wound Surface Area (cm^2) 0.25 cm^2 11/20/24 1537   Wound Volume (cm^3) 0.025 cm^3 11/20/24 1537   Calculated Wound Volume (cm^3) 0.03 cm^3 11/20/24 1537   Change in Wound Size % 76.92 11/20/24 1537   Drainage Amount Scant 11/20/24 1537   Drainage Description Serosanguineous 11/20/24 1537   Non-staged Wound Description Partial thickness 11/20/24 1537   Dressing Status -- (moved, not covering wound) 11/20/24 1537       Wound 11/06/24 Diabetic Ulcer Plantar Right (Active)   Wound Image Images linked 11/20/24 1540   Wound Description Epithelialization 11/20/24 1540   Sydney-wound Assessment Dry 11/20/24 1540   Wound Length (cm) 0 cm 11/20/24 1540   Wound Width (cm) 0 cm 11/20/24 1540   Wound Depth (cm) 0 cm 11/20/24 1540   Wound Surface Area (cm^2) 0 cm^2 11/20/24 1540   Wound Volume (cm^3) 0 cm^3 11/20/24 1540   Calculated Wound Volume (cm^3) 0 cm^3 11/20/24 1540   Change in Wound Size % 100 11/20/24 1540   Drainage Amount None 11/20/24 1540   Non-staged Wound  Description Not applicable 11/20/24 1540   Dressing Status Dry 11/20/24 1540                VAS ARTERIAL DUPLEX- LOWER LIMB BILATERAL  Result Date: 11/18/2024  Narrative:  [facility] REPORT [facility]INICAL: Indications:  Patient presents with ulcers on his bilateral great toes which started 3 weeks ago.  Patient denies any pain with walking. Operative History: no reported cardiovascular surgeries Risk Factors The patient has history of HTN and Diabetes (NIDDM (oral meds)).  Current occasional smoker. Clinical Right Pressure:  118/ mm Hg, Left Pressure:  111/ mm Hg.  FINDINGS:  Segment                Right       Left                                          PSV (cm/s)  PSV (cm/s)  Common Femoral Artery          53          79  Prox Profunda                  70          78  Prox SFA                       72          83  Mid SFA                        86          94  Dist SFA                       63          68  Proximal Pop                   47          39  Distal Pop                     48          47  Tibioperoneal                  46              Prox Post Tibial               82          51  Dist Post Tibial               74          66  Prox. Ant. Tibial              54              Dist. Ant. Tibial              69          55     CONCLUSION: Impression: RIGHT LOWER LIMB: No evidence of significant lower extremity arterial occlusive disease. Ankle/Brachial index: 1.23 which is in the normal category PVR/ PPG tracings are normal. Metatarsal pressure of 84mmHg Great toe pressure of 82mmHg, within the healing range  LEFT LOWER LIMB: No evidence of significant lower extremity arterial occlusive disease. Ankle/Brachial index: 1.19 which is in the normal category PVR/ PPG tracings are normal. Metatarsal pressure of 100mmHg Great toe pressure of 87mmHg, within the healing range  There is no previous study for comparison.  SIGNATURE: Electronically Signed by: YANN WILLIS on 2024-11-18 02:06:01 PM      Wound  "Instructions:  Orders Placed This Encounter   Procedures    Wound cleansing and dressings     Bulky Football dressings to bilateral feet. Leave intact until follow up at [facility].     Standing Status:   Future     Expiration Date:   11/27/2024    Wound Procedure Treatment     This order was created via procedure documentation         Rosmery Weir DPM, DANITA, FACRAQUEL    Portions of the record may have been created with voice recognition software. Occasional wrong word or \"sound a like\" substitutions may have occurred due to the inherent limitations of voice recognition software. Read the chart carefully and recognize, using context, where substitutions have occurred.      "

## 2024-11-20 NOTE — PATIENT INSTRUCTIONS
Hgba1c is 6.2%. this is excellent.     Decrease the metformin  mg to 1 in am and 2 in pm.     If the blood sugars go up, let me know. Continue top test sugars 3 times a day. We can consider adding another diabetes medicine.     Try vitamin B 12 1000 mcg daily. Since the vitamin B 12 is low normal. Consider seeing if the lyrica can be decreased as lyrica works more for painful neuropathy than numbness and tingling. The numbness and tingling may not get better with lyrica, but may with vitamin B 12.  Consider talking about decreasing the amitryptiline also.     Follow up in 3-4 months with blood work.

## 2024-11-27 ENCOUNTER — OFFICE VISIT (OUTPATIENT)
Dept: WOUND CARE | Facility: HOSPITAL | Age: 54
End: 2024-11-27
Payer: MEDICARE

## 2024-11-27 VITALS
HEART RATE: 82 BPM | DIASTOLIC BLOOD PRESSURE: 82 MMHG | RESPIRATION RATE: 16 BRPM | SYSTOLIC BLOOD PRESSURE: 130 MMHG | TEMPERATURE: 99 F

## 2024-11-27 DIAGNOSIS — M20.21 HALLUX RIGIDUS OF BOTH FEET: ICD-10-CM

## 2024-11-27 DIAGNOSIS — E11.621 DIABETIC ULCER OF TOE OF LEFT FOOT ASSOCIATED WITH TYPE 2 DIABETES MELLITUS, WITH FAT LAYER EXPOSED (HCC): ICD-10-CM

## 2024-11-27 DIAGNOSIS — M20.22 HALLUX RIGIDUS OF BOTH FEET: ICD-10-CM

## 2024-11-27 DIAGNOSIS — L97.522 DIABETIC ULCER OF TOE OF LEFT FOOT ASSOCIATED WITH TYPE 2 DIABETES MELLITUS, WITH FAT LAYER EXPOSED (HCC): ICD-10-CM

## 2024-11-27 DIAGNOSIS — E11.40 TYPE 2 DIABETES MELLITUS WITH DIABETIC NEUROPATHY, WITHOUT LONG-TERM CURRENT USE OF INSULIN (HCC): ICD-10-CM

## 2024-11-27 DIAGNOSIS — L97.519 DIABETIC ULCER OF RIGHT GREAT TOE (HCC): Primary | ICD-10-CM

## 2024-11-27 DIAGNOSIS — E11.621 DIABETIC ULCER OF RIGHT GREAT TOE (HCC): Primary | ICD-10-CM

## 2024-11-27 PROCEDURE — 11042 DBRDMT SUBQ TIS 1ST 20SQCM/<: CPT | Performed by: PODIATRIST

## 2024-12-02 ENCOUNTER — HOSPITAL ENCOUNTER (OUTPATIENT)
Dept: NEUROLOGY | Facility: CLINIC | Age: 54
Discharge: HOME | End: 2024-12-02

## 2024-12-02 DIAGNOSIS — R29.818 TRANSIENT NEUROLOGICAL SYMPTOMS: ICD-10-CM

## 2024-12-03 ENCOUNTER — HOSPITAL ENCOUNTER (OUTPATIENT)
Dept: NEUROLOGY | Facility: CLINIC | Age: 54
Discharge: HOME | End: 2024-12-03
Payer: MEDICARE

## 2024-12-03 DIAGNOSIS — R29.818 TRANSIENT NEUROLOGICAL SYMPTOMS: ICD-10-CM

## 2024-12-03 PROCEDURE — 95719 EEG PHYS/QHP EA INCR W/O VID: CPT | Performed by: PSYCHIATRY & NEUROLOGY

## 2024-12-03 PROCEDURE — 95708 EEG WO VID EA 12-26HR UNMNTR: CPT

## 2024-12-03 NOTE — PROGRESS NOTES
Patient ID: Benito Park is a 54 y.o. male Date of Birth 1970       Chief Complaint   Patient presents with    Follow Up Wound Care Visit     Bilateral great toe wounds.       Allergies:  Patient has no known allergies.    Diagnosis:  1. Diabetic ulcer of right great toe (HCC)  -     Wound cleansing and dressings Diabetic Ulcer Right Plantar; Future  2. Diabetic ulcer of toe of left foot associated with type 2 diabetes mellitus, with fat layer exposed (HCC)  -     Wound cleansing and dressings Diabetic Ulcer Left Plantar; Future  3. Type 2 diabetes mellitus with diabetic neuropathy, without long-term current use of insulin ([facility])  4. Hallux rigidus of both feet     Diagnosis ICD-10-CM Associated Orders   1. Diabetic ulcer of right great toe (HCC)  E11.621 Wound cleansing and dressings Diabetic Ulcer Right Plantar    L97.519       2. Diabetic ulcer of toe of left foot associated with type 2 diabetes mellitus, with fat layer exposed (HCC)  E11.621 Wound cleansing and dressings Diabetic Ulcer Left Plantar    L97.522       3. Type 2 diabetes mellitus with diabetic neuropathy, without long-term current use of insulin ([facility])  E11.40       4. Hallux rigidus of both feet  M20.21     M20.22            Assessment & Plan:  Right great toe ulceration has closed, left remains open.  Arterial duplex from 11/18/2024 personally reviewed.  Shows no evidence of significant lower extremity arterial occlusive disease.  SHON and toe pressures are all within the normal healing range.  Football dressing with Dermagran gauze and endoform applied to left Foot/great toe..  Shoe right foot and carefully monitor area for recurrent breakdown.  Potentially may benefit from surgical procedure first MPJ to help increase available range of motion to prevent future recurrent breakdown.  Custom diabetic shoes in addition will need to be integrated into this long-term management plan.  Follow-up in 1 week.    Subjective:   11/27/2024:  54-year-old type II diabetic seen today for follow-up bilateral great toe ulcerations who was last seen by Dr. Weir 1 week ago who put him in bilateral football dressings.  Patient reports right foot opened up while in [place] and left foot opened up upon return from [place] at home.  Reports good progress and feels the right foot has closed.  Denies any new pain/discomfort.  Reports he has been diabetic for approximately 1 year.    11/20/2024: (Dr. Weir) presents today for evaluation and care of bilateral great toe diabetic ulcerations, he is tolerating football dressing well although he did have to remove it on Monday for his lower extremity arterial Dopplers.        The following portions of the patient's history were reviewed and updated as appropriate:   Patient Active Problem List   Diagnosis    Sprain of anterior talofibular ligament of right ankle    Perianal abscess    Polyneuropathy    Chronic bilateral low back pain with bilateral sciatica    Bilateral lower extremity edema    Chronic pain syndrome    Failed back surgical syndrome    Lumbar spondylosis    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 (HCC)    Congenital fusion of sacroiliac joint    Terminal ileitis (HCC)    Viral enteritis    Chronic foot pain    Lumbar radiculopathy    GERD (gastroesophageal reflux disease)    History of colon polyps    Perianal fistula    Functional diarrhea    Blepharitis, left eye    Closed fracture of radial styloid    Compression of right ulnar nerve at multiple levels    Degenerative disc disease at L5-S1 level    History of lumbar fusion    Numbness and tingling of both lower extremities    Periorbital cellulitis of left eye    Right leg swelling    Tobacco use    Vitamin D deficiency    Arthritis of right shoulder region    Chronic right shoulder pain    Myofascial pain syndrome    S/P insertion of spinal cord stimulator    Crohn's disease (HCC)    Numbness and tingling in right hand    Neck pain    Cervical  radiculopathy    Herniated nucleus pulposus, C3-4    Hypertension    Smoker    Heavy alcohol consumption    Sleep apnea    Snoring    Excessive daytime sleepiness    Overweight (BMI 25.0-29.9)    Dupuytren contracture    Capillary disorder    Epididymitis, right    Obstructive sleep apnea syndrome    Pes anserinus bursitis of left knee    Arthralgia    Ulcer of left foot, limited to breakdown of skin (HCC)    Dysarthria    acute Confusional state    Hyperlipidemia    Type 2 diabetes mellitus with hyperglycemia, without long-term current use of insulin (HCC)    Diabetic polyneuropathy associated with type 2 diabetes mellitus (HCC)    Lung nodule    Abnormal movements    Episode of change in speech    Hoarseness    Diabetic ulcer of left foot associated with type 2 diabetes mellitus, limited to breakdown of skin, unspecified part of foot (HCC)    Carpal tunnel syndrome of right wrist    Transient neurological symptoms     Past Medical History:   Diagnosis Date    Anxiety     Back pain     Callus Few months ago    Colon polyp     COPD (chronic obstructive pulmonary disease) ([facility])     Crohn's disease ([facility])     Depression     Diabetes mellitus (HCC)     GERD (gastroesophageal reflux disease)     Hyperlipidemia     Hypertension     Perianal fistula     Sleep apnea     no current CPAP use    Terminal ileitis ([facility])      Past Surgical History:   Procedure Laterality Date    BACK SURGERY  2019    CAST APPLICATION Right 11/17/2022    Procedure: Application short-arm splint;  Surgeon: Sarthak Villatoro MD;  Location: [facility];  Service: Orthopedics    COLONOSCOPY      EGD      HAND CONTRACTURE RELEASE Left 06/01/2023    Procedure: left ring and small finger dupuytrens excision and ring finger Metacarpophalangeal joint release and small finger Metacarpophalangeal and proximal interphalangeal joint release;  Surgeon: Sarthak Villatoro MD;  Location: [facility];  Service: Orthopedics    HERNIA REPAIR      umbilical hernia     MN ANRCT XM SURG REQ ANES GENERAL SPI/EDRL DX N/A 11/17/2021    Procedure: EXAM UNDER ANESTHESIA (EUA);  Surgeon: Davi Thao MD;  Location: [facility];  Service: Colorectal    MN APPLICATION SHORT ARM SPLINT FOREARM-HAND STATIC Right 1/11/2024    Procedure: Application short arm splint;  Surgeon: Sarthak Villatoro MD;  Location: [facility];  Service: Orthopedics    MN CAPSULECTOMY/CAPSULOTOMY IPHAL JOINT EACH Right 1/11/2024    Procedure: Contracture release right hand small finger proximal interphalangeal joint;  Surgeon: Sarthak Villatoro MD;  Location: [facility];  Service: Orthopedics    MN DISE DYN EVAL SLEEP DISORDERED BREATHING FLX DX N/A 11/13/2024    Procedure: DRUG INDUCED SLEEP ENDOSCOPY;  Surgeon: Jeff Arndt MD;  Location[facility];  Service: ENT    MN FASCIOTOMY PALMAR OPEN PARTIAL Right 11/17/2022    Procedure: Dupuytren's excision right palm extending into the small finger with release of the metacarpophalangeal joint and proximal interphalangeal joint.  Dupuytren's excision right palm with release of the right ring finger metacarpophalangeal joint.;  Surgeon: Sarthak Villatoro MD;  Location: [facility];  Service: Orthopedics    MN FASCIOTOMY PALMAR OPEN PARTIAL Right 1/11/2024    Procedure: Right hand small finger duppuytrens excision with release of the metacarpophalangeal joint and proximal interphalangeal joint;  Surgeon: Sarthak Villatoro MD;  Location: [facility];  Service: Orthopedics    MN FASCT PALM W/WO Z-PLASTY TISSUE REARGMT/SKN GRFT Right 1/11/2024    Procedure: Right hand small finger duppuytrens excision with release of the metacarpophalangeal joint and proximal interphalangeal joint;  Surgeon: Sarthak Villatoro MD;  Location: [facility];  Service: Orthopedics    MN FASCT PRTL PALMAR 1 DGT PROX IPHAL JT W/WO RPR Right 1/11/2024    Procedure: Right hand small finger duppuytrens excision with release of the metacarpophalangeal joint and proximal interphalangeal  joint;  Surgeon: Sarthak Villatoro MD;  Location: [facility];  Service: Orthopedics    AK I&D ISCHIORECTAL&/PERIRECTAL ABSCESS SPX Right 10/24/2021    Procedure: excisional debredement right gluteal cheek ;  Surgeon: Jeana Bustamante MD;  Location: [facility];  Service: General    AK PLACEMENT SETON N/A 11/17/2021    Procedure: PLACEMENT SETON;  Surgeon: Davi Thao MD;  [facility];  Service: Colorectal    AK PRQ IMPLTJ NSTIM ELECTRODE ARRAY EPIDURAL Right 03/26/2021    Procedure: INSERTION THORACIC DORSAL COLUMN SPINAL CORD STIMULATOR PERCUTANEOUS W IMPLANTABLE PULSE GENERATOR, RIGHT;  Surgeon: Akshat Witt MD;  Location: [facility];  Service: Neurosurgery     Social History     Socioeconomic History    Marital status: /Civil Union     Spouse name: Not on file    Number of children: Not on file    Years of education: Not on file    Highest education level: Not on file   Occupational History    Not on file   Tobacco Use    Smoking status: Former     Current packs/day: 0.00     Average packs/day: 0.5 packs/day for 30.0 years (15.0 ttl pk-yrs)     Types: Cigarettes     Start date: 1/1/2019     Quit date: 2/4/2021     Years since quitting: 3.8     Passive exposure: Past    Smokeless tobacco: Never   Vaping Use    Vaping status: Every Day    Substances: THC   Substance and Sexual Activity    Alcohol use: Not Currently     Alcohol/week: 1.0 standard drink of alcohol     Types: 1 Cans of beer per week    Drug use: Yes     Frequency: 7.0 times per week     Types: Marijuana     Comment: Medical marijuana-vapes, bedtime    Sexual activity: Yes     Partners: Female, Male   Other Topics Concern    Not on file   Social History Narrative    Not on file     Social Drivers of Health     Financial Resource Strain: Low Risk  (6/7/2023)    Overall Financial Resource Strain (CARDIA)     Difficulty of Paying Living Expenses: Not hard at all   Food Insecurity: No Food Insecurity (7/5/2024)    Nursing -  Inadequate Food Risk Classification     Worried About Running Out of Food in the Last Year: Never true     Ran Out of Food in the Last Year: Never true     Ran Out of Food in the Last Year: Not on file   Transportation Needs: No Transportation Needs (7/5/2024)    PRAPARE - Transportation     Lack of Transportation (Medical): No     Lack of Transportation (Non-Medical): No   Physical Activity: Sufficiently Active (4/11/2023)    Received from [employer]    Physical Activity   Stress: No Stress Concern Present (4/11/2023)    Received from [employer], [employer]    [facility]ational [facility] - Occupational Stress Questionnaire     Feeling of Stress : Only a little   Social Connections: Moderately Integrated (4/11/2023)    Received from [employer]    Social Connection and Isolation Panel [NHANES]   Intimate Partner Violence: Not At Risk (4/11/2023)    Received from [employer]    Humiliation, Afraid, Rape, and Kick questionnaire     Fear of Current or Ex-Partner: No     Emotionally Abused: No     Physically Abused: No     Sexually Abused: No   Housing Stability: Low Risk  (7/5/2024)    Housing Stability Vital Sign     Unable to Pay for Housing in the Last Year: No     Number of Times Moved in the Last Year: 1     Homeless in the Last Year: No        Current Outpatient Medications:     acetaminophen (TYLENOL) 650 mg CR tablet, Take 1 tablet (650 mg total) by mouth every 8 (eight) hours as needed for mild pain, Disp: 30 tablet, Rfl: 0    albuterol (PROVENTIL HFA,VENTOLIN HFA) 90 mcg/act inhaler, TAKE 2 PUFFS BY MOUTH EVERY 6 HOURS AS NEEDED FOR WHEEZE OR FOR SHORTNESS OF BREATH, Disp: 6.7 g, Rfl: 2    Alcohol Swabs 70 % PADS, May substitute brand based on insurance coverage. Check glucose TID., Disp: 100 each, Rfl: 0    amitriptyline (ELAVIL) 100 mg tablet, TAKE 3 TABLETS BY MOUTH AT BEDTIME, Disp: 270 tablet, Rfl: 1    atorvastatin (LIPITOR) 40 mg tablet, TAKE 1 TABLET BY MOUTH EVERY  DAY IN THE EVENING, Disp: 90 tablet, Rfl: 1    azaTHIOprine (IMURAN) 50 mg tablet, TAKE 1 TABLET BY MOUTH EVERY DAY, Disp: 90 tablet, Rfl: 4    Blood Glucose Monitoring Suppl (OneTouch Verio Reflect) w/Device KIT, May substitute brand based on insurance coverage. Check glucose TID., Disp: 1 kit, Rfl: 0    buPROPion (WELLBUTRIN SR) 150 mg 12 hr tablet, Take 1 tablet (150 mg total) by mouth 2 (two) times a day, Disp: 180 tablet, Rfl: 3    cyclobenzaprine (FLEXERIL) 10 mg tablet, TAKE 1 TABLET BY MOUTH EVERYDAY AT BEDTIME, Disp: 90 tablet, Rfl: 1    furosemide (LASIX) 20 mg tablet, TAKE 1 TABLET BY MOUTH ONCE DAILY, Disp: 90 tablet, Rfl: 3    glucose blood (OneTouch Verio) test strip, May substitute brand based on insurance coverage. Check glucose TID., Disp: 100 each, Rfl: 5    lisinopril (ZESTRIL) 20 mg tablet, Take 1 tablet (20 mg total) by mouth daily, Disp: 90 tablet, Rfl: 1    loperamide (IMODIUM) 2 mg capsule, TAKE 1 CAPSULE (2 MG TOTAL) BY MOUTH EVERY OTHER DAY FOR 7 DAYS, THEN 1 CAPSULE (2 MG TOTAL) IN THE MORNING FOR 7 DAYS., Disp: 30 capsule, Rfl: 2    metFORMIN (GLUCOPHAGE-XR) 500 mg 24 hr tablet, 1 in am and 2 in pm, Disp: 360 tablet, Rfl: 3    naproxen (Naprosyn) 500 mg tablet, Take 1 tablet (500 mg total) by mouth 2 (two) times a day with meals, Disp: 180 tablet, Rfl: 3    omeprazole (PriLOSEC) 40 MG capsule, TAKE 1 CAPSULE (40 MG TOTAL) BY MOUTH DAILY., Disp: 100 capsule, Rfl: 1    ondansetron (ZOFRAN) 4 mg tablet, Take 1 tablet (4 mg total) by mouth every 8 (eight) hours as needed for nausea or vomiting, Disp: 20 tablet, Rfl: 1    OneTouch Delica Lancets 33G MISC, May substitute brand based on insurance coverage. Check glucose TID., Disp: 100 each, Rfl: 5    pregabalin (LYRICA) 150 mg capsule, TAKE 1 CAPSULE BY MOUTH THREE TIMES A DAY, Disp: 90 capsule, Rfl: 0    RA Vitamin D-3 25 MCG (1000 UT) tablet, TAKE 2 TABLETS BY MOUTH DAILY, Disp: 60 tablet, Rfl: 2    vedolizumab (Entyvio) SOLR, Inject 300 mg  into a catheter in a vein every 4 weeks, Disp: , Rfl:   Family History   Problem Relation Age of Onset    COPD Mother     Lung cancer Mother     Heart disease Father     Heart attack Father     Hypertension Sister     Diabetes type II Sister     No Known Problems Sister     No Known Problems Sister     No Known Problems Sister     No Known Problems Brother     Heart attack Brother     No Known Problems Son     No Known Problems Son     Colon cancer Neg Hx     Colon polyps Neg Hx     Inflammatory bowel disease Neg Hx       Review of Systems   Constitutional:  Negative for chills and fever.   HENT:  Negative for ear pain and sore throat.    Eyes:  Negative for pain and visual disturbance.   Respiratory:  Negative for cough and shortness of breath.    Cardiovascular:  Negative for chest pain and palpitations.   Gastrointestinal:  Negative for abdominal pain and vomiting.   Genitourinary:  Negative for dysuria and hematuria.   Musculoskeletal:  Positive for gait problem. Negative for arthralgias and back pain.   Skin:  Positive for color change and wound. Negative for rash.   Neurological:  Positive for numbness. Negative for seizures and syncope.   Psychiatric/Behavioral: Negative.     All other systems reviewed and are negative.        Objective:  /82   Pulse 82   Temp 99 °F (37.2 °C)   Resp 16     Physical Exam  Constitutional:       Appearance: Normal appearance. He is normal weight.   HENT:      Head: Normocephalic and atraumatic.      Right Ear: External ear normal.      Left Ear: External ear normal.      Nose: Nose normal.      Mouth/Throat:      Mouth: Mucous membranes are moist.      Pharynx: Oropharynx is clear.   Eyes:      Conjunctiva/sclera: Conjunctivae normal.      Pupils: Pupils are equal, round, and reactive to light.   Cardiovascular:      Pulses: Normal pulses.           Dorsalis pedis pulses are 2+ on the right side and 2+ on the left side.        Posterior tibial pulses are 2+ on the right  side and 2+ on the left side.   Pulmonary:      Effort: Pulmonary effort is normal.   Musculoskeletal:      Cervical back: Normal range of motion.      Right lower leg: No edema.      Left lower leg: No edema.      Comments: Diminished first MPJ range of motion bilateral   Feet:      Right foot:      Protective Sensation: 10 sites tested.  0 sites sensed.      Skin integrity: Ulcer (Closed with stable eschar.) present.      Toenail Condition: Right toenails are normal.      Left foot:      Protective Sensation: 10 sites tested.  0 sites sensed.      Skin integrity: Ulcer (Full-thickness breakdown plantar tip of left great toe, hyperkeratotic margin, no signs of infection, wound granulating and appears healthy.) present.      Toenail Condition: Left toenails are normal.   Skin:     General: Skin is warm and dry.   Neurological:      Mental Status: He is alert. Mental status is at baseline.   Psychiatric:         Mood and Affect: Mood normal.         Behavior: Behavior normal.         Wound 11/06/24 Diabetic Ulcer Plantar Left (Active)   Enter Loza score: Loza Grade 1: Partial or full-thickness ulcer (superficial) 11/20/24 1537   Wound Image   11/27/24 0912   Wound Description Pink 11/27/24 0854   Sydney-wound Assessment Callus;Dry 11/27/24 0854   Wound Length (cm) 0.5 cm 11/27/24 0854   Wound Width (cm) 0.5 cm 11/27/24 0854   Wound Depth (cm) 0.1 cm 11/27/24 0854   Wound Surface Area (cm^2) 0.25 cm^2 11/27/24 0854   Wound Volume (cm^3) 0.025 cm^3 11/27/24 0854   Calculated Wound Volume (cm^3) 0.03 cm^3 11/27/24 0854   Change in Wound Size % 76.92 11/27/24 0854   Drainage Amount Small 11/27/24 0854   Drainage Description Serosanguineous 11/27/24 0854   Non-staged Wound Description Full thickness 11/27/24 0854   Dressing Status Intact 11/27/24 0854       Wound 11/06/24 Diabetic Ulcer Plantar Right (Active)   Enter Loza score: Loza Grade 1: Partial or full-thickness ulcer (superficial) 11/11/24 1121   Wound Image   " 11/27/24 0854   Wound Description Epithelialization 11/27/24 0854   Sydney-wound Assessment Dry;Callus 11/27/24 0854   Wound Length (cm) 0 cm 11/27/24 0854   Wound Width (cm) 0 cm 11/27/24 0854   Wound Depth (cm) 0 cm 11/27/24 0854   Wound Surface Area (cm^2) 0 cm^2 11/27/24 0854   Wound Volume (cm^3) 0 cm^3 11/27/24 0854   Calculated Wound Volume (cm^3) 0 cm^3 11/27/24 0854   Change in Wound Size % 100 11/27/24 0854   Drainage Amount None 11/27/24 0854   Drainage Description Serosanguineous 11/11/24 1121   Non-staged Wound Description Not applicable 11/27/24 0854   Dressing Status Intact 11/27/24 0854       Wound 11/13/24 Nose N/A (Active)       Debridement   Wound 11/06/24 Diabetic Ulcer Plantar Left    Universal Protocol:  procedure performed by consultantConsent: Verbal consent obtained.  Risks and benefits: risks, benefits and alternatives were discussed  Consent given by: patient  Time out: Immediately prior to procedure a \"time out\" was called to verify the correct patient, procedure, equipment, support staff and site/side marked as required.  Patient understanding: patient states understanding of the procedure being performed  Patient identity confirmed: verbally with patient    Debridement Details  Performed by: physician  Debridement type: surgical  Level of debridement: subcutaneous tissue  Pain control: lidocaine 4%      Post-debridement measurements  Length (cm): 0.5  Width (cm): 0.5  Depth (cm): 0.2  Percent debrided: 100%  Surface Area (cm^2): 0.25  Area Debrided (cm^2): 0.25  Volume (cm^3): 0.05    Tissue and other material debrided: subcutaneous tissue  Devitalized tissue debrided: necrotic debris and slough  Instrument(s) utilized: curette and blade  Technique utilized: excisionalBleeding: small  Hemostasis obtained with: pressure  Procedural pain (0-10): 3  Post-procedural pain: 3   Response to treatment: procedure was tolerated well                 Wound Instructions:  Orders Placed This Encounter " "  Procedures    Wound cleansing and dressings Diabetic Ulcer Right Plantar     Protect the right great toe wound, continue to cover with dry dressing.     Standing Status:   Future     Expiration Date:   12/4/2024    Wound cleansing and dressings Diabetic Ulcer Left Plantar     Bulky football dressing to right foot to be left in place 1 week and [facility] at [facility]     Standing Status:   Future     Expiration Date:   12/4/2024    Debridement Diabetic Ulcer Left Plantar     This order was created via procedure documentation         Jovanni Rodas DPM      Portions of the record may have been created with voice recognition software. Occasional wrong word or \"sound a like\" substitutions may have occurred due to the inherent limitations of voice recognition software. Read the chart carefully and recognize, using context, where substitutions have occurred.    "

## 2024-12-04 ENCOUNTER — HOSPITAL ENCOUNTER (OUTPATIENT)
Dept: NEUROLOGY | Facility: CLINIC | Age: 54
Discharge: HOME | End: 2024-12-04
Payer: MEDICARE

## 2024-12-04 ENCOUNTER — TELEPHONE (OUTPATIENT)
Dept: FAMILY MEDICINE CLINIC | Facility: CLINIC | Age: 54
End: 2024-12-04

## 2024-12-04 ENCOUNTER — OFFICE VISIT (OUTPATIENT)
Dept: WOUND CARE | Facility: HOSPITAL | Age: 54
End: 2024-12-04
Payer: MEDICARE

## 2024-12-04 VITALS
TEMPERATURE: 97.6 F | RESPIRATION RATE: 16 BRPM | DIASTOLIC BLOOD PRESSURE: 78 MMHG | HEART RATE: 80 BPM | SYSTOLIC BLOOD PRESSURE: 120 MMHG

## 2024-12-04 DIAGNOSIS — R29.818 TRANSIENT NEUROLOGICAL SYMPTOMS: ICD-10-CM

## 2024-12-04 DIAGNOSIS — E11.621 DIABETIC ULCER OF TOE OF LEFT FOOT ASSOCIATED WITH TYPE 2 DIABETES MELLITUS, WITH FAT LAYER EXPOSED (HCC): Primary | ICD-10-CM

## 2024-12-04 DIAGNOSIS — E11.40 TYPE 2 DIABETES MELLITUS WITH DIABETIC NEUROPATHY, WITHOUT LONG-TERM CURRENT USE OF INSULIN (HCC): ICD-10-CM

## 2024-12-04 DIAGNOSIS — L97.522 DIABETIC ULCER OF TOE OF LEFT FOOT ASSOCIATED WITH TYPE 2 DIABETES MELLITUS, WITH FAT LAYER EXPOSED (HCC): Primary | ICD-10-CM

## 2024-12-04 PROCEDURE — 95708 EEG WO VID EA 12-26HR UNMNTR: CPT

## 2024-12-04 PROCEDURE — 95719 EEG PHYS/QHP EA INCR W/O VID: CPT | Performed by: PSYCHIATRY & NEUROLOGY

## 2024-12-04 PROCEDURE — 11042 DBRDMT SUBQ TIS 1ST 20SQCM/<: CPT | Performed by: PODIATRIST

## 2024-12-04 NOTE — PROGRESS NOTES
Patient ID: Benito Park is a 54 y.o. male Date of Birth 1970       Chief Complaint   Patient presents with    Follow Up Wound Care Visit     Left great toe wound        Allergies:  Patient has no known allergies.    Diagnosis:  1. Diabetic ulcer of toe of left foot associated with type 2 diabetes mellitus, with fat layer exposed (HCC)  -     Wound cleansing and dressings Diabetic Ulcer Left Plantar; Future  -     Wound Procedure Treatment Diabetic Ulcer Left Plantar  2. Type 2 diabetes mellitus with diabetic neuropathy, without long-term current use of insulin (HCC)  -     Wound cleansing and dressings Diabetic Ulcer Left Plantar; Future  -     Wound Procedure Treatment Diabetic Ulcer Left Plantar     Diagnosis ICD-10-CM Associated Orders   1. Diabetic ulcer of toe of left foot associated with type 2 diabetes mellitus, with fat layer exposed (HCC)  E11.621 Wound cleansing and dressings Diabetic Ulcer Left Plantar    L97.522 Wound Procedure Treatment Diabetic Ulcer Left Plantar      2. Type 2 diabetes mellitus with diabetic neuropathy, without long-term current use of insulin ([facility])  E11.40 Wound cleansing and dressings Diabetic Ulcer Left Plantar     Wound Procedure Treatment Diabetic Ulcer Left Plantar           Assessment & Plan:  Right great toe ulceration remains closed, left remains open but is improving.  Excisional debridement with 10 blade and curette as noted below.    Football dressing with Dermagran gauze and endoform applied to left Foot/great toe..  Return to regular sneaker/diabetic shoe on right foot and monitor area for recurrent breakdown.  Discussed underlying limited range of motion of great toe joint as the significant contributory factor to the slow healing and breakdown.  Potentially may benefit from surgical procedure first MPJ to help increase available range of motion to prevent future recurrent breakdown.  Custom diabetic shoes in addition will need to be integrated into this  long-term management plan.  Follow-up in 1 week    Subjective:   12/4/2024: 54-year-old type II diabetic seen today for follow-up DFU left great toe, right great toe was closed as of last visit.  Reports he is not a fan of the football dressing but is anxious to not needed.  Reports right great toe is doing fine.    11/27/2024: 54-year-old type II diabetic seen today for follow-up bilateral great toe ulcerations who was last seen by Dr. Weir 1 week ago who put him in bilateral football dressings.  Patient reports right foot opened up while in [place] and left foot opened up upon return from [place] at home.  Reports good progress and feels the right foot has closed.  Denies any new pain/discomfort.  Reports he has been diabetic for approximately 1 year.    11/20/2024: (Dr. Weir) presents today for evaluation and care of bilateral great toe diabetic ulcerations, he is tolerating football dressing well although he did have to remove it on Monday for his lower extremity arterial Dopplers.        The following portions of the patient's history were reviewed and updated as appropriate:   Patient Active Problem List   Diagnosis    Sprain of anterior talofibular ligament of right ankle    Perianal abscess    Polyneuropathy    Chronic bilateral low back pain with bilateral sciatica    Bilateral lower extremity edema    Chronic pain syndrome    Failed back surgical syndrome    Lumbar spondylosis    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 (HCC)    Congenital fusion of sacroiliac joint    Terminal ileitis (HCC)    Viral enteritis    Chronic foot pain    Lumbar radiculopathy    GERD (gastroesophageal reflux disease)    History of colon polyps    Perianal fistula    Functional diarrhea    Blepharitis, left eye    Closed fracture of radial styloid    Compression of right ulnar nerve at multiple levels    Degenerative disc disease at L5-S1 level    History of lumbar fusion    Numbness and tingling of both lower extremities     Periorbital cellulitis of left eye    Right leg swelling    Tobacco use    Vitamin D deficiency    Arthritis of right shoulder region    Chronic right shoulder pain    Myofascial pain syndrome    S/P insertion of spinal cord stimulator    Crohn's disease (HCC)    Numbness and tingling in right hand    Neck pain    Cervical radiculopathy    Herniated nucleus pulposus, C3-4    Hypertension    Smoker    Heavy alcohol consumption    Sleep apnea    Snoring    Excessive daytime sleepiness    Overweight (BMI 25.0-29.9)    Dupuytren contracture    Capillary disorder    Epididymitis, right    Obstructive sleep apnea syndrome    Pes anserinus bursitis of left knee    Arthralgia    Ulcer of left foot, limited to breakdown of skin (HCC)    Dysarthria    acute Confusional state    Hyperlipidemia    Type 2 diabetes mellitus with hyperglycemia, without long-term current use of insulin (HCC)    Diabetic polyneuropathy associated with type 2 diabetes mellitus (HCC)    Lung nodule    Abnormal movements    Episode of change in speech    Hoarseness    Diabetic ulcer of left foot associated with type 2 diabetes mellitus, limited to breakdown of skin, unspecified part of foot (HCC)    Carpal tunnel syndrome of right wrist    Transient neurological symptoms     Past Medical History:   Diagnosis Date    Anxiety     Back pain     Callus Few months ago    Colon polyp     COPD (chronic obstructive pulmonary disease) (HCC)     Crohn's disease (HCC)     Depression     Diabetes mellitus (HCC)     GERD (gastroesophageal reflux disease)     Hyperlipidemia     Hypertension     Perianal fistula     Sleep apnea     no current CPAP use    Terminal ileitis (HCC)      Past Surgical History:   Procedure Laterality Date    BACK SURGERY  2019    CAST APPLICATION Right 11/17/2022    Procedure: Application short-arm splint;  Surgeon: Sarthak Villatoro MD;  Location:  MAIN OR;  Service: Orthopedics    COLONOSCOPY      EGD      HAND CONTRACTURE RELEASE Left  06/01/2023    Procedure: left ring and small finger dupuytrens excision and ring finger Metacarpophalangeal joint release and small finger Metacarpophalangeal and proximal interphalangeal joint release;  Surgeon: Satrhak Villatoro MD;  Location: [facility];  Service: Orthopedics    HERNIA REPAIR      umbilical hernia    LA ANRCT XM SURG REQ ANES GENERAL SPI/EDRL DX N/A 11/17/2021    Procedure: EXAM UNDER ANESTHESIA (EUA);  Surgeon: Davi Thao MD;  Location: [facility];  Service: Colorectal    LA APPLICATION SHORT ARM SPLINT FOREARM-HAND STATIC Right 1/11/2024    Procedure: Application short arm splint;  Surgeon: Sarthak Villatoro MD;  Location: [facility];  Service: Orthopedics    LA CAPSULECTOMY/CAPSULOTOMY IPHAL JOINT EACH Right 1/11/2024    Procedure: Contracture release right hand small finger proximal interphalangeal joint;  Surgeon: Sarthak Villatoro MD;  Location: [facility];  Service: Orthopedics    LA DISE DYN EVAL SLEEP DISORDERED BREATHING FLX DX N/A 11/13/2024    Procedure: DRUG INDUCED SLEEP ENDOSCOPY;  Surgeon: Jeff Arndt MD;  Location: [facility];  Service: ENT    LA FASCIOTOMY PALMAR OPEN PARTIAL Right 11/17/2022    Procedure: Dupuytren's excision right palm extending into the small finger with release of the metacarpophalangeal joint and proximal interphalangeal joint.  Dupuytren's excision right palm with release of the right ring finger metacarpophalangeal joint.;  Surgeon: Sarthak Villatoro MD;  Location: [facility];  Service: Orthopedics    LA FASCIOTOMY PALMAR OPEN PARTIAL Right 1/11/2024    Procedure: Right hand small finger duppuytrens excision with release of the metacarpophalangeal joint and proximal interphalangeal joint;  Surgeon: Sarthak Villatoro MD;  Location: [facility];  Service: Orthopedics    LA FASCT PALM W/WO Z-PLASTY TISSUE REARGMT/SKN GRFT Right 1/11/2024    Procedure: Right hand small finger duppuytrens excision with release of the metacarpophalangeal joint  and proximal interphalangeal joint;  Surgeon: Sarthak Villatoro MD;  Location: [facility];  Service: Orthopedics    IN FASCT PRTL PALMAR 1 DGT PROX IPHAL JT W/WO RPR Right 1/11/2024    Procedure: Right hand small finger duppuytrens excision with release of the metacarpophalangeal joint and proximal interphalangeal joint;  Surgeon: Sarthak Villatoro MD;  Location: [facility];  Service: Orthopedics    IN I&D ISCHIORECTAL&/PERIRECTAL ABSCESS SPX Right 10/24/2021    Procedure: excisional debredement right gluteal cheek ;  Surgeon: Jeana Bustamante MD;  Location: [facility];  Service: General    IN PLACEMENT SETON N/A 11/17/2021    Procedure: PLACEMENT SETON;  Surgeon: Davi Thao MD;  [facility];  Service: Colorectal    IN PRQ IMPLTJ NSTIM ELECTRODE ARRAY EPIDURAL Right 03/26/2021    Procedure: INSERTION THORACIC DORSAL COLUMN SPINAL CORD STIMULATOR PERCUTANEOUS W IMPLANTABLE PULSE GENERATOR, RIGHT;  Surgeon: Akshat Witt MD;  Location: [facility];  Service: Neurosurgery     Social History     Socioeconomic History    Marital status: /Civil Union     Spouse name: Not on file    Number of children: Not on file    Years of education: Not on file    Highest education level: Not on file   Occupational History    Not on file   Tobacco Use    Smoking status: Former     Current packs/day: 0.00     Average packs/day: 0.5 packs/day for 30.0 years (15.0 ttl pk-yrs)     Types: Cigarettes     Start date: 1/1/2019     Quit date: 2/4/2021     Years since quitting: 3.8     Passive exposure: Past    Smokeless tobacco: Never   Vaping Use    Vaping status: Every Day    Substances: THC   Substance and Sexual Activity    Alcohol use: Not Currently     Alcohol/week: 1.0 standard drink of alcohol     Types: 1 Cans of beer per week    Drug use: Yes     Frequency: 7.0 times per week     Types: Marijuana     Comment: Medical marijuana-vapes, bedtime    Sexual activity: Yes     Partners: Female, Male   Other Topics  Concern    Not on file   Social History Narrative    Not on file     Social Drivers of Health     Financial Resource Strain: Low Risk  (6/7/2023)    Overall Financial Resource Strain (CARDIA)     Difficulty of Paying Living Expenses: Not hard at all   Food Insecurity: No Food Insecurity (7/5/2024)    Nursing - Inadequate Food Risk Classification     Worried About Running Out of Food in the Last Year: Never true     Ran Out of Food in the Last Year: Never true     Ran Out of Food in the Last Year: Not on file   Transportation Needs: No Transportation Needs (7/5/2024)    PRAPARE - Transportation     Lack of Transportation (Medical): No     Lack of Transportation (Non-Medical): No   Physical Activity: Sufficiently Active (4/11/2023)    Received from [facility]    Physical Activity   Stress: No Stress Concern Present (4/11/2023)    [facility] from [facility] [employer] - Occupational Stress Questionnaire     Feeling of Stress : Only a little   Social Connections: Moderately Integrated (4/11/2023)    Received from [facility]    Social Connection and Isolation Panel [NHANES]   Intimate Partner Violence: Not At Risk (4/11/2023)    Received from [facility]    Humiliation, Afraid, Rape, and Kick questionnaire     Fear of Current or Ex-Partner: No     Emotionally Abused: No     Physically Abused: No     Sexually Abused: No   Housing Stability: Low Risk  (7/5/2024)    Housing Stability Vital Sign     Unable to Pay for Housing in the Last Year: No     Number of Times Moved in the Last Year: 1     Homeless in the Last Year: No        Current Outpatient Medications:     acetaminophen (TYLENOL) 650 mg CR tablet, Take 1 tablet (650 mg total) by mouth every 8 (eight) hours as needed for mild pain, Disp: 30 tablet, Rfl: 0    albuterol (PROVENTIL HFA,VENTOLIN HFA) 90 mcg/act inhaler, TAKE 2 PUFFS BY MOUTH EVERY 6 HOURS AS NEEDED FOR WHEEZE OR FOR SHORTNESS OF BREATH,  Disp: 6.7 g, Rfl: 2    Alcohol Swabs 70 % PADS, May substitute brand based on insurance coverage. Check glucose TID., Disp: 100 each, Rfl: 0    amitriptyline (ELAVIL) 100 mg tablet, TAKE 3 TABLETS BY MOUTH AT BEDTIME, Disp: 270 tablet, Rfl: 1    atorvastatin (LIPITOR) 40 mg tablet, TAKE 1 TABLET BY MOUTH EVERY DAY IN THE EVENING, Disp: 90 tablet, Rfl: 1    azaTHIOprine (IMURAN) 50 mg tablet, TAKE 1 TABLET BY MOUTH EVERY DAY, Disp: 90 tablet, Rfl: 4    Blood Glucose Monitoring Suppl (OneTouch Verio Reflect) w/Device KIT, May substitute brand based on insurance coverage. Check glucose TID., Disp: 1 kit, Rfl: 0    buPROPion (WELLBUTRIN SR) 150 mg 12 hr tablet, Take 1 tablet (150 mg total) by mouth 2 (two) times a day, Disp: 180 tablet, Rfl: 3    cyclobenzaprine (FLEXERIL) 10 mg tablet, TAKE 1 TABLET BY MOUTH EVERYDAY AT BEDTIME, Disp: 90 tablet, Rfl: 1    furosemide (LASIX) 20 mg tablet, TAKE 1 TABLET BY MOUTH ONCE DAILY, Disp: 90 tablet, Rfl: 3    glucose blood (OneTouch Verio) test strip, May substitute brand based on insurance coverage. Check glucose TID., Disp: 100 each, Rfl: 5    lisinopril (ZESTRIL) 20 mg tablet, Take 1 tablet (20 mg total) by mouth daily, Disp: 90 tablet, Rfl: 1    loperamide (IMODIUM) 2 mg capsule, TAKE 1 CAPSULE (2 MG TOTAL) BY MOUTH EVERY OTHER DAY FOR 7 DAYS, THEN 1 CAPSULE (2 MG TOTAL) IN THE MORNING FOR 7 DAYS., Disp: 30 capsule, Rfl: 2    metFORMIN (GLUCOPHAGE-XR) 500 mg 24 hr tablet, 1 in am and 2 in pm, Disp: 360 tablet, Rfl: 3    naproxen (Naprosyn) 500 mg tablet, Take 1 tablet (500 mg total) by mouth 2 (two) times a day with meals, Disp: 180 tablet, Rfl: 3    omeprazole (PriLOSEC) 40 MG capsule, TAKE 1 CAPSULE (40 MG TOTAL) BY MOUTH DAILY., Disp: 100 capsule, Rfl: 1    ondansetron (ZOFRAN) 4 mg tablet, Take 1 tablet (4 mg total) by mouth every 8 (eight) hours as needed for nausea or vomiting, Disp: 20 tablet, Rfl: 1    OneTouch Delica Lancets 33G MISC, May substitute brand based on  insurance coverage. Check glucose TID., Disp: 100 each, Rfl: 5    pregabalin (LYRICA) 150 mg capsule, TAKE 1 CAPSULE BY MOUTH THREE TIMES A DAY, Disp: 90 capsule, Rfl: 0    RA Vitamin D-3 25 MCG (1000 UT) tablet, TAKE 2 TABLETS BY MOUTH DAILY, Disp: 60 tablet, Rfl: 2    vedolizumab (Entyvio) SOLR, Inject 300 mg into a catheter in a vein every 4 weeks, Disp: , Rfl:   Family History   Problem Relation Age of Onset    COPD Mother     Lung cancer Mother     Heart disease Father     Heart attack Father     Hypertension Sister     Diabetes type II Sister     No Known Problems Sister     No Known Problems Sister     No Known Problems Sister     No Known Problems Brother     Heart attack Brother     No Known Problems Son     No Known Problems Son     Colon cancer Neg Hx     Colon polyps Neg Hx     Inflammatory bowel disease Neg Hx       Review of Systems   Constitutional:  Negative for chills and fever.   HENT:  Negative for ear pain and sore throat.    Eyes:  Negative for pain and visual disturbance.   Respiratory:  Negative for cough and shortness of breath.    Cardiovascular:  Negative for chest pain and palpitations.   Gastrointestinal:  Negative for abdominal pain and vomiting.   Genitourinary:  Negative for dysuria and hematuria.   Musculoskeletal:  Positive for gait problem. Negative for arthralgias and back pain.   Skin:  Positive for color change and wound. Negative for rash.   Neurological:  Positive for numbness. Negative for seizures and syncope.   Psychiatric/Behavioral: Negative.     All other systems reviewed and are negative.        Objective:  /78   Pulse 80   Temp 97.6 °F (36.4 °C)   Resp 16     Physical Exam  Constitutional:       Appearance: Normal appearance. He is normal weight.   HENT:      Head: Normocephalic and atraumatic.      Right Ear: External ear normal.      Left Ear: External ear normal.      Nose: Nose normal.      Mouth/Throat:      Mouth: Mucous membranes are moist.      Pharynx:  Oropharynx is clear.   Eyes:      Conjunctiva/sclera: Conjunctivae normal.      Pupils: Pupils are equal, round, and reactive to light.   Cardiovascular:      Pulses: Normal pulses.           Dorsalis pedis pulses are 2+ on the right side and 2+ on the left side.        Posterior tibial pulses are 2+ on the right side and 2+ on the left side.   Pulmonary:      Effort: Pulmonary effort is normal.   Musculoskeletal:      Cervical back: Normal range of motion.      Right lower leg: No edema.      Left lower leg: No edema.      Right foot: Decreased range of motion.      Left foot: Decreased range of motion.      Comments: Diminished first MPJ range of motion bilateral  Left great toe 15-20 degrees range of motion first MPJ  Right great toe 20-25 degrees range of motion first MPJ   Feet:      Right foot:      Protective Sensation: 10 sites tested.  0 sites sensed.      Skin integrity: No ulcer (Closed with stable eschar.).      Toenail Condition: Right toenails are normal.      Left foot:      Protective Sensation: 10 sites tested.  0 sites sensed.      Skin integrity: Ulcer (Full-thickness breakdownTip of LGT, hyperkeratotic margin, diminished size, no signs of infection, wound granulating and appears healthy.) present.      Toenail Condition: Left toenails are normal.   Skin:     General: Skin is warm and dry.   Neurological:      Mental Status: He is alert. Mental status is at baseline.   Psychiatric:         Mood and Affect: Mood normal.         Behavior: Behavior normal.         Wound 11/06/24 Diabetic Ulcer Plantar Left (Active)   Enter Loza score: Loza Grade 1: Partial or full-thickness ulcer (superficial) 12/04/24 0821   Wound Image   12/04/24 0821   Wound Description Pink;Epithelialization 12/04/24 0821   [place]-wound Assessment Callus;Dry 12/04/24 0821   Wound Length (cm) 0.4 cm 12/04/24 0821   Wound Width (cm) 0.3 cm 12/04/24 0821   Wound Depth (cm) 0.1 cm 12/04/24 0821   Wound Surface Area (cm^2) 0.12 cm^2  "12/04/24 0821   Wound Volume (cm^3) 0.012 cm^3 12/04/24 0821   Calculated Wound Volume (cm^3) 0.01 cm^3 12/04/24 0821   Change in Wound Size % 92.31 12/04/24 0821   Drainage Amount Small 12/04/24 0821   Drainage Description Bloody 12/04/24 0821   Non-staged Wound Description Full thickness 12/04/24 0821   Dressing Status Intact 12/04/24 0821       Wound 11/13/24 Nose N/A (Active)       Debridement   Wound 11/06/24 Diabetic Ulcer Plantar Left    Universal Protocol:  procedure performed by consultantConsent: Verbal consent obtained.  Risks and benefits: risks, benefits and alternatives were discussed  Consent given by: patient  Time out: Immediately prior to procedure a \"time out\" was called to verify the correct patient, procedure, equipment, support staff and site/side marked as required.  Patient understanding: patient states understanding of the procedure being performed  Patient identity confirmed: verbally with patient    Debridement Details  Performed by: physician  Debridement type: surgical  Level of debridement: subcutaneous tissue  Pain control: lidocaine 4%      Post-debridement measurements  Length (cm): 0.5  Width (cm): 0.4  Depth (cm): 0.2  Percent debrided: 100%  Surface Area (cm^2): 0.2  Area Debrided (cm^2): 0.2  Volume (cm^3): 0.04    Tissue and other material debrided: subcutaneous tissue  Devitalized tissue debrided: necrotic debris and slough  Instrument(s) utilized: curette and blade  Technique utilized: excisionalBleeding: small  Hemostasis obtained with: pressure  Procedural pain (0-10): 3  Post-procedural pain: 3   Response to treatment: procedure was tolerated well                 Wound Instructions:  Orders Placed This Encounter   Procedures    Wound cleansing and dressings Diabetic Ulcer Left Plantar     Keep dressing clean dry and intact until next wound care visit     Standing Status:   Future     Expiration Date:   12/11/2024    Wound Procedure Treatment Diabetic Ulcer Left Plantar     " "This order was created via procedure documentation    Debridement     This order was created via procedure documentation         Jovanni Rodas DPM      Portions of the record may have been created with voice recognition software. Occasional wrong word or \"sound a like\" substitutions may have occurred due to the inherent limitations of voice recognition software. Read the chart carefully and recognize, using context, where substitutions have occurred.    "

## 2024-12-04 NOTE — TELEPHONE ENCOUNTER
Pt called refill line and stated that he is having a hard time sleeping for the past 6 months, and is asking if a script for Trazodone be called into the pharmacy or does he need an appointment first. Please call the pt back regarding this.

## 2024-12-04 NOTE — PROGRESS NOTES
Wound Procedure Treatment Diabetic Ulcer Left Plantar    Performed by: Belen Beard RN  Authorized by: Jovanni Rodas DPM    Associated wounds:   Wound 11/06/24 Diabetic Ulcer Plantar Left  Wound cleansed with:  Soap and water  Applied primary dressing:  Collagen dressing and Other  Applied secondary dressing:  Foam and Cast padding  Dressing secured with:  Tubifast and Kerlix  Offloading device appllied:  Felt padding 1/4 inch    Endoform AM applied to wound followed by dermagran gauze  Cast padding x2 rolls  Kerlix followed by coban

## 2024-12-04 NOTE — PATIENT INSTRUCTIONS
Orders Placed This Encounter   Procedures    Wound cleansing and dressings Diabetic Ulcer Left Plantar     Keep dressing clean dry and intact until next wound care visit     Standing Status:   Future     Expiration Date:   12/11/2024

## 2024-12-06 ENCOUNTER — OFFICE VISIT (OUTPATIENT)
Dept: FAMILY MEDICINE CLINIC | Facility: CLINIC | Age: 54
End: 2024-12-06
Payer: MEDICARE

## 2024-12-06 VITALS
OXYGEN SATURATION: 94 % | HEART RATE: 94 BPM | SYSTOLIC BLOOD PRESSURE: 114 MMHG | DIASTOLIC BLOOD PRESSURE: 72 MMHG | WEIGHT: 200 LBS | BODY MASS INDEX: 26.39 KG/M2

## 2024-12-06 DIAGNOSIS — R60.0 LOCALIZED EDEMA: ICD-10-CM

## 2024-12-06 DIAGNOSIS — E78.5 HYPERLIPIDEMIA: ICD-10-CM

## 2024-12-06 DIAGNOSIS — G47.33 OBSTRUCTIVE SLEEP APNEA SYNDROME: ICD-10-CM

## 2024-12-06 DIAGNOSIS — E55.9 VITAMIN D DEFICIENCY: ICD-10-CM

## 2024-12-06 DIAGNOSIS — I10 PRIMARY HYPERTENSION: ICD-10-CM

## 2024-12-06 DIAGNOSIS — R52 PAIN: ICD-10-CM

## 2024-12-06 DIAGNOSIS — E11.00 TYPE 2 DIABETES MELLITUS WITH HYPEROSMOLARITY WITHOUT COMA, WITHOUT LONG-TERM CURRENT USE OF INSULIN (HCC): ICD-10-CM

## 2024-12-06 DIAGNOSIS — F41.9 ANXIETY: ICD-10-CM

## 2024-12-06 DIAGNOSIS — K21.9 GASTROESOPHAGEAL REFLUX DISEASE, UNSPECIFIED WHETHER ESOPHAGITIS PRESENT: ICD-10-CM

## 2024-12-06 DIAGNOSIS — R06.2 WHEEZING: ICD-10-CM

## 2024-12-06 DIAGNOSIS — J41.1 MUCOPURULENT CHRONIC BRONCHITIS (HCC): ICD-10-CM

## 2024-12-06 DIAGNOSIS — R11.0 NAUSEA: ICD-10-CM

## 2024-12-06 DIAGNOSIS — F51.01 PRIMARY INSOMNIA: Primary | ICD-10-CM

## 2024-12-06 DIAGNOSIS — G62.9 NEUROPATHY: ICD-10-CM

## 2024-12-06 PROCEDURE — 99214 OFFICE O/P EST MOD 30 MIN: CPT

## 2024-12-06 PROCEDURE — G2211 COMPLEX E/M VISIT ADD ON: HCPCS

## 2024-12-06 RX ORDER — LISINOPRIL 20 MG/1
20 TABLET ORAL DAILY
Qty: 90 TABLET | Refills: 2 | Status: SHIPPED | OUTPATIENT
Start: 2024-12-06

## 2024-12-06 RX ORDER — CYCLOBENZAPRINE HCL 10 MG
TABLET ORAL
Qty: 90 TABLET | Refills: 2 | Status: SHIPPED | OUTPATIENT
Start: 2024-12-06

## 2024-12-06 RX ORDER — ONDANSETRON 4 MG/1
4 TABLET, FILM COATED ORAL EVERY 8 HOURS PRN
Qty: 20 TABLET | Refills: 1 | Status: SHIPPED | OUTPATIENT
Start: 2024-12-06

## 2024-12-06 RX ORDER — ALBUTEROL SULFATE 90 UG/1
2 INHALANT RESPIRATORY (INHALATION) EVERY 6 HOURS PRN
Qty: 6.7 G | Refills: 2 | Status: SHIPPED | OUTPATIENT
Start: 2024-12-06

## 2024-12-06 RX ORDER — ATORVASTATIN CALCIUM 40 MG/1
40 TABLET, FILM COATED ORAL EVERY EVENING
Qty: 90 TABLET | Refills: 2 | Status: SHIPPED | OUTPATIENT
Start: 2024-12-06

## 2024-12-06 RX ORDER — OMEPRAZOLE 40 MG/1
40 CAPSULE, DELAYED RELEASE ORAL DAILY
Qty: 90 CAPSULE | Refills: 2 | Status: SHIPPED | OUTPATIENT
Start: 2024-12-06

## 2024-12-06 RX ORDER — PREGABALIN 150 MG/1
150 CAPSULE ORAL 3 TIMES DAILY
Qty: 90 CAPSULE | Refills: 2 | Status: SHIPPED | OUTPATIENT
Start: 2024-12-06

## 2024-12-06 RX ORDER — METFORMIN HYDROCHLORIDE 500 MG/1
TABLET, EXTENDED RELEASE ORAL
Qty: 360 TABLET | Refills: 2 | Status: SHIPPED | OUTPATIENT
Start: 2024-12-06

## 2024-12-06 RX ORDER — BUPROPION HYDROCHLORIDE 150 MG/1
150 TABLET, EXTENDED RELEASE ORAL 2 TIMES DAILY
Qty: 180 TABLET | Refills: 2 | Status: SHIPPED | OUTPATIENT
Start: 2024-12-06

## 2024-12-06 RX ORDER — NAPROXEN 500 MG/1
500 TABLET ORAL 2 TIMES DAILY WITH MEALS
Qty: 180 TABLET | Refills: 2 | Status: SHIPPED | OUTPATIENT
Start: 2024-12-06

## 2024-12-06 RX ORDER — FUROSEMIDE 20 MG/1
20 TABLET ORAL DAILY
Qty: 90 TABLET | Refills: 2 | Status: SHIPPED | OUTPATIENT
Start: 2024-12-06

## 2024-12-06 RX ORDER — TRAZODONE HYDROCHLORIDE 50 MG/1
TABLET, FILM COATED ORAL
Qty: 90 TABLET | Refills: 3 | Status: SHIPPED | OUTPATIENT
Start: 2024-12-06

## 2024-12-06 NOTE — ASSESSMENT & PLAN NOTE
Continue current medication regimen.   Orders:    atorvastatin (LIPITOR) 40 mg tablet; Take 1 tablet (40 mg total) by mouth every evening

## 2024-12-06 NOTE — ASSESSMENT & PLAN NOTE
Bp well controlled. Continue current medication regimen.   Orders:    lisinopril (ZESTRIL) 20 mg tablet; Take 1 tablet (20 mg total) by mouth daily

## 2024-12-06 NOTE — ASSESSMENT & PLAN NOTE
Continue current medication regimen.      Orders:    omeprazole (PriLOSEC) 40 MG capsule; Take 1 capsule (40 mg total) by mouth daily

## 2024-12-06 NOTE — PROGRESS NOTES
Name: Benito Park      : 1970      MRN: [id number]  Encounter Provider: MIKE Mane  Encounter Date: 2024   Encounter department: [facility]  :  Assessment & Plan  Primary insomnia  Reports that amitriptyline has been ineffective. Unable to fall or stay asleep. Requests trial of trazodone. Encouraged to taper of amitriptyline as he has been taking 300 mg. Trial seroquel if trazodone not effective.   Orders:    traZODone (DESYREL) 50 mg tablet; Take 1-2 tabs at bedtime as needed for insomnia    cyclobenzaprine (FLEXERIL) 10 mg tablet; TAKE 1 TABLET BY MOUTH EVERYDAY AT BEDTIME    Wheezing  Continue current medication regimen.    Orders:    albuterol (PROVENTIL HFA,VENTOLIN HFA) 90 mcg/act inhaler; Inhale 2 puffs every 6 (six) hours as needed for wheezing    Hyperlipidemia  Continue current medication regimen.   Orders:    atorvastatin (LIPITOR) 40 mg tablet; Take 1 tablet (40 mg total) by mouth every evening    Anxiety  Patient reports continued anxiety or depression but relates this to his inability to sleep. Continue wellbutrin and see if sleep improvement relieves anxiety/depressive symptoms.   Orders:    buPROPion (WELLBUTRIN SR) 150 mg 12 hr tablet; Take 1 tablet (150 mg total) by mouth 2 (two) times a day    Localized edema  Continue current medication regimen.    Orders:    furosemide (LASIX) 20 mg tablet; Take 1 tablet (20 mg total) by mouth daily    Primary hypertension  Bp well controlled. Continue current medication regimen.   Orders:    lisinopril (ZESTRIL) 20 mg tablet; Take 1 tablet (20 mg total) by mouth daily    Type 2 diabetes mellitus with hyperosmolarity without coma, without long-term current use of insulin (HCC)  A1C well controlled. Continue current medication regimen.   Lab Results   Component Value Date    HGBA1C 6.2 (H) 2024       Orders:    metFORMIN (GLUCOPHAGE-XR) 500 mg 24 hr tablet; 1 in am and 2 in  pm    Pain  Patient reports chronic back pain and peripheral neuropathy. Currently takes flexeril, naproxen, lyrica. Spoke with Dr. Sosa as he manages patient pain regimen. States that he may benefit from cymbalta but would not start that with trazodone. Continue current pain regimen.   Orders:    naproxen (Naprosyn) 500 mg tablet; Take 1 tablet (500 mg total) by mouth 2 (two) times a day with meals    Gastroesophageal reflux disease, unspecified whether esophagitis present  Continue current medication regimen.      Orders:    omeprazole (PriLOSEC) 40 MG capsule; Take 1 capsule (40 mg total) by mouth daily    Nausea  Continue current medication regimen.    Orders:    ondansetron (ZOFRAN) 4 mg tablet; Take 1 tablet (4 mg total) by mouth every 8 (eight) hours as needed for nausea or vomiting    Neuropathy  See pain note.  Orders:    pregabalin (LYRICA) 150 mg capsule; Take 1 capsule (150 mg total) by mouth 3 (three) times a day    Vitamin D deficiency    Orders:    Cholecalciferol (RA Vitamin D-3) 1,000 units tablet; Take 2 tablets (2,000 Units total) by mouth daily    Mucopurulent chronic bronchitis (HCC)         Obstructive sleep apnea syndrome  States that he has been approved for the inspira.                Depression Screening and Follow-up Plan: Patient was screened for depression during today's encounter. They screened negative with a PHQ-2 score of 0.      History of Present Illness     See assessment/plan.         Review of Systems   Constitutional:  Positive for fatigue. Negative for activity change and fever.   HENT:  Negative for congestion, ear pain, rhinorrhea and sore throat.    Eyes:  Negative for pain.   Respiratory:  Negative for cough, shortness of breath and wheezing.    Cardiovascular:  Negative for chest pain and leg swelling.   Gastrointestinal:  Negative for abdominal pain, diarrhea, nausea and vomiting.   Musculoskeletal:  Positive for back pain. Negative for arthralgias and myalgias.    Skin:  Negative for rash.   Neurological:  Positive for numbness (tingling, burning BL feet--- peripheral neuropathy). Negative for dizziness and weakness.   Psychiatric/Behavioral:  Positive for dysphoric mood and sleep disturbance. Negative for suicidal ideas. The patient is nervous/anxious.    All other systems reviewed and are negative.    Medical History Reviewed by provider this encounter:  Tobacco  Allergies  Meds  Problems  Med Hx  Surg Hx  Fam Hx     .  Current Outpatient Medications on File Prior to Visit   Medication Sig Dispense Refill    Alcohol Swabs 70 % PADS May substitute brand based on insurance coverage. Check glucose TID. 100 each 0    azaTHIOprine (IMURAN) 50 mg tablet TAKE 1 TABLET BY MOUTH EVERY DAY 90 tablet 4    Blood Glucose Monitoring Suppl (OneTouch Verio Reflect) w/Device KIT May substitute brand based on insurance coverage. Check glucose TID. 1 kit 0    glucose blood (OneTouch Verio) test strip May substitute brand based on insurance coverage. Check glucose TID. 100 each 5    loperamide (IMODIUM) 2 mg capsule TAKE 1 CAPSULE (2 MG TOTAL) BY MOUTH EVERY OTHER DAY FOR 7 DAYS, THEN 1 CAPSULE (2 MG TOTAL) IN THE MORNING FOR 7 DAYS. 30 capsule 2    OneTouch Delica Lancets 33G MISC May substitute brand based on insurance coverage. Check glucose TID. 100 each 5    vedolizumab (Entyvio) SOLR Inject 300 mg into a catheter in a vein every 4 weeks      [DISCONTINUED] albuterol (PROVENTIL HFA,VENTOLIN HFA) 90 mcg/act inhaler TAKE 2 PUFFS BY MOUTH EVERY 6 HOURS AS NEEDED FOR WHEEZE OR FOR SHORTNESS OF BREATH 6.7 g 2    [DISCONTINUED] amitriptyline (ELAVIL) 100 mg tablet TAKE 3 TABLETS BY MOUTH AT BEDTIME 270 tablet 1    [DISCONTINUED] atorvastatin (LIPITOR) 40 mg tablet TAKE 1 TABLET BY MOUTH EVERY DAY IN THE EVENING 90 tablet 1    [DISCONTINUED] buPROPion (WELLBUTRIN SR) 150 mg 12 hr tablet Take 1 tablet (150 mg total) by mouth 2 (two) times a day 180 tablet 3    [DISCONTINUED]  cyclobenzaprine (FLEXERIL) 10 mg tablet TAKE 1 TABLET BY MOUTH EVERYDAY AT BEDTIME 90 tablet 1    [DISCONTINUED] furosemide (LASIX) 20 mg tablet TAKE 1 TABLET BY MOUTH ONCE DAILY 90 tablet 3    [DISCONTINUED] lisinopril (ZESTRIL) 20 mg tablet Take 1 tablet (20 mg total) by mouth daily 90 tablet 1    [DISCONTINUED] metFORMIN (GLUCOPHAGE-XR) 500 mg 24 hr tablet 1 in am and 2 in pm 360 tablet 3    [DISCONTINUED] naproxen (Naprosyn) 500 mg tablet Take 1 tablet (500 mg total) by mouth 2 (two) times a day with meals 180 tablet 3    [DISCONTINUED] omeprazole (PriLOSEC) 40 MG capsule TAKE 1 CAPSULE (40 MG TOTAL) BY MOUTH DAILY. 100 capsule 1    [DISCONTINUED] ondansetron (ZOFRAN) 4 mg tablet Take 1 tablet (4 mg total) by mouth every 8 (eight) hours as needed for nausea or vomiting 20 tablet 1    [DISCONTINUED] pregabalin (LYRICA) 150 mg capsule TAKE 1 CAPSULE BY MOUTH THREE TIMES A DAY 90 capsule 0    [DISCONTINUED] RA Vitamin D-3 25 MCG (1000 UT) tablet TAKE 2 TABLETS BY MOUTH DAILY 60 tablet 2    acetaminophen (TYLENOL) 650 mg CR tablet Take 1 tablet (650 mg total) by mouth every 8 (eight) hours as needed for mild pain 30 tablet 0     No current facility-administered medications on file prior to visit.         Objective   /72 (BP Location: Left arm, Patient Position: Sitting, Cuff Size: Standard)   Pulse 94   Wt 90.7 kg (200 lb)   SpO2 94%   BMI 26.39 kg/m²      Physical Exam  Vitals and nursing note reviewed.   Constitutional:       General: He is not in acute distress.     Appearance: Normal appearance. He is well-developed.   HENT:      Head: Normocephalic and atraumatic.      Right Ear: External ear normal.      Left Ear: External ear normal.   Cardiovascular:      Rate and Rhythm: Normal rate and regular rhythm.      Heart sounds: Normal heart sounds. No murmur heard.  Pulmonary:      Effort: Pulmonary effort is normal. No respiratory distress.      Breath sounds: Normal breath sounds. No wheezing.    Abdominal:      General: Bowel sounds are normal. There is no distension.      Palpations: Abdomen is soft.      Tenderness: There is no abdominal tenderness.   Musculoskeletal:      Cervical back: Neck supple.      Lumbar back: Spasms and tenderness present. Decreased range of motion.   Skin:     General: Skin is warm and dry.      Capillary Refill: Capillary refill takes less than 2 seconds.   Neurological:      Mental Status: He is alert and oriented to person, place, and time. Mental status is at baseline.   Psychiatric:         Attention and Perception: Attention and perception normal.         Mood and Affect: Mood and affect normal. Mood is not anxious or depressed.         Speech: Speech normal.         Behavior: Behavior normal. Behavior is cooperative.         Thought Content: Thought content normal. Thought content does not include suicidal ideation. Thought content does not include suicidal plan.       Administrative Statements   I have spent a total time of 30 minutes in caring for this patient on the day of the visit/encounter including Risks and benefits of tx options, Instructions for management, Patient and family education, Importance of tx compliance, Risk factor reductions, Impressions, Documenting in the medical record, Reviewing / ordering tests, medicine, procedures  , and Obtaining or reviewing history  .

## 2024-12-06 NOTE — ASSESSMENT & PLAN NOTE
Orders:    Cholecalciferol (RA Vitamin D-3) 1,000 units tablet; Take 2 tablets (2,000 Units total) by mouth daily

## 2024-12-11 ENCOUNTER — OFFICE VISIT (OUTPATIENT)
Dept: WOUND CARE | Facility: HOSPITAL | Age: 54
End: 2024-12-11
Payer: MEDICARE

## 2024-12-11 VITALS
DIASTOLIC BLOOD PRESSURE: 68 MMHG | HEART RATE: 98 BPM | SYSTOLIC BLOOD PRESSURE: 102 MMHG | RESPIRATION RATE: 16 BRPM | TEMPERATURE: 97.4 F

## 2024-12-11 DIAGNOSIS — E11.40 TYPE 2 DIABETES MELLITUS WITH DIABETIC NEUROPATHY, WITHOUT LONG-TERM CURRENT USE OF INSULIN (HCC): ICD-10-CM

## 2024-12-11 DIAGNOSIS — L97.522 DIABETIC ULCER OF TOE OF LEFT FOOT ASSOCIATED WITH TYPE 2 DIABETES MELLITUS, WITH FAT LAYER EXPOSED (HCC): Primary | ICD-10-CM

## 2024-12-11 DIAGNOSIS — M20.22 HALLUX RIGIDUS OF LEFT FOOT: ICD-10-CM

## 2024-12-11 DIAGNOSIS — E11.621 DIABETIC ULCER OF TOE OF LEFT FOOT ASSOCIATED WITH TYPE 2 DIABETES MELLITUS, WITH FAT LAYER EXPOSED (HCC): Primary | ICD-10-CM

## 2024-12-11 PROCEDURE — 11042 DBRDMT SUBQ TIS 1ST 20SQCM/<: CPT | Performed by: PODIATRIST

## 2024-12-11 NOTE — PROGRESS NOTES
Wound Procedure Treatment Diabetic Ulcer Left Plantar    Performed by: Krupa Purcell RN  Authorized by: Jovanni Rodas DPM    Associated wounds:   Wound 11/06/24 Diabetic Ulcer Plantar Left  Wound cleansed with:  Soap and water  Applied primary dressing:  Collagen dressing and Dermagran  Comments:  Felt offloading pad and gauze and tape

## 2024-12-11 NOTE — PATIENT INSTRUCTIONS
Orders Placed This Encounter   Procedures    Wound cleansing and dressings Diabetic Ulcer Left Plantar     Left great toe    Wash your hands with soap and water.  Remove old dressing, discard into plastic bag and place in trash.  Cleanse the wound with soap and water prior to applying a clean dressing. Do not use tissue or cotton balls. Do not scrub the wound. Pat dry using gauze.  Shower yes   Apply felt offloading pad to skin surrounding wound  Apply endoform and dermagran gauze to the open wound.  Cover with gauze  Secure with tape  Change dressing every other day     Standing Status:   Future     Expiration Date:   12/18/2024

## 2024-12-11 NOTE — PROGRESS NOTES
Patient ID: Benito Park is a 54 y.o. male Date of Birth 1970       Chief Complaint   Patient presents with   • Follow Up Wound Care Visit     Left great toe wound.  Reports removed football dressing this morning because it got wet.   Band-Aid intact on arrival.       Allergies:  Patient has no known allergies.    Diagnosis:  1. Diabetic ulcer of toe of left foot associated with type 2 diabetes mellitus, with fat layer exposed (HCC)  -     Wound cleansing and dressings Diabetic Ulcer Left Plantar; Future  -     Wound Procedure Treatment Diabetic Ulcer Left Plantar  -     Cam Boot  -     Debridement Diabetic Ulcer Left Plantar  2. Type 2 diabetes mellitus with diabetic neuropathy, without long-term current use of insulin ([facility])  3. Hallux rigidus of left foot     Diagnosis ICD-10-CM Associated Orders   1. Diabetic ulcer of toe of left foot associated with type 2 diabetes mellitus, with fat layer exposed ([facility])  E11.621 Wound cleansing and dressings Diabetic Ulcer Left Plantar    L97.522 Wound Procedure Treatment Diabetic Ulcer Left Plantar     Cam Boot     Debridement Diabetic Ulcer Left Plantar      2. Type 2 diabetes mellitus with diabetic neuropathy, without long-term current use of insulin ([facility])  E11.40       3. Hallux rigidus of left foot  M20.22            Assessment & Plan:  Slowly improving ulceration left great toe.  Debridement of skin full-thickness excisional with 10 blade and tissue nipper as noted below.  Felt accommodative padding applied to left great toe to help offload the ulcer site.  Endoform/Dermagran gauze dressing to be changed every other day.  Will discontinue football dressing for now and switch to cam boot for further offloading immobilization.  Follow-up in 2 weeks.    Subjective:   12/11/2024: 54-year-old male seen today for follow-up chronic left great toe ulceration.  Reports football dressing has helped and he is making progress but would like to discontinue the football  dressing.    12/4/2024: 54-year-old type II diabetic seen today for follow-up DFU left great toe, right great toe was closed as of last visit.  Reports he is not a fan of the football dressing but is anxious to not needed.  Reports right great toe is doing fine.    11/27/2024: 54-year-old type II diabetic seen today for follow-up bilateral great toe ulcerations who was last seen by Dr. Weir 1 week ago who put him in bilateral football dressings.  Patient reports right foot opened up while in [place] and left foot opened up upon return from [place] at home.  Reports good progress and feels the right foot has closed.  Denies any new pain/discomfort.  Reports he has been diabetic for approximately 1 year.    11/20/2024: (Dr. Weir) presents today for evaluation and care of bilateral great toe diabetic ulcerations, he is tolerating football dressing well although he did have to remove it on Monday for his lower extremity arterial Dopplers.        The following portions of the patient's history were reviewed and updated as appropriate:   Patient Active Problem List   Diagnosis   • Sprain of anterior talofibular ligament of right ankle   • Perianal abscess   • Polyneuropathy   • Chronic bilateral low back pain with bilateral sciatica   • Bilateral lower extremity edema   • Chronic pain syndrome   • Failed back surgical syndrome   • Lumbar spondylosis   • 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 (HCC)   • Congenital fusion of sacroiliac joint   • Terminal ileitis (HCC)   • Viral enteritis   • Chronic foot pain   • Lumbar radiculopathy   • GERD (gastroesophageal reflux disease)   • History of colon polyps   • Perianal fistula   • Functional diarrhea   • Blepharitis, left eye   • Closed fracture of radial styloid   • Compression of right ulnar nerve at multiple levels   • Degenerative disc disease at L5-S1 level   • History of lumbar fusion   • Numbness and tingling of both lower extremities   • Periorbital cellulitis of  left eye   • Right leg swelling   • Tobacco use   • Vitamin D deficiency   • Arthritis of right shoulder region   • Chronic right shoulder pain   • Myofascial pain syndrome   • S/P insertion of spinal cord stimulator   • Crohn's disease (HCC)   • Numbness and tingling in right hand   • Neck pain   • Cervical radiculopathy   • Herniated nucleus pulposus, C3-4   • Hypertension   • Smoker   • Heavy alcohol consumption   • Sleep apnea   • Snoring   • Excessive daytime sleepiness   • Overweight (BMI 25.0-29.9)   • Dupuytren contracture   • Capillary disorder   • Epididymitis, right   • Obstructive sleep apnea syndrome   • Pes anserinus bursitis of left knee   • Arthralgia   • Ulcer of left foot, limited to breakdown of skin ([facility])   • Dysarthria   • acute Confusional state   • Hyperlipidemia   • Type 2 diabetes mellitus with hyperglycemia, without long-term current use of insulin ([facility])   • Diabetic polyneuropathy associated with type 2 diabetes mellitus ([facility])   • Lung nodule   • Abnormal movements   • Episode of change in speech   • Hoarseness   • Diabetic ulcer of left foot associated with type 2 diabetes mellitus, limited to breakdown of skin, unspecified part of foot ([facility])   • Carpal tunnel syndrome of right wrist   • Transient neurological symptoms   • Mucopurulent chronic bronchitis ([facility])   • Diabetic ulcer of toe of left foot associated with type 2 diabetes mellitus, with fat layer exposed ([facility])     Past Medical History:   Diagnosis Date   • Anxiety    • Back pain    • Callus Few months ago   • Colon polyp    • COPD (chronic obstructive pulmonary disease) ([facility])    • Crohn's disease ([facility])    • Depression    • Diabetes mellitus ([facility])    • GERD (gastroesophageal reflux disease)    • Hyperlipidemia    • Hypertension    • Perianal fistula    • Sleep apnea     no current CPAP use   • Terminal ileitis ([facility])      Past Surgical History:   Procedure Laterality Date   • BACK SURGERY  2019   • CAST APPLICATION Right 11/17/2022     Procedure: Application short-arm splint;  Surgeon: Sarthak Villatoro MD;  Location: [facility];  Service: Orthopedics   • COLONOSCOPY     • EGD     • HAND CONTRACTURE RELEASE Left 06/01/2023    Procedure: left ring and small finger dupuytrens excision and ring finger Metacarpophalangeal joint release and small finger Metacarpophalangeal and proximal interphalangeal joint release;  Surgeon: Sarthak Villatoro MD;  Location: [facility];  Service: Orthopedics   • HERNIA REPAIR      umbilical hernia   • WI ANRCT XM SURG REQ ANES GENERAL SPI/EDRL DX N/A 11/17/2021    Procedure: EXAM UNDER ANESTHESIA (EUA);  Surgeon: Davi Thao MD;  [facility]: [facility];  Service: Colorectal   • WI APPLICATION SHORT ARM SPLINT FOREARM-HAND STATIC Right 1/11/2024    Procedure: Application short arm splint;  Surgeon: Sarthak Villatoro MD;  Location: [facility];  Service: Orthopedics   • WI CAPSULECTOMY/CAPSULOTOMY IPHAL JOINT EACH Right 1/11/2024    Procedure: Contracture release right hand small finger proximal interphalangeal joint;  Surgeon: Sarthak Villatoro MD;  Location: [facility];  Service: Orthopedics   • WI DISE DYN EVAL SLEEP DISORDERED BREATHING FLX DX N/A 11/13/2024    Procedure: DRUG INDUCED SLEEP ENDOSCOPY;  Surgeon: Jeff Arndt MD;  Location[facility];  Service: ENT   • WI FASCIOTOMY PALMAR OPEN PARTIAL Right 11/17/2022    Procedure: Dupuytren's excision right palm extending into the small finger with release of the metacarpophalangeal joint and proximal interphalangeal joint.  Dupuytren's excision right palm with release of the right ring finger metacarpophalangeal joint.;  Surgeon: Sarthak Villatoro MD;  Location: [facility];  Service: Orthopedics   • WI FASCIOTOMY PALMAR OPEN PARTIAL Right 1/11/2024    Procedure: Right hand small finger duppuytrens excision with release of the metacarpophalangeal joint and proximal interphalangeal joint;  Surgeon: Sarthak Villatoro MD;  Location: [facility];   Service: Orthopedics   • WA FASCT PALM W/WO Z-PLASTY TISSUE REARGMT/SKN GRFT Right 1/11/2024    Procedure: Right hand small finger duppuytrens excision with release of the metacarpophalangeal joint and proximal interphalangeal joint;  Surgeon: Sarthak Villatoro MD;  Location: [facility];  Service: Orthopedics   • WA FASCT PRTL PALMAR 1 DGT PROX IPHAL JT W/WO RPR Right 1/11/2024    Procedure: Right hand small finger duppuytrens excision with release of the metacarpophalangeal joint and proximal interphalangeal joint;  Surgeon: Sarthak Villatoro MD;  Location: [facility];  Service: Orthopedics   • WA I&D ISCHIORECTAL&/PERIRECTAL ABSCESS SPX Right 10/24/2021    Procedure: excisional debredement right gluteal cheek ;  Surgeon: Jeana Bustamante MD;  Location: [facility];  Service: General   • WA PLACEMENT SETON N/A 11/17/2021    Procedure: PLACEMENT SETON;  Surgeon: Davi Thao MD;  [facility];  Service: Colorectal   • WA PRQ IMPLTJ NSTIM ELECTRODE ARRAY EPIDURAL Right 03/26/2021    Procedure: INSERTION THORACIC DORSAL COLUMN SPINAL CORD STIMULATOR PERCUTANEOUS W IMPLANTABLE PULSE GENERATOR, RIGHT;  Surgeon: Akshat Witt MD;  Location: [facility];  Service: Neurosurgery     Social History     Socioeconomic History   • Marital status: /Civil Union     Spouse name: Not on file   • Number of children: Not on file   • Years of education: Not on file   • Highest education level: Not on file   Occupational History   • Not on file   Tobacco Use   • Smoking status: Former     Current packs/day: 0.00     Average packs/day: 0.5 packs/day for 30.0 years (15.0 ttl pk-yrs)     Types: Cigarettes     Start date: 1/1/2019     Quit date: 2/4/2021     Years since quitting: 3.8     Passive exposure: Past   • Smokeless tobacco: Never   Vaping Use   • Vaping status: Every Day   • Substances: THC   Substance and Sexual Activity   • Alcohol use: Not Currently     Alcohol/week: 1.0 standard drink of alcohol      Types: 1 Cans of beer per week   • Drug use: Yes     Frequency: 7.0 times per week     Types: Marijuana     Comment: Medical marijuana-vapes, bedtime   • Sexual activity: Yes     Partners: Female, Male   Other Topics Concern   • Not on file   Social History Narrative   • Not on file     Social Drivers of Health     Financial Resource Strain: Low Risk  (6/7/2023)    Overall Financial Resource Strain (CARDIA)    • Difficulty of Paying Living Expenses: Not hard at all   Food Insecurity: No Food Insecurity (7/5/2024)    Nursing - Inadequate Food Risk Classification    • Worried About Running Out of Food in the Last Year: Never true    • Ran Out of Food in the Last Year: Never true    • Ran Out of Food in the Last Year: Not on file   Transportation Needs: No Transportation Needs (7/5/2024)    PRAPARE - Transportation    • Lack of Transportation (Medical): No    • Lack of Transportation (Non-Medical): No   Physical Activity: Sufficiently Active (4/11/2023)    Received from [employer]    Physical Activity   Stress: No Stress Concern Present (4/11/2023)    Received from [employer], [employer] [facility]    [facility] of Occupational Health - Occupational Stress Questionnaire    • Feeling of Stress : Only a little   Social Connections: Moderately Integrated (4/11/2023)    Received from [employer]    Social Connection and Isolation Panel [NHANES]   Intimate Partner Violence: Not At Risk (4/11/2023)    Received from [employer]    Humiliation, Afraid, Rape, and Kick questionnaire    • Fear of Current or Ex-Partner: No    • Emotionally Abused: No    • Physically Abused: No    • Sexually Abused: No   Housing Stability: Low Risk  (7/5/2024)    Housing Stability Vital Sign    • Unable to Pay for Housing in the Last Year: No    • Number of Times Moved in the Last Year: 1    • Homeless in the Last Year: No        Current Outpatient Medications:   •  albuterol (PROVENTIL HFA,VENTOLIN HFA) 90 mcg/act inhaler, Inhale  2 puffs every 6 (six) hours as needed for wheezing, Disp: 6.7 g, Rfl: 2  •  Alcohol Swabs 70 % PADS, May substitute brand based on insurance coverage. Check glucose TID., Disp: 100 each, Rfl: 0  •  atorvastatin (LIPITOR) 40 mg tablet, Take 1 tablet (40 mg total) by mouth every evening, Disp: 90 tablet, Rfl: 2  •  Blood Glucose Monitoring Suppl (OneTouch Verio Reflect) w/Device KIT, May substitute brand based on insurance coverage. Check glucose TID., Disp: 1 kit, Rfl: 0  •  buPROPion (WELLBUTRIN SR) 150 mg 12 hr tablet, Take 1 tablet (150 mg total) by mouth 2 (two) times a day, Disp: 180 tablet, Rfl: 2  •  Cholecalciferol (RA Vitamin D-3) 1,000 units tablet, Take 2 tablets (2,000 Units total) by mouth daily, Disp: 180 tablet, Rfl: 2  •  cyclobenzaprine (FLEXERIL) 10 mg tablet, TAKE 1 TABLET BY MOUTH EVERYDAY AT BEDTIME, Disp: 90 tablet, Rfl: 2  •  furosemide (LASIX) 20 mg tablet, Take 1 tablet (20 mg total) by mouth daily, Disp: 90 tablet, Rfl: 2  •  glucose blood (OneTouch Verio) test strip, May substitute brand based on insurance coverage. Check glucose TID., Disp: 100 each, Rfl: 5  •  lisinopril (ZESTRIL) 20 mg tablet, Take 1 tablet (20 mg total) by mouth daily, Disp: 90 tablet, Rfl: 2  •  loperamide (IMODIUM) 2 mg capsule, Take 1 capsule (2 mg total) by mouth daily at bedtime, Disp: 30 capsule, Rfl: 2  •  metFORMIN (GLUCOPHAGE-XR) 500 mg 24 hr tablet, 1 in am and 2 in pm, Disp: 360 tablet, Rfl: 2  •  omeprazole (PriLOSEC) 40 MG capsule, Take 1 capsule (40 mg total) by mouth daily, Disp: 90 capsule, Rfl: 2  •  ondansetron (ZOFRAN) 4 mg tablet, Take 1 tablet (4 mg total) by mouth every 8 (eight) hours as needed for nausea or vomiting, Disp: 20 tablet, Rfl: 1  •  OneTouch Delica Lancets 33G MISC, May substitute brand based on insurance coverage. Check glucose TID., Disp: 100 each, Rfl: 5  •  pregabalin (LYRICA) 150 mg capsule, Take 1 capsule (150 mg total) by mouth 3 (three) times a day, Disp: 90 capsule, Rfl: 2  •   traZODone (DESYREL) 50 mg tablet, Take 1-2 tabs at bedtime as needed for insomnia, Disp: 90 tablet, Rfl: 3  •  vedolizumab (Entyvio) SOLR, Inject 300 mg into a catheter in a vein every 4 weeks, Disp: , Rfl:   Family History   Problem Relation Age of Onset   • COPD Mother    • Lung cancer Mother    • Heart disease Father    • Heart attack Father    • Hypertension Sister    • Diabetes type II Sister    • No Known Problems Sister    • No Known Problems Sister    • No Known Problems Sister    • No Known Problems Brother    • Heart attack Brother    • No Known Problems Son    • No Known Problems Son    • Colon cancer Neg Hx    • Colon polyps Neg Hx    • Inflammatory bowel disease Neg Hx       Review of Systems   Constitutional:  Negative for chills and fever.   HENT:  Negative for ear pain and sore throat.    Eyes:  Negative for pain and visual disturbance.   Respiratory:  Negative for cough and shortness of breath.    Cardiovascular:  Negative for chest pain and palpitations.   Gastrointestinal:  Negative for abdominal pain and vomiting.   Genitourinary:  Negative for dysuria and hematuria.   Musculoskeletal:  Positive for gait problem. Negative for arthralgias and back pain.   Skin:  Positive for color change and wound. Negative for rash.   Neurological:  Positive for numbness. Negative for seizures and syncope.   Psychiatric/Behavioral: Negative.     All other systems reviewed and are negative.        Objective:  /68   Pulse 98   Temp (!) 97.4 °F (36.3 °C)   Resp 16     Physical Exam  Constitutional:       Appearance: Normal appearance. He is normal weight.   HENT:      Head: Normocephalic and atraumatic.      Right Ear: External ear normal.      Left Ear: External ear normal.      Nose: Nose normal.      Mouth/Throat:      Mouth: Mucous membranes are moist.      Pharynx: Oropharynx is clear.   Eyes:      Conjunctiva/sclera: Conjunctivae normal.      Pupils: Pupils are equal, round, and reactive to light.    Cardiovascular:      Pulses: Normal pulses.           Dorsalis pedis pulses are 2+ on the right side and 2+ on the left side.        Posterior tibial pulses are 2+ on the right side and 2+ on the left side.   Pulmonary:      Effort: Pulmonary effort is normal.   Musculoskeletal:      Cervical back: Normal range of motion.      Right lower leg: No edema.      Left lower leg: No edema.      Right foot: Decreased range of motion.      Left foot: Decreased range of motion (First MPJ).      Comments: Diminished first MPJ range of motion bilateral  Left great toe 15-20 degrees range of motion first MPJ  Right great toe 20-25 degrees range of motion first MPJ   Feet:      Right foot:      Protective Sensation: 10 sites tested.  0 sites sensed.      Skin integrity: No ulcer (Closed with stable eschar.).      Toenail Condition: Right toenails are normal.      Left foot:      Protective Sensation: 10 sites tested.  0 sites sensed.      Skin integrity: Ulcer (Full-thickness breakdownTip of LGT, hyperkeratotic margin, diminished size, no signs of infection, wound granulating and appears healthy.) present.      Toenail Condition: Left toenails are normal.   Skin:     General: Skin is warm and dry.   Neurological:      Mental Status: He is alert. Mental status is at baseline.      Sensory: Sensory deficit present.   Psychiatric:         Mood and Affect: Mood normal.         Behavior: Behavior normal.         Wound 11/06/24 Diabetic Ulcer Plantar Left (Active)   Enter Loza score: Loza Grade 1: Partial or full-thickness ulcer (superficial) 12/11/24 1111   Wound Image   12/11/24 1209   Wound Description Pink;Epithelialization 12/11/24 1111   [place]-wound Assessment Callus 12/11/24 1111   Wound Length (cm) 0.4 cm 12/11/24 1111   Wound Width (cm) 0.2 cm 12/11/24 1111   Wound Depth (cm) 0.1 cm 12/11/24 1111   Wound Surface Area (cm^2) 0.08 cm^2 12/11/24 1111   Wound Volume (cm^3) 0.008 cm^3 12/11/24 1111   Calculated Wound Volume  "(cm^3) 0.01 cm^3 12/11/24 1111   Change in Wound Size % 92.31 12/11/24 1111   Drainage Amount Moderate 12/11/24 1111   Drainage Description Serosanguineous 12/11/24 1111   Non-staged Wound Description Full thickness 12/11/24 1111   Dressing Status Intact 12/11/24 1111       Wound 11/13/24 Nose N/A (Active)       Debridement   Wound 11/06/24 Diabetic Ulcer Plantar Left    Universal Protocol:  procedure performed by consultantConsent: Verbal consent obtained.  Risks and benefits: risks, benefits and alternatives were discussed  Consent given by: patient  Time out: Immediately prior to procedure a \"time out\" was called to verify the correct patient, procedure, equipment, support staff and site/side marked as required.  Patient understanding: patient states understanding of the procedure being performed  Patient identity confirmed: verbally with patient    Debridement Details  Performed by: physician  Debridement type: surgical  Level of debridement: subcutaneous tissue  Pain control: lidocaine 4%      Post-debridement measurements  Length (cm): 0.5  Width (cm): 0.5  Depth (cm): 0.2  Percent debrided: 100%  Surface Area (cm^2): 0.25  Area Debrided (cm^2): 0.25  Volume (cm^3): 0.05    Tissue and other material debrided: subcutaneous tissue  Devitalized tissue debrided: necrotic debris and slough  Instrument(s) utilized: blade  Technique utilized: excisionalBleeding: small  Hemostasis obtained with: pressure  Procedural pain (0-10): 3  Post-procedural pain: 3   Response to treatment: procedure was tolerated well                 Wound Instructions:  Orders Placed This Encounter   Procedures   • Wound cleansing and dressings Diabetic Ulcer Left Plantar     Left great toe    Wash your hands with soap and water.  Remove old dressing, discard into plastic bag and place in trash.  Cleanse the wound with soap and water prior to applying a clean dressing. Do not use tissue or cotton balls. Do not scrub the wound. Pat dry using " "gauze.  Shower yes   Apply felt offloading pad to skin surrounding wound  Apply endoform and dermagran gauze to the open wound.  Cover with gauze  Secure with tape  Change dressing every other day     Standing Status:   Future     Expiration Date:   12/18/2024   • Wound Procedure Treatment Diabetic Ulcer Left Plantar     This order was created via procedure documentation   • Debridement Diabetic Ulcer Left Plantar     This order was created via procedure documentation   • Cam Boot     Size:   Large     Type:   High Tide     Type:   With Air Jovanni Rodas DPM      Portions of the record may have been created with voice recognition software. Occasional wrong word or \"sound a like\" substitutions may have occurred due to the inherent limitations of voice recognition software. Read the chart carefully and recognize, using context, where substitutions have occurred.    "

## 2024-12-17 ENCOUNTER — TELEPHONE (OUTPATIENT)
Age: 54
End: 2024-12-17

## 2024-12-17 ENCOUNTER — OFFICE VISIT (OUTPATIENT)
Dept: GASTROENTEROLOGY | Facility: CLINIC | Age: 54
End: 2024-12-17
Payer: MEDICARE

## 2024-12-17 VITALS
DIASTOLIC BLOOD PRESSURE: 68 MMHG | SYSTOLIC BLOOD PRESSURE: 112 MMHG | BODY MASS INDEX: 27.17 KG/M2 | WEIGHT: 205 LBS | HEIGHT: 73 IN

## 2024-12-17 DIAGNOSIS — E11.42 DIABETIC POLYNEUROPATHY ASSOCIATED WITH TYPE 2 DIABETES MELLITUS (HCC): ICD-10-CM

## 2024-12-17 DIAGNOSIS — K21.9 GASTROESOPHAGEAL REFLUX DISEASE WITHOUT ESOPHAGITIS: ICD-10-CM

## 2024-12-17 DIAGNOSIS — K59.1 FUNCTIONAL DIARRHEA: Primary | ICD-10-CM

## 2024-12-17 DIAGNOSIS — K50.814 CROHN'S DISEASE OF BOTH SMALL AND LARGE INTESTINE WITH ABSCESS (HCC): ICD-10-CM

## 2024-12-17 DIAGNOSIS — Z86.0100 HISTORY OF COLON POLYPS: ICD-10-CM

## 2024-12-17 DIAGNOSIS — K50.913 PERIANAL FISTULA DUE TO CROHN'S DISEASE (HCC): ICD-10-CM

## 2024-12-17 DIAGNOSIS — K50.818 CROHN'S DISEASE OF BOTH SMALL AND LARGE INTESTINE WITH OTHER COMPLICATION (HCC): ICD-10-CM

## 2024-12-17 DIAGNOSIS — K60.30 PERIANAL FISTULA DUE TO CROHN'S DISEASE (HCC): ICD-10-CM

## 2024-12-17 PROCEDURE — G2211 COMPLEX E/M VISIT ADD ON: HCPCS | Performed by: INTERNAL MEDICINE

## 2024-12-17 PROCEDURE — 99214 OFFICE O/P EST MOD 30 MIN: CPT | Performed by: INTERNAL MEDICINE

## 2024-12-17 RX ORDER — VEDOLIZUMAB 300 MG/5ML
300 INJECTION, POWDER, LYOPHILIZED, FOR SOLUTION INTRAVENOUS
Start: 2024-12-17

## 2024-12-17 RX ORDER — LOPERAMIDE HYDROCHLORIDE 2 MG/1
2 CAPSULE ORAL
Qty: 30 CAPSULE | Refills: 2 | Status: SHIPPED | OUTPATIENT
Start: 2024-12-17

## 2024-12-17 RX ORDER — OMEPRAZOLE 40 MG/1
40 CAPSULE, DELAYED RELEASE ORAL DAILY
Qty: 90 CAPSULE | Refills: 2 | Status: SHIPPED | OUTPATIENT
Start: 2024-12-17

## 2024-12-17 NOTE — PROGRESS NOTES
Name: Benito Park      : 1970      MRN: [id number]  Encounter Provider: Sharon Hampton MD  Encounter Date: 2024   Encounter department: [facility] [place] [facility]  :  Assessment & Plan  Functional diarrhea  54M here today for f/u. Still with intermittent diarrhea. 2-4 times a day, sometimes multiple times at night waking him up.     Reviewed note from Dr Woodward.  Agree that his symptoms of diarrhea at this point is no longer from his Crohns as his colonoscopies recently have been normal. Although MRE is otherwise unremarkable, will proceed w SBC and pt will f/u.     - Pt has f/u w PCP about potentially switching metformin although recently w intentional weight loss, healthier eating, A1c is improved. Possible med sources of diarrhea to be reassessed (incl naprosyn/metformin).  - Take imodium: start w one tab at night before bed given night time symptoms. OK to increase to 2 tabs at night, if diarrhea persists.   - stop naprosyn      Orders:  •  loperamide (IMODIUM) 2 mg capsule; Take 1 capsule (2 mg total) by mouth daily at bedtime    Crohn's disease of both small and large intestine with other complication (HCC)  - get the capsule endoscopy to be scheduled. Last MRE 3/2024 normal. Most recent colonoscopies have been without active inflammation.   - stop imuran  - leave Entyvio q 4weeks for now  - has f/u w Dr Woodward in 3/2025 (w IBD office) and then will f/u me in 2025    Orders:  •  Capsule endoscopy; Future    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 (HCC)         Crohn's disease of both small and large intestine with abscess (HCC)    Orders:  •  vedolizumab (Entyvio) SOLR; Inject 300 mg into a catheter in a vein every 4 weeks  •  Capsule endoscopy; Future    Gastroesophageal reflux disease without esophagitis  Well controlled for now.     Orders:  •  omeprazole (PriLOSEC) 40 MG capsule; Take 1 capsule (40 mg total) by mouth daily    Diabetic polyneuropathy  associated with type 2 diabetes mellitus (HCC)    Lab Results   Component Value Date    HGBA1C 6.2 (H) 11/13/2024          History of colon polyps  Recall 11/2025           History of Present Illness     Benito Park is a 54 y.o. male who presents today for f/u. Still w diarrhea, anywhere from 2-5 times a day. Often at night when he's asleep, waking him up from sleep and at times with fecal incontinence. No bleeding. No abd pain.     Recently changed up his diet so that he is eating healthier and has lost some weight.     History obtained from: patient    Review of Systems   All other systems reviewed and are negative.    Past Medical History   Past Medical History:   Diagnosis Date   • Anxiety    • Back pain    • Callus Few months ago   • Colon polyp    • COPD (chronic obstructive pulmonary disease) (HCC)    • Crohn's disease (HCC)    • Depression    • Diabetes mellitus (HCC)    • GERD (gastroesophageal reflux disease)    • Hyperlipidemia    • Hypertension    • Perianal fistula    • Sleep apnea     no current CPAP use   • Terminal ileitis (HCC)      Past Surgical History:   Procedure Laterality Date   • BACK SURGERY  2019   • CAST APPLICATION Right 11/17/2022    Procedure: Application short-arm splint;  Surgeon: Sarthak Villatoro MD;  Location: [facility];  Service: Orthopedics   • COLONOSCOPY     • EGD     • HAND CONTRACTURE RELEASE Left 06/01/2023    Procedure: left ring and small finger dupuytrens excision and ring finger Metacarpophalangeal joint release and small finger Metacarpophalangeal and proximal interphalangeal joint release;  Surgeon: Sarthak Villatoro MD;  Location: [facility];  Service: Orthopedics   • HERNIA REPAIR      umbilical hernia   • OR ANRCT XM SURG REQ ANES GENERAL SPI/EDRL DX N/A 11/17/2021    Procedure: EXAM UNDER ANESTHESIA (EUA);  Surgeon: Davi Thao MD;  Location: [facility];  Service: Colorectal   • OR APPLICATION SHORT ARM SPLINT FOREARM-HAND STATIC Right 1/11/2024     Procedure: Application short arm splint;  Surgeon: Sarthak Villatoro MD;  Location: [facility];  Service: Orthopedics   • PA CAPSULECTOMY/CAPSULOTOMY IPHAL JOINT EACH Right 1/11/2024    Procedure: Contracture release right hand small finger proximal interphalangeal joint;  Surgeon: Sarthak Villatoro MD;  Location: [facility];  Service: Orthopedics   • PA DISE DYN EVAL SLEEP DISORDERED BREATHING FLX DX N/A 11/13/2024    Procedure: DRUG INDUCED SLEEP ENDOSCOPY;  Surgeon: Jeff Arndt MD;  Location: [facility];  Service: ENT   • PA FASCIOTOMY PALMAR OPEN PARTIAL Right 11/17/2022    Procedure: Dupuytren's excision right palm extending into the small finger with release of the metacarpophalangeal joint and proximal interphalangeal joint.  Dupuytren's excision right palm with release of the right ring finger metacarpophalangeal joint.;  Surgeon: Sarthak Villatoro MD;  Location: [facility];  Service: Orthopedics   • PA FASCIOTOMY PALMAR OPEN PARTIAL Right 1/11/2024    Procedure: Right hand small finger duppuytrens excision with release of the metacarpophalangeal joint and proximal interphalangeal joint;  Surgeon: Sarthak Villatoro MD;  Location: [facility];  Service: Orthopedics   • PA FASCT PALM W/WO Z-PLASTY TISSUE REARGMT/SKN GRFT Right 1/11/2024    Procedure: Right hand small finger duppuytrens excision with release of the metacarpophalangeal joint and proximal interphalangeal joint;  Surgeon: Sarthak Villatoro MD;  Location: [facility];  Service: Orthopedics   • PA FASCT PRTL PALMAR 1 DGT PROX IPHAL JT W/WO RPR Right 1/11/2024    Procedure: Right hand small finger duppuytrens excision with release of the metacarpophalangeal joint and proximal interphalangeal joint;  Surgeon: Sarthak Villatoro MD;  Location: [facility];  Service: Orthopedics   • PA I&D ISCHIORECTAL&/PERIRECTAL ABSCESS SPX Right 10/24/2021    Procedure: excisional debredement right gluteal cheek ;  Surgeon: Jeana Bustamante MD;  Location:   MAIN OR;  Service: General   • MI PLACEMENT SETON N/A 11/17/2021    Procedure: PLACEMENT SETON;  Surgeon: Davi Thao MD;  [facility]: [facility];  Service: Colorectal   • MI PRQ IMPLTJ NSTIM ELECTRODE ARRAY EPIDURAL Right 03/26/2021    Procedure: INSERTION THORACIC DORSAL COLUMN SPINAL CORD STIMULATOR PERCUTANEOUS W IMPLANTABLE PULSE GENERATOR, RIGHT;  Surgeon: Akshat Witt MD;  Location: [facility];  Service: Neurosurgery     Family History   Problem Relation Age of Onset   • COPD Mother    • Lung cancer Mother    • Heart disease Father    • Heart attack Father    • Hypertension Sister    • Diabetes type II Sister    • No Known Problems Sister    • No Known Problems Sister    • No Known Problems Sister    • No Known Problems Brother    • Heart attack Brother    • No Known Problems Son    • No Known Problems Son    • Colon cancer Neg Hx    • Colon polyps Neg Hx    • Inflammatory bowel disease Neg Hx       reports that he quit smoking about 3 years ago. His smoking use included cigarettes. He started smoking about 5 years ago. He has a 15 pack-year smoking history. He has been exposed to tobacco smoke. He has never used smokeless tobacco. He reports that he does not currently use alcohol after a past usage of about 1.0 standard drink of alcohol per week. He reports current drug use. Frequency: 7.00 times per week. Drug: Marijuana.  Current Outpatient Medications on File Prior to Visit   Medication Sig Dispense Refill   • albuterol (PROVENTIL HFA,VENTOLIN HFA) 90 mcg/act inhaler Inhale 2 puffs every 6 (six) hours as needed for wheezing 6.7 g 2   • Alcohol Swabs 70 % PADS May substitute brand based on insurance coverage. Check glucose TID. 100 each 0   • atorvastatin (LIPITOR) 40 mg tablet Take 1 tablet (40 mg total) by mouth every evening 90 tablet 2   • Blood Glucose Monitoring Suppl (OneTouch Verio Reflect) w/Device KIT May substitute brand based on insurance coverage. Check glucose TID. 1 kit 0   •  buPROPion (WELLBUTRIN SR) 150 mg 12 hr tablet Take 1 tablet (150 mg total) by mouth 2 (two) times a day 180 tablet 2   • Cholecalciferol (RA Vitamin D-3) 1,000 units tablet Take 2 tablets (2,000 Units total) by mouth daily 180 tablet 2   • cyclobenzaprine (FLEXERIL) 10 mg tablet TAKE 1 TABLET BY MOUTH EVERYDAY AT BEDTIME 90 tablet 2   • furosemide (LASIX) 20 mg tablet Take 1 tablet (20 mg total) by mouth daily 90 tablet 2   • glucose blood (OneTouch Verio) test strip May substitute brand based on insurance coverage. Check glucose TID. 100 each 5   • lisinopril (ZESTRIL) 20 mg tablet Take 1 tablet (20 mg total) by mouth daily 90 tablet 2   • metFORMIN (GLUCOPHAGE-XR) 500 mg 24 hr tablet 1 in am and 2 in pm 360 tablet 2   • ondansetron (ZOFRAN) 4 mg tablet Take 1 tablet (4 mg total) by mouth every 8 (eight) hours as needed for nausea or vomiting 20 tablet 1   • OneTouch Delica Lancets 33G MISC May substitute brand based on insurance coverage. Check glucose TID. 100 each 5   • pregabalin (LYRICA) 150 mg capsule Take 1 capsule (150 mg total) by mouth 3 (three) times a day 90 capsule 2   • traZODone (DESYREL) 50 mg tablet Take 1-2 tabs at bedtime as needed for insomnia 90 tablet 3   • [DISCONTINUED] azaTHIOprine (IMURAN) 50 mg tablet TAKE 1 TABLET BY MOUTH EVERY DAY 90 tablet 4   • [DISCONTINUED] loperamide (IMODIUM) 2 mg capsule TAKE 1 CAPSULE (2 MG TOTAL) BY MOUTH EVERY OTHER DAY FOR 7 DAYS, THEN 1 CAPSULE (2 MG TOTAL) IN THE MORNING FOR 7 DAYS. 30 capsule 2   • [DISCONTINUED] naproxen (Naprosyn) 500 mg tablet Take 1 tablet (500 mg total) by mouth 2 (two) times a day with meals 180 tablet 2   • [DISCONTINUED] omeprazole (PriLOSEC) 40 MG capsule Take 1 capsule (40 mg total) by mouth daily 90 capsule 2   • [DISCONTINUED] vedolizumab (Entyvio) SOLR Inject 300 mg into a catheter in a vein every 4 weeks     • [DISCONTINUED] acetaminophen (TYLENOL) 650 mg CR tablet Take 1 tablet (650 mg total) by mouth every 8 (eight) hours  as needed for mild pain 30 tablet 0     No current facility-administered medications on file prior to visit.   No Known Allergies   Current Outpatient Medications on File Prior to Visit   Medication Sig Dispense Refill   • albuterol (PROVENTIL HFA,VENTOLIN HFA) 90 mcg/act inhaler Inhale 2 puffs every 6 (six) hours as needed for wheezing 6.7 g 2   • Alcohol Swabs 70 % PADS May substitute brand based on insurance coverage. Check glucose TID. 100 each 0   • atorvastatin (LIPITOR) 40 mg tablet Take 1 tablet (40 mg total) by mouth every evening 90 tablet 2   • Blood Glucose Monitoring Suppl (OneTouch Verio Reflect) w/Device KIT May substitute brand based on insurance coverage. Check glucose TID. 1 kit 0   • buPROPion (WELLBUTRIN SR) 150 mg 12 hr tablet Take 1 tablet (150 mg total) by mouth 2 (two) times a day 180 tablet 2   • Cholecalciferol (RA Vitamin D-3) 1,000 units tablet Take 2 tablets (2,000 Units total) by mouth daily 180 tablet 2   • cyclobenzaprine (FLEXERIL) 10 mg tablet TAKE 1 TABLET BY MOUTH EVERYDAY AT BEDTIME 90 tablet 2   • furosemide (LASIX) 20 mg tablet Take 1 tablet (20 mg total) by mouth daily 90 tablet 2   • glucose blood (OneTouch Verio) test strip May substitute brand based on insurance coverage. Check glucose TID. 100 each 5   • lisinopril (ZESTRIL) 20 mg tablet Take 1 tablet (20 mg total) by mouth daily 90 tablet 2   • metFORMIN (GLUCOPHAGE-XR) 500 mg 24 hr tablet 1 in am and 2 in pm 360 tablet 2   • ondansetron (ZOFRAN) 4 mg tablet Take 1 tablet (4 mg total) by mouth every 8 (eight) hours as needed for nausea or vomiting 20 tablet 1   • OneTouch Delica Lancets 33G MISC May substitute brand based on insurance coverage. Check glucose TID. 100 each 5   • pregabalin (LYRICA) 150 mg capsule Take 1 capsule (150 mg total) by mouth 3 (three) times a day 90 capsule 2   • traZODone (DESYREL) 50 mg tablet Take 1-2 tabs at bedtime as needed for insomnia 90 tablet 3   • [DISCONTINUED] azaTHIOprine (IMURAN)  "50 mg tablet TAKE 1 TABLET BY MOUTH EVERY DAY 90 tablet 4   • [DISCONTINUED] loperamide (IMODIUM) 2 mg capsule TAKE 1 CAPSULE (2 MG TOTAL) BY MOUTH EVERY OTHER DAY FOR 7 DAYS, THEN 1 CAPSULE (2 MG TOTAL) IN THE MORNING FOR 7 DAYS. 30 capsule 2   • [DISCONTINUED] naproxen (Naprosyn) 500 mg tablet Take 1 tablet (500 mg total) by mouth 2 (two) times a day with meals 180 tablet 2   • [DISCONTINUED] omeprazole (PriLOSEC) 40 MG capsule Take 1 capsule (40 mg total) by mouth daily 90 capsule 2   • [DISCONTINUED] vedolizumab (Entyvio) SOLR Inject 300 mg into a catheter in a vein every 4 weeks     • [DISCONTINUED] acetaminophen (TYLENOL) 650 mg CR tablet Take 1 tablet (650 mg total) by mouth every 8 (eight) hours as needed for mild pain 30 tablet 0     No current facility-administered medications on file prior to visit.      Social History     Tobacco Use   • Smoking status: Former     Current packs/day: 0.00     Average packs/day: 0.5 packs/day for 30.0 years (15.0 ttl pk-yrs)     Types: Cigarettes     Start date: 1/1/2019     Quit date: 2/4/2021     Years since quitting: 3.8     Passive exposure: Past   • Smokeless tobacco: Never   Vaping Use   • Vaping status: Every Day   • Substances: THC   Substance and Sexual Activity   • Alcohol use: Not Currently     Alcohol/week: 1.0 standard drink of alcohol     Types: 1 Cans of beer per week   • Drug use: Yes     Frequency: 7.0 times per week     Types: Marijuana     Comment: Medical marijuana-vapes, bedtime   • Sexual activity: Yes     Partners: Female, Male        Objective   /68 (BP Location: Right arm, Patient Position: Sitting, Cuff Size: Large)   Ht 6' 1\" (1.854 m)   Wt 93 kg (205 lb)   BMI 27.05 kg/m²      Physical Exam  Vitals and nursing note reviewed.   Constitutional:       General: He is not in acute distress.     Appearance: He is well-developed.   HENT:      Head: Normocephalic and atraumatic.   Eyes:      Conjunctiva/sclera: Conjunctivae normal. "   Cardiovascular:      Rate and Rhythm: Normal rate and regular rhythm.      Heart sounds: No murmur heard.  Pulmonary:      Effort: Pulmonary effort is normal. No respiratory distress.      Breath sounds: Normal breath sounds.   Abdominal:      General: Bowel sounds are normal. There is no distension.      Palpations: Abdomen is soft.      Tenderness: There is no abdominal tenderness.   Musculoskeletal:         General: No swelling.      Cervical back: Neck supple.      Comments: Left food in a boot   Skin:     General: Skin is warm and dry.   Neurological:      Mental Status: He is alert and oriented to person, place, and time.   Psychiatric:         Mood and Affect: Mood normal.

## 2024-12-17 NOTE — ASSESSMENT & PLAN NOTE
Well controlled for now.     Orders:  •  omeprazole (PriLOSEC) 40 MG capsule; Take 1 capsule (40 mg total) by mouth daily

## 2024-12-17 NOTE — ASSESSMENT & PLAN NOTE
- get the capsule endoscopy to be scheduled. Last MRE 3/2024 normal. Most recent colonoscopies have been without active inflammation.   - stop imuran  - leave Entyvio q 4weeks for now  - has f/u w Dr Woodward in 3/2025 (w IBD office) and then will f/u me in 5/2025    Orders:  •  Capsule endoscopy; Future

## 2024-12-17 NOTE — ASSESSMENT & PLAN NOTE
Orders:  •  vedolizumab (Entyvio) SOLR; Inject 300 mg into a catheter in a vein every 4 weeks  •  Capsule endoscopy; Future

## 2024-12-17 NOTE — TELEPHONE ENCOUNTER
Patients GI provider:  Dr. Hampton    Number to return call: [phone]    Reason for call: Pt called to reschedule his capsule endoscopy. Please reach out to pt to reschedule.    Scheduled procedure/appointment date if applicable: Apt/procedure 12/23/24

## 2024-12-18 ENCOUNTER — OFFICE VISIT (OUTPATIENT)
Dept: NEUROLOGY | Facility: CLINIC | Age: 54
End: 2024-12-18
Payer: MEDICARE

## 2024-12-18 VITALS
RESPIRATION RATE: 16 BRPM | BODY MASS INDEX: 27.17 KG/M2 | SYSTOLIC BLOOD PRESSURE: 104 MMHG | HEIGHT: 73 IN | OXYGEN SATURATION: 98 % | DIASTOLIC BLOOD PRESSURE: 80 MMHG | HEART RATE: 80 BPM | TEMPERATURE: 97.8 F | WEIGHT: 205 LBS

## 2024-12-18 DIAGNOSIS — R41.3 MEMORY LOSS: Primary | ICD-10-CM

## 2024-12-18 DIAGNOSIS — G62.9 PERIPHERAL NEUROPATHY: ICD-10-CM

## 2024-12-18 DIAGNOSIS — R29.818 TRANSIENT NEUROLOGICAL SYMPTOMS: ICD-10-CM

## 2024-12-18 PROCEDURE — 99213 OFFICE O/P EST LOW 20 MIN: CPT | Performed by: NURSE PRACTITIONER

## 2024-12-18 NOTE — PROGRESS NOTES
Name: Benito Park      : 1970      MRN: [id number]  Encounter Provider: MIKE Cerda  Encounter Date: 2024   Encounter department: [facility] [place]  :  Assessment & Plan  Memory loss    Orders:    MRI brain NeuroQuant wo and w contrast; Future    Ambulatory Referral to Speech Therapy; Future    Transient neurological symptoms         Peripheral neuropathy           Patient Instructions   MRI brain repeat  Speech therapy  Let us know if any changes  Follow up in 4 months      History of Present Illness   Benito presents with his wife for 6 month follow up.  They state continued episodes of speech change and worsening memory. He states he had 2 better days during recent EEG testing. He also states a left hand tremor that is intermittent.   He continues on entyvio for crohns; he is planning on getting inspire device for OSIRIS. He states he weaned off amitriptyline without a change in his pain.   Reviewed recent labs- A1c 6.2, ldl 32, b12 284 (has not yet started replacement but will), tsh 1.4  Repeat MOCA today  ( previous).  Lab Results   Component Value Date     10/03/2016    SODIUM 144 2024    K 4.4 2024     2024    CO2 29 2024    ANIONGAP 23 (H) 2015    AGAP 9 2024    BUN 20 2024    CREATININE 0.94 2024    GLUC 166 (H) 2024    GLUF 129 (H) 2024    CALCIUM 8.9 2024    AST 23 2024    ALT 29 2024    ALKPHOS 100 2024    PROT 7.3 10/03/2016    TP 6.7 2024    BILITOT 0.8 10/03/2016    TBILI 0.42 2024    EGFR 92 2024     Lab Results   Component Value Date    WBC 7.21 2024    HGB 14.8 2024    HCT 44.7 2024     (H) 2024     2024     48 hour eeg 12/3- 2024:  EEG Interpretation:  This 24 hour ambulatory EEG (day 1 of 2) recorded during wakefulness, drowsiness, and sleep is normal. No clinical events were  reported. No epileptiform discharges or electrographic seizures were seen.   EEG Interpretation:  This 24 hour ambulatory EEG (day 2 of 2) recorded during wakefulness, drowsiness, and sleep is normal. Around the time of reported symptoms of word slurring (without a corresponding push button), the tracing was limited due to multiple electrode artifacts, listed above. In remaining electrodes, there was no electrographic evidence of seizures, but again, this was limited due to electrode artifacts at the time of the reported symptoms and no associated push button.   If clinically warranted, continuous video EEG could be considered to better characterize the events.     EMG 11/12/2024:  Impression: EMG and nerve conduction study reveal a generalized predominantly axonal neuropathy. An early superimposed median mononeuropathy at the right wrist is present compatible with the clinical diagnosis of carpal tunnel syndrome.    PreviousHistory:  past medical history of HTN, hyperlipidemia, chronic pain with SCS, crohns disease, alcohol use issues in past, DM with neuropathy and ulcer, OSIRIS who presents for hospital follow up- admitted 5/16-5/17/2024 for falls and speech change. He is with his wife. He does not work. He states family history of heart attack in his father at age 51 and stroke in his brother at age 53.  Today he states he still gets periods of speech changes/confusion that last for hours at at time. He also mention he gets left hand twitching/abnormal movements but these only last seconds and these are not painful.   Workup:  CTh and CTA head/neck were unremarkable for acute intracranial abnormality however did show incidental finding of pulmonary nodules.   MRI brain reviewed; negative for acute infarct. Unclear etiology of symptoms; possible polypharmacy or metabolic etiology with hyperglycemia. No indication to continue plavix/statin from neurology standpoint.   A1c 8.2, this is a new diagnosis for patient    Lipid panel reviewed with elevated total cholesterol, LDL, low HDL  Triglycerides elevated  Given 10-year ASCVD risk 11.3%    HPI  Review of Systems   Constitutional: Negative.  Negative for chills and fever.   HENT: Negative.  Negative for ear pain and sore throat.    Eyes: Negative.  Negative for pain and visual disturbance.   Respiratory: Negative.  Negative for cough and shortness of breath.    Cardiovascular: Negative.  Negative for chest pain and palpitations.   Gastrointestinal: Negative.  Negative for abdominal pain and vomiting.   Endocrine: Negative.    Genitourinary: Negative.  Negative for dysuria and hematuria.   Musculoskeletal: Negative.  Negative for arthralgias and back pain.   Skin: Negative.  Negative for color change and rash.   Allergic/Immunologic: Negative.    Neurological:  Positive for dizziness, speech difficulty (stable, on and off), weakness (on going) and numbness (in both feet). Negative for seizures and syncope.   Hematological: Negative.    Psychiatric/Behavioral:  Positive for confusion (memory worse).    All other systems reviewed and are negative.   I have personally reviewed the MA's review of systems and made changes as necessary.    Current Outpatient Medications on File Prior to Visit   Medication Sig Dispense Refill    albuterol (PROVENTIL HFA,VENTOLIN HFA) 90 mcg/act inhaler Inhale 2 puffs every 6 (six) hours as needed for wheezing 6.7 g 2    Alcohol Swabs 70 % PADS May substitute brand based on insurance coverage. Check glucose TID. 100 each 0    atorvastatin (LIPITOR) 40 mg tablet Take 1 tablet (40 mg total) by mouth every evening 90 tablet 2    Blood Glucose Monitoring Suppl (OneTouch Verio Reflect) w/Device KIT May substitute brand based on insurance coverage. Check glucose TID. 1 kit 0    buPROPion (WELLBUTRIN SR) 150 mg 12 hr tablet Take 1 tablet (150 mg total) by mouth 2 (two) times a day 180 tablet 2    Cholecalciferol (RA Vitamin D-3) 1,000 units tablet Take 2  tablets (2,000 Units total) by mouth daily 180 tablet 2    cyclobenzaprine (FLEXERIL) 10 mg tablet TAKE 1 TABLET BY MOUTH EVERYDAY AT BEDTIME 90 tablet 2    furosemide (LASIX) 20 mg tablet Take 1 tablet (20 mg total) by mouth daily 90 tablet 2    glucose blood (OneTouch Verio) test strip May substitute brand based on insurance coverage. Check glucose TID. 100 each 5    lisinopril (ZESTRIL) 20 mg tablet Take 1 tablet (20 mg total) by mouth daily 90 tablet 2    loperamide (IMODIUM) 2 mg capsule Take 1 capsule (2 mg total) by mouth daily at bedtime 30 capsule 2    metFORMIN (GLUCOPHAGE-XR) 500 mg 24 hr tablet 1 in am and 2 in pm 360 tablet 2    omeprazole (PriLOSEC) 40 MG capsule Take 1 capsule (40 mg total) by mouth daily 90 capsule 2    ondansetron (ZOFRAN) 4 mg tablet Take 1 tablet (4 mg total) by mouth every 8 (eight) hours as needed for nausea or vomiting 20 tablet 1    OneTouch Delica Lancets 33G MISC May substitute brand based on insurance coverage. Check glucose TID. 100 each 5    pregabalin (LYRICA) 150 mg capsule Take 1 capsule (150 mg total) by mouth 3 (three) times a day 90 capsule 2    traZODone (DESYREL) 50 mg tablet Take 1-2 tabs at bedtime as needed for insomnia 90 tablet 3    vedolizumab (Entyvio) SOLR Inject 300 mg into a catheter in a vein every 4 weeks       No current facility-administered medications on file prior to visit.      Social History     Tobacco Use    Smoking status: Former     Current packs/day: 0.00     Average packs/day: 0.5 packs/day for 30.0 years (15.0 ttl pk-yrs)     Types: Cigarettes     Start date: 1/1/2019     Quit date: 2/4/2021     Years since quitting: 3.9     Passive exposure: Past    Smokeless tobacco: Never   Vaping Use    Vaping status: Every Day    Substances: THC   Substance and Sexual Activity    Alcohol use: Not Currently     Alcohol/week: 1.0 standard drink of alcohol     Types: 1 Cans of beer per week    Drug use: Yes     Frequency: 7.0 times per week     Types:  "Marijuana     Comment: Medical marijuana-vapes, bedtime    Sexual activity: Yes     Partners: Female, Male        Objective   /80 (BP Location: Right arm, Patient Position: Sitting, Cuff Size: Adult)   Pulse 80   Temp 97.8 °F (36.6 °C) (Temporal)   Resp 16   Ht 6' 1\" (1.854 m)   Wt 93 kg (205 lb)   SpO2 98%   BMI 27.05 kg/m²     Physical Exam  Vitals reviewed.   Constitutional:       Appearance: He is normal weight. He is not ill-appearing.   HENT:      Head: Normocephalic.      Right Ear: External ear normal.      Left Ear: External ear normal.      Nose: Nose normal.      Mouth/Throat:      Pharynx: Oropharynx is clear.   Eyes:      General: Lids are normal.         Right eye: No discharge.         Left eye: No discharge.      Extraocular Movements: Extraocular movements intact.      Pupils: Pupils are equal, round, and reactive to light.   Cardiovascular:      Rate and Rhythm: Normal rate.      Pulses: Normal pulses.      Heart sounds: Normal heart sounds.   Pulmonary:      Effort: Pulmonary effort is normal.      Breath sounds: Normal breath sounds.   Abdominal:      General: There is no distension.      Tenderness: There is no abdominal tenderness.   Musculoskeletal:      Cervical back: Normal range of motion.      Right lower leg: No edema.      Left lower leg: No edema.   Skin:     Capillary Refill: Capillary refill takes less than 2 seconds.      Findings: No rash.   Neurological:      Mental Status: He is alert. Mental status is at baseline.      Motor: Motor strength is normal.     Deep Tendon Reflexes:      Reflex Scores:       Brachioradialis reflexes are 1+ on the right side and 1+ on the left side.  Psychiatric:         Mood and Affect: Mood normal.       Neurological Exam  Mental Status  Alert. Fund of knowledge is abnormal.  See moca.    Cranial Nerves  CN II: Visual acuity is normal. Visual fields full to confrontation.  CN III, IV, VI: Extraocular movements intact bilaterally. Normal " lids and orbits bilaterally. Pupils equal round and reactive to light bilaterally.  CN V: Facial sensation is normal.  CN VII: Full and symmetric facial movement.  CN VIII: Hearing is normal.  CN IX, X: Palate elevates symmetrically. Normal gag reflex.  CN XI: Shoulder shrug strength is normal.  CN XII: Tongue midline without atrophy or fasciculations.    Motor  Normal muscle bulk throughout. Strength is 5/5 throughout all four extremities.    Reflexes                                            Right                      Left  Brachioradialis                    1+                         1+  Patellar                                Tr                         Tr    Coordination  Right: Rapid alternating movement normal.Left: Rapid alternating movement normal.    Gait  Casual gait: Wide stance.

## 2024-12-30 ENCOUNTER — HOSPITAL ENCOUNTER (OUTPATIENT)
Dept: RADIOLOGY | Facility: HOSPITAL | Age: 54
Discharge: HOME | End: 2024-12-30

## 2024-12-30 ENCOUNTER — OFFICE VISIT (OUTPATIENT)
Dept: WOUND CARE | Facility: HOSPITAL | Age: 54
End: 2024-12-30
Payer: MEDICARE

## 2024-12-30 VITALS
TEMPERATURE: 97.6 F | DIASTOLIC BLOOD PRESSURE: 70 MMHG | SYSTOLIC BLOOD PRESSURE: 110 MMHG | HEART RATE: 88 BPM | RESPIRATION RATE: 16 BRPM

## 2024-12-30 DIAGNOSIS — E11.40 TYPE 2 DIABETES MELLITUS WITH DIABETIC NEUROPATHY, WITHOUT LONG-TERM CURRENT USE OF INSULIN (HCC): ICD-10-CM

## 2024-12-30 DIAGNOSIS — L97.522 DIABETIC ULCER OF TOE OF LEFT FOOT ASSOCIATED WITH TYPE 2 DIABETES MELLITUS, WITH FAT LAYER EXPOSED (HCC): Primary | ICD-10-CM

## 2024-12-30 DIAGNOSIS — Z96.89 SPINAL CORD STIMULATOR STATUS: ICD-10-CM

## 2024-12-30 DIAGNOSIS — M20.22 HALLUX RIGIDUS OF LEFT FOOT: ICD-10-CM

## 2024-12-30 DIAGNOSIS — E11.621 DIABETIC ULCER OF TOE OF LEFT FOOT ASSOCIATED WITH TYPE 2 DIABETES MELLITUS, WITH FAT LAYER EXPOSED (HCC): Primary | ICD-10-CM

## 2024-12-30 PROCEDURE — 11042 DBRDMT SUBQ TIS 1ST 20SQCM/<: CPT | Performed by: PODIATRIST

## 2024-12-30 NOTE — PATIENT INSTRUCTIONS
Orders Placed This Encounter   Procedures    Wound cleansing and dressings Diabetic Ulcer Left Plantar     Left great toe     Wash your hands with soap and water.  Remove old dressing, discard into plastic bag and place in trash.  Cleanse the wound with soap and water prior to applying a clean dressing. Do not use tissue or cotton balls. Do not scrub the wound. Pat dry using gauze.  Shower yes   Apply felt offloading pad to skin surrounding wound  Apply endoform and dermagran gauze to the open wound.  Cover with gauze  Secure with tape  Change dressing every other day    Wear CAM boot     Standing Status:   Future     Expiration Date:   1/13/2025

## 2024-12-30 NOTE — PROGRESS NOTES
Wound Procedure Treatment Diabetic Ulcer Left Plantar    Performed by: Dior Li RN  Authorized by: Jovanni Rodas DPM    Associated wounds:   Wound 11/06/24 Diabetic Ulcer Plantar Left  Wound cleansed with:  Soap and water  Applied primary dressing:  Collagen dressing, Silver and Dermagran  Applied secondary dressing:  Gauze  Dressing secured with:  Tape  Offloading device appllied:  Felt padding 1/4 inch  Comments:  Endoform AM

## 2024-12-31 ENCOUNTER — RA CDI HCC (OUTPATIENT)
Dept: OTHER | Facility: HOSPITAL | Age: 54
End: 2024-12-31

## 2025-01-01 NOTE — PROGRESS NOTES
Patient ID: Benito Park is a 54 y.o. male Date of Birth 1970       Chief Complaint   Patient presents with    [facility]             Diagnosis:  1. Diabetic polyneuropathy associated with type 2 diabetes mellitus (HCC)  -     Diabetic Shoe  2. Diabetic ulcer of left foot associated with type 2 diabetes mellitus, limited to breakdown of skin, unspecified part of foot (HCC)  3. Onychomycosis       Patient presents for at-risk diabetic foot care.  Patient has no acute concerns today.  Patient has significant lower extremity risk due to neuropathy, parasthesia, edema, and trophic skin changes to the lower extremity. Most recent HBA1c was 6.2 on 11/13/24  FBS runs in the 130s  Last visit with PCP 9/1/24 and endocrinology was 8/20/24.  Diabetic shoes : Received end of August 2024  LEAD 11/18/24 - WNL  Following with Dr. Rodas at [facility] for left foot DFU - current TX is endoform and dermagran gauze DSD.    On exam patient has thickened, hypertrophic, discolored, brittle toenails with subungual debris and tenderness x10   Callus: submet 1 and 5 bilaterally  Patient does not have  BL lower extremity edema  Patient's skin is atrophic, thickened nails, and decreased pedal hair  Patient has decreased pinprick and vibratory sensation to his feet and parasthesia  Patient does not have any  pedal ulcerations right foot.  DP and PT pulses are monophasic on Doppler  Left foot DFU 0.2 x 0.2 x 0.1 with thick hyperkeratosis, wound is plugged with product, postdebridement there is serous drainage, wound base is granular, does not probe to muscle, tendon, bone, no undermining is noted, wound is larger postdebridement.  No signs of infection noted.      Today's treatment includes:  Debridement of toenails. Using nail nipper, demarcus, and curette, nails were sharply debrided, reduced in thickness and length. Devitalized nail tissue and fungal debris excised and removed. Patient tolerated well.      Hyperkeratosis were trimmed x 4 without  "incident    Debridement   Wound 11/06/24 Diabetic Ulcer Plantar Left    Universal Protocol:  procedure performed by consultantConsent: Verbal consent obtained.  Risks and benefits: risks, benefits and alternatives were discussed  Consent given by: patient  Time out: Immediately prior to procedure a \"time out\" was called to verify the correct patient, procedure, equipment, support staff and site/side marked as required.  Patient understanding: patient states understanding of the procedure being performed  Patient identity confirmed: verbally with patient    Debridement Details  Performed by: physician  Debridement type: surgical  Level of debridement: subcutaneous tissue  Pain control: none      Post-debridement measurements  Length (cm): 0.3  Width (cm): 0.3  Depth (cm): 0.2  Percent debrided: 100%  Surface Area (cm^2): 0.09  Area Debrided (cm^2): 0.09  Volume (cm^3): 0.02    Tissue and other material debrided: subcutaneous tissue  Devitalized tissue debrided: biofilm, fibrin, necrotic debris and slough  Instrument(s) utilized: blade  Bleeding: small  Hemostasis obtained with: pressure  Procedural pain (0-10): insensate  Post-procedural pain: insensate   Response to treatment: procedure was tolerated well  Debridement Comments: Wound was dressed with Dermagran gauze dry dressing, patient will change dressing 3 times a week.  He will continue use of his cam boot, I did add 1/4 inch felt offloading metatarsal bar and Steward's extension to remove pressure from plantar great toe.  Patient has not been compliant with use of his cam boot, I expressed to him it is extremely important that he uses it every time he walks.        Discussed proper shoe gear, daily inspections of feet, and general foot health with patient. Patient has Q9 findings and is recommended for at risk foot care every 9-10 weeks.    Patient will follow in wound management as previously appointment on 1/15/2025.    Patient given prescription for 1 pair " "diabetic shoes, 3 pair custom molded inserts to remove pressure submetatarsal head 1 and hallux with Steward's extension bilaterally.    Patients most recent complete clinical foot exam was on: 5/24/24     The following portions of the patient's history were reviewed and updated as appropriate: allergies, current medications, past family history, past medical history, past social history, past surgical history, and problem list.        Objective:  Ht 6' 1\" (1.854 m) Comment: VERBAL  Wt 93 kg (205 lb)   BMI 27.05 kg/m²         No pertinent results found.      Rosmery Weir, DPM, DPM, FACFAS    Portions of the record may have been created with voice recognition software. Occasional wrong word or \"sound a like\" substitutions may have occurred due to the inherent limitations of voice recognition software. Read the chart carefully and recognize, using context, where substitutions have occurred.  "

## 2025-01-02 ENCOUNTER — OFFICE VISIT (OUTPATIENT)
Dept: PODIATRY | Facility: CLINIC | Age: 55
End: 2025-01-02
Payer: MEDICARE

## 2025-01-02 VITALS — BODY MASS INDEX: 27.17 KG/M2 | WEIGHT: 205 LBS | HEIGHT: 73 IN

## 2025-01-02 DIAGNOSIS — L97.521 DIABETIC ULCER OF LEFT FOOT ASSOCIATED WITH TYPE 2 DIABETES MELLITUS, LIMITED TO BREAKDOWN OF SKIN, UNSPECIFIED PART OF FOOT (HCC): ICD-10-CM

## 2025-01-02 DIAGNOSIS — E11.621 DIABETIC ULCER OF LEFT FOOT ASSOCIATED WITH TYPE 2 DIABETES MELLITUS, LIMITED TO BREAKDOWN OF SKIN, UNSPECIFIED PART OF FOOT (HCC): ICD-10-CM

## 2025-01-02 DIAGNOSIS — E11.42 DIABETIC POLYNEUROPATHY ASSOCIATED WITH TYPE 2 DIABETES MELLITUS (HCC): Primary | ICD-10-CM

## 2025-01-02 DIAGNOSIS — B35.1 ONYCHOMYCOSIS: ICD-10-CM

## 2025-01-02 PROCEDURE — 11056 PARNG/CUTG B9 HYPRKR LES 2-4: CPT | Performed by: PODIATRIST

## 2025-01-02 PROCEDURE — 11042 DBRDMT SUBQ TIS 1ST 20SQCM/<: CPT | Performed by: PODIATRIST

## 2025-01-02 PROCEDURE — 11721 DEBRIDE NAIL 6 OR MORE: CPT | Performed by: PODIATRIST

## 2025-01-02 PROCEDURE — 99213 OFFICE O/P EST LOW 20 MIN: CPT | Performed by: PODIATRIST

## 2025-01-07 ENCOUNTER — PROCEDURE VISIT (OUTPATIENT)
Dept: GASTROENTEROLOGY | Facility: CLINIC | Age: 55
End: 2025-01-07
Attending: INTERNAL MEDICINE
Payer: MEDICARE

## 2025-01-07 DIAGNOSIS — K50.818 CROHN'S DISEASE OF BOTH SMALL AND LARGE INTESTINE WITH OTHER COMPLICATION (HCC): ICD-10-CM

## 2025-01-07 DIAGNOSIS — K50.814 CROHN'S DISEASE OF BOTH SMALL AND LARGE INTESTINE WITH ABSCESS (HCC): ICD-10-CM

## 2025-01-07 PROCEDURE — 91110 GI TRC IMG INTRAL ESOPH-ILE: CPT | Performed by: INTERNAL MEDICINE

## 2025-01-07 NOTE — PROGRESS NOTES
Patient here for capsule endoscopy.  Patient tolerated bowel prep.  Remained NPO after midnight.  Reviewed capsule and instructions.  Patient verbalized understanding.  Patient to return at 4:15.

## 2025-01-09 ENCOUNTER — APPOINTMENT (OUTPATIENT)
Dept: LAB | Facility: HOSPITAL | Age: 55
End: 2025-01-09
Payer: MEDICARE

## 2025-01-09 ENCOUNTER — EVALUATION (OUTPATIENT)
Dept: SPEECH THERAPY | Facility: CLINIC | Age: 55
End: 2025-01-09
Payer: MEDICARE

## 2025-01-09 DIAGNOSIS — I10 PRIMARY HYPERTENSION: ICD-10-CM

## 2025-01-09 DIAGNOSIS — G47.33 OSA (OBSTRUCTIVE SLEEP APNEA): ICD-10-CM

## 2025-01-09 DIAGNOSIS — R41.841 COGNITIVE COMMUNICATION DEFICIT: ICD-10-CM

## 2025-01-09 DIAGNOSIS — E11.42 DIABETIC POLYNEUROPATHY ASSOCIATED WITH TYPE 2 DIABETES MELLITUS (HCC): ICD-10-CM

## 2025-01-09 DIAGNOSIS — R48.8 OTHER SYMBOLIC DYSFUNCTIONS: Primary | ICD-10-CM

## 2025-01-09 DIAGNOSIS — R41.3 MEMORY LOSS: ICD-10-CM

## 2025-01-09 DIAGNOSIS — E11.65 TYPE 2 DIABETES MELLITUS WITH HYPERGLYCEMIA, WITHOUT LONG-TERM CURRENT USE OF INSULIN (HCC): ICD-10-CM

## 2025-01-09 DIAGNOSIS — E78.2 MIXED HYPERLIPIDEMIA: ICD-10-CM

## 2025-01-09 LAB
ALBUMIN SERPL BCG-MCNC: 4.2 G/DL (ref 3.5–5)
ALP SERPL-CCNC: 85 U/L (ref 34–104)
ALT SERPL W P-5'-P-CCNC: 31 U/L (ref 7–52)
ANION GAP SERPL CALCULATED.3IONS-SCNC: 7 MMOL/L (ref 4–13)
ANION GAP SERPL CALCULATED.3IONS-SCNC: 7 MMOL/L (ref 4–13)
AST SERPL W P-5'-P-CCNC: 24 U/L (ref 13–39)
BASOPHILS # BLD AUTO: 0.02 THOUSANDS/ΜL (ref 0–0.1)
BASOPHILS # BLD AUTO: 0.02 THOUSANDS/ΜL (ref 0–0.1)
BASOPHILS NFR BLD AUTO: 0 % (ref 0–1)
BASOPHILS NFR BLD AUTO: 0 % (ref 0–1)
BILIRUB SERPL-MCNC: 0.76 MG/DL (ref 0.2–1)
BUN SERPL-MCNC: 13 MG/DL (ref 5–25)
BUN SERPL-MCNC: 13 MG/DL (ref 5–25)
CALCIUM SERPL-MCNC: 9.2 MG/DL (ref 8.4–10.2)
CALCIUM SERPL-MCNC: 9.3 MG/DL (ref 8.4–10.2)
CHLORIDE SERPL-SCNC: 105 MMOL/L (ref 96–108)
CHLORIDE SERPL-SCNC: 105 MMOL/L (ref 96–108)
CO2 SERPL-SCNC: 29 MMOL/L (ref 21–32)
CO2 SERPL-SCNC: 29 MMOL/L (ref 21–32)
CREAT SERPL-MCNC: 0.88 MG/DL (ref 0.6–1.3)
CREAT SERPL-MCNC: 0.88 MG/DL (ref 0.6–1.3)
EOSINOPHIL # BLD AUTO: 0.31 THOUSAND/ΜL (ref 0–0.61)
EOSINOPHIL # BLD AUTO: 0.34 THOUSAND/ΜL (ref 0–0.61)
EOSINOPHIL NFR BLD AUTO: 4 % (ref 0–6)
EOSINOPHIL NFR BLD AUTO: 4 % (ref 0–6)
ERYTHROCYTE [DISTWIDTH] IN BLOOD BY AUTOMATED COUNT: 13.3 % (ref 11.6–15.1)
ERYTHROCYTE [DISTWIDTH] IN BLOOD BY AUTOMATED COUNT: 13.4 % (ref 11.6–15.1)
GFR SERPL CREATININE-BSD FRML MDRD: 98 ML/MIN/1.73SQ M
GFR SERPL CREATININE-BSD FRML MDRD: 98 ML/MIN/1.73SQ M
GLUCOSE P FAST SERPL-MCNC: 148 MG/DL (ref 65–99)
GLUCOSE P FAST SERPL-MCNC: 148 MG/DL (ref 65–99)
HCT VFR BLD AUTO: 48 % (ref 36.5–49.3)
HCT VFR BLD AUTO: 48.1 % (ref 36.5–49.3)
HGB BLD-MCNC: 15.9 G/DL (ref 12–17)
HGB BLD-MCNC: 16.3 G/DL (ref 12–17)
IMM GRANULOCYTES # BLD AUTO: 0.03 THOUSAND/UL (ref 0–0.2)
IMM GRANULOCYTES # BLD AUTO: 0.03 THOUSAND/UL (ref 0–0.2)
IMM GRANULOCYTES NFR BLD AUTO: 0 % (ref 0–2)
IMM GRANULOCYTES NFR BLD AUTO: 0 % (ref 0–2)
LYMPHOCYTES # BLD AUTO: 1.99 THOUSANDS/ΜL (ref 0.6–4.47)
LYMPHOCYTES # BLD AUTO: 2.04 THOUSANDS/ΜL (ref 0.6–4.47)
LYMPHOCYTES NFR BLD AUTO: 25 % (ref 14–44)
LYMPHOCYTES NFR BLD AUTO: 25 % (ref 14–44)
MCH RBC QN AUTO: 32.7 PG (ref 26.8–34.3)
MCH RBC QN AUTO: 33.7 PG (ref 26.8–34.3)
MCHC RBC AUTO-ENTMCNC: 33.1 G/DL (ref 31.4–37.4)
MCHC RBC AUTO-ENTMCNC: 33.9 G/DL (ref 31.4–37.4)
MCV RBC AUTO: 100 FL (ref 82–98)
MCV RBC AUTO: 99 FL (ref 82–98)
MONOCYTES # BLD AUTO: 0.67 THOUSAND/ΜL (ref 0.17–1.22)
MONOCYTES # BLD AUTO: 0.69 THOUSAND/ΜL (ref 0.17–1.22)
MONOCYTES NFR BLD AUTO: 8 % (ref 4–12)
MONOCYTES NFR BLD AUTO: 9 % (ref 4–12)
NEUTROPHILS # BLD AUTO: 4.98 THOUSANDS/ΜL (ref 1.85–7.62)
NEUTROPHILS # BLD AUTO: 5.1 THOUSANDS/ΜL (ref 1.85–7.62)
NEUTS SEG NFR BLD AUTO: 62 % (ref 43–75)
NEUTS SEG NFR BLD AUTO: 63 % (ref 43–75)
NRBC BLD AUTO-RTO: 0 /100 WBCS
NRBC BLD AUTO-RTO: 0 /100 WBCS
PLATELET # BLD AUTO: 203 THOUSANDS/UL (ref 149–390)
PLATELET # BLD AUTO: 214 THOUSANDS/UL (ref 149–390)
PMV BLD AUTO: 10 FL (ref 8.9–12.7)
PMV BLD AUTO: 9.8 FL (ref 8.9–12.7)
POTASSIUM SERPL-SCNC: 4.1 MMOL/L (ref 3.5–5.3)
POTASSIUM SERPL-SCNC: 4.2 MMOL/L (ref 3.5–5.3)
PROT SERPL-MCNC: 6.7 G/DL (ref 6.4–8.4)
RBC # BLD AUTO: 4.83 MILLION/UL (ref 3.88–5.62)
RBC # BLD AUTO: 4.86 MILLION/UL (ref 3.88–5.62)
SODIUM SERPL-SCNC: 141 MMOL/L (ref 135–147)
SODIUM SERPL-SCNC: 141 MMOL/L (ref 135–147)
WBC # BLD AUTO: 8.02 THOUSAND/UL (ref 4.31–10.16)
WBC # BLD AUTO: 8.2 THOUSAND/UL (ref 4.31–10.16)

## 2025-01-09 PROCEDURE — 83036 HEMOGLOBIN GLYCOSYLATED A1C: CPT

## 2025-01-09 PROCEDURE — 80053 COMPREHEN METABOLIC PANEL: CPT

## 2025-01-09 PROCEDURE — 96125 COGNITIVE TEST BY HC PRO: CPT | Performed by: SPEECH-LANGUAGE PATHOLOGIST

## 2025-01-09 PROCEDURE — 85025 COMPLETE CBC W/AUTO DIFF WBC: CPT

## 2025-01-09 PROCEDURE — 36415 COLL VENOUS BLD VENIPUNCTURE: CPT

## 2025-01-09 PROCEDURE — 80048 BASIC METABOLIC PNL TOTAL CA: CPT

## 2025-01-09 NOTE — PROGRESS NOTES
"Speech-Language Pathology Initial Evaluation    Today's date: 2025   Patient’s name: Benito Park, goes by Anand  : 1970  MRN: [id number]  Safety measures: h/o Diabetes  Referring provider: Francesco Edouard CRNP    Encounter Diagnoses   Name Primary?    Memory loss     Other symbolic dysfunctions Yes    Cognitive communication deficit          Assessment:  Patient presents with complaints of memory loss that began following an episode of diabetic shock that occurred in May 2024. He was hospitalized for two days for this incident and discharged home. He feels his memory has been \"shot\" since then. For example he notes he forgets why he crossed a room to get something, can't remember to take his pills without significant support (Uses alarms to remember, uses a pill box, his wife is helping put pills into the boxes each week). He notes his symptoms are worsening. He is undergoing MRI of brain on 25.       He denies any articulation changes but notes he may exhibit some slurring but per his wife and others report. None observed in session today.     Short-term goals:  Patient will be educated on the use of internal and external memory aids and compensatory strategies to facilitate increased recall of routine, personal information, and recent events, to be achieved in 4-6 weeks.    Patient will complete auditory immediate and short term memory tasks to facilitate increased ability to retell narratives and recall information within functional living environment, to be achieved in 4-6 weeks.    Patient will complete complex auditory attention processing tasks (e.g., sentence unscramble, ranking numbers/words, etc.) to improve working memory with 80% accuracy, to be achieved in 4-6 weeks.    Patient will complete thought organization tasks (e.g., sequencing, deduction puzzles, etc.) with 80% accuracy to facilitate increased executive functioning, working memory, problem solving, and processing skills, to " be achieved in 4-6 weeks.    Patient will complete concrete and abstract categorization tasks to 80% accuracy to facilitate improved generative naming skills and working memory, to be achieved in 4-6 weeks.    Patient will utilize word finding strategies during semantic feature analysis treatment activity for improved naming and verbal expression skills, to be achieved in 4-6 weeks.    Long Term Goals    Patient will demonstrate cognitive-communication skills consistent with age and education given use of compensatory strategies when needed to resume baseline activities and responsibilities in home, community, and work/school settings by discharge.     Patient will complete cognitive-linguistic therapy that addresses patient’s specific deficits in processing speed, short-term working memory, attention to detail, monitoring, sequencing, and organization skills, with instruction, to alleviate effects of executive functioning disorder deficits by discharge.    Patient will complete higher-level expressive language tasks (e.g., word definitions, idioms, synonym/antonyms, etc) with 80% accuracy to improve functional communication skills by discharge.       Plan:  Patient would benefit from outpatient skilled Speech Therapy services: Cognitive-linguistic therapy    Frequency: 2x weekly  Duration: 6-8 weeks    Intervention certification from: 1/9/2025  Intervention certification to: 3/9/2025      Subjective:  History of present illness: Patient is a 54 y.o. male who was referred to outpatient skilled Speech Therapy services for a cognitive-linguistic evaluation.     Patient's goal(s): To improve memory    Pain: Absent (scale:  N/A )     Hearing: WFL  Vision: WFL    Home environment/lifestyle: Lives with wife, very supportive  Highest level of education: High school  Vocational status: Disabled in 2019      Objective:  The Repeatable Battery for the Assessment of Neuropsychological Status (RBANS) is a brief,  individually-administered assessment which measures attention, language, visuospatial/constructional abilities, and immediate & delayed memory. The RBANS is intended for use with adolescents to adults, ages 12 to 89 years. The following results were obtained during the administration of the assessment.    Form: A    Cognitive Domain/Subtest: Index Score: Percentile Rank: Classification:   IMMEDIATE MEMORY 57 0.1%ile Extremely Low        1. List Learning (17/40)        2. Story Memory (8/24)       VISUOSPATIAL/  CONSTRUCTIONAL 75 5%ile Borderline        3. Figure Copy (17/20)        4. Line Orientation (9/20)       LANGUAGE 75 5%ile Borderline        5. Picture Naming (9/10)        6. Semantic Fluency (8/40)       ATTENTION 75 5%ile Borderline        7. Digit Span (10/16)        8. Coding (18/89)       DELAYED MEMORY 64 0.8%ile Extremely Low        9. List Recall (0/10)        10. List Recognition (17/20)        11. Story Recall (3/12)        12. Figure Recall (11/20)         Sum of Index Scores:  44 (Total scaled)   Total Score:  346   Percentile: 2%ile   Classification: Extremely Low       *Patient named 8 concrete category members (fruits and vegetables) in 60 sec (norm=15+). -- BELOW AVERAGE      Treatment:  No treatment provided today, evaluation only      Visit Tracking:  POC   Expires Auth Expiration Date ST Visit Limit   3/9/2025 BOMN BOMN          Visit/Unit Tracking:  Auth Status Date 1/9/2025   No auth req, Used 1    Remaining BOMN       Intervention Comments:  45 minutes testing + 1 hr scoring and interpretation

## 2025-01-10 ENCOUNTER — RESULTS FOLLOW-UP (OUTPATIENT)
Dept: ENDOCRINOLOGY | Facility: HOSPITAL | Age: 55
End: 2025-01-10

## 2025-01-10 LAB
EST. AVERAGE GLUCOSE BLD GHB EST-MCNC: 137 MG/DL
HBA1C MFR BLD: 6.4 %

## 2025-01-10 NOTE — PROGRESS NOTES
Small bowel capsule endoscopy    Indication: Crohn's disease, diarrhea    Procedure summary:   Normal transit through esophagus, stomach, and small intestine.  Capsule reaches the duodenum at 1 hour 15 minutes, and the cecum at 3 hours and 18 minutes.  Good prep    Findings: Normal mucosa throughout the small intestine.  No signs of Crohn's disease such as stricture, inflammation or ulcer    Recommendations:   Discussed results with patient by phone.  Diarrhea persists despite stopping naproxen  Continue Imodium as needed  Recommend follow-up with PCP to discuss holiday from metformin while assessing diarrhea.

## 2025-01-12 DIAGNOSIS — F51.01 PRIMARY INSOMNIA: ICD-10-CM

## 2025-01-13 ENCOUNTER — OFFICE VISIT (OUTPATIENT)
Dept: SPEECH THERAPY | Facility: CLINIC | Age: 55
End: 2025-01-13
Payer: MEDICARE

## 2025-01-13 DIAGNOSIS — R41.3 MEMORY LOSS: Primary | ICD-10-CM

## 2025-01-13 DIAGNOSIS — R48.8 OTHER SYMBOLIC DYSFUNCTIONS: ICD-10-CM

## 2025-01-13 DIAGNOSIS — R41.841 COGNITIVE COMMUNICATION DEFICIT: ICD-10-CM

## 2025-01-13 PROCEDURE — 92507 TX SP LANG VOICE COMM INDIV: CPT | Performed by: SPEECH-LANGUAGE PATHOLOGIST

## 2025-01-13 RX ORDER — TRAZODONE HYDROCHLORIDE 50 MG/1
TABLET, FILM COATED ORAL
Qty: 180 TABLET | Refills: 1 | Status: SHIPPED | OUTPATIENT
Start: 2025-01-13

## 2025-01-13 NOTE — PROGRESS NOTES
Daily Speech Treatment Note    Today's date: 2025   Patient’s name: Benito Park  : 1970  MRN: [id number]  Safety measures: h/o Diabetic Shock  Referring provider: Francesco Edouard CRNP    Encounter Diagnoses   Name Primary?    Memory loss Yes    Other symbolic dysfunctions     Cognitive communication deficit        Visit Tracking:  POC   Expires Auth Expiration Date ST Visit Limit   3/9/2025 BOMN BOMN          Visit/Unit Tracking:  Auth Status Date 2025   No auth req, Used 1 2    Remaining BOMN        Subjective/Behavioral:  -Patient pleasant and cooperative. Requested RBANS results emailed to himself so he can share with his wife, which was done at end of session.     Objective/Assessment:  -Reviewed testing results and goals in plan care with patient. Patient is in agreement at this time.    Short-term goals:  Patient will be educated on the use of internal and external memory aids and compensatory strategies to facilitate increased recall of routine, personal information, and recent events, to be achieved in 4-6 weeks.  25: Patient provided basic education about use of external memory aids. He currently uses a list when going to the store. His wife manages his appointments and medications at this point.    Patient will complete auditory immediate and short term memory tasks to facilitate increased ability to retell narratives and recall information within functional living environment, to be achieved in 4-6 weeks.    25: To target immediate recall, prompted to recall second word below (when I say green, you say grass). Patient demonstrated 60% accuracy initially, this improved to 80% accuracy with moderate cueing. Patient also benefitted from education regarding memory recall strategies (repetition, word association).   Green: grass  Red: Stop sign  Blue: Ocean  Black: Bird  Yellow: Sun  Brown: Dirt  Purple: Flower     Additionally, patient was provided unassociated word  pairs for target during immediate recall task. He demonstrated 40% accuracy initially. With insstruction and cueing for association among the pairs, patient improved with 70% with moderate cueing.   Cat: Wall  Clock: TV  Robbins: Lemon  Desk: Pencil  Marker: Cup  Scissors: Coffee      To target mental manipulation, patient was provided 4 words and asked questions about them (which are animals). Initially patient demonstrated 60% accuracy. This improved with cued repetition of the original word set. To decrease difficulty, patient was given 3 words and asked to repeat them in reverse order. He demonstrated 70% accuracy with moderate visual cues.     Patient will complete complex auditory attention processing tasks (e.g., sentence unscramble, ranking numbers/words, etc.) to improve working memory with 80% accuracy, to be achieved in 4-6 weeks.  1/13/25: Patient was provided 4 word sentences to unscramble. The first 5 trials, patient demonstrated 20% accuracy. This improved after cued repetitions of original word sets and accuracy increased to 90% with minimal-moderate cueing.     Patient will complete thought organization tasks (e.g., sequencing, deduction puzzles, etc.) with 80% accuracy to facilitate increased executive functioning, working memory, problem solving, and processing skills, to be achieved in 4-6 weeks.    Patient will complete concrete and abstract categorization tasks to 80% accuracy to facilitate improved generative naming skills and working memory, to be achieved in 4-6 weeks.  1/13/25:To target this goal, plated Anomia with patient in which he was provided with a category and instructed to formulate a word within category. He completed two trials: 5/minute (orange cards) and 7/minute (orange).     Patient will utilize word finding strategies during semantic feature analysis treatment activity for improved naming and verbal expression skills, to be achieved in 4-6 weeks.    Long Term  Goals    Patient will demonstrate cognitive-communication skills consistent with age and education given use of compensatory strategies when needed to resume baseline activities and responsibilities in home, community, and work/school settings by discharge.     Patient will complete cognitive-linguistic therapy that addresses patient’s specific deficits in processing speed, short-term working memory, attention to detail, monitoring, sequencing, and organization skills, with instruction, to alleviate effects of executive functioning disorder deficits by discharge.    Patient will complete higher-level expressive language tasks (e.g., word definitions, idioms, synonym/antonyms, etc) with 80% accuracy to improve functional communication skills by discharge.   Plan:  -Continue with current plan of care.  -Patient instructed to download word, memory and math apps in which he has interest to practice cognitive challenges at home.

## 2025-01-13 NOTE — PROGRESS NOTES
Patient ID: Benito Park is a 54 y.o. male Date of Birth 1970       Chief Complaint   Patient presents with   • Follow Up Wound Care Visit     Left great toe wound       Allergies:  Patient has no known allergies.    Diagnosis:  1. Diabetic ulcer of toe of left foot associated with type 2 diabetes mellitus, with fat layer exposed (HCC)  -     Wound cleansing and dressings Diabetic Ulcer Left Plantar; Future  -     Wound Procedure Treatment Diabetic Ulcer Left Plantar  -     Debridement Diabetic Ulcer Left Plantar  2. Type 2 diabetes mellitus with diabetic neuropathy, without long-term current use of insulin (HCC)  3. Hallux rigidus of left foot     Diagnosis ICD-10-CM Associated Orders   1. Diabetic ulcer of toe of left foot associated with type 2 diabetes mellitus, with fat layer exposed (HCC)  E11.621 Wound cleansing and dressings Diabetic Ulcer Left Plantar    L97.522 Wound Procedure Treatment Diabetic Ulcer Left Plantar     Debridement Diabetic Ulcer Left Plantar      2. Type 2 diabetes mellitus with diabetic neuropathy, without long-term current use of insulin (HCC)  E11.40       3. Hallux rigidus of left foot  M20.22            Assessment & Plan:  Left great toe slowly making some progress.  Debridement of skin full-thickness excisional with 10 blade and tissue nipper as noted below.  Continue with Endoform/Dermagran gauze dressing to be changed every other day.  Will discontinue football dressing for now and switch to cam boot for further offloading and immobilization.    Patient instructed only to remove cam boot when bathing or sleeping  Follow-up in 2 weeks.    Subjective:   12/30/2024: 54-year-old male presents today for follow-up chronic ulceration located at the tip of his left great toe.  Football dressing intact.  Patient reports has been comfortable but very inconvenient to keep dry.    12/11/2024: 54-year-old male seen today for follow-up chronic left great toe ulceration.  Reports  football dressing has helped and he is making progress but would like to discontinue the football dressing.    12/4/2024: 54-year-old type II diabetic seen today for follow-up DFU left great toe, right great toe was closed as of last visit.  Reports he is not a fan of the football dressing but is anxious to not needed.  Reports right great toe is doing fine.    11/27/2024: 54-year-old type II diabetic seen today for follow-up bilateral great toe ulcerations who was last seen by Dr. Weir 1 week ago who put him in bilateral football dressings.  Patient reports right foot opened up while in [place] and left foot opened up upon return from [place] at home.  Reports good progress and feels the right foot has closed.  Denies any new pain/discomfort.  Reports he has been diabetic for approximately 1 year.    11/20/2024: (Dr. Weir) presents today for evaluation and care of bilateral great toe diabetic ulcerations, he is tolerating football dressing well although he did have to remove it on Monday for his lower extremity arterial Dopplers.        The following portions of the patient's history were reviewed and updated as appropriate:   Patient Active Problem List   Diagnosis   • Sprain of anterior talofibular ligament of right ankle   • Perianal abscess   • Polyneuropathy   • Chronic bilateral low back pain with bilateral sciatica   • Bilateral lower extremity edema   • Chronic pain syndrome   • Failed back surgical syndrome   • Lumbar spondylosis   • 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 (HCC)   • Congenital fusion of sacroiliac joint   • Terminal ileitis (HCC)   • Viral enteritis   • Chronic foot pain   • Lumbar radiculopathy   • GERD (gastroesophageal reflux disease)   • History of colon polyps   • Perianal fistula   • Functional diarrhea   • Blepharitis, left eye   • Closed fracture of radial styloid   • Compression of right ulnar nerve at multiple levels   • Degenerative disc disease at L5-S1 level   • History of  lumbar fusion   • Numbness and tingling of both lower extremities   • Periorbital cellulitis of left eye   • Right leg swelling   • Tobacco use   • Vitamin D deficiency   • Arthritis of right shoulder region   • Chronic right shoulder pain   • Myofascial pain syndrome   • S/P insertion of spinal cord stimulator   • Crohn's disease (HCC)   • Numbness and tingling in right hand   • Neck pain   • Cervical radiculopathy   • Herniated nucleus pulposus, C3-4   • Hypertension   • Smoker   • Heavy alcohol consumption   • Sleep apnea   • Snoring   • Excessive daytime sleepiness   • Overweight (BMI 25.0-29.9)   • Dupuytren contracture   • Capillary disorder   • Epididymitis, right   • Obstructive sleep apnea syndrome   • Pes anserinus bursitis of left knee   • Arthralgia   • Ulcer of left foot, limited to breakdown of skin ([facility])   • Dysarthria   • acute Confusional state   • Hyperlipidemia   • Type 2 diabetes mellitus with hyperglycemia, without long-term current use of insulin ([facility])   • Diabetic polyneuropathy associated with type 2 diabetes mellitus ([facility])   • Lung nodule   • Abnormal movements   • Episode of change in speech   • Hoarseness   • Diabetic ulcer of left foot associated with type 2 diabetes mellitus, limited to breakdown of skin, unspecified part of foot ([facility])   • Carpal tunnel syndrome of right wrist   • Transient neurological symptoms   • Mucopurulent chronic bronchitis ([facility])   • Diabetic ulcer of toe of left foot associated with type 2 diabetes mellitus, with fat layer exposed ([facility])     Past Medical History:   Diagnosis Date   • Anxiety    • Back pain    • Callus Few months ago   • Colon polyp    • COPD (chronic obstructive pulmonary disease) ([facility])    • Crohn's disease ([facility])    • Depression    • Diabetes mellitus ([facility])    • GERD (gastroesophageal reflux disease)    • Hyperlipidemia    • Hypertension    • Perianal fistula    • Sleep apnea     no current CPAP use   • Terminal ileitis ([facility])      Past Surgical  History:   Procedure Laterality Date   • BACK SURGERY  2019   • CAST APPLICATION Right 11/17/2022    Procedure: Application short-arm splint;  Surgeon: Sarthak Villatoro MD;  Location: [facility];  Service: Orthopedics   • COLONOSCOPY     • EGD     • HAND CONTRACTURE RELEASE Left 06/01/2023    Procedure: left ring and small finger dupuytrens excision and ring finger Metacarpophalangeal joint release and small finger Metacarpophalangeal and proximal interphalangeal joint release;  Surgeon: Sarthak Villatoro MD;  Location: [facility];  Service: Orthopedics   • HERNIA REPAIR      umbilical hernia   • [place] ANRCT XM SURG REQ ANES GENERAL SPI/EDRL DX N/A 11/17/2021    Procedure: EXAM UNDER ANESTHESIA (EUA);  Surgeon: Davi Thao MD;  Location:  MAIN OR;  Service: Colorectal   • [place] APPLICATION SHORT ARM SPLINT FOREARM-HAND STATIC Right 1/11/2024    Procedure: Application short arm splint;  Surgeon: Sarthak Villatoro MD;  Location: [facility];  Service: Orthopedics   • KY CAPSULECTOMY/CAPSULOTOMY IPHAL JOINT EACH Right 1/11/2024    Procedure: Contracture release right hand small finger proximal interphalangeal joint;  Surgeon: Sarthak Villatoro MD;  Location: [facility];  Service: Orthopedics   • KY DISE DYN EVAL SLEEP DISORDERED BREATHING FLX DX N/A 11/13/2024    Procedure: DRUG INDUCED SLEEP ENDOSCOPY;  Surgeon: Jeff Arndt MD;  Location: [facility];  Service: ENT   • KY FASCIOTOMY PALMAR OPEN PARTIAL Right 11/17/2022    Procedure: Dupuytren's excision right palm extending into the small finger with release of the metacarpophalangeal joint and proximal interphalangeal joint.  Dupuytren's excision right palm with release of the right ring finger metacarpophalangeal joint.;  Surgeon: Sarthak Villatoro MD;  Location: [facility];  Service: Orthopedics   • KY FASCIOTOMY PALMAR OPEN PARTIAL Right 1/11/2024    Procedure: Right hand small finger duppuytrens excision with release of the metacarpophalangeal joint  and proximal interphalangeal joint;  Surgeon: Sarthak Villatoro MD;  Location: [facility];  Service: Orthopedics   • IN FASCT PALM W/WO Z-PLASTY TISSUE REARGMT/SKN GRFT Right 1/11/2024    Procedure: Right hand small finger duppuytrens excision with release of the metacarpophalangeal joint and proximal interphalangeal joint;  Surgeon: Sarthak Villatoro MD;  Location: [facility];  Service: Orthopedics   • IN FASCT PRTL PALMAR 1 DGT PROX IPHAL JT W/WO RPR Right 1/11/2024    Procedure: Right hand small finger duppuytrens excision with release of the metacarpophalangeal joint and proximal interphalangeal joint;  Surgeon: Sarthak Villatoro MD;  Location: [facility];  Service: Orthopedics   • IN I&D ISCHIORECTAL&/PERIRECTAL ABSCESS SPX Right 10/24/2021    Procedure: excisional debredement right gluteal cheek ;  Surgeon: Jeana Bustamante MD;  Location: [facility];  Service: General   • IN PLACEMENT SETON N/A 11/17/2021    Procedure: PLACEMENT SETON;  Surgeon: Davi Thao MD;  Location: [facility];  Service: Colorectal   • IN PRQ IMPLTJ NSTIM ELECTRODE ARRAY EPIDURAL Right 03/26/2021    Procedure: INSERTION THORACIC DORSAL COLUMN SPINAL CORD STIMULATOR PERCUTANEOUS W IMPLANTABLE PULSE GENERATOR, RIGHT;  Surgeon: Akshat Witt MD;  Location: [facility];  Service: Neurosurgery     Social History     Socioeconomic History   • Marital status: /Civil Union     Spouse name: Not on file   • Number of children: Not on file   • Years of education: Not on file   • Highest education level: Not on file   Occupational History   • Not on file   Tobacco Use   • Smoking status: Former     Current packs/day: 0.00     Average packs/day: 0.5 packs/day for 30.0 years (15.0 ttl pk-yrs)     Types: Cigarettes     Start date: 1/1/2019     Quit date: 2/4/2021     Years since quitting: 3.9     Passive exposure: Past   • Smokeless tobacco: Never   Vaping Use   • Vaping status: Every Day   • Substances: THC   Substance and Sexual  Activity   • Alcohol use: Not Currently     Alcohol/week: 1.0 standard drink of alcohol     Types: 1 Cans of beer per week   • Drug use: Yes     Frequency: 7.0 times per week     Types: Marijuana     Comment: Medical marijuana-vapes, bedtime   • Sexual activity: Yes     Partners: Female, Male   Other Topics Concern   • Not on file   Social History Narrative   • Not on file     Social Drivers of Health     Financial Resource Strain: Low Risk  (6/7/2023)    Overall Financial Resource Strain (CARDIA)    • Difficulty of Paying Living Expenses: Not hard at all   Food Insecurity: No Food Insecurity (7/5/2024)    Nursing - Inadequate Food Risk Classification    • Worried About Running Out of Food in the Last Year: Never true    • Ran Out of Food in the Last Year: Never true    • Ran Out of Food in the Last Year: Not on file   Transportation Needs: No Transportation Needs (7/5/2024)    PRAPARE - Transportation    • Lack of Transportation (Medical): No    • Lack of Transportation (Non-Medical): No   Physical Activity: Sufficiently Active (4/11/2023)    Received from [facility]    Physical Activity   Stress: No Stress Concern Present (4/11/2023)    Received from [facility], [facility]    [facility] of Occupational [facility] - Occupational Stress Questionnaire    • Feeling of Stress : Only a little   Social Connections: Moderately Integrated (4/11/2023)    Received from [facility]    Social Connection and Isolation Panel [NHANES]   Intimate Partner Violence: Not At Risk (4/11/2023)    Received from [employer]    Humiliation, Afraid, Rape, and Kick questionnaire    • Fear of Current or Ex-Partner: No    • Emotionally Abused: No    • Physically Abused: No    • Sexually Abused: No   Housing Stability: Low Risk  (7/5/2024)    Housing Stability Vital Sign    • Unable to Pay for Housing in the Last Year: No    • Number of Times Moved in the Last Year: 1    • Homeless in the Last Year: No        Current  Outpatient Medications:   •  albuterol (PROVENTIL HFA,VENTOLIN HFA) 90 mcg/act inhaler, Inhale 2 puffs every 6 (six) hours as needed for wheezing, Disp: 6.7 g, Rfl: 2  •  Alcohol Swabs 70 % PADS, May substitute brand based on insurance coverage. Check glucose TID., Disp: 100 each, Rfl: 0  •  atorvastatin (LIPITOR) 40 mg tablet, Take 1 tablet (40 mg total) by mouth every evening, Disp: 90 tablet, Rfl: 2  •  Blood Glucose Monitoring Suppl (OneTouch Verio Reflect) w/Device KIT, May substitute brand based on insurance coverage. Check glucose TID., Disp: 1 kit, Rfl: 0  •  buPROPion (WELLBUTRIN SR) 150 mg 12 hr tablet, Take 1 tablet (150 mg total) by mouth 2 (two) times a day, Disp: 180 tablet, Rfl: 2  •  Cholecalciferol (RA Vitamin D-3) 1,000 units tablet, Take 2 tablets (2,000 Units total) by mouth daily, Disp: 180 tablet, Rfl: 2  •  cyclobenzaprine (FLEXERIL) 10 mg tablet, TAKE 1 TABLET BY MOUTH EVERYDAY AT BEDTIME, Disp: 90 tablet, Rfl: 2  •  furosemide (LASIX) 20 mg tablet, Take 1 tablet (20 mg total) by mouth daily, Disp: 90 tablet, Rfl: 2  •  glucose blood (OneTouch Verio) test strip, May substitute brand based on insurance coverage. Check glucose TID., Disp: 100 each, Rfl: 5  •  lisinopril (ZESTRIL) 20 mg tablet, Take 1 tablet (20 mg total) by mouth daily, Disp: 90 tablet, Rfl: 2  •  loperamide (IMODIUM) 2 mg capsule, Take 1 capsule (2 mg total) by mouth daily at bedtime, Disp: 30 capsule, Rfl: 2  •  metFORMIN (GLUCOPHAGE-XR) 500 mg 24 hr tablet, 1 in am and 2 in pm, Disp: 360 tablet, Rfl: 2  •  omeprazole (PriLOSEC) 40 MG capsule, Take 1 capsule (40 mg total) by mouth daily, Disp: 90 capsule, Rfl: 2  •  ondansetron (ZOFRAN) 4 mg tablet, Take 1 tablet (4 mg total) by mouth every 8 (eight) hours as needed for nausea or vomiting, Disp: 20 tablet, Rfl: 1  •  OneTouch Delica Lancets 33G MISC, May substitute brand based on insurance coverage. Check glucose TID., Disp: 100 each, Rfl: 5  •  pregabalin (LYRICA) 150 mg  capsule, Take 1 capsule (150 mg total) by mouth 3 (three) times a day, Disp: 90 capsule, Rfl: 2  •  traZODone (DESYREL) 50 mg tablet, Take 1-2 tabs at bedtime as needed for insomnia, Disp: 90 tablet, Rfl: 3  •  vedolizumab (Entyvio) SOLR, Inject 300 mg into a catheter in a vein every 4 weeks, Disp: , Rfl:   Family History   Problem Relation Age of Onset   • COPD Mother    • Lung cancer Mother    • Heart disease Father    • Heart attack Father    • Hypertension Sister    • Diabetes type II Sister    • No Known Problems Sister    • No Known Problems Sister    • No Known Problems Sister    • No Known Problems Brother    • Heart attack Brother    • No Known Problems Son    • No Known Problems Son    • Colon cancer Neg Hx    • Colon polyps Neg Hx    • Inflammatory bowel disease Neg Hx       Review of Systems   Constitutional: Negative.    HENT: Negative.     Eyes: Negative.    Respiratory: Negative.     Cardiovascular: Negative.    Gastrointestinal: Negative.    Endocrine: Negative.    Genitourinary: Negative.    Musculoskeletal: Negative.    Skin:         Chronic DFU left great toe   Allergic/Immunologic: Negative.    Neurological: Negative.    Hematological: Negative.    Psychiatric/Behavioral: Negative.                 Objective:  /70   Pulse 88   Temp 97.6 °F (36.4 °C)   Resp 16     Physical Exam  Constitutional:       Appearance: Normal appearance. He is normal weight.   HENT:      Head: Normocephalic and atraumatic.      Right Ear: External ear normal.      Left Ear: External ear normal.      Nose: Nose normal.      Mouth/Throat:      Mouth: Mucous membranes are moist.      Pharynx: Oropharynx is clear.   Eyes:      Conjunctiva/sclera: Conjunctivae normal.      Pupils: Pupils are equal, round, and reactive to light.   Cardiovascular:      Pulses: Normal pulses.           Dorsalis pedis pulses are 2+ on the right side and 2+ on the left side.        Posterior tibial pulses are 2+ on the right side and 2+ on  the left side.   Pulmonary:      Effort: Pulmonary effort is normal.   Musculoskeletal:      Cervical back: Normal range of motion.      Right lower leg: No edema.      Left lower leg: No edema.      Right foot: Decreased range of motion.      Left foot: Decreased range of motion (First MPJ).      Comments: Diminished first MPJ range of motion bilateral  Left great toe 15-20 degrees range of motion first MPJ  Right great toe 20-25 degrees range of motion first MPJ   Feet:      Right foot:      Protective Sensation: 10 sites tested.  0 sites sensed.      Skin integrity: No ulcer (Closed with stable eschar.).      Toenail Condition: Right toenails are normal.      Left foot:      Protective Sensation: 10 sites tested.  0 sites sensed.      Skin integrity: Ulcer (See comments) present.      Toenail Condition: Left toenails are normal.      Comments: Left great toe: Full-thickness breakdownTip of LGT, hyperkeratotic margin, diminished size, no signs of infection, wound granulating and appears healthy.  Wound measures post debridement 0.7 x 0.5 x 0.2 cm  Skin:     General: Skin is warm and dry.   Neurological:      Mental Status: He is alert. Mental status is at baseline.      Sensory: Sensory deficit present.   Psychiatric:         Mood and Affect: Mood normal.         Behavior: Behavior normal.         Wound 11/06/24 Diabetic Ulcer Plantar Left (Active)   Enter Loza score: Loza Grade 1: Partial or full-thickness ulcer (superficial) 12/30/24 1527   Wound Image   12/30/24 1539   Wound Description Pink;Epithelialization 12/30/24 1527   [place]-wound Assessment Callus 12/30/24 1527   Wound Length (cm) 0.1 cm 12/30/24 1527   Wound Width (cm) 0.2 cm 12/30/24 1527   Wound Depth (cm) 0.1 cm 12/30/24 1527   Wound Surface Area (cm^2) 0.02 cm^2 12/30/24 1527   Wound Volume (cm^3) 0.002 cm^3 12/30/24 1527   Calculated Wound Volume (cm^3) 0 cm^3 12/30/24 1527   Change in Wound Size % 100 12/30/24 1527   Drainage Amount Small  "12/30/24 1527   Drainage Description Serosanguineous 12/30/24 1527   Non-staged Wound Description Full thickness 12/30/24 1527   Dressing Status Intact 12/30/24 1527       Wound 11/13/24 Nose N/A (Active)       Debridement   Wound 11/06/24 Diabetic Ulcer Plantar Left    Universal Protocol:  procedure performed by consultantConsent: Verbal consent obtained.  Risks and benefits: risks, benefits and alternatives were discussed  Consent given by: patient  Time out: Immediately prior to procedure a \"time out\" was called to verify the correct patient, procedure, equipment, support staff and site/side marked as required.  Patient understanding: patient states understanding of the procedure being performed  Patient identity confirmed: verbally with patient    Debridement Details  Performed by: physician  Debridement type: surgical  Level of debridement: subcutaneous tissue  Pain control: lidocaine 4%      Post-debridement measurements  Length (cm): 0.7  Width (cm): 0.5  Depth (cm): 0.2  Percent debrided: 100%  Surface Area (cm^2): 0.35  Area Debrided (cm^2): 0.35  Volume (cm^3): 0.07    Tissue and other material debrided: subcutaneous tissue  Devitalized tissue debrided: callus, necrotic debris and eschar  Instrument(s) utilized: blade  Technique utilized: excisionalBleeding: small  Hemostasis obtained with: pressure  Procedural pain (0-10): 3  Post-procedural pain: 3   Response to treatment: procedure was tolerated well                 Wound Instructions:  Orders Placed This Encounter   Procedures   • Wound cleansing and dressings Diabetic Ulcer Left Plantar     Left great toe     Wash your hands with soap and water.  Remove old dressing, discard into plastic bag and place in trash.  Cleanse the wound with soap and water prior to applying a clean dressing. Do not use tissue or cotton balls. Do not scrub the wound. Pat dry using gauze.  Shower yes   Apply felt offloading pad to skin surrounding wound  Apply endoform and " "dermagran gauze to the open wound.  Cover with gauze  Secure with tape  Change dressing every other day    Wear CAM boot     Standing Status:   Future     Expiration Date:   1/13/2025   • Wound Procedure Treatment Diabetic Ulcer Left Plantar     This order was created via procedure documentation   • Debridement Diabetic Ulcer Left Plantar     This order was created via procedure documentation         Jovanni Rodas DPM      Portions of the record may have been created with voice recognition software. Occasional wrong word or \"sound a like\" substitutions may have occurred due to the inherent limitations of voice recognition software. Read the chart carefully and recognize, using context, where substitutions have occurred.    "

## 2025-01-14 RX ORDER — ACETAMINOPHEN 500 MG
500 TABLET ORAL EVERY 6 HOURS PRN
COMMUNITY

## 2025-01-14 RX ORDER — NAPROXEN 250 MG/1
250 TABLET ORAL AS NEEDED
COMMUNITY

## 2025-01-14 NOTE — PRE-PROCEDURE INSTRUCTIONS
Pre-Surgery Instructions:   Medication Instructions    acetaminophen (TYLENOL) 500 mg tablet Uses PRN- OK to take day of surgery    albuterol (PROVENTIL HFA,VENTOLIN HFA) 90 mcg/act inhaler Uses PRN- OK to take day of surgery    atorvastatin (LIPITOR) 40 mg tablet Take night before surgery    buPROPion (WELLBUTRIN SR) 150 mg 12 hr tablet Take day of surgery.    Cholecalciferol (RA Vitamin D-3) 1,000 units tablet Stop taking 7 days prior to surgery.    cyclobenzaprine (FLEXERIL) 10 mg tablet Take night before surgery    furosemide (LASIX) 20 mg tablet Hold day of surgery.    lisinopril (ZESTRIL) 20 mg tablet Hold day of surgery.    loperamide (IMODIUM) 2 mg capsule Take night before surgery    metFORMIN (GLUCOPHAGE-XR) 500 mg 24 hr tablet Hold day of surgery.    naproxen (NAPROSYN) 250 mg tablet Stop taking 7 days prior to surgery.    omeprazole (PriLOSEC) 40 MG capsule Take day of surgery.    ondansetron (ZOFRAN) 4 mg tablet Uses PRN- OK to take day of surgery    pregabalin (LYRICA) 150 mg capsule Take day of surgery.    traZODone (DESYREL) 50 mg tablet Uses PRN- DO NOT take day of surgery    Medication instructions for day surgery reviewed. Please use only a sip of water to take your instructed medications. Avoid all over the counter vitamins, supplements and NSAIDS for one week prior to surgery per anesthesia guidelines. Tylenol is ok to take as needed.     You will receive a call one business day prior to surgery with an arrival time and hospital directions. If your surgery is scheduled on a Monday, the hospital will be calling you on the Friday prior to your surgery. If you have not heard from anyone by 8pm, please call the hospital supervisor through the hospital  at [phone]. ([place] [phone] or [place] [phone]).    Do not eat or drink anything after midnight the night before your surgery, including candy, mints, lifesavers, or chewing gum. Do not drink alcohol 24hrs before your  surgery. Try not to smoke at least 24hrs before your surgery.       Follow the pre surgery showering instructions as listed in the “My Surgical Experience Booklet” or otherwise provided by your surgeon's office. Do not use a blade to shave the surgical area 1 week before surgery. It is okay to use a clean electric clippers up to 24 hours before surgery. Do not apply any lotions, creams, including makeup, cologne, deodorant, or perfumes after showering on the day of your surgery. Do not use dry shampoo, hair spray, hair gel, or any type of hair products.     No contact lenses, eye make-up, or artificial eyelashes. Remove nail polish, including gel polish, and any artificial, gel, or acrylic nails if possible. Remove all jewelry including rings and body piercing jewelry.     Wear causal clothing that is easy to take on and off. Consider your type of surgery.    Keep any valuables, jewelry, piercings at home. Please bring any specially ordered equipment (sling, braces) if indicated.    Arrange for a responsible person to drive you to and from the hospital on the day of your surgery. Please confirm the visitor policy for the day of your procedure when you receive your phone call with an arrival time.     Call the surgeon's office with any new illnesses, exposures, or additional questions prior to surgery.    Please reference your “My Surgical Experience Booklet” for additional information to prepare for your upcoming surgery.

## 2025-01-15 ENCOUNTER — OFFICE VISIT (OUTPATIENT)
Dept: WOUND CARE | Facility: HOSPITAL | Age: 55
End: 2025-01-15
Payer: MEDICARE

## 2025-01-15 VITALS — DIASTOLIC BLOOD PRESSURE: 72 MMHG | RESPIRATION RATE: 16 BRPM | HEART RATE: 100 BPM | SYSTOLIC BLOOD PRESSURE: 110 MMHG

## 2025-01-15 DIAGNOSIS — E11.621 DIABETIC ULCER OF TOE OF LEFT FOOT ASSOCIATED WITH TYPE 2 DIABETES MELLITUS, WITH FAT LAYER EXPOSED (HCC): Primary | ICD-10-CM

## 2025-01-15 DIAGNOSIS — L97.522 DIABETIC ULCER OF TOE OF LEFT FOOT ASSOCIATED WITH TYPE 2 DIABETES MELLITUS, WITH FAT LAYER EXPOSED (HCC): Primary | ICD-10-CM

## 2025-01-15 DIAGNOSIS — E11.40 TYPE 2 DIABETES MELLITUS WITH DIABETIC NEUROPATHY, WITHOUT LONG-TERM CURRENT USE OF INSULIN (HCC): ICD-10-CM

## 2025-01-15 PROCEDURE — 11042 DBRDMT SUBQ TIS 1ST 20SQCM/<: CPT | Performed by: PODIATRIST

## 2025-01-15 NOTE — PATIENT INSTRUCTIONS
Orders Placed This Encounter   Procedures    Wound cleansing and dressings Diabetic Ulcer Left Plantar     · Wound cleansing and dressings Diabetic Ulcer Left Plantar      Left great toe     Wash your hands with soap and water.  Remove old dressing, discard into plastic bag and place in trash.  Cleanse the wound with soap and water prior to applying a clean dressing. Do not use tissue or cotton balls. Do not scrub the wound. Pat dry using gauze.  Shower yes   Apply felt offloading pad to skin surrounding wound  Apply endoform and dermagran gauze to the open wound.  Cover with gauze  Secure with tape  Change dressing every other day     Wear CAM boot     Standing Status:   Future     Expiration Date:   1/22/2025

## 2025-01-15 NOTE — PROGRESS NOTES
Wound Procedure Treatment Diabetic Ulcer Left Plantar    Performed by: Krupa Purcell RN  Authorized by: Jovanni Rodas DPM    Associated wounds:   Wound 11/06/24 Diabetic Ulcer Plantar Left  Wound cleansed with:  Soap and water  Applied primary dressing:  Collagen dressing and Dermagran  Applied secondary dressing:  Gauze  Dressing secured with:  Tape  Comments:  Felt offloading pad

## 2025-01-16 ENCOUNTER — HOSPITAL ENCOUNTER (OUTPATIENT)
Dept: MRI IMAGING | Facility: HOSPITAL | Age: 55
End: 2025-01-16
Payer: MEDICARE

## 2025-01-16 DIAGNOSIS — R41.3 MEMORY LOSS: ICD-10-CM

## 2025-01-16 PROCEDURE — A9585 GADOBUTROL INJECTION: HCPCS | Performed by: NURSE PRACTITIONER

## 2025-01-16 PROCEDURE — 70553 MRI BRAIN STEM W/O & W/DYE: CPT

## 2025-01-16 RX ORDER — GADOBUTROL 604.72 MG/ML
8 INJECTION INTRAVENOUS
Status: COMPLETED | OUTPATIENT
Start: 2025-01-16 | End: 2025-01-16

## 2025-01-16 RX ADMIN — GADOBUTROL 8 ML: 604.72 INJECTION INTRAVENOUS at 12:43

## 2025-01-17 ENCOUNTER — TELEMEDICINE (OUTPATIENT)
Dept: FAMILY MEDICINE CLINIC | Facility: CLINIC | Age: 55
End: 2025-01-17
Payer: MEDICARE

## 2025-01-17 ENCOUNTER — RESULTS FOLLOW-UP (OUTPATIENT)
Dept: NEUROLOGY | Facility: CLINIC | Age: 55
End: 2025-01-17

## 2025-01-17 DIAGNOSIS — R45.1 AGITATION: Primary | ICD-10-CM

## 2025-01-17 PROCEDURE — G2211 COMPLEX E/M VISIT ADD ON: HCPCS | Performed by: FAMILY MEDICINE

## 2025-01-17 PROCEDURE — 99213 OFFICE O/P EST LOW 20 MIN: CPT | Performed by: FAMILY MEDICINE

## 2025-01-17 RX ORDER — LORAZEPAM 1 MG/1
1 TABLET ORAL EVERY 8 HOURS PRN
Qty: 60 TABLET | Refills: 3 | Status: SHIPPED | OUTPATIENT
Start: 2025-01-17

## 2025-01-17 RX ORDER — ESCITALOPRAM OXALATE 10 MG/1
10 TABLET ORAL DAILY
Qty: 90 TABLET | Refills: 3 | Status: SHIPPED | OUTPATIENT
Start: 2025-01-17 | End: 2026-01-12

## 2025-01-17 NOTE — ASSESSMENT & PLAN NOTE
Stop wellbutrin---trial ativan/lexapro---f/u 4 wks---? Abilify in future    Orders:    LORazepam (ATIVAN) 1 mg tablet; Take 1 tablet (1 mg total) by mouth every 8 (eight) hours as needed for anxiety    escitalopram (LEXAPRO) 10 mg tablet; Take 1 tablet (10 mg total) by mouth daily

## 2025-01-17 NOTE — PROGRESS NOTES
Virtual Regular Visit  Name: Benito Park      : 1970      MRN: [id number]  Encounter Provider: Ariel Sosa MD  Encounter Date: 2025   Encounter department: [facility]      Verification of patient location:  Patient is located at Home in the following state in which I hold an active license [place] :  Assessment & Plan  Agitation  Stop wellbutrin---trial ativan/lexapro---f/u 4 wks---? Abilify in future    Orders:    LORazepam (ATIVAN) 1 mg tablet; Take 1 tablet (1 mg total) by mouth every 8 (eight) hours as needed for anxiety    escitalopram (LEXAPRO) 10 mg tablet; Take 1 tablet (10 mg total) by mouth daily        Encounter provider Ariel Sosa MD    The patient was identified by name and date of birth. Benito Park was informed that this is a telemedicine visit and that the visit is being conducted through the Epic Embedded platform. He agrees to proceed..  My office door was closed. No one else was in the room.  He acknowledged consent and understanding of privacy and security of the video platform. The patient has agreed to participate and understands they can discontinue the visit at any time.    Patient is aware this is a billable service.     History of Present Illness     HPI  Review of Systems   Constitutional:  Negative for fatigue, fever and unexpected weight change.   HENT:  Negative for congestion, sinus pain and sore throat.    Eyes:  Negative for visual disturbance.   Respiratory:  Negative for shortness of breath and wheezing.    Cardiovascular:  Negative for chest pain and palpitations.   Gastrointestinal:  Negative for abdominal pain, nausea and vomiting.   Musculoskeletal: Negative.  Negative for arthralgias and myalgias.   Neurological:  Negative for syncope, weakness and numbness.   Psychiatric/Behavioral:  Positive for agitation and sleep disturbance. Negative for confusion, dysphoric mood and suicidal ideas. The patient is  nervous/anxious.        Objective   There were no vitals taken for this visit.    Physical Exam  Pulmonary:      Effort: Pulmonary effort is normal.   Neurological:      General: No focal deficit present.   Psychiatric:         Mood and Affect: Mood normal.         Visit Time  Total Visit Duration: 15

## 2025-01-17 NOTE — H&P (VIEW-ONLY)
Virtual Regular Visit  Name: Benito Park      : 1970      MRN: [id number]  Encounter Provider: Ariel Sosa MD  Encounter Date: 2025   Encounter department: [facility]      Verification of patient location:  Patient is located at Home in the following state in [place] an [place] license [place]  Assessment & Plan  Agitation  Stop wellbutrin---trial ativan/lexapro---f/u 4 wks---? Abilify in future    Orders:    LORazepam (ATIVAN) 1 mg tablet; Take 1 tablet (1 mg total) by mouth every 8 (eight) hours as needed for anxiety    escitalopram (LEXAPRO) 10 mg tablet; Take 1 tablet (10 mg total) by mouth daily        Encounter provider Ariel Sosa MD    The patient was identified by name and date of birth. Benito Park was informed that this is a telemedicine visit and that the visit is being conducted through the Epic Embedded platform. He agrees to proceed..  My office door was closed. No one else was in the room.  He acknowledged consent and understanding of privacy and security of the video platform. The patient has agreed to participate and understands they can discontinue the visit at any time.    Patient is aware this is a billable service.     History of Present Illness     HPI  Review of Systems   Constitutional:  Negative for fatigue, fever and unexpected weight change.   HENT:  Negative for congestion, sinus pain and sore throat.    Eyes:  Negative for visual disturbance.   Respiratory:  Negative for shortness of breath and wheezing.    Cardiovascular:  Negative for chest pain and palpitations.   Gastrointestinal:  Negative for abdominal pain, nausea and vomiting.   Musculoskeletal: Negative.  Negative for arthralgias and myalgias.   Neurological:  Negative for syncope, weakness and numbness.   Psychiatric/Behavioral:  Positive for agitation and sleep disturbance. Negative for confusion, dysphoric mood and suicidal ideas. The patient is  nervous/anxious.        Objective   There were no vitals taken for this visit.    Physical Exam  Pulmonary:      Effort: Pulmonary effort is normal.   Neurological:      General: No focal deficit present.   Psychiatric:         Mood and Affect: Mood normal.         Visit Time  Total Visit Duration: 15

## 2025-01-21 ENCOUNTER — OFFICE VISIT (OUTPATIENT)
Dept: SPEECH THERAPY | Facility: CLINIC | Age: 55
End: 2025-01-21
Payer: MEDICARE

## 2025-01-21 ENCOUNTER — ANESTHESIA EVENT (OUTPATIENT)
Dept: PERIOP | Facility: HOSPITAL | Age: 55
End: 2025-01-21
Payer: MEDICARE

## 2025-01-21 DIAGNOSIS — R48.8 OTHER SYMBOLIC DYSFUNCTIONS: ICD-10-CM

## 2025-01-21 DIAGNOSIS — R41.841 COGNITIVE COMMUNICATION DEFICIT: ICD-10-CM

## 2025-01-21 DIAGNOSIS — R41.3 MEMORY LOSS: Primary | ICD-10-CM

## 2025-01-21 PROCEDURE — 97129 THER IVNTJ 1ST 15 MIN: CPT

## 2025-01-21 PROCEDURE — 97130 THER IVNTJ EA ADDL 15 MIN: CPT

## 2025-01-21 NOTE — ANESTHESIA PREPROCEDURE EVALUATION
Procedure:  INSERTION UPPER AIRWAY STIMULATOR RIGHT INSPIRE (Right: Head)  [id number]  Relevant Problems   CARDIO   (+) Capillary disorder   (+) Hyperlipidemia   (+) Hypertension      ENDO   (+) Type 2 diabetes mellitus with hyperglycemia, without long-term current use of insulin (HCC)      GI/HEPATIC   (+) GERD (gastroesophageal reflux disease)      MUSCULOSKELETAL   (+) Chronic bilateral low back pain with bilateral sciatica   (+) Degenerative disc disease at L5-S1 level   (+) Failed back surgical syndrome   (+) Lumbar spondylosis   (+) Myofascial pain syndrome      NEURO/PSYCH   (+) Chronic bilateral low back pain with bilateral sciatica   (+) Chronic pain syndrome   (+) Chronic right shoulder pain   (+) Diabetic polyneuropathy associated with type 2 diabetes mellitus (HCC)   (+) Myofascial pain syndrome   (+) Numbness and tingling in right hand   (+) Numbness and tingling of both lower extremities      PULMONARY   (+) Mucopurulent chronic bronchitis (HCC)   (+) Obstructive sleep apnea syndrome   (+) Sleep apnea   (+) Smoker   (-) URI (upper respiratory infection)      Neurology/Sleep   (+) S/P insertion of spinal cord stimulator (Turned off)        Physical Exam    Airway  Comment: Small Mouth  Mallampati score: III  TM Distance: >3 FB  Neck ROM: full     Dental       Cardiovascular      Pulmonary   Breath sounds clear to auscultation, Decreased breath sounds    Other Findings        Anesthesia Plan  ASA Score- 3     Anesthesia Type- general with ASA Monitors.         Additional Monitors:     Airway Plan: ETT.           Plan Factors-Exercise tolerance (METS): >4 METS.    Chart reviewed. EKG reviewed. Imaging results reviewed. Existing labs reviewed. Patient summary reviewed.    Patient is not a current smoker.              Induction- intravenous.    Postoperative Plan-         Informed Consent- Anesthetic plan and risks discussed with patient and spouse.  I personally reviewed this patient with the CRNA. Discussed  and agreed on the Anesthesia Plan with the CRNA..      NPO Status:  No vitals data found for the desired time range.

## 2025-01-21 NOTE — PROGRESS NOTES
Daily Speech Treatment Note    Today's date: 2025   Patient’s name: Benito Park  : 1970  MRN: [id number]  Safety measures: h/o Diabetic Shock  Referring provider: Francesco Edouard CRNP    Encounter Diagnoses   Name Primary?    Memory loss Yes    Other symbolic dysfunctions     Cognitive communication deficit        Visit Tracking:  POC   Expires Auth Expiration Date ST Visit Limit   3/9/2025 BOMN BOMN          Visit/Unit Tracking:  Auth Status Date 2025   No auth req, Used 1 2 3    Remaining BOMN         Subjective/Behavioral:  -Patient pleasant and cooperative.     Objective/Assessment:  Completed structured activities with patient to target goals below.  Results as stated below.       Short-term goals:  Patient will be educated on the use of internal and external memory aids and compensatory strategies to facilitate increased recall of routine, personal information, and recent events, to be achieved in 4-6 weeks.  25:  Reviewed information / handout on memory strategies with patient. Patient will bring in journal next session, use post it before entering car, work on repetition out loud, etc.  Working on review of medication list / reason taking in future session.     Patient will complete auditory immediate and short term memory tasks to facilitate increased ability to retell narratives and recall information within functional living environment, to be achieved in 4-6 weeks.          Patient will complete complex auditory attention processing tasks (e.g., sentence unscramble, ranking numbers/words, etc.) to improve working memory with 80% accuracy, to be achieved in 4-6 weeks.  : Working on pairing of words, able to complete 4 words into 2 pairs: 100% , not able to complete 6 words into 3 pairs with max cues.     Patient will complete thought organization tasks (e.g., sequencing, deduction puzzles, etc.) with 80% accuracy to facilitate increased executive functioning,  working memory, problem solving, and processing skills, to be achieved in 4-6 weeks.    Patient will complete concrete and abstract categorization tasks to 80% accuracy to facilitate improved generative naming skills and working memory, to be achieved in 4-6 weeks.    Patient will utilize word finding strategies during semantic feature analysis treatment activity for improved naming and verbal expression skills, to be achieved in 4-6 weeks.    Long Term Goals    Patient will demonstrate cognitive-communication skills consistent with age and education given use of compensatory strategies when needed to resume baseline activities and responsibilities in home, community, and work/school settings by discharge.     Patient will complete cognitive-linguistic therapy that addresses patient’s specific deficits in processing speed, short-term working memory, attention to detail, monitoring, sequencing, and organization skills, with instruction, to alleviate effects of executive functioning disorder deficits by discharge.    Patient will complete higher-level expressive language tasks (e.g., word definitions, idioms, synonym/antonyms, etc) with 80% accuracy to improve functional communication skills by discharge.   Plan:  Future apps education

## 2025-01-22 ENCOUNTER — ANESTHESIA (OUTPATIENT)
Dept: PERIOP | Facility: HOSPITAL | Age: 55
End: 2025-01-22
Payer: MEDICARE

## 2025-01-22 ENCOUNTER — APPOINTMENT (OUTPATIENT)
Dept: RADIOLOGY | Facility: HOSPITAL | Age: 55
End: 2025-01-22
Payer: MEDICARE

## 2025-01-22 ENCOUNTER — TELEPHONE (OUTPATIENT)
Age: 55
End: 2025-01-22

## 2025-01-22 ENCOUNTER — HOSPITAL ENCOUNTER (OUTPATIENT)
Facility: HOSPITAL | Age: 55
Setting detail: OUTPATIENT SURGERY
Discharge: HOME | End: 2025-01-22
Attending: OTOLARYNGOLOGY | Admitting: OTOLARYNGOLOGY
Payer: MEDICARE

## 2025-01-22 VITALS
HEART RATE: 92 BPM | SYSTOLIC BLOOD PRESSURE: 112 MMHG | TEMPERATURE: 97.5 F | OXYGEN SATURATION: 91 % | WEIGHT: 197 LBS | DIASTOLIC BLOOD PRESSURE: 59 MMHG | BODY MASS INDEX: 26.68 KG/M2 | HEIGHT: 72 IN | RESPIRATION RATE: 16 BRPM

## 2025-01-22 PROBLEM — G47.33 OSA (OBSTRUCTIVE SLEEP APNEA): Status: ACTIVE | Noted: 2025-01-22

## 2025-01-22 LAB — GLUCOSE SERPL-MCNC: 114 MG/DL (ref 65–140)

## 2025-01-22 PROCEDURE — 64582 OPN MPLTJ HPGLSL NSTM ARY PG: CPT | Performed by: OTOLARYNGOLOGY

## 2025-01-22 PROCEDURE — C1787 PATIENT PROGR, NEUROSTIM: HCPCS | Performed by: OTOLARYNGOLOGY

## 2025-01-22 PROCEDURE — 82948 REAGENT STRIP/BLOOD GLUCOSE: CPT

## 2025-01-22 PROCEDURE — 71045 X-RAY EXAM CHEST 1 VIEW: CPT

## 2025-01-22 PROCEDURE — C1778 LEAD, NEUROSTIMULATOR: HCPCS | Performed by: OTOLARYNGOLOGY

## 2025-01-22 PROCEDURE — C1767 GENERATOR, NEURO NON-RECHARG: HCPCS | Performed by: OTOLARYNGOLOGY

## 2025-01-22 DEVICE — THE INSPIRE® RESPIRATORY SENSING LEAD (MODEL 4340) IS DESIGNED TO DETECT RESPIRATORY EFFORT. THE LEAD FEATURES A PRESSURE SENSITIVE MEMBRANE THAT CONVERTS THE MECHANICAL ENERGY OF RESPIRATION INTO AN ELECTRICAL SIGNAL. THE LEAD INCORPORATES A STANDARD CONNECTOR FOR COUPLING TO THE INSPIRE IMPLANTABLE PULSE GENERATOR (IPG) FOR TREATMENT OF OBSTRUCTIVE SLEEP APNEA.
Type: IMPLANTABLE DEVICE | Site: CHEST | Status: FUNCTIONAL
Brand: INSPIRE

## 2025-01-22 DEVICE — THE INSPIRE® STIMULATION LEAD (MODEL 4063) IS DESIGNED TO DELIVER STIMULATION TO THE HYPOGLOSSAL NERVE FOR THE TREATMENT OF OBSTRUCTIVE SLEEP APNEA. THE LEAD FEATURES A FLEXIBLE, SELF-SIZING CUFF. ELECTRODES IN THE INNER SURFACE OF THE CUFF DELIVER STIMULATION TO THE NERVE. THE LEAD INCORPORATES A STANDARD CONNECTOR FOR COUPLING TO THE INSPIRE IMPLANTABLE PULSE GENERATOR (IPG).
Type: IMPLANTABLE DEVICE | Site: NECK | Status: FUNCTIONAL
Brand: INSPIRE

## 2025-01-22 DEVICE — THE MODEL 3028 IPG CONTAINS ELECTRONICS AND A BATTERY THAT ARE SEALED INSIDE A TITANIUM CASE.  THE IPG IS IMPLANTED SUBCUTANEOUSLY, BELOW THE CLAVICLE IN THE UPPER CHEST, AND CONNECT TO THE STIMULATION LEAD AND SENSING LEAD.  THE MODEL 3028 DOES NOT CONTAIN ANY SOFTWARE OR FIRMWARE.  ALL FUNCTIONS INCLUDING THE TELEMETRY AND ALGORITHM HAVE BEEN DESIGNED INTO THE HARDWARE OF THE IPG.  THE ALGORITHM SYNCHRONIZES STIMULATION OF THE HYPOGLOSSAL NERVE WITH RESPIRATION SIGNALS.   THE IPG PROCESSES THE SAME DYNAMIC RANGE OF PRESSURE SIGNALS (2-48 CMH2O) IN ORDER TO ACCOUNT FOR PRESSURE READING VARIABILITY.  BASED ON TYPICAL SETTINGS FROM THE STAR PIVOTAL TRIAL, THE LONGEVITY OF THE MODEL 3028’S BATTERY WILL AVERAGE 10 YEARS ALTHOUGH THE DEVICE IS SMALLER THAN THE CURRENTLY APPROVED VERSION (MODEL 3024).  IN ADDITION THE MODEL 3028 IPG WILL ALLOW PATIENTS TO SAFELY UNDERGO MAGNETIC RESONANCE IMAGING (MRI) UNDER SPECIFIED CONDITIONS.
Type: IMPLANTABLE DEVICE | Site: CHEST | Status: FUNCTIONAL
Brand: INSPIRE

## 2025-01-22 RX ORDER — LIDOCAINE HYDROCHLORIDE AND EPINEPHRINE 10; 10 MG/ML; UG/ML
INJECTION, SOLUTION INFILTRATION; PERINEURAL AS NEEDED
Status: DISCONTINUED | OUTPATIENT
Start: 2025-01-22 | End: 2025-01-22 | Stop reason: HOSPADM

## 2025-01-22 RX ORDER — FENTANYL CITRATE 50 UG/ML
INJECTION, SOLUTION INTRAMUSCULAR; INTRAVENOUS AS NEEDED
Status: DISCONTINUED | OUTPATIENT
Start: 2025-01-22 | End: 2025-01-22

## 2025-01-22 RX ORDER — PROPOFOL 10 MG/ML
INJECTION, EMULSION INTRAVENOUS AS NEEDED
Status: DISCONTINUED | OUTPATIENT
Start: 2025-01-22 | End: 2025-01-22

## 2025-01-22 RX ORDER — MIDAZOLAM HYDROCHLORIDE 2 MG/2ML
INJECTION, SOLUTION INTRAMUSCULAR; INTRAVENOUS AS NEEDED
Status: DISCONTINUED | OUTPATIENT
Start: 2025-01-22 | End: 2025-01-22

## 2025-01-22 RX ORDER — HYDROMORPHONE HCL/PF 1 MG/ML
0.5 SYRINGE (ML) INJECTION
Status: DISCONTINUED | OUTPATIENT
Start: 2025-01-22 | End: 2025-01-22 | Stop reason: HOSPADM

## 2025-01-22 RX ORDER — PHENYLEPHRINE HCL IN 0.9% NACL 1 MG/10 ML
SYRINGE (ML) INTRAVENOUS AS NEEDED
Status: DISCONTINUED | OUTPATIENT
Start: 2025-01-22 | End: 2025-01-22

## 2025-01-22 RX ORDER — CEFAZOLIN SODIUM 2 G/50ML
2000 SOLUTION INTRAVENOUS EVERY 8 HOURS
Status: COMPLETED | OUTPATIENT
Start: 2025-01-22 | End: 2025-01-22

## 2025-01-22 RX ORDER — DEXAMETHASONE SODIUM PHOSPHATE 10 MG/ML
INJECTION, SOLUTION INTRAMUSCULAR; INTRAVENOUS AS NEEDED
Status: DISCONTINUED | OUTPATIENT
Start: 2025-01-22 | End: 2025-01-22

## 2025-01-22 RX ORDER — SUCCINYLCHOLINE/SOD CL,ISO/PF 100 MG/5ML
SYRINGE (ML) INTRAVENOUS AS NEEDED
Status: DISCONTINUED | OUTPATIENT
Start: 2025-01-22 | End: 2025-01-22

## 2025-01-22 RX ORDER — ALBUTEROL SULFATE 90 UG/1
INHALANT RESPIRATORY (INHALATION) AS NEEDED
Status: DISCONTINUED | OUTPATIENT
Start: 2025-01-22 | End: 2025-01-22

## 2025-01-22 RX ORDER — ONDANSETRON 2 MG/ML
INJECTION INTRAMUSCULAR; INTRAVENOUS AS NEEDED
Status: DISCONTINUED | OUTPATIENT
Start: 2025-01-22 | End: 2025-01-22

## 2025-01-22 RX ORDER — FENTANYL CITRATE/PF 50 MCG/ML
50 SYRINGE (ML) INJECTION
Refills: 0 | Status: DISCONTINUED | OUTPATIENT
Start: 2025-01-22 | End: 2025-01-22 | Stop reason: HOSPADM

## 2025-01-22 RX ORDER — LIDOCAINE HYDROCHLORIDE 10 MG/ML
0.5 INJECTION, SOLUTION EPIDURAL; INFILTRATION; INTRACAUDAL; PERINEURAL ONCE AS NEEDED
Status: DISCONTINUED | OUTPATIENT
Start: 2025-01-22 | End: 2025-01-22 | Stop reason: HOSPADM

## 2025-01-22 RX ORDER — SODIUM CHLORIDE, SODIUM LACTATE, POTASSIUM CHLORIDE, CALCIUM CHLORIDE 600; 310; 30; 20 MG/100ML; MG/100ML; MG/100ML; MG/100ML
125 INJECTION, SOLUTION INTRAVENOUS CONTINUOUS
Status: DISCONTINUED | OUTPATIENT
Start: 2025-01-22 | End: 2025-01-22 | Stop reason: HOSPADM

## 2025-01-22 RX ORDER — LIDOCAINE HYDROCHLORIDE 10 MG/ML
INJECTION, SOLUTION EPIDURAL; INFILTRATION; INTRACAUDAL; PERINEURAL AS NEEDED
Status: DISCONTINUED | OUTPATIENT
Start: 2025-01-22 | End: 2025-01-22

## 2025-01-22 RX ORDER — LEVALBUTEROL INHALATION SOLUTION 1.25 MG/3ML
1.25 SOLUTION RESPIRATORY (INHALATION) ONCE
Status: COMPLETED | OUTPATIENT
Start: 2025-01-22 | End: 2025-01-22

## 2025-01-22 RX ADMIN — LIDOCAINE HYDROCHLORIDE 50 MG: 10 INJECTION, SOLUTION EPIDURAL; INFILTRATION; INTRACAUDAL; PERINEURAL at 07:43

## 2025-01-22 RX ADMIN — PROPOFOL 100 MG: 10 INJECTION, EMULSION INTRAVENOUS at 08:05

## 2025-01-22 RX ADMIN — ALBUTEROL SULFATE 2 PUFF: 90 AEROSOL, METERED RESPIRATORY (INHALATION) at 07:33

## 2025-01-22 RX ADMIN — MIDAZOLAM 2 MG: 1 INJECTION INTRAMUSCULAR; INTRAVENOUS at 07:33

## 2025-01-22 RX ADMIN — Medication 100 MG: at 07:43

## 2025-01-22 RX ADMIN — ONDANSETRON 4 MG: 2 INJECTION, SOLUTION INTRAMUSCULAR; INTRAVENOUS at 07:43

## 2025-01-22 RX ADMIN — SODIUM CHLORIDE, SODIUM LACTATE, POTASSIUM CHLORIDE, AND CALCIUM CHLORIDE: .6; .31; .03; .02 INJECTION, SOLUTION INTRAVENOUS at 08:37

## 2025-01-22 RX ADMIN — DEXAMETHASONE SODIUM PHOSPHATE 10 MG: 10 INJECTION, SOLUTION INTRAMUSCULAR; INTRAVENOUS at 07:43

## 2025-01-22 RX ADMIN — DEXMEDETOMIDINE HYDROCHLORIDE 12 MCG: 100 INJECTION, SOLUTION, CONCENTRATE INTRAVENOUS at 08:05

## 2025-01-22 RX ADMIN — DEXMEDETOMIDINE HYDROCHLORIDE 8 MCG: 100 INJECTION, SOLUTION, CONCENTRATE INTRAVENOUS at 07:43

## 2025-01-22 RX ADMIN — Medication 100 MCG: at 08:27

## 2025-01-22 RX ADMIN — CEFAZOLIN SODIUM 2000 MG: 2 SOLUTION INTRAVENOUS at 07:50

## 2025-01-22 RX ADMIN — FENTANYL CITRATE 50 MCG: 50 INJECTION, SOLUTION INTRAMUSCULAR; INTRAVENOUS at 07:43

## 2025-01-22 RX ADMIN — PROPOFOL 50 MG: 10 INJECTION, EMULSION INTRAVENOUS at 09:13

## 2025-01-22 RX ADMIN — SODIUM CHLORIDE, SODIUM LACTATE, POTASSIUM CHLORIDE, AND CALCIUM CHLORIDE: .6; .31; .03; .02 INJECTION, SOLUTION INTRAVENOUS at 07:20

## 2025-01-22 RX ADMIN — PROPOFOL 150 MG: 10 INJECTION, EMULSION INTRAVENOUS at 07:43

## 2025-01-22 RX ADMIN — Medication 200 MCG: at 09:19

## 2025-01-22 RX ADMIN — FENTANYL CITRATE 50 MCG: 50 INJECTION, SOLUTION INTRAMUSCULAR; INTRAVENOUS at 08:05

## 2025-01-22 RX ADMIN — LEVALBUTEROL HYDROCHLORIDE 1.25 MG: 1.25 SOLUTION RESPIRATORY (INHALATION) at 10:34

## 2025-01-22 RX ADMIN — Medication 100 MCG: at 08:20

## 2025-01-22 NOTE — TELEPHONE ENCOUNTER
Will be changing urg to f/u for 2/21   at 2:20 for inspire activation within 30 days with red  pt has my chart

## 2025-01-22 NOTE — INTERVAL H&P NOTE
H&P reviewed. After examining the patient I find no changes in the patients condition since the H&P had been written.    Vitals:    01/22/25 0634   BP: 129/83   Pulse: 74   Resp: 20   Temp: (!) 97.1 °F (36.2 °C)   SpO2: 92%     NAD  AAOx3  CTAB  RRR  Abd soft NT/ND  ASH

## 2025-01-22 NOTE — PERIOPERATIVE NURSING NOTE
Patient O2 saturation 87-92% on RA. Lung sounds decreased. Bo CLOUD notified and assessed patient at bedside. Levalbuterol ordered by MD and administered by RN. Patient O2 says 89-95% on RA after IS use and neb. Bo CLOUD assessed patient again at bedside. No mew orders. MD approved for patient d/c

## 2025-01-22 NOTE — ANESTHESIA POSTPROCEDURE EVALUATION
Post-Op Assessment Note    CV Status:  Stable  Pain Score: 0    Pain management: adequate       Mental Status:  Alert and awake   Hydration Status:  Euvolemic   PONV Controlled:  Controlled   Airway Patency:  Patent     Post Op Vitals Reviewed: Yes    No anethesia notable event occurred.    Staff: Anesthesiologist           Last Filed PACU Vitals:  Vitals Value Taken Time   Temp 97.5 °F (36.4 °C) 01/22/25 0947   Pulse 92 01/22/25 1130   /59 01/22/25 1130   Resp 16 01/22/25 1130   SpO2 91 % 01/22/25 1130   Aggressive Incentive Spirometry at home advised.    Modified Alo:     Vitals Value Taken Time   Activity 2 01/22/25 1050   Respiration 2 01/22/25 1050   Circulation 2 01/22/25 1050   Consciousness 2 01/22/25 1050   Oxygen Saturation 2 01/22/25 1050     Modified Alo Score: 10

## 2025-01-22 NOTE — ANESTHESIA POSTPROCEDURE EVALUATION
Post-Op Assessment Note    CV Status:  Stable  Pain Score: 0    Pain management: adequate       Mental Status:  Sleepy   Hydration Status:  Stable   PONV Controlled:  None   Airway Patency:  Patent     Post Op Vitals Reviewed: Yes    No anethesia notable event occurred.    Staff: Anesthesiologist, CRNA           Last Filed PACU Vitals:  Vitals Value Taken Time   Temp 97.5 °F (36.4 °C) 01/22/25 0947   Pulse 78 01/22/25 0951   BP 93/50 01/22/25 0947   Resp 8 01/22/25 0951   SpO2 93 % 01/22/25 0951   Vitals shown include unfiled device data.

## 2025-01-22 NOTE — OP NOTE
OPERATIVE REPORT  PATIENT NAME: Benito Park    :  1970  MRN: [id number]  Pt Location: [facility] OR ROOM 04    SURGERY DATE: 2025    Surgeons and Role:     * Jeff Arndt MD - Primary     * Tammy Kaplan MD - Assisting    Preop Diagnosis:  OSIRIS (obstructive sleep apnea) [G47.33]    Post-Op Diagnosis Codes:     * OSIRIS (obstructive sleep apnea) [G47.33]    Procedure(s):  Right - INSERTION UPPER AIRWAY STIMULATOR RIGHT INSPIRE    Specimen(s):  * No specimens in log *    Estimated Blood Loss:   Minimal    Drains:  * No LDAs found *    Anesthesia Type:   General    Operative Indications:  OSIRIS (obstructive sleep apnea) [G47.33]  BMI 26.7  MARIE 18.8    Operative Findings:  Good stimulation at 1.0V  No retraction at 0.4V  Good waveform of sense lead    Complications:   None    Procedure and Technique:  Indications for procedure: Benito Park is a 54 y.o. male with a history of Moderate to Severe obstructive sleep apnea, who is intolerant and unable to achieve benefit from positive pressure therapy. Patient has passed the clinical, polysomnographic, and endoscopic screening criteria and presents today for the implant, whom I have seen in consultation for the above-listed procedure. After discussion of risks, benefits, and alternatives the patient elected to undergo the procedure and informed consent was obtained.      Procedure in detail: The patient was brought back to the operating room laid in the supine position and general endotracheal anesthesia was administered.  The patient was positioned appropriately.  Appropriate time-out was taken and procedure, sidedness, and marking was confirmed.  7 mL of 1% lidocaine with 1:100,000 epinephrine was infiltrated into the marked areas. Prior to prepping and draping, electrodes were placed in the genioglossus and styloglossus muscle and connected to the NIM box for intraoperative nerve monitoring. The patient was prepped and draped in standard fashion.      Neuroplasty was performed as follows: The lateral branches to retrusor muscles were identified, and tested intra-operatively using the NIM stimulator. The branches were identified and the inclusion branches were stimulated with both visual and neurostimulator confirmation. The branches were dissected in 360 degrees for 1.5 cm around the TV and C1 branches with care not to include the HG branches.      Insertion of an upper airway stimulator was performed as follows: The cuff electrode for the hypoglossal nerve stimulator was placed distally to these branches on the medial nerve branch to the genioglossus muscle. Diagnostic evaluation confirmed activation of the genioglossus nerve, resulting in genioglossal activation and tongue protrusion, confirmed visually.  The stimulation electrode was then looped under and secured to the digastric tendon on its lateral surface with the provided anchor.    Insertion of a thoracic sensor lead was performed as follows:  A second 5 cm incision was made in the right upper chest approximately 3 cm below the clavicle.  Dissection was carried down to the pectoralis muscle. An inferior pocket was created deep to the subcutaneous layer and superficial to the pectoralis muscle.   Dissection was carried down through pectoralis muscle using blunt dissection. The interspace between the 2nd and 3rd ribs were exposed. The external oblique muscles were identified, and bluntly dissected, and a tunnel was created between the external and internal intercostals in the 2nd intercostal space just on the superior aspect of the third rib. The pleural respiration sensor was placed into the pocket in the inferior aspect of the intercostal space.  The sensor was sutured to the fascia using the provided anchors to maintain the sensor facing the pleural space.   The stimulation lead was then tunneled in a subplatysmal plane and brought out into the sub-clavicular pocket.     Insertion of an upper airway  stimulator was continued as follows: Both the cuff electrode and the respiration sensing lead were connected to the implantable pulse generator.  Diagnostic evaluation was run, which confirmed a good respiration sensing signal as well as good tongue protrusion stimulation.      The implantable pulse generator was placed in the subclavicular pocket and secured loosely to the pectoralis fascia using 2-0 silk sutures.  All the wounds were thoroughly irrigated with irrigation.  The wounds were then closed in three layers with deep layers closed with 3-0 Vicryl and the skin closed with 4-0 Monocryl. Wound dressings were placed.        I was present for the entire procedure.    Patient Disposition:  PACU              SIGNATURE: Tammy Kaplan MD  DATE: January 22, 2025  TIME: 9:46 AM

## 2025-01-22 NOTE — DISCHARGE INSTR - AVS FIRST PAGE
Jeff Arndt M.D.  [facility] Hypoglossal Nerve Stimulator    Post-Operative Care  Office [phone]  Cell [phone]               At Home (in the days immediately following the procedure):   Try to sleep with your head elevated on 2-3 pillows   Iced packs placed over wound will help reduce swelling.  They should be used 20 minutes on/ 20 minutes off while awake for the first full day.  Crushed ice in ziplock bags or frozen peas or corn work well.    The dressings may be removed 2 days after surgery. After the dressings are removed, the wounds should be cleaned with a Q-tip soaked in hydrogen peroxide mixed 50/50 with water.     Apply antibiotic ointment (Bacitracin) or Vaseline to the external incisions 3-4 times per day.   Take your medicines as prescribed.  REMEMBER:  DO NOT DRIVE WHILE TAKING PAIN MEDICATIONS.   You should do neck rolls 10 times clockwise and 10 times counterclockwise directions for 1 week after surgery.    You may shower with luke-warm water only after the dressings are removed.    You may use ibuprofen (Motrin, Advil) and acetaminophen (Tyelnol) for pain control in addition to any prescribed pain medications.     You may get out of bed and go to the bathroom with assistance. Eat light, soft meals as tolerated, avoiding gas-stimulating foods.     Follow-up care:   Eat before coming to the office for post-operative visits.  Rest for the first week after the procedure, avoiding excessive physical activities, hard chewing, lifting objects over 8 lbs (about the weight of a phone book), or bending over. We request that you do not travel by plane for one week after surgery. Clean the wound at least twice daily using 1/2 hydrogen peroxide 1/2 water solution to remove any crusting (scabbing).  Lubricate your wound after cleaning with Q-tips and antibiotic ointment to soften hardened crusts.      Follow-up visits:    At one week, the sutures will be removed; you may drive yourself to this  appointment (as long as you are no longer taking prescription/narcotic pain medicines).  After dressing removal, make-up may be worn, avoiding the incision lines. Additional follow-up visits will be scheduled at this time.  Note that final results from the incisions may not be apparent until ONE YEAR after surgery.    Healing Care:   After surgery try not to roll onto the wound while asleep.  Clean the external skin gently but thoroughly with soap. The use of alcohol and tobacco products prolong swelling and healing and are best avoided for 2 weeks after surgery.   Do not expose your wound to sun for 4-6 weeks after surgery.  Use sunscreen (SPF 30 or higher) for 6 months after surgery if sun exposure is absolutely necessary.  Avoid any physical exercise that can cause over-heating or over-exertion for two weeks after the surgery.  Your nose may be swollen and stuffy for several months.  Complete healing may take 12 months.  It is to your advantage to return for all postoperative visits so that long-term results may be evaluated.    Frequently Asked Questions:  When can I shower and shampoo my hair?   You may shower the day after your surgery, BUT KEEP ANY DRESSING DRY.  This may mean you wash your face/hair in the sink instead.  It is important that you do not use hot water, as this can increase the swelling.  Lukewarm water is best.      When will the swelling and bruising go away?   This usually takes 7-10 days or so, but may take less or more time, depending on the individual.    When can I take aspirin?   You should not take aspirin for 2 weeks prior to or after surgery. The same is true for vitamin E, ginko, garlic pills, and other “natural” supplements.    When can I take ibuprofen?      Non-steroidal anti-inflammatory drugs such as advil (ibuprofen), alleve (naproxen), or other similar may be used immediately after surgery as per the guidelines on the package.      When can I wear makeup?   Make-up can be  applied after suture removal, but not directly on the incisions until 3 weeks after the procedure.    When will I activate my device?       We will wait for your healing to finish prior to activation which usually means a few weeks. The plan will be set up at your first follow up appointment at 1 week after the procedure.     What about exercise?   Please adhere to the following schedule:   Up to week 1 after surgery:  REST!  No strenuous exercise.  Walking is ok.  Week 1-3 after surgery:  You may begin light aerobic exercise, but no bending over/straining/lifting weights. You may begin some range of motion exercises of your shoulder.   Week 3+:  You may begin more strenuous exercise, such as yoga, stretching, bending over, lifting weights.  Please remember to start slowly.  Week 6+:  You may resume contact sports, such as soccer, basketball, etc    POST-OPERATIVE APPOINTMENTS:  1 week:  wound check in the office.   1 month: device activation and wound check in the office.  3 months: device titration sleep study at [facility].   4 months: final wound check in the office.   Yearly: device check at office.     How to contact us:    Phone:  If you have questions or concerns, please call us at [phone] during business hours (8 am to 5 pm).  On nights and weekends, you may page the ENT surgeon on call  at [facility].  In case of emergency, please call [phone].

## 2025-01-22 NOTE — TELEPHONE ENCOUNTER
Pt's wife calling as he had his Inspire implant placed this morning, and they stated he needs to be seen by Dr. Lopez within 30 days to activate it. Offered f/u apt 1/28 in [place], however she stated that was too soon and opted to take f/u apt 3/7. She requests a day be opened on his schedule as he needs to be seen before 2/18, and prefers the [facility]

## 2025-01-26 NOTE — PROGRESS NOTES
Patient ID: Benito Park is a 54 y.o. male Date of Birth 1970       Chief Complaint   Patient presents with   • Follow Up Wound Care Visit     Left foot wound.       Allergies:  Patient has no known allergies.    Diagnosis:  1. Diabetic ulcer of toe of left foot associated with type 2 diabetes mellitus, with fat layer exposed (HCC)  -     Wound cleansing and dressings Diabetic Ulcer Left Plantar; Future  -     Wound Procedure Treatment Diabetic Ulcer Left Plantar  -     Debridement Diabetic Ulcer Left Plantar  2. Type 2 diabetes mellitus with diabetic neuropathy, without long-term current use of insulin (HCC)     Diagnosis ICD-10-CM Associated Orders   1. Diabetic ulcer of toe of left foot associated with type 2 diabetes mellitus, with fat layer exposed (HCC)  E11.621 Wound cleansing and dressings Diabetic Ulcer Left Plantar    L97.522 Wound Procedure Treatment Diabetic Ulcer Left Plantar     Debridement Diabetic Ulcer Left Plantar      2. Type 2 diabetes mellitus with diabetic neuropathy, without long-term current use of insulin (HCC)  E11.40            Assessment & Plan:  LGT still slowly making some progress.  Debridement of skin full-thickness excisional with 10 blade and tissue nipper as noted below.  Continue with Endoform/Dermagran gauze dressing to be changed every other day.  Felt offloading with cam boot when ambulating.    Patient instructed only to remove cam boot when bathing or sleeping  Follow-up in 2 weeks.    Subjective:   1/15/2025:  54 year-old male presents for follow-up chronic ulcer tip of left great toe.  Has been wearing cam boot as previously instructed.  Denies any new pain/discomfort.    12/30/2024: 54-year-old male presents today for follow-up chronic ulceration located at the tip of his left great toe.  Football dressing intact.  Patient reports has been comfortable but very inconvenient to keep dry.    12/11/2024: 54-year-old male seen today for follow-up chronic left great  toe ulceration.  Reports football dressing has helped and he is making progress but would like to discontinue the football dressing.    12/4/2024: 54-year-old type II diabetic seen today for follow-up DFU left great toe, right great toe was closed as of last visit.  Reports he is not a fan of the football dressing but is anxious to not needed.  Reports right great toe is doing fine.    11/27/2024: 54-year-old type II diabetic seen today for follow-up bilateral great toe ulcerations who was last seen by Dr. Weir 1 week ago who put him in bilateral football dressings.  Patient reports right foot opened up while in [place] and left foot opened up upon return from [place] at home.  Reports good progress and feels the right foot has closed.  Denies any new pain/discomfort.  Reports he has been diabetic for approximately 1 year.    11/20/2024: (Dr. Weir) presents today for evaluation and care of bilateral great toe diabetic ulcerations, he is tolerating football dressing well although he did have to remove it on Monday for his lower extremity arterial Dopplers.        The following portions of the patient's history were reviewed and updated as appropriate:   Patient Active Problem List   Diagnosis   • Sprain of anterior talofibular ligament of right ankle   • Perianal abscess   • Polyneuropathy   • Chronic bilateral low back pain with bilateral sciatica   • Bilateral lower extremity edema   • Chronic pain syndrome   • Failed back surgical syndrome   • Lumbar spondylosis   • 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 (HCC)   • Congenital fusion of sacroiliac joint   • Terminal ileitis (HCC)   • Viral enteritis   • Chronic foot pain   • Lumbar radiculopathy   • GERD (gastroesophageal reflux disease)   • History of colon polyps   • Perianal fistula   • Functional diarrhea   • Blepharitis, left eye   • Closed fracture of radial styloid   • Compression of right ulnar nerve at multiple levels   • Degenerative disc disease at  L5-S1 level   • History of lumbar fusion   • Numbness and tingling of both lower extremities   • Periorbital cellulitis of left eye   • Right leg swelling   • Tobacco use   • Vitamin D deficiency   • Arthritis of right shoulder region   • Chronic right shoulder pain   • Myofascial pain syndrome   • S/P insertion of spinal cord stimulator   • Crohn's disease (HCC)   • Numbness and tingling in right hand   • Neck pain   • Cervical radiculopathy   • Herniated nucleus pulposus, C3-4   • Hypertension   • Smoker   • Heavy alcohol consumption   • Sleep apnea   • Snoring   • Excessive daytime sleepiness   • Overweight (BMI 25.0-29.9)   • Dupuytren contracture   • Capillary disorder   • Epididymitis, right   • Obstructive sleep apnea syndrome   • Pes anserinus bursitis of left knee   • Arthralgia   • Ulcer of left foot, limited to breakdown of skin ([facility])   • Dysarthria   • acute Confusional state   • Hyperlipidemia   • Type 2 diabetes mellitus with hyperglycemia, without long-term current use of insulin ([facility])   • Diabetic polyneuropathy associated with type 2 diabetes mellitus ([facility])   • Lung nodule   • Abnormal movements   • Episode of change in speech   • Hoarseness   • Diabetic ulcer of left foot associated with type 2 diabetes mellitus, limited to breakdown of skin, unspecified part of foot ([facility])   • Carpal tunnel syndrome of right wrist   • Transient neurological symptoms   • Mucopurulent chronic bronchitis ([facility])   • Diabetic ulcer of toe of left foot associated with type 2 diabetes mellitus, with fat layer exposed ([facility])   • Agitation   • OSIRIS (obstructive sleep apnea)     Past Medical History:   Diagnosis Date   • Anxiety    • Back pain    • Callus Few months ago   • Chronic pain disorder     back and legs   • Colon polyp    • COPD (chronic obstructive pulmonary disease) ([facility])     pt denies   • Crohn's disease ([facility])    • Dental crown present    • Depression    • Diabetes mellitus ([facility])     type 2   • GERD  (gastroesophageal reflux disease)    • Hyperglycemia     diabetic shock--follows with  Endocrinology   • Hyperlipidemia     per pt diet controlled   • Hypertension    • Neuropathy    • Perianal fistula    • Sleep apnea     no current CPAP use   • Spinal cord stimulator status     battery  -near right side back/hip   • Terminal ileitis (HCC)      Past Surgical History:   Procedure Laterality Date   • BACK SURGERY  2019   • CAST APPLICATION Right 11/17/2022    Procedure: Application short-arm splint;  Surgeon: Sarthak Villatoro MD;  Location: [facility];  Service: Orthopedics   • COLONOSCOPY     • EGD     • HAND CONTRACTURE RELEASE Left 06/01/2023    Procedure: left ring and small finger dupuytrens excision and ring finger Metacarpophalangeal joint release and small finger Metacarpophalangeal and proximal interphalangeal joint release;  Surgeon: Sarthak Villatoro MD;  Location: [facility];  Service: Orthopedics   • HERNIA REPAIR      umbilical hernia   • WA ANRCT XM SURG REQ ANES GENERAL SPI/EDRL DX N/A 11/17/2021    Procedure: EXAM UNDER ANESTHESIA (EUA);  Surgeon: Davi Thao MD;  Location: [facility];  Service: Colorectal   • WA APPLICATION SHORT ARM SPLINT FOREARM-HAND STATIC Right 1/11/2024    Procedure: Application short arm splint;  Surgeon: Sarthak Villatoro MD;  Location: [facility];  Service: Orthopedics   • WA CAPSULECTOMY/CAPSULOTOMY IPHAL JOINT EACH Right 1/11/2024    Procedure: Contracture release right hand small finger proximal interphalangeal joint;  Surgeon: Sarthak Villatoro MD;  Location: [facility];  Service: Orthopedics   • WA DISE DYN EVAL SLEEP DISORDERED BREATHING FLX DX N/A 11/13/2024    Procedure: DRUG INDUCED SLEEP ENDOSCOPY;  Surgeon: Jeff Arndt MD;  Location: [facility];  Service: ENT   • WA FASCIOTOMY PALMAR OPEN PARTIAL Right 11/17/2022    Procedure: Dupuytren's excision right palm extending into the small finger with release of the metacarpophalangeal joint and  proximal interphalangeal joint.  Dupuytren's excision right palm with release of the right ring finger metacarpophalangeal joint.;  Surgeon: Sarthak Villatoro MD;  Location: [facility];  Service: Orthopedics   • IL FASCIOTOMY PALMAR OPEN PARTIAL Right 1/11/2024    Procedure: Right hand small finger duppuytrens excision with release of the metacarpophalangeal joint and proximal interphalangeal joint;  Surgeon: Sarthak Villatoro MD;  Location: [facility];  Service: Orthopedics   • IL FASCT PALM W/WO Z-PLASTY TISSUE REARGMT/SKN GRFT Right 1/11/2024    Procedure: Right hand small finger duppuytrens excision with release of the metacarpophalangeal joint and proximal interphalangeal joint;  Surgeon: Sarthak Villatoro MD;  Location:  MAIN OR;  Service: Orthopedics   • IL FASCT PRTL PALMAR 1 DGT PROX IPHAL JT W/WO RPR Right 1/11/2024    Procedure: Right hand small finger duppuytrens excision with release of the metacarpophalangeal joint and proximal interphalangeal joint;  Surgeon: Sarthak Villatoro MD;  Location: [facility];  Service: Orthopedics   • IL I&D ISCHIORECTAL&/PERIRECTAL ABSCESS SPX Right 10/24/2021    Procedure: excisional debredement right gluteal cheek ;  Surgeon: Jeana Bustamante MD;  Location:  MAIN OR;  Service: General   • IL PLACEMENT SETON N/A 11/17/2021    Procedure: PLACEMENT SETON;  Surgeon: Davi Thao MD;  Location:  MAIN OR;  Service: Colorectal   • IL PRQ IMPLTJ NSTIM ELECTRODE ARRAY EPIDURAL Right 03/26/2021    Procedure: INSERTION THORACIC DORSAL COLUMN SPINAL CORD STIMULATOR PERCUTANEOUS W IMPLANTABLE PULSE GENERATOR, RIGHT;  Surgeon: Akshat Witt MD;  Location: [facility];  Service: Neurosurgery     Social History     Socioeconomic History   • Marital status: /Civil Union     Spouse name: Not on file   • Number of children: Not on file   • Years of education: Not on file   • Highest education level: Not on file   Occupational History   • Not on file   Tobacco Use    • Smoking status: Former     Current packs/day: 0.00     Average packs/day: 0.5 packs/day for 30.0 years (15.0 ttl pk-yrs)     Types: Cigarettes     Start date: 1/1/2019     Quit date: 2/4/2021     Years since quitting: 3.9     Passive exposure: Never   • Smokeless tobacco: Never   Vaping Use   • Vaping status: Every Day   • Substances: THC   Substance and Sexual Activity   • Alcohol use: Yes     Alcohol/week: 1.0 standard drink of alcohol     Types: 1 Cans of beer per week     Comment: social   • Drug use: Yes     Frequency: 7.0 times per week     Types: Marijuana     Comment: Medical marijuana-vapes, bedtime   • Sexual activity: Not on file     Comment: defer   Other Topics Concern   • Not on file   Social History Narrative   • Not on file     Social Drivers of Health     Financial Resource Strain: Low Risk  (6/7/2023)    Overall Financial Resource Strain (CARDIA)    • Difficulty of Paying Living Expenses: Not hard at all   Food Insecurity: No Food Insecurity (7/5/2024)    Nursing - Inadequate Food Risk Classification    • Worried About Running Out of Food in the Last Year: Never true    • Ran Out of Food in the Last Year: Never true    • Ran Out of Food in the Last Year: Not on file   Transportation Needs: No Transportation Needs (7/5/2024)    PRAPARE - Transportation    • Lack of Transportation (Medical): No    • Lack of Transportation (Non-Medical): No   Physical Activity: Sufficiently Active (4/11/2023)    Received from [email or web address]    Physical Activity   Stress: No Stress Concern Present (4/11/2023)    Received from [email or web address], [email or web address]    [place] [facility] [employer] [facility] - Occupational Stress Questionnaire    • Feeling of Stress : Only a little   Social Connections: Moderately Integrated (4/11/2023)    Received from [email or web address]    Social Connection and Isolation Panel [NHANES]   Intimate Partner Violence: Not At Risk (4/11/2023)    Received from [facility]    Humiliation,  Afraid, Rape, and Kick questionnaire    • Fear of Current or Ex-Partner: No    • Emotionally Abused: No    • Physically Abused: No    • Sexually Abused: No   Housing Stability: Low Risk  (7/5/2024)    Housing Stability Vital Sign    • Unable to Pay for Housing in the Last Year: No    • Number of Times Moved in the Last Year: 1    • Homeless in the Last Year: No        Current Outpatient Medications:   •  acetaminophen (TYLENOL) 500 mg tablet, Take 500 mg by mouth every 6 (six) hours as needed for mild pain, Disp: , Rfl:   •  albuterol (PROVENTIL HFA,VENTOLIN HFA) 90 mcg/act inhaler, Inhale 2 puffs every 6 (six) hours as needed for wheezing, Disp: 6.7 g, Rfl: 2  •  Alcohol Swabs 70 % PADS, May substitute brand based on insurance coverage. Check glucose TID., Disp: 100 each, Rfl: 0  •  atorvastatin (LIPITOR) 40 mg tablet, Take 1 tablet (40 mg total) by mouth every evening, Disp: 90 tablet, Rfl: 2  •  Blood Glucose Monitoring Suppl (OneTouch Verio Reflect) w/Device KIT, May substitute brand based on insurance coverage. Check glucose TID., Disp: 1 kit, Rfl: 0  •  Cholecalciferol (RA Vitamin D-3) 1,000 units tablet, Take 2 tablets (2,000 Units total) by mouth daily, Disp: 180 tablet, Rfl: 2  •  cyclobenzaprine (FLEXERIL) 10 mg tablet, TAKE 1 TABLET BY MOUTH EVERYDAY AT BEDTIME, Disp: 90 tablet, Rfl: 2  •  escitalopram (LEXAPRO) 10 mg tablet, Take 1 tablet (10 mg total) by mouth daily, Disp: 90 tablet, Rfl: 3  •  glucose blood (OneTouch Verio) test strip, May substitute brand based on insurance coverage. Check glucose TID., Disp: 100 each, Rfl: 5  •  lisinopril (ZESTRIL) 20 mg tablet, Take 1 tablet (20 mg total) by mouth daily, Disp: 90 tablet, Rfl: 2  •  loperamide (IMODIUM) 2 mg capsule, Take 1 capsule (2 mg total) by mouth daily at bedtime, Disp: 30 capsule, Rfl: 2  •  LORazepam (ATIVAN) 1 mg tablet, Take 1 tablet (1 mg total) by mouth every 8 (eight) hours as needed for anxiety, Disp: 60 tablet, Rfl: 3  •  metFORMIN  (GLUCOPHAGE-XR) 500 mg 24 hr tablet, 1 in am and 2 in pm, Disp: 360 tablet, Rfl: 2  •  naproxen (NAPROSYN) 250 mg tablet, Take 250 mg by mouth as needed for mild pain, Disp: , Rfl:   •  omeprazole (PriLOSEC) 40 MG capsule, Take 1 capsule (40 mg total) by mouth daily, Disp: 90 capsule, Rfl: 2  •  ondansetron (ZOFRAN) 4 mg tablet, Take 1 tablet (4 mg total) by mouth every 8 (eight) hours as needed for nausea or vomiting, Disp: 20 tablet, Rfl: 1  •  OneTouch Delica Lancets 33G MISC, May substitute brand based on insurance coverage. Check glucose TID., Disp: 100 each, Rfl: 5  •  pregabalin (LYRICA) 150 mg capsule, Take 1 capsule (150 mg total) by mouth 3 (three) times a day, Disp: 90 capsule, Rfl: 2  •  traZODone (DESYREL) 50 mg tablet, TAKE 1 TO 2 TABLETS AT BEDTIME AS NEEDED FOR SLEEP, Disp: 180 tablet, Rfl: 1  •  vedolizumab (Entyvio) SOLR, Inject 300 mg into a catheter in a vein every 4 weeks (Patient taking differently: Inject 300 mg into a catheter in a vein every 4 weeks Every 4 weeks--next dose due end of February 2025), Disp: , Rfl:   Family History   Problem Relation Age of Onset   • COPD Mother    • Lung cancer Mother    • Heart disease Father    • Heart attack Father    • Hypertension Sister    • Diabetes type II Sister    • No Known Problems Sister    • No Known Problems Sister    • No Known Problems Sister    • No Known Problems Brother    • Heart attack Brother    • No Known Problems Son    • No Known Problems Son    • Colon cancer Neg Hx    • Colon polyps Neg Hx    • Inflammatory bowel disease Neg Hx       Review of Systems   Constitutional: Negative.    HENT: Negative.     Eyes: Negative.    Respiratory: Negative.     Cardiovascular: Negative.    Gastrointestinal: Negative.    Endocrine: Negative.    Genitourinary: Negative.    Musculoskeletal: Negative.    Skin:         Chronic DFU left great toe   Allergic/Immunologic: Negative.    Neurological: Negative.    Hematological: Negative.     Psychiatric/Behavioral: Negative.                 Objective:  /72   Pulse 100   Resp 16     Physical Exam  Constitutional:       Appearance: Normal appearance. He is normal weight.   HENT:      Head: Normocephalic and atraumatic.      Right Ear: External ear normal.      Left Ear: External ear normal.      Nose: Nose normal.      Mouth/Throat:      Mouth: Mucous membranes are moist.      Pharynx: Oropharynx is clear.   Eyes:      Conjunctiva/sclera: Conjunctivae normal.      Pupils: Pupils are equal, round, and reactive to light.   Cardiovascular:      Pulses: Normal pulses.           Dorsalis pedis pulses are 2+ on the right side and 2+ on the left side.        Posterior tibial pulses are 2+ on the right side and 2+ on the left side.   Pulmonary:      Effort: Pulmonary effort is normal.   Musculoskeletal:      Cervical back: Normal range of motion.      Right lower leg: No edema.      Left lower leg: No edema.      Right foot: Decreased range of motion.      Left foot: Decreased range of motion (First MPJ).      Comments: Diminished first MPJ range of motion bilateral  Left great toe 15-20 degrees range of motion first MPJ  Right great toe 20-25 degrees range of motion first MPJ   Feet:      Right foot:      Protective Sensation: 10 sites tested.  0 sites sensed.      Skin integrity: No ulcer (Closed with stable eschar.).      Toenail Condition: Right toenails are normal.      Left foot:      Protective Sensation: 10 sites tested.  0 sites sensed.      Skin integrity: Ulcer (See comments) present.      Toenail Condition: Left toenails are normal.      Comments: Left great toe: Full-thickness breakdownTip of LGT, hyperkeratotic margin, diminished size, no signs of infection, wound granulating and appears healthy.  Wound measures post debridement 0.7 x 0.5 x 0.2 cm  Skin:     General: Skin is warm and dry.   Neurological:      Mental Status: He is alert. Mental status is at baseline.      Sensory: Sensory  "deficit present.   Psychiatric:         Mood and Affect: Mood normal.         Behavior: Behavior normal.         Wound 11/06/24 Diabetic Ulcer Plantar Left (Active)   Enter Loza score: Loza Grade 1: Partial or full-thickness ulcer (superficial) 12/30/24 1527   Wound Image   01/15/25 1015   Wound Description Pink;Eschar 01/15/25 0950   [place]-wound Assessment Callus 01/15/25 0950   Wound Length (cm) 0.1 cm 01/15/25 0950   Wound Width (cm) 0.1 cm 01/15/25 0950   Wound Depth (cm) 0.1 cm 01/15/25 0950   Wound Surface Area (cm^2) 0.01 cm^2 01/15/25 0950   Wound Volume (cm^3) 0.001 cm^3 01/15/25 0950   Calculated Wound Volume (cm^3) 0 cm^3 01/15/25 0950   Change in Wound Size % 100 01/15/25 0950   Drainage Amount Scant 01/15/25 0950   Drainage Description Serous 01/15/25 0950   Non-staged Wound Description Full thickness 01/15/25 0950   Dressing Status Intact 01/15/25 0950       Wound 11/13/24 Nose N/A (Active)       Wound 01/22/25 Chest Right (Active)       Debridement   Wound 11/06/24 Diabetic Ulcer Plantar Left    Universal Protocol:  procedure performed by consultantConsent: Verbal consent obtained.  Risks and benefits: risks, benefits and alternatives were discussed  Consent given by: patient  Time out: Immediately prior to procedure a \"time out\" was called to verify the correct patient, procedure, equipment, support staff and site/side marked as required.  Patient understanding: patient states understanding of the procedure being performed  Patient identity confirmed: verbally with patient    Debridement Details  Performed by: physician  Debridement type: surgical  Level of debridement: subcutaneous tissue  Pain control: lidocaine 4%      Post-debridement measurements  Length (cm): 0.5  Width (cm): 0.4  Depth (cm): 0.2  Percent debrided: 100%  Surface Area (cm^2): 0.2  Area Debrided (cm^2): 0.2  Volume (cm^3): 0.04    Tissue and other material debrided: subcutaneous tissue  Devitalized tissue debrided: callus, " "necrotic debris and eschar  Instrument(s) utilized: blade  Technique utilized: excisionalBleeding: small  Hemostasis obtained with: pressure  Procedural pain (0-10): 3  Post-procedural pain: 3   Response to treatment: procedure was tolerated well                 Wound Instructions:  Orders Placed This Encounter   Procedures   • Wound cleansing and dressings Diabetic Ulcer Left Plantar     · Wound cleansing and dressings Diabetic Ulcer Left Plantar      Left great toe     Wash your hands with soap and water.  Remove old dressing, discard into plastic bag and place in trash.  Cleanse the wound with soap and water prior to applying a clean dressing. Do not use tissue or cotton balls. Do not scrub the wound. Pat dry using gauze.  Shower yes   Apply felt offloading pad to skin surrounding wound  Apply endoform and dermagran gauze to the open wound.  Cover with gauze  Secure with tape  Change dressing every other day     Wear CAM boot     Standing Status:   Future     Expiration Date:   1/22/2025   • Wound Procedure Treatment Diabetic Ulcer Left Plantar     This order was created via procedure documentation   • Debridement Diabetic Ulcer Left Plantar     This order was created via procedure documentation         Jovanni Rodas DPM      Portions of the record may have been created with voice recognition software. Occasional wrong word or \"sound a like\" substitutions may have occurred due to the inherent limitations of voice recognition software. Read the chart carefully and recognize, using context, where substitutions have occurred.    "

## 2025-01-28 ENCOUNTER — OFFICE VISIT (OUTPATIENT)
Dept: SPEECH THERAPY | Facility: CLINIC | Age: 55
End: 2025-01-28
Payer: MEDICARE

## 2025-01-28 DIAGNOSIS — R41.841 COGNITIVE COMMUNICATION DEFICIT: ICD-10-CM

## 2025-01-28 DIAGNOSIS — R41.3 MEMORY LOSS: Primary | ICD-10-CM

## 2025-01-28 DIAGNOSIS — R48.8 OTHER SYMBOLIC DYSFUNCTIONS: ICD-10-CM

## 2025-01-28 PROCEDURE — 97130 THER IVNTJ EA ADDL 15 MIN: CPT

## 2025-01-28 PROCEDURE — 97129 THER IVNTJ 1ST 15 MIN: CPT

## 2025-01-29 ENCOUNTER — OFFICE VISIT (OUTPATIENT)
Dept: WOUND CARE | Facility: HOSPITAL | Age: 55
End: 2025-01-29
Payer: MEDICARE

## 2025-01-29 VITALS
TEMPERATURE: 99 F | RESPIRATION RATE: 18 BRPM | SYSTOLIC BLOOD PRESSURE: 130 MMHG | DIASTOLIC BLOOD PRESSURE: 70 MMHG | HEART RATE: 68 BPM

## 2025-01-29 DIAGNOSIS — L97.522 DIABETIC ULCER OF TOE OF LEFT FOOT ASSOCIATED WITH TYPE 2 DIABETES MELLITUS, WITH FAT LAYER EXPOSED (HCC): Primary | ICD-10-CM

## 2025-01-29 DIAGNOSIS — E11.40 TYPE 2 DIABETES MELLITUS WITH DIABETIC NEUROPATHY, WITHOUT LONG-TERM CURRENT USE OF INSULIN (HCC): ICD-10-CM

## 2025-01-29 DIAGNOSIS — E11.621 DIABETIC ULCER OF TOE OF LEFT FOOT ASSOCIATED WITH TYPE 2 DIABETES MELLITUS, WITH FAT LAYER EXPOSED (HCC): Primary | ICD-10-CM

## 2025-01-29 PROCEDURE — 97597 DBRDMT OPN WND 1ST 20 CM/<: CPT | Performed by: PODIATRIST

## 2025-01-29 NOTE — PATIENT INSTRUCTIONS
Orders Placed This Encounter   Procedures    Wound cleansing and dressings Diabetic Ulcer Left Plantar     Left great toe     Wash your hands with soap and water.  Remove old dressing, discard into plastic bag and place in trash.  Cleanse the wound with soap and water prior to applying a clean dressing. Do not use tissue or cotton balls. Do not scrub the wound. Pat dry using gauze.  Shower yes   Apply felt offloading pad to skin surrounding wound    Apply dermagran gauze to the open wound.  Cover with gauze  Secure with tape  Change dressing every other day for two more times and then stop.  CONTINUE WITH THE OFFLOADING PAD UNTIL YOUR NEXT VISIT.     Wear CAM boot    Wound infection:  If you have signs of infection please call the wound center.  If the wound center is closed- please go to the Emergency department.  Some signs of infection:  fever, chills, increased redness, red streaks, increase in pain, increased drainage.  Drainage with an odor, Change in drainage color: white/milky/green/tan/yellow,  an increase in swelling, chest pain and/or shortness of breath.     Protein: Eat protein with each meal to promote healing.  Examples of protein are fish, meat, chicken, nuts, peanut butter, eggs, lentils, edamame or a protein shake.     Standing Status:   Future     Expiration Date:   2/5/2025

## 2025-01-29 NOTE — PROGRESS NOTES
Wound Procedure Treatment Diabetic Ulcer Left Plantar    Performed by: Nancy Cruz RN  Authorized by: Jovanni Rodas DPM    Associated wounds:   Wound 11/06/24 Diabetic Ulcer Plantar Left  Wound cleansed with:  NSS  Applied to periwound:  Skin prep  Applied primary dressing:  Dermagran  Applied secondary dressing:  Gauze  Dressing secured with:  Tape  Offloading device appllied:  Felt padding 1/4 inch

## 2025-01-29 NOTE — PROGRESS NOTES
Patient ID: Benito Park is a 54 y.o. male Date of Birth 1970       Chief Complaint   Patient presents with   • Follow Up Wound Care Visit     L great toe wound       Allergies:  Patient has no known allergies.    Diagnosis:  1. Diabetic ulcer of toe of left foot associated with type 2 diabetes mellitus, with fat layer exposed (HCC)  -     Wound cleansing and dressings Diabetic Ulcer Left Plantar; Future  -     Wound Procedure Treatment Diabetic Ulcer Left Plantar  -     Debridement Diabetic Ulcer Left Plantar  2. Type 2 diabetes mellitus with diabetic neuropathy, without long-term current use of insulin (HCC)     Diagnosis ICD-10-CM Associated Orders   1. Diabetic ulcer of toe of left foot associated with type 2 diabetes mellitus, with fat layer exposed (HCC)  E11.621 Wound cleansing and dressings Diabetic Ulcer Left Plantar    L97.522 Wound Procedure Treatment Diabetic Ulcer Left Plantar     Debridement Diabetic Ulcer Left Plantar      2. Type 2 diabetes mellitus with diabetic neuropathy, without long-term current use of insulin (HCC)  E11.40            Assessment & Plan:  LGT slowly improving.  Debridement of skin with 10 blade as noted below.  Discontinue endoform, continue Dermagran gauze dressing QOD.  Felt offloading with cam boot when ambulating.    Patient instructed only to remove cam boot when bathing or sleeping  Follow-up in 3 weeks.      Subjective:   1/15/2025:  54 year-old male presents for follow-up chronic ulcer tip of left great toe.  Has been wearing cam boot as previously instructed.  Denies any new pain/discomfort.    12/30/2024: 54-year-old male presents today for follow-up chronic ulceration located at the tip of his left great toe.  Football dressing intact.  Patient reports has been comfortable but very inconvenient to keep dry.    12/11/2024: 54-year-old male seen today for follow-up chronic left great toe ulceration.  Reports football dressing has helped and he is making  progress but would like to discontinue the football dressing.    12/4/2024: 54-year-old type II diabetic seen today for follow-up DFU left great toe, right great toe was closed as of last visit.  Reports he is not a fan of the football dressing but is anxious to not needed.  Reports right great toe is doing fine.    11/27/2024: 54-year-old type II diabetic seen today for follow-up bilateral great toe ulcerations who was last seen by Dr. Weir 1 week ago who put him in bilateral football dressings.  Patient reports right foot opened up while in [place] and left foot opened up upon return from [place] at home.  Reports good progress and feels the right foot has closed.  Denies any new pain/discomfort.  Reports he has been diabetic for approximately 1 year.    11/20/2024: (Dr. Weir) presents today for evaluation and care of bilateral great toe diabetic ulcerations, he is tolerating football dressing well although he did have to remove it on Monday for his lower extremity arterial Dopplers.        The following portions of the patient's history were reviewed and updated as appropriate:   Patient Active Problem List   Diagnosis   • Sprain of anterior talofibular ligament of right ankle   • Perianal abscess   • Polyneuropathy   • Chronic bilateral low back pain with bilateral sciatica   • Bilateral lower extremity edema   • Chronic pain syndrome   • Failed back surgical syndrome   • Lumbar spondylosis   • 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 (HCC)   • Congenital fusion of sacroiliac joint   • Terminal ileitis (HCC)   • Viral enteritis   • Chronic foot pain   • Lumbar radiculopathy   • GERD (gastroesophageal reflux disease)   • History of colon polyps   • Perianal fistula   • Functional diarrhea   • Blepharitis, left eye   • Closed fracture of radial styloid   • Compression of right ulnar nerve at multiple levels   • Degenerative disc disease at L5-S1 level   • History of lumbar fusion   • Numbness and tingling of  both lower extremities   • Periorbital cellulitis of left eye   • Right leg swelling   • Tobacco use   • Vitamin D deficiency   • Arthritis of right shoulder region   • Chronic right shoulder pain   • Myofascial pain syndrome   • S/P insertion of spinal cord stimulator   • Crohn's disease ([facility])   • Numbness and tingling in right hand   • Neck pain   • Cervical radiculopathy   • Herniated nucleus pulposus, C3-4   • Hypertension   • Smoker   • Heavy alcohol consumption   • Sleep apnea   • Snoring   • Excessive daytime sleepiness   • Overweight (BMI 25.0-29.9)   • Dupuytren contracture   • Capillary disorder   • Epididymitis, right   • Obstructive sleep apnea syndrome   • Pes anserinus bursitis of left knee   • Arthralgia   • Ulcer of left foot, limited to breakdown of skin ([facility])   • Dysarthria   • acute Confusional state   • Hyperlipidemia   • Type 2 diabetes mellitus with hyperglycemia, without long-term current use of insulin ([facility])   • Diabetic polyneuropathy associated with type 2 diabetes mellitus ([facility])   • Lung nodule   • Abnormal movements   • Episode of change in speech   • Hoarseness   • Diabetic ulcer of left foot associated with type 2 diabetes mellitus, limited to breakdown of skin, unspecified part of foot ([facility])   • Carpal tunnel syndrome of right wrist   • Transient neurological symptoms   • Mucopurulent chronic bronchitis ([facility])   • Diabetic ulcer of toe of left foot associated with type 2 diabetes mellitus, with fat layer exposed ([facility])   • Agitation   • OSIRIS (obstructive sleep apnea)     Past Medical History:   Diagnosis Date   • Anxiety    • Back pain    • Callus Few months ago   • Chronic pain disorder     back and legs   • Colon polyp    • COPD (chronic obstructive pulmonary disease) ([facility])     pt denies   • Crohn's disease ([facility])    • Dental crown present    • Depression    • Diabetes mellitus ([facility])     type 2   • GERD (gastroesophageal reflux disease)    • Hyperglycemia     diabetic shock--follows with  [facility]   • Hyperlipidemia     per pt diet controlled   • Hypertension    • Neuropathy    • Perianal fistula    • Sleep apnea     no current CPAP use   • Spinal cord stimulator status     battery  -near right side back/hip   • Terminal ileitis (HCC)      Past Surgical History:   Procedure Laterality Date   • BACK SURGERY  2019   • CAST APPLICATION Right 11/17/2022    Procedure: Application short-arm splint;  Surgeon: Sarthak Villatoro MD;  Location: [facility];  Service: Orthopedics   • COLONOSCOPY     • EGD     • HAND CONTRACTURE RELEASE Left 06/01/2023    Procedure: left ring and small finger dupuytrens excision and ring finger Metacarpophalangeal joint release and small finger Metacarpophalangeal and proximal interphalangeal joint release;  Surgeon: Sarthak Villatoor MD;  Location: [facility];  Service: Orthopedics   • HERNIA REPAIR      umbilical hernia   • PA ANRCT XM SURG REQ ANES GENERAL SPI/EDRL DX N/A 11/17/2021    Procedure: EXAM UNDER ANESTHESIA (EUA);  Surgeon: Davi Thao MD;  Location: [facility];  Service: Colorectal   • PA APPLICATION SHORT ARM SPLINT FOREARM-HAND STATIC Right 1/11/2024    Procedure: Application short arm splint;  Surgeon: Sarthak Villatoro MD;  Location: [facility];  Service: Orthopedics   • PA CAPSULECTOMY/CAPSULOTOMY IPHAL JOINT EACH Right 1/11/2024    Procedure: Contracture release right hand small finger proximal interphalangeal joint;  Surgeon: Sarthak Villatoro MD;  Location: [facility];  Service: Orthopedics   • PA DISE DYN EVAL SLEEP DISORDERED BREATHING FLX DX N/A 11/13/2024    Procedure: DRUG INDUCED SLEEP ENDOSCOPY;  Surgeon: Jeff Arndt MD;  [facility];  Service: ENT   • PA FASCIOTOMY PALMAR OPEN PARTIAL Right 11/17/2022    Procedure: Dupuytren's excision right palm extending into the small finger with release of the metacarpophalangeal joint and proximal interphalangeal joint.  Dupuytren's excision right palm with release of the right  ring finger metacarpophalangeal joint.;  Surgeon: Sarthak Villatoro MD;  Location: [facility];  Service: Orthopedics   • ME FASCIOTOMY PALMAR OPEN PARTIAL Right 1/11/2024    Procedure: Right hand small finger duppuytrens excision with release of the metacarpophalangeal joint and proximal interphalangeal joint;  Surgeon: Sarthak Villatoro MD;  Location: [facility];  Service: Orthopedics   • ME FASCT PALM W/WO Z-PLASTY TISSUE REARGMT/SKN GRFT Right 1/11/2024    Procedure: Right hand small finger duppuytrens excision with release of the metacarpophalangeal joint and proximal interphalangeal joint;  Surgeon: Sarthak Villatoro MD;  Location: [facility];  Service: Orthopedics   • ME FASCT PRTL PALMAR 1 DGT PROX IPHAL JT W/WO RPR Right 1/11/2024    Procedure: Right hand small finger duppuytrens excision with release of the metacarpophalangeal joint and proximal interphalangeal joint;  Surgeon: Sarthak Villatoro MD;  Location: [facility];  Service: Orthopedics   • ME I&D ISCHIORECTAL&/PERIRECTAL ABSCESS SPX Right 10/24/2021    Procedure: excisional debredement right gluteal cheek ;  Surgeon: Jeana Bustamante MD;  Location: [facility];  Service: General   • ME PLACEMENT SETON N/A 11/17/2021    Procedure: PLACEMENT SETON;  Surgeon: Davi Thao MD;  [facility];  Service: Colorectal   • ME PRQ IMPLTJ NSTIM ELECTRODE ARRAY EPIDURAL Right 03/26/2021    Procedure: INSERTION THORACIC DORSAL COLUMN SPINAL CORD STIMULATOR PERCUTANEOUS W IMPLANTABLE PULSE GENERATOR, RIGHT;  Surgeon: Akshat Witt MD;  Location: [facility];  Service: Neurosurgery   • ME REMOVAL HYPOGLOSSAL NERVE NSTIM RA PG&RESPIR SNR Right 1/22/2025    Procedure: INSERTION UPPER AIRWAY STIMULATOR RIGHT INSPIRE;  Surgeon: Jeff Arndt MD;  Location: [facility];  Service: ENT     Social History     Socioeconomic History   • Marital status: /Civil Union     Spouse name: Not on file   • Number of children: Not on file   • Years of  education: Not on file   • Highest education level: Not on file   Occupational History   • Not on file   Tobacco Use   • Smoking status: Former     Current packs/day: 0.00     Average packs/day: 0.5 packs/day for 30.0 years (15.0 ttl pk-yrs)     Types: Cigarettes     Start date: 2019     Quit date: 2021     Years since quittin.0     Passive exposure: Never   • Smokeless tobacco: Never   Vaping Use   • Vaping status: Every Day   • Substances: THC   Substance and Sexual Activity   • Alcohol use: Yes     Alcohol/week: 1.0 standard drink of alcohol     Types: 1 Cans of beer per week     Comment: social   • Drug use: Yes     Frequency: 7.0 times per week     Types: Marijuana     Comment: Medical marijuana-vapes, bedtime   • Sexual activity: Not on file     Comment: defer   Other Topics Concern   • Not on file   Social History Narrative   • Not on file     Social Drivers of Health     Financial Resource Strain: Low Risk  (2023)    Overall Financial Resource Strain (CARDIA)    • Difficulty of Paying Living Expenses: Not hard at all   Food Insecurity: No Food Insecurity (2024)    Nursing - Inadequate Food Risk Classification    • Worried About Running Out of Food in the Last Year: Never true    • Ran Out of Food in the Last Year: Never true    • Ran Out of Food in the Last Year: Not on file   Transportation Needs: No Transportation Needs (2024)    PRAPARE - Transportation    • Lack of Transportation (Medical): No    • Lack of Transportation (Non-Medical): No   Physical Activity: Sufficiently Active (2023)    Received from [facility]    Physical Activity   Stress: No Stress Concern Present (2023)    [facility] - Occupational Stress Questionnaire    • Feeling of Stress : Only a little   Social Connections: Moderately Integrated (2023)    Received from [facility]    Social Connection and Isolation Panel [NHANES]    Intimate Partner Violence: Not At Risk (4/11/2023)    [facility] from [facility]    Humiliation, Afraid, Rape, and Kick questionnaire    • Fear of Current or Ex-Partner: No    • Emotionally Abused: No    • Physically Abused: No    • Sexually Abused: No   Housing Stability: Low Risk  (7/5/2024)    Housing Stability Vital Sign    • Unable to Pay for Housing in the Last Year: No    • Number of Times Moved in the Last Year: 1    • Homeless in the Last Year: No        Current Outpatient Medications:   •  acetaminophen (TYLENOL) 500 mg tablet, Take 500 mg by mouth every 6 (six) hours as needed for mild pain, Disp: , Rfl:   •  albuterol (PROVENTIL HFA,VENTOLIN HFA) 90 mcg/act inhaler, Inhale 2 puffs every 6 (six) hours as needed for wheezing, Disp: 6.7 g, Rfl: 2  •  Alcohol Swabs 70 % PADS, May substitute brand based on insurance coverage. Check glucose TID., Disp: 100 each, Rfl: 0  •  atorvastatin (LIPITOR) 40 mg tablet, Take 1 tablet (40 mg total) by mouth every evening, Disp: 90 tablet, Rfl: 2  •  Blood Glucose Monitoring Suppl (OneTouch Verio Reflect) w/Device KIT, May substitute brand based on insurance coverage. Check glucose TID., Disp: 1 kit, Rfl: 0  •  Cholecalciferol (RA Vitamin D-3) 1,000 units tablet, Take 2 tablets (2,000 Units total) by mouth daily, Disp: 180 tablet, Rfl: 2  •  cyclobenzaprine (FLEXERIL) 10 mg tablet, TAKE 1 TABLET BY MOUTH EVERYDAY AT BEDTIME, Disp: 90 tablet, Rfl: 2  •  escitalopram (LEXAPRO) 10 mg tablet, Take 1 tablet (10 mg total) by mouth daily, Disp: 90 tablet, Rfl: 3  •  glucose blood (OneTouch Verio) test strip, May substitute brand based on insurance coverage. Check glucose TID., Disp: 100 each, Rfl: 5  •  lisinopril (ZESTRIL) 20 mg tablet, Take 1 tablet (20 mg total) by mouth daily, Disp: 90 tablet, Rfl: 2  •  loperamide (IMODIUM) 2 mg capsule, Take 1 capsule (2 mg total) by mouth daily at bedtime, Disp: 30 capsule, Rfl: 2  •  LORazepam (ATIVAN) 1 mg tablet, Take 1  tablet (1 mg total) by mouth every 8 (eight) hours as needed for anxiety, Disp: 60 tablet, Rfl: 3  •  metFORMIN (GLUCOPHAGE-XR) 500 mg 24 hr tablet, 1 in am and 2 in pm, Disp: 360 tablet, Rfl: 2  •  naproxen (NAPROSYN) 250 mg tablet, Take 250 mg by mouth as needed for mild pain, Disp: , Rfl:   •  omeprazole (PriLOSEC) 40 MG capsule, Take 1 capsule (40 mg total) by mouth daily, Disp: 90 capsule, Rfl: 2  •  ondansetron (ZOFRAN) 4 mg tablet, Take 1 tablet (4 mg total) by mouth every 8 (eight) hours as needed for nausea or vomiting, Disp: 20 tablet, Rfl: 1  •  OneTouch Delica Lancets 33G MISC, May substitute brand based on insurance coverage. Check glucose TID., Disp: 100 each, Rfl: 5  •  pregabalin (LYRICA) 150 mg capsule, Take 1 capsule (150 mg total) by mouth 3 (three) times a day, Disp: 90 capsule, Rfl: 2  •  traZODone (DESYREL) 50 mg tablet, TAKE 1 TO 2 TABLETS AT BEDTIME AS NEEDED FOR SLEEP, Disp: 180 tablet, Rfl: 1  •  vedolizumab (Entyvio) SOLR, Inject 300 mg into a catheter in a vein every 4 weeks (Patient taking differently: Inject 300 mg into a catheter in a vein every 4 weeks Every 4 weeks--next dose due end of February 2025), Disp: , Rfl:   Family History   Problem Relation Age of Onset   • COPD Mother    • Lung cancer Mother    • Heart disease Father    • Heart attack Father    • Hypertension Sister    • Diabetes type II Sister    • No Known Problems Sister    • No Known Problems Sister    • No Known Problems Sister    • No Known Problems Brother    • Heart attack Brother    • No Known Problems Son    • No Known Problems Son    • Colon cancer Neg Hx    • Colon polyps Neg Hx    • Inflammatory bowel disease Neg Hx       Review of Systems   Constitutional: Negative.    HENT: Negative.     Eyes: Negative.    Respiratory: Negative.     Cardiovascular: Negative.    Gastrointestinal: Negative.    Endocrine: Negative.    Genitourinary: Negative.    Musculoskeletal: Negative.    Skin:         Chronic DFU left great  toe   Allergic/Immunologic: Negative.    Neurological: Negative.    Hematological: Negative.    Psychiatric/Behavioral: Negative.           Objective:  /70   Pulse 68   Temp 99 °F (37.2 °C)   Resp 18     Physical Exam  Constitutional:       Appearance: Normal appearance. He is normal weight.   HENT:      Head: Normocephalic and atraumatic.      Right Ear: External ear normal.      Left Ear: External ear normal.      Nose: Nose normal.      Mouth/Throat:      Mouth: Mucous membranes are moist.      Pharynx: Oropharynx is clear.   Eyes:      Conjunctiva/sclera: Conjunctivae normal.      Pupils: Pupils are equal, round, and reactive to light.   Cardiovascular:      Pulses: Normal pulses.           Dorsalis pedis pulses are 2+ on the right side and 2+ on the left side.        Posterior tibial pulses are 2+ on the right side and 2+ on the left side.   Pulmonary:      Effort: Pulmonary effort is normal.   Musculoskeletal:      Cervical back: Normal range of motion.      Right lower leg: No edema.      Left lower leg: No edema.      Right foot: Decreased range of motion.      Left foot: Decreased range of motion (First MPJ).      Comments: Diminished first MPJ range of motion bilateral  Left great toe 15-20 degrees range of motion first MPJ  Right great toe 20-25 degrees range of motion first MPJ   Feet:      Right foot:      Protective Sensation: 10 sites tested.  0 sites sensed.      Skin integrity: No ulcer (Closed with stable eschar.).      Toenail Condition: Right toenails are normal.      Left foot:      Protective Sensation: 10 sites tested.  0 sites sensed.      Skin integrity: Ulcer (See comments) present.      Toenail Condition: Left toenails are normal.      Comments:   Left great toe: Partial depth breakdown Tip of LGT, hyperkeratotic margin, diminishing size, no signs of infection, wound granulating and appears healthy.    Wound measures 0.1 x 0.1 x 0.1 cm  Skin:     General: Skin is warm and dry.  "  Neurological:      Mental Status: He is alert. Mental status is at baseline.      Sensory: Sensory deficit present.   Psychiatric:         Mood and Affect: Mood normal.         Behavior: Behavior normal.         Wound 11/06/24 Diabetic Ulcer Plantar Left (Active)   Enter Loza score: Loza Grade 1: Partial or full-thickness ulcer (superficial) 12/30/24 1527   Wound Image   01/29/25 0924   Wound Description Pink;Eschar 01/15/25 0950   [place]ney-wound Assessment Callus 01/29/25 0920   Wound Length (cm) 0.1 cm 01/29/25 0920   Wound Width (cm) 0.1 cm 01/29/25 0920   Wound Depth (cm) 0.1 cm 01/29/25 0920   Wound Surface Area (cm^2) 0.01 cm^2 01/29/25 0920   Wound Volume (cm^3) 0.001 cm^3 01/29/25 0920   Calculated Wound Volume (cm^3) 0 cm^3 01/29/25 0920   Change in Wound Size % 100 01/29/25 0920   Drainage Amount None 01/29/25 0920   Drainage Description Serous 01/15/25 0950   Non-staged Wound Description Full thickness 01/29/25 0920   Dressing Status Intact 01/29/25 0920       Wound 11/13/24 Nose N/A (Active)       Wound 01/22/25 Chest Right (Active)       Debridement   Wound 11/06/24 Diabetic Ulcer Plantar Left    Universal Protocol:  procedure performed by consultantConsent: Verbal consent obtained.  Risks and benefits: risks, benefits and alternatives were discussed  Consent given by: patient  Time out: Immediately prior to procedure a \"time out\" was called to verify the correct patient, procedure, equipment, support staff and site/side marked as required.  Patient understanding: patient states understanding of the procedure being performed  Patient identity confirmed: verbally with patient    Debridement Details  Performed by: physician  Debridement type: selective  Pain control: lidocaine 4%      Post-debridement measurements  Length (cm): 0.1  Width (cm): 0.1  Depth (cm): 0.1  Percent debrided: 100%  Surface Area (cm^2): 0.01  Area Debrided (cm^2): 0.01  Volume (cm^3): 0    Devitalized tissue debrided: callus and " "slough  Instrument(s) utilized: blade and curette  Bleeding: small  Hemostasis obtained with: pressure  Procedural pain (0-10): 2  Post-procedural pain: 2   Response to treatment: procedure was tolerated well                 Wound Instructions:  Orders Placed This Encounter   Procedures   • Wound cleansing and dressings Diabetic Ulcer Left Plantar     Left great toe     Wash your hands with soap and water.  Remove old dressing, discard into plastic bag and place in trash.  Cleanse the wound with soap and water prior to applying a clean dressing. Do not use tissue or cotton balls. Do not scrub the wound. Pat dry using gauze.  Shower yes   Apply felt offloading pad to skin surrounding wound    Apply dermagran gauze to the open wound.  Cover with gauze  Secure with tape  Change dressing every other day for two more times and then stop.  CONTINUE WITH THE OFFLOADING PAD UNTIL YOUR NEXT VISIT.     Wear CAM boot    Wound infection:  If you have signs of infection please call the wound center.  If the wound center is closed- please go to the Emergency department.  Some signs of infection:  fever, chills, increased redness, red streaks, increase in pain, increased drainage.  Drainage with an odor, Change in drainage color: white/milky/green/tan/yellow,  an increase in swelling, chest pain and/or shortness of breath.     Protein: Eat protein with each meal to promote healing.  Examples of protein are fish, meat, chicken, nuts, peanut butter, eggs, lentils, edamame or a protein shake.     Standing Status:   Future     Expiration Date:   2/5/2025   • Wound Procedure Treatment Diabetic Ulcer Left Plantar     This order was created via procedure documentation   • Debridement Diabetic Ulcer Left Plantar     This order was created via procedure documentation         Jovanni Rodas DPM      Portions of the record may have been created with voice recognition software. Occasional wrong word or \"sound a like\" substitutions may have " occurred due to the inherent limitations of voice recognition software. Read the chart carefully and recognize, using context, where substitutions have occurred.

## 2025-01-31 ENCOUNTER — TELEPHONE (OUTPATIENT)
Dept: PULMONOLOGY | Facility: CLINIC | Age: 55
End: 2025-01-31

## 2025-01-31 ENCOUNTER — OFFICE VISIT (OUTPATIENT)
Dept: SPEECH THERAPY | Facility: CLINIC | Age: 55
End: 2025-01-31
Payer: MEDICARE

## 2025-01-31 DIAGNOSIS — R48.8 OTHER SYMBOLIC DYSFUNCTIONS: ICD-10-CM

## 2025-01-31 DIAGNOSIS — R41.3 MEMORY LOSS: Primary | ICD-10-CM

## 2025-01-31 PROCEDURE — 97130 THER IVNTJ EA ADDL 15 MIN: CPT

## 2025-01-31 PROCEDURE — 97129 THER IVNTJ 1ST 15 MIN: CPT

## 2025-01-31 NOTE — PROGRESS NOTES
Daily Speech Treatment Note    Today's date: 2025   Patient’s name: Benito Park  : 1970  MRN: [id number]  Safety measures: h/o Diabetic Shock  Referring provider: Francesco Edouard CRNP    Encounter Diagnoses   Name Primary?    Memory loss Yes    Other symbolic dysfunctions        Visit Tracking:  POC   Expires Auth Expiration Date ST Visit Limit   3/9/2025 BOMN BOMN          Visit/Unit Tracking:  Auth Status Date 2025   No auth req, Used 1 2 3 4 5    Remaining BOMN           Subjective/Behavioral:  -Patient pleasant and cooperative. . Patient noted that he wrote out reason for taking medications on list and will review starting this weekend along with filling in box with wife. Also, downloaded word scapes onto his phone.     Objective/Assessment:  Completed structured activities with patient to target goals below.  Results as stated below.       Short-term goals:  Patient will be educated on the use of internal and external memory aids and compensatory strategies to facilitate increased recall of routine, personal information, and recent events, to be achieved in 4-6 weeks.  25:  Reviewed information / handout on memory strategies with patient. Patient will bring in journal next session, use post it before entering car, work on repetition out loud, etc.  Working on review of medication list / reason taking in future session.  : Provided information on apps/workbooks/games recommended and provided handout.       Patient will complete auditory immediate and short term memory tasks to facilitate increased ability to retell narratives and recall information within functional living environment, to be achieved in 4-6 weeks.    Patient will complete complex auditory attention processing tasks (e.g., sentence unscramble, ranking numbers/words, etc.) to improve working memory with 80% accuracy, to be achieved in 4-6 weeks.  : Working on pairing of words, able to  complete 4 words into 2 pairs: 100% , not able to complete 6 words into 3 pairs with max cues. 1/31: Worked with sentence formulation and recalling words: set of 2: 100%, set of 3 words benefitted from writing words down on paper and formulating sentence on paper, then work towards repetition, visualization for final 3 words which was successful, continue to work with sentence formulation 3 words in future sessions.     Patient will complete thought organization tasks (e.g., sequencing, deduction puzzles, etc.) with 80% accuracy to facilitate increased executive functioning, working memory, problem solving, and processing skills, to be achieved in 4-6 weeks.  1/28: Attempted to complete Reggie's closet , logical solution puzzle- will work with patient with this task due to needs glasses and confusion over location/comprehension.  Also, wrote out medication list for patient and patient will work on writing reason taking for each medication. 1/31: Worked on Kitchen Shelves activity: moderate cues to help patient with attention/ focus: using finger or pencil as visual marker, one part of sentence at a time, crossing off completed tasks, provided vegetable garden for home practice. Played BLINK & organized cards into 3 different rows: number, color, shape, required frequent reminders for proper category / but did improve with indep. As game went on.     Patient will complete concrete and abstract categorization tasks to 80% accuracy to facilitate improved generative naming skills and working memory, to be achieved in 4-6 weeks.    Patient will utilize word finding strategies during semantic feature analysis treatment activity for improved naming and verbal expression skills, to be achieved in 4-6 weeks.    Long Term Goals    Patient will demonstrate cognitive-communication skills consistent with age and education given use of compensatory strategies when needed to resume baseline activities and responsibilities in home,  community, and work/school settings by discharge.     Patient will complete cognitive-linguistic therapy that addresses patient’s specific deficits in processing speed, short-term working memory, attention to detail, monitoring, sequencing, and organization skills, with instruction, to alleviate effects of executive functioning disorder deficits by discharge.    Patient will complete higher-level expressive language tasks (e.g., word definitions, idioms, synonym/antonyms, etc) with 80% accuracy to improve functional communication skills by discharge.   Plan:

## 2025-02-05 ENCOUNTER — APPOINTMENT (OUTPATIENT)
Dept: SPEECH THERAPY | Facility: CLINIC | Age: 55
End: 2025-02-05
Payer: MEDICARE

## 2025-02-07 ENCOUNTER — OFFICE VISIT (OUTPATIENT)
Dept: SPEECH THERAPY | Facility: CLINIC | Age: 55
End: 2025-02-07
Payer: MEDICARE

## 2025-02-07 DIAGNOSIS — R48.8 OTHER SYMBOLIC DYSFUNCTIONS: ICD-10-CM

## 2025-02-07 DIAGNOSIS — R41.3 MEMORY LOSS: Primary | ICD-10-CM

## 2025-02-07 DIAGNOSIS — R41.841 COGNITIVE COMMUNICATION DEFICIT: ICD-10-CM

## 2025-02-07 PROCEDURE — 97130 THER IVNTJ EA ADDL 15 MIN: CPT

## 2025-02-07 PROCEDURE — 97129 THER IVNTJ 1ST 15 MIN: CPT

## 2025-02-07 NOTE — PROGRESS NOTES
Daily Speech Treatment Note    Today's date: 2025   Patient’s name: Benito Park  : 1970  MRN: [id number]  Safety measures: h/o Diabetic Shock  Referring provider: Francesco Edouard CRNP    Encounter Diagnoses   Name Primary?    Memory loss Yes    Other symbolic dysfunctions     Cognitive communication deficit        Visit Tracking:  POC   Expires Auth Expiration Date ST Visit Limit   3/9/2025 BOMN BOMN          Visit/Unit Tracking:  Auth Status Date 2025   No auth req, Used 1 2 3 4 5 6    Remaining BOMN            Subjective/Behavioral:  -Patient pleasant and cooperative. .       Objective/Assessment:  Completed structured activities with patient to target goals below.  Results as stated below.       Short-term goals:  Patient will be educated on the use of internal and external memory aids and compensatory strategies to facilitate increased recall of routine, personal information, and recent events, to be achieved in 4-6 weeks.  25:  Reviewed information / handout on memory strategies with patient. Patient will bring in journal next session, use post it before entering car, work on repetition out loud, etc.  Working on review of medication list / reason taking in future session.  : Provided information on apps/workbooks/games recommended and provided handout.       Patient will complete auditory immediate and short term memory tasks to facilitate increased ability to retell narratives and recall information within functional living environment, to be achieved in 4-6 weeks.    Patient will complete complex auditory attention processing tasks (e.g., sentence unscramble, ranking numbers/words, etc.) to improve working memory with 80% accuracy, to be achieved in 4-6 weeks.  : Working on pairing of words, able to complete 4 words into 2 pairs: 100% , not able to complete 6 words into 3 pairs with max cues. : Worked with sentence formulation and recalling  words: set of 2: 100%, set of 3 words benefitted from writing words down on paper and formulating sentence on paper, then work towards repetition, visualization for final 3 words which was successful, continue to work with sentence formulation 3 words in future sessions.     Patient will complete thought organization tasks (e.g., sequencing, deduction puzzles, etc.) with 80% accuracy to facilitate increased executive functioning, working memory, problem solving, and processing skills, to be achieved in 4-6 weeks.  1/28: Attempted to complete Reggie's closet , logical solution puzzle- will work with patient with this task due to needs glasses and confusion over location/comprehension.  Also, wrote out medication list for patient and patient will work on writing reason taking for each medication. 1/31: Worked on Kitchen Shelves activity: moderate cues to help patient with attention/ focus: using finger or pencil as visual marker, one part of sentence at a time, crossing off completed tasks, provided vegetable garden for home practice. Played BLINK & organized cards into 3 different rows: number, color, shape, required frequent reminders for proper category / but did improve with indep. As game went on. 2/7:  Garden Plot: Completed from homework and corrected with patient - requiring moderate - max cues for assistance with reading comprehension and location comprehension using compass. 2/7: Organizing written sequencing of tasks (4 steps): 100% accuracy, provided more steps for hw. Reading comprehension written directions, multi steps: with education of breaking up steps, able to complete accurately- provided for hw.     Patient will complete concrete and abstract categorization tasks to 80% accuracy to facilitate improved generative naming skills and working memory, to be achieved in 4-6 weeks.    Patient will utilize word finding strategies during semantic feature analysis treatment activity for improved naming and  verbal expression skills, to be achieved in 4-6 weeks.    Long Term Goals    Patient will demonstrate cognitive-communication skills consistent with age and education given use of compensatory strategies when needed to resume baseline activities and responsibilities in home, community, and work/school settings by discharge.     Patient will complete cognitive-linguistic therapy that addresses patient’s specific deficits in processing speed, short-term working memory, attention to detail, monitoring, sequencing, and organization skills, with instruction, to alleviate effects of executive functioning disorder deficits by discharge.    Patient will complete higher-level expressive language tasks (e.g., word definitions, idioms, synonym/antonyms, etc) with 80% accuracy to improve functional communication skills by discharge.   Plan:

## 2025-02-11 ENCOUNTER — OFFICE VISIT (OUTPATIENT)
Dept: SPEECH THERAPY | Facility: CLINIC | Age: 55
End: 2025-02-11
Payer: MEDICARE

## 2025-02-11 DIAGNOSIS — R48.8 OTHER SYMBOLIC DYSFUNCTIONS: ICD-10-CM

## 2025-02-11 DIAGNOSIS — R41.3 MEMORY LOSS: Primary | ICD-10-CM

## 2025-02-11 DIAGNOSIS — R41.841 COGNITIVE COMMUNICATION DEFICIT: ICD-10-CM

## 2025-02-11 PROCEDURE — 92507 TX SP LANG VOICE COMM INDIV: CPT

## 2025-02-11 NOTE — PROGRESS NOTES
Daily Speech Treatment Note    Today's date: 2025   Patient’s name: Benito Park  : 1970  MRN: [id number]  Safety measures: h/o Diabetic Shock  Referring provider: Francesco Edouard CRNP    Encounter Diagnoses   Name Primary?    Memory loss Yes    Other symbolic dysfunctions     Cognitive communication deficit        Visit Tracking:  POC   Expires Auth Expiration Date ST Visit Limit   3/9/2025 BOMN BOMN          Visit/Unit Tracking:  Auth Status Date 2025   No auth req, Used 1 2 3 4 5 6 7    Remaining BOMN      5       Subjective/Behavioral:  -Patient pleasant and cooperative. .       Objective/Assessment:  Completed structured activities with patient to target goals below.  Results as stated below.       Provided instruction and plan with reading comprehension activities (paragraph level)), following written directions and cross word puzzles.      Short-term goals:  Patient will be educated on the use of internal and external memory aids and compensatory strategies to facilitate increased recall of routine, personal information, and recent events, to be achieved in 4-6 weeks.  25:  Reviewed information / handout on memory strategies with patient. Patient will bring in journal next session, use post it before entering car, work on repetition out loud, etc.  Working on review of medication list / reason taking in future session.  : Provided information on apps/workbooks/games recommended and provided handout.       Patient will complete auditory immediate and short term memory tasks to facilitate increased ability to retell narratives and recall information within functional living environment, to be achieved in 4-6 weeks.    Patient will complete complex auditory attention processing tasks (e.g., sentence unscramble, ranking numbers/words, etc.) to improve working memory with 80% accuracy, to be achieved in 4-6 weeks.  : Working on pairing of words,  able to complete 4 words into 2 pairs: 100% , not able to complete 6 words into 3 pairs with max cues. : Worked with sentence formulation and recalling words: set of 2: 100%, set of 3 words benefitted from writing words down on paper and formulating sentence on paper, then work towards repetition, visualization for final 3 words which was successful, continue to work with sentence formulation 3 words in future sessions. : Able to formulate sentence with 3 words, give increased time, review out loud, focused on these strategies. Recall of pairs, color codin/5 pairs: 80% accuracy.      Patient will complete thought organization tasks (e.g., sequencing, deduction puzzles, etc.) with 80% accuracy to facilitate increased executive functioning, working memory, problem solving, and processing skills, to be achieved in 4-6 weeks.  : Attempted to complete Reggie's closet , logical solution puzzle- will work with patient with this task due to needs glasses and confusion over location/comprehension.  Also, wrote out medication list for patient and patient will work on writing reason taking for each medication. : Worked on Kitchen Shelves activity: moderate cues to help patient with attention/ focus: using finger or pencil as visual marker, one part of sentence at a time, crossing off completed tasks, provided vegetable garden for home practice. Played BLINK & organized cards into 3 different rows: number, color, shape, required frequent reminders for proper category / but did improve with indep. As game went on. :  Garden Plot: Completed from homework and corrected with patient - requiring moderate - max cues for assistance with reading comprehension and location comprehension using compass. : Organizing written sequencing of tasks (4 steps): 100% accuracy, provided more steps for hw. Reading comprehension written directions, multi steps: with education of breaking up steps, able to complete accurately-  provided for hw.     Patient will complete concrete and abstract categorization tasks to 80% accuracy to facilitate improved generative naming skills and working memory, to be achieved in 4-6 weeks.    Patient will utilize word finding strategies during semantic feature analysis treatment activity for improved naming and verbal expression skills, to be achieved in 4-6 weeks.    Long Term Goals    Patient will demonstrate cognitive-communication skills consistent with age and education given use of compensatory strategies when needed to resume baseline activities and responsibilities in home, community, and work/school settings by discharge.     Patient will complete cognitive-linguistic therapy that addresses patient’s specific deficits in processing speed, short-term working memory, attention to detail, monitoring, sequencing, and organization skills, with instruction, to alleviate effects of executive functioning disorder deficits by discharge.    Patient will complete higher-level expressive language tasks (e.g., word definitions, idioms, synonym/antonyms, etc) with 80% accuracy to improve functional communication skills by discharge.   Plan:

## 2025-02-13 ENCOUNTER — OFFICE VISIT (OUTPATIENT)
Dept: SPEECH THERAPY | Facility: CLINIC | Age: 55
End: 2025-02-13
Payer: MEDICARE

## 2025-02-13 DIAGNOSIS — R48.8 OTHER SYMBOLIC DYSFUNCTIONS: ICD-10-CM

## 2025-02-13 DIAGNOSIS — R41.841 COGNITIVE COMMUNICATION DEFICIT: ICD-10-CM

## 2025-02-13 DIAGNOSIS — R41.3 MEMORY LOSS: Primary | ICD-10-CM

## 2025-02-13 PROCEDURE — 97130 THER IVNTJ EA ADDL 15 MIN: CPT

## 2025-02-13 PROCEDURE — 97129 THER IVNTJ 1ST 15 MIN: CPT

## 2025-02-13 NOTE — PROGRESS NOTES
Daily Speech Treatment Note    Today's date: 2025   Patient’s name: Benito Park  : 1970  MRN: [id number]  Safety measures: h/o Diabetic Shock  Referring provider: Francesco Edouard CRNP    Encounter Diagnoses   Name Primary?    Memory loss Yes    Other symbolic dysfunctions     Cognitive communication deficit        Visit Tracking:  POC   Expires Auth Expiration Date ST Visit Limit   3/9/2025 BOMN BOMN          Visit/Unit Tracking:  Auth Status Date 2025   No auth req, Used 1 2 3 4 5 6 7 8    Remaining BOMN      5 4       Subjective/Behavioral:  -Patient pleasant and cooperative. .       Objective/Assessment:  Completed structured activities with patient to target goals below.  Results as stated below.       Provided instruction and plan with reading comprehension activities (paragraph level)), following written directions and cross word puzzles.      Short-term goals:  Patient will be educated on the use of internal and external memory aids and compensatory strategies to facilitate increased recall of routine, personal information, and recent events, to be achieved in 4-6 weeks.  25:  Reviewed information / handout on memory strategies with patient. Patient will bring in journal next session, use post it before entering car, work on repetition out loud, etc.  Working on review of medication list / reason taking in future session.  : Provided information on apps/workbooks/games recommended and provided handout.       Patient will complete auditory immediate and short term memory tasks to facilitate increased ability to retell narratives and recall information within functional living environment, to be achieved in 4-6 weeks.    Patient will complete complex auditory attention processing tasks (e.g., sentence unscramble, ranking numbers/words, etc.) to improve working memory with 80% accuracy, to be achieved in 4-6 weeks.  : Working on pairing of  words, able to complete 4 words into 2 pairs: 100% , not able to complete 6 words into 3 pairs with max cues. : Worked with sentence formulation and recalling words: set of 2: 100%, set of 3 words benefitted from writing words down on paper and formulating sentence on paper, then work towards repetition, visualization for final 3 words which was successful, continue to work with sentence formulation 3 words in future sessions. : Able to formulate sentence with 3 words, give increased time, review out loud, focused on these strategies. Recall of pairs, color codin/5 pairs: 80% accuracy.  : recall of pairs of words: attribute: 3/5, then 4/5 with 2nd set.  Reverse order: 3 words: 90%, putting 3 words in order, Unscrambling words 3-4 100%, 5 words, requires rep. At time, increased processing.  Word List Retention: 80% accuracy out of 4 words (attribute). Word List Retention: Exclusion: required repetition 2-3 times for accurate recall with question after 5 words.  At end of session: recall of pairs: 80% accuracy.     Patient will complete thought organization tasks (e.g., sequencing, deduction puzzles, etc.) with 80% accuracy to facilitate increased executive functioning, working memory, problem solving, and processing skills, to be achieved in 4-6 weeks.  : Attempted to complete Reggie's closet , logical solution puzzle- will work with patient with this task due to needs glasses and confusion over location/comprehension.  Also, wrote out medication list for patient and patient will work on writing reason taking for each medication. : Worked on Kitchen Shelves activity: moderate cues to help patient with attention/ focus: using finger or pencil as visual marker, one part of sentence at a time, crossing off completed tasks, provided vegetable garden for home practice. Played BLINK & organized cards into 3 different rows: number, color, shape, required frequent reminders for proper category / but did  improve with indep. As game went on. 2/7:  Garden Plot: Completed from homework and corrected with patient - requiring moderate - max cues for assistance with reading comprehension and location comprehension using compass. 2/7: Organizing written sequencing of tasks (4 steps): 100% accuracy, provided more steps for hw. Reading comprehension written directions, multi steps: with education of breaking up steps, able to complete accurately- provided for hw.     Patient will complete concrete and abstract categorization tasks to 80% accuracy to facilitate improved generative naming skills and working memory, to be achieved in 4-6 weeks.    Patient will utilize word finding strategies during semantic feature analysis treatment activity for improved naming and verbal expression skills, to be achieved in 4-6 weeks.    Long Term Goals    Patient will demonstrate cognitive-communication skills consistent with age and education given use of compensatory strategies when needed to resume baseline activities and responsibilities in home, community, and work/school settings by discharge.     Patient will complete cognitive-linguistic therapy that addresses patient’s specific deficits in processing speed, short-term working memory, attention to detail, monitoring, sequencing, and organization skills, with instruction, to alleviate effects of executive functioning disorder deficits by discharge.    Patient will complete higher-level expressive language tasks (e.g., word definitions, idioms, synonym/antonyms, etc) with 80% accuracy to improve functional communication skills by discharge.   Plan:
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
## 2025-02-14 ENCOUNTER — TELEMEDICINE (OUTPATIENT)
Dept: FAMILY MEDICINE CLINIC | Facility: CLINIC | Age: 55
End: 2025-02-14
Payer: MEDICARE

## 2025-02-14 DIAGNOSIS — R52 PAIN: Primary | ICD-10-CM

## 2025-02-14 DIAGNOSIS — F41.9 ANXIETY: ICD-10-CM

## 2025-02-14 DIAGNOSIS — G60.3 IDIOPATHIC PROGRESSIVE NEUROPATHY: ICD-10-CM

## 2025-02-14 PROCEDURE — G2211 COMPLEX E/M VISIT ADD ON: HCPCS | Performed by: FAMILY MEDICINE

## 2025-02-14 PROCEDURE — 99213 OFFICE O/P EST LOW 20 MIN: CPT | Performed by: FAMILY MEDICINE

## 2025-02-14 RX ORDER — DULOXETIN HYDROCHLORIDE 30 MG/1
30 CAPSULE, DELAYED RELEASE ORAL DAILY
Qty: 30 CAPSULE | Refills: 5 | Status: SHIPPED | OUTPATIENT
Start: 2025-02-14

## 2025-02-14 NOTE — PROGRESS NOTES
Virtual Regular Visit  Name: Benito Park      : 1970      MRN: [id number]  Encounter Provider: Ariel Sosa MD  Encounter Date: 2025   Encounter department: [facility]      Verification of patient location:  Patient is located at Home in the following state in which I hold an active license [place] :  Assessment & Plan  Pain    Orders:    DULoxetine (Cymbalta) 30 mg delayed release capsule; Take 1 capsule (30 mg total) by mouth daily    Anxiety       improved on lexapro  Idiopathic progressive neuropathy         On lyrica---add cymbalta    Encounter provider Ariel Sosa MD    The patient was identified by name and date of birth. Benito Park was informed that this is a telemedicine visit and that the visit is being conducted through the Epic Embedded platform. He agrees to proceed..  My office door was closed. No one else was in the room.  He acknowledged consent and understanding of privacy and security of the video platform. The patient has agreed to participate and understands they can discontinue the visit at any time.    Patient is aware this is a billable service.     History of Present Illness     HPI  Review of Systems   Constitutional:  Negative for appetite change and fatigue.   HENT:  Negative for ear pain, postnasal drip, sinus pressure and sore throat.    Eyes:  Negative for redness and visual disturbance.   Respiratory:  Negative for cough, chest tightness, shortness of breath and wheezing.    Cardiovascular:  Negative for chest pain, palpitations and leg swelling.   Gastrointestinal:  Negative for abdominal pain, blood in stool, constipation, diarrhea, nausea and vomiting.   Genitourinary:  Negative for difficulty urinating, flank pain, hematuria and urgency.   Musculoskeletal:  Negative for arthralgias, back pain, joint swelling and myalgias.   Skin:  Negative for rash and wound.        No suspicious skin lesions   Neurological:  Negative for  light-headedness, numbness and headaches.   Psychiatric/Behavioral:  Negative for confusion, decreased concentration, sleep disturbance and suicidal ideas. The patient is not nervous/anxious.        Objective   There were no vitals taken for this visit.    Physical Exam  Constitutional:       Appearance: He is well-developed.   HENT:      Right Ear: Ear canal normal. Tympanic membrane is not injected.      Left Ear: Ear canal normal. Tympanic membrane is not injected.      Nose: Nose normal.   Eyes:      General:         Right eye: No discharge.         Left eye: No discharge.      Conjunctiva/sclera: Conjunctivae normal.      Pupils: Pupils are equal, round, and reactive to light.   Neck:      Thyroid: No thyromegaly.   Cardiovascular:      Rate and Rhythm: Normal rate and regular rhythm.      Heart sounds: Normal heart sounds. No murmur heard.  Pulmonary:      Effort: Pulmonary effort is normal. No respiratory distress.      Breath sounds: Normal breath sounds. No wheezing.   Abdominal:      General: Bowel sounds are normal. There is no distension.      Palpations: Abdomen is soft.      Tenderness: There is no abdominal tenderness.   Musculoskeletal:         General: Normal range of motion.      Cervical back: Normal range of motion and neck supple.   Lymphadenopathy:      Cervical: No cervical adenopathy.   Skin:     General: Skin is warm and dry.   Neurological:      Mental Status: He is alert and oriented to person, place, and time. He is not disoriented.      Sensory: No sensory deficit.      Motor: No weakness.      Coordination: Coordination normal.      Gait: Gait normal.      Deep Tendon Reflexes: Reflexes are normal and symmetric.   Psychiatric:         Speech: Speech normal.         Behavior: Behavior normal.         Thought Content: Thought content normal.         Judgment: Judgment normal.         Visit Time  Total Visit Duration: 15

## 2025-02-17 ENCOUNTER — APPOINTMENT (OUTPATIENT)
Dept: SPEECH THERAPY | Facility: CLINIC | Age: 55
End: 2025-02-17
Payer: MEDICARE

## 2025-02-19 ENCOUNTER — TELEPHONE (OUTPATIENT)
Age: 55
End: 2025-02-19

## 2025-02-19 ENCOUNTER — APPOINTMENT (OUTPATIENT)
Dept: SPEECH THERAPY | Facility: CLINIC | Age: 55
End: 2025-02-19
Payer: MEDICARE

## 2025-02-19 ENCOUNTER — OFFICE VISIT (OUTPATIENT)
Dept: WOUND CARE | Facility: HOSPITAL | Age: 55
End: 2025-02-19
Payer: MEDICARE

## 2025-02-19 VITALS — HEART RATE: 76 BPM | RESPIRATION RATE: 16 BRPM | DIASTOLIC BLOOD PRESSURE: 82 MMHG | SYSTOLIC BLOOD PRESSURE: 132 MMHG

## 2025-02-19 DIAGNOSIS — E11.621 DIABETIC ULCER OF TOE OF LEFT FOOT ASSOCIATED WITH TYPE 2 DIABETES MELLITUS, WITH FAT LAYER EXPOSED (HCC): Primary | ICD-10-CM

## 2025-02-19 DIAGNOSIS — L97.522 DIABETIC ULCER OF TOE OF LEFT FOOT ASSOCIATED WITH TYPE 2 DIABETES MELLITUS, WITH FAT LAYER EXPOSED (HCC): Primary | ICD-10-CM

## 2025-02-19 PROCEDURE — 97597 DBRDMT OPN WND 1ST 20 CM/<: CPT | Performed by: PODIATRIST

## 2025-02-19 PROCEDURE — 99214 OFFICE O/P EST MOD 30 MIN: CPT | Performed by: PODIATRIST

## 2025-02-19 NOTE — TELEPHONE ENCOUNTER
Caller: Austin    Doctor and/or Office: Dr. Weir    #: [phone] ext [phone]    Escalation: Last office visit Has the standard written order and request for office notes been received yet? She just faxed it yesterday. Please fax back to [phone]. Thank you

## 2025-02-19 NOTE — PROGRESS NOTES
Wound Procedure Treatment Diabetic Ulcer Left Plantar    Performed by: Krupa Purcell RN  Authorized by: Jovanni Rodas DPM  Associated wounds:   Wound 11/06/24 Diabetic Ulcer Plantar Left    Applied topical:  Mupirocin ointment   Offloading device appllied:  Felt padding 1/4 inch

## 2025-02-24 ENCOUNTER — DOCUMENTATION (OUTPATIENT)
Dept: ENDOCRINOLOGY | Facility: HOSPITAL | Age: 55
End: 2025-02-24

## 2025-02-24 DIAGNOSIS — J10.1 INFLUENZA A: Primary | ICD-10-CM

## 2025-02-24 RX ORDER — OSELTAMIVIR PHOSPHATE 75 MG/1
CAPSULE ORAL
Qty: 7 CAPSULE | Refills: 0 | Status: SHIPPED | OUTPATIENT
Start: 2025-02-24 | End: 2025-03-02

## 2025-02-24 NOTE — PROGRESS NOTES
(Francine Gracia) all forms needed form diabetic shoes.  Patient needs to have a foot exam done by Dr Logan & is currently scheduled for a 3 month follow up with her on 3-5-25.

## 2025-02-25 ENCOUNTER — OFFICE VISIT (OUTPATIENT)
Dept: SLEEP CENTER | Facility: CLINIC | Age: 55
End: 2025-02-25
Payer: MEDICARE

## 2025-02-25 ENCOUNTER — OFFICE VISIT (OUTPATIENT)
Dept: SPEECH THERAPY | Facility: CLINIC | Age: 55
End: 2025-02-25
Payer: MEDICARE

## 2025-02-25 VITALS
SYSTOLIC BLOOD PRESSURE: 126 MMHG | WEIGHT: 202 LBS | BODY MASS INDEX: 27.36 KG/M2 | HEART RATE: 87 BPM | OXYGEN SATURATION: 95 % | DIASTOLIC BLOOD PRESSURE: 81 MMHG | HEIGHT: 72 IN

## 2025-02-25 DIAGNOSIS — R41.841 COGNITIVE COMMUNICATION DEFICIT: ICD-10-CM

## 2025-02-25 DIAGNOSIS — R06.83 SNORING: ICD-10-CM

## 2025-02-25 DIAGNOSIS — R48.8 OTHER SYMBOLIC DYSFUNCTIONS: ICD-10-CM

## 2025-02-25 DIAGNOSIS — R41.3 MEMORY LOSS: Primary | ICD-10-CM

## 2025-02-25 DIAGNOSIS — Z72.0 TOBACCO USE: ICD-10-CM

## 2025-02-25 DIAGNOSIS — Z96.82 S/P INSERTION OF HYPOGLOSSAL NERVE STIMULATOR: ICD-10-CM

## 2025-02-25 DIAGNOSIS — G47.33 OSA (OBSTRUCTIVE SLEEP APNEA): Primary | ICD-10-CM

## 2025-02-25 PROCEDURE — 97130 THER IVNTJ EA ADDL 15 MIN: CPT

## 2025-02-25 PROCEDURE — 95977 ALYS CPLX CN NPGT PRGRMG: CPT | Performed by: INTERNAL MEDICINE

## 2025-02-25 PROCEDURE — 99215 OFFICE O/P EST HI 40 MIN: CPT | Performed by: INTERNAL MEDICINE

## 2025-02-25 PROCEDURE — 97129 THER IVNTJ 1ST 15 MIN: CPT

## 2025-02-25 NOTE — ASSESSMENT & PLAN NOTE
As detailed above    I reminded  the patient the pathophysiology of obstructive sleep apnea (OSIRIS), explaining that the condition involves intermittent blockage of the upper airway during sleep, which leads to brief pauses in breathing and drops in oxygen levels. These frequent episodes of hypoxia and disrupted sleep trigger an increase in sympathetic nervous system activity, which raises blood pressure and stresses the cardiovascular system. I emphasized that inadequately treated OSIRIS is strongly associated with a range of serious health consequences. Specifically, chronic untreated OSIRIS significantly raises the risk of developing hypertension, atrial fibrillation (A-fib), and heart failure due to the prolonged strain on the heart and blood vessels. I discussed how untreated OSIRIS is also a major risk factor for stroke, as intermittent hypoxia and elevated blood pressure can lead to the development of atherosclerosis and the formation of blood clots. Furthermore, I explained the growing body of evidence suggesting that untreated OSIRIS is linked to cognitive decline, including an increased risk of early-onset dementia, particularly Alzheimer's disease. This is thought to result from the effects of chronic sleep disruption and hypoxia on brain function.

## 2025-02-25 NOTE — ASSESSMENT & PLAN NOTE
He tolerated activation of the hypoglossal nerve stimulator well today, good tongue protrusion was noted with use of the device in the office.  There was no sign of neuropraxia prior to activation of the device.  They were instructed on use of the remote control and also instructed on how to increase levels on the inspire remote.  We discussed that the setting to be increased each week, not any faster than that.  I will see himback in the office in 2 months with an anticipated repeat diagnostic study to fine tune the Inspire device in 3 months.  I asked them to call me with any concerns or questions as they acclimate to this therapy.  We discussed that in the initial phases, the treatment is not always initially effective, therefore symptoms of obstructive sleep apnea may be present until appropriate settings are reached.    Settings applied  Inspire Settings  Outgoing Settings  Electrode configuration A + - +   Amplitude (V) 0.4  Range (V) 0.2 to 1.2  Pulse width (us) 90  Rate (Hz) 33  Start delay (min) 45  Pause Time (min) 15  Therapy Duration (hr) 8    Will see him in 2 months     Orders:    Diagnostic Sleep Study with Inspire; Future

## 2025-02-25 NOTE — PROGRESS NOTES
Name: Benito Park      : 1970      MRN: [id number]  Encounter Provider: Jovanni Lopez MD  Encounter Date: 2025   Encounter department: [facility] SLEEP MEDICINE [facility]  :  Assessment & Plan  OSIRIS (obstructive sleep apnea)  He tolerated activation of the hypoglossal nerve stimulator well today, good tongue protrusion was noted with use of the device in the office.  There was no sign of neuropraxia prior to activation of the device.  They were instructed on use of the remote control and also instructed on how to increase levels on the inspire remote.  We discussed that the setting to be increased each week, not any faster than that.  I will see himback in the office in 2 months with an anticipated repeat diagnostic study to fine tune the Inspire device in 3 months.  I asked them to call me with any concerns or questions as they acclimate to this therapy.  We discussed that in the initial phases, the treatment is not always initially effective, therefore symptoms of obstructive sleep apnea may be present until appropriate settings are reached.    Settings applied  Inspire Settings  Outgoing Settings  Electrode configuration A + - +   Amplitude (V) 0.4  Range (V) 0.2 to 1.2  Pulse width (us) 90  Rate (Hz) 33  Start delay (min) 45  Pause Time (min) 15  Therapy Duration (hr) 8    Will see him in 2 months     Orders:    Diagnostic Sleep Study with Inspire; Future    S/P insertion of hypoglossal nerve stimulator  As detailed above    I reminded  the patient the pathophysiology of obstructive sleep apnea (OSIRIS), explaining that the condition involves intermittent blockage of the upper airway during sleep, which leads to brief pauses in breathing and drops in oxygen levels. These frequent episodes of hypoxia and disrupted sleep trigger an increase in sympathetic nervous system activity, which raises blood pressure and stresses the cardiovascular system. I emphasized that inadequately treated OSIRIS is strongly  associated with a range of serious health consequences. Specifically, chronic untreated OSIRIS significantly raises the risk of developing hypertension, atrial fibrillation (A-fib), and heart failure due to the prolonged strain on the heart and blood vessels. I discussed how untreated OSIRIS is also a major risk factor for stroke, as intermittent hypoxia and elevated blood pressure can lead to the development of atherosclerosis and the formation of blood clots. Furthermore, I explained the growing body of evidence suggesting that untreated OSIRIS is linked to cognitive decline, including an increased risk of early-onset dementia, particularly Alzheimer's disease. This is thought to result from the effects of chronic sleep disruption and hypoxia on brain function.          Snoring  Untreated OSIRIS       Tobacco use  Counseled for 4 minutes            History of Present Illness      53 y.o. male who is here for an inspire activation appointment     He was last seen in the pulmonary office in September 2024 for history of OSIRIS with loud snoring, excessive daytime sleepiness found to have moderate OSIRIS did not tolerate CPAP after several trials of different masks referred to ENT for further evaluation found to be in an inspire candidate had an implantation by Dr. Arndt 1/22/2025        Ramona scale  Sitting and reading: High chance of dozing  Watching TV: High chance of dozing  Sitting, inactive in a public place (e.g. a theatre or a meeting): Would never doze  As a passenger in a car for an hour without a break: High chance of dozing  Lying down to rest in the afternoon when circumstances permit: High chance of dozing  Sitting and talking to someone: Would never doze  Sitting quietly after a lunch without alcohol: Slight chance of dozing  In a car, while stopped for a few minutes in traffic: Would never doze  Total score: 13       Pertinent positives/negatives included in HPI and also as noted:     Medical History Reviewed by  provider this encounter:  Tobacco  Allergies  Meds  Problems  Med Hx  Surg Hx  Fam Hx     .  Past Medical History   Past Medical History:   Diagnosis Date    Anxiety     Back pain     Callus Few months ago    Chronic pain disorder     back and legs    Colon polyp     COPD (chronic obstructive pulmonary disease) (HCC)     pt denies    Crohn's disease (HCC)     Dental crown present     Depression     Diabetes mellitus (HCC)     type 2    GERD (gastroesophageal reflux disease)     Hyperglycemia     diabetic shock--follows with  Endocrinology    Hyperlipidemia     per pt diet controlled    Hypertension     Neuropathy     Perianal fistula     Sleep apnea     no current CPAP use    Spinal cord stimulator status     battery  -near right side back/hip    Terminal ileitis (HCC)      Past Surgical History:   Procedure Laterality Date    BACK SURGERY  2019    CAST APPLICATION Right 11/17/2022    Procedure: Application short-arm splint;  Surgeon: Sarthak Villatoro MD;  Location: [facility];  Service: Orthopedics    COLONOSCOPY      EGD      HAND CONTRACTURE RELEASE Left 06/01/2023    Procedure: left ring and small finger dupuytrens excision and ring finger Metacarpophalangeal joint release and small finger Metacarpophalangeal and proximal interphalangeal joint release;  Surgeon: Sarthak Villatoro MD;  Location: [facility];  Service: Orthopedics    HERNIA REPAIR      umbilical hernia    AZ ANRCT XM SURG REQ ANES GENERAL SPI/EDRL DX N/A 11/17/2021    Procedure: EXAM UNDER ANESTHESIA (EUA);  Surgeon: Davi Thao MD;  Location: [facility];  Service: Colorectal    AZ APPLICATION SHORT ARM SPLINT FOREARM-HAND STATIC Right 1/11/2024    Procedure: Application short arm splint;  Surgeon: Sarthak Villatoro MD;  Location: [facility];  Service: Orthopedics    AZ CAPSULECTOMY/CAPSULOTOMY IPHAL JOINT EACH Right 1/11/2024    Procedure: Contracture release right hand small finger proximal interphalangeal joint;  Surgeon:  Sarthak Villatoro MD;  Location: [facility];  Service: Orthopedics    OK DISE DYN EVAL SLEEP DISORDERED BREATHING FLX DX N/A 11/13/2024    Procedure: DRUG INDUCED SLEEP ENDOSCOPY;  Surgeon: Jeff Arndt MD;  Location: [facility];  Service: ENT    OK FASCIOTOMY PALMAR OPEN PARTIAL Right 11/17/2022    Procedure: Dupuytren's excision right palm extending into the small finger with release of the metacarpophalangeal joint and proximal interphalangeal joint.  Dupuytren's excision right palm with release of the right ring finger metacarpophalangeal joint.;  Surgeon: Sarthak Villatoro MD;  Location: [facility];  Service: Orthopedics    OK FASCIOTOMY PALMAR OPEN PARTIAL Right 1/11/2024    Procedure: Right hand small finger duppuytrens excision with release of the metacarpophalangeal joint and proximal interphalangeal joint;  Surgeon: Sarthak Villatoro MD;  Location: [facility];  Service: Orthopedics    OK FASCT PALM W/WO Z-PLASTY TISSUE REARGMT/SKN GRFT Right 1/11/2024    Procedure: Right hand small finger duppuytrens excision with release of the metacarpophalangeal joint and proximal interphalangeal joint;  Surgeon: Sarthak Villatoro MD;  Location: [facility];  Service: Orthopedics    OK FASCT PRTL PALMAR 1 DGT PROX IPHAL JT W/WO RPR Right 1/11/2024    Procedure: Right hand small finger duppuytrens excision with release of the metacarpophalangeal joint and proximal interphalangeal joint;  Surgeon: Sarthak Villatoro MD;  Location: [facility];  Service: Orthopedics    OK I&D ISCHIORECTAL&/PERIRECTAL ABSCESS SPX Right 10/24/2021    Procedure: excisional debredement right gluteal cheek ;  Surgeon: Jeana Bustamante MD;  Location: [facility];  Service: General    OK PLACEMENT SETON N/A 11/17/2021    Procedure: PLACEMENT SETON;  Surgeon: Davi Thao MD;  [facility];  Service: Colorectal    OK PRQ IMPLTJ NSTIM ELECTRODE ARRAY EPIDURAL Right 03/26/2021    Procedure: INSERTION THORACIC DORSAL COLUMN SPINAL  CORD STIMULATOR PERCUTANEOUS W IMPLANTABLE PULSE GENERATOR, RIGHT;  Surgeon: Akshat Witt MD;  Location: [facility];  Service: Neurosurgery    CO REMOVAL HYPOGLOSSAL NERVE NSTIM RA PG&RESPIR SNR Right 1/22/2025    Procedure: INSERTION UPPER AIRWAY STIMULATOR RIGHT INSPIRE;  Surgeon: Jeff Arndt MD;  Location: [facility];  Service: ENT     Family History   Problem Relation Age of Onset    COPD Mother     Lung cancer Mother     Heart disease Father     Heart attack Father     Hypertension Sister     Diabetes type II Sister     No Known Problems Sister     No Known Problems Sister     No Known Problems Sister     No Known Problems Brother     Heart attack Brother     No Known Problems Son     No Known Problems Son     Colon cancer Neg Hx     Colon polyps Neg Hx     Inflammatory bowel disease Neg Hx       reports that he quit smoking about 4 years ago. His smoking use included cigarettes. He started smoking about 6 years ago. He has a 15 pack-year smoking history. He has never been exposed to tobacco smoke. He has never used smokeless tobacco. He reports current alcohol use of about 1.0 standard drink of alcohol per week. He reports current drug use. Frequency: 7.00 times per week. Drug: Marijuana.  Current Outpatient Medications   Medication Instructions    acetaminophen (TYLENOL) 500 mg, Every 6 hours PRN    albuterol (PROVENTIL HFA,VENTOLIN HFA) 90 mcg/act inhaler 2 puffs, Inhalation, Every 6 hours PRN    Alcohol Swabs 70 % PADS May substitute brand based on insurance coverage. Check glucose TID.    atorvastatin (LIPITOR) 40 mg, Oral, Every evening    Blood Glucose Monitoring Suppl (OneTouch Verio Reflect) w/Device KIT May substitute brand based on insurance coverage. Check glucose TID.    Cholecalciferol (RA VITAMIN D-3) 2,000 Units, Oral, Daily    cyclobenzaprine (FLEXERIL) 10 mg tablet TAKE 1 TABLET BY MOUTH EVERYDAY AT BEDTIME    DULoxetine (CYMBALTA) 30 mg, Oral, Daily    Entyvio 300 mg, Intravenous, Every 4  weeks    escitalopram (LEXAPRO) 10 mg, Oral, Daily    glucose blood (OneTouch Verio) test strip May substitute brand based on insurance coverage. Check glucose TID.    lisinopril (ZESTRIL) 20 mg, Oral, Daily    loperamide (IMODIUM) 2 mg, Oral, Daily at bedtime    LORazepam (ATIVAN) 1 mg, Oral, Every 8 hours PRN    metFORMIN (GLUCOPHAGE-XR) 500 mg 24 hr tablet 1 in am and 2 in pm    naproxen (NAPROSYN) 250 mg, As needed    omeprazole (PRILOSEC) 40 mg, Oral, Daily    ondansetron (ZOFRAN) 4 mg, Oral, Every 8 hours PRN    OneTouch Delica Lancets 33G MISC May substitute brand based on insurance coverage. Check glucose TID.    oseltamivir (TAMIFLU) 75 mg capsule Take 1 capsule (75 mg total) by mouth daily for 7 days for influenza prophylaxis    pregabalin (LYRICA) 150 mg, Oral, 3 times daily    traZODone (DESYREL) 50 mg tablet TAKE 1 TO 2 TABLETS AT BEDTIME AS NEEDED FOR SLEEP   No Known Allergies   Current Outpatient Medications on File Prior to Visit   Medication Sig Dispense Refill    acetaminophen (TYLENOL) 500 mg tablet Take 500 mg by mouth every 6 (six) hours as needed for mild pain      albuterol (PROVENTIL HFA,VENTOLIN HFA) 90 mcg/act inhaler Inhale 2 puffs every 6 (six) hours as needed for wheezing 6.7 g 2    Alcohol Swabs 70 % PADS May substitute brand based on insurance coverage. Check glucose TID. 100 each 0    atorvastatin (LIPITOR) 40 mg tablet Take 1 tablet (40 mg total) by mouth every evening 90 tablet 2    Blood Glucose Monitoring Suppl (OneTouch Verio Reflect) w/Device KIT May substitute brand based on insurance coverage. Check glucose TID. 1 kit 0    Cholecalciferol (RA Vitamin D-3) 1,000 units tablet Take 2 tablets (2,000 Units total) by mouth daily 180 tablet 2    cyclobenzaprine (FLEXERIL) 10 mg tablet TAKE 1 TABLET BY MOUTH EVERYDAY AT BEDTIME 90 tablet 2    DULoxetine (Cymbalta) 30 mg delayed release capsule Take 1 capsule (30 mg total) by mouth daily 30 capsule 5    escitalopram (LEXAPRO) 10 mg  tablet Take 1 tablet (10 mg total) by mouth daily 90 tablet 3    glucose blood (OneTouch Verio) test strip May substitute brand based on insurance coverage. Check glucose TID. 100 each 5    lisinopril (ZESTRIL) 20 mg tablet Take 1 tablet (20 mg total) by mouth daily 90 tablet 2    loperamide (IMODIUM) 2 mg capsule Take 1 capsule (2 mg total) by mouth daily at bedtime 30 capsule 2    LORazepam (ATIVAN) 1 mg tablet Take 1 tablet (1 mg total) by mouth every 8 (eight) hours as needed for anxiety 60 tablet 3    metFORMIN (GLUCOPHAGE-XR) 500 mg 24 hr tablet 1 in am and 2 in pm 360 tablet 2    naproxen (NAPROSYN) 250 mg tablet Take 250 mg by mouth as needed for mild pain      omeprazole (PriLOSEC) 40 MG capsule Take 1 capsule (40 mg total) by mouth daily 90 capsule 2    ondansetron (ZOFRAN) 4 mg tablet Take 1 tablet (4 mg total) by mouth every 8 (eight) hours as needed for nausea or vomiting 20 tablet 1    OneTouch Delica Lancets 33G MISC May substitute brand based on insurance coverage. Check glucose TID. 100 each 5    oseltamivir (TAMIFLU) 75 mg capsule Take 1 capsule (75 mg total) by mouth daily for 7 days for influenza prophylaxis 7 capsule 0    pregabalin (LYRICA) 150 mg capsule Take 1 capsule (150 mg total) by mouth 3 (three) times a day 90 capsule 2    traZODone (DESYREL) 50 mg tablet TAKE 1 TO 2 TABLETS AT BEDTIME AS NEEDED FOR SLEEP 180 tablet 1    vedolizumab (Entyvio) SOLR Inject 300 mg into a catheter in a vein every 4 weeks       No current facility-administered medications on file prior to visit.      Social History     Tobacco Use    Smoking status: Former     Current packs/day: 0.00     Average packs/day: 0.5 packs/day for 30.0 years (15.0 ttl pk-yrs)     Types: Cigarettes     Start date: 2019     Quit date: 2021     Years since quittin.0     Passive exposure: Never    Smokeless tobacco: Never   Vaping Use    Vaping status: Every Day    Substances: THC   Substance and Sexual Activity    Alcohol  use: Yes     Alcohol/week: 1.0 standard drink of alcohol     Types: 1 Cans of beer per week     Comment: social    Drug use: Yes     Frequency: 7.0 times per week     Types: Marijuana     Comment: Medical marijuana-vapes, bedtime    Sexual activity: Not on file     Comment: defer     Objective   /81 (BP Location: Left arm, Patient Position: Sitting, Cuff Size: Adult)   Pulse 87   Ht 6' (1.829 m)   Wt 91.6 kg (202 lb)   SpO2 95%   BMI 27.40 kg/m²        Physical Exam  Constitutional:       Appearance: Normal appearance.   HENT:      Head: Normocephalic and atraumatic.      Nose: No congestion or rhinorrhea.      Mouth/Throat:      Mouth: Mucous membranes are moist.      Pharynx: Oropharynx is clear. No oropharyngeal exudate.   Eyes:      General: No scleral icterus.        Right eye: No discharge.         Left eye: No discharge.      Conjunctiva/sclera: Conjunctivae normal.   Neck:      Comments: Adequately healed and inspire cuff scar  Cardiovascular:      Rate and Rhythm: Normal rate and regular rhythm.      Pulses: Normal pulses.      Heart sounds: Normal heart sounds.      No gallop.   Pulmonary:      Effort: Pulmonary effort is normal.      Breath sounds: Normal breath sounds. No stridor. No wheezing or rhonchi.   Musculoskeletal:         General: No swelling or tenderness.      Cervical back: Normal range of motion and neck supple.   Skin:     General: Skin is warm.      Coloration: Skin is not pale.      Findings: No erythema.   Neurological:      General: No focal deficit present.      Mental Status: He is alert and oriented to person, place, and time. Mental status is at baseline.   Psychiatric:         Mood and Affect: Mood normal.         Behavior: Behavior normal.         Data  Lab Results   Component Value Date    HGB 16.3 01/09/2025    HCT 48.1 01/09/2025     (H) 01/09/2025      Lab Results   Component Value Date    GLUCOSE 110 02/20/2015    CALCIUM 9.2 01/09/2025     10/03/2016  "   K 4.2 01/09/2025    CO2 29 01/09/2025     01/09/2025    BUN 13 01/09/2025    CREATININE 0.88 01/09/2025     No results found for: \"IRON\", \"TIBC\", \"FERRITIN\"  Lab Results   Component Value Date    AST 24 01/09/2025    ALT 31 01/09/2025       Administrative Statements   I have spent a total time of 50 minutes in caring for this patient on the day of the visit/encounter including Patient and family education, Importance of tx compliance, Impressions, Counseling / Coordination of care, Documenting in the medical record, and Reviewing/placing orders in the medical record (including tests, medications, and/or procedures).  "

## 2025-02-25 NOTE — PROGRESS NOTES
Marietta from [facility] Called back for a status on their request. I did inform her that the patient has an upcoming appointment on 03/5 which Dr Logan at the time of the appointment can perform a diabetic foot exam and send us those results along with all the other request.      Catarina mentioned she needs:   Diabetic foot exam form  Rosmery Emely's DPN note to be signed by Dr Logan stating that she agrees with her  Most recent office notes from the upcoming 3/5 appointment with Dr. Logan              Call back: [phone]   Fax: [phone]

## 2025-02-25 NOTE — PROGRESS NOTES
Daily Speech Treatment Note    Today's date: 2025   Patient’s name: Benito Park  : 1970  MRN: [id number]  Safety measures: h/o Diabetic Shock  Referring provider: Francesco Edouard CRNP    Encounter Diagnoses   Name Primary?    Memory loss Yes    Other symbolic dysfunctions     Cognitive communication deficit        Visit Tracking:  POC   Expires Auth Expiration Date ST Visit Limit   3/9/2025 BOMN BOMN          Visit/Unit Tracking:  Auth Status Date 2025 2   No auth req, Used 1 2 3 4 5 6 7 8 9    Remaining BOMN      5 4 3       Subjective/Behavioral:  -Patient pleasant and cooperative. .       Objective/Assessment:  Completed structured activities with patient to target goals below.  Results as stated below.       Provided instruction and plan with reading comprehension activities (paragraph level)), following written directions and cross word puzzles.      Short-term goals:  Patient will be educated on the use of internal and external memory aids and compensatory strategies to facilitate increased recall of routine, personal information, and recent events, to be achieved in 4-6 weeks.  25:  Reviewed information / handout on memory strategies with patient. Patient will bring in journal next session, use post it before entering car, work on repetition out loud, etc.  Working on review of medication list / reason taking in future session.  : Provided information on apps/workbooks/games recommended and provided handout.       Patient will complete auditory immediate and short term memory tasks to facilitate increased ability to retell narratives and recall information within functional living environment, to be achieved in 4-6 weeks.  : Following auditory directives: 1 step 2 components: 80% & 100% accuracy, 2 step - 4 components: 70-90%, 3 step, 6 components: 75% accuracy, ongoing education regarding step / step, repetition out loud, review.  Drawing Figures: Written: 75% - 100% accuracy.     Patient will complete complex auditory attention processing tasks (e.g., sentence unscramble, ranking numbers/words, etc.) to improve working memory with 80% accuracy, to be achieved in 4-6 weeks.  : Working on pairing of words, able to complete 4 words into 2 pairs: 100% , not able to complete 6 words into 3 pairs with max cues. : Worked with sentence formulation and recalling words: set of 2: 100%, set of 3 words benefitted from writing words down on paper and formulating sentence on paper, then work towards repetition, visualization for final 3 words which was successful, continue to work with sentence formulation 3 words in future sessions. : Able to formulate sentence with 3 words, give increased time, review out loud, focused on these strategies. Recall of pairs, color codin/5 pairs: 80% accuracy.  : recall of pairs of words: attribute: 3/5, then 4/5 with 2nd set.  Reverse order: 3 words: 90%, putting 3 words in order, Unscrambling words 3-4 100%, 5 words, requires rep. At time, increased processing.  Word List Retention: 80% accuracy out of 4 words (attribute). Word List Retention: Exclusion: required repetition 2-3 times for accurate recall with question after 5 words.  At end of session: recall of pairs: 80% accuracy.     Patient will complete thought organization tasks (e.g., sequencing, deduction puzzles, etc.) with 80% accuracy to facilitate increased executive functioning, working memory, problem solving, and processing skills, to be achieved in 4-6 weeks.  : Attempted to complete Reggie's closet , logical solution puzzle- will work with patient with this task due to needs glasses and confusion over location/comprehension.  Also, wrote out medication list for patient and patient will work on writing reason taking for each medication. : Worked on Kitchen Shelves activity: moderate cues to help patient with attention/ focus: using  finger or pencil as visual marker, one part of sentence at a time, crossing off completed tasks, provided vegetable garden for home practice. Played BLINK & organized cards into 3 different rows: number, color, shape, required frequent reminders for proper category / but did improve with indep. As game went on. 2/7:  Garden Plot: Completed from homework and corrected with patient - requiring moderate - max cues for assistance with reading comprehension and location comprehension using compass. 2/7: Organizing written sequencing of tasks (4 steps): 100% accuracy, provided more steps for hw. Reading comprehension written directions, multi steps: with education of breaking up steps, able to complete accurately- provided for hw.     Patient will complete concrete and abstract categorization tasks to 80% accuracy to facilitate improved generative naming skills and working memory, to be achieved in 4-6 weeks.    Patient will utilize word finding strategies during semantic feature analysis treatment activity for improved naming and verbal expression skills, to be achieved in 4-6 weeks.    Long Term Goals    Patient will demonstrate cognitive-communication skills consistent with age and education given use of compensatory strategies when needed to resume baseline activities and responsibilities in home, community, and work/school settings by discharge.     Patient will complete cognitive-linguistic therapy that addresses patient’s specific deficits in processing speed, short-term working memory, attention to detail, monitoring, sequencing, and organization skills, with instruction, to alleviate effects of executive functioning disorder deficits by discharge.    Patient will complete higher-level expressive language tasks (e.g., word definitions, idioms, synonym/antonyms, etc) with 80% accuracy to improve functional communication skills by discharge.   Plan:

## 2025-02-28 ENCOUNTER — OFFICE VISIT (OUTPATIENT)
Dept: SPEECH THERAPY | Facility: CLINIC | Age: 55
End: 2025-02-28
Payer: MEDICARE

## 2025-02-28 DIAGNOSIS — R48.8 OTHER SYMBOLIC DYSFUNCTIONS: ICD-10-CM

## 2025-02-28 DIAGNOSIS — R41.3 MEMORY LOSS: Primary | ICD-10-CM

## 2025-02-28 DIAGNOSIS — R41.841 COGNITIVE COMMUNICATION DEFICIT: ICD-10-CM

## 2025-02-28 PROCEDURE — 97129 THER IVNTJ 1ST 15 MIN: CPT

## 2025-02-28 PROCEDURE — 97130 THER IVNTJ EA ADDL 15 MIN: CPT

## 2025-02-28 NOTE — PROGRESS NOTES
Daily Speech Treatment Note- Reevalution    Today's date: 2025   Patient’s name: Benito Park  : 1970  MRN: [id number]  Safety measures: h/o Diabetic Shock  Referring provider: Francesco Edouard CRNP    Encounter Diagnoses   Name Primary?    Memory loss Yes    Other symbolic dysfunctions     Cognitive communication deficit        Visit Tracking:  POC   Expires Auth Expiration Date ST Visit Limit   2025 BOMN BOMN          Visit/Unit Tracking:  Auth Status Date 2025   No auth req, Used 1 2 3 4 5 6 7 8 9 10    Remaining BOMN      5 4 3 9       Subjective/Behavioral:  -Patient pleasant and cooperative. .       Objective/Assessment:  Completed structured activities with patient to target goals below.  Results as stated below.     Cognitive Checklist:  *Patient indicated that he is experiencing difficulties in the following areas:    Memory: Remembering what people have told you, Remembering peoples' names, Learning new things, Remembering the date/day of the week, and Keeping track of your appointments    Attention: Keeping your attention/concentrating on a task, Dividing your attention (i.e., multi-tasking), and Losing your train of thought    Processing: Processing new information  and Responding to questions in a timely manner     Executive Functions: Organizing your thoughts, Self-correcting or recognizing mistakes, and Initiating/starting tasks    Communication: Word finding in conversation and Expressing thoughts and ideas fluently    Visual: Losing spot on the page when reading, Poor screen tolerance with electronics, and Eye strain/fatigue when reading    Emotional: Personality changes, Awareness or control of your emotions, Increased anxiety, Easily agitated or irritable, and Sleep changes - Patient notes he will talk to wife for referral for psycho therapist.      Increased Sensitivities to: Crowds or busy environments          Plan:  Patient would benefit from outpatient skilled Speech Therapy services: Cognitive-linguistic therapy     Frequency: 1-2x weekly  Duration: 6-8 weeks     Intervention certification from: 2/28/2025  Intervention certification to: 4/25/2025           Short-term goals:  Patient will be educated on the use of internal and external memory aids and compensatory strategies to facilitate increased recall of routine, personal information, and recent events, to be achieved in 4-6 weeks.  1/21/25:  Reviewed information / handout on memory strategies with patient. Patient will bring in journal next session, use post it before entering car, work on repetition out loud, etc.  Working on review of medication list / reason taking in future session.  1/28: Provided information on apps/workbooks/games recommended and provided handout.  2/28:  Provided info on digital voice recorder since patient having difficulty with writing (fine motor / hand issues). CONTINUE    Patient will complete auditory immediate and short term memory tasks to facilitate increased ability to retell narratives and recall information within functional living environment, to be achieved in 4-6 weeks.  2/25: Following auditory directives: 1 step 2 components: 80% & 100% accuracy, 2 step - 4 components: 70-90%, 3 step, 6 components: 75% accuracy, ongoing education regarding step / step, repetition out loud, review. Drawing Figures: Written: 75% - 100% accuracy. CONTINUE    Patient will complete complex auditory attention processing tasks (e.g., sentence unscramble, ranking numbers/words, etc.) to improve working memory with 80% accuracy, to be achieved in 4-6 weeks.  1/21: Working on pairing of words, able to complete 4 words into 2 pairs: 100% , not able to complete 6 words into 3 pairs with max cues. 1/31: Worked with sentence formulation and recalling words: set of 2: 100%, set of 3 words benefitted from writing words down on paper and formulating  sentence on paper, then work towards repetition, visualization for final 3 words which was successful, continue to work with sentence formulation 3 words in future sessions. : Able to formulate sentence with 3 words, give increased time, review out loud, focused on these strategies. Recall of pairs, color codin/5 pairs: 80% accuracy.  : recall of pairs of words: attribute: 3/5, then 4/5 with 2nd set.  Reverse order: 3 words: 90%, putting 3 words in order, Unscrambling words 3-4 100%, 5 words, requires rep. At time, increased processing.  Word List Retention: 80% accuracy out of 4 words (attribute). Word List Retention: Exclusion: required repetition 2-3 times for accurate recall with question after 5 words.  At end of session: recall of pairs: 80% accuracy. CONTINUE    Patient will complete thought organization tasks (e.g., sequencing, deduction puzzles, etc.) with 80% accuracy to facilitate increased executive functioning, working memory, problem solving, and processing skills, to be achieved in 4-6 weeks.  : Attempted to complete Reggie's closet , logical solution puzzle- will work with patient with this task due to needs glasses and confusion over location/comprehension.  Also, wrote out medication list for patient and patient will work on writing reason taking for each medication. : Worked on Kitchen Shelves activity: moderate cues to help patient with attention/ focus: using finger or pencil as visual marker, one part of sentence at a time, crossing off completed tasks, provided vegetable garden for home practice. Played BLINK & organized cards into 3 different rows: number, color, shape, required frequent reminders for proper category / but did improve with indep. As game went on. :  Garden Plot: Completed from homework and corrected with patient - requiring moderate - max cues for assistance with reading comprehension and location comprehension using compass. : Organizing written  sequencing of tasks (4 steps): 100% accuracy, provided more steps for hw. Reading comprehension written directions, multi steps: with education of breaking up steps, able to complete accurately- provided for hw.  2/28: Completed hw tasks, 1 step 2 components, 2 step 4 components, 3 step 6 components, completed at least 80% accuracy but would be beneficial to continue this goal. CONTINUE    Patient will complete concrete and abstract categorization tasks to 80% accuracy to facilitate improved generative naming skills and working memory, to be achieved in 4-6 weeks. CONTINUE    Patient will utilize word finding strategies during semantic feature analysis treatment activity for improved naming and verbal expression skills, to be achieved in 4-6 weeks. CONTINUE    Long Term Goals    Patient will demonstrate cognitive-communication skills consistent with age and education given use of compensatory strategies when needed to resume baseline activities and responsibilities in home, community, and work/school settings by discharge. CONTINUE    Patient will complete cognitive-linguistic therapy that addresses patient’s specific deficits in processing speed, short-term working memory, attention to detail, monitoring, sequencing, and organization skills, with instruction, to alleviate effects of executive functioning disorder deficits by discharge. CONTINUE    Patient will complete higher-level expressive language tasks (e.g., word definitions, idioms, synonym/antonyms, etc) with 80% accuracy to improve functional communication skills by discharge.  CONTINUE  Plan:

## 2025-03-02 NOTE — PROGRESS NOTES
Patient ID: Benito Park is a 54 y.o. male Date of Birth 1970       Chief Complaint   Patient presents with   • Follow Up Wound Care Visit     Left great toe.       Allergies:  Patient has no known allergies.    Diagnosis:  1. Diabetic ulcer of toe of left foot associated with type 2 diabetes mellitus, with fat layer exposed (HCC)  -     Wound cleansing and dressings Diabetic Ulcer Left Plantar; Future  -     Wound Procedure Treatment Diabetic Ulcer Left Plantar  -     Debridement Diabetic Ulcer Left Plantar     Diagnosis ICD-10-CM Associated Orders   1. Diabetic ulcer of toe of left foot associated with type 2 diabetes mellitus, with fat layer exposed (HCC)  E11.621 Wound cleansing and dressings Diabetic Ulcer Left Plantar    L97.522 Wound Procedure Treatment Diabetic Ulcer Left Plantar     Debridement Diabetic Ulcer Left Plantar           Assessment & Plan:  LGT noted to have good progress and is nearly closed  Selective debridement with 10 blade as noted below.  Discontinue Dermagran gauze dressing QOD.  Mupirocin ointment, gauze, and felt accommodative cut out to be changed daily.  Felt offloading with cam boot when ambulating.    Patient instructed only to remove cam boot when bathing or sleeping  Follow-up in 3 weeks.    Subjective:   2/19/2025: 54-year-old male type II diabetic seen for follow-up chronic ulcer tip of left great toe.  Reports good progress since last visit and feels the wound is nearly closed.    1/15/2025:  54 year-old male presents for follow-up chronic ulcer tip of left great toe.  Has been wearing cam boot as previously instructed.  Denies any new pain/discomfort.    12/30/2024: 54-year-old male presents today for follow-up chronic ulceration located at the tip of his left great toe.  Football dressing intact.  Patient reports has been comfortable but very inconvenient to keep dry.    12/11/2024: 54-year-old male seen today for follow-up chronic left great toe ulceration.   Reports football dressing has helped and he is making progress but would like to discontinue the football dressing.    12/4/2024: 54-year-old type II diabetic seen today for follow-up DFU left great toe, right great toe was closed as of last visit.  Reports he is not a fan of the football dressing but is anxious to not needed.  Reports right great toe is doing fine.    11/27/2024: 54-year-old type II diabetic seen today for follow-up bilateral great toe ulcerations who was last seen by Dr. Weir 1 week ago who put him in bilateral football dressings.  Patient reports right foot opened up while in [place] and left foot opened up upon return from [place] at home.  Reports good progress and feels the right foot has closed.  Denies any new pain/discomfort.  Reports he has been diabetic for approximately 1 year.    11/20/2024: (Dr. Weir) presents today for evaluation and care of bilateral great toe diabetic ulcerations, he is tolerating football dressing well although he did have to remove it on Monday for his lower extremity arterial Dopplers.        The following portions of the patient's history were reviewed and updated as appropriate:   Patient Active Problem List   Diagnosis   • Sprain of anterior talofibular ligament of right ankle   • Perianal abscess   • Polyneuropathy   • Chronic bilateral low back pain with bilateral sciatica   • Bilateral lower extremity edema   • Chronic pain syndrome   • Failed back surgical syndrome   • Lumbar spondylosis   • 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 (HCC)   • Congenital fusion of sacroiliac joint   • Terminal ileitis (HCC)   • Viral enteritis   • Chronic foot pain   • Lumbar radiculopathy   • GERD (gastroesophageal reflux disease)   • History of colon polyps   • Perianal fistula   • Functional diarrhea   • Blepharitis, left eye   • Closed fracture of radial styloid   • Compression of right ulnar nerve at multiple levels   • Degenerative disc disease at L5-S1 level   •  History of lumbar fusion   • Numbness and tingling of both lower extremities   • Periorbital cellulitis of left eye   • Right leg swelling   • Tobacco use   • Vitamin D deficiency   • Arthritis of right shoulder region   • Chronic right shoulder pain   • Myofascial pain syndrome   • S/P insertion of spinal cord stimulator   • Crohn's disease (HCC)   • Numbness and tingling in right hand   • Neck pain   • Cervical radiculopathy   • Herniated nucleus pulposus, C3-4   • Hypertension   • Smoker   • Heavy alcohol consumption   • Sleep apnea   • Snoring   • Excessive daytime sleepiness   • Overweight (BMI 25.0-29.9)   • Dupuytren contracture   • Capillary disorder   • Epididymitis, right   • Obstructive sleep apnea syndrome   • Pes anserinus bursitis of left knee   • Arthralgia   • Ulcer of left foot, limited to breakdown of skin ([facility])   • Dysarthria   • acute Confusional state   • Hyperlipidemia   • Type 2 diabetes mellitus with hyperglycemia, without long-term current use of insulin ([facility])   • Diabetic polyneuropathy associated with type 2 diabetes mellitus ([facility])   • Lung nodule   • Abnormal movements   • Episode of change in speech   • Hoarseness   • Diabetic ulcer of left foot associated with type 2 diabetes mellitus, limited to breakdown of skin, unspecified part of foot ([facility])   • Carpal tunnel syndrome of right wrist   • Transient neurological symptoms   • Mucopurulent chronic bronchitis ([facility])   • Diabetic ulcer of toe of left foot associated with type 2 diabetes mellitus, with fat layer exposed ([facility])   • Agitation   • OSIRIS (obstructive sleep apnea)   • Anxiety   • Idiopathic progressive neuropathy   • S/P insertion of hypoglossal nerve stimulator     Past Medical History:   Diagnosis Date   • Anxiety    • Back pain    • Callus Few months ago   • Chronic pain disorder     back and legs   • Colon polyp    • COPD (chronic obstructive pulmonary disease) ([facility])     pt denies   • Crohn's disease ([facility])    • Dental crown  present    • Depression    • Diabetes mellitus (HCC)     type 2   • GERD (gastroesophageal reflux disease)    • Hyperglycemia     diabetic shock--follows with  Endocrinology   • Hyperlipidemia     per pt diet controlled   • Hypertension    • Neuropathy    • Perianal fistula    • Sleep apnea     no current CPAP use   • Spinal cord stimulator status     battery  -near right side back/hip   • Terminal ileitis (HCC)      Past Surgical History:   Procedure Laterality Date   • BACK SURGERY  2019   • CAST APPLICATION Right 11/17/2022    Procedure: Application short-arm splint;  Surgeon: Sarthak Villatoro MD;  Location: [facility];  Service: Orthopedics   • COLONOSCOPY     • EGD     • HAND CONTRACTURE RELEASE Left 06/01/2023    Procedure: left ring and small finger dupuytrens excision and ring finger Metacarpophalangeal joint release and small finger Metacarpophalangeal and proximal interphalangeal joint release;  Surgeon: Sarthak Villatoro MD;  Location: [facility];  Service: Orthopedics   • HERNIA REPAIR      umbilical hernia   • FL ANRCT XM SURG REQ ANES GENERAL SPI/EDRL DX N/A 11/17/2021    Procedure: EXAM UNDER ANESTHESIA (EUA);  Surgeon: Davi Thao MD;  Location: [facility];  Service: Colorectal   • FL APPLICATION SHORT ARM SPLINT FOREARM-HAND STATIC Right 1/11/2024    Procedure: Application short arm splint;  Surgeon: Sarthak Villatoro MD;  Location: [facility];  Service: Orthopedics   • FL CAPSULECTOMY/CAPSULOTOMY IPHAL JOINT EACH Right 1/11/2024    Procedure: Contracture release right hand small finger proximal interphalangeal joint;  Surgeon: Sarthak Villatoro MD;  Location: [facility];  Service: Orthopedics   • FL DISE DYN EVAL SLEEP DISORDERED BREATHING FLX DX N/A 11/13/2024    Procedure: DRUG INDUCED SLEEP ENDOSCOPY;  Surgeon: Jeff Arndt MD;  Location: [facility];  Service: ENT   • FL FASCIOTOMY PALMAR OPEN PARTIAL Right 11/17/2022    Procedure: Dupuytren's excision right palm extending  into the small finger with release of the metacarpophalangeal joint and proximal interphalangeal joint.  Dupuytren's excision right palm with release of the right ring finger metacarpophalangeal joint.;  Surgeon: Sarthak Villatoro MD;  Location:  MAIN OR;  Service: Orthopedics   • SC FASCIOTOMY PALMAR OPEN PARTIAL Right 1/11/2024    Procedure: Right hand small finger duppuytrens excision with release of the metacarpophalangeal joint and proximal interphalangeal joint;  Surgeon: Sarthak Villatoro MD;  Location: [facility];  Service: Orthopedics   • SC FASCT PALM W/WO Z-PLASTY TISSUE REARGMT/SKN GRFT Right 1/11/2024    Procedure: Right hand small finger duppuytrens excision with release of the metacarpophalangeal joint and proximal interphalangeal joint;  Surgeon: Sarthak Villatoro MD;  Location:  MAIN OR;  Service: Orthopedics   • SC FASCT PRTL PALMAR 1 DGT PROX IPHAL JT W/WO RPR Right 1/11/2024    Procedure: Right hand small finger duppuytrens excision with release of the metacarpophalangeal joint and proximal interphalangeal joint;  Surgeon: Sarthak Villatoro MD;  Location:  MAIN OR;  Service: Orthopedics   • SC I&D ISCHIORECTAL&/PERIRECTAL ABSCESS SPX Right 10/24/2021    Procedure: excisional debredement right gluteal cheek ;  Surgeon: Jeana Bustamante MD;  Location:  MAIN OR;  Service: General   • SC PLACEMENT SETON N/A 11/17/2021    Procedure: PLACEMENT SETON;  Surgeon: Davi Thao MD;  Location:  MAIN OR;  Service: Colorectal   • SC PRQ IMPLTJ NSTIM ELECTRODE ARRAY EPIDURAL Right 03/26/2021    Procedure: INSERTION THORACIC DORSAL COLUMN SPINAL CORD STIMULATOR PERCUTANEOUS W IMPLANTABLE PULSE GENERATOR, RIGHT;  Surgeon: Akshat Witt MD;  Location: [facility];  Service: Neurosurgery   • SC REMOVAL HYPOGLOSSAL NERVE NSTIM RA PG&RESPIR SNR Right 1/22/2025    Procedure: INSERTION UPPER AIRWAY STIMULATOR RIGHT INSPIRE;  Surgeon: Jeff Arndt MD;  Location: [facility];  Service: ENT      Social History     Socioeconomic History   • Marital status: /Civil Union     Spouse name: Not on file   • Number of children: Not on file   • Years of education: Not on file   • Highest education level: Not on file   Occupational History   • Not on file   Tobacco Use   • Smoking status: Former     Current packs/day: 0.00     Average packs/day: 0.5 packs/day for 30.0 years (15.0 ttl pk-yrs)     Types: Cigarettes     Start date: 2019     Quit date: 2021     Years since quittin.0     Passive exposure: Never   • Smokeless tobacco: Never   Vaping Use   • Vaping status: Every Day   • Substances: THC   Substance and Sexual Activity   • Alcohol use: Yes     Alcohol/week: 1.0 standard drink of alcohol     Types: 1 Cans of beer per week     Comment: social   • Drug use: Yes     Frequency: 7.0 times per week     Types: Marijuana     Comment: Medical marijuana-vapes, bedtime   • Sexual activity: Not on file     Comment: defer   Other Topics Concern   • Not on file   Social History Narrative   • Not on file     Social Drivers of Health     Financial Resource Strain: Low Risk  (2023)    Overall Financial Resource Strain (CARDIA)    • Difficulty of Paying Living Expenses: Not hard at all   Food Insecurity: No Food Insecurity (2024)    Nursing - Inadequate Food Risk Classification    • Worried About Running Out of Food in the Last Year: Never true    • Ran Out of Food in the Last Year: Never true    • Ran Out of Food in the Last Year: Not on file   Transportation Needs: No Transportation Needs (2024)    PRAPARE - Transportation    • Lack of Transportation (Medical): No    • Lack of Transportation (Non-Medical): No   Physical Activity: Sufficiently Active (2023)    Received from [facility]    Physical Activity   Stress: No Stress Concern Present (2023)    Received from [facility], [facility]    [employer] - Occupational Stress Questionnaire    •  Feeling of Stress : Only a little   Social Connections: Moderately Integrated (4/11/2023)    Received from [facility]    Social Connection and Isolation Panel [NHANES]   Intimate Partner Violence: Not At Risk (4/11/2023)    Received from [facility], [facility]    Humiliation, Afraid, Rape, and Kick questionnaire    • Fear of Current or Ex-Partner: No    • Emotionally Abused: No    • Physically Abused: No    • Sexually Abused: No   Housing Stability: Low Risk  (7/5/2024)    Housing Stability Vital Sign    • Unable to Pay for Housing in the Last Year: No    • Number of Times Moved in the Last Year: 1    • Homeless in the Last Year: No        Current Outpatient Medications:   •  acetaminophen (TYLENOL) 500 mg tablet, Take 500 mg by mouth every 6 (six) hours as needed for mild pain, Disp: , Rfl:   •  albuterol (PROVENTIL HFA,VENTOLIN HFA) 90 mcg/act inhaler, Inhale 2 puffs every 6 (six) hours as needed for wheezing, Disp: 6.7 g, Rfl: 2  •  Alcohol Swabs 70 % PADS, May substitute brand based on insurance coverage. Check glucose TID., Disp: 100 each, Rfl: 0  •  atorvastatin (LIPITOR) 40 mg tablet, Take 1 tablet (40 mg total) by mouth every evening, Disp: 90 tablet, Rfl: 2  •  Blood Glucose Monitoring Suppl (OneTouch Verio Reflect) w/Device KIT, May substitute brand based on insurance coverage. Check glucose TID., Disp: 1 kit, Rfl: 0  •  Cholecalciferol (RA Vitamin D-3) 1,000 units tablet, Take 2 tablets (2,000 Units total) by mouth daily, Disp: 180 tablet, Rfl: 2  •  cyclobenzaprine (FLEXERIL) 10 mg tablet, TAKE 1 TABLET BY MOUTH EVERYDAY AT BEDTIME, Disp: 90 tablet, Rfl: 2  •  DULoxetine (Cymbalta) 30 mg delayed release capsule, Take 1 capsule (30 mg total) by mouth daily, Disp: 30 capsule, Rfl: 5  •  escitalopram (LEXAPRO) 10 mg tablet, Take 1 tablet (10 mg total) by mouth daily, Disp: 90 tablet, Rfl: 3  •  glucose blood (OneTouch Verio) test strip, May substitute brand based on insurance coverage. Check glucose  TID., Disp: 100 each, Rfl: 5  •  lisinopril (ZESTRIL) 20 mg tablet, Take 1 tablet (20 mg total) by mouth daily, Disp: 90 tablet, Rfl: 2  •  loperamide (IMODIUM) 2 mg capsule, Take 1 capsule (2 mg total) by mouth daily at bedtime, Disp: 30 capsule, Rfl: 2  •  LORazepam (ATIVAN) 1 mg tablet, Take 1 tablet (1 mg total) by mouth every 8 (eight) hours as needed for anxiety, Disp: 60 tablet, Rfl: 3  •  metFORMIN (GLUCOPHAGE-XR) 500 mg 24 hr tablet, 1 in am and 2 in pm, Disp: 360 tablet, Rfl: 2  •  naproxen (NAPROSYN) 250 mg tablet, Take 250 mg by mouth as needed for mild pain, Disp: , Rfl:   •  omeprazole (PriLOSEC) 40 MG capsule, Take 1 capsule (40 mg total) by mouth daily, Disp: 90 capsule, Rfl: 2  •  ondansetron (ZOFRAN) 4 mg tablet, Take 1 tablet (4 mg total) by mouth every 8 (eight) hours as needed for nausea or vomiting, Disp: 20 tablet, Rfl: 1  •  OneTouch Delica Lancets 33G MISC, May substitute brand based on insurance coverage. Check glucose TID., Disp: 100 each, Rfl: 5  •  oseltamivir (TAMIFLU) 75 mg capsule, Take 1 capsule (75 mg total) by mouth daily for 7 days for influenza prophylaxis, Disp: 7 capsule, Rfl: 0  •  pregabalin (LYRICA) 150 mg capsule, Take 1 capsule (150 mg total) by mouth 3 (three) times a day, Disp: 90 capsule, Rfl: 2  •  traZODone (DESYREL) 50 mg tablet, TAKE 1 TO 2 TABLETS AT BEDTIME AS NEEDED FOR SLEEP, Disp: 180 tablet, Rfl: 1  •  vedolizumab (Entyvio) SOLR, Inject 300 mg into a catheter in a vein every 4 weeks, Disp: , Rfl:   Family History   Problem Relation Age of Onset   • COPD Mother    • Lung cancer Mother    • Heart disease Father    • Heart attack Father    • Hypertension Sister    • Diabetes type II Sister    • No Known Problems Sister    • No Known Problems Sister    • No Known Problems Sister    • No Known Problems Brother    • Heart attack Brother    • No Known Problems Son    • No Known Problems Son    • Colon cancer Neg Hx    • Colon polyps Neg Hx    • Inflammatory bowel  disease Neg Hx       Review of Systems   Constitutional: Negative.    HENT: Negative.     Eyes: Negative.    Respiratory: Negative.     Cardiovascular: Negative.    Gastrointestinal: Negative.    Endocrine: Negative.    Genitourinary: Negative.    Musculoskeletal: Negative.    Skin:         Chronic DFU left great toe   Allergic/Immunologic: Negative.    Neurological: Negative.    Hematological: Negative.    Psychiatric/Behavioral: Negative.           Objective:  /82   Pulse 76   Resp 16     Physical Exam  Constitutional:       Appearance: Normal appearance. He is normal weight.   HENT:      Head: Normocephalic and atraumatic.      Right Ear: External ear normal.      Left Ear: External ear normal.      Nose: Nose normal.      Mouth/Throat:      Mouth: Mucous membranes are moist.      Pharynx: Oropharynx is clear.   Eyes:      Conjunctiva/sclera: Conjunctivae normal.      Pupils: Pupils are equal, round, and reactive to light.   Cardiovascular:      Pulses: Normal pulses.           Dorsalis pedis pulses are 2+ on the right side and 2+ on the left side.        Posterior tibial pulses are 2+ on the right side and 2+ on the left side.   Pulmonary:      Effort: Pulmonary effort is normal.   Musculoskeletal:      Cervical back: Normal range of motion.      Right lower leg: No edema.      Left lower leg: No edema.      Right foot: Decreased range of motion.      Left foot: Decreased range of motion (First MPJ).      Comments: Diminished first MPJ range of motion bilateral  Left great toe 15-20 degrees range of motion first MPJ  Right great toe 20-25 degrees range of motion first MPJ   Feet:      Right foot:      Protective Sensation: 10 sites tested.  0 sites sensed.      Skin integrity: No ulcer (Closed with stable eschar.).      Toenail Condition: Right toenails are normal.      Left foot:      Protective Sensation: 10 sites tested.  0 sites sensed.      Skin integrity: Ulcer (See comments) present.      Toenail  "Condition: Left toenails are normal.      Comments:   Left great toe: Partial depth breakdown Tip of LGT, hyperkeratotic margin, diminishing size, no signs of infection, wound granulating and appears healthy.    Wound measures 0.1 x 0.1 x 0.1 cm, overall making steady progress.  Skin:     General: Skin is warm and dry.   Neurological:      Mental Status: He is alert. Mental status is at baseline.      Sensory: Sensory deficit present.   Psychiatric:         Mood and Affect: Mood normal.         Behavior: Behavior normal.         Wound 11/06/24 Diabetic Ulcer Plantar Left (Active)   Enter Loza score: Loza Grade 1: Partial or full-thickness ulcer (superficial) 12/30/24 1527   Wound Image   02/19/25 1139   Wound Description Eschar 02/19/25 1140   [place]-wound Assessment Callus 02/19/25 1140   Wound Length (cm) 0.4 cm 02/19/25 1140   Wound Width (cm) 0.4 cm 02/19/25 1140   Wound Depth (cm) 0.1 cm 02/19/25 1140   Wound Surface Area (cm^2) 0.16 cm^2 02/19/25 1140   Wound Volume (cm^3) 0.016 cm^3 02/19/25 1140   Calculated Wound Volume (cm^3) 0.02 cm^3 02/19/25 1140   Change in Wound Size % 84.62 02/19/25 1140   Drainage Amount None 02/19/25 1140   Drainage Description Serous 01/15/25 0950   Non-staged Wound Description Full thickness 02/19/25 1140   Dressing Status Intact 02/19/25 1140       Wound 11/13/24 Nose N/A (Active)       Wound 01/22/25 Chest Right (Active)       Debridement   Wound 11/06/24 Diabetic Ulcer Plantar Left    Universal Protocol:  procedure performed by consultantConsent: Verbal consent obtained.  Risks and benefits: risks, benefits and alternatives were discussed  Consent given by: patient  Time out: Immediately prior to procedure a \"time out\" was called to verify the correct patient, procedure, equipment, support staff and site/side marked as required.  Patient understanding: patient states understanding of the procedure being performed  Patient identity confirmed: verbally with " "patient    Debridement Details  Performed by: physician  Debridement type: selective  Pain control: lidocaine 4%      Post-debridement measurements  Length (cm): 0.1  Width (cm): 0.1  Depth (cm): 0.1  Percent debrided: 100%  Surface Area (cm^2): 0.01  Area Debrided (cm^2): 0.01  Volume (cm^3): 0    Devitalized tissue debrided: callus and slough  Instrument(s) utilized: blade and curette  Bleeding: small  Hemostasis obtained with: pressure  Procedural pain (0-10): 2  Post-procedural pain: 2   Response to treatment: procedure was tolerated well                 Wound Instructions:  Orders Placed This Encounter   Procedures   • Wound cleansing and dressings Diabetic Ulcer Left Plantar     Left great toe wound    Wash your hands with soap and water.  Remove old dressing, discard into plastic bag and place in trash.  Cleanse the wound with soap and water prior to applying a clean dressing. Do not use tissue or cotton balls. Do not scrub the wound. Pat dry using gauze.  Shower yes with protectin  Apply skin prep and felt cut out to skin surrounding wound  Keep felt cut out on toe at all times  Mupirocin applied to scabbed area today     Standing Status:   Future     Expiration Date:   2/26/2025   • Wound Procedure Treatment Diabetic Ulcer Left Plantar     This order was created via procedure documentation   • Debridement Diabetic Ulcer Left Plantar     This order was created via procedure documentation         Jovanni Rodas DPM      Portions of the record may have been created with voice recognition software. Occasional wrong word or \"sound a like\" substitutions may have occurred due to the inherent limitations of voice recognition software. Read the chart carefully and recognize, using context, where substitutions have occurred.    "

## 2025-03-03 ENCOUNTER — OFFICE VISIT (OUTPATIENT)
Dept: WOUND CARE | Facility: HOSPITAL | Age: 55
End: 2025-03-03
Payer: MEDICARE

## 2025-03-03 ENCOUNTER — OFFICE VISIT (OUTPATIENT)
Dept: SPEECH THERAPY | Facility: CLINIC | Age: 55
End: 2025-03-03
Payer: MEDICARE

## 2025-03-03 VITALS — HEART RATE: 98 BPM | SYSTOLIC BLOOD PRESSURE: 122 MMHG | RESPIRATION RATE: 16 BRPM | DIASTOLIC BLOOD PRESSURE: 80 MMHG

## 2025-03-03 DIAGNOSIS — R48.8 OTHER SYMBOLIC DYSFUNCTIONS: ICD-10-CM

## 2025-03-03 DIAGNOSIS — L97.522 DIABETIC ULCER OF OTHER PART OF LEFT FOOT ASSOCIATED WITH TYPE 2 DIABETES MELLITUS, WITH FAT LAYER EXPOSED (HCC): Primary | ICD-10-CM

## 2025-03-03 DIAGNOSIS — E11.621 DIABETIC ULCER OF OTHER PART OF LEFT FOOT ASSOCIATED WITH TYPE 2 DIABETES MELLITUS, WITH FAT LAYER EXPOSED (HCC): Primary | ICD-10-CM

## 2025-03-03 DIAGNOSIS — E11.40 TYPE 2 DIABETES MELLITUS WITH DIABETIC NEUROPATHY, WITHOUT LONG-TERM CURRENT USE OF INSULIN (HCC): ICD-10-CM

## 2025-03-03 DIAGNOSIS — L97.522 DIABETIC ULCER OF TOE OF LEFT FOOT ASSOCIATED WITH TYPE 2 DIABETES MELLITUS, WITH FAT LAYER EXPOSED (HCC): ICD-10-CM

## 2025-03-03 DIAGNOSIS — E11.621 DIABETIC ULCER OF TOE OF LEFT FOOT ASSOCIATED WITH TYPE 2 DIABETES MELLITUS, WITH FAT LAYER EXPOSED (HCC): ICD-10-CM

## 2025-03-03 DIAGNOSIS — R41.3 MEMORY LOSS: Primary | ICD-10-CM

## 2025-03-03 PROCEDURE — 97129 THER IVNTJ 1ST 15 MIN: CPT

## 2025-03-03 PROCEDURE — 11042 DBRDMT SUBQ TIS 1ST 20SQCM/<: CPT | Performed by: PODIATRIST

## 2025-03-03 PROCEDURE — 99212 OFFICE O/P EST SF 10 MIN: CPT | Performed by: PODIATRIST

## 2025-03-03 PROCEDURE — 97130 THER IVNTJ EA ADDL 15 MIN: CPT

## 2025-03-03 NOTE — PROGRESS NOTES
Daily Speech Treatment Note    Today's date: 3/3/2025   Patient’s name: Benito Park  : 1970  MRN: [id number]  Safety measures: h/o Diabetic Shock  Referring provider: Francesco Edouard CRNP    Encounter Diagnoses   Name Primary?    Memory loss Yes    Other symbolic dysfunctions        Visit Tracking:  POC   Expires Auth Expiration Date ST Visit Limit   2025 BOMN BOMN          Visit/Unit Tracking:  Auth Status Date 3/3   No auth req, Used 11    Remaining 9       Subjective/Behavioral:  -Patient pleasant and cooperative. .       Objective/Assessment:  Completed structured activities with patient to target goals below.  Results as stated below.           Short-term goals:  Patient will be educated on the use of internal and external memory aids and compensatory strategies to facilitate increased recall of routine, personal information, and recent events, to be achieved in 4-6 weeks.  25:  Reviewed information / handout on memory strategies with patient. Patient will bring in journal next session, use post it before entering car, work on repetition out loud, etc.  Working on review of medication list / reason taking in future session.  : Provided information on apps/workbooks/games recommended and provided handout.  :  Provided info on digital voice recorder since patient having difficulty with writing (fine motor / hand issues).     Patient will complete auditory immediate and short term memory tasks to facilitate increased ability to retell narratives and recall information within functional living environment, to be achieved in 4-6 weeks.  : Following auditory directives: 1 step 2 components: 80% & 100% accuracy, 2 step - 4 components: 70-90%, 3 step, 6 components: 75% accuracy, ongoing education regarding step / step, repetition out loud, review. Drawing Figures: Written: 75% - 100% accuracy.     Patient will complete complex auditory attention processing tasks (e.g., sentence  unscramble, ranking numbers/words, etc.) to improve working memory with 80% accuracy, to be achieved in 4-6 weeks.  : Working on pairing of words, able to complete 4 words into 2 pairs: 100% , not able to complete 6 words into 3 pairs with max cues. : Worked with sentence formulation and recalling words: set of 2: 100%, set of 3 words benefitted from writing words down on paper and formulating sentence on paper, then work towards repetition, visualization for final 3 words which was successful, continue to work with sentence formulation 3 words in future sessions. : Able to formulate sentence with 3 words, give increased time, review out loud, focused on these strategies. Recall of pairs, color codin/5 pairs: 80% accuracy.  : recall of pairs of words: attribute: 3/5, then 4/5 with 2nd set.  Reverse order: 3 words: 90%, putting 3 words in order, Unscrambling words 3-4 100%, 5 words, requires rep. At time, increased processing.  Word List Retention: 80% accuracy out of 4 words (attribute). Word List Retention: Exclusion: required repetition 2-3 times for accurate recall with question after 5 words.  At end of session: recall of pairs: 80% accuracy. 3/3: Recall of 4/4 pictures after 5 minutes using story imagery recall, recall of details from short paragraph- 80% accuracy.     Patient will complete thought organization tasks (e.g., sequencing, deduction puzzles, etc.) with 80% accuracy to facilitate increased executive functioning, working memory, problem solving, and processing skills, to be achieved in 4-6 weeks.  : Attempted to complete Reggie's closet , logical solution puzzle- will work with patient with this task due to needs glasses and confusion over location/comprehension.  Also, wrote out medication list for patient and patient will work on writing reason taking for each medication. : Worked on Kitchen Shelves activity: moderate cues to help patient with attention/ focus: using finger  or pencil as visual marker, one part of sentence at a time, crossing off completed tasks, provided vegetable garden for home practice. Played BLINK & organized cards into 3 different rows: number, color, shape, required frequent reminders for proper category / but did improve with indep. As game went on. 2/7:  Garden Plot: Completed from homework and corrected with patient - requiring moderate - max cues for assistance with reading comprehension and location comprehension using compass. 2/7: Organizing written sequencing of tasks (4 steps): 100% accuracy, provided more steps for hw. Reading comprehension written directions, multi steps: with education of breaking up steps, able to complete accurately- provided for hw.  2/28: Completed hw tasks, 1 step 2 components, 2 step 4 components, 3 step 6 components, completed at least 80% accuracy but would be beneficial to continue this goal. 3/3: Day 1 Coding task, organizing maze of symbols & computations: Initially, completed education & demonstration, then patient able to complete exercises. Then, min cues provided, patient will practice Day 1-2 at home, with checking work throughout.      Patient will complete concrete and abstract categorization tasks to 80% accuracy to facilitate improved generative naming skills and working memory, to be achieved in 4-6 weeks.     Patient will utilize word finding strategies during semantic feature analysis treatment activity for improved naming and verbal expression skills, to be achieved in 4-6 weeks.       Long Term Goals    Patient will demonstrate cognitive-communication skills consistent with age and education given use of compensatory strategies when needed to resume baseline activities and responsibilities in home, community, and work/school settings by discharge.     Patient will complete cognitive-linguistic therapy that addresses patient’s specific deficits in processing speed, short-term working memory, attention to  detail, monitoring, sequencing, and organization skills, with instruction, to alleviate effects of executive functioning disorder deficits by discharge.     Patient will complete higher-level expressive language tasks (e.g., word definitions, idioms, synonym/antonyms, etc) with 80% accuracy to improve functional communication skills by discharge.      Plan:

## 2025-03-03 NOTE — PATIENT INSTRUCTIONS
Orders Placed This Encounter   Procedures    Wound cleansing and dressings     Left plantar great toe wound:     Wash your hands with soap and water.  Remove old dressing, discard into plastic bag and place in trash.  Cleanse the wound with soap and water prior to applying a clean dressing. Do not use tissue or cotton balls. Do not scrub the wound. Pat dry using gauze.  Shower yes with protection  Apply skin prep and felt cut out to skin surrounding wound  Keep felt cut out on toe at all times  Mupirocin applied to scabbed area today    Left dorsal foot wound:    Wash your hands with soap and water.  Remove old dressing, discard into plastic bag and place in trash.  Cleanse the wound with soap and water prior to applying a clean dressing. Do not use tissue or cotton balls. Do not scrub the wound. Pat dry using gauze.  Shower yes with protection  Apply silver alginate to wound then cover with dry dressing every other day to 3 x week    Return in 3 weeks     Standing Status:   Future     Expiration Date:   3/24/2025

## 2025-03-03 NOTE — PROGRESS NOTES
Wound Procedure Treatment Diabetic Ulcer Left Plantar    Performed by: Dior Li RN  Authorized by: Jovanni Rodas DPM  Associated wounds:   Wound 11/06/24 Diabetic Ulcer Plantar Left    Offloading device appllied:  Felt padding 1/4 inch

## 2025-03-03 NOTE — PROGRESS NOTES
Wound Procedure Treatment Traumatic Anterior;Left Foot    Performed by: Dior Li RN  Authorized by: Jovanni Rodas DPM  Associated wounds:   Wound 03/03/25 Traumatic Foot Anterior;Left    Wound cleansed with:  NSS   Applied primary dressing:  Calcium alginate and Silver   Applied secondary dressing:  Gauze   Dressing secured with:  Tape

## 2025-03-05 ENCOUNTER — OFFICE VISIT (OUTPATIENT)
Dept: SPEECH THERAPY | Facility: CLINIC | Age: 55
End: 2025-03-05
Payer: MEDICARE

## 2025-03-05 ENCOUNTER — OFFICE VISIT (OUTPATIENT)
Dept: ENDOCRINOLOGY | Facility: HOSPITAL | Age: 55
End: 2025-03-05
Payer: MEDICARE

## 2025-03-05 VITALS
WEIGHT: 200.2 LBS | SYSTOLIC BLOOD PRESSURE: 120 MMHG | OXYGEN SATURATION: 95 % | HEART RATE: 71 BPM | HEIGHT: 72 IN | BODY MASS INDEX: 27.12 KG/M2 | DIASTOLIC BLOOD PRESSURE: 80 MMHG

## 2025-03-05 DIAGNOSIS — E78.2 MIXED HYPERLIPIDEMIA: ICD-10-CM

## 2025-03-05 DIAGNOSIS — R41.841 COGNITIVE COMMUNICATION DEFICIT: ICD-10-CM

## 2025-03-05 DIAGNOSIS — I10 PRIMARY HYPERTENSION: ICD-10-CM

## 2025-03-05 DIAGNOSIS — R48.8 OTHER SYMBOLIC DYSFUNCTIONS: ICD-10-CM

## 2025-03-05 DIAGNOSIS — E11.65 TYPE 2 DIABETES MELLITUS WITH HYPERGLYCEMIA, WITHOUT LONG-TERM CURRENT USE OF INSULIN (HCC): Primary | ICD-10-CM

## 2025-03-05 DIAGNOSIS — R41.3 MEMORY LOSS: Primary | ICD-10-CM

## 2025-03-05 DIAGNOSIS — E11.42 DIABETIC POLYNEUROPATHY ASSOCIATED WITH TYPE 2 DIABETES MELLITUS (HCC): ICD-10-CM

## 2025-03-05 PROCEDURE — 99214 OFFICE O/P EST MOD 30 MIN: CPT | Performed by: INTERNAL MEDICINE

## 2025-03-05 PROCEDURE — 97129 THER IVNTJ 1ST 15 MIN: CPT

## 2025-03-05 PROCEDURE — 97130 THER IVNTJ EA ADDL 15 MIN: CPT

## 2025-03-05 NOTE — PATIENT INSTRUCTIONS
Hgba1c is 6.4%. this is excellent.     For now,, stop metformin. We can see if diarrhea resolves.     Call if sugars consistently over 150 pre meals or am. We can then start a different diabetes medication if needed.     Follow up in 4 months with blood work.

## 2025-03-05 NOTE — PROGRESS NOTES
Daily Speech Treatment Note    Today's date: 3/5/2025   Patient’s name: Benito Park  : 1970  MRN: [id number]  Safety measures: h/o Diabetic Shock  Referring provider: Francesco Edouard CRNP    Encounter Diagnoses   Name Primary?    Memory loss Yes    Other symbolic dysfunctions     Cognitive communication deficit        Visit Tracking:  POC   Expires Auth Expiration Date ST Visit Limit   2025 BOMN BOMN          Visit/Unit Tracking:  Auth Status Date 3/3 3/5/25   No auth req, Used 11 12    Remaining 9 8       Subjective/Behavioral:  -Patient pleasant and cooperative. .       Objective/Assessment:  Completed structured activities with patient to target goals below.  Results as stated below.           Short-term goals:  Patient will be educated on the use of internal and external memory aids and compensatory strategies to facilitate increased recall of routine, personal information, and recent events, to be achieved in 4-6 weeks.  25:  Reviewed information / handout on memory strategies with patient. Patient will bring in journal next session, use post it before entering car, work on repetition out loud, etc.  Working on review of medication list / reason taking in future session.  : Provided information on apps/workbooks/games recommended and provided handout.  :  Provided info on digital voice recorder since patient having difficulty with writing (fine motor / hand issues).     Patient will complete auditory immediate and short term memory tasks to facilitate increased ability to retell narratives and recall information within functional living environment, to be achieved in 4-6 weeks.  : Following auditory directives: 1 step 2 components: 80% & 100% accuracy, 2 step - 4 components: 70-90%, 3 step, 6 components: 75% accuracy, ongoing education regarding step / step, repetition out loud, review. Drawing Figures: Written: 75% - 100% accuracy.     Patient will complete complex auditory  attention processing tasks (e.g., sentence unscramble, ranking numbers/words, etc.) to improve working memory with 80% accuracy, to be achieved in 4-6 weeks.  : Working on pairing of words, able to complete 4 words into 2 pairs: 100% , not able to complete 6 words into 3 pairs with max cues. : Worked with sentence formulation and recalling words: set of 2: 100%, set of 3 words benefitted from writing words down on paper and formulating sentence on paper, then work towards repetition, visualization for final 3 words which was successful, continue to work with sentence formulation 3 words in future sessions. : Able to formulate sentence with 3 words, give increased time, review out loud, focused on these strategies. Recall of pairs, color codin/5 pairs: 80% accuracy.  : recall of pairs of words: attribute: 3/5, then 4/5 with 2nd set.  Reverse order: 3 words: 90%, putting 3 words in order, Unscrambling words 3-4 100%, 5 words, requires rep. At time, increased processing.  Word List Retention: 80% accuracy out of 4 words (attribute). Word List Retention: Exclusion: required repetition 2-3 times for accurate recall with question after 5 words.  At end of session: recall of pairs: 80% accuracy. 3/3: Recall of 4/4 pictures after 5 minutes using story imagery recall, recall of details from short paragraph- 80% accuracy.     Patient will complete thought organization tasks (e.g., sequencing, deduction puzzles, etc.) with 80% accuracy to facilitate increased executive functioning, working memory, problem solving, and processing skills, to be achieved in 4-6 weeks.  : Attempted to complete Reggie's closet , logical solution puzzle- will work with patient with this task due to needs glasses and confusion over location/comprehension.  Also, wrote out medication list for patient and patient will work on writing reason taking for each medication. : Worked on Kitchen Shelves activity: moderate cues to help  patient with attention/ focus: using finger or pencil as visual marker, one part of sentence at a time, crossing off completed tasks, provided vegetable garden for home practice. Played BLINK & organized cards into 3 different rows: number, color, shape, required frequent reminders for proper category / but did improve with indep. As game went on. 2/7:  Garden Plot: Completed from homework and corrected with patient - requiring moderate - max cues for assistance with reading comprehension and location comprehension using compass. 2/7: Organizing written sequencing of tasks (4 steps): 100% accuracy, provided more steps for hw. Reading comprehension written directions, multi steps: with education of breaking up steps, able to complete accurately- provided for hw.  2/28: Completed hw tasks, 1 step 2 components, 2 step 4 components, 3 step 6 components, completed at least 80% accuracy but would be beneficial to continue this goal. 3/3: Day 1 Coding task, organizing maze of symbols & computations: Initially, completed education & demonstration, then patient able to complete exercises. Then, min cues provided, patient will practice Day 1-2 at home, with checking work throughout.  3/5: Reading comprehension questions, first one: 70% accuracy, completed education, using highlighter, slow, review. 100% 2nd paragraph with independent use of these strategies. Nell's Schedule: required mod-max cues/assistance, will work on these type of problems in future sessions.        Patient will complete concrete and abstract categorization tasks to 80% accuracy to facilitate improved generative naming skills and working memory, to be achieved in 4-6 weeks.   3/5: Assisted patient with completing cross word puzzle from folder- completed 65-70% indep, minimal cues to increase to 100% accuracy.       Patient will utilize word finding strategies during semantic feature analysis treatment activity for improved naming and verbal expression  skills, to be achieved in 4-6 weeks.       Long Term Goals    Patient will demonstrate cognitive-communication skills consistent with age and education given use of compensatory strategies when needed to resume baseline activities and responsibilities in home, community, and work/school settings by discharge.     Patient will complete cognitive-linguistic therapy that addresses patient’s specific deficits in processing speed, short-term working memory, attention to detail, monitoring, sequencing, and organization skills, with instruction, to alleviate effects of executive functioning disorder deficits by discharge.     Patient will complete higher-level expressive language tasks (e.g., word definitions, idioms, synonym/antonyms, etc) with 80% accuracy to improve functional communication skills by discharge.      Plan:

## 2025-03-05 NOTE — PROGRESS NOTES
3/5/2025    Assessment & Plan      Diagnoses and all orders for this visit:    Type 2 diabetes mellitus with hyperglycemia, without long-term current use of insulin (HCC)  -     Comprehensive metabolic panel; Future  -     Hemoglobin A1C; Future    Diabetic polyneuropathy associated with type 2 diabetes mellitus (HCC)  -     Comprehensive metabolic panel; Future  -     Hemoglobin A1C; Future    Primary hypertension  -     Comprehensive metabolic panel; Future  -     Hemoglobin A1C; Future    Mixed hyperlipidemia  -     Comprehensive metabolic panel; Future  -     Hemoglobin A1C; Future          Assessment & Plan  1. Type 2 Diabetes Mellitus.  His last recorded hemoglobin A1c level was 6.4, which, although slightly elevated compared to previous readings, remains within an acceptable range. He will discontinue metformin to ascertain if it is the cause of his diarrhea. He is advised to monitor his blood glucose levels and report any significant changes within the next month. If his blood glucose levels consistently exceed 150 pre-meal or in the morning, a different antidiabetic medication may be initiated.  Likely an SGLT2 inhibitor or sulfonylurea will be added if needed.    2. Diabetic Neuropathy.  He reports numbness and tingling in his feet, with a wound on the left foot. A diabetic foot exam was performed, revealing diminished vibration and sensation, and a wound on the dorsum of the left first metatarsophalangeal joint. He is advised to keep the wound clean and covered. A Band-Aid was provided to cover the wound to prevent rubbing against his socks.  He will follow-up with podiatry.    3. Hypertension.  He is currently taking lisinopril 20 mg daily for blood pressure and kidney protection. He reports no chest pain or shortness of breath.    4. Hyperlipidemia.  He is currently taking atorvastatin 40 mg daily for cholesterol management.    5. Crohn's Disease.  He reports ongoing diarrhea, which may be exacerbated  by metformin. He is advised to discontinue metformin to see if symptoms improve. He continues to receive Entyvio every 4 weeks.    I have asked him to follow-up in 4 months with preceding hemoglobin A1c and CMP.        CC: Diabetes type 2, blood pressure, lipid follow-up      History of Present Illness    HPI: Benito Park is a 54-year-old male with a history of type 2 diabetes with neuropathy, hypertension, hyperlipidemia, and Crohn's disease, here for a follow-up visit. He was diagnosed with diabetes in 2024.    He is currently on a regimen of metformin  mg twice daily. He reports frequent bowel movements, necessitating to 7 bathroom visits daily. He experiences polyuria, particularly at night, and polydipsia.     He does not experience any visual disturbances and has an upcoming ophthalmology appointment next month.     He also reports numbness and tingling in his feet, with an open wound present on his left foot.  Last diabetic foot exam was over a year ago.  He continues to utilize pregabalin 150 mg 3 times a day.  He also takes duloxetine 30 mg daily.    He had an Inspire device placed for sleep apnea in January, which has been beneficial, resulting in less daytime fatigue and improved sleep quality.    He currently takes atorvastatin 40 mg daily for hyperlipidemia and lisinopril 20 mg daily for hypertension and renal protection.  He does not experience any chest pain or shortness of breath.    Blood Sugar/Glucometer/Pump/CGM review: He monitors his blood glucose levels 3 times daily, with readings typically around 100. However, he experienced a hypoglycemic episode today, with a reading of 60 after consuming pretzel bites and a sandwich.         Historical Information   Past Medical History:   Diagnosis Date    Anxiety     Back pain     Callus Few months ago    Chronic pain disorder     back and legs    Colon polyp     COPD (chronic obstructive pulmonary disease) (HCC)     pt denies    Crohn's  disease (HCC)     Dental crown present     Depression     Diabetes mellitus (HCC)     type 2    GERD (gastroesophageal reflux disease)     Hyperglycemia     diabetic shock--follows with [facility]    Hyperlipidemia     per pt diet controlled    Hypertension     Neuropathy     Perianal fistula     Sleep apnea     no current CPAP use    Spinal cord stimulator status     battery  -near right side back/hip    Terminal ileitis (HCC)      Past Surgical History:   Procedure Laterality Date    BACK SURGERY  2019    CAST APPLICATION Right 11/17/2022    Procedure: Application short-arm splint;  Surgeon: Sarthak Villatoro MD;  Location: [facility];  Service: Orthopedics    COLONOSCOPY      EGD      HAND CONTRACTURE RELEASE Left 06/01/2023    Procedure: left ring and small finger dupuytrens excision and ring finger Metacarpophalangeal joint release and small finger Metacarpophalangeal and proximal interphalangeal joint release;  Surgeon: Sarthak Villatoro MD;  Location: [facility];  Service: Orthopedics    HERNIA REPAIR      umbilical hernia    CO ANRCT XM SURG REQ ANES GENERAL SPI/EDRL DX N/A 11/17/2021    Procedure: EXAM UNDER ANESTHESIA (EUA);  Surgeon: Davi Thao MD;  [facility]: [facility];  Service: Colorectal    CO APPLICATION SHORT ARM SPLINT FOREARM-HAND STATIC Right 1/11/2024    Procedure: Application short arm splint;  Surgeon: Sarthak Villatoro MD;  Location: [facility];  Service: Orthopedics    CO CAPSULECTOMY/CAPSULOTOMY IPHAL JOINT EACH Right 1/11/2024    Procedure: Contracture release right hand small finger proximal interphalangeal joint;  Surgeon: Sarthak Villatoro MD;  Location: [facility];  Service: Orthopedics    CO DISE DYN EVAL SLEEP DISORDERED BREATHING FLX DX N/A 11/13/2024    Procedure: DRUG INDUCED SLEEP ENDOSCOPY;  Surgeon: Jeff Arndt MD;  [facility];  Service: ENT    CO FASCIOTOMY PALMAR OPEN PARTIAL Right 11/17/2022    Procedure: Dupuytren's excision right palm  extending into the small finger with release of the metacarpophalangeal joint and proximal interphalangeal joint.  Dupuytren's excision right palm with release of the right ring finger metacarpophalangeal joint.;  Surgeon: Sarthak Villatoro MD;  Location: [facility];  Service: Orthopedics    VA FASCIOTOMY PALMAR OPEN PARTIAL Right 1/11/2024    Procedure: Right hand small finger duppuytrens excision with release of the metacarpophalangeal joint and proximal interphalangeal joint;  Surgeon: Sarthak Villatoro MD;  Location: [facility];  Service: Orthopedics    VA FASCT PALM W/WO Z-PLASTY TISSUE REARGMT/SKN GRFT Right 1/11/2024    Procedure: Right hand small finger duppuytrens excision with release of the metacarpophalangeal joint and proximal interphalangeal joint;  Surgeon: Sarthak Villatoro MD;  Location: [facility];  Service: Orthopedics    VA FASCT PRTL PALMAR 1 DGT PROX IPHAL JT W/WO RPR Right 1/11/2024    Procedure: Right hand small finger duppuytrens excision with release of the metacarpophalangeal joint and proximal interphalangeal joint;  Surgeon: Sarthak Villatoro MD;  Location: [facility];  Service: Orthopedics    VA I&D ISCHIORECTAL&/PERIRECTAL ABSCESS SPX Right 10/24/2021    Procedure: excisional debredement right gluteal cheek ;  Surgeon: Jeana Bustamante MD;  Location: [facility];  Service: General    VA PLACEMENT SETON N/A 11/17/2021    Procedure: PLACEMENT SETON;  Surgeon: Davi Thao MD;  [facility];  Service: Colorectal    VA PRQ IMPLTJ NSTIM ELECTRODE ARRAY EPIDURAL Right 03/26/2021    Procedure: INSERTION THORACIC DORSAL COLUMN SPINAL CORD STIMULATOR PERCUTANEOUS W IMPLANTABLE PULSE GENERATOR, RIGHT;  Surgeon: Akshat Witt MD;  Location: [facility];  Service: Neurosurgery    VA REMOVAL HYPOGLOSSAL NERVE NSTIM RA PG&RESPIR SNR Right 1/22/2025    Procedure: INSERTION UPPER AIRWAY STIMULATOR RIGHT INSPIRE;  Surgeon: Jeff Arndt MD;  Location: [facility];  Service: ENT      Social History   Social History     Substance and Sexual Activity   Alcohol Use Yes    Alcohol/week: 3.0 standard drinks of alcohol    Types: 3 Cans of beer per week    Comment: social     Social History     Substance and Sexual Activity   Drug Use Yes    Frequency: 7.0 times per week    Types: Marijuana    Comment: Medical marijuana-vapes, bedtime     Social History     Tobacco Use   Smoking Status Former    Current packs/day: 0.00    Average packs/day: 0.5 packs/day for 30.0 years (15.0 ttl pk-yrs)    Types: Cigarettes    Start date: 2019    Quit date: 2021    Years since quittin.0    Passive exposure: Never   Smokeless Tobacco Never     Family History:   Family History   Problem Relation Age of Onset    COPD Mother     Lung cancer Mother     Heart disease Father     Heart attack Father     Hypertension Sister     Diabetes type II Sister     No Known Problems Sister     No Known Problems Sister     No Known Problems Sister     No Known Problems Brother     Heart attack Brother     No Known Problems Son     No Known Problems Son     Colon cancer Neg Hx     Colon polyps Neg Hx     Inflammatory bowel disease Neg Hx        Meds/Allergies   Current Outpatient Medications   Medication Sig Dispense Refill    acetaminophen (TYLENOL) 500 mg tablet Take 500 mg by mouth every 6 (six) hours as needed for mild pain      albuterol (PROVENTIL HFA,VENTOLIN HFA) 90 mcg/act inhaler Inhale 2 puffs every 6 (six) hours as needed for wheezing 6.7 g 2    Alcohol Swabs 70 % PADS May substitute brand based on insurance coverage. Check glucose TID. 100 each 0    atorvastatin (LIPITOR) 40 mg tablet Take 1 tablet (40 mg total) by mouth every evening 90 tablet 2    Blood Glucose Monitoring Suppl (OneTouch Verio Reflect) w/Device KIT May substitute brand based on insurance coverage. Check glucose TID. 1 kit 0    Cholecalciferol (RA Vitamin D-3) 1,000 units tablet Take 2 tablets (2,000 Units total) by mouth daily 180 tablet 2     cyclobenzaprine (FLEXERIL) 10 mg tablet TAKE 1 TABLET BY MOUTH EVERYDAY AT BEDTIME 90 tablet 2    DULoxetine (Cymbalta) 30 mg delayed release capsule Take 1 capsule (30 mg total) by mouth daily 30 capsule 5    escitalopram (LEXAPRO) 10 mg tablet Take 1 tablet (10 mg total) by mouth daily 90 tablet 3    glucose blood (OneTouch Verio) test strip May substitute brand based on insurance coverage. Check glucose TID. 100 each 5    lisinopril (ZESTRIL) 20 mg tablet Take 1 tablet (20 mg total) by mouth daily 90 tablet 2    loperamide (IMODIUM) 2 mg capsule Take 1 capsule (2 mg total) by mouth daily at bedtime 30 capsule 2    LORazepam (ATIVAN) 1 mg tablet Take 1 tablet (1 mg total) by mouth every 8 (eight) hours as needed for anxiety 60 tablet 3    naproxen (NAPROSYN) 250 mg tablet Take 250 mg by mouth as needed for mild pain      omeprazole (PriLOSEC) 40 MG capsule Take 1 capsule (40 mg total) by mouth daily 90 capsule 2    ondansetron (ZOFRAN) 4 mg tablet Take 1 tablet (4 mg total) by mouth every 8 (eight) hours as needed for nausea or vomiting 20 tablet 1    OneTouch Delica Lancets 33G MISC May substitute brand based on insurance coverage. Check glucose TID. 100 each 5    pregabalin (LYRICA) 150 mg capsule Take 1 capsule (150 mg total) by mouth 3 (three) times a day 90 capsule 2    traZODone (DESYREL) 50 mg tablet TAKE 1 TO 2 TABLETS AT BEDTIME AS NEEDED FOR SLEEP 180 tablet 1    vedolizumab (Entyvio) SOLR Inject 300 mg into a catheter in a vein every 4 weeks       No current facility-administered medications for this visit.     No Known Allergies    Objective   Vitals: Blood pressure 120/80, pulse 71, height 6' (1.829 m), weight 90.8 kg (200 lb 3.2 oz), SpO2 95%.  Invasive Devices       None                   Physical Exam    Thyroid is normal in size. No palpable thyroid nodules.  Lungs are clear to auscultation.  Heart has a regular rate and rhythm. No murmurs.  There is no lower extremity edema. On the right foot,  there are some excoriations on the top of the second, third, fourth toe and the top of the first metatarsophalangeal joint from rubbing in shoes. Callus on the lateral portion of the right fifth metatarsophalangeal joint, the medial first toe on the right and the heel. On the left foot, there are thick calluses on the first toe, some calluses on the heel, callus on the fifth and first metatarsophalangeal joints. There is a wound about half centimeter on the dorsum of the left first metatarsophalangeal joint. There is a small just excoriation in the callus on the plantar surface of the left first toe. Dorsalis pedis and posterior tibialis pulses 1+. Vibration sensation diminished to the first toe DIP joint on the right and markedly diminished to the first toe DIP joint on the left. Monofilament sensation absent to both feet except to the left fifth metatarsophalangeal joint and right first toe.  Patient's shoes and socks removed.    Right Foot/Ankle   Right Foot Inspection  Skin Exam: skin normal, skin intact, callus and callus. No dry skin, no warmth, no erythema, no maceration, no abnormal color, no pre-ulcer and no ulcer.     Toe Exam: No swelling and  no right toe deformity    Sensory   Vibration: diminished  Monofilament testing: diminished    Vascular  The right DP pulse is 1+. The right PT pulse is 1+.     Left Foot/Ankle  Left Foot Inspection  Skin Exam: skin normal, skin intact, ulcer and callus. No dry skin, no warmth, no erythema, no maceration, normal color and no pre-ulcer.     Toe Exam: No swelling and no left toe deformity.     Sensory   Vibration: diminished  Monofilament testing: diminished    Vascular  The left DP pulse is 1+. The left PT pulse is 1+.     Assign Risk Category  Deformity present  Loss of protective sensation  Weak pulses  Risk: 3        The history was obtained from the review of the chart and from the patient.    Lab Results:    Most recent Alc is  Lab Results   Component Value Date     HGBA1C 6.4 (H) 01/09/2025           Blood work performed on 1/9/2025 showed a CMP with a glucose of 148 fasting but was otherwise normal.    CBC was normal.    Lab Results   Component Value Date    CREATININE 0.88 01/09/2025    CREATININE 0.88 01/09/2025    CREATININE 0.94 11/13/2024    BUN 13 01/09/2025     10/03/2016    K 4.2 01/09/2025     01/09/2025    CO2 29 01/09/2025     GFR, Calculated   Date Value Ref Range Status   12/12/2019 90 >60 mL/min/1.73m2 Final     Comment:     mL/min per 1.73 square meters                                            Normal Function or Mild Renal    Disease (if clinically at risk):  >or=60  Moderately Decreased:                30-59  Severely Decreased:                  15-29  Renal Failure:                         <15                                            -American GFR: multiply reported GFR by 1.16    Please note that the eGFR is based on the CKD-EPI calculation, and is not intended to be used for drug dosing.     eGFR   Date Value Ref Range Status   01/09/2025 98 ml/min/1.73sq m Final         Lab Results   Component Value Date    CHOL 208 (H) 10/03/2016    HDL 36 (L) 08/08/2024    TRIG 176 (H) 08/08/2024       Lab Results   Component Value Date    ALT 31 01/09/2025    AST 24 01/09/2025    ALKPHOS 85 01/09/2025    BILITOT 0.8 10/03/2016                 Future Appointments   Date Time Provider Department Center   3/7/2025  3:00 PM Jovanni Lopez MD [facility]   3/14/2025  8:00 AM BETH Keane [facility]   3/17/2025 10:15 AM Rosalind Mccormack SLP [facility]   3/20/2025  8:00 AM Rosalind Mccormack SLP [facility]   3/20/2025  1:00 PM Bernardino Woodward MD [facility]   3/24/2025  8:00 AM Rosalind Mccormack SLP [facility]   3/26/2025  8:00 AM Jovanni Rodas DPM [facility]   3/28/2025  8:00 AM Rosalind Mccormack, SLP [facility]   3/31/2025  8:00 AM Rosalind Mccormack,  SLP [facility]   4/2/2025  8:00 AM Rosalind [place] [facility], SLP [facility]   4/3/2025  8:00 AM Rosmery Weir DPM [facility]   4/7/2025  8:00 AM Rosalind [place] [facility], SLP [facility]   4/9/2025  3:30 PM Rosalind [place] [facility], SLP [facility]   4/14/2025  8:00 AM Rosalind [place] [facility], SLP [facility]   4/16/2025  8:00 AM Rosalind [place] [facility], SLP [facility]   4/17/2025  9:00 AM MIKE Matthews [facility]   4/21/2025  8:00 AM Rosalind [place] [facility], SLP [facility]   4/23/2025  8:00 AM Rosalind [place] [facility], SLP [facility]   4/28/2025  8:00 AM Rosalind [place] [facility], SLP [facility]   4/30/2025  8:00 AM Rosalind [place] [facility], SLP [facility]   5/1/2025  8:00 AM Sharon Hampton MD [facility]   5/19/2025 10:40 AM DO Cathleen Campbell   7/8/2025  7:30 AM Ariel Sosa MD [facility]   7/24/2025  8:20 AM Frieda Logan MD ENDO [facility]

## 2025-03-08 DIAGNOSIS — R52 PAIN: ICD-10-CM

## 2025-03-08 RX ORDER — DULOXETIN HYDROCHLORIDE 30 MG/1
30 CAPSULE, DELAYED RELEASE ORAL DAILY
Qty: 90 CAPSULE | Refills: 1 | Status: SHIPPED | OUTPATIENT
Start: 2025-03-08

## 2025-03-10 ENCOUNTER — APPOINTMENT (OUTPATIENT)
Dept: SPEECH THERAPY | Facility: CLINIC | Age: 55
End: 2025-03-10
Payer: MEDICARE

## 2025-03-14 ENCOUNTER — OFFICE VISIT (OUTPATIENT)
Dept: SPEECH THERAPY | Facility: CLINIC | Age: 55
End: 2025-03-14
Payer: MEDICARE

## 2025-03-14 DIAGNOSIS — R41.3 MEMORY LOSS: ICD-10-CM

## 2025-03-14 DIAGNOSIS — R41.841 COGNITIVE COMMUNICATION DEFICIT: Primary | ICD-10-CM

## 2025-03-14 DIAGNOSIS — R48.8 OTHER SYMBOLIC DYSFUNCTIONS: ICD-10-CM

## 2025-03-14 PROCEDURE — 97130 THER IVNTJ EA ADDL 15 MIN: CPT

## 2025-03-14 PROCEDURE — 97129 THER IVNTJ 1ST 15 MIN: CPT

## 2025-03-14 NOTE — PROGRESS NOTES
Daily Speech Treatment Note    Today's date: 3/14/2025   Patient’s name: Benito Park  : 1970  MRN: [id number]  Safety measures: h/o Diabetic Shock  Referring provider: Francesco Edouard CRNP    Encounter Diagnoses   Name Primary?    Memory loss Yes    Other symbolic dysfunctions     Cognitive communication deficit        Visit Tracking:  POC   Expires Auth Expiration Date ST Visit Limit   2025 BOMN BOMN          Visit/Unit Tracking:  Auth Status Date 3/3 3/5/25   3/14   No auth req, Used 11 12 13    Remaining 9 8 7       Subjective/Behavioral:  -Patient pleasant and cooperative. .       Objective/Assessment:  Completed structured activities with patient to target goals below.  Results as stated below.     ? If medication could be effective to help with attention /focus as patient moves and thinks very fast; negatively impacting cognition, recall.  Discussed with patient as a possibility in the future. Encouraged continually slowing down, taking things slower during the day as much as possible.       Short-term goals:  Patient will be educated on the use of internal and external memory aids and compensatory strategies to facilitate increased recall of routine, personal information, and recent events, to be achieved in 4-6 weeks.  25:  Reviewed information / handout on memory strategies with patient. Patient will bring in journal next session, use post it before entering car, work on repetition out loud, etc.  Working on review of medication list / reason taking in future session.  : Provided information on apps/workbooks/games recommended and provided handout.  :  Provided info on digital voice recorder since patient having difficulty with writing (fine motor / hand issues).     Patient will complete auditory immediate and short term memory tasks to facilitate increased ability to retell narratives and recall information within functional living environment, to be achieved in 4-6 weeks.   : Following auditory directives: 1 step 2 components: 80% & 100% accuracy, 2 step - 4 components: 70-90%, 3 step, 6 components: 75% accuracy, ongoing education regarding step / step, repetition out loud, review. Drawing Figures: Written: 75% - 100% accuracy.     Patient will complete complex auditory attention processing tasks (e.g., sentence unscramble, ranking numbers/words, etc.) to improve working memory with 80% accuracy, to be achieved in 4-6 weeks.  : Working on pairing of words, able to complete 4 words into 2 pairs: 100% , not able to complete 6 words into 3 pairs with max cues. : Worked with sentence formulation and recalling words: set of 2: 100%, set of 3 words benefitted from writing words down on paper and formulating sentence on paper, then work towards repetition, visualization for final 3 words which was successful, continue to work with sentence formulation 3 words in future sessions. : Able to formulate sentence with 3 words, give increased time, review out loud, focused on these strategies. Recall of pairs, color codin/5 pairs: 80% accuracy.  : recall of pairs of words: attribute: 3/5, then 4/5 with 2nd set.  Reverse order: 3 words: 90%, putting 3 words in order, Unscrambling words 3-4 100%, 5 words, requires rep. At time, increased processing.  Word List Retention: 80% accuracy out of 4 words (attribute). Word List Retention: Exclusion: required repetition 2-3 times for accurate recall with question after 5 words.  At end of session: recall of pairs: 80% accuracy. 3/3: Recall of 4/4 pictures after 5 minutes using story imagery recall, recall of details from short paragraph- 80% accuracy. 3/14: Recall of 5 pictures after 5 minutes: Recall of all 5/5 pictures after 5 minutes.     Patient will complete thought organization tasks (e.g., sequencing, deduction puzzles, etc.) with 80% accuracy to facilitate increased executive functioning, working memory, problem solving, and  processing skills, to be achieved in 4-6 weeks.  1/28: Attempted to complete Reggie's closet , logical solution puzzle- will work with patient with this task due to needs glasses and confusion over location/comprehension.  Also, wrote out medication list for patient and patient will work on writing reason taking for each medication. 1/31: Worked on Kitchen Shelves activity: moderate cues to help patient with attention/ focus: using finger or pencil as visual marker, one part of sentence at a time, crossing off completed tasks, provided vegetable garden for home practice. Played BLINK & organized cards into 3 different rows: number, color, shape, required frequent reminders for proper category / but did improve with indep. As game went on. 2/7:  Garden Plot: Completed from homework and corrected with patient - requiring moderate - max cues for assistance with reading comprehension and location comprehension using compass. 2/7: Organizing written sequencing of tasks (4 steps): 100% accuracy, provided more steps for hw. Reading comprehension written directions, multi steps: with education of breaking up steps, able to complete accurately- provided for hw.  2/28: Completed hw tasks, 1 step 2 components, 2 step 4 components, 3 step 6 components, completed at least 80% accuracy but would be beneficial to continue this goal. 3/3: Day 1 Coding task, organizing maze of symbols & computations: Initially, completed education & demonstration, then patient able to complete exercises. Then, min cues provided, patient will practice Day 1-2 at home, with checking work throughout.  3/5: Reading comprehension questions, first one: 70% accuracy, completed education, using highlighter, slow, review. 100% 2nd paragraph with independent use of these strategies. Nell's Schedule: required mod-max cues/assistance, will work on these type of problems in future sessions.        Patient will complete concrete and abstract categorization tasks to  80% accuracy to facilitate improved generative naming skills and working memory, to be achieved in 4-6 weeks.   3/5: Assisted patient with completing cross word puzzle from folder- completed 65-70% indep, minimal cues to increase to 100% accuracy.   3/14: Filling in Category Members given categories and letters: anagrams: minimal-moderate cues, 70% accuracy.     Patient will utilize word finding strategies during semantic feature analysis treatment activity for improved naming and verbal expression skills, to be achieved in 4-6 weeks.       Long Term Goals    Patient will demonstrate cognitive-communication skills consistent with age and education given use of compensatory strategies when needed to resume baseline activities and responsibilities in home, community, and work/school settings by discharge.     Patient will complete cognitive-linguistic therapy that addresses patient’s specific deficits in processing speed, short-term working memory, attention to detail, monitoring, sequencing, and organization skills, with instruction, to alleviate effects of executive functioning disorder deficits by discharge.     Patient will complete higher-level expressive language tasks (e.g., word definitions, idioms, synonym/antonyms, etc) with 80% accuracy to improve functional communication skills by discharge.

## 2025-03-17 ENCOUNTER — OFFICE VISIT (OUTPATIENT)
Dept: SPEECH THERAPY | Facility: CLINIC | Age: 55
End: 2025-03-17
Payer: MEDICARE

## 2025-03-17 DIAGNOSIS — R41.841 COGNITIVE COMMUNICATION DEFICIT: ICD-10-CM

## 2025-03-17 DIAGNOSIS — R48.8 OTHER SYMBOLIC DYSFUNCTIONS: ICD-10-CM

## 2025-03-17 DIAGNOSIS — R41.3 MEMORY LOSS: Primary | ICD-10-CM

## 2025-03-17 PROCEDURE — 97129 THER IVNTJ 1ST 15 MIN: CPT

## 2025-03-17 PROCEDURE — 97130 THER IVNTJ EA ADDL 15 MIN: CPT

## 2025-03-17 NOTE — PROGRESS NOTES
Daily Speech Treatment Note    Today's date: 3/17/2025   Patient’s name: Benito Park  : 1970  MRN: [id number]  Safety measures: h/o Diabetic Shock  Referring provider: Francesco Edouard CRNP    Encounter Diagnoses   Name Primary?    Memory loss Yes    Other symbolic dysfunctions     Cognitive communication deficit        Visit Tracking:  POC   Expires Auth Expiration Date ST Visit Limit   2025 BOMN BOMN          Visit/Unit Tracking:  Auth Status Date 3/3 3/5/25   3/14 3/17   No auth req, Used  12 13 14    Remaining 9 8 7 6       Subjective/Behavioral:  -Patient pleasant and cooperative. .       Objective/Assessment:  Completed structured activities with patient to target goals below.  Results as stated below.       Short-term goals:  Patient will be educated on the use of internal and external memory aids and compensatory strategies to facilitate increased recall of routine, personal information, and recent events, to be achieved in 4-6 weeks.  25:  Reviewed information / handout on memory strategies with patient. Patient will bring in journal next session, use post it before entering car, work on repetition out loud, etc.  Working on review of medication list / reason taking in future session.  : Provided information on apps/workbooks/games recommended and provided handout.  :  Provided info on digital voice recorder since patient having difficulty with writing (fine motor / hand issues).     Patient will complete auditory immediate and short term memory tasks to facilitate increased ability to retell narratives and recall information within functional living environment, to be achieved in 4-6 weeks.  : Following auditory directives: 1 step 2 components: 80% & 100% accuracy, 2 step - 4 components: 70-90%, 3 step, 6 components: 75% accuracy, ongoing education regarding step / step, repetition out loud, review. Drawing Figures: Written: 75% - 100% accuracy.     Patient will complete  complex auditory attention processing tasks (e.g., sentence unscramble, ranking numbers/words, etc.) to improve working memory with 80% accuracy, to be achieved in 4-6 weeks.  : Working on pairing of words, able to complete 4 words into 2 pairs: 100% , not able to complete 6 words into 3 pairs with max cues. : Worked with sentence formulation and recalling words: set of 2: 100%, set of 3 words benefitted from writing words down on paper and formulating sentence on paper, then work towards repetition, visualization for final 3 words which was successful, continue to work with sentence formulation 3 words in future sessions. : Able to formulate sentence with 3 words, give increased time, review out loud, focused on these strategies. Recall of pairs, color codin/5 pairs: 80% accuracy.  : recall of pairs of words: attribute: 3/5, then 4/5 with 2nd set.  Reverse order: 3 words: 90%, putting 3 words in order, Unscrambling words 3-4 100%, 5 words, requires rep. At time, increased processing.  Word List Retention: 80% accuracy out of 4 words (attribute). Word List Retention: Exclusion: required repetition 2-3 times for accurate recall with question after 5 words.  At end of session: recall of pairs: 80% accuracy. 3/3: Recall of 4/4 pictures after 5 minutes using story imagery recall, recall of details from short paragraph- 80% accuracy. 3/14: Recall of 5 pictures after 5 minutes: Recall of all 5/5 pictures after 5 minutes. 3/17: Recall of 7/7 pictures: 6/7 pictures recalled using story imagery.  Recall: Short paragraphs, 3-5 sentences, recalling via SLP questioning.     Patient will complete thought organization tasks (e.g., sequencing, deduction puzzles, etc.) with 80% accuracy to facilitate increased executive functioning, working memory, problem solving, and processing skills, to be achieved in 4-6 weeks.  : Attempted to complete Reggie's closet , logical solution puzzle- will work with patient with  this task due to needs glasses and confusion over location/comprehension.  Also, wrote out medication list for patient and patient will work on writing reason taking for each medication. 1/31: Worked on Kitchen Shelves activity: moderate cues to help patient with attention/ focus: using finger or pencil as visual marker, one part of sentence at a time, crossing off completed tasks, provided vegetable garden for home practice. Played BLINK & organized cards into 3 different rows: number, color, shape, required frequent reminders for proper category / but did improve with indep. As game went on. 2/7:  Garden Plot: Completed from homework and corrected with patient - requiring moderate - max cues for assistance with reading comprehension and location comprehension using compass. 2/7: Organizing written sequencing of tasks (4 steps): 100% accuracy, provided more steps for hw. Reading comprehension written directions, multi steps: with education of breaking up steps, able to complete accurately- provided for hw.  2/28: Completed hw tasks, 1 step 2 components, 2 step 4 components, 3 step 6 components, completed at least 80% accuracy but would be beneficial to continue this goal. 3/3: Day 1 Coding task, organizing maze of symbols & computations: Initially, completed education & demonstration, then patient able to complete exercises. Then, min cues provided, patient will practice Day 1-2 at home, with checking work throughout.  3/5: Reading comprehension questions, first one: 70% accuracy, completed education, using highlighter, slow, review. 100% 2nd paragraph with independent use of these strategies. Nell's Schedule: required mod-max cues/assistance, will work on these type of problems in future sessions.        Patient will complete concrete and abstract categorization tasks to 80% accuracy to facilitate improved generative naming skills and working memory, to be achieved in 4-6 weeks.   3/5: Assisted patient with  completing cross word puzzle from folder- completed 65-70% indep, minimal cues to increase to 100% accuracy.   3/14: Filling in Category Members given categories and letters: anagrams: minimal-moderate cues, 70% accuracy.  3/17: Filling in cross word puzzles, 90% accuracy.  40% accuracy in answering responsive naming questions given first letter.      Patient will utilize word finding strategies during semantic feature analysis treatment activity for improved naming and verbal expression skills, to be achieved in 4-6 weeks.       Long Term Goals    Patient will demonstrate cognitive-communication skills consistent with age and education given use of compensatory strategies when needed to resume baseline activities and responsibilities in home, community, and work/school settings by discharge.     Patient will complete cognitive-linguistic therapy that addresses patient’s specific deficits in processing speed, short-term working memory, attention to detail, monitoring, sequencing, and organization skills, with instruction, to alleviate effects of executive functioning disorder deficits by discharge.     Patient will complete higher-level expressive language tasks (e.g., word definitions, idioms, synonym/antonyms, etc) with 80% accuracy to improve functional communication skills by discharge.

## 2025-03-20 ENCOUNTER — OFFICE VISIT (OUTPATIENT)
Dept: SPEECH THERAPY | Facility: CLINIC | Age: 55
End: 2025-03-20
Payer: MEDICARE

## 2025-03-20 ENCOUNTER — OFFICE VISIT (OUTPATIENT)
Dept: GASTROENTEROLOGY | Facility: CLINIC | Age: 55
End: 2025-03-20
Payer: MEDICARE

## 2025-03-20 VITALS
DIASTOLIC BLOOD PRESSURE: 80 MMHG | SYSTOLIC BLOOD PRESSURE: 130 MMHG | BODY MASS INDEX: 27.22 KG/M2 | HEIGHT: 72 IN | WEIGHT: 201 LBS

## 2025-03-20 DIAGNOSIS — K59.1 FUNCTIONAL DIARRHEA: ICD-10-CM

## 2025-03-20 DIAGNOSIS — R41.3 MEMORY LOSS: Primary | ICD-10-CM

## 2025-03-20 DIAGNOSIS — R41.841 COGNITIVE COMMUNICATION DEFICIT: ICD-10-CM

## 2025-03-20 DIAGNOSIS — R48.8 OTHER SYMBOLIC DYSFUNCTIONS: ICD-10-CM

## 2025-03-20 DIAGNOSIS — E53.8 B12 DEFICIENCY: ICD-10-CM

## 2025-03-20 DIAGNOSIS — K50.814 CROHN'S DISEASE OF BOTH SMALL AND LARGE INTESTINE WITH ABSCESS (HCC): Primary | ICD-10-CM

## 2025-03-20 PROCEDURE — 97130 THER IVNTJ EA ADDL 15 MIN: CPT

## 2025-03-20 PROCEDURE — 99214 OFFICE O/P EST MOD 30 MIN: CPT | Performed by: INTERNAL MEDICINE

## 2025-03-20 PROCEDURE — 97129 THER IVNTJ 1ST 15 MIN: CPT

## 2025-03-20 RX ORDER — LOPERAMIDE HYDROCHLORIDE 2 MG/1
2 CAPSULE ORAL
Qty: 60 CAPSULE | Refills: 3 | Status: SHIPPED | OUTPATIENT
Start: 2025-03-20

## 2025-03-20 RX ORDER — LANOLIN ALCOHOL/MO/W.PET/CERES
1000 CREAM (GRAM) TOPICAL DAILY
Qty: 90 TABLET | Refills: 3 | Status: SHIPPED | OUTPATIENT
Start: 2025-03-20

## 2025-03-20 NOTE — TELEPHONE ENCOUNTER
Patient called Rx refill line and states he received a call from his pharmacy  ([facility]) that they no longer accept Rx from Dr Sosa - Please check with pharmacy and let pt know

## 2025-03-20 NOTE — PROGRESS NOTES
Patient ID: Benito Park is a 54 y.o. male Date of Birth 1970       Chief Complaint   Patient presents with   • Follow Up Wound Care Visit     Left great toe wound and new dorsal wound       Allergies:  Patient has no known allergies.    Diagnosis:  1. Diabetic ulcer of other part of left foot associated with type 2 diabetes mellitus, with fat layer exposed (HCC)  -     Wound cleansing and dressings; Future  -     Wound Procedure Treatment Diabetic Ulcer Left Plantar  -     Debridement Diabetic Ulcer Anterior;Left;Medial;Dorsal Foot  2. Diabetic ulcer of toe of left foot associated with type 2 diabetes mellitus, with fat layer exposed (HCC)  -     Wound cleansing and dressings; Future  -     Wound Procedure Treatment Traumatic Anterior;Left Foot  3. Type 2 diabetes mellitus with diabetic neuropathy, without long-term current use of insulin (HCC)     Diagnosis ICD-10-CM Associated Orders   1. Diabetic ulcer of other part of left foot associated with type 2 diabetes mellitus, with fat layer exposed (HCC)  E11.621 Wound cleansing and dressings    L97.522 Wound Procedure Treatment Diabetic Ulcer Left Plantar     Debridement Diabetic Ulcer Anterior;Left;Medial;Dorsal Foot      2. Diabetic ulcer of toe of left foot associated with type 2 diabetes mellitus, with fat layer exposed (HCC)  E11.621 Wound cleansing and dressings    L97.522 Wound Procedure Treatment Traumatic Anterior;Left Foot      3. Type 2 diabetes mellitus with diabetic neuropathy, without long-term current use of insulin (HCC)  E11.40            Assessment & Plan:  LGT tip noted to have good progress and is healed  New DM ulcer has developed dorsal medial left first MPJ which may be secondary to CAM boot being strapped on too tight over the forefoot.  Felt offloading with cam boot when ambulating.    Patient advised to be careful not to apply CAM boot too tight  Instructed only to remove cam boot when bathing or sleeping  Follow-up in 3  weeks.    Subjective:   33 2025: 54-year-old male type II diabetic presents today for follow-up evaluation of chronic DFU on tip of left great toe and now has reported a new wound on the top of his left great toe joint.    2/19/2025: 54-year-old male type II diabetic seen for follow-up chronic ulcer tip of left great toe.  Reports good progress since last visit and feels the wound is nearly closed.    1/15/2025:  54 year-old male presents for follow-up chronic ulcer tip of left great toe.  Has been wearing cam boot as previously instructed.  Denies any new pain/discomfort.    12/30/2024: 54-year-old male presents today for follow-up chronic ulceration located at the tip of his left great toe.  Football dressing intact.  Patient reports has been comfortable but very inconvenient to keep dry.    12/11/2024: 54-year-old male seen today for follow-up chronic left great toe ulceration.  Reports football dressing has helped and he is making progress but would like to discontinue the football dressing.    12/4/2024: 54-year-old type II diabetic seen today for follow-up DFU left great toe, right great toe was closed as of last visit.  Reports he is not a fan of the football dressing but is anxious to not needed.  Reports right great toe is doing fine.    11/27/2024: 54-year-old type II diabetic seen today for follow-up bilateral great toe ulcerations who was last seen by Dr. Weir 1 week ago who put him in bilateral football dressings.  Patient reports right foot opened up while in [place] and left foot opened up upon return from [place] at home.  Reports good progress and feels the right foot has closed.  Denies any new pain/discomfort.  Reports he has been diabetic for approximately 1 year.    11/20/2024: (Dr. Weir) presents today for evaluation and care of bilateral great toe diabetic ulcerations, he is tolerating football dressing well although he did have to remove it on Monday for his lower extremity arterial  Dopplers.        The following portions of the patient's history were reviewed and updated as appropriate:   Patient Active Problem List   Diagnosis   • Sprain of anterior talofibular ligament of right ankle   • Perianal abscess   • Polyneuropathy   • Chronic bilateral low back pain with bilateral sciatica   • Bilateral lower extremity edema   • Chronic pain syndrome   • Failed back surgical syndrome   • Lumbar spondylosis   • 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 ([facility])   • Congenital fusion of sacroiliac joint   • Terminal ileitis ([facility])   • Viral enteritis   • Chronic foot pain   • Lumbar radiculopathy   • GERD (gastroesophageal reflux disease)   • History of colon polyps   • Perianal fistula   • Functional diarrhea   • Blepharitis, left eye   • Closed fracture of radial styloid   • Compression of right ulnar nerve at multiple levels   • Degenerative disc disease at L5-S1 level   • History of lumbar fusion   • Numbness and tingling of both lower extremities   • Periorbital cellulitis of left eye   • Right leg swelling   • Tobacco use   • Vitamin D deficiency   • Arthritis of right shoulder region   • Chronic right shoulder pain   • Myofascial pain syndrome   • S/P insertion of spinal cord stimulator   • Crohn's disease ([facility])   • Numbness and tingling in right hand   • Neck pain   • Cervical radiculopathy   • Herniated nucleus pulposus, C3-4   • Hypertension   • Smoker   • Heavy alcohol consumption   • Sleep apnea   • Snoring   • Excessive daytime sleepiness   • Overweight (BMI 25.0-29.9)   • Dupuytren contracture   • Capillary disorder   • Epididymitis, right   • Obstructive sleep apnea syndrome   • Pes anserinus bursitis of left knee   • Arthralgia   • Ulcer of left foot, limited to breakdown of skin ([facility])   • Dysarthria   • acute Confusional state   • Hyperlipidemia   • Type 2 diabetes mellitus with hyperglycemia, without long-term current use of insulin ([facility])   • Diabetic polyneuropathy associated with type 2  diabetes mellitus ([facility])   • Lung nodule   • Abnormal movements   • Episode of change in speech   • Hoarseness   • Diabetic ulcer of left foot associated with type 2 diabetes mellitus, limited to breakdown of skin, unspecified part of foot ([facility])   • Carpal tunnel syndrome of right wrist   • Transient neurological symptoms   • Mucopurulent chronic bronchitis ([facility])   • Diabetic ulcer of toe of left foot associated with type 2 diabetes mellitus, with fat layer exposed ([facility])   • Agitation   • OSIRIS (obstructive sleep apnea)   • Anxiety   • Idiopathic progressive neuropathy   • S/P insertion of hypoglossal nerve stimulator     Past Medical History:   Diagnosis Date   • Anxiety    • Back pain    • Callus Few months ago   • Chronic pain disorder     back and legs   • Colon polyp    • COPD (chronic obstructive pulmonary disease) ([facility])     pt denies   • Crohn's disease ([facility])    • Dental crown present    • Depression    • Diabetes mellitus ([facility])     type 2   • GERD (gastroesophageal reflux disease)    • Hyperglycemia     diabetic shock--follows with  Endocrinology   • Hyperlipidemia     per pt diet controlled   • Hypertension    • Neuropathy    • Perianal fistula    • Sleep apnea     no current CPAP use   • Spinal cord stimulator status     battery  -near right side back/hip   • Terminal ileitis ([facility])      Past Surgical History:   Procedure Laterality Date   • BACK SURGERY  2019   • CAST APPLICATION Right 11/17/2022    Procedure: Application short-arm splint;  Surgeon: Sarthak Villatoro MD;  Location:  MAIN OR;  Service: Orthopedics   • COLONOSCOPY     • EGD     • HAND CONTRACTURE RELEASE Left 06/01/2023    Procedure: left ring and small finger dupuytrens excision and ring finger Metacarpophalangeal joint release and small finger Metacarpophalangeal and proximal interphalangeal joint release;  Surgeon: Sarthak Villatoro MD;  Location:  MAIN OR;  Service: Orthopedics   • HERNIA REPAIR      umbilical hernia   • [place] ANRCT XM  SURG REQ ANES GENERAL SPI/EDRL DX N/A 11/17/2021    Procedure: EXAM UNDER ANESTHESIA (EUA);  Surgeon: Davi Thao MD;  Location: [facility];  Service: Colorectal   • [place] APPLICATION SHORT ARM SPLINT FOREARM-HAND STATIC Right 1/11/2024    Procedure: Application short arm splint;  Surgeon: Sarthak Villatoro MD;  Location: [facility];  Service: Orthopedics   • KY CAPSULECTOMY/CAPSULOTOMY IPHAL JOINT EACH Right 1/11/2024    Procedure: Contracture release right hand small finger proximal interphalangeal joint;  Surgeon: Sarthak Villatoro MD;  Location: [facility];  Service: Orthopedics   • KY DISE DYN EVAL SLEEP DISORDERED BREATHING FLX DX N/A 11/13/2024    Procedure: DRUG INDUCED SLEEP ENDOSCOPY;  Surgeon: Jeff Arndt MD;  Location: [facility] Main [facility];  Service: ENT   • KY FASCIOTOMY PALMAR OPEN PARTIAL Right 11/17/2022    Procedure: Dupuytren's excision right palm extending into the small finger with release of the metacarpophalangeal joint and proximal interphalangeal joint.  Dupuytren's excision right palm with release of the right ring finger metacarpophalangeal joint.;  Surgeon: Sarthak Villatoro MD;  Location: [facility];  Service: Orthopedics   • KY FASCIOTOMY PALMAR OPEN PARTIAL Right 1/11/2024    Procedure: Right hand small finger duppuytrens excision with release of the metacarpophalangeal joint and proximal interphalangeal joint;  Surgeon: Sarthak Villatoro MD;  Location: [facility];  Service: Orthopedics   • KY FASCT PALM W/WO Z-PLASTY TISSUE REARGMT/SKN GRFT Right 1/11/2024    Procedure: Right hand small finger duppuytrens excision with release of the metacarpophalangeal joint and proximal interphalangeal joint;  Surgeon: Sarthak Villatoro MD;  Location: [facility];  Service: Orthopedics   • [place] FASCT PRTL PALMAR 1 DGT PROX IPHAL JT W/WO RPR Right 1/11/2024    Procedure: Right hand small finger duppuytrens excision with release of the metacarpophalangeal joint and proximal interphalangeal joint;   Surgeon: Sarthak Villatoro MD;  Location: [facility];  Service: Orthopedics   • [place] I&D ISCHIORECTAL&/PERIRECTAL ABSCESS SPX Right 10/24/2021    Procedure: excisional debredement right gluteal cheek ;  Surgeon: Jeana Bustamante MD;  Location: [facility];  Service: General   • NJ PLACEMENT SETON N/A 2021    Procedure: PLACEMENT SETON;  Surgeon: Davi Thao MD;  Location: [facility];  Service: Colorectal   • [place] PRQ IMPLTJ NSTIM ELECTRODE ARRAY EPIDURAL Right 2021    Procedure: INSERTION THORACIC DORSAL COLUMN SPINAL CORD STIMULATOR PERCUTANEOUS W IMPLANTABLE PULSE GENERATOR, RIGHT;  Surgeon: Akshat Witt MD;  Location: [facility];  Service: Neurosurgery   • [place] REMOVAL HYPOGLOSSAL NERVE NSTIM RA PG&RESPIR SNR Right 2025    Procedure: INSERTION UPPER AIRWAY STIMULATOR RIGHT INSPIRE;  Surgeon: Jeff Arndt MD;  Location: [facility];  Service: ENT     Social History     Socioeconomic History   • Marital status: /Civil Union     Spouse name: None   • Number of children: None   • Years of education: None   • Highest education level: None   Occupational History   • None   Tobacco Use   • Smoking status: Former     Current packs/day: 0.00     Average packs/day: 0.5 packs/day for 30.0 years (15.0 ttl pk-yrs)     Types: Cigarettes     Start date: 2019     Quit date: 2021     Years since quittin.1     Passive exposure: Never   • Smokeless tobacco: Never   Vaping Use   • Vaping status: Every Day   • Substances: THC   Substance and Sexual Activity   • Alcohol use: Yes     Alcohol/week: 3.0 standard drinks of alcohol     Types: 3 Cans of beer per week     Comment: social   • Drug use: Yes     Frequency: 7.0 times per week     Types: Marijuana     Comment: Medical marijuana-vapes, bedtime   • Sexual activity: Yes     Partners: Female     Comment: defer   Other Topics Concern   • None   Social History Narrative   • None     Social Drivers of Health     Financial Resource Strain: Low  Risk  (6/7/2023)    Overall Financial Resource Strain (CARDIA)    • Difficulty of Paying Living Expenses: Not hard at all   Food Insecurity: No Food Insecurity (7/5/2024)    Nursing - Inadequate Food Risk Classification    • Worried About Running Out of Food in the Last Year: Never true    • Ran Out of Food in the Last Year: Never true    • Ran Out of Food in the Last Year: Not on file   Transportation Needs: No Transportation Needs (7/5/2024)    PRAPARE - Transportation    • Lack of Transportation (Medical): No    • Lack of Transportation (Non-Medical): No   Physical Activity: Sufficiently Active (4/11/2023)    Received from [facility]    Physical Activity   Stress: No Stress Concern Present (4/11/2023)    Received from [facility]    [employer] - Occupational Stress Questionnaire    • Feeling of Stress : Only a little   Social Connections: Moderately Integrated (4/11/2023)    Received from [facility]    Social Connection and Isolation Panel [NHANES]   Intimate Partner Violence: Not At Risk (4/11/2023)    Received from [facility], [facility]    Humiliation, Afraid, Rape, and Kick questionnaire    • Fear of Current or Ex-Partner: No    • Emotionally Abused: No    • Physically Abused: No    • Sexually Abused: No   Housing Stability: Low Risk  (7/5/2024)    Housing Stability Vital Sign    • Unable to Pay for Housing in the Last Year: No    • Number of Times Moved in the Last Year: 1    • Homeless in the Last Year: No        Current Outpatient Medications:   •  acetaminophen (TYLENOL) 500 mg tablet, Take 500 mg by mouth every 6 (six) hours as needed for mild pain, Disp: , Rfl:   •  albuterol (PROVENTIL HFA,VENTOLIN HFA) 90 mcg/act inhaler, Inhale 2 puffs every 6 (six) hours as needed for wheezing, Disp: 6.7 g, Rfl: 2  •  Alcohol Swabs 70 % PADS, May substitute brand based on insurance coverage. Check glucose TID., Disp: 100 each, Rfl: 0  •  atorvastatin (LIPITOR) 40  mg tablet, Take 1 tablet (40 mg total) by mouth every evening, Disp: 90 tablet, Rfl: 2  •  Blood Glucose Monitoring Suppl (OneTouch Verio Reflect) w/Device KIT, May substitute brand based on insurance coverage. Check glucose TID., Disp: 1 kit, Rfl: 0  •  Cholecalciferol (RA Vitamin D-3) 1,000 units tablet, Take 2 tablets (2,000 Units total) by mouth daily, Disp: 180 tablet, Rfl: 2  •  cyclobenzaprine (FLEXERIL) 10 mg tablet, TAKE 1 TABLET BY MOUTH EVERYDAY AT BEDTIME, Disp: 90 tablet, Rfl: 2  •  DULoxetine (CYMBALTA) 30 mg delayed release capsule, TAKE 1 CAPSULE BY MOUTH EVERY DAY, Disp: 90 capsule, Rfl: 1  •  escitalopram (LEXAPRO) 10 mg tablet, Take 1 tablet (10 mg total) by mouth daily, Disp: 90 tablet, Rfl: 3  •  glucose blood (OneTouch Verio) test strip, May substitute brand based on insurance coverage. Check glucose TID., Disp: 100 each, Rfl: 5  •  lisinopril (ZESTRIL) 20 mg tablet, Take 1 tablet (20 mg total) by mouth daily, Disp: 90 tablet, Rfl: 2  •  loperamide (IMODIUM) 2 mg capsule, Take 1 capsule (2 mg total) by mouth daily at bedtime, Disp: 30 capsule, Rfl: 2  •  LORazepam (ATIVAN) 1 mg tablet, Take 1 tablet (1 mg total) by mouth every 8 (eight) hours as needed for anxiety, Disp: 60 tablet, Rfl: 3  •  naproxen (NAPROSYN) 250 mg tablet, Take 250 mg by mouth as needed for mild pain, Disp: , Rfl:   •  omeprazole (PriLOSEC) 40 MG capsule, Take 1 capsule (40 mg total) by mouth daily, Disp: 90 capsule, Rfl: 2  •  ondansetron (ZOFRAN) 4 mg tablet, Take 1 tablet (4 mg total) by mouth every 8 (eight) hours as needed for nausea or vomiting, Disp: 20 tablet, Rfl: 1  •  OneTouch Delica Lancets 33G MISC, May substitute brand based on insurance coverage. Check glucose TID., Disp: 100 each, Rfl: 5  •  pregabalin (LYRICA) 150 mg capsule, Take 1 capsule (150 mg total) by mouth 3 (three) times a day, Disp: 90 capsule, Rfl: 2  •  traZODone (DESYREL) 50 mg tablet, TAKE 1 TO 2 TABLETS AT BEDTIME AS NEEDED FOR SLEEP, Disp:  180 tablet, Rfl: 1  •  vedolizumab (Entyvio) SOLR, Inject 300 mg into a catheter in a vein every 4 weeks, Disp: , Rfl:   Family History   Problem Relation Age of Onset   • COPD Mother    • Lung cancer Mother    • Heart disease Father    • Heart attack Father    • Hypertension Sister    • Diabetes type II Sister    • No Known Problems Sister    • No Known Problems Sister    • No Known Problems Sister    • No Known Problems Brother    • Heart attack Brother    • No Known Problems Son    • No Known Problems Son    • Colon cancer Neg Hx    • Colon polyps Neg Hx    • Inflammatory bowel disease Neg Hx       Review of Systems   Constitutional: Negative.    HENT: Negative.     Eyes: Negative.    Respiratory: Negative.     Cardiovascular: Negative.    Gastrointestinal: Negative.    Endocrine: Negative.    Genitourinary: Negative.    Musculoskeletal: Negative.    Skin:         Reports chronic DFU left great toe may be healed, but has new wound on top of left great toe joint   Allergic/Immunologic: Negative.    Neurological:  Positive for numbness.   Hematological: Negative.          Objective:  /80   Pulse 98   Resp 16     Physical Exam  Constitutional:       Appearance: Normal appearance. He is normal weight.   HENT:      Head: Normocephalic and atraumatic.      Right Ear: External ear normal.      Left Ear: External ear normal.      Nose: Nose normal.      Mouth/Throat:      Mouth: Mucous membranes are moist.      Pharynx: Oropharynx is clear.   Eyes:      Conjunctiva/sclera: Conjunctivae normal.      Pupils: Pupils are equal, round, and reactive to light.   Cardiovascular:      Pulses: Normal pulses.           Dorsalis pedis pulses are 2+ on the right side and 2+ on the left side.        Posterior tibial pulses are 2+ on the right side and 2+ on the left side.   Pulmonary:      Effort: Pulmonary effort is normal.   Musculoskeletal:      Cervical back: Normal range of motion.      Right lower leg: No edema.       Left lower leg: No edema.      Right foot: Decreased range of motion.      Left foot: Decreased range of motion (First MPJ).      Comments: Diminished first MPJ range of motion bilateral  Left great toe 15-20 degrees range of motion first MPJ  Right great toe 20-25 degrees range of motion first MPJ   Feet:      Right foot:      Protective Sensation: 10 sites tested.  0 sites sensed.      Skin integrity: No ulcer (Closed with stable eschar.).      Toenail Condition: Right toenails are normal.      Left foot:      Protective Sensation: 10 sites tested.  0 sites sensed.      Skin integrity: Ulcer (See comments) present.      Toenail Condition: Left toenails are normal.      Comments:   Left great toe: Partial depth (Loza 1) ulcer @ plantar Tip of LGT, hyperkeratotic eschar appears closed 100%, no infection    Left foot: Full-thickness (Loza 1) ulcer overlying first MPJ dorsal medial location.  Wound measures 0.4 x 0.8 x 0.2 cm, necrotic eschar and slough, no signs of infection.  Skin:     General: Skin is warm and dry.   Neurological:      Mental Status: He is alert. Mental status is at baseline.      Sensory: Sensory deficit present.      Comments: Diminished epicritic sensations consistent with diabetic neuropathy.   Psychiatric:         Mood and Affect: Mood normal.         Behavior: Behavior normal.         Wound 11/06/24 Diabetic Ulcer Plantar Left (Active)   Wound Image   03/03/25 1512   Enter Loza score: Loza Grade 1: Partial or full-thickness ulcer (superficial) 12/30/24 1527   Wound Description Eschar 03/03/25 1512   Non-staged Wound Description Not applicable 03/03/25 1512   Wound Length (cm) 0.4 cm 03/03/25 1512   Wound Width (cm) 0.3 cm 03/03/25 1512   Wound Depth (cm) 0 cm 03/03/25 1512   Wound Surface Area (cm^2) 0.12 cm^2 03/03/25 1512   Wound Volume (cm^3) 0 cm^3 03/03/25 1512   Calculated Wound Volume (cm^3) 0 cm^3 03/03/25 1512   Change in Wound Size % 100 03/03/25 1512   Drainage Amount None  "03/03/25 1512   Drainage Description Serous 01/15/25 0950   [place]ydney-wound Assessment Dry;Callus 03/03/25 1512   Dressing Status Intact 02/19/25 1140       Wound 11/13/24 Nose N/A (Active)       Wound 01/22/25 Chest Right (Active)       Wound 03/03/25 Traumatic Foot Anterior;Left (Active)   Wound Image   03/03/25 1610   Wound Description Pink;Yellow 03/03/25 1513   Non-staged Wound Description Full thickness 03/03/25 1513   Wound Length (cm) 0.3 cm 03/03/25 1513   Wound Width (cm) 0.8 cm 03/03/25 1513   Wound Depth (cm) 0.1 cm 03/03/25 1513   Wound Surface Area (cm^2) 0.24 cm^2 03/03/25 1513   Wound Volume (cm^3) 0.024 cm^3 03/03/25 1513   Calculated Wound Volume (cm^3) 0.02 cm^3 03/03/25 1513   Drainage Amount Small 03/03/25 1513   Drainage Description Serosanguineous 03/03/25 1513   Sydney-wound Assessment Maceration 03/03/25 1513       Debridement   Wound 03/03/25 Diabetic Ulcer Diabetic Ulcer Foot Anterior;Left;Medial;Dorsal     Date/Time: 3/3/2025 3:00 PM  Universal Protocol:  procedure performed by consultantConsent: Verbal consent obtained.  Risks and benefits: risks, benefits and alternatives were discussed  Consent given by: patient  Time out: Immediately prior to procedure a \"time out\" was called to verify the correct patient, procedure, equipment, support staff and site/side marked as required.  Patient understanding: patient states understanding of the procedure being performed  Patient identity confirmed: verbally with patient    Debridement Details  Performed by: physician  Debridement type: surgical  Level of debridement: subcutaneous tissue  Pain control: lidocaine 4%    Post-debridement measurements  Length (cm): 0.4  Width (cm): 0.8  Depth (cm): 0.2  Percent debrided: 100%  Surface Area (cm^2): 0.32  Area Debrided (cm^2): 0.32  Volume (cm^3): 0.06    Tissue and other material debrided: subcutaneous tissue  Devitalized tissue debrided: necrotic debris and slough  Instrument(s) utilized: curette and " "nippers  Technique utilized: excisional  Bleeding: small  Hemostasis obtained with: pressure  Procedural pain (0-10): 3  Post-procedural pain: 3   Response to treatment: procedure was tolerated well                 Wound Instructions:  Orders Placed This Encounter   Procedures   • Wound cleansing and dressings     Left plantar great toe wound:     Wash your hands with soap and water.  Remove old dressing, discard into plastic bag and place in trash.  Cleanse the wound with soap and water prior to applying a clean dressing. Do not use tissue or cotton balls. Do not scrub the wound. Pat dry using gauze.  Shower yes with protection  Apply skin prep and felt cut out to skin surrounding wound  Keep felt cut out on toe at all times  Mupirocin applied to scabbed area today    Left dorsal foot wound:    Wash your hands with soap and water.  Remove old dressing, discard into plastic bag and place in trash.  Cleanse the wound with soap and water prior to applying a clean dressing. Do not use tissue or cotton balls. Do not scrub the wound. Pat dry using gauze.  Shower yes with protection  Apply silver alginate to wound then cover with dry dressing every other day to 3 x week    Return in 3 weeks     Standing Status:   Future     Expiration Date:   3/24/2025   • Wound Procedure Treatment Traumatic Anterior;Left Foot     This order was created via procedure documentation   • Wound Procedure Treatment Diabetic Ulcer Left Plantar     This order was created via procedure documentation   • Debridement Diabetic Ulcer Anterior;Left;Medial;Dorsal Foot     This order was created via procedure documentation         Jovanni Rodas DPM      Portions of the record may have been created with voice recognition software. Occasional wrong word or \"sound a like\" substitutions may have occurred due to the inherent limitations of voice recognition software. Read the chart carefully and recognize, using context, where substitutions have " occurred.

## 2025-03-20 NOTE — PROGRESS NOTES
Daily Speech Treatment Note    Today's date: 3/20/2025   Patient’s name: Benito Park  : 1970  MRN: [id number]  Safety measures: h/o Diabetic Shock  Referring provider: Francesco Edouard CRNP    Encounter Diagnoses   Name Primary?    Memory loss Yes    Other symbolic dysfunctions     Cognitive communication deficit        Visit Tracking:  POC   Expires Auth Expiration Date ST Visit Limit   2025 BOMN BOMN          Visit/Unit Tracking:  Auth Status Date 3/3 3/5/25   3/14 3/17 3/20   No auth req, Used 11 12 13 14 15    Remaining 9 8 7 6 5       Subjective/Behavioral:  -Patient pleasant and cooperative. .       Objective/Assessment:  Completed structured activities with patient to target goals below.  Results as stated below.       Short-term goals:  Patient will be educated on the use of internal and external memory aids and compensatory strategies to facilitate increased recall of routine, personal information, and recent events, to be achieved in 4-6 weeks.  25:  Reviewed information / handout on memory strategies with patient. Patient will bring in journal next session, use post it before entering car, work on repetition out loud, etc.  Working on review of medication list / reason taking in future session.  : Provided information on apps/workbooks/games recommended and provided handout.  :  Provided info on digital voice recorder since patient having difficulty with writing (fine motor / hand issues).     Patient will complete auditory immediate and short term memory tasks to facilitate increased ability to retell narratives and recall information within functional living environment, to be achieved in 4-6 weeks.  : Following auditory directives: 1 step 2 components: 80% & 100% accuracy, 2 step - 4 components: 70-90%, 3 step, 6 components: 75% accuracy, ongoing education regarding step / step, repetition out loud, review. Drawing Figures: Written: 75% - 100% accuracy.   3/20:  Summarizing / recall of info from medium length paragraphs: 8 sentences, recalls beginning and end but discussed getting a recorder, writing information down, needing repetition for info/ conversation.      Patient will complete complex auditory attention processing tasks (e.g., sentence unscramble, ranking numbers/words, etc.) to improve working memory with 80% accuracy, to be achieved in 4-6 weeks.  : Working on pairing of words, able to complete 4 words into 2 pairs: 100% , not able to complete 6 words into 3 pairs with max cues. : Worked with sentence formulation and recalling words: set of 2: 100%, set of 3 words benefitted from writing words down on paper and formulating sentence on paper, then work towards repetition, visualization for final 3 words which was successful, continue to work with sentence formulation 3 words in future sessions. : Able to formulate sentence with 3 words, give increased time, review out loud, focused on these strategies. Recall of pairs, color codin/5 pairs: 80% accuracy.  : recall of pairs of words: attribute: 3/5, then 4/5 with 2nd set.  Reverse order: 3 words: 90%, putting 3 words in order, Unscrambling words 3-4 100%, 5 words, requires rep. At time, increased processing.  Word List Retention: 80% accuracy out of 4 words (attribute). Word List Retention: Exclusion: required repetition 2-3 times for accurate recall with question after 5 words.  At end of session: recall of pairs: 80% accuracy. 3/3: Recall of 4/4 pictures after 5 minutes using story imagery recall, recall of details from short paragraph- 80% accuracy. 3/14: Recall of 5 pictures after 5 minutes: Recall of all 5/5 pictures after 5 minutes. 317: Recall of 7/7 pictures: 6/7 pictures recalled using story imagery.  Recall: Short paragraphs, 3-5 sentences, recalling via SLP questioning.     Patient will complete thought organization tasks (e.g., sequencing, deduction puzzles, etc.) with 80% accuracy  to facilitate increased executive functioning, working memory, problem solving, and processing skills, to be achieved in 4-6 weeks.  1/28: Attempted to complete Reggie's closet , logical solution puzzle- will work with patient with this task due to needs glasses and confusion over location/comprehension.  Also, wrote out medication list for patient and patient will work on writing reason taking for each medication. 1/31: Worked on Kitchen Shelves activity: moderate cues to help patient with attention/ focus: using finger or pencil as visual marker, one part of sentence at a time, crossing off completed tasks, provided vegetable garden for home practice. Played BLINK & organized cards into 3 different rows: number, color, shape, required frequent reminders for proper category / but did improve with indep. As game went on. 2/7:  Garden Plot: Completed from homework and corrected with patient - requiring moderate - max cues for assistance with reading comprehension and location comprehension using compass. 2/7: Organizing written sequencing of tasks (4 steps): 100% accuracy, provided more steps for hw. Reading comprehension written directions, multi steps: with education of breaking up steps, able to complete accurately- provided for hw.  2/28: Completed hw tasks, 1 step 2 components, 2 step 4 components, 3 step 6 components, completed at least 80% accuracy but would be beneficial to continue this goal. 3/3: Day 1 Coding task, organizing maze of symbols & computations: Initially, completed education & demonstration, then patient able to complete exercises. Then, min cues provided, patient will practice Day 1-2 at home, with checking work throughout.  3/5: Reading comprehension questions, first one: 70% accuracy, completed education, using highlighter, slow, review. 100% 2nd paragraph with independent use of these strategies. Nell's Schedule: required mod-max cues/assistance, will work on these type of problems in future  sessions.  3/20: Completing [facility] puzzle/ deduction puzzle: Generally, completed 65% accurately but required moderate cueing to increase accuracy and encouraging taking notes to help with processing.     Patient will complete concrete and abstract categorization tasks to 80% accuracy to facilitate improved generative naming skills and working memory, to be achieved in 4-6 weeks.   3/5: Assisted patient with completing cross word puzzle from folder- completed 65-70% indep, minimal cues to increase to 100% accuracy.   3/14: Filling in Category Members given categories and letters: anagrams: minimal-moderate cues, 70% accuracy.  3/17: Filling in cross word puzzles, 90% accuracy.  40% accuracy in answering responsive naming questions given first letter.  3/20: Responsive Naming giving first letter: 90% accuracy.     Patient will utilize word finding strategies during semantic feature analysis treatment activity for improved naming and verbal expression skills, to be achieved in 4-6 weeks.       Long Term Goals    Patient will demonstrate cognitive-communication skills consistent with age and education given use of compensatory strategies when needed to resume baseline activities and responsibilities in home, community, and work/school settings by discharge.     Patient will complete cognitive-linguistic therapy that addresses patient’s specific deficits in processing speed, short-term working memory, attention to detail, monitoring, sequencing, and organization skills, with instruction, to alleviate effects of executive functioning disorder deficits by discharge.     Patient will complete higher-level expressive language tasks (e.g., word definitions, idioms, synonym/antonyms, etc) with 80% accuracy to improve functional communication skills by discharge.

## 2025-03-20 NOTE — TELEPHONE ENCOUNTER
Reason for call:   [x] Refill   [] Prior Auth  [x] Other: Patient is asking Dr Sosa handle this medication instead of gastro     Office:   [x] [facility]Provider [facility]   [] Specialty/Provider -     Medication: loperamide (IMODIUM) 2 mg cap     Dose/Frequency: 2 mg, Daily at bedtime     Quantity: [facility] #[id number]    Pharmacy: [facility] #[id number]    Local Pharmacy   Does the patient have enough for 3 days?   [] Yes   [x] No - Send as HP to POD    Mail Away Pharmacy   Does the patient have enough for 10 days?   [] Yes   [] No - Send as HP to POD

## 2025-03-20 NOTE — TELEPHONE ENCOUNTER
Called and spoke to pt, notified him of need of pharmacy change. Pt noted that rx can go to [facility] in [place]. Confirmed pharmacy address.

## 2025-03-20 NOTE — PROGRESS NOTES
Gastroenterology Outpatient Follow-up - Crohn's Disease  Benito Park 54 y.o. male MRN: [id number]  Encounter: [id number]    Benito Park is a 54 y.o. male with Crohn's disease.    Symptom onset:  2019  Diagnosis:  Crohns disease  Year of diagnosis:  2020    IBD Summary: Anand his history of perianal abscess and fistula and chronic diarrhea.  He had CT evidence of ileitis in 2020, but has had several normal colonoscopies and MRI enterography since.  There is no tissue diagnosis of Crohn's disease.  He was treated with adalimumab, azathioprine, ustekinumab, and vedolizumab with partial improvement in diarrhea at best.    CD Summary  Current Crohn's disease phenotype:  penetrating    Involved sites since disease onset   Esophagus: no   Stomach: no     Duodenum: no   Jejunum: no   Ileum: yes   Right colon: no   Transverse colon: no   Left colon: no   Rectum: no   Anus: yes     History of stricture  Esophagus: no   Stomach: no   Duodenum: no   Jejunum: no   Ileum: no   Right colon: no   Transverse colon: no   Left colon: no   Rectum: no   Anus: no     History of fistula other than perianal:  Intra-abdominal   abcess: no      Enteroenteric fistula: no      Enterocutaneous fistula: no      Enterovesical fistula: no      Other fistula: no            Surgical History  Number of IBD surgeries: 0      First IBD surgery:    Most Recent IBD surgery:    Esophageal:  0    Gastroduodenal:  0    Small bowel resection(s):  0    Ileocolonic resection(s): 0      Colonic resection(s):  0    Ileostomy or colostomy:  no previous ostomy    Complete colectomy:  No         Medications     Year last used Reason for discontinuation   Corticosteroids   never         Thiopurines prior           Methotrexate   never         Infliximab   never         Adalimumab prior   2021 MARYLU antibodies   Certolizumab   never         Golimumab   never         Natalizumab   never         Mesalamine   never         Sulfasalzine   never         Vedolizumab  prior     [place]     Ustekinumab prior   2022 [place]     Tofacitinib   never         Other biologic   never             Extraintestinal Manifestations    IBD-associated arthropathy No    Uveitis No    Oral aphthous ulcers No   Erythema nodosum No    Pyoderma gangrenosum No    Primary sclerosing cholangitis No    Thrombotic complications No          Cancer / Dysplasia History  History of IBD-associated dysplasia: none    Date of diagnosis (Year):     History of colorectal cancer: No   History of cervical dysplasia: No   History of skin cancer: none         Laboratory Data   Most recent (date) Result   PPD   unknown   Quanitferon gold 2/2/2024 negative   TPMT 2/2/2024 normal   Hepatitis A   unknown   HBsAb   unknown   HBcAb   unknown   HBsAg   unknown   HCV Ab   unknown       Imaging / Diagnostic Procedures   Most recent (date) Findings   Colonoscopy or sigmoidoscopy #1              Ulcers/Erosions              Strictures              Stricture Severity              Endoscopic Score Polo Score:    Rutgeert's:               Findings              Pathology      Colonoscopy or sigmoidoscopy #2 7/19/2024            Ulcers/Erosions  no erosions              Strictures  none              Stricture Severity  N/A           Endoscopic Score Polo Score:    Rugeert's:  N/A           Findings  Normal colon and TI.           Pathology      EGD       Small bowel follow-through       CT enterography       CT without enterography       MRI enterography 3/12/2024 (1) No findings of active IBD. (2) Angulation/distortion of TI and ICV valve suggesting probable chronic stricturing without obstructive upstream dilatation.   Capsule study       DEXA scan   Lowest Z-score:      CXR (for TB)           HPI:   Interval History:  3/20/2025 Follow up  Since the last visit, we checked stool test.  Calprotectin was normal and infectious stool tests were also normal.  He then discontinued metformin and his diarrhea improved significantly.  He is still on  "vedolizumab 300 mg every 4 weeks.  He reports that he has \"good\" and \"bad\" days.  40% of days are good with 2-3 bowel movements, formed, without bleeding, but he still has urgency.  On bad days, he has 6-7 bowel movements, loose, still no bleeding, and severe urgency.  He says that things were much worse when he was on metformin.  He thinks the cheese steak, pizza, and fast food may be trigger.  Stress may be another trigger.  She takes Imodium once per day on bad days.  No perianal issues.  No longer on Imuran.    He still smokes.  He has a diabetic foot ulcer.  He has managed to lose 20 pounds by dieting.  He also got an implantable upper airway stimulator for sleep apnea.  ([facility] at [facility]).    Labs from 11/5/2024 show hemoglobin 14.8, elevated MCV to 102.  BMP normal with creatinine 0.9.  A1c improved to 6.2.  LFTs normal.  B12 low at 284.  Folate normal.    9/5/2024 Initial visit  Benito Park is establishing care with me for diagnosis of ileal and perianal Crohn's disease.  The patient was a poor historian and could not remember many details of his history.  I have done extensive review of the medical record to put together his history.  This involved going back to 2019 and reading through many GI office notes, telephone encounters, colonoscopy reports, biopsy reports, and reviewing imaging.  I have spent at least 90 minutes reviewing his case and preparing this note.    It appears that the first sign of Crohn's disease was in 2019 when he developed a recurrent perianal abscess requiring several I&Ds.  He did not have bowel issues at the time.  In August 2020, he was hospitalized at [facility] with 3 days of severe abdominal pain and diarrhea.  CT from that admission, which I reviewed, showed a long segment of inflamed distal ileum and terminal ileum, as well as some inflammation of the cecum and ascending colon.  Colonoscopy was done during that admission but showed only mildly and locally edematous " and petechial mucosa in the TI with normal biopsies.  Random biopsies from the colon were also normal.  There was a 15 mm adenoma in the ascending colon and a small inflammatory polyp in the rectum.  He then had MRI enterography in October 2020, which was normal and showed resolution of the previously seen ileitis.      However, he continued to have diarrhea and a decision was made to treat him with adalimumab starting in November 2020.  His diarrhea partially improved over the following several months, although he was also taking cholestyramine and his stools remained loose at times.  Another colonoscopy in August 2021 was normal with biopsies from the TI only showing focal active cryptitis.    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  He also needed another abscess drainage and seton placement for perianal fistula in November 2021.  According to the op report, there was active inflammation at the dentate line on anoscopy.  Apparently, he continued to have diarrhea so cholestyramine was increased.  Another colonoscopy was done in March 2022, again normal except for seton.    In June 2022, he switched to vedolizumab because insurance stopped covering ustekinumab.  He remained on azathioprine.  Eventually, vedolizumab dosing was escalated to every 4 weeks because of worsening symptoms at around week 5 or 6.  Vedolizumab level was 53 mcg/mL on 2/2/2024.  I am not sure if this was a true trough.  Another colonoscopy was done in November 2023, again normal.  Another MRI enterography was done in March 2024, again normal.  Another colonoscopy in July 2024, again normal.  C. difficile in April negative.  No other recent infectious studies.    Despite these many normal studies, he complains of 6 loose to watery bowel movements per day.  He rarely sees blood in the stool.  He has moderate abdominal cramping.  He has severe  urgency with less than 2 minutes time to get to the bathroom.  He rates his quality of life as poor.  He has joint pain.  He is a former smoker.  Celiac panel negative.  CMP from August 2024 was normal.    CRP has been persistently elevated, most recently 15.9 on 4/1/2024.  Calprotectin was only mildly elevated in April at 157.  Previously, calprotectin was 291 in February, and other values have been normal.  CBC from May 2024 was normal with hemoglobin 14.2 and mildly increased MCV 99 consistent with azathioprine use.    He has diabetes and is on metformin.  He remains on vedolizumab and azathioprine 50 mg daily.  He is also on pregabalin, Wellbutrin, and amitriptyline.    Recently, he was referred to colorectal surgery for consideration of terminal ileal resection because of ongoing diarrhea and questionable scarring on MRI enteroscopy.  He is seeing me for second opinion.    Benito reports the following symptoms over the last 7 days:    Stool Frequency 3-4 stools/day more than normal   Average stools per day: 5   Average liquid stools per day: 3   Consistency of bowel: soft or semi-formed   Awakening from sleep to move bowels? Yes   Urgency mild fecal urgency   Visible blood in stool? none   Abdominal pain? none   General Wellbeing? slightly under par     Benito also reports the following symptoms in the last month:    Leakage of stool while sleeping? No   Leakage of stool while awake? Yes       REVIEW OF SYSTEMS:  During the last 7 days, Benito experienced the following symptoms:  Unintentional Weight Loss (in the last month) No   Fever No   Eye irritation No   Mouth sores No   Sore throat No   Chest pain No   Shortness of breath No   Numbness or tingling in hands or feet Yes   Skin rash No   Pain or swelling in joints Yes   Bruising or bleeding No   Felt depressed or blue No   Fatigue No   Dysuria No   Please see HPI for additional pertinent review of systems; otherwise remainder of ROS was  unremarkable    MEDICATIONS:    Current Outpatient Medications:     acetaminophen (TYLENOL) 500 mg tablet    albuterol (PROVENTIL HFA,VENTOLIN HFA) 90 mcg/act inhaler    Alcohol Swabs 70 % PADS    atorvastatin (LIPITOR) 40 mg tablet    Blood Glucose Monitoring Suppl (OneTouch Verio Reflect) w/Device KIT    Cholecalciferol (RA Vitamin D-3) 1,000 units tablet    cyclobenzaprine (FLEXERIL) 10 mg tablet    DULoxetine (CYMBALTA) 30 mg delayed release capsule    escitalopram (LEXAPRO) 10 mg tablet    glucose blood (OneTouch Verio) test strip    lisinopril (ZESTRIL) 20 mg tablet    LORazepam (ATIVAN) 1 mg tablet    naproxen (NAPROSYN) 250 mg tablet    omeprazole (PriLOSEC) 40 MG capsule    ondansetron (ZOFRAN) 4 mg tablet    OneTouch Delica Lancets 33G MISC    pregabalin (LYRICA) 150 mg capsule    traZODone (DESYREL) 50 mg tablet    vedolizumab (Entyvio) SOLR    vitamin B-12 (VITAMIN B-12) 1,000 mcg tablet    loperamide (IMODIUM) 2 mg capsule    ALLERGIES:  No Known Allergies    OBJECTIVE:  /80   Ht 6' (1.829 m)   Wt 91.2 kg (201 lb)   BMI 27.26 kg/m²      PHYSICAL EXAM:    General Appearance:   Alert, cooperative, no distress   HEENT:   Normocephalic, atraumatic, anicteric.     Neck:  Supple, symmetrical, trachea midline   Lungs:   Clear to auscultation bilaterally; no rales, rhonchi or wheezing; respirations unlabored    Heart::   Regular rate and rhythm; no murmur, rub, or gallop.   Abdomen:   Soft, non-distended; normal bowel sounds    Abdominal exam was notable for no tenderness with no mass.     Genitalia:   Deferred    Rectal:   Perirectal assessment was not performed.                     Extremities:  No cyanosis, clubbing or edema    Pulses:  2+ and symmetric    Skin:  No jaundice, rashes, or lesions    Lymph nodes:  No palpable cervical lymphadenopathy        Lab Results   Component Value Date    WBC 8.20 01/09/2025    HGB 16.3 01/09/2025    HCT 48.1 01/09/2025     (H) 01/09/2025      "01/09/2025     Lab Results   Component Value Date     10/03/2016    SODIUM 141 01/09/2025    K 4.2 01/09/2025     01/09/2025    CO2 29 01/09/2025    ANIONGAP 23 (H) 02/20/2015    AGAP 7 01/09/2025    BUN 13 01/09/2025    CREATININE 0.88 01/09/2025    GLUC 166 (H) 05/17/2024    GLUF 148 (H) 01/09/2025    CALCIUM 9.2 01/09/2025    AST 24 01/09/2025    ALT 31 01/09/2025    ALKPHOS 85 01/09/2025    PROT 7.3 10/03/2016    TP 6.7 01/09/2025    BILITOT 0.8 10/03/2016    TBILI 0.76 01/09/2025    EGFR 98 01/09/2025     Lab Results   Component Value Date    CRP 15.9 (H) 04/01/2024     Lab Results   Component Value Date    MXEEBUZB74 284 11/13/2024     No results found for: \"FERRITIN\"    ASSESSMENT AND PLAN:    Benito has a history of penetrating Crohn’s disease diagnosed in 2020 with involvement in the following areas:      Ileum,     Anus, with perianal fistula . Surgical history includes no small bowel resections, no colon resections, and no prior ostomy. Current medical therapy is with vedolizumab 300 me every 4 weeks. My global assessment is that the clinical disease activity is currently quiescent.    The 6-point Polo score was 2 and the 9-point Polo score was 2.  The short CDAI was 135.      Anand has history of perianal abscess and fistula and chronic diarrhea.  He does not have tissue diagnosis of Crohn's disease.  He had 1 self-limited episode of ileitis based on CT scan, but multiple subsequent colonoscopies and small bowel imaging studies have shown terminal ileum with normal histology.  Calprotectin and infectious stool testing was negative.  His diarrhea has improved significantly after stopping metformin.    Anand is being treated with adalimumab, azathioprine, ustekinumab, and vedolizumab but never had tissue diagnosis from disease.  At this time, I do not have enough evidence to confirm his diagnosis of Crohn's disease and I do not think that vedolizumab is making a difference.    1.  I recommend " stopping vedolizumab and observing him off therapy.  2.  If he develops worsening diarrhea, I would repeat calprotectin and schedule video capsule endoscopy.  3.  Dairy free diet and avoid other known food triggers.  4.  Imodium as needed.  5.  Continue vitamin B12 supplements.  6.  Consider SIBO testing given diarrhea and B12 deficiency.    Follow-up in 6 months.  I have asked him to keep up with regarding your symptoms.     Health Maintenance Recommendations:  Vaccines & Infections  COVID-19 vaccination and boosters are recommended. There is no evidence that the COVID-19 vaccine would cause an IBD flare.  Avoid live vaccines if on immunosuppressive therapy.  Yearly influenza vaccine (flu shot).  Pneumonia vaccines for patients on immunosuppression. These include Prevnar 20, followed by Pneumovax 23 at least 8 weeks later.  Shingrix vaccine (series of 2 injections) for al patients 65 and older. Patients on tofacitinib or upadacitinib should be vaccinated regardless of age.  If not immune to measles mumps or rubella, MMR vaccine is recommended. However, this is a live vaccine and should be given prior to immunosuppressive therapy.  HPV vaccination as per national guidelines.  Hepatitis A and B vaccinations if not previously vaccinated.  Testing for tuberculosis with QuantiFERON Gold blood test and/or chest xray prior to starting immunosuppressive medications, and then annually    Cancer screening  Dysplasia surveillance for colorectal cancer. Colonoscopy in all patients with extensive colitis (more than 1/3 of the colon involved) who had disease for at least 8 or more years.  Repeat colonoscopy approximately every 12-24 months.  In patients with concurrent primary sclerosing cholangitis, history of dysplasia, or family history of colon cancer, repeat colonoscopy annually.    Females: Pap smear annually for woman on immunosuppression.  Annual dermatologic/skin exam in all patients with IBD, especially those on  immunosuppression with thiopurines or ZAIRE inhibitors.    Miscellaneous  DEXA scan, once off steroids for 3 months  Depression screening recommended annually  Routine dental and ophthalmology examinations    Problem List Items Addressed This Visit       Crohn's disease (HCC) - Primary     Other Visit Diagnoses         B12 deficiency        Relevant Medications    vitamin B-12 (VITAMIN B-12) 1,000 mcg tablet            Bernardino Woodward MD

## 2025-03-20 NOTE — TELEPHONE ENCOUNTER
Refill must be reviewed and completed by the office or provider. The refill is unable to be approved or denied by the medication management team.    Sending to PCP to determine if he will take over

## 2025-03-24 ENCOUNTER — OFFICE VISIT (OUTPATIENT)
Dept: SPEECH THERAPY | Facility: CLINIC | Age: 55
End: 2025-03-24
Payer: MEDICARE

## 2025-03-24 ENCOUNTER — TELEPHONE (OUTPATIENT)
Age: 55
End: 2025-03-24

## 2025-03-24 DIAGNOSIS — R41.3 MEMORY LOSS: Primary | ICD-10-CM

## 2025-03-24 DIAGNOSIS — R48.8 OTHER SYMBOLIC DYSFUNCTIONS: ICD-10-CM

## 2025-03-24 DIAGNOSIS — R41.841 COGNITIVE COMMUNICATION DEFICIT: ICD-10-CM

## 2025-03-24 PROCEDURE — 97130 THER IVNTJ EA ADDL 15 MIN: CPT

## 2025-03-24 PROCEDURE — 97129 THER IVNTJ 1ST 15 MIN: CPT

## 2025-03-24 NOTE — TELEPHONE ENCOUNTER
----- Message from Bernardino Woodward MD sent at 3/23/2025  1:28 PM EDT -----  Hello, I am stopping his Entyvio.  He gets it at [facility].  Thanks.

## 2025-03-24 NOTE — PROGRESS NOTES
"Daily Speech Treatment Note    Today's date: 3/24/2025   Patient’s name: Benito Park  : 1970  MRN: [id number]  Safety measures: h/o Diabetic Shock  Referring provider: Francesco Edouard CRNP    Encounter Diagnoses   Name Primary?    Memory loss Yes    Other symbolic dysfunctions     Cognitive communication deficit        Visit Tracking:  POC   Expires Auth Expiration Date ST Visit Limit   2025 BOMN BOMN          Visit/Unit Tracking:  Auth Status Date 3/3 3/5/25   3/14 3/17 3/20 3/24   No auth req, Used 11 12 13 14 15 16    Remaining 9 8 7 6 5 4       Subjective/Behavioral:  -Patient pleasant and cooperative. .       Patient showed SLP criss: of \"voice memos....\" Practiced utilizing this voice memos criss. To increase recall and organization of information.  Patient will practice using criss / recording at home for to maximize daily recall.        Objective/Assessment:  Completed structured activities with patient to target goals below.  Results as stated below.       Short-term goals:  Patient will be educated on the use of internal and external memory aids and compensatory strategies to facilitate increased recall of routine, personal information, and recent events, to be achieved in 4-6 weeks.  25:  Reviewed information / handout on memory strategies with patient. Patient will bring in journal next session, use post it before entering car, work on repetition out loud, etc.  Working on review of medication list / reason taking in future session.  : Provided information on apps/workbooks/games recommended and provided handout.  :  Provided info on digital voice recorder since patient having difficulty with writing (fine motor / hand issues).     Patient will complete auditory immediate and short term memory tasks to facilitate increased ability to retell narratives and recall information within functional living environment, to be achieved in 4-6 weeks.  : Following auditory directives: 1 " step 2 components: 80% & 100% accuracy, 2 step - 4 components: 70-90%, 3 step, 6 components: 75% accuracy, ongoing education regarding step / step, repetition out loud, review. Drawing Figures: Written: 75% - 100% accuracy.   3/20: Summarizing / recall of info from medium length paragraphs: 8 sentences, recalls beginning and end but discussed getting a recorder, writing information down, needing repetition for info/ conversation.      Patient will complete complex auditory attention processing tasks (e.g., sentence unscramble, ranking numbers/words, etc.) to improve working memory with 80% accuracy, to be achieved in 4-6 weeks.  : Working on pairing of words, able to complete 4 words into 2 pairs: 100% , not able to complete 6 words into 3 pairs with max cues. : Worked with sentence formulation and recalling words: set of 2: 100%, set of 3 words benefitted from writing words down on paper and formulating sentence on paper, then work towards repetition, visualization for final 3 words which was successful, continue to work with sentence formulation 3 words in future sessions. : Able to formulate sentence with 3 words, give increased time, review out loud, focused on these strategies. Recall of pairs, color codin/5 pairs: 80% accuracy.  : recall of pairs of words: attribute: 3/5, then 4/5 with 2nd set.  Reverse order: 3 words: 90%, putting 3 words in order, Unscrambling words 3-4 100%, 5 words, requires rep. At time, increased processing.  Word List Retention: 80% accuracy out of 4 words (attribute). Word List Retention: Exclusion: required repetition 2-3 times for accurate recall with question after 5 words.  At end of session: recall of pairs: 80% accuracy. 3/3: Recall of 4/4 pictures after 5 minutes using story imagery recall, recall of details from short paragraph- 80% accuracy. 3/14: Recall of 5 pictures after 5 minutes: Recall of all 5/5 pictures after 5 minutes. 3/17: Recall of 7/7 pictures:  6/7 pictures recalled using story imagery.  Recall: Short paragraphs, 3-5 sentences, recalling via SLP questioning.  3/24: Recall of details of paragraph: 75% recall initially, increased to 100% with review of info. 2nd paragraph, requires review of sequencing of information, working on establishing more association with prior memories to remember new information, after this, able to recall greater than 70% of information.     Patient will complete thought organization tasks (e.g., sequencing, deduction puzzles, etc.) with 80% accuracy to facilitate increased executive functioning, working memory, problem solving, and processing skills, to be achieved in 4-6 weeks.  1/28: Attempted to complete Reggie's closet , logical solution puzzle- will work with patient with this task due to needs glasses and confusion over location/comprehension.  Also, wrote out medication list for patient and patient will work on writing reason taking for each medication. 1/31: Worked on Kitchen Shelves activity: moderate cues to help patient with attention/ focus: using finger or pencil as visual marker, one part of sentence at a time, crossing off completed tasks, provided vegetable garden for home practice. Played BLINK & organized cards into 3 different rows: number, color, shape, required frequent reminders for proper category / but did improve with indep. As game went on. 2/7:  Garden Plot: Completed from homework and corrected with patient - requiring moderate - max cues for assistance with reading comprehension and location comprehension using compass. 2/7: Organizing written sequencing of tasks (4 steps): 100% accuracy, provided more steps for hw. Reading comprehension written directions, multi steps: with education of breaking up steps, able to complete accurately- provided for hw.  2/28: Completed hw tasks, 1 step 2 components, 2 step 4 components, 3 step 6 components, completed at least 80% accuracy but would be beneficial to  continue this goal. 3/3: Day 1 Coding task, organizing maze of symbols & computations: Initially, completed education & demonstration, then patient able to complete exercises. Then, min cues provided, patient will practice Day 1-2 at home, with checking work throughout.  3/5: Reading comprehension questions, first one: 70% accuracy, completed education, using highlighter, slow, review. 100% 2nd paragraph with independent use of these strategies. Nell's Schedule: required mod-max cues/assistance, will work on these type of problems in future sessions.  3/20: Completing [facility] puzzle/ deduction puzzle: Generally, completed 65% accurately but required moderate cueing to increase accuracy and encouraging taking notes to help with processing.     Patient will complete concrete and abstract categorization tasks to 80% accuracy to facilitate improved generative naming skills and working memory, to be achieved in 4-6 weeks.   3/5: Assisted patient with completing cross word puzzle from folder- completed 65-70% indep, minimal cues to increase to 100% accuracy.   3/14: Filling in Category Members given categories and letters: anagrams: minimal-moderate cues, 70% accuracy.  3/17: Filling in cross word puzzles, 90% accuracy.  40% accuracy in answering responsive naming questions given first letter.  3/20: Responsive Naming giving first letter: 90% accuracy.     Patient will utilize word finding strategies during semantic feature analysis treatment activity for improved naming and verbal expression skills, to be achieved in 4-6 weeks.       Long Term Goals    Patient will demonstrate cognitive-communication skills consistent with age and education given use of compensatory strategies when needed to resume baseline activities and responsibilities in home, community, and work/school settings by discharge.     Patient will complete cognitive-linguistic therapy that addresses patient’s specific deficits in processing speed, short-term  working memory, attention to detail, monitoring, sequencing, and organization skills, with instruction, to alleviate effects of executive functioning disorder deficits by discharge.     Patient will complete higher-level expressive language tasks (e.g., word definitions, idioms, synonym/antonyms, etc) with 80% accuracy to improve functional communication skills by discharge.

## 2025-03-24 NOTE — TELEPHONE ENCOUNTER
Emailed Joycelyn at [facility] to make her aware that we were stopping Entyvio infusions and asked if anything is needed from our office

## 2025-03-25 DIAGNOSIS — G62.9 NEUROPATHY: ICD-10-CM

## 2025-03-25 NOTE — TELEPHONE ENCOUNTER
Reason for call:   [x] Refill   [] Prior Auth  [] Other:     Office:   [x] PCP/Provider - DEMOND Sosa  [] Specialty/Provider -     Medication: Pregabalin 150mg    Dose/Frequency: 1 cap tid    Quantity: 90    Pharmacy: [place] [id number] [address] [phone]     Local Pharmacy   Does the patient have enough for 3 days?   [x] Yes   [] No - Send as HP to POD

## 2025-03-26 ENCOUNTER — OFFICE VISIT (OUTPATIENT)
Dept: WOUND CARE | Facility: HOSPITAL | Age: 55
End: 2025-03-26
Payer: MEDICARE

## 2025-03-26 VITALS
HEART RATE: 76 BPM | DIASTOLIC BLOOD PRESSURE: 84 MMHG | RESPIRATION RATE: 16 BRPM | SYSTOLIC BLOOD PRESSURE: 130 MMHG | TEMPERATURE: 97.4 F

## 2025-03-26 DIAGNOSIS — M20.22 HALLUX RIGIDUS OF BOTH FEET: ICD-10-CM

## 2025-03-26 DIAGNOSIS — L97.522 DIABETIC ULCER OF TOE OF LEFT FOOT ASSOCIATED WITH TYPE 2 DIABETES MELLITUS, WITH FAT LAYER EXPOSED (HCC): Primary | ICD-10-CM

## 2025-03-26 DIAGNOSIS — G62.9 NEUROPATHY: ICD-10-CM

## 2025-03-26 DIAGNOSIS — M20.21 HALLUX RIGIDUS OF BOTH FEET: ICD-10-CM

## 2025-03-26 DIAGNOSIS — E11.42 DIABETIC POLYNEUROPATHY ASSOCIATED WITH TYPE 2 DIABETES MELLITUS (HCC): ICD-10-CM

## 2025-03-26 DIAGNOSIS — E11.621 DIABETIC ULCER OF TOE OF LEFT FOOT ASSOCIATED WITH TYPE 2 DIABETES MELLITUS, WITH FAT LAYER EXPOSED (HCC): Primary | ICD-10-CM

## 2025-03-26 PROCEDURE — 99212 OFFICE O/P EST SF 10 MIN: CPT | Performed by: PODIATRIST

## 2025-03-26 PROCEDURE — 99213 OFFICE O/P EST LOW 20 MIN: CPT | Performed by: PODIATRIST

## 2025-03-26 RX ORDER — PREGABALIN 150 MG/1
150 CAPSULE ORAL 3 TIMES DAILY
Qty: 90 CAPSULE | Refills: 0 | Status: CANCELLED | OUTPATIENT
Start: 2025-03-26

## 2025-03-26 RX ORDER — PREGABALIN 150 MG/1
150 CAPSULE ORAL 3 TIMES DAILY
Qty: 90 CAPSULE | Refills: 5 | Status: SHIPPED | OUTPATIENT
Start: 2025-03-26

## 2025-03-26 NOTE — PATIENT INSTRUCTIONS
Orders Placed This Encounter   Procedures    Wound cleansing and dressings Diabetic Ulcer Left Plantar     Your wounds are healed. You are discharged from the [facility]. Continue to moisturize your skin. Gradually wear your diabetic shoes for a few hours at a time each day and assess your feet for any redness or open areas before wearing them all day.     Standing Status:   Future     Expiration Date:   3/26/2025

## 2025-03-28 ENCOUNTER — OFFICE VISIT (OUTPATIENT)
Dept: SPEECH THERAPY | Facility: CLINIC | Age: 55
End: 2025-03-28
Payer: MEDICARE

## 2025-03-28 DIAGNOSIS — R48.8 OTHER SYMBOLIC DYSFUNCTIONS: ICD-10-CM

## 2025-03-28 DIAGNOSIS — R41.841 COGNITIVE COMMUNICATION DEFICIT: ICD-10-CM

## 2025-03-28 DIAGNOSIS — R41.3 MEMORY LOSS: Primary | ICD-10-CM

## 2025-03-28 PROCEDURE — 97130 THER IVNTJ EA ADDL 15 MIN: CPT

## 2025-03-28 PROCEDURE — 97129 THER IVNTJ 1ST 15 MIN: CPT

## 2025-03-28 NOTE — PROGRESS NOTES
Daily Speech Treatment Note    Today's date: 3/28/2025   Patient’s name: Benito Park  : 1970  MRN: [id number]  Safety measures: h/o Diabetic Shock  Referring provider: Francesco Edouard CRNP    Encounter Diagnoses   Name Primary?    Memory loss Yes    Other symbolic dysfunctions     Cognitive communication deficit        Visit Tracking:  POC   Expires Auth Expiration Date ST Visit Limit   2025 BOMN BOMN          Visit/Unit Tracking:  Auth Status Date 3/3 3/5/25   3/14 3/17 3/20 3/24 3/28   No auth req, Used 11 12 13 14 15 16 17    Remaining 9 8 7 6 5 4 3       Subjective/Behavioral:  -Patient pleasant and cooperative. .             Objective/Assessment:  Completed structured activities with patient to target goals below.  Results as stated below.       Short-term goals:  Patient will be educated on the use of internal and external memory aids and compensatory strategies to facilitate increased recall of routine, personal information, and recent events, to be achieved in 4-6 weeks.  25:  Reviewed information / handout on memory strategies with patient. Patient will bring in journal next session, use post it before entering car, work on repetition out loud, etc.  Working on review of medication list / reason taking in future session.  : Provided information on apps/workbooks/games recommended and provided handout.  :  Provided info on digital voice recorder since patient having difficulty with writing (fine motor / hand issues).     Patient will complete auditory immediate and short term memory tasks to facilitate increased ability to retell narratives and recall information within functional living environment, to be achieved in 4-6 weeks.  : Following auditory directives: 1 step 2 components: 80% & 100% accuracy, 2 step - 4 components: 70-90%, 3 step, 6 components: 75% accuracy, ongoing education regarding step / step, repetition out loud, review. Drawing Figures: Written: 75% - 100%  accuracy.   3/20: Summarizing / recall of info from medium length paragraphs: 8 sentences, recalls beginning and end but discussed getting a recorder, writing information down, needing repetition for info/ conversation.      Patient will complete complex auditory attention processing tasks (e.g., sentence unscramble, ranking numbers/words, etc.) to improve working memory with 80% accuracy, to be achieved in 4-6 weeks.  : Working on pairing of words, able to complete 4 words into 2 pairs: 100% , not able to complete 6 words into 3 pairs with max cues. : Worked with sentence formulation and recalling words: set of 2: 100%, set of 3 words benefitted from writing words down on paper and formulating sentence on paper, then work towards repetition, visualization for final 3 words which was successful, continue to work with sentence formulation 3 words in future sessions. : Able to formulate sentence with 3 words, give increased time, review out loud, focused on these strategies. Recall of pairs, color codin/5 pairs: 80% accuracy.  : recall of pairs of words: attribute: 3/5, then 4/5 with 2nd set.  Reverse order: 3 words: 90%, putting 3 words in order, Unscrambling words 3-4 100%, 5 words, requires rep. At time, increased processing.  Word List Retention: 80% accuracy out of 4 words (attribute). Word List Retention: Exclusion: required repetition 2-3 times for accurate recall with question after 5 words.  At end of session: recall of pairs: 80% accuracy. 3/3: Recall of 4/4 pictures after 5 minutes using story imagery recall, recall of details from short paragraph- 80% accuracy. 3/14: Recall of 5 pictures after 5 minutes: Recall of all 5/5 pictures after 5 minutes. 3/17: Recall of 7/7 pictures: 6/7 pictures recalled using story imagery.  Recall: Short paragraphs, 3-5 sentences, recalling via SLP questioning.  3/24: Recall of details of paragraph: 75% recall initially, increased to 100% with review of  info. 2nd paragraph, requires review of sequencing of information, working on establishing more association with prior memories to remember new information, after this, able to recall greater than 70% of information.     Patient will complete thought organization tasks (e.g., sequencing, deduction puzzles, etc.) with 80% accuracy to facilitate increased executive functioning, working memory, problem solving, and processing skills, to be achieved in 4-6 weeks.  1/28: Attempted to complete Reggie's closet , logical solution puzzle- will work with patient with this task due to needs glasses and confusion over location/comprehension.  Also, wrote out medication list for patient and patient will work on writing reason taking for each medication. 1/31: Worked on Kitchen Shelves activity: moderate cues to help patient with attention/ focus: using finger or pencil as visual marker, one part of sentence at a time, crossing off completed tasks, provided vegetable garden for home practice. Played BLINK & organized cards into 3 different rows: number, color, shape, required frequent reminders for proper category / but did improve with indep. As game went on. 2/7:  Garden Plot: Completed from homework and corrected with patient - requiring moderate - max cues for assistance with reading comprehension and location comprehension using compass. 2/7: Organizing written sequencing of tasks (4 steps): 100% accuracy, provided more steps for hw. Reading comprehension written directions, multi steps: with education of breaking up steps, able to complete accurately- provided for hw.  2/28: Completed hw tasks, 1 step 2 components, 2 step 4 components, 3 step 6 components, completed at least 80% accuracy but would be beneficial to continue this goal. 3/3: Day 1 Coding task, organizing maze of symbols & computations: Initially, completed education & demonstration, then patient able to complete exercises. Then, min cues provided, patient will  practice Day 1-2 at home, with checking work throughout.  3/5: Reading comprehension questions, first one: 70% accuracy, completed education, using highlighter, slow, review. 100% 2nd paragraph with independent use of these strategies. Nell's Schedule: required mod-max cues/assistance, will work on these type of problems in future sessions.  3/20: Completing [facility] puzzle/ deduction puzzle: Generally, completed 65% accurately but required moderate cueing to increase accuracy and encouraging taking notes to help with processing.     Patient will complete concrete and abstract categorization tasks to 80% accuracy to facilitate improved generative naming skills and working memory, to be achieved in 4-6 weeks.   3/5: Assisted patient with completing cross word puzzle from folder- completed 65-70% indep, minimal cues to increase to 100% accuracy.   3/14: Filling in Category Members given categories and letters: anagrams: minimal-moderate cues, 70% accuracy.  3/17: Filling in cross word puzzles, 90% accuracy.  40% accuracy in answering responsive naming questions given first letter.  3/20: Responsive Naming giving first letter: 90% accuracy.   3/28: Helped patient with word retrieval, responsive namin% accuracy with letter cue.  Patient also assisted with filling in rest of cross word puzzles, assisted with circumlocution / synonyms.     Patient will utilize word finding strategies during semantic feature analysis treatment activity for improved naming and verbal expression skills, to be achieved in 4-6 weeks.       Long Term Goals    Patient will demonstrate cognitive-communication skills consistent with age and education given use of compensatory strategies when needed to resume baseline activities and responsibilities in home, community, and work/school settings by discharge.     Patient will complete cognitive-linguistic therapy that addresses patient’s specific deficits in processing speed, short-term working  memory, attention to detail, monitoring, sequencing, and organization skills, with instruction, to alleviate effects of executive functioning disorder deficits by discharge.     Patient will complete higher-level expressive language tasks (e.g., word definitions, idioms, synonym/antonyms, etc) with 80% accuracy to improve functional communication skills by discharge.

## 2025-03-30 NOTE — PROGRESS NOTES
Patient ID: Benito Park is a 54 y.o. male Date of Birth 1970       Chief Complaint   Patient presents with   • Follow Up Wound Care Visit     Left foot wound       Allergies:  Patient has no known allergies.    Diagnosis:  1. Diabetic ulcer of toe of left foot associated with type 2 diabetes mellitus, with fat layer exposed (HCC)  -     Wound cleansing and dressings Diabetic Ulcer Left Plantar; Future  2. Diabetic polyneuropathy associated with type 2 diabetes mellitus ([facility])  3. Hallux rigidus of both feet  -     XR foot 3+ vw left; Future; Expected date: 03/30/2025  -     XR foot 3+ vw right; Future; Expected date: 04/30/2025     Diagnosis ICD-10-CM Associated Orders   1. Diabetic ulcer of toe of left foot associated with type 2 diabetes mellitus, with fat layer exposed ([facility])  E11.621 Wound cleansing and dressings Diabetic Ulcer Left Plantar    L97.522       2. Diabetic polyneuropathy associated with type 2 diabetes mellitus ([facility])  E11.42       3. Hallux rigidus of both feet  M20.21 XR foot 3+ vw left    M20.22 XR foot 3+ vw right           Assessment & Plan:  Excellent progress with complete closure of left great toe diabetic foot ulceration.  Follow-up with Dr. Weir as scheduled for diabetic footcare in the future.  Moisturize area daily and monitor closely for recurrent breakdown.  May utilize a Band-Aid or silicone tape to cut friction in this area to lessen chance of recurrence.  Importance of proper diabetic shoe gear reviewed with patient.  He reports he is scheduled to pick them up on April 1.  Importance of slow gradual break-in with these monitoring feet closely emphasized.  Discussed with patient the underlying reason that his great toes are breaking down aside from his diabetic neuropathy but more specifically the limited range of motion available in his great toe joint consistent with hallux rigidus.  Discussed with him that there are some surgical options that may afford him more motion  in this joint in the future if recurrent breakdowns are not mitigated with shoe gear.  Will need new more current x-rays to better evaluate the underlying osseous limitations.  Will order bilateral foot x-rays weightbearing to be done sometime in the future.  Most recent foot x-ray available to review is only on the left foot from 12/27/2020.  Will require newer films to better evaluate underlying limited range of motion first MPJ.  Rx bilateral foot x-rays weightbearing  Follow-up as needed to wound care if recurrence noted.    Subjective:   HPI    The following portions of the patient's history were reviewed and updated as appropriate:   Patient Active Problem List   Diagnosis   • Sprain of anterior talofibular ligament of right ankle   • Perianal abscess   • Polyneuropathy   • Chronic bilateral low back pain with bilateral sciatica   • Bilateral lower extremity edema   • Chronic pain syndrome   • Failed back surgical syndrome   • Lumbar spondylosis   • 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 (HCC)   • Congenital fusion of sacroiliac joint   • Terminal ileitis (HCC)   • Viral enteritis   • Chronic foot pain   • Lumbar radiculopathy   • GERD (gastroesophageal reflux disease)   • History of colon polyps   • Perianal fistula   • Functional diarrhea   • Blepharitis, left eye   • Closed fracture of radial styloid   • Compression of right ulnar nerve at multiple levels   • Degenerative disc disease at L5-S1 level   • History of lumbar fusion   • Numbness and tingling of both lower extremities   • Periorbital cellulitis of left eye   • Right leg swelling   • Tobacco use   • Vitamin D deficiency   • Arthritis of right shoulder region   • Chronic right shoulder pain   • Myofascial pain syndrome   • S/P insertion of spinal cord stimulator   • Crohn's disease (HCC)   • Numbness and tingling in right hand   • Neck pain   • Cervical radiculopathy   • Herniated nucleus pulposus, C3-4   • Hypertension   • Smoker   • Heavy  alcohol consumption   • Sleep apnea   • Snoring   • Excessive daytime sleepiness   • Overweight (BMI 25.0-29.9)   • Dupuytren contracture   • Capillary disorder   • Epididymitis, right   • Obstructive sleep apnea syndrome   • Pes anserinus bursitis of left knee   • Arthralgia   • Ulcer of left foot, limited to breakdown of skin ([facility])   • Dysarthria   • acute Confusional state   • Hyperlipidemia   • Type 2 diabetes mellitus with hyperglycemia, without long-term current use of insulin ([facility])   • Diabetic polyneuropathy associated with type 2 diabetes mellitus ([facility])   • Lung nodule   • Abnormal movements   • Episode of change in speech   • Hoarseness   • Diabetic ulcer of left foot associated with type 2 diabetes mellitus, limited to breakdown of skin, unspecified part of foot ([facility])   • Carpal tunnel syndrome of right wrist   • Transient neurological symptoms   • Mucopurulent chronic bronchitis ([facility])   • Diabetic ulcer of toe of left foot associated with type 2 diabetes mellitus, with fat layer exposed ([facility])   • Agitation   • OSIRIS (obstructive sleep apnea)   • Anxiety   • Idiopathic progressive neuropathy   • S/P insertion of hypoglossal nerve stimulator     Past Medical History:   Diagnosis Date   • Anxiety    • Back pain    • Callus Few months ago   • Chronic pain disorder     back and legs   • Colon polyp    • COPD (chronic obstructive pulmonary disease) ([facility])     pt denies   • Crohn's disease ([facility])    • Dental crown present    • Depression    • Diabetes mellitus ([facility])     type 2   • GERD (gastroesophageal reflux disease)    • Hyperglycemia     diabetic shock--follows with  Endocrinology   • Hyperlipidemia     per pt diet controlled   • Hypertension    • Neuropathy    • Perianal fistula    • Sleep apnea     no current CPAP use   • Spinal cord stimulator status     battery  -near right side back/hip   • Terminal ileitis ([facility])      Past Surgical History:   Procedure Laterality Date   • BACK SURGERY  2019   • CAST  APPLICATION Right 11/17/2022    Procedure: Application short-arm splint;  Surgeon: Sarthak Villatoro MD;  Location: [facility];  Service: Orthopedics   • COLONOSCOPY     • EGD     • HAND CONTRACTURE RELEASE Left 06/01/2023    Procedure: left ring and small finger dupuytrens excision and ring finger Metacarpophalangeal joint release and small finger Metacarpophalangeal and proximal interphalangeal joint release;  Surgeon: Sarthak Villatoro MD;  Location: [facility];  Service: Orthopedics   • HERNIA REPAIR      umbilical hernia   • AR ANRCT XM SURG REQ ANES GENERAL SPI/EDRL DX N/A 11/17/2021    Procedure: EXAM UNDER ANESTHESIA (EUA);  Surgeon: Davi Thao MD;  Location: [facility];  Service: Colorectal   • AR APPLICATION SHORT ARM SPLINT FOREARM-HAND STATIC Right 1/11/2024    Procedure: Application short arm splint;  Surgeon: Sarthak Villatoro MD;  Location: [facility];  Service: Orthopedics   • AR CAPSULECTOMY/CAPSULOTOMY IPHAL JOINT EACH Right 1/11/2024    Procedure: Contracture release right hand small finger proximal interphalangeal joint;  Surgeon: Sarthak Villatoro MD;  Location: [facility];  Service: Orthopedics   • AR DISE DYN EVAL SLEEP DISORDERED BREATHING FLX DX N/A 11/13/2024    Procedure: DRUG INDUCED SLEEP ENDOSCOPY;  Surgeon: Jeff Arndt MD;  Location: [facility];  Service: ENT   • AR FASCIOTOMY PALMAR OPEN PARTIAL Right 11/17/2022    Procedure: Dupuytren's excision right palm extending into the small finger with release of the metacarpophalangeal joint and proximal interphalangeal joint.  Dupuytren's excision right palm with release of the right ring finger metacarpophalangeal joint.;  Surgeon: Sarthak Villatoro MD;  Location: [facility];  Service: Orthopedics   • AR FASCIOTOMY PALMAR OPEN PARTIAL Right 1/11/2024    Procedure: Right hand small finger duppuytrens excision with release of the metacarpophalangeal joint and proximal interphalangeal joint;  Surgeon: Sarthak Villatoro MD;   Location: [facility];  Service: Orthopedics   • TX FASCT PALM W/WO Z-PLASTY TISSUE REARGMT/SKN GRFT Right 2024    Procedure: Right hand small finger duppuytrens excision with release of the metacarpophalangeal joint and proximal interphalangeal joint;  Surgeon: Sarthak Villatoro MD;  Location: [facility];  Service: Orthopedics   • TX FASCT PRTL PALMAR 1 DGT PROX IPHAL JT W/WO RPR Right 2024    Procedure: Right hand small finger duppuytrens excision with release of the metacarpophalangeal joint and proximal interphalangeal joint;  Surgeon: Sarthak Villatoro MD;  Location: [facility];  Service: Orthopedics   • TX I&D ISCHIORECTAL&/PERIRECTAL ABSCESS SPX Right 10/24/2021    Procedure: excisional debredement right gluteal cheek ;  Surgeon: Jeana Bustamante MD;  Location: [facility];  Service: General   • TX PLACEMENT SETON N/A 2021    Procedure: PLACEMENT SETON;  Surgeon: Davi Thao MD;  [facility];  Service: Colorectal   • TX PRQ IMPLTJ NSTIM ELECTRODE ARRAY EPIDURAL Right 2021    Procedure: INSERTION THORACIC DORSAL COLUMN SPINAL CORD STIMULATOR PERCUTANEOUS W IMPLANTABLE PULSE GENERATOR, RIGHT;  Surgeon: Akshat Witt MD;  Location: [facility];  Service: Neurosurgery   • TX REMOVAL HYPOGLOSSAL NERVE NSTIM RA PG&RESPIR SNR Right 2025    Procedure: INSERTION UPPER AIRWAY STIMULATOR RIGHT INSPIRE;  Surgeon: Jeff Arndt MD;  Location: [facility];  Service: ENT     Social History     Socioeconomic History   • Marital status: /Civil Union     Spouse name: Not on file   • Number of children: Not on file   • Years of education: Not on file   • Highest education level: Not on file   Occupational History   • Not on file   Tobacco Use   • Smoking status: Former     Current packs/day: 0.00     Average packs/day: 0.5 packs/day for 30.0 years (15.0 ttl pk-yrs)     Types: Cigarettes     Start date: 2019     Quit date: 2021     Years since quittin.1     Passive  exposure: Never   • Smokeless tobacco: Never   Vaping Use   • Vaping status: Every Day   • Substances: THC   Substance and Sexual Activity   • Alcohol use: Yes     Alcohol/week: 3.0 standard drinks of alcohol     Types: 3 Cans of beer per week     Comment: social   • Drug use: Yes     Frequency: 7.0 times per week     Types: Marijuana     Comment: Medical marijuana-vapes, bedtime   • Sexual activity: Yes     Partners: Female     Comment: defer   Other Topics Concern   • Not on file   Social History Narrative   • Not on file     Social Drivers of Health     Financial Resource Strain: Low Risk  (6/7/2023)    Overall Financial Resource Strain (CARDIA)    • Difficulty of Paying Living Expenses: Not hard at all   Food Insecurity: No Food Insecurity (7/5/2024)    Nursing - Inadequate Food Risk Classification    • Worried About Running Out of Food in the Last Year: Never true    • Ran Out of Food in the Last Year: Never true    • Ran Out of Food in the Last Year: Not on file   Transportation Needs: No Transportation Needs (7/5/2024)    PRAPARE - Transportation    • Lack of Transportation (Medical): No    • Lack of Transportation (Non-Medical): No   Physical Activity: Sufficiently Active (4/11/2023)    Received from [facility]    Physical Activity   Stress: No Stress Concern Present (4/11/2023)    [facility] from [facility]    [employer] [place] [employer] - Occupational Stress Questionnaire    • Feeling of Stress : Only a little   Social Connections: Moderately Integrated (4/11/2023)    Received from [facility]    Social Connection and Isolation Panel [NHANES]   Intimate Partner Violence: Not At Risk (4/11/2023)    Received from [facility]    Humiliation, Afraid, Rape, and Kick questionnaire    • Fear of Current or Ex-Partner: No    • Emotionally Abused: No    • Physically Abused: No    • Sexually Abused: No   Housing Stability: Low Risk  (7/5/2024)    Housing Stability Vital  Sign    • Unable to Pay for Housing in the Last Year: No    • Number of Times Moved in the Last Year: 1    • Homeless in the Last Year: No        Current Outpatient Medications:   •  acetaminophen (TYLENOL) 500 mg tablet, Take 500 mg by mouth every 6 (six) hours as needed for mild pain, Disp: , Rfl:   •  albuterol (PROVENTIL HFA,VENTOLIN HFA) 90 mcg/act inhaler, Inhale 2 puffs every 6 (six) hours as needed for wheezing, Disp: 6.7 g, Rfl: 2  •  Alcohol Swabs 70 % PADS, May substitute brand based on insurance coverage. Check glucose TID., Disp: 100 each, Rfl: 0  •  atorvastatin (LIPITOR) 40 mg tablet, Take 1 tablet (40 mg total) by mouth every evening, Disp: 90 tablet, Rfl: 2  •  Blood Glucose Monitoring Suppl (OneTouch Verio Reflect) w/Device KIT, May substitute brand based on insurance coverage. Check glucose TID., Disp: 1 kit, Rfl: 0  •  Cholecalciferol (RA Vitamin D-3) 1,000 units tablet, Take 2 tablets (2,000 Units total) by mouth daily, Disp: 180 tablet, Rfl: 2  •  cyclobenzaprine (FLEXERIL) 10 mg tablet, TAKE 1 TABLET BY MOUTH EVERYDAY AT BEDTIME, Disp: 90 tablet, Rfl: 2  •  DULoxetine (CYMBALTA) 30 mg delayed release capsule, TAKE 1 CAPSULE BY MOUTH EVERY DAY, Disp: 90 capsule, Rfl: 1  •  escitalopram (LEXAPRO) 10 mg tablet, Take 1 tablet (10 mg total) by mouth daily, Disp: 90 tablet, Rfl: 3  •  glucose blood (OneTouch Verio) test strip, May substitute brand based on insurance coverage. Check glucose TID., Disp: 100 each, Rfl: 5  •  lisinopril (ZESTRIL) 20 mg tablet, Take 1 tablet (20 mg total) by mouth daily, Disp: 90 tablet, Rfl: 2  •  loperamide (IMODIUM) 2 mg capsule, Take 1 capsule (2 mg total) by mouth daily at bedtime, Disp: 60 capsule, Rfl: 3  •  LORazepam (ATIVAN) 1 mg tablet, Take 1 tablet (1 mg total) by mouth every 8 (eight) hours as needed for anxiety, Disp: 60 tablet, Rfl: 3  •  naproxen (NAPROSYN) 250 mg tablet, Take 250 mg by mouth as needed for mild pain, Disp: , Rfl:   •  omeprazole  (PriLOSEC) 40 MG capsule, Take 1 capsule (40 mg total) by mouth daily, Disp: 90 capsule, Rfl: 2  •  ondansetron (ZOFRAN) 4 mg tablet, Take 1 tablet (4 mg total) by mouth every 8 (eight) hours as needed for nausea or vomiting, Disp: 20 tablet, Rfl: 1  •  OneTouch Delica Lancets 33G MISC, May substitute brand based on insurance coverage. Check glucose TID., Disp: 100 each, Rfl: 5  •  pregabalin (LYRICA) 150 mg capsule, Take 1 capsule (150 mg total) by mouth 3 (three) times a day, Disp: 90 capsule, Rfl: 5  •  traZODone (DESYREL) 50 mg tablet, TAKE 1 TO 2 TABLETS AT BEDTIME AS NEEDED FOR SLEEP, Disp: 180 tablet, Rfl: 1  •  vedolizumab (Entyvio) SOLR, Inject 300 mg into a catheter in a vein every 4 weeks, Disp: , Rfl:   •  vitamin B-12 (VITAMIN B-12) 1,000 mcg tablet, Take 1 tablet (1,000 mcg total) by mouth daily, Disp: 90 tablet, Rfl: 3  Family History   Problem Relation Age of Onset   • COPD Mother    • Lung cancer Mother    • Heart disease Father    • Heart attack Father    • Hypertension Sister    • Diabetes type II Sister    • No Known Problems Sister    • No Known Problems Sister    • No Known Problems Sister    • No Known Problems Brother    • Heart attack Brother    • No Known Problems Son    • No Known Problems Son    • Colon cancer Neg Hx    • Colon polyps Neg Hx    • Inflammatory bowel disease Neg Hx       Review of Systems   Constitutional: Negative.    HENT: Negative.     Eyes: Negative.    Respiratory: Negative.     Cardiovascular: Negative.    Gastrointestinal: Negative.    Endocrine: Negative.    Genitourinary: Negative.    Musculoskeletal:         Limited range of motion bilateral great toes   Skin:         Skin ulceration left great toe has apparently closed   Allergic/Immunologic: Negative.    Neurological:  Positive for numbness.   Hematological: Negative.    Psychiatric/Behavioral: Negative.           Objective:  /84   Pulse 76   Temp (!) 97.4 °F (36.3 °C)   Resp 16     Physical  Exam  Constitutional:       Appearance: Normal appearance.   HENT:      Head: Normocephalic.      Right Ear: Tympanic membrane normal.      Left Ear: Tympanic membrane normal.      Nose: No congestion.      Mouth/Throat:      Pharynx: No oropharyngeal exudate or posterior oropharyngeal erythema.   Eyes:      Conjunctiva/sclera: Conjunctivae normal.      Pupils: Pupils are equal, round, and reactive to light.   Cardiovascular:      Rate and Rhythm: Normal rate and regular rhythm.      Pulses: Normal pulses.   Pulmonary:      Effort: Pulmonary effort is normal.   Musculoskeletal:         General: Deformity present.      Comments: Bilateral feet: 15-20 degree range of motion available bilateral great toe first MPJ, findings consistent with hallux limitus/rigidus.  No significant osteophyte proliferation of the dorsal first MPJ bilateral.   Feet:      Right foot:      Protective Sensation: 10 sites tested.        Left foot:      Protective Sensation: 10 sites tested.     Skin:     General: Skin is warm and dry.      Capillary Refill: Capillary refill takes 2 to 3 seconds.      Coloration: Skin is not pale.      Findings: No bruising, erythema, lesion or rash.   Neurological:      Mental Status: He is alert.      Cranial Nerves: No cranial nerve deficit.      Sensory: Sensory deficit present.      Motor: No weakness.      Gait: Gait normal.      Deep Tendon Reflexes: Reflexes normal.      Comments: Diminished epicritic sensation bilaterally consistent with diabetic neuropathy.  Loss of protective sensation noted.  No vibratory monofilament testing appreciated.   Psychiatric:         Mood and Affect: Mood normal.         Behavior: Behavior normal.         Judgment: Judgment normal.         Wound 11/06/24 Diabetic Ulcer Plantar Left (Active)   Wound Image   03/26/25 0813   Enter Loza score: Loza Grade 0: No open lesion or a preulcerative lesion - may have a deformity or cellulitis 03/26/25 0813   Wound Description  "Eschar;Dry 03/26/25 0813   Non-staged Wound Description Not applicable 03/26/25 0813   Wound Length (cm) 0.1 cm 03/26/25 0813   Wound Width (cm) 0.1 cm 03/26/25 0813   Wound Depth (cm) 0 cm 03/26/25 0813   Wound Surface Area (cm^2) 0.01 cm^2 03/26/25 0813   Wound Volume (cm^3) 0 cm^3 03/26/25 0813   Calculated Wound Volume (cm^3) 0 cm^3 03/26/25 0813   Change in Wound Size % 100 03/26/25 0813   Drainage Amount None 03/26/25 0813   Drainage Description Serous 01/15/25 0950   [place]-wound Assessment Dry 03/26/25 0813   Dressing Status Intact 02/19/25 1140       Wound 11/13/24 Nose N/A (Active)       Wound 01/22/25 Chest Right (Active)       Procedures             Wound Instructions:  Orders Placed This Encounter   Procedures   • Wound cleansing and dressings Diabetic Ulcer Left Plantar     Your wounds are healed. You are discharged from the [facility]. Continue to moisturize your skin. Gradually wear your diabetic shoes for a few hours at a time each day and assess your feet for any redness or open areas before wearing them all day.     Standing Status:   Future     Expiration Date:   3/26/2025   • XR foot 3+ vw left     Weightbearing please     Standing Status:   Future     Expected Date:   3/30/2025     Expiration Date:   3/30/2029     Scheduling Instructions:      Bring along any outside films relating to this procedure.         • XR foot 3+ vw right     Weightbearing please     Standing Status:   Future     Expected Date:   4/30/2025     Expiration Date:   3/30/2029     Scheduling Instructions:      Bring along any outside films relating to this procedure.               Jovanni Rodas DPM      Portions of the record may have been created with voice recognition software. Occasional wrong word or \"sound a like\" substitutions may have occurred due to the inherent limitations of voice recognition software. Read the chart carefully and recognize, using context, where substitutions have occurred.    "

## 2025-03-31 ENCOUNTER — APPOINTMENT (OUTPATIENT)
Dept: SPEECH THERAPY | Facility: CLINIC | Age: 55
End: 2025-03-31
Payer: MEDICARE

## 2025-04-02 ENCOUNTER — OFFICE VISIT (OUTPATIENT)
Dept: SPEECH THERAPY | Facility: CLINIC | Age: 55
End: 2025-04-02
Payer: MEDICARE

## 2025-04-02 DIAGNOSIS — R41.841 COGNITIVE COMMUNICATION DEFICIT: ICD-10-CM

## 2025-04-02 DIAGNOSIS — R41.3 MEMORY LOSS: ICD-10-CM

## 2025-04-02 DIAGNOSIS — R48.8 OTHER SYMBOLIC DYSFUNCTIONS: Primary | ICD-10-CM

## 2025-04-02 PROCEDURE — 97129 THER IVNTJ 1ST 15 MIN: CPT

## 2025-04-02 PROCEDURE — 97130 THER IVNTJ EA ADDL 15 MIN: CPT

## 2025-04-02 NOTE — PROGRESS NOTES
Daily Speech Treatment Note    Today's date: 2025   Patient’s name: Benito Park  : 1970  MRN: [id number]  Safety measures: h/o Diabetic Shock  Referring provider: Francesco Edouard CRNP    Encounter Diagnoses   Name Primary?    Other symbolic dysfunctions Yes    Cognitive communication deficit     Memory loss        Visit Tracking:  POC   Expires Auth Expiration Date ST Visit Limit   2025 BOMN BOMN          Visit/Unit Tracking:  Auth Status Date 3/3 3/5/25   3/14 3/17 3/20 3/24 3/28 4/2   No auth req, Used 11 12 13 14 15 16 17 18    Remaining 9 8 7 6 5 4 3 2       Subjective/Behavioral:  -Patient pleasant and cooperative. .             Objective/Assessment:  Completed structured activities with patient to target goals below.  Results as stated below.       Short-term goals:  Patient will be educated on the use of internal and external memory aids and compensatory strategies to facilitate increased recall of routine, personal information, and recent events, to be achieved in 4-6 weeks.  25:  Reviewed information / handout on memory strategies with patient. Patient will bring in journal next session, use post it before entering car, work on repetition out loud, etc.  Working on review of medication list / reason taking in future session.  : Provided information on apps/workbooks/games recommended and provided handout.  :  Provided info on digital voice recorder since patient having difficulty with writing (fine motor / hand issues).     Patient will complete auditory immediate and short term memory tasks to facilitate increased ability to retell narratives and recall information within functional living environment, to be achieved in 4-6 weeks.  : Following auditory directives: 1 step 2 components: 80% & 100% accuracy, 2 step - 4 components: 70-90%, 3 step, 6 components: 75% accuracy, ongoing education regarding step / step, repetition out loud, review. Drawing Figures: Written: 75%  - 100% accuracy.   3/20: Summarizing / recall of info from medium length paragraphs: 8 sentences, recalls beginning and end but discussed getting a recorder, writing information down, needing repetition for info/ conversation.  : Completed listening to long paragraphs and allowed patient to take notes and used notes to answer questions about paragraphs, at least 80% accuracy in recall; however, taking notes is tough on patient's hands since has [employer]- patient prefers to use a digital recorder and will purchase.      Patient will complete complex auditory attention processing tasks (e.g., sentence unscramble, ranking numbers/words, etc.) to improve working memory with 80% accuracy, to be achieved in 4-6 weeks.  : Working on pairing of words, able to complete 4 words into 2 pairs: 100% , not able to complete 6 words into 3 pairs with max cues. : Worked with sentence formulation and recalling words: set of 2: 100%, set of 3 words benefitted from writing words down on paper and formulating sentence on paper, then work towards repetition, visualization for final 3 words which was successful, continue to work with sentence formulation 3 words in future sessions. : Able to formulate sentence with 3 words, give increased time, review out loud, focused on these strategies. Recall of pairs, color codin/5 pairs: 80% accuracy.  : recall of pairs of words: attribute: 3/5, then 4/5 with 2nd set.  Reverse order: 3 words: 90%, putting 3 words in order, Unscrambling words 3-4 100%, 5 words, requires rep. At time, increased processing.  Word List Retention: 80% accuracy out of 4 words (attribute). Word List Retention: Exclusion: required repetition 2-3 times for accurate recall with question after 5 words.  At end of session: recall of pairs: 80% accuracy. 3/3: Recall of 4/4 pictures after 5 minutes using story imagery recall, recall of details from short paragraph- 80% accuracy. 3/14: Recall of  5 pictures after 5 minutes: Recall of all 5/5 pictures after 5 minutes. 3/17: Recall of 7/7 pictures: 6/7 pictures recalled using story imagery.  Recall: Short paragraphs, 3-5 sentences, recalling via SLP questioning.  3/24: Recall of details of paragraph: 75% recall initially, increased to 100% with review of info. 2nd paragraph, requires review of sequencing of information, working on establishing more association with prior memories to remember new information, after this, able to recall greater than 70% of information.     Patient will complete thought organization tasks (e.g., sequencing, deduction puzzles, etc.) with 80% accuracy to facilitate increased executive functioning, working memory, problem solving, and processing skills, to be achieved in 4-6 weeks.  1/28: Attempted to complete Reggie's closet , logical solution puzzle- will work with patient with this task due to needs glasses and confusion over location/comprehension.  Also, wrote out medication list for patient and patient will work on writing reason taking for each medication. 1/31: Worked on Kitchen Shelves activity: moderate cues to help patient with attention/ focus: using finger or pencil as visual marker, one part of sentence at a time, crossing off completed tasks, provided vegetable garden for home practice. Played BLINK & organized cards into 3 different rows: number, color, shape, required frequent reminders for proper category / but did improve with indep. As game went on. 2/7:  Garden Plot: Completed from homework and corrected with patient - requiring moderate - max cues for assistance with reading comprehension and location comprehension using compass. 2/7: Organizing written sequencing of tasks (4 steps): 100% accuracy, provided more steps for hw. Reading comprehension written directions, multi steps: with education of breaking up steps, able to complete accurately- provided for hw.  2/28: Completed hw tasks, 1 step 2 components, 2  step 4 components, 3 step 6 components, completed at least 80% accuracy but would be beneficial to continue this goal. 3/3: Day 1 Coding task, organizing maze of symbols & computations: Initially, completed education & demonstration, then patient able to complete exercises. Then, min cues provided, patient will practice Day 1-2 at home, with checking work throughout.  3/5: Reading comprehension questions, first one: 70% accuracy, completed education, using highlighter, slow, review. 100% 2nd paragraph with independent use of these strategies. Nell's Schedule: required mod-max cues/assistance, will work on these type of problems in future sessions.  3/20: Completing [facility] puzzle/ deduction puzzle: Generally, completed 65% accurately but required moderate cueing to increase accuracy and encouraging taking notes to help with processing.     Patient will complete concrete and abstract categorization tasks to 80% accuracy to facilitate improved generative naming skills and working memory, to be achieved in 4-6 weeks.   3/5: Assisted patient with completing cross word puzzle from folder- completed 65-70% indep, minimal cues to increase to 100% accuracy.   3/14: Filling in Category Members given categories and letters: anagrams: minimal-moderate cues, 70% accuracy.  3/17: Filling in cross word puzzles, 90% accuracy.  40% accuracy in answering responsive naming questions given first letter.  3/20: Responsive Naming giving first letter: 90% accuracy.   3/28: Helped patient with word retrieval, responsive namin% accuracy with letter cue.  Patient also assisted with filling in rest of cross word puzzles, assisted with circumlocution / synonyms. : 80% accuracy naming words by letter.     Patient will utilize word finding strategies during semantic feature analysis treatment activity for improved naming and verbal expression skills, to be achieved in 4-6 weeks.       Long Term Goals    Patient will demonstrate  cognitive-communication skills consistent with age and education given use of compensatory strategies when needed to resume baseline activities and responsibilities in home, community, and work/school settings by discharge.     Patient will complete cognitive-linguistic therapy that addresses patient’s specific deficits in processing speed, short-term working memory, attention to detail, monitoring, sequencing, and organization skills, with instruction, to alleviate effects of executive functioning disorder deficits by discharge.     Patient will complete higher-level expressive language tasks (e.g., word definitions, idioms, synonym/antonyms, etc) with 80% accuracy to improve functional communication skills by discharge.

## 2025-04-02 NOTE — PROGRESS NOTES
Patient ID: Benito Park is a 54 y.o. male Date of Birth 1970       No chief complaint on file.            Diagnosis:  1. Onychomycosis  2. Diabetic polyneuropathy associated with type 2 diabetes mellitus (HCC)         Patient presents for at-risk diabetic foot care.  Patient has no acute concerns today.  Patient has significant lower extremity risk due to neuropathy, parasthesia, edema, and trophic skin changes to the lower extremity. Most recent HBA1c was 6.4 on 1/9/25.  Upon discharge from a management center Dr. Rodas did discuss procedure to increased range of motion of first MTPJ, x-rays were ordered but patient has not gotten them yet.  FBS runs in the 130s  Last visit with PCP 2/14/25 and endocrinology was 8/20/24.  Diabetic shoes : Received 4/1/25  LEAD 11/18/24 - WNL  Following with Dr. Rodas at [facility] for left foot DFU -discharged on 3/26/2024 as ulcer is well-healed.    On exam patient has thickened, hypertrophic, discolored, brittle toenails with subungual debris and tenderness x10   Callus: submet 1 and 5 bilaterally  Patient does not have  BL lower extremity edema  Patient's skin is atrophic, thickened nails, and decreased pedal hair  Patient has decreased pinprick and vibratory sensation to his feet and parasthesia  Patient does not have any  pedal ulcerations right foot.  DP and PT pulses are monophasic on Doppler  Left plantar hallux foot DFU 0-well-healed  New/recurrent ulceration dorsal first MTPJ left foot, predebridement measures 0.2 x 0.4 x 0.1, eschar present, postdebridement granular base, wound does not probe to muscle, tendon, bone, no purulence, malodor or signs of infection noted.        Today's treatment includes:    Debridement     Date/Time: 4/3/2025 8:00 AM  Universal Protocol:  procedure performed by consultantConsent: Verbal consent obtained.  Risks and benefits: risks, benefits and alternatives were discussed  Consent given by: patient  Time out: Immediately prior to  "procedure a \"time out\" was called to verify the correct patient, procedure, equipment, support staff and site/side marked as required.  Patient understanding: patient states understanding of the procedure being performed  Patient identity confirmed: verbally with patient    Debridement Details  Performed by: physician  Debridement type: surgical  Level of debridement: subcutaneous tissue  Pain control: none    Post-debridement measurements  Length (cm): 0.4  Width (cm): 0.6  Depth (cm): 0.2  Percent debrided: 100%  Surface Area (cm^2): 0.24  Area Debrided (cm^2): 0.24  Volume (cm^3): 0.05    Tissue and other material debrided: subcutaneous tissue  Devitalized tissue debrided: biofilm, callus, fibrin, necrotic debris, slough and eschar  Instrument(s) utilized: blade  Technique utilized: excisional  Bleeding: small  Hemostasis obtained with: pressure  Procedural pain (0-10): insensate  Post-procedural pain: insensate   Response to treatment: procedure was tolerated well        Debridement of toenails. Using nail nipper, demarcus, and curette, nails were sharply debrided, reduced in thickness and length. Devitalized nail tissue and fungal debris excised and removed. Patient tolerated well.        Hyperkeratosis were trimmed x 4 without incident    Advised patient to have x-rays done of bilateral feet and schedule follow-up appointment to discuss possible surgical options for correction of first MTPJ DJD.    Discussed proper shoe gear, daily inspections of feet, and general foot health with patient. Patient has Q9 findings and is recommended for at risk foot care every 9-10 weeks. 1 week for ulcer care.    Patients most recent complete clinical foot exam was on: 5/24/24     The following portions of the patient's history were reviewed and updated as appropriate: allergies, current medications, past family history, past medical history, past social history, past surgical history, and problem list.        Objective:  There " "were no vitals taken for this visit.        No pertinent results found.      Rosmery Weir, DANITA, DPM, FACFAS    Portions of the record may have been created with voice recognition software. Occasional wrong word or \"sound a like\" substitutions may have occurred due to the inherent limitations of voice recognition software. Read the chart carefully and recognize, using context, where substitutions have occurred.  "

## 2025-04-03 ENCOUNTER — APPOINTMENT (OUTPATIENT)
Dept: RADIOLOGY | Facility: CLINIC | Age: 55
End: 2025-04-03
Payer: MEDICARE

## 2025-04-03 ENCOUNTER — OFFICE VISIT (OUTPATIENT)
Dept: PODIATRY | Facility: CLINIC | Age: 55
End: 2025-04-03
Payer: MEDICARE

## 2025-04-03 VITALS — WEIGHT: 201 LBS | HEIGHT: 72 IN | BODY MASS INDEX: 27.22 KG/M2

## 2025-04-03 DIAGNOSIS — L85.9 HYPERKERATOSIS: ICD-10-CM

## 2025-04-03 DIAGNOSIS — B35.1 ONYCHOMYCOSIS: Primary | ICD-10-CM

## 2025-04-03 DIAGNOSIS — M20.22 HALLUX RIGIDUS OF BOTH FEET: ICD-10-CM

## 2025-04-03 DIAGNOSIS — E11.42 DIABETIC POLYNEUROPATHY ASSOCIATED WITH TYPE 2 DIABETES MELLITUS (HCC): ICD-10-CM

## 2025-04-03 DIAGNOSIS — E11.621 DIABETIC ULCER OF LEFT FOOT ASSOCIATED WITH TYPE 2 DIABETES MELLITUS, LIMITED TO BREAKDOWN OF SKIN, UNSPECIFIED PART OF FOOT (HCC): ICD-10-CM

## 2025-04-03 DIAGNOSIS — L97.521 DIABETIC ULCER OF LEFT FOOT ASSOCIATED WITH TYPE 2 DIABETES MELLITUS, LIMITED TO BREAKDOWN OF SKIN, UNSPECIFIED PART OF FOOT (HCC): ICD-10-CM

## 2025-04-03 DIAGNOSIS — M20.21 HALLUX RIGIDUS OF BOTH FEET: ICD-10-CM

## 2025-04-03 PROCEDURE — 11721 DEBRIDE NAIL 6 OR MORE: CPT | Performed by: PODIATRIST

## 2025-04-03 PROCEDURE — 11042 DBRDMT SUBQ TIS 1ST 20SQCM/<: CPT | Performed by: PODIATRIST

## 2025-04-03 PROCEDURE — 11056 PARNG/CUTG B9 HYPRKR LES 2-4: CPT | Performed by: PODIATRIST

## 2025-04-03 PROCEDURE — 99212 OFFICE O/P EST SF 10 MIN: CPT | Performed by: PODIATRIST

## 2025-04-03 PROCEDURE — 73630 X-RAY EXAM OF FOOT: CPT

## 2025-04-07 ENCOUNTER — APPOINTMENT (OUTPATIENT)
Dept: SPEECH THERAPY | Facility: CLINIC | Age: 55
End: 2025-04-07
Payer: MEDICARE

## 2025-04-09 ENCOUNTER — OFFICE VISIT (OUTPATIENT)
Dept: SPEECH THERAPY | Facility: CLINIC | Age: 55
End: 2025-04-09
Payer: MEDICARE

## 2025-04-09 DIAGNOSIS — R48.8 OTHER SYMBOLIC DYSFUNCTIONS: ICD-10-CM

## 2025-04-09 DIAGNOSIS — R41.3 MEMORY LOSS: ICD-10-CM

## 2025-04-09 DIAGNOSIS — R41.841 COGNITIVE COMMUNICATION DEFICIT: Primary | ICD-10-CM

## 2025-04-09 PROCEDURE — 97130 THER IVNTJ EA ADDL 15 MIN: CPT

## 2025-04-09 PROCEDURE — 97129 THER IVNTJ 1ST 15 MIN: CPT

## 2025-04-09 NOTE — PROGRESS NOTES
Daily Speech Treatment Note    Today's date: 2025   Patient’s name: Benito Park  : 1970  MRN: [id number]  Safety measures: h/o Diabetic Shock  Referring provider: Francesco Edouard CRNP    Encounter Diagnoses   Name Primary?    Cognitive communication deficit Yes    Other symbolic dysfunctions     Memory loss        Visit Tracking:  POC   Expires Auth Expiration Date ST Visit Limit   2025 BOMN BOMN          Visit/Unit Tracking:  Auth Status Date 3/3 3/5/25   3/14 3/17 3/20 3/24 3/28 4/2 4/9   No auth req, Used 11 12 13 14 15 16 17 18 19    Remaining 9 8 7 6 5 4 3 2 1       Subjective/Behavioral:  -Patient pleasant and cooperative. .             Objective/Assessment:  Completed structured activities with patient to target goals below.  Results as stated below.       Short-term goals:  Patient will be educated on the use of internal and external memory aids and compensatory strategies to facilitate increased recall of routine, personal information, and recent events, to be achieved in 4-6 weeks.  25:  Reviewed information / handout on memory strategies with patient. Patient will bring in journal next session, use post it before entering car, work on repetition out loud, etc.  Working on review of medication list / reason taking in future session.  : Provided information on apps/workbooks/games recommended and provided handout.  :  Provided info on digital voice recorder since patient having difficulty with writing (fine motor / hand issues).     Patient will complete auditory immediate and short term memory tasks to facilitate increased ability to retell narratives and recall information within functional living environment, to be achieved in 4-6 weeks.  : Following auditory directives: 1 step 2 components: 80% & 100% accuracy, 2 step - 4 components: 70-90%, 3 step, 6 components: 75% accuracy, ongoing education regarding step / step, repetition out loud, review. Drawing Figures:  Written: 75% - 100% accuracy.   3/20: Summarizing / recall of info from medium length paragraphs: 8 sentences, recalls beginning and end but discussed getting a recorder, writing information down, needing repetition for info/ conversation.  : Completed listening to long paragraphs and allowed patient to take notes and used notes to answer questions about paragraphs, at least 80% accuracy in recall; however, taking notes is tough on patient's hands since has [employer]- patient prefers to use a digital recorder and will purchase.      Patient will complete complex auditory attention processing tasks (e.g., sentence unscramble, ranking numbers/words, etc.) to improve working memory with 80% accuracy, to be achieved in 4-6 weeks.  : Working on pairing of words, able to complete 4 words into 2 pairs: 100% , not able to complete 6 words into 3 pairs with max cues. : Worked with sentence formulation and recalling words: set of 2: 100%, set of 3 words benefitted from writing words down on paper and formulating sentence on paper, then work towards repetition, visualization for final 3 words which was successful, continue to work with sentence formulation 3 words in future sessions. : Able to formulate sentence with 3 words, give increased time, review out loud, focused on these strategies. Recall of pairs, color codin/5 pairs: 80% accuracy.  : recall of pairs of words: attribute: 3/5, then 4/5 with 2nd set.  Reverse order: 3 words: 90%, putting 3 words in order, Unscrambling words 3-4 100%, 5 words, requires rep. At time, increased processing.  Word List Retention: 80% accuracy out of 4 words (attribute). Word List Retention: Exclusion: required repetition 2-3 times for accurate recall with question after 5 words.  At end of session: recall of pairs: 80% accuracy. 3/3: Recall of 4/4 pictures after 5 minutes using story imagery recall, recall of details from short paragraph- 80% accuracy.  3/14: Recall of 5 pictures after 5 minutes: Recall of all 5/5 pictures after 5 minutes. 3/17: Recall of 7/7 pictures: 6/7 pictures recalled using story imagery.  Recall: Short paragraphs, 3-5 sentences, recalling via SLP questioning.  3/24: Recall of details of paragraph: 75% recall initially, increased to 100% with review of info. 2nd paragraph, requires review of sequencing of information, working on establishing more association with prior memories to remember new information, after this, able to recall greater than 70% of information.     Patient will complete thought organization tasks (e.g., sequencing, deduction puzzles, etc.) with 80% accuracy to facilitate increased executive functioning, working memory, problem solving, and processing skills, to be achieved in 4-6 weeks.  1/28: Attempted to complete Reggie's closet , logical solution puzzle- will work with patient with this task due to needs glasses and confusion over location/comprehension.  Also, wrote out medication list for patient and patient will work on writing reason taking for each medication. 1/31: Worked on Kitchen Shelves activity: moderate cues to help patient with attention/ focus: using finger or pencil as visual marker, one part of sentence at a time, crossing off completed tasks, provided vegetable garden for home practice. Played BLINK & organized cards into 3 different rows: number, color, shape, required frequent reminders for proper category / but did improve with indep. As game went on. 2/7:  Garden Plot: Completed from homework and corrected with patient - requiring moderate - max cues for assistance with reading comprehension and location comprehension using compass. 2/7: Organizing written sequencing of tasks (4 steps): 100% accuracy, provided more steps for hw. Reading comprehension written directions, multi steps: with education of breaking up steps, able to complete accurately- provided for hw.  2/28: Completed hw tasks, 1 step 2  components, 2 step 4 components, 3 step 6 components, completed at least 80% accuracy but would be beneficial to continue this goal. 3/3: Day 1 Coding task, organizing maze of symbols & computations: Initially, completed education & demonstration, then patient able to complete exercises. Then, min cues provided, patient will practice Day 1-2 at home, with checking work throughout.  3/5: Reading comprehension questions, first one: 70% accuracy, completed education, using highlighter, slow, review. 100% 2nd paragraph with independent use of these strategies. Nell's Schedule: required mod-max cues/assistance, will work on these type of problems in future sessions.  3/20: Completing [place] puzzle/ deduction puzzle: Generally, completed 65% accurately but required moderate cueing to increase accuracy and encouraging taking notes to help with processing.  : Completing of [employer] logical solution puzzle: mild- moderate cues to help with processing of information and taking notes, etc.  Noted improved independence and processing of info and making changes as necessary.  Also, 80% completion of [employer] with minimal cues.      Patient will complete concrete and abstract categorization tasks to 80% accuracy to facilitate improved generative naming skills and working memory, to be achieved in 4-6 weeks.   3/5: Assisted patient with completing cross word puzzle from folder- completed 65-70% indep, minimal cues to increase to 100% accuracy.   3/14: Filling in Category Members given categories and letters: anagrams: minimal-moderate cues, 70% accuracy.  3/17: Filling in cross word puzzles, 90% accuracy.  40% accuracy in answering responsive naming questions given first letter.  3/20: Responsive Naming giving first letter: 90% accuracy.   3/28: Helped patient with word retrieval, responsive namin% accuracy with letter cue.  Patient also assisted with filling in rest of cross word puzzles, assisted with  circumlocution / synonyms. 4/2: 80% accuracy naming words by letter.   4/9: Completed 80% of responsive naming tasks given letter by SLP out loud.     Patient will utilize word finding strategies during semantic feature analysis treatment activity for improved naming and verbal expression skills, to be achieved in 4-6 weeks.       Long Term Goals    Patient will demonstrate cognitive-communication skills consistent with age and education given use of compensatory strategies when needed to resume baseline activities and responsibilities in home, community, and work/school settings by discharge.     Patient will complete cognitive-linguistic therapy that addresses patient’s specific deficits in processing speed, short-term working memory, attention to detail, monitoring, sequencing, and organization skills, with instruction, to alleviate effects of executive functioning disorder deficits by discharge.     Patient will complete higher-level expressive language tasks (e.g., word definitions, idioms, synonym/antonyms, etc) with 80% accuracy to improve functional communication skills by discharge.

## 2025-04-14 ENCOUNTER — APPOINTMENT (OUTPATIENT)
Dept: SPEECH THERAPY | Facility: CLINIC | Age: 55
End: 2025-04-14
Payer: MEDICARE

## 2025-04-16 ENCOUNTER — OFFICE VISIT (OUTPATIENT)
Dept: SPEECH THERAPY | Facility: CLINIC | Age: 55
End: 2025-04-16
Payer: MEDICARE

## 2025-04-16 DIAGNOSIS — R48.8 OTHER SYMBOLIC DYSFUNCTIONS: ICD-10-CM

## 2025-04-16 DIAGNOSIS — R41.841 COGNITIVE COMMUNICATION DEFICIT: Primary | ICD-10-CM

## 2025-04-16 DIAGNOSIS — R41.3 MEMORY LOSS: ICD-10-CM

## 2025-04-16 PROCEDURE — 96125 COGNITIVE TEST BY HC PRO: CPT

## 2025-04-16 NOTE — PROGRESS NOTES
Daily Speech Treatment Note    Today's date: 2025   Patient’s name: Benito Park  : 1970  MRN: [id number]  Safety measures: h/o Diabetic Shock  Referring provider: Francesco Edouard CRNP    Encounter Diagnoses   Name Primary?    Cognitive communication deficit Yes    Other symbolic dysfunctions     Memory loss        Visit Tracking:  POC   Expires Auth Expiration Date ST Visit Limit   2025 BOMN BOMN          Visit/Unit Tracking:  Auth Status Date 3/3 3/5/25   3/14 3/17 3/20 3/24 3/28 4/2 4/9 4/16   No auth req, Used 11 12 13 14 15 16 17 18 19 20    Remaining 9 8 7 6 5 4 3 2 1        Subjective/Behavioral:  -Patient pleasant and cooperative. .     Assessment: Patient made great progress upon completion of RBANS-Form B in comparison to evaluation results. Patient improved from a 44 to an 80, almost doubling his overall score. Will continue work on goals and education as appropriate. Patient will need to continue to utilize compensatory strategies in home and community.         Objective/Assessment:  Completed structured activities with patient to target goals below.  Results as stated below.     Plan:  Patient would benefit from outpatient skilled Speech Therapy services: Cognitive-linguistic therapy     Frequency: 1-2x weekly  Duration: 6-8 weeks     Intervention certification from: 2025  Intervention certification to: 2025         The Repeatable Battery for the Assessment of Neuropsychological Status (RBANS) is a brief, individually-administered assessment which measures attention, language, visuospatial/constructional abilities, and immediate & delayed memory. The RBANS is intended for use with adolescents to adults, ages 12 to 89 years. The following results were obtained during the administration of the assessment.    Form: B    Cognitive Domain/Subtest: Index Score: Percentile Rank: Classification: IE Index Score: Status:   IMMEDIATE MEMORY 87 19%ile Low Average 57 IMPROVEMENT         1. List Learning (28/40)          2. Story Memory (13/24)           VISUOSPATIAL/  CONSTRUCTIONAL 75 5%ile Borderline 75 NO CHANGE        3. Figure Copy (16/20)          4. Line Orientation (12/20)           LANGUAGE 82 12%ile Low Average 75 IMPROVEMENT        5. Picture Naming (9/10)          6. Semantic Fluency (13/40)           ATTENTION 85 16%ile Low Average 75 IMPROVEMENT        7. Digit Span (10/16)          8. Coding (33/89)           DELAYED MEMORY 97 42%ile Average 64 IMPROVEMENT        9. List Recall (6/10)          10. List Recognition (20/20)          11. Story Recall (8/12)          12. Figure Recall (11/20)           Sum of Index Scores:  80  44 IMPROVEMENT   Total Score:  426      Percentile: 9%ile      Classification: Low Average          “IE” indicates the scores from the initial evaluation (). Form: B      *Patient named 9 concrete category members in 60 sec (norm=15+). -- BELOW AVERAGE      Short-term goals:  Patient will be educated on the use of internal and external memory aids and compensatory strategies to facilitate increased recall of routine, personal information, and recent events, to be achieved in 4-6 weeks.  1/21/25:  Reviewed information / handout on memory strategies with patient. Patient will bring in journal next session, use post it before entering car, work on repetition out loud, etc.  Working on review of medication list / reason taking in future session.  1/28: Provided information on apps/workbooks/games recommended and provided handout.  2/28:  Provided info on digital voice recorder since patient having difficulty with writing (fine motor / hand issues).  CONTINUE AS APPROPRIATE    Patient will complete auditory immediate and short term memory tasks to facilitate increased ability to retell narratives and recall information within functional living environment, to be achieved in 4-6 weeks.  2/25: Following auditory directives: 1 step 2 components: 80% & 100% accuracy, 2  step - 4 components: 70-90%, 3 step, 6 components: 75% accuracy, ongoing education regarding step / step, repetition out loud, review. Drawing Figures: Written: 75% - 100% accuracy.   3/20: Summarizing / recall of info from medium length paragraphs: 8 sentences, recalls beginning and end but discussed getting a recorder, writing information down, needing repetition for info/ conversation.  : Completed listening to long paragraphs and allowed patient to take notes and used notes to answer questions about paragraphs, at least 80% accuracy in recall; however, taking notes is tough on patient's hands since has [employer]- patient prefers to use a digital recorder and will purchase.  CONTINUE AS APPROPRIATE    Patient will complete complex auditory attention processing tasks (e.g., sentence unscramble, ranking numbers/words, etc.) to improve working memory with 80% accuracy, to be achieved in 4-6 weeks.  : Working on pairing of words, able to complete 4 words into 2 pairs: 100% , not able to complete 6 words into 3 pairs with max cues. : Worked with sentence formulation and recalling words: set of 2: 100%, set of 3 words benefitted from writing words down on paper and formulating sentence on paper, then work towards repetition, visualization for final 3 words which was successful, continue to work with sentence formulation 3 words in future sessions. : Able to formulate sentence with 3 words, give increased time, review out loud, focused on these strategies. Recall of pairs, color codin/5 pairs: 80% accuracy.  : recall of pairs of words: attribute: 3/5, then 4/5 with 2nd set.  Reverse order: 3 words: 90%, putting 3 words in order, Unscrambling words 3-4 100%, 5 words, requires rep. At time, increased processing.  Word List Retention: 80% accuracy out of 4 words (attribute). Word List Retention: Exclusion: required repetition 2-3 times for accurate recall with question after 5 words.  At  end of session: recall of pairs: 80% accuracy. 3/3: Recall of 4/4 pictures after 5 minutes using story imagery recall, recall of details from short paragraph- 80% accuracy. 3/14: Recall of 5 pictures after 5 minutes: Recall of all 5/5 pictures after 5 minutes. 3/17: Recall of 7/7 pictures: 6/7 pictures recalled using story imagery.  Recall: Short paragraphs, 3-5 sentences, recalling via SLP questioning.  3/24: Recall of details of paragraph: 75% recall initially, increased to 100% with review of info. 2nd paragraph, requires review of sequencing of information, working on establishing more association with prior memories to remember new information, after this, able to recall greater than 70% of information. ACHIEVED    Patient will complete thought organization tasks (e.g., sequencing, deduction puzzles, etc.) with 80% accuracy to facilitate increased executive functioning, working memory, problem solving, and processing skills, to be achieved in 4-6 weeks.  1/28: Attempted to complete Reggie's closet , logical solution puzzle- will work with patient with this task due to needs glasses and confusion over location/comprehension.  Also, wrote out medication list for patient and patient will work on writing reason taking for each medication. 1/31: Worked on Kitchen Shelves activity: moderate cues to help patient with attention/ focus: using finger or pencil as visual marker, one part of sentence at a time, crossing off completed tasks, provided vegetable garden for home practice. Played BLINK & organized cards into 3 different rows: number, color, shape, required frequent reminders for proper category / but did improve with indep. As game went on. 2/7:  Garden Plot: Completed from homework and corrected with patient - requiring moderate - max cues for assistance with reading comprehension and location comprehension using compass. 2/7: Organizing written sequencing of tasks (4 steps): 100% accuracy, provided more steps  for hw. Reading comprehension written directions, multi steps: with education of breaking up steps, able to complete accurately- provided for hw.  2/28: Completed hw tasks, 1 step 2 components, 2 step 4 components, 3 step 6 components, completed at least 80% accuracy but would be beneficial to continue this goal. 3/3: Day 1 Coding task, organizing maze of symbols & computations: Initially, completed education & demonstration, then patient able to complete exercises. Then, min cues provided, patient will practice Day 1-2 at home, with checking work throughout.  3/5: Reading comprehension questions, first one: 70% accuracy, completed education, using highlighter, slow, review. 100% 2nd paragraph with independent use of these strategies. Nell's Schedule: required mod-max cues/assistance, will work on these type of problems in future sessions.  3/20: Completing [facility] puzzle/ deduction puzzle: Generally, completed 65% accurately but required moderate cueing to increase accuracy and encouraging taking notes to help with processing.  4/9: Completing of [employer] logical solution puzzle: mild- moderate cues to help with processing of information and taking notes, etc.  Noted improved independence and processing of info and making changes as necessary.  Also, 80% completion of [employer] with minimal cues.  CONTINUE AS APPROPRIATE    Patient will complete concrete and abstract categorization tasks to 80% accuracy to facilitate improved generative naming skills and working memory, to be achieved in 4-6 weeks.   3/5: Assisted patient with completing cross word puzzle from folder- completed 65-70% indep, minimal cues to increase to 100% accuracy.   3/14: Filling in Category Members given categories and letters: anagrams: minimal-moderate cues, 70% accuracy.  3/17: Filling in cross word puzzles, 90% accuracy.  40% accuracy in answering responsive naming questions given first letter.  3/20: Responsive Naming giving first  letter: 90% accuracy.   3/28: Helped patient with word retrieval, responsive namin% accuracy with letter cue.  Patient also assisted with filling in rest of cross word puzzles, assisted with circumlocution / synonyms. : 80% accuracy naming words by letter.   : Completed 80% of responsive naming tasks given letter by SLP out loud.   CONTINUE AS APPROPRIATE    Patient will utilize word finding strategies during semantic feature analysis treatment activity for improved naming and verbal expression skills, to be achieved in 4-6 weeks.  DISCONTINUE      Long Term Goals - CONTINUE    Patient will demonstrate cognitive-communication skills consistent with age and education given use of compensatory strategies when needed to resume baseline activities and responsibilities in home, community, and work/school settings by discharge.     Patient will complete cognitive-linguistic therapy that addresses patient’s specific deficits in processing speed, short-term working memory, attention to detail, monitoring, sequencing, and organization skills, with instruction, to alleviate effects of executive functioning disorder deficits by discharge.     Patient will complete higher-level expressive language tasks (e.g., word definitions, idioms, synonym/antonyms, etc) with 80% accuracy to improve functional communication skills by discharge.

## 2025-04-17 ENCOUNTER — OFFICE VISIT (OUTPATIENT)
Dept: NEUROLOGY | Facility: CLINIC | Age: 55
End: 2025-04-17
Payer: MEDICARE

## 2025-04-17 VITALS
BODY MASS INDEX: 26.95 KG/M2 | HEIGHT: 72 IN | DIASTOLIC BLOOD PRESSURE: 80 MMHG | TEMPERATURE: 98 F | SYSTOLIC BLOOD PRESSURE: 132 MMHG | HEART RATE: 72 BPM | WEIGHT: 199 LBS

## 2025-04-17 DIAGNOSIS — G31.84 MILD COGNITIVE IMPAIRMENT: ICD-10-CM

## 2025-04-17 DIAGNOSIS — G62.9 PERIPHERAL NEUROPATHY: Primary | ICD-10-CM

## 2025-04-17 DIAGNOSIS — E53.8 B12 DEFICIENCY: ICD-10-CM

## 2025-04-17 DIAGNOSIS — G47.33 OSA (OBSTRUCTIVE SLEEP APNEA): ICD-10-CM

## 2025-04-17 PROCEDURE — 99213 OFFICE O/P EST LOW 20 MIN: CPT | Performed by: NURSE PRACTITIONER

## 2025-04-17 NOTE — PROGRESS NOTES
Name: Benito Park      : 1970      MRN: [id number]  Encounter Provider: MIKE Cerda  Encounter Date: 2025   Encounter department: [facility]  :  Assessment & Plan  Peripheral neuropathy    Orders:    Protein electrophoresis, serum; Future    B12 deficiency  -taking supplement will repeat level  Orders:    Vitamin B12; Future    Mild cognitive impairment  -states mild improvement with continued speech/cognitive therapy       OSIRIS (obstructive sleep apnea)  -doing better with inspire         Assessment & Plan  1. Memory loss.  His memory scores have shown improvement on review of speech therapy documentation; he will continue speech therapy. The MRI results indicate stability in the mild microangiopathic changes. The neuroquant analysis was normal. He has been advised to maintain control over his blood pressure, cholesterol, and blood sugar levels, and to abstain from smoking. A repeat B12 level test will be conducted during his next blood work session. If there is continued memory loss despite efforts with speech therapy, repeating the MRI may be considered in 1 year.    2. Neuropathy.  He reports worsening neuropathy symptoms, particularly at night, with burning sensations in both feet. There are no new wounds on his feet (left top of foot wound monitored by podiatry). He is advised to continue wearing appropriate footwear, including his new diabetic shoes, to prevent further wounds.    3. Back pain.  He continues to experience significant back pain, which has not changed much despite adjustments to his stimulator. The stimulator provides some relief, especially when lying down. No new interventions are planned at this time.    4. Diabetes mellitus.  He has recently stopped taking metformin due to concerns about its impact on his Crohn's disease. Blood sugar levels are being monitored, and another medication may be considered if necessary. He is advised to  continue monitoring his blood sugar levels and follow up with his healthcare provider for re-evaluation.    Follow-up  The patient will follow up in 6 months.        History of Present Illness   History of Present Illness  The patient is a 54-year-old male who follows up in this office. His last office visit was in 12/2024 for a history of speech disturbance and memory loss. Since then, speech therapy has been attended, and progress has been noted. An Inspire device was placed in late January. No repeat hospitalizations have occurred, and a B12 supplement has been initiated. A repeat MRI of the brain on 01/16/2025 showed no acute findings or signs of neurodegeneration.    Short-term memory remains a concern, and an assessment was completed yesterday, with results to be discussed next week at speech therapy. His wife,expressed concerns about mild microangiopathic changes and small vessel disease. Elevated blood sugar levels have been observed to correlate with certain symptoms, though not severe. Compliance with speech therapy recommendations is reported.      Neuropathy symptoms have worsened, particularly at night, with persistent burning sensations in both feet. A small wound on the foot is attributed to inappropriate footwear during a trip to [place]. A wound on the back healed in 10/2024. Care is provided by podiatry and foot surgery has been advised but is currently deferred. New diabetic shoes have been acquired.    Significant back pain continues, unchanged from before. Numerous adjustments to the stimulator have been made, providing some relief, especially when lying down.    INTERVAL: Since last visit, speech therapy has been attended with noted progress. An Inspire device was placed in late January. No repeat hospitalizations have occurred, and a B12 supplement has been initiated. Entyvio and metformin have been discontinued , and blood sugar levels are being monitored with consideration of alternative  medication.    SOCIAL HISTORY  Marital Status:   Sleep: Sleeps well, no longer disturbed during the night.        Lab Results   Component Value Date    AHPELUUQ58 284 11/13/2024     Lab Results   Component Value Date    KMD2IJKUEKTW 1.480 11/13/2024     Lab Results   Component Value Date    LDLCALC 32 08/08/2024     Lab Results   Component Value Date    HGBA1C 6.4 (H) 01/09/2025      Lab Results   Component Value Date    WBC 8.20 01/09/2025    HGB 16.3 01/09/2025    HCT 48.1 01/09/2025     (H) 01/09/2025     01/09/2025     Lab Results   Component Value Date     10/03/2016    SODIUM 141 01/09/2025    K 4.2 01/09/2025     01/09/2025    CO2 29 01/09/2025    ANIONGAP 23 (H) 02/20/2015    AGAP 7 01/09/2025    BUN 13 01/09/2025    CREATININE 0.88 01/09/2025    GLUC 166 (H) 05/17/2024    GLUF 148 (H) 01/09/2025    CALCIUM 9.2 01/09/2025    AST 24 01/09/2025    ALT 31 01/09/2025    ALKPHOS 85 01/09/2025    PROT 7.3 10/03/2016    TP 6.7 01/09/2025    BILITOT 0.8 10/03/2016    TBILI 0.76 01/09/2025    EGFR 98 01/09/2025     MRI brain neuroquant 1/16/2025:  FINDINGS:     BRAIN PARENCHYMA:  There is no discrete mass, mass effect or midline shift.  Brainstem and cerebellum demonstrate normal signal. There is no intracranial hemorrhage.  There is no evidence of acute infarction and diffusion imaging is unremarkable.  Post   contrast imaging is normal. Small scattered hyperintensities on T2/FLAIR imaging are noted in the periventricular and subcortical white matter demonstrating an appearance that is statistically most likely to represent mild microangiopathic change. These   appear stable.     QUANTITATIVE:  Exam Quality: Adequate for volumetric analysis.  3D Volumetric Data:  Hippocampal Occupancy Score (HOC):                   0.87  Percentile for similar age:                                   66th percentile  Lower HOC scores are associated with progression of MCI to AD.  Total hippocampal  volume (cc):                           6.87  Percentile for similar age:                              72nd percentile  Entorhinal cortex (cc)                                            5.81  Percentile for similar age:                                   74th percentile  Superior Lateral ventricular volume (cc):             22.43  Percentile for similar age:                             57th percentile  Inferior lateral ventricular volume (cc):                    1.06  Percentile for similar age:                                41st percentile  Quantitative conclusions:  Hippocampal Volume:                       Normal volume  Entorhinal Volume:                            Normal volume  Superior Lateral Ventricle Volume:     Normal Volume  Inferior Lateral Ventricle Volume:       Normal Volume  Concordance between qualitative and quantitative hippocampal volume assessment: Concordant.  Change in brain volumes: No previous volumetric study for comparison  Mean hippocampal volume loss among normal elderly: 0.7% per year, (-0.3 to 1.7;  Mohan 2008; also Abdon 2010).  VENTRICLES: No hydrocephalus noted.  SELLA AND PITUITARY GLAND:  Normal.  ORBITS:  Normal.  PARANASAL SINUSES: Mucosal thickening involving the bilateral maxillary sinuses, with mucus retention cysts.  VASCULATURE:  Evaluation of the major intracranial vasculature demonstrates appropriate flow voids.  CALVARIUM AND SKULL BASE: Partial opacification right greater than left mastoid air cells.  EXTRACRANIAL SOFT TISSUES:  Normal.    Previous History:  48 hour eeg 12/3- 12/4/2024:  EEG Interpretation:  This 24 hour ambulatory EEG (day 1 of 2) recorded during wakefulness, drowsiness, and sleep is normal. No clinical events were reported. No epileptiform discharges or electrographic seizures were seen.   EEG Interpretation:  This 24 hour ambulatory EEG (day 2 of 2) recorded during wakefulness, drowsiness, and sleep is normal. Around the time of reported symptoms  of word slurring (without a corresponding push button), the tracing was limited due to multiple electrode artifacts, listed above. In remaining electrodes, there was no electrographic evidence of seizures, but again, this was limited due to electrode artifacts at the time of the reported symptoms and no associated push button.   If clinically warranted, continuous video EEG could be considered to better characterize the events.      EMG 11/12/2024:  Impression: EMG and nerve conduction study reveal a generalized predominantly axonal neuropathy. An early superimposed median mononeuropathy at the right wrist is present compatible with the clinical diagnosis of carpal tunnel syndrome.     past medical history of HTN, hyperlipidemia, chronic pain with SCS, crohns disease, alcohol use issues in past, DM with neuropathy and ulcer, OSIRIS who presents for hospital follow up- admitted 5/16-5/17/2024 for falls and speech change. He is with his wife. He does not work. He states family history of heart attack in his father at age 51 and stroke in his brother at age 53.  Today he states he still gets periods of speech changes/confusion that last for hours at at time. He also mention he gets left hand twitching/abnormal movements but these only last seconds and these are not painful.   Workup:  CTh and CTA head/neck were unremarkable for acute intracranial abnormality however did show incidental finding of pulmonary nodules.   MRI brain reviewed; negative for acute infarct. Unclear etiology of symptoms; possible polypharmacy or metabolic etiology with hyperglycemia. No indication to continue plavix/statin from neurology standpoint.   A1c 8.2, this is a new diagnosis for patient   Lipid panel reviewed with elevated total cholesterol, LDL, low HDL  Triglycerides elevated  Given 10-year ASCVD risk 11.3%    HPI   Review of Systems   Constitutional:  Negative for appetite change, fatigue and fever.   HENT: Negative.  Negative for  hearing loss, tinnitus, trouble swallowing and voice change.    Eyes: Negative.  Negative for photophobia, pain and visual disturbance.   Respiratory: Negative.  Negative for shortness of breath.    Cardiovascular: Negative.  Negative for palpitations.   Gastrointestinal: Negative.  Negative for nausea and vomiting.   Endocrine: Negative.  Negative for cold intolerance.   Genitourinary: Negative.  Negative for dysuria, frequency and urgency.   Musculoskeletal:  Positive for back pain. Negative for gait problem, myalgias, neck pain and neck stiffness.   Skin: Negative.  Negative for rash.   Allergic/Immunologic: Negative.    Neurological:  Positive for numbness. Negative for dizziness, tremors, seizures, syncope, facial asymmetry, speech difficulty, weakness, light-headedness and headaches.   Hematological: Negative.  Does not bruise/bleed easily.   Psychiatric/Behavioral: Negative.  Negative for hallucinations and sleep disturbance.    All other systems reviewed and are negative.   I have personally reviewed the MA's review of systems and made changes as necessary.  No new issues to address  Current Outpatient Medications on File Prior to Visit   Medication Sig Dispense Refill    acetaminophen (TYLENOL) 500 mg tablet Take 500 mg by mouth every 6 (six) hours as needed for mild pain      albuterol (PROVENTIL HFA,VENTOLIN HFA) 90 mcg/act inhaler Inhale 2 puffs every 6 (six) hours as needed for wheezing 6.7 g 2    Alcohol Swabs 70 % PADS May substitute brand based on insurance coverage. Check glucose TID. 100 each 0    atorvastatin (LIPITOR) 40 mg tablet Take 1 tablet (40 mg total) by mouth every evening 90 tablet 2    Blood Glucose Monitoring Suppl (OneTouch Verio Reflect) w/Device KIT May substitute brand based on insurance coverage. Check glucose TID. 1 kit 0    Cholecalciferol (RA Vitamin D-3) 1,000 units tablet Take 2 tablets (2,000 Units total) by mouth daily 180 tablet 2    cyclobenzaprine (FLEXERIL) 10 mg tablet  TAKE 1 TABLET BY MOUTH EVERYDAY AT BEDTIME 90 tablet 2    DULoxetine (CYMBALTA) 30 mg delayed release capsule TAKE 1 CAPSULE BY MOUTH EVERY DAY 90 capsule 1    escitalopram (LEXAPRO) 10 mg tablet Take 1 tablet (10 mg total) by mouth daily 90 tablet 3    glucose blood (OneTouch Verio) test strip May substitute brand based on insurance coverage. Check glucose TID. 100 each 5    lisinopril (ZESTRIL) 20 mg tablet Take 1 tablet (20 mg total) by mouth daily 90 tablet 2    loperamide (IMODIUM) 2 mg capsule Take 1 capsule (2 mg total) by mouth daily at bedtime 60 capsule 3    LORazepam (ATIVAN) 1 mg tablet Take 1 tablet (1 mg total) by mouth every 8 (eight) hours as needed for anxiety 60 tablet 3    naproxen (NAPROSYN) 250 mg tablet Take 250 mg by mouth as needed for mild pain      omeprazole (PriLOSEC) 40 MG capsule Take 1 capsule (40 mg total) by mouth daily 90 capsule 2    ondansetron (ZOFRAN) 4 mg tablet Take 1 tablet (4 mg total) by mouth every 8 (eight) hours as needed for nausea or vomiting 20 tablet 1    OneTouch Delica Lancets 33G MISC May substitute brand based on insurance coverage. Check glucose TID. 100 each 5    pregabalin (LYRICA) 150 mg capsule Take 1 capsule (150 mg total) by mouth 3 (three) times a day 90 capsule 5    traZODone (DESYREL) 50 mg tablet TAKE 1 TO 2 TABLETS AT BEDTIME AS NEEDED FOR SLEEP 180 tablet 1    vitamin B-12 (VITAMIN B-12) 1,000 mcg tablet Take 1 tablet (1,000 mcg total) by mouth daily 90 tablet 3    vedolizumab (Entyvio) SOLR Inject 300 mg into a catheter in a vein every 4 weeks (Patient not taking: Reported on 2025)       No current facility-administered medications on file prior to visit.      Social History     Tobacco Use    Smoking status: Former     Current packs/day: 0.00     Average packs/day: 0.5 packs/day for 30.0 years (15.0 ttl pk-yrs)     Types: Cigarettes     Start date: 2019     Quit date: 2021     Years since quittin.2     Passive exposure: Never     Smokeless tobacco: Never   Vaping Use    Vaping status: Every Day    Substances: THC   Substance and Sexual Activity    Alcohol use: Yes     Alcohol/week: 3.0 standard drinks of alcohol     Types: 3 Cans of beer per week     Comment: social    Drug use: Yes     Frequency: 7.0 times per week     Types: Marijuana     Comment: Medical marijuana-vapes, bedtime    Sexual activity: Yes     Partners: Female     Comment: defer        Objective   /80   Pulse 72   Temp 98 °F (36.7 °C)   Ht 6' (1.829 m)   Wt 90.3 kg (199 lb)   BMI 26.99 kg/m²     Physical Exam  Vitals reviewed.   Constitutional:       General: He is not in acute distress.  HENT:      Head: Normocephalic and atraumatic.      Right Ear: External ear normal.      Left Ear: External ear normal.      Nose: Nose normal.      Mouth/Throat:      Mouth: Mucous membranes are moist.      Pharynx: Oropharynx is clear.   Eyes:      General:         Right eye: No discharge.         Left eye: No discharge.      Extraocular Movements: Extraocular movements intact.      Pupils: Pupils are equal, round, and reactive to light.   Cardiovascular:      Rate and Rhythm: Normal rate.      Pulses:           Dorsalis pedis pulses are 2+ on the left side.   Pulmonary:      Effort: Pulmonary effort is normal.   Abdominal:      General: There is no distension.      Tenderness: There is no abdominal tenderness.   Musculoskeletal:      Cervical back: Normal range of motion.      Right lower leg: No edema.      Left lower leg: No edema.   Skin:     Capillary Refill: Capillary refill takes less than 2 seconds.      Findings: No rash.   Neurological:      Mental Status: He is alert. Mental status is at baseline.      Cranial Nerves: No cranial nerve deficit.      Sensory: Sensory deficit present.      Motor: No weakness.      Coordination: Coordination normal.      Gait: Gait normal.   Psychiatric:         Mood and Affect: Mood normal.       Neurological Exam  Mental  Status  Alert.    Cranial Nerves  CN III, IV, VI: Extraocular movements intact bilaterally. Pupils equal round and reactive to light bilaterally.    Gait   Normal gait.

## 2025-04-21 ENCOUNTER — OFFICE VISIT (OUTPATIENT)
Dept: SPEECH THERAPY | Facility: CLINIC | Age: 55
End: 2025-04-21
Payer: MEDICARE

## 2025-04-21 DIAGNOSIS — R41.841 COGNITIVE COMMUNICATION DEFICIT: Primary | ICD-10-CM

## 2025-04-21 DIAGNOSIS — R41.3 MEMORY LOSS: ICD-10-CM

## 2025-04-21 DIAGNOSIS — R48.8 OTHER SYMBOLIC DYSFUNCTIONS: ICD-10-CM

## 2025-04-21 PROCEDURE — 97129 THER IVNTJ 1ST 15 MIN: CPT

## 2025-04-21 PROCEDURE — 97130 THER IVNTJ EA ADDL 15 MIN: CPT

## 2025-04-21 NOTE — PROGRESS NOTES
Daily Speech Treatment Note- Discharge Report    Today's date: 2025   Patient’s name: Benito Park  : 1970  MRN: [id number]  Safety measures: h/o Diabetic Shock  Referring provider: Francesco Edouard CRNP    Encounter Diagnoses   Name Primary?    Cognitive communication deficit Yes    Other symbolic dysfunctions     Memory loss        Visit Tracking:  POC   Expires Auth Expiration Date ST Visit Limit   2025 BOMN BOMN          Visit/Unit Tracking:  Auth Status Date 3/3 3/5/25   3/14 3/17 3/20 3/24 3/28 4/2 4/9 4/16 4/21   No auth req, Used 11 12 13 14 15 16 17 18 19 20 21    Remaining 9 8 7 6 5 4 3 2 1         Subjective/Behavioral:  -Patient pleasant and cooperative. .           Objective/Assessment:  Completed structured activities with patient to target goals below.  Results as stated below.              25: Completed education of results from RBANS reevaluation and way to continue to keep cognition at best performance   / level. Patient expressed understanding and agreeable to discharge.  Will plan on Reevaluation in 6 months if not sooner to determine if Skilled Maintenance Therapy warranted.       The Repeatable Battery for the Assessment of Neuropsychological Status (RBANS) is a brief, individually-administered assessment which measures attention, language, visuospatial/constructional abilities, and immediate & delayed memory. The RBANS is intended for use with adolescents to adults, ages 12 to 89 years. The following results were obtained during the administration of the assessment.    Form: B    Cognitive Domain/Subtest: Index Score: Percentile Rank: Classification: IE Index Score: Status:   IMMEDIATE MEMORY 87 19%ile Low Average 57 IMPROVEMENT        1. List Learning ()          2. Story Memory ()           VISUOSPATIAL/  CONSTRUCTIONAL 75 5%ile Borderline 75 NO CHANGE        3. Figure Copy ()          4. Line Orientation ()           LANGUAGE 82 12%ile Low  Average 75 IMPROVEMENT        5. Picture Naming (9/10)          6. Semantic Fluency (13/40)           ATTENTION 85 16%ile Low Average 75 IMPROVEMENT        7. Digit Span (10/16)          8. Coding (33/89)           DELAYED MEMORY 97 42%ile Average 64 IMPROVEMENT        9. List Recall (6/10)          10. List Recognition (20/20)          11. Story Recall (8/12)          12. Figure Recall (11/20)           Sum of Index Scores:  80  44 IMPROVEMENT   Total Score:  426      Percentile: 9%ile      Classification: Low Average          Short-term goals:  All goals achieved maximum potential at this time.   Patient will be educated on the use of internal and external memory aids and compensatory strategies to facilitate increased recall of routine, personal information, and recent events, to be achieved in 4-6 weeks.  1/21/25:  Reviewed information / handout on memory strategies with patient. Patient will bring in journal next session, use post it before entering car, work on repetition out loud, etc.  Working on review of medication list / reason taking in future session.  1/28: Provided information on apps/workbooks/games recommended and provided handout.  2/28:  Provided info on digital voice recorder since patient having difficulty with writing (fine motor / hand issues).      Patient will complete auditory immediate and short term memory tasks to facilitate increased ability to retell narratives and recall information within functional living environment, to be achieved in 4-6 weeks.  2/25: Following auditory directives: 1 step 2 components: 80% & 100% accuracy, 2 step - 4 components: 70-90%, 3 step, 6 components: 75% accuracy, ongoing education regarding step / step, repetition out loud, review. Drawing Figures: Written: 75% - 100% accuracy.   3/20: Summarizing / recall of info from medium length paragraphs: 8 sentences, recalls beginning and end but discussed getting a recorder, writing information down, needing  repetition for info/ conversation.  4/2: Completed listening to long paragraphs and allowed patient to take notes and used notes to answer questions about paragraphs, at least 80% accuracy in recall; however, taking notes is tough on patient's hands since has [employer]- patient prefers to use a digital recorder and will purchase.       Patient will complete thought organization tasks (e.g., sequencing, deduction puzzles, etc.) with 80% accuracy to facilitate increased executive functioning, working memory, problem solving, and processing skills, to be achieved in 4-6 weeks.  1/28: Attempted to complete Reggie's closet , logical solution puzzle- will work with patient with this task due to needs glasses and confusion over location/comprehension.  Also, wrote out medication list for patient and patient will work on writing reason taking for each medication. 1/31: Worked on Kitchen Shelves activity: moderate cues to help patient with attention/ focus: using finger or pencil as visual marker, one part of sentence at a time, crossing off completed tasks, provided vegetable garden for home practice. Played BLINK & organized cards into 3 different rows: number, color, shape, required frequent reminders for proper category / but did improve with indep. As game went on. 2/7:  Garden Plot: Completed from homework and corrected with patient - requiring moderate - max cues for assistance with reading comprehension and location comprehension using compass. 2/7: Organizing written sequencing of tasks (4 steps): 100% accuracy, provided more steps for hw. Reading comprehension written directions, multi steps: with education of breaking up steps, able to complete accurately- provided for hw.  2/28: Completed hw tasks, 1 step 2 components, 2 step 4 components, 3 step 6 components, completed at least 80% accuracy but would be beneficial to continue this goal. 3/3: Day 1 Coding task, organizing maze of symbols & computations:  Initially, completed education & demonstration, then patient able to complete exercises. Then, min cues provided, patient will practice Day 1-2 at home, with checking work throughout.  3: Reading comprehension questions, first one: 70% accuracy, completed education, using highlighter, slow, review. 100% 2nd paragraph with independent use of these strategies. Nell's Schedule: required mod-max cues/assistance, will work on these type of problems in future sessions.  3/20: Completing [place] puzzle/ deduction puzzle: Generally, completed 65% accurately but required moderate cueing to increase accuracy and encouraging taking notes to help with processing.  : Completing of [place] logical solution puzzle: mild- moderate cues to help with processing of information and taking notes, etc.  Noted improved independence and processing of info and making changes as necessary.  Also, 80% completion of Furniture Delivery with minimal cues.      Patient will complete concrete and abstract categorization tasks to 80% accuracy to facilitate improved generative naming skills and working memory, to be achieved in 4-6 weeks.   3/5: Assisted patient with completing cross word puzzle from folder- completed 65-70% indep, minimal cues to increase to 100% accuracy.   3/14: Filling in Category Members given categories and letters: anagrams: minimal-moderate cues, 70% accuracy.  3/17: Filling in cross word puzzles, 90% accuracy.  40% accuracy in answering responsive naming questions given first letter.  3/20: Responsive Naming giving first letter: 90% accuracy.   3/28: Helped patient with word retrieval, responsive namin% accuracy with letter cue.  Patient also assisted with filling in rest of cross word puzzles, assisted with circumlocution / synonyms. : 80% accuracy naming words by letter.   : Completed 80% of responsive naming tasks given letter by SLP out loud.           Long Term Goals -     Patient will demonstrate  cognitive-communication skills consistent with age and education given use of compensatory strategies when needed to resume baseline activities and responsibilities in home, community, and work/school settings by discharge.     Patient will complete cognitive-linguistic therapy that addresses patient’s specific deficits in processing speed, short-term working memory, attention to detail, monitoring, sequencing, and organization skills, with instruction, to alleviate effects of executive functioning disorder deficits by discharge.     Patient will complete higher-level expressive language tasks (e.g., word definitions, idioms, synonym/antonyms, etc) with 80% accuracy to improve functional communication skills by discharge.

## 2025-04-23 ENCOUNTER — APPOINTMENT (OUTPATIENT)
Dept: SPEECH THERAPY | Facility: CLINIC | Age: 55
End: 2025-04-23
Payer: MEDICARE

## 2025-04-28 ENCOUNTER — APPOINTMENT (OUTPATIENT)
Dept: SPEECH THERAPY | Facility: CLINIC | Age: 55
End: 2025-04-28
Payer: MEDICARE

## 2025-04-30 ENCOUNTER — APPOINTMENT (OUTPATIENT)
Dept: SPEECH THERAPY | Facility: CLINIC | Age: 55
End: 2025-04-30
Payer: MEDICARE

## 2025-05-01 DIAGNOSIS — F51.01 PRIMARY INSOMNIA: ICD-10-CM

## 2025-05-01 RX ORDER — TRAZODONE HYDROCHLORIDE 50 MG/1
TABLET ORAL
Qty: 180 TABLET | Refills: 0 | Status: SHIPPED | OUTPATIENT
Start: 2025-05-01

## 2025-05-01 NOTE — TELEPHONE ENCOUNTER
Pharmacy change    Reason for call:   [x] Refill   [] Prior Auth  [] Other:     Office:   [x] PCP/Provider -   [] Specialty/Provider -     Medication: Trazadone     Dose/Frequency: 50 mg tablet. Take 1 to 2 tablets at bedtime PRN for sleep     Quantity: 180    Pharmacy: [facility] [address] [place] [address] KRISTINA CHAMBERLAIN [phone]     Local Pharmacy   Does the patient have enough for 3 days?   [x] Yes   [] No - Send as HP to POD    Mail Away Pharmacy   Does the patient have enough for 10 days?   [] Yes   [] No - Send as HP to POD

## 2025-05-19 ENCOUNTER — OFFICE VISIT (OUTPATIENT)
Dept: SLEEP CENTER | Facility: CLINIC | Age: 55
End: 2025-05-19
Payer: MEDICARE

## 2025-05-19 VITALS
HEIGHT: 72 IN | SYSTOLIC BLOOD PRESSURE: 128 MMHG | WEIGHT: 204 LBS | OXYGEN SATURATION: 96 % | HEART RATE: 89 BPM | DIASTOLIC BLOOD PRESSURE: 82 MMHG | BODY MASS INDEX: 27.63 KG/M2

## 2025-05-19 DIAGNOSIS — F51.04 CHRONIC INSOMNIA: ICD-10-CM

## 2025-05-19 DIAGNOSIS — Z96.82 S/P INSERTION OF HYPOGLOSSAL NERVE STIMULATOR: ICD-10-CM

## 2025-05-19 DIAGNOSIS — G47.33 OSA (OBSTRUCTIVE SLEEP APNEA): Primary | ICD-10-CM

## 2025-05-19 PROCEDURE — 99214 OFFICE O/P EST MOD 30 MIN: CPT | Performed by: STUDENT IN AN ORGANIZED HEALTH CARE EDUCATION/TRAINING PROGRAM

## 2025-05-19 PROCEDURE — G2211 COMPLEX E/M VISIT ADD ON: HCPCS | Performed by: STUDENT IN AN ORGANIZED HEALTH CARE EDUCATION/TRAINING PROGRAM

## 2025-05-19 NOTE — ASSESSMENT & PLAN NOTE
Inspire Settings  Outgoing Settings  Electrode configuration A + - +   Amplitude (V) 1.1  Range (V) 0.2 to 1.2  Pulse width (us)/ Rate (Hz)  90/33  Start delay (min) 45  Pause Time (min) 15  Therapy Duration (hr) 8

## 2025-05-19 NOTE — ASSESSMENT & PLAN NOTE
Anand is a very pleasant 54-year-old gentleman  with a PMHx of HTN, GERD, T2DM with polyneuropathy, myofascial pain syndrome, chronic pain syndrome status post spinal cord stimulator placement, vitamin D deficiency, HLD who presents in follow up for OSIRIS (MARIE 18.8, O2 chapito 77% 7/2023) status post HGNS placement.  He is actually overall doing well, with consistent use with no significant adverse effects.  Of note, we do not have recent usage data simply due to his obtaining a new phone and not yet updating the criss.    Reviewed the proper use of the Inspire remote  He currently has an Inspire titration study scheduled for 6/4/2025; at this stage, I feel he is okay to go forward with this study  Encouraged him to potentially try increasing to level 10 on the remote simply prior to the study; proper settings will be programmed at his next visit.

## 2025-05-19 NOTE — PATIENT INSTRUCTIONS
"At present, it appears you are doing well on the device.  At this stage, given consistent use, comfort, and even subjective benefit, we will go forward with the inspire titration study.  This is a study overnight in the sleep lab to titrate/adjust the inspire settings to find the \"optimal\" setting (similar to the PAP titration study that you had in the past with CPAP).  This should be scheduled before you leave today.    Presently you are at level 9 on the remote.  Can try increasing to level 10 before the Titration Study.     Please remember you must turn off (White button) or pause the Inspire device anytime you are eating, drinking, chewing, or swallowing as there is a risk of choking if you do so when the Inspire device is active.     Please bring your remote to each visit with me    If you have device questions in the middle of the night, please call [employer] support  ([phone])     For problems using Inspire, sleep issues, etc, please contact me in the office in the day        Good Sleep Hygiene  Wake up at the same time every day, even on the weekends.  Use your bed for sleep and intimacy only.  If you have been in bed awake for 30 minutes, get up and leave the bedroom. Choose a dull activity not involving a blue screen (TV, computer, handheld devices). Go back to bed when you feel sleepy.  Avoid caffeine, nicotine and alcohol before you go to bed.  Avoid large meals before you go to bed.  Avoid using screens (computers, tablets, smartphones, etc.) for at least 1 hour before bedtime  Exercise regularly, but do not exercise right before you go to bed.  Avoid daytime naps. If you do take a nap, sleep for 20-40 minutes, and not after 2pm.   "

## 2025-05-19 NOTE — PROGRESS NOTES
Name: Benito Park      : 1970      MRN: [id number]  Encounter Provider: Woodrow Gordon DO  Encounter Date: 2025   Encounter department: [facility] SLEEP MEDICINE [place]  :  Assessment & Plan  OSIRIS (obstructive sleep apnea)  Anand is a very pleasant 54-year-old gentleman  with a PMHx of HTN, GERD, T2DM with polyneuropathy, myofascial pain syndrome, chronic pain syndrome status post spinal cord stimulator placement, vitamin D deficiency, HLD who presents in follow up for OSIRIS (MARIE 18.8, O2 chapito 77% 2023) status post HGNS placement.  He is actually overall doing well, with consistent use with no significant adverse effects.  Of note, we do not have recent usage data simply due to his obtaining a new phone and not yet updating the criss.    Reviewed the proper use of the Inspire remote  He currently has an Inspire titration study scheduled for 2025; at this stage, I feel he is okay to go forward with this study  Encouraged him to potentially try increasing to level 10 on the remote simply prior to the study; proper settings will be programmed at his next visit.         S/P insertion of hypoglossal nerve stimulator    Inspire Settings  Outgoing Settings  Electrode configuration A + - +   Amplitude (V) 1.1  Range (V) 0.2 to 1.2  Pulse width (us)/ Rate (Hz)  90/33  Start delay (min) 45  Pause Time (min) 15  Therapy Duration (hr) 8         Chronic insomnia  He does currently have what sounds to be a decently significant chronic insomnia, which does appear to overlap with the start delay of his device; curiously, he does not endorse feeling the device coming on, and thus the device turned on is not significantly impacting his sleep.    Discussed the pathophysiology behind chronic insomnia, including the 3-P model  Discussed that CBT-I is first-line for treatment of chronic insomnia, and has the best data supporting its efficacy  We will consider a formal Cognitive Behavioral Therapy for Insomnia  (CBT-I) at a future appointment.  Recommended that he utilize good sleep hygiene         Follow-up:  He will Return for Follow-up in 6-8 weeks.      ________________________________________________________________________________________________    Per Last Visit Note (Date: 2/25/2025):  OSIRIS (obstructive sleep apnea)  He tolerated activation of the hypoglossal nerve stimulator well today, good tongue protrusion was noted with use of the device in the office.  There was no sign of neuropraxia prior to activation of the device.  They were instructed on use of the remote control and also instructed on how to increase levels on the inspire remote.  We discussed that the setting to be increased each week, not any faster than that.  I will see himback in the office in 2 months with an anticipated repeat diagnostic study to fine tune the Inspire device in 3 months.  I asked them to call me with any concerns or questions as they acclimate to this therapy.  We discussed that in the initial phases, the treatment is not always initially effective, therefore symptoms of obstructive sleep apnea may be present until appropriate settings are reached.     Settings applied  Inspire Settings  Outgoing Settings  Electrode configuration A + - +   Amplitude (V) 0.4  Range (V) 0.2 to 1.2  Pulse width (us) 90  Rate (Hz) 33  Start delay (min) 45  Pause Time (min) 15  Therapy Duration (hr) 8     Will see him in 2 months      Orders:    Diagnostic Sleep Study with Inspire; Future     S/P insertion of hypoglossal nerve stimulator  As detailed above     I reminded  the patient the pathophysiology of obstructive sleep apnea (OSIRIS), explaining that the condition involves intermittent blockage of the upper airway during sleep, which leads to brief pauses in breathing and drops in oxygen levels. These frequent episodes of hypoxia and disrupted sleep trigger an increase in sympathetic nervous system activity, which raises blood pressure and stresses  the cardiovascular system. I emphasized that inadequately treated OSIRIS is strongly associated with a range of serious health consequences. Specifically, chronic untreated OSIRIS significantly raises the risk of developing hypertension, atrial fibrillation (A-fib), and heart failure due to the prolonged strain on the heart and blood vessels. I discussed how untreated OSIRIS is also a major risk factor for stroke, as intermittent hypoxia and elevated blood pressure can lead to the development of atherosclerosis and the formation of blood clots. Furthermore, I explained the growing body of evidence suggesting that untreated OSIRIS is linked to cognitive decline, including an increased risk of early-onset dementia, particularly Alzheimer's disease. This is thought to result from the effects of chronic sleep disruption and hypoxia on brain function.       Sleep Studies:  -HSAT 7/22/2023: TRT: 439 minutes, MARIE: 18.8, O2 chapito: 77%     -PAP Titration Study 8 /14/2023: Titration started at 4 cmH2O, titrated to 14 cmH2O. Best pressure noted to be 14  cmH2O. PLM index 18.8, PLM arousal index 1.6.      ________________________________________________________________________________________________      Interval History: Benito Park is a 54 y.o. male with a PMHx of HTN, GERD, T2DM with polyneuropathy, myofascial pain syndrome, chronic pain syndrome status post spinal cord stimulator placement, vitamin D deficiency, HLD who presents in follow up for OSIRSI (MARIE 18.8, O2 chapito 77% 7/2023) status post HGNS placement.      SDB:  Timeline:  -Sleep studies as above  -Trailed CPAP for >1 year, unable to tolerate  -S/p DISE 11/13/2024  -S/p Inspire implantation 1/22/2025  -Activation: 2/25/2025    As of today's visit:   -He has increased to level 9 as of today. says his wife notes that he is still snoring every once in a while (maybe 2-3 times in a week). Still much better than it was.   -Endorses nightly use.  -He himself endorses feeling  better, but does still get up ~2x/night.   -Tolerating it well, denies any discomfort. Device not waking him up.   -Note - got a new phone ~1 month ago. Has not signed back in to [employer] to upload the data.     Use Data:        Inspire Settings  Incoming Settings  Electrode configuration A + - +   Amplitude (V) 1.1  Range (V) 0.2 to 1.2  Pulse width (us)/ Rate (Hz)  90/33  Start delay (min) 45  Pause Time (min) 15  Therapy Duration (hr) 8        SLEEP SCHEDULE:  Bedtime: 2030/2100   Time it takes to fall sleep: 46-60 minutes. Interestingly, denies feeling the device come on.  Wake up Time: 0430   Number of times patient wakes up per night: 2  Naps: 0  Estimated total sleep time ( in a 24 hour period of time): ~6-7 hours       Changes to PMH, PSH, SH: Denies       Sitting and reading: Slight chance of dozing  Watching TV: Would never doze  Sitting, inactive in a public place (e.g. a theatre or a meeting): Would never doze  As a passenger in a car for an hour without a break: Slight chance of dozing  Lying down to rest in the afternoon when circumstances permit: Would never doze  Sitting and talking to someone: Slight chance of dozing  Sitting quietly after a lunch without alcohol: Would never doze  In a car, while stopped for a few minutes in traffic: Would never doze  Total score: 3     Review of Systems  Pertinent positives/negatives included in HPI and also as noted:     Medications Ordered Prior to Encounter[1]   Objective   /82 (BP Location: Left arm, Patient Position: Sitting, Cuff Size: Large)   Pulse 89   Ht 6' (1.829 m)   Wt 92.5 kg (204 lb)   SpO2 96%   BMI 27.67 kg/m²        Physical Exam  Visit Vitals  /82 (BP Location: Left arm, Patient Position: Sitting, Cuff Size: Large)   Pulse 89   Ht 6' (1.829 m)   Wt 92.5 kg (204 lb)   SpO2 96%   BMI 27.67 kg/m²   Smoking Status Former   BSA 2.15 m²     PHYSICAL EXAMINATION:  Vital Signs: /82 (BP Location: Left arm, Patient Position: Sitting,  "Cuff Size: Large)   Pulse 89   Ht 6' (1.829 m)   Wt 92.5 kg (204 lb)   SpO2 96%   BMI 27.67 kg/m²     Constitutional: NAD, well appearing   Mental Status: AAOx3  Skin: Warm, dry, no rashes noted   Eyes: PERRL, normal conjunctiva  Posterior Airspace:   Tongue: Good protrusion at current settings  Chest: No evidence of respiratory distress, no accessory muscle use; no evidence of peripheral cyanosis  Abdomen: Soft, NT/ND  Extremities: No digital clubbing or pedal edema  Neuro: Strength 5/5 throughout, sensation grossly intact      Data  Lab Results   Component Value Date    HGB 16.3 01/09/2025    HCT 48.1 01/09/2025     (H) 01/09/2025      Lab Results   Component Value Date    GLUCOSE 110 02/20/2015    CALCIUM 9.2 01/09/2025     10/03/2016    K 4.2 01/09/2025    CO2 29 01/09/2025     01/09/2025    BUN 13 01/09/2025    CREATININE 0.88 01/09/2025     No results found for: \"IRON\", \"TIBC\", \"FERRITIN\"  Lab Results   Component Value Date    AST 24 01/09/2025    ALT 31 01/09/2025         Electronically signed by:    Woodrow Gordon DO  Board-Certified Neurology and Sleep Medicine  [facility]  05/19/25       [1]   Current Outpatient Medications on File Prior to Visit   Medication Sig Dispense Refill    acetaminophen (TYLENOL) 500 mg tablet Take 500 mg by mouth every 6 (six) hours as needed for mild pain      albuterol (PROVENTIL HFA,VENTOLIN HFA) 90 mcg/act inhaler Inhale 2 puffs every 6 (six) hours as needed for wheezing 6.7 g 2    Alcohol Swabs 70 % PADS May substitute brand based on insurance coverage. Check glucose TID. 100 each 0    atorvastatin (LIPITOR) 40 mg tablet Take 1 tablet (40 mg total) by mouth every evening 90 tablet 2    Blood Glucose Monitoring Suppl (OneTouch Verio Reflect) w/Device KIT May substitute brand based on insurance coverage. Check glucose TID. 1 kit 0    Cholecalciferol (RA Vitamin D-3) 1,000 units tablet Take 2 tablets (2,000 Units total) by " mouth daily 180 tablet 2    cyclobenzaprine (FLEXERIL) 10 mg tablet TAKE 1 TABLET BY MOUTH EVERYDAY AT BEDTIME 90 tablet 2    DULoxetine (CYMBALTA) 30 mg delayed release capsule TAKE 1 CAPSULE BY MOUTH EVERY DAY 90 capsule 1    escitalopram (LEXAPRO) 10 mg tablet Take 1 tablet (10 mg total) by mouth daily 90 tablet 3    glucose blood (OneTouch Verio) test strip May substitute brand based on insurance coverage. Check glucose TID. 100 each 5    lisinopril (ZESTRIL) 20 mg tablet Take 1 tablet (20 mg total) by mouth daily 90 tablet 2    loperamide (IMODIUM) 2 mg capsule Take 1 capsule (2 mg total) by mouth daily at bedtime 60 capsule 3    LORazepam (ATIVAN) 1 mg tablet Take 1 tablet (1 mg total) by mouth every 8 (eight) hours as needed for anxiety 60 tablet 3    naproxen (NAPROSYN) 250 mg tablet Take 250 mg by mouth as needed for mild pain      omeprazole (PriLOSEC) 40 MG capsule Take 1 capsule (40 mg total) by mouth daily 90 capsule 2    ondansetron (ZOFRAN) 4 mg tablet Take 1 tablet (4 mg total) by mouth every 8 (eight) hours as needed for nausea or vomiting 20 tablet 1    OneTouch Delica Lancets 33G MISC May substitute brand based on insurance coverage. Check glucose TID. 100 each 5    pregabalin (LYRICA) 150 mg capsule Take 1 capsule (150 mg total) by mouth 3 (three) times a day 90 capsule 5    traZODone (DESYREL) 50 mg tablet TAKE 1 TO 2 TABLETS AT BEDTIME AS NEEDED FOR SLEEP 180 tablet 0    vitamin B-12 (VITAMIN B-12) 1,000 mcg tablet Take 1 tablet (1,000 mcg total) by mouth daily 90 tablet 3    vedolizumab (Entyvio) SOLR Inject 300 mg into a catheter in a vein every 4 weeks (Patient not taking: Reported on 4/17/2025)       No current facility-administered medications on file prior to visit.

## 2025-06-02 ENCOUNTER — HOSPITAL ENCOUNTER (OUTPATIENT)
Dept: CT IMAGING | Facility: HOSPITAL | Age: 55
Discharge: HOME | End: 2025-06-02
Attending: INTERNAL MEDICINE
Payer: MEDICARE

## 2025-06-02 DIAGNOSIS — R91.1 LUNG NODULE: ICD-10-CM

## 2025-06-02 PROCEDURE — 71250 CT THORAX DX C-: CPT

## 2025-06-04 ENCOUNTER — HOSPITAL ENCOUNTER (OUTPATIENT)
Dept: SLEEP CENTER | Facility: CLINIC | Age: 55
Discharge: HOME | End: 2025-06-04
Attending: INTERNAL MEDICINE
Payer: MEDICARE

## 2025-06-04 DIAGNOSIS — G47.33 OSA (OBSTRUCTIVE SLEEP APNEA): ICD-10-CM

## 2025-06-04 PROCEDURE — 95976 ALYS SMPL CN NPGT PRGRMG: CPT

## 2025-06-04 PROCEDURE — 95810 POLYSOM 6/> YRS 4/> PARAM: CPT

## 2025-06-04 PROCEDURE — 95810 POLYSOM 6/> YRS 4/> PARAM: CPT | Performed by: INTERNAL MEDICINE

## 2025-06-07 ENCOUNTER — RESULTS FOLLOW-UP (OUTPATIENT)
Dept: SLEEP CENTER | Facility: CLINIC | Age: 55
End: 2025-06-07

## 2025-06-19 NOTE — TELEPHONE ENCOUNTER
Diagnostic study with Inspire resulted:  Based on this study, 1.0 and 1.1V may be optimal and appears to resolve sleep apnea in all sleep stages/positions except for concurrent REM supine sleep. There is some concern for overtitration at 1.3V that should warrant caution. Baseline hypoxemia not caused by sleep apnea was observed throughout the study in all positions of sleep.  Consider supplemental oxygen at 1 to 2 liters/min via nasal cannula during sleep if clinically indicated.     Follow-up with Dr. Gordon 6/23/2025 to review results and discussed changes to therapy.    Call placed to patient, advised of above.

## 2025-06-20 ENCOUNTER — TELEPHONE (OUTPATIENT)
Dept: FAMILY MEDICINE CLINIC | Facility: CLINIC | Age: 55
End: 2025-06-20

## 2025-06-20 RX ORDER — NAPROXEN 250 MG/1
250 TABLET ORAL AS NEEDED
Refills: 0 | Status: CANCELLED | OUTPATIENT
Start: 2025-06-20

## 2025-06-23 ENCOUNTER — TELEPHONE (OUTPATIENT)
Age: 55
End: 2025-06-23

## 2025-06-23 ENCOUNTER — OFFICE VISIT (OUTPATIENT)
Dept: SLEEP CENTER | Facility: CLINIC | Age: 55
End: 2025-06-23
Payer: MEDICARE

## 2025-06-23 VITALS
WEIGHT: 199 LBS | OXYGEN SATURATION: 98 % | SYSTOLIC BLOOD PRESSURE: 126 MMHG | HEIGHT: 72 IN | HEART RATE: 96 BPM | BODY MASS INDEX: 26.95 KG/M2 | DIASTOLIC BLOOD PRESSURE: 82 MMHG

## 2025-06-23 DIAGNOSIS — Z96.82 S/P INSERTION OF HYPOGLOSSAL NERVE STIMULATOR: ICD-10-CM

## 2025-06-23 DIAGNOSIS — F51.04 CHRONIC INSOMNIA: ICD-10-CM

## 2025-06-23 DIAGNOSIS — G47.33 OSA (OBSTRUCTIVE SLEEP APNEA): Primary | ICD-10-CM

## 2025-06-23 DIAGNOSIS — R52 PAIN: Primary | ICD-10-CM

## 2025-06-23 PROCEDURE — 99214 OFFICE O/P EST MOD 30 MIN: CPT | Performed by: STUDENT IN AN ORGANIZED HEALTH CARE EDUCATION/TRAINING PROGRAM

## 2025-06-23 PROCEDURE — G2211 COMPLEX E/M VISIT ADD ON: HCPCS | Performed by: STUDENT IN AN ORGANIZED HEALTH CARE EDUCATION/TRAINING PROGRAM

## 2025-06-23 RX ORDER — NAPROXEN 250 MG/1
250 TABLET ORAL
Qty: 100 TABLET | Refills: 2 | Status: SHIPPED | OUTPATIENT
Start: 2025-06-23

## 2025-06-23 NOTE — TELEPHONE ENCOUNTER
The patient called to advise he is returning a call from [facility]. The patient was no longer answering, while the writer was assisting.     An encounter has been routed for a call back, for further assistance.    Please contact Anand:  [phone]    Thank you.

## 2025-06-23 NOTE — ASSESSMENT & PLAN NOTE
Anand is a very pleasant 54-year-old gentleman with a PMHx of HTN, GERD, T2DM with polyneuropathy, myofascial pain syndrome, chronic pain syndrome status post spinal cord stimulator placement, vitamin D deficiency, HLD who presents in follow up for OSIRIS (MARIE 18.8, O2 chapito 77% 7/2023) status post HGNS placement and insomnia.  He is doing very well with inspire, although I do suspect that his current setting (1.2 V) is slightly too high, based off the results of his titration study.  1.0-1.1 (and, in truth, even 1.2 to extend) were all somewhat comparable, however oxygenation and respiratory event control appeared most optimal at 1.1 V.    Spent some time debating the best setting based off of the above; at this time, we will have him continue at 1.1 V, with a settings range of 0.5 V.  Settings as below.  I have placed an order for an overnight pulse oximetry study, in case adjunctive oxygen is needed as well  Reviewed preference for him to bring his remote with him at all visits  Reviewed usage data as below  Will likely plan for a home sleep apnea test to verify control following his next visit.      Orders:    Pulse oximetry overnight

## 2025-06-23 NOTE — TELEPHONE ENCOUNTER
Writer contacted pt regarding referral for CBT-I to verify services needed and explained to him that there is no opening available at this time. Pt agree to be place on [facility] wait list. Referral closed.

## 2025-06-23 NOTE — PATIENT INSTRUCTIONS
Presently, your inspire device seems to be working well.  You can increase your settings based upon comfort.  You can increase by 1 level each week if needed due to increased snoring, worsened sleep, or increased sleepiness.  If settings are uncomfortable, you can decrease the settings by 1 level a week.  If discomfort with the inspire device is prominent, please contact me as likely a follow-up visit would be needed to make changes in the settings.    Presently you are at level 3 on the remote.      Please remember you must turn off (White button) or pause the Inspire device anytime you are eating, drinking, chewing, or swallowing as there is a risk of choking if you do so when the Inspire device is active.     Please bring your remote to each visit with me    If you have device questions in the middle of the night, please call [employer] support  ([phone])     For problems using Inspire, sleep issues, etc, please contact me in the office in the day            For Your Insomnia:     The gold standard of treatment for insomnia is cognitive behavioral therapy for insomnia (CBT-I). Medications come with side effects and have varying, often insufficient, degrees of efficacy; in addition, once stopped, they may no longer help the underlying sleep problem. Data on CBT-I shows significant lasting, long-term effects on insomnia, often better than any medication.      PLAN:  Practice good Sleep Hygiene, as outlined below.   I am also placing a referral for Cognitive Behavioral Therapy for Insomnia (CBT-I). Know that you will likely receive a call from our Psych Services department to validate the need for this therapy and to either schedule an appointment or place you on the waitlist for it.  Continue trazodone for your insomnia  When/As you undergo CBT-I, recommend weaning of of the sleep medication per the direction of the Behavioral Sleep Medicine provider  As a preview of what to expect with CBT-I:   To help re-train  "your brain and allow it to re-associate your bed with sleep, we typically utilize a two-pronged approach (with some personalization patient-to-patient):  Increasing Sleep Efficiency:    You will establish a scheduled wake time, which will be the same every day (including weekends), regardless of your sleep the night before. In addition, do not go to bed until you're tired, usually with a set \"earliest bedtime\" as well, per your discussion with either your Sleep medicine Provider or Behavioral Sleep Medicine specialist.   The idea behind this, particularly in conjunction with Stimulus Control, is to get your body to re-recognize your drive for sleep .  Stimulus Control:  To be performed in conjunction with Increasing Sleep Efficiency, above.  You should not spend more than 20 minutes in bed awake. If you are awake after 20 minutes, leave the bedroom (or at least your bed) and engage in a relaxing activity, such as reading, word puzzles, or listening to soothing music. Ideally, avoid activities that stimulate you or reward you for being awake in the middle of the night, such as eating or watching television.   After 30-40 minutes of this activity, you then return to bed (ideally, should not return to bed until you are tired and feel ready to sleep, however this can sometimes be difficult to determine).   If you return to bed and still cannot sleep within 20 minutes, the process should be repeated.   No matter how many times the process has to be repeated, you should still wake at the set time as outlined above (Increasing Sleep Efficiency).  The idea behind this is that patients with insomnia commonly associate their bed and bedroom with the fear of not sleeping, or it has simply become a stimulating environment and their brain no longer associates it with sleep. The longer one stays in bed trying to sleep, the stronger the association becomes, which then perpetuates the difficulty falling asleep. Stimulus control " then is a strategy whose purpose is to disrupt this association and re-train your brain to associate your bedroom with sleep, thus enhancing the likelihood of sleep.  Consistent application of these 2 strategies (again, often with some personalization if needed) leads to a consistent improvement in falling asleep right away and staying asleep throughout the night.        Good Sleep Hygiene  Wake up at the same time every day, even on the weekends.  Use your bed for sleep and intimacy only.  If you have been in bed awake for 30 minutes, get up and leave the bedroom. Choose a dull activity not involving a blue screen (TV, computer, handheld devices). Go back to bed when you feel sleepy.  Avoid caffeine, nicotine and alcohol before you go to bed.  Avoid large meals before you go to bed.  Avoid using screens (computers, tablets, smartphones, etc.) for at least 1 hour before bedtime  Exercise regularly, but do not exercise right before you go to bed.  Avoid daytime naps. If you do take a nap, sleep for 20-40 minutes, and not after dinner.

## 2025-06-23 NOTE — PROGRESS NOTES
Name: Benito Park      : 1970      MRN: [id number]  Encounter Provider: Woodrow Gordon DO  Encounter Date: 2025   Encounter department: [facility] [place]  :  Assessment & Plan  OSIRIS (obstructive sleep apnea)  Anand is a very pleasant 54-year-old gentleman with a PMHx of HTN, GERD, T2DM with polyneuropathy, myofascial pain syndrome, chronic pain syndrome status post spinal cord stimulator placement, vitamin D deficiency, HLD who presents in follow up for OSIRIS (MARIE 18.8, O2 chapito 77% 2023) status post HGNS placement and insomnia.  He is doing very well with inspire, although I do suspect that his current setting (1.2 V) is slightly too high, based off the results of his titration study.  1.0-1.1 (and, in truth, even 1.2 to extend) were all somewhat comparable, however oxygenation and respiratory event control appeared most optimal at 1.1 V.    Spent some time debating the best setting based off of the above; at this time, we will have him continue at 1.1 V, with a settings range of 0.5 V.  Settings as below.  I have placed an order for an overnight pulse oximetry study, in case adjunctive oxygen is needed as well  Reviewed preference for him to bring his remote with him at all visits  Reviewed usage data as below  Will likely plan for a home sleep apnea test to verify control following his next visit.      Orders:    Pulse oximetry overnight    S/P insertion of hypoglossal nerve stimulator  Inspire Settings  Outgoing Settings  Electrode configuration A + - +   Amplitude (V) 1.1  Range (V) 0.9 to 1.3  Pulse width (us)/ Rate (Hz)  90/33  Start delay (min) 55  Pause Time (min) 15  Therapy Duration (hr) 8  Orders:    Pulse oximetry overnight    Chronic insomnia  This does continue to be problematic for him.  While trazodone does sound to be beneficial, it is still taking him close to an hour to fall asleep.    Discussed the pathophysiology behind chronic insomnia, including the 3-P  model  Discussed that CBT-I is first-line for treatment of chronic insomnia, and has the best data supporting its efficacy  Reviewed the combination of sleep restriction along with stimulus control  Referral placed for formal Cognitive Behavioral Therapy for Insomnia (CBT-I).  Will continue trazodone 50-100mg nightly for insomnia.  Encouraged the patient to wean off of the sleep aid as directed by the behavioral sleep medicine specialist once they initiate CBT-I.  Recommended that he utilize good sleep hygiene    Orders:    Ambulatory Referral to Psych Services; Future        Follow-up:  He will Return in about 6 months (around 12/23/2025).      ________________________________________________________________________________________________    Per Last Visit Note (Date: 5/19/2025):  OSIRIS (obstructive sleep apnea)  Anand is a very pleasant 54-year-old gentleman  with a PMHx of HTN, GERD, T2DM with polyneuropathy, myofascial pain syndrome, chronic pain syndrome status post spinal cord stimulator placement, vitamin D deficiency, HLD who presents in follow up for OSIRIS (MARIE 18.8, O2 chapito 77% 7/2023) status post HGNS placement.  He is actually overall doing well, with consistent use with no significant adverse effects.  Of note, we do not have recent usage data simply due to his obtaining a new phone and not yet updating the criss.     Reviewed the proper use of the Inspire remote  He currently has an Inspire titration study scheduled for 6/4/2025; at this stage, I feel he is okay to go forward with this study  Encouraged him to potentially try increasing to level 10 on the remote simply prior to the study; proper settings will be programmed at his next visit.        S/P insertion of hypoglossal nerve stimulator     Inspire Settings  Outgoing Settings  Electrode configuration A + - +   Amplitude (V) 1.1  Range (V) 0.2 to 1.2  Pulse width (us)/ Rate (Hz)  90/33  Start delay (min) 45  Pause Time (min) 15  Therapy Duration (hr)  8        Chronic insomnia  He does currently have what sounds to be a decently significant chronic insomnia, which does appear to overlap with the start delay of his device; curiously, he does not endorse feeling the device coming on, and thus the device turned on is not significantly impacting his sleep.     Discussed the pathophysiology behind chronic insomnia, including the 3-P model  Discussed that CBT-I is first-line for treatment of chronic insomnia, and has the best data supporting its efficacy  We will consider a formal Cognitive Behavioral Therapy for Insomnia (CBT-I) at a future appointment.  Recommended that he utilize good sleep hygiene         Sleep Studies:  -HSAT 7/22/2023: TRT: 439 minutes, MARIE: 18.8, O2 chapito: 77%     -PAP Titration Study 8 /14/2023: Titration started at 4 cmH2O, titrated to 14 cmH2O. Best pressure noted to be 14  cmH2O. PLM index 18.8, PLM arousal index 1.6.      ________________________________________________________________________________________________      Interval History: Benito Park is a 54 y.o. male with a PMHx of HTN, GERD, T2DM with polyneuropathy, myofascial pain syndrome, chronic pain syndrome status post spinal cord stimulator placement, vitamin D deficiency, HLD who presents in follow up for OSIRIS (MARIE 18.8, O2 chapito 77% 7/2023) status post HGNS placement and insomnia.      Insomnia:  -Current Symptom Severity/Description: Better controlled with trazodone, however it does still take him close to an hour to fall asleep  -CBT-I?  - None  -Current Treatments:   - Trazodone 50 to 100 mg nightly  -Prior Treatments Tried:   - Denies    SDB:  Timeline:  -Sleep studies as above  -Trailed CPAP for >1 year, unable to tolerate  -S/p DISE 11/13/2024  -S/p Inspire implantation 1/22/2025  -Activation: 2/25/2025  -Inspire Titration Study 6/5/2025: Titration as below, with some concern for over titration at 1.3 V.  Of note, he was noted to have hypoxemia independent of  "respiratory events.      As of today's visit:   -He states that he felt \"fine\" after the study.  -Currently at level 10 (1.2V) on his remote  -Does endorse benefit with no issues.     Use Data:        Inspire Settings  Incoming Settings  Electrode configuration A + - +   Amplitude (V) 1.1  Range (V) 0.2 to 1.2  Pulse width (us)/ Rate (Hz)  90/33  Start delay (min) 45  Pause Time (min) 15  Therapy Duration (hr) 8        SLEEP SCHEDULE:  Bedtime: 2030/2100   Time it takes to fall sleep: 46-60 minutes. Does feel Inspire come on.   Wake up Time: 0430/0500   Number of times patient wakes up per night: 2  Naps: 0  Estimated total sleep time ( in a 24 hour period of time): ~6-7 hours       Changes to PMH, PSH, SH: Denies                                     Review of Systems  Pertinent positives/negatives included in HPI and also as noted:     Medications Ordered Prior to Encounter[1]   Objective   /82 (BP Location: Left arm, Patient Position: Sitting, Cuff Size: Large)   Pulse 96   Ht 6' (1.829 m)   Wt 90.3 kg (199 lb)   SpO2 98%   BMI 26.99 kg/m²        Physical Exam  Visit Vitals  /82 (BP Location: Left arm, Patient Position: Sitting, Cuff Size: Large)   Pulse 96   Ht 6' (1.829 m)   Wt 90.3 kg (199 lb)   SpO2 98%   BMI 26.99 kg/m²   Smoking Status Former   BSA 2.13 m²     PHYSICAL EXAMINATION:  Vital Signs: /82 (BP Location: Left arm, Patient Position: Sitting, Cuff Size: Large)   Pulse 96   Ht 6' (1.829 m)   Wt 90.3 kg (199 lb)   SpO2 98%   BMI 26.99 kg/m²     Constitutional: NAD, well appearing   Mental Status: AAOx3  Skin: Warm, dry, no rashes noted   Eyes: PERRL, normal conjunctiva  Posterior Airspace:   Tongue: Good protrusion at current settings  Chest: No evidence of respiratory distress, no accessory muscle use; no evidence of peripheral cyanosis  Abdomen: Soft, NT/ND  Extremities: No digital clubbing or pedal edema  Neuro: Strength 5/5 throughout, sensation grossly " "intact      Data  Lab Results   Component Value Date    HGB 16.3 01/09/2025    HCT 48.1 01/09/2025     (H) 01/09/2025      Lab Results   Component Value Date    GLUCOSE 110 02/20/2015    CALCIUM 9.2 01/09/2025     10/03/2016    K 4.2 01/09/2025    CO2 29 01/09/2025     01/09/2025    BUN 13 01/09/2025    CREATININE 0.88 01/09/2025     No results found for: \"IRON\", \"TIBC\", \"FERRITIN\"  Lab Results   Component Value Date    AST 24 01/09/2025    ALT 31 01/09/2025         Electronically signed by:    Woodrow Gordon DO  Board-Certified Neurology and Sleep Medicine  [facility]  06/23/25         [1]   Current Outpatient Medications on File Prior to Visit   Medication Sig Dispense Refill    acetaminophen (TYLENOL) 500 mg tablet Take 500 mg by mouth every 6 (six) hours as needed for mild pain      albuterol (PROVENTIL HFA,VENTOLIN HFA) 90 mcg/act inhaler Inhale 2 puffs every 6 (six) hours as needed for wheezing 6.7 g 2    Alcohol Swabs 70 % PADS May substitute brand based on insurance coverage. Check glucose TID. 100 each 0    atorvastatin (LIPITOR) 40 mg tablet Take 1 tablet (40 mg total) by mouth every evening 90 tablet 2    Blood Glucose Monitoring Suppl (OneTouch Verio Reflect) w/Device KIT May substitute brand based on insurance coverage. Check glucose TID. 1 kit 0    Cholecalciferol (RA Vitamin D-3) 1,000 units tablet Take 2 tablets (2,000 Units total) by mouth daily 180 tablet 2    cyclobenzaprine (FLEXERIL) 10 mg tablet TAKE 1 TABLET BY MOUTH EVERYDAY AT BEDTIME 90 tablet 2    DULoxetine (CYMBALTA) 30 mg delayed release capsule TAKE 1 CAPSULE BY MOUTH EVERY DAY 90 capsule 1    escitalopram (LEXAPRO) 10 mg tablet Take 1 tablet (10 mg total) by mouth daily 90 tablet 3    glucose blood (OneTouch Verio) test strip May substitute brand based on insurance coverage. Check glucose TID. 100 each 5    lisinopril (ZESTRIL) 20 mg tablet Take 1 tablet (20 mg total) by mouth daily 90 " tablet 2    loperamide (IMODIUM) 2 mg capsule Take 1 capsule (2 mg total) by mouth daily at bedtime 60 capsule 3    LORazepam (ATIVAN) 1 mg tablet Take 1 tablet (1 mg total) by mouth every 8 (eight) hours as needed for anxiety 60 tablet 3    naproxen (NAPROSYN) 250 mg tablet Take 250 mg by mouth as needed for mild pain      omeprazole (PriLOSEC) 40 MG capsule Take 1 capsule (40 mg total) by mouth daily 90 capsule 2    ondansetron (ZOFRAN) 4 mg tablet Take 1 tablet (4 mg total) by mouth every 8 (eight) hours as needed for nausea or vomiting 20 tablet 1    OneTouch Delica Lancets 33G MISC May substitute brand based on insurance coverage. Check glucose TID. 100 each 5    pregabalin (LYRICA) 150 mg capsule Take 1 capsule (150 mg total) by mouth 3 (three) times a day 90 capsule 5    traZODone (DESYREL) 50 mg tablet TAKE 1 TO 2 TABLETS AT BEDTIME AS NEEDED FOR SLEEP 180 tablet 0    vitamin B-12 (VITAMIN B-12) 1,000 mcg tablet Take 1 tablet (1,000 mcg total) by mouth daily 90 tablet 3    vedolizumab (Entyvio) SOLR Inject 300 mg into a catheter in a vein every 4 weeks (Patient not taking: Reported on 4/17/2025)       No current facility-administered medications on file prior to visit.

## 2025-06-23 NOTE — ASSESSMENT & PLAN NOTE
Inspire Settings  Outgoing Settings  Electrode configuration A + - +   Amplitude (V) 1.1  Range (V) 0.9 to 1.3  Pulse width (us)/ Rate (Hz)  90/33  Start delay (min) 55  Pause Time (min) 15  Therapy Duration (hr) 8  Orders:    Pulse oximetry overnight

## 2025-06-23 NOTE — TELEPHONE ENCOUNTER
Pt called in stated he has an appt on 7/08 w/dr sheriff wanted to know if another appt was needed or if he could get refill until 7/8 please follow up

## 2025-07-01 ENCOUNTER — RA CDI HCC (OUTPATIENT)
Dept: OTHER | Facility: HOSPITAL | Age: 55
End: 2025-07-01

## 2025-07-03 NOTE — ASSESSMENT & PLAN NOTE
Lab Results   Component Value Date    HGBA1C 6.5 (H) 07/08/2025     Discussed proper shoe gear, daily inspections of feet, and general foot health with patient. Patient has Q9 findings and is recommended for at risk foot care every 9-10 weeks.   Continue Diabetic shoes which he picked up on 4/1/25  Annual Diabetic Foot Exam performed today.  Most recent LEAD was 11/18/24 - WNL  Orders:    ammonium lactate (LAC-HYDRIN) 12 % cream; Apply topically daily

## 2025-07-03 NOTE — PROGRESS NOTES
Name: Benito Park      : 1970      MRN: [id number]  Encounter Provider: Rosmery Weir DPM  Encounter Date: 7/10/2025   Encounter department: [facility] [place]  :  Assessment & Plan  Onychomycosis  Nails both manually and mechanically debrided x 10 without incident       Hyperkeratosis  Hyperkeratosis trimmed without incident x 4  Patient with preulcerated fissures, I explained to him the importance of wearing shoes all the time, he only wear socks within the home.  He is to wear his diabetic shoes at all times when he is ambulating.  He may wear his old diabetic shoes when he is doing yard work outside as long as inserts are updated.    Orders:    ammonium lactate (LAC-HYDRIN) 12 % cream; Apply topically daily    Diabetic polyneuropathy associated with type 2 diabetes mellitus (HCC)    Lab Results   Component Value Date    HGBA1C 6.5 (H) 2025     Discussed proper shoe gear, daily inspections of feet, and general foot health with patient. Patient has Q9 findings and is recommended for at risk foot care every 9-10 weeks.   Continue Diabetic shoes which he picked up on 25  Annual Diabetic Foot Exam performed today.  Most recent LEAD was 24 - WN  Orders:    ammonium lactate (LAC-HYDRIN) 12 % cream; Apply topically daily    Acquired hallux limitus of both feet  I personally viewed patient's bilateral foot x-ray images from 2025 showing long first metatarsal, narrowed joint space, flattening of first metatarsal head, no acute osseous abnormality noted, met primus elevatus, no spurring of first MTPJ noted.  Today we discussed hallux limitus in relation to patient's recurrent ulcerations depending on his shoe gear on dorsal first MTPJ.  We discussed surgical correction with plantarflexed 3 shortening first metatarsal osteotomy versus first MTPJ fusion.  At this time we deferred any surgical intervention as he is doing well with diabetic shoes and custom molded inserts.            History of Present Illness   HPI  Benito Park is a 54 y.o. male who presents today for annual diabetic foot examination, at risk footcare.  He is a type II diabetic who is well-controlled, most recent HbA1c was 6.4 on 1/9/2025, most recent visit with endocrinology was on 3/9/2025.  He was last seen in our office in April with an ulceration on the left foot, he was scheduled to follow-up in 1 week, he states he canceled his appointment and forgot to follow-up.  The area healed up pretty quickly.  No new complaints.  States he got new diabetic shoes about 2 months ago.    History obtained from: patient    Review of Systems   Constitutional:  Negative for chills and fever.   HENT:  Negative for ear pain and sore throat.    Eyes:  Negative for pain and visual disturbance.   Respiratory:  Negative for cough and shortness of breath.    Cardiovascular:  Negative for chest pain and palpitations.   Gastrointestinal:  Negative for abdominal pain and vomiting.   Genitourinary:  Negative for dysuria and hematuria.   Musculoskeletal:  Negative for arthralgias and back pain.   Skin:  Positive for color change. Negative for rash.        Long toe nails and callouses on his feet   Neurological:  Positive for numbness. Negative for seizures and syncope.   All other systems reviewed and are negative.         Objective   Ht 6' (1.829 m) Comment: verbal  Wt 92.5 kg (204 lb)   BMI 27.67 kg/m²      Physical Exam  Vitals and nursing note reviewed.   Constitutional:       General: He is not in acute distress.     Appearance: Normal appearance. He is well-developed and normal weight.   HENT:      Head: Normocephalic and atraumatic.      Nose: Nose normal.      Mouth/Throat:      Mouth: Mucous membranes are moist.      Pharynx: Oropharynx is clear.     Eyes:      Conjunctiva/sclera: Conjunctivae normal.      Pupils: Pupils are equal, round, and reactive to light.       Cardiovascular:      Rate and Rhythm: Normal rate and  regular rhythm.      Pulses: no weak pulses.           Dorsalis pedis pulses are 2+ on the right side and 2+ on the left side.        Posterior tibial pulses are 2+ on the right side and 2+ on the left side.      Heart sounds: No murmur heard.  Pulmonary:      Effort: Pulmonary effort is normal. No respiratory distress.      Breath sounds: Normal breath sounds.   Abdominal:      Palpations: Abdomen is soft.      Tenderness: There is no abdominal tenderness.     Musculoskeletal:         General: No swelling.      Cervical back: Neck supple.      Right lower leg: No edema.      Left lower leg: No edema.      Right foot: Decreased range of motion. Deformity present.      Left foot: Decreased range of motion. Deformity present.   Feet:      Right foot:      Protective Sensation: 10 sites tested.  0 sites sensed.      Skin integrity: Callus present. No ulcer, skin breakdown, erythema, warmth or dry skin.      Toenail Condition: Right toenails are abnormally thick and long. Fungal disease present.     Left foot:      Protective Sensation: 10 sites tested.  0 sites sensed.      Skin integrity: Callus present. No ulcer, skin breakdown, erythema, warmth or dry skin.      Toenail Condition: Left toenails are long. Fungal disease present.     Comments: Hyperkeratosis submet 1, 5 BL with preulcerative fissures submetatarsal head 1 bilateral    Diabetic foot ulcer Dorsal left 1st MTPJ well-healed    Skin:     General: Skin is warm and dry.      Capillary Refill: Capillary refill takes less than 2 seconds.     Neurological:      General: No focal deficit present.      Mental Status: He is alert and oriented to person, place, and time.      Sensory: Sensory deficit present.      Coordination: Coordination abnormal.      Gait: Gait abnormal.      Deep Tendon Reflexes: Reflexes abnormal.     Psychiatric:         Mood and Affect: Mood normal.         Behavior: Behavior normal.         Thought Content: Thought content normal.          Judgment: Judgment normal.       Diabetic Foot Exam    Patient's shoes and socks removed.    Right Foot/Ankle   Right Foot Inspection  Skin Exam: skin normal, skin intact, callus and callus. No dry skin, no warmth, no erythema, no maceration, no abnormal color, no pre-ulcer and no ulcer.     Toe Exam: ROM and strength within normal limits.     Sensory   Vibration: absent  Proprioception: absent  Monofilament testing: absent    Vascular  Capillary refills: < 3 seconds  The right DP pulse is 2+. The right PT pulse is 2+.     Left Foot/Ankle  Left Foot Inspection  Skin Exam: skin normal, skin intact and callus. No dry skin, no warmth, no erythema, no maceration, normal color, no pre-ulcer and no ulcer.     Toe Exam: ROM and strength within normal limits.     Sensory   Vibration: absent  Proprioception: absent  Monofilament testing: absent    Vascular  Capillary refills: < 3 seconds  The left DP pulse is 2+. The left PT pulse is 2+.     Assign Risk Category  Deformity present  Loss of protective sensation  No weak pulses  Risk: 2

## 2025-07-08 ENCOUNTER — TELEPHONE (OUTPATIENT)
Age: 55
End: 2025-07-08

## 2025-07-08 ENCOUNTER — APPOINTMENT (OUTPATIENT)
Dept: LAB | Facility: HOSPITAL | Age: 55
End: 2025-07-08
Payer: MEDICARE

## 2025-07-08 ENCOUNTER — OFFICE VISIT (OUTPATIENT)
Dept: FAMILY MEDICINE CLINIC | Facility: CLINIC | Age: 55
End: 2025-07-08
Payer: MEDICARE

## 2025-07-08 VITALS — TEMPERATURE: 97.1 F | BODY MASS INDEX: 27.67 KG/M2 | HEART RATE: 103 BPM | WEIGHT: 204 LBS | OXYGEN SATURATION: 95 %

## 2025-07-08 DIAGNOSIS — E78.2 MIXED HYPERLIPIDEMIA: ICD-10-CM

## 2025-07-08 DIAGNOSIS — K21.9 GASTROESOPHAGEAL REFLUX DISEASE WITHOUT ESOPHAGITIS: ICD-10-CM

## 2025-07-08 DIAGNOSIS — R52 PAIN: ICD-10-CM

## 2025-07-08 DIAGNOSIS — G60.3 IDIOPATHIC PROGRESSIVE NEUROPATHY: ICD-10-CM

## 2025-07-08 DIAGNOSIS — Z12.5 SCREENING FOR PROSTATE CANCER: ICD-10-CM

## 2025-07-08 DIAGNOSIS — R45.1 AGITATION: ICD-10-CM

## 2025-07-08 DIAGNOSIS — E53.8 B12 DEFICIENCY: ICD-10-CM

## 2025-07-08 DIAGNOSIS — I10 PRIMARY HYPERTENSION: ICD-10-CM

## 2025-07-08 DIAGNOSIS — G89.4 CHRONIC PAIN SYNDROME: ICD-10-CM

## 2025-07-08 DIAGNOSIS — E11.65 TYPE 2 DIABETES MELLITUS WITH HYPERGLYCEMIA, WITHOUT LONG-TERM CURRENT USE OF INSULIN (HCC): ICD-10-CM

## 2025-07-08 DIAGNOSIS — G62.9 NEUROPATHY: ICD-10-CM

## 2025-07-08 DIAGNOSIS — J41.1 MUCOPURULENT CHRONIC BRONCHITIS (HCC): ICD-10-CM

## 2025-07-08 DIAGNOSIS — F51.01 PRIMARY INSOMNIA: ICD-10-CM

## 2025-07-08 DIAGNOSIS — E78.5 HYPERLIPIDEMIA: ICD-10-CM

## 2025-07-08 DIAGNOSIS — Z00.00 MEDICARE ANNUAL WELLNESS VISIT, SUBSEQUENT: Primary | ICD-10-CM

## 2025-07-08 DIAGNOSIS — G62.9 POLYNEUROPATHY: ICD-10-CM

## 2025-07-08 DIAGNOSIS — G62.9 PERIPHERAL NEUROPATHY: ICD-10-CM

## 2025-07-08 DIAGNOSIS — E11.42 DIABETIC POLYNEUROPATHY ASSOCIATED WITH TYPE 2 DIABETES MELLITUS (HCC): ICD-10-CM

## 2025-07-08 LAB
ALBUMIN SERPL BCG-MCNC: 4.2 G/DL (ref 3.5–5)
ALP SERPL-CCNC: 97 U/L (ref 34–104)
ALT SERPL W P-5'-P-CCNC: 34 U/L (ref 7–52)
ANION GAP SERPL CALCULATED.3IONS-SCNC: 7 MMOL/L (ref 4–13)
AST SERPL W P-5'-P-CCNC: 29 U/L (ref 13–39)
BILIRUB SERPL-MCNC: 0.59 MG/DL (ref 0.2–1)
BUN SERPL-MCNC: 15 MG/DL (ref 5–25)
CALCIUM SERPL-MCNC: 9.5 MG/DL (ref 8.4–10.2)
CHLORIDE SERPL-SCNC: 104 MMOL/L (ref 96–108)
CO2 SERPL-SCNC: 29 MMOL/L (ref 21–32)
CREAT SERPL-MCNC: 0.85 MG/DL (ref 0.6–1.3)
EST. AVERAGE GLUCOSE BLD GHB EST-MCNC: 140 MG/DL
GFR SERPL CREATININE-BSD FRML MDRD: 100 ML/MIN/1.73SQ M
GLUCOSE P FAST SERPL-MCNC: 156 MG/DL (ref 65–99)
HBA1C MFR BLD: 6.5 %
POTASSIUM SERPL-SCNC: 4.3 MMOL/L (ref 3.5–5.3)
PROT SERPL-MCNC: 6.8 G/DL (ref 6.4–8.4)
PSA SERPL-MCNC: 0.52 NG/ML (ref 0–4)
SL AMB POCT HEMOGLOBIN AIC: 6.2 (ref ?–6.5)
SODIUM SERPL-SCNC: 140 MMOL/L (ref 135–147)
VIT B12 SERPL-MCNC: 687 PG/ML (ref 180–914)

## 2025-07-08 PROCEDURE — 36415 COLL VENOUS BLD VENIPUNCTURE: CPT

## 2025-07-08 PROCEDURE — 99214 OFFICE O/P EST MOD 30 MIN: CPT | Performed by: FAMILY MEDICINE

## 2025-07-08 PROCEDURE — G0439 PPPS, SUBSEQ VISIT: HCPCS | Performed by: FAMILY MEDICINE

## 2025-07-08 PROCEDURE — G2211 COMPLEX E/M VISIT ADD ON: HCPCS | Performed by: FAMILY MEDICINE

## 2025-07-08 PROCEDURE — 80053 COMPREHEN METABOLIC PANEL: CPT

## 2025-07-08 PROCEDURE — 83036 HEMOGLOBIN GLYCOSYLATED A1C: CPT | Performed by: FAMILY MEDICINE

## 2025-07-08 PROCEDURE — G0103 PSA SCREENING: HCPCS

## 2025-07-08 PROCEDURE — 84165 PROTEIN E-PHORESIS SERUM: CPT

## 2025-07-08 PROCEDURE — 82607 VITAMIN B-12: CPT

## 2025-07-08 PROCEDURE — 83036 HEMOGLOBIN GLYCOSYLATED A1C: CPT

## 2025-07-08 RX ORDER — CYCLOBENZAPRINE HCL 10 MG
TABLET ORAL
Qty: 90 TABLET | Refills: 2 | Status: SHIPPED | OUTPATIENT
Start: 2025-07-08

## 2025-07-08 RX ORDER — ATORVASTATIN CALCIUM 40 MG/1
40 TABLET, FILM COATED ORAL EVERY EVENING
Qty: 90 TABLET | Refills: 3 | Status: SHIPPED | OUTPATIENT
Start: 2025-07-08

## 2025-07-08 RX ORDER — PREGABALIN 150 MG/1
150 CAPSULE ORAL 3 TIMES DAILY
Qty: 270 CAPSULE | Refills: 5 | Status: SHIPPED | OUTPATIENT
Start: 2025-07-08

## 2025-07-08 RX ORDER — DULOXETIN HYDROCHLORIDE 30 MG/1
30 CAPSULE, DELAYED RELEASE ORAL DAILY
Qty: 90 CAPSULE | Refills: 3 | Status: SHIPPED | OUTPATIENT
Start: 2025-07-08

## 2025-07-08 RX ORDER — OMEPRAZOLE 40 MG/1
40 CAPSULE, DELAYED RELEASE ORAL DAILY
Qty: 90 CAPSULE | Refills: 3 | Status: SHIPPED | OUTPATIENT
Start: 2025-07-08

## 2025-07-08 RX ORDER — NAPROXEN 500 MG/1
500 TABLET ORAL 2 TIMES DAILY WITH MEALS
Qty: 180 TABLET | Refills: 3 | Status: SHIPPED | OUTPATIENT
Start: 2025-07-08

## 2025-07-08 RX ORDER — LISINOPRIL 20 MG/1
20 TABLET ORAL DAILY
Qty: 90 TABLET | Refills: 3 | Status: SHIPPED | OUTPATIENT
Start: 2025-07-08

## 2025-07-08 RX ORDER — TRAZODONE HYDROCHLORIDE 150 MG/1
TABLET ORAL
Qty: 90 TABLET | Refills: 3 | Status: SHIPPED | OUTPATIENT
Start: 2025-07-08

## 2025-07-08 RX ORDER — ESCITALOPRAM OXALATE 10 MG/1
10 TABLET ORAL DAILY
Qty: 90 TABLET | Refills: 3 | Status: SHIPPED | OUTPATIENT
Start: 2025-07-08 | End: 2026-07-03

## 2025-07-08 NOTE — PROGRESS NOTES
Name: Benito Park      : 1970      MRN: [id number]  Encounter Provider: Ariel Sosa MD  Encounter Date: 2025   Encounter department: [facility]  :  Assessment & Plan  Medicare annual wellness visit, subsequent         Type 2 diabetes mellitus with hyperglycemia, without long-term current use of insulin (HCC)    Lab Results   Component Value Date    HGBA1C 6.2 2025       Orders:    POCT hemoglobin A1c    Idiopathic progressive neuropathy         Polyneuropathy         Chronic pain syndrome         Mixed hyperlipidemia  Stable on current medication regimen. Patient to continue prescribed medication.            Hyperlipidemia  Stable on current medication regimen. Patient to continue prescribed medication.       Orders:    atorvastatin (LIPITOR) 40 mg tablet; Take 1 tablet (40 mg total) by mouth every evening    Primary insomnia    Orders:    cyclobenzaprine (FLEXERIL) 10 mg tablet; TAKE 1 TABLET BY MOUTH EVERYDAY AT BEDTIME    traZODone (DESYREL) 150 mg tablet; Take 1 HS    Pain    Orders:    DULoxetine (CYMBALTA) 30 mg delayed release capsule; Take 1 capsule (30 mg total) by mouth daily    naproxen (NAPROSYN) 500 mg tablet; Take 1 tablet (500 mg total) by mouth 2 (two) times a day with meals    Agitation    Orders:    escitalopram (LEXAPRO) 10 mg tablet; Take 1 tablet (10 mg total) by mouth daily    Primary hypertension    Orders:    lisinopril (ZESTRIL) 20 mg tablet; Take 1 tablet (20 mg total) by mouth daily    Gastroesophageal reflux disease without esophagitis    Orders:    omeprazole (PriLOSEC) 40 MG capsule; Take 1 capsule (40 mg total) by mouth daily    Neuropathy    Orders:    pregabalin (LYRICA) 150 mg capsule; Take 1 capsule (150 mg total) by mouth 3 (three) times a day    Mucopurulent chronic bronchitis (HCC)            Preventive health issues were discussed with patient, and age appropriate screening tests were ordered as noted in patient's  After Visit Summary. Personalized health advice and appropriate referrals for health education or preventive services given if needed, as noted in patient's After Visit Summary.    History of Present Illness     HPI   Patient Care Team:  Ariel Sosa MD as PCP - General (Family Medicine)  Frieda Logan MD as PCP - Endocrinology (Endocrinology)  MD Jie Bower PA-C as Nurse Practitioner (Vascular Surgery)  MIKE Matthews as Nurse Practitioner (Neurology)  Daniel Simpson as Diabetes Educator (Dietician)  Melissa Whatley RD as Diabetes Educator (Nutrition)  Sharon Hampton MD (Gastroenterology)  Bernardino Woodward MD (Gastroenterology)    Review of Systems   Constitutional:  Negative for fatigue, fever and unexpected weight change.   HENT:  Negative for congestion, sinus pain and sore throat.    Eyes:  Negative for visual disturbance.   Respiratory:  Negative for shortness of breath and wheezing.    Cardiovascular:  Negative for chest pain and palpitations.   Gastrointestinal:  Negative for abdominal pain, nausea and vomiting.   Musculoskeletal: Negative.  Negative for arthralgias and myalgias.   Neurological:  Negative for syncope, weakness and numbness.   Psychiatric/Behavioral: Negative.  Negative for confusion, dysphoric mood and suicidal ideas.      Medical History Reviewed by provider this encounter:  Tobacco  Allergies  Meds  Problems  Med Hx  Surg Hx  Fam Hx       Annual Wellness Visit Questionnaire   Last Medicare Wellness visit information reviewed, patient interviewed and updates made to the record today.      Health Risk Assessment:   Patient rates overall health as good. Patient feels that their physical health rating is same. Patient is satisfied with their life. Eyesight was rated as same. Hearing was rated as same. Patient feels that their emotional and mental health rating is same. Patients states they are never, rarely angry. Patient states they are never, rarely unusually  tired/fatigued. Pain experienced in the last 7 days has been none. Patient states that he has experienced no weight loss or gain in last 6 months.     Depression Screening:   PHQ-2 Score: 0  PHQ-9 Score: 0      Fall Risk Screening:   In the past year, patient has experienced: no history of falling in past year      Home Safety:  Patient does not have trouble with stairs inside or outside of their home. Patient has working smoke alarms and has working carbon monoxide detector. Home safety hazards include: none.     Nutrition:   Current diet is Diabetic.     Medications:   Patient is not currently taking any over-the-counter supplements. Patient is able to manage medications.     Activities of Daily Living (ADLs)/Instrumental Activities of Daily Living (IADLs):   Walk and transfer into and out of bed and chair?: Yes  Dress and groom yourself?: Yes    Bathe or shower yourself?: Yes    Feed yourself? Yes  Do your laundry/housekeeping?: Yes  Manage your money, pay your bills and track your expenses?: Yes  Make your own meals?: Yes    Do your own shopping?: Yes    Previous Hospitalizations:   Any hospitalizations or ED visits within the last 12 months?: No      Advance Care Planning:   Living will: Yes    Durable POA for healthcare: Yes    Advanced directive: Yes    Provider agrees with end of life decisions: Yes      Cognitive Screening:   Provider or family/friend/caregiver concerned regarding cognition?: No    Preventive Screenings      Cardiovascular Screening:    General: Screening Not Indicated and History Lipid Disorder      Diabetes Screening:     General: Screening Not Indicated and History Diabetes      Colorectal Cancer Screening:     General: Screening Current      Prostate Cancer Screening:    General: Risks and Benefits Discussed      Osteoporosis Screening:    General: Screening Not Indicated      Abdominal Aortic Aneurysm (AAA) Screening:    Risk factors include: tobacco use        General: Screening Not  Indicated      Lung Cancer Screening:     General: Screening Not Indicated    Immunizations:  - Immunizations due: Prevnar 20 and Zoster (Shingrix)    Screening, Brief Intervention, and Referral to Treatment (SBIRT)     Screening      Single Item Drug Screening:  How often have you used an illegal drug (including marijuana) or a prescription medication for non-medical reasons in the past year? never    Single Item Drug Screen Score: 0  Interpretation: Negative screen for possible drug use disorder    Social Drivers of Health     Financial Resource Strain: Low Risk  (6/7/2023)    Overall Financial Resource Strain (CARDIA)     Difficulty of Paying Living Expenses: Not hard at all   Food Insecurity: No Food Insecurity (7/8/2025)    Nursing - Inadequate Food Risk Classification     Worried About Running Out of Food in the Last Year: Never true     Ran Out of Food in the Last Year: Never true   Transportation Needs: No Transportation Needs (7/8/2025)    PRAPARE - Transportation     Lack of Transportation (Medical): No     Lack of Transportation (Non-Medical): No   Housing Stability: Unknown (7/8/2025)    Housing Stability Vital Sign     Unable to Pay for Housing in the Last Year: No     Homeless in the Last Year: No   Utilities: Not At Risk (7/8/2025)    [facility] Utilities     Threatened with loss of utilities: No     No results found.    Objective   Pulse 103   Temp (!) 97.1 °F (36.2 °C) (Tympanic)   Wt 92.5 kg (204 lb)   SpO2 95%   BMI 27.67 kg/m²     Physical Exam  Vitals and nursing note reviewed.   Constitutional:       General: He is not in acute distress.     Appearance: He is well-developed.   HENT:      Head: Normocephalic and atraumatic.     Eyes:      Conjunctiva/sclera: Conjunctivae normal.       Cardiovascular:      Rate and Rhythm: Normal rate and regular rhythm.      Heart sounds: No murmur heard.  Pulmonary:      Effort: Pulmonary effort is normal. No respiratory distress.      Breath sounds: Normal breath  sounds.   Abdominal:      Palpations: Abdomen is soft.      Tenderness: There is no abdominal tenderness.     Musculoskeletal:         General: No swelling.      Cervical back: Neck supple.     Skin:     General: Skin is warm and dry.      Capillary Refill: Capillary refill takes less than 2 seconds.     Neurological:      Mental Status: He is alert.     Psychiatric:         Mood and Affect: Mood normal.

## 2025-07-08 NOTE — PATIENT INSTRUCTIONS
Medicare Preventive Visit Patient Instructions  Thank you for completing your Welcome to Medicare Visit or Medicare Annual Wellness Visit today. Your next wellness visit will be due in one year (7/9/2026).  The screening/preventive services that you may require over the next 5-10 years are detailed below. Some tests may not apply to you based off risk factors and/or age. Screening tests ordered at today's visit but not completed yet may show as past due. Also, please note that scanned in results may not display below.  Preventive Screenings:  Service Recommendations Previous Testing/Comments   Colorectal Cancer Screening  Colonoscopy    Fecal Occult Blood Test (FOBT)/Fecal Immunochemical Test (FIT)  Fecal DNA/Cologuard Test  Flexible Sigmoidoscopy Age: 45-75 years old   Colonoscopy: every 10 years (May be performed more frequently if at higher risk)  OR  FOBT/FIT: every 1 year  OR  Cologuard: every 3 years  OR  Sigmoidoscopy: every 5 years  Screening may be recommended earlier than age 45 if at higher risk for colorectal cancer. Also, an individualized decision between you and your healthcare provider will decide whether screening between the ages of 76-85 would be appropriate. Colonoscopy: 07/19/2024  FOBT/FIT: Not on file  Cologuard: Not on file  Sigmoidoscopy: Not on file          Prostate Cancer Screening Individualized decision between patient and health care provider in men between ages of 55-69   Medicare will cover every 12 months beginning on the day after your 50th birthday PSA: 0.65 ng/mL           Hepatitis C Screening Once for adults born between 1945 and 1965  More frequently in patients at high risk for Hepatitis C Hep C Antibody: Not on file        Diabetes Screening 1-2 times per year if you're at risk for diabetes or have pre-diabetes Fasting glucose: 148 mg/dL (1/9/2025)  A1C: 6.4 % (1/9/2025)      Cholesterol Screening Once every 5 years if you don't have a lipid disorder. May order more often  based on risk factors. Lipid panel: 08/08/2024         Other Preventive Screenings Covered by Medicare:  Abdominal Aortic Aneurysm (AAA) Screening: covered once if your at risk. You're considered to be at risk if you have a family history of AAA or a male between the age of 65-75 who smoking at least 100 cigarettes in your lifetime.  Lung Cancer Screening: covers low dose CT scan once per year if you meet all of the following conditions: (1) Age 55-77; (2) No signs or symptoms of lung cancer; (3) Current smoker or have quit smoking within the last 15 years; (4) You have a tobacco smoking history of at least 20 pack years (packs per day x number of years you smoked); (5) You get a written order from a healthcare provider.  Glaucoma Screening: covered annually if you're considered high risk: (1) You have diabetes OR (2) Family history of glaucoma OR (3)  aged 50 and older OR (4)  American aged 65 and older  Osteoporosis Screening: covered every 2 years if you meet one of the following conditions: (1) Have a vertebral abnormality; (2) On glucocorticoid therapy for more than 3 months; (3) Have primary hyperparathyroidism; (4) On osteoporosis medications and need to assess response to drug therapy.  HIV Screening: covered annually if you're between the age of 15-65. Also covered annually if you are younger than 15 and older than 65 with risk factors for HIV infection. For pregnant patients, it is covered up to 3 times per pregnancy.    Immunizations:  Immunization Recommendations   Influenza Vaccine Annual influenza vaccination during flu season is recommended for all persons aged >= 6 months who do not have contraindications   Pneumococcal Vaccine   * Pneumococcal conjugate vaccine = PCV13 (Prevnar 13), PCV15 (Vaxneuvance), PCV20 (Prevnar 20)  * Pneumococcal polysaccharide vaccine = PPSV23 (Pneumovax) Adults 19-65 yo with certain risk factors or if 65+ yo  If never received any pneumonia vaccine:  recommend Prevnar 20 (PCV20)  Give PCV20 if previously received 1 dose of PCV13 or PPSV23   Hepatitis B Vaccine 3 dose series if at intermediate or high risk (ex: diabetes, end stage renal disease, liver disease)   Respiratory syncytial virus (RSV) Vaccine - COVERED BY MEDICARE PART D  * RSVPreF3 (Arexvy) CDC recommends that adults 60 years of age and older may receive a single dose of RSV vaccine using shared clinical decision-making (SCDM)   Tetanus (Td) Vaccine - COST NOT COVERED BY MEDICARE PART B Following completion of primary series, a booster dose should be given every 10 years to maintain immunity against tetanus. Td may also be given as tetanus wound prophylaxis.   Tdap Vaccine - COST NOT COVERED BY MEDICARE PART B Recommended at least once for all adults. For pregnant patients, recommended with each pregnancy.   Shingles Vaccine (Shingrix) - COST NOT COVERED BY MEDICARE PART B  2 shot series recommended in those 19 years and older who have or will have weakened immune systems or those 50 years and older     Health Maintenance Due:      Topic Date Due   • Hepatitis C Screening  Never done   • HIV Screening  Never done   • Colorectal Cancer Screening  11/13/2025     Immunizations Due:      Topic Date Due   • Pneumococcal Vaccine: 50+ Years (2 of 2 - PPSV23) 01/07/2021   • COVID-19 Vaccine (4 - 2024-25 season) 09/01/2024   • Influenza Vaccine (1) 09/01/2025     Advance Directives   What are advance directives?  Advance directives are legal documents that state your wishes and plans for medical care. These plans are made ahead of time in case you lose your ability to make decisions for yourself. Advance directives can apply to any medical decision, such as the treatments you want, and if you want to donate organs.   What are the types of advance directives?  There are many types of advance directives, and each state has rules about how to use them. You may choose a combination of any of the following:  Living  will:  This is a written record of the treatment you want. You can also choose which treatments you do not want, which to limit, and which to stop at a certain time. This includes surgery, medicine, IV fluid, and tube feedings.   Durable power of  for healthcare (DPAHC):  This is a written record that states who you want to make healthcare choices for you when you are unable to make them for yourself. This person, called a proxy, is usually a family member or a friend. You may choose more than 1 proxy.  Do not resuscitate (DNR) order:  A DNR order is used in case your heart stops beating or you stop breathing. It is a request not to have certain forms of treatment, such as CPR. A DNR order may be included in other types of advance directives.  Medical directive:  This covers the care that you want if you are in a coma, near death, or unable to make decisions for yourself. You can list the treatments you want for each condition. Treatment may include pain medicine, surgery, blood transfusions, dialysis, IV or tube feedings, and a ventilator (breathing machine).  Values history:  This document has questions about your views, beliefs, and how you feel and think about life. This information can help others choose the care that you would choose.  Why are advance directives important?  An advance directive helps you control your care. Although spoken wishes may be used, it is better to have your wishes written down. Spoken wishes can be misunderstood, or not followed. Treatments may be given even if you do not want them. An advance directive may make it easier for your family to make difficult choices about your care.   Weight Management   Why it is important to manage your weight:  Being overweight increases your risk of health conditions such as heart disease, high blood pressure, type 2 diabetes, and certain types of cancer. It can also increase your risk for osteoarthritis, sleep apnea, and other respiratory  problems. Aim for a slow, steady weight loss. Even a small amount of weight loss can lower your risk of health problems.  How to lose weight safely:  A safe and healthy way to lose weight is to eat fewer calories and get regular exercise. You can lose up about 1 pound a week by decreasing the number of calories you eat by 500 calories each day.   Healthy meal plan for weight management:  A healthy meal plan includes a variety of foods, contains fewer calories, and helps you stay healthy. A healthy meal plan includes the following:  Eat whole-grain foods more often.  A healthy meal plan should contain fiber. Fiber is the part of grains, fruits, and vegetables that is not broken down by your body. Whole-grain foods are healthy and provide extra fiber in your diet. Some examples of whole-grain foods are whole-wheat breads and pastas, oatmeal, brown rice, and bulgur.  Eat a variety of vegetables every day.  Include dark, leafy greens such as spinach, kale, luke greens, and mustard greens. Eat yellow and orange vegetables such as carrots, sweet potatoes, and winter squash.   Eat a variety of fruits every day.  Choose fresh or canned fruit (canned in its own juice or light syrup) instead of juice. Fruit juice has very little or no fiber.  Eat low-fat dairy foods.  Drink fat-free (skim) milk or 1% milk. Eat fat-free yogurt and low-fat cottage cheese. Try low-fat cheeses such as mozzarella and other reduced-fat cheeses.  Choose meat and other protein foods that are low in fat.  Choose beans or other legumes such as split peas or lentils. Choose fish, skinless poultry (chicken or turkey), or lean cuts of red meat (beef or pork). Before you cook meat or poultry, cut off any visible fat.   Use less fat and oil.  Try baking foods instead of frying them. Add less fat, such as margarine, sour cream, regular salad dressing and mayonnaise to foods. Eat fewer high-fat foods. Some examples of high-fat foods include french fries,  doughnuts, ice cream, and cakes.  Eat fewer sweets.  Limit foods and drinks that are high in sugar. This includes candy, cookies, regular soda, and sweetened drinks.  Exercise:  Exercise at least 30 minutes per day on most days of the week. Some examples of exercise include walking, biking, dancing, and swimming. You can also fit in more physical activity by taking the stairs instead of the elevator or parking farther away from stores. Ask your healthcare provider about the best exercise plan for you.    © Copyright [employer] 2018 Information is for End User's use only and may not be sold, redistributed or otherwise used for commercial purposes. All illustrations and images included in CareNotes® are the copyrighted property of [employer] or [employer]

## 2025-07-08 NOTE — ASSESSMENT & PLAN NOTE
Lab Results   Component Value Date    HGBA1C 6.2 07/08/2025       Orders:    POCT hemoglobin A1c

## 2025-07-08 NOTE — ASSESSMENT & PLAN NOTE
Stable on current medication regimen. Patient to continue prescribed medication.       Orders:    atorvastatin (LIPITOR) 40 mg tablet; Take 1 tablet (40 mg total) by mouth every evening

## 2025-07-08 NOTE — TELEPHONE ENCOUNTER
Patients GI provider:  Dr. YU    Number to return call: ([phone]    Reason for call: Received call from [employer] in regards to pts Entyvio. They are requesting a phone call back to discuss Entyvio. Patient states he has stopped and will be placed on another medication, they state they have not received anything.    Please return call to [facility] at [phone]

## 2025-07-09 LAB
ALBUMIN SERPL ELPH-MCNC: 4.13 G/DL (ref 3.2–5.1)
ALBUMIN SERPL ELPH-MCNC: 63.5 % (ref 48–70)
ALPHA1 GLOB SERPL ELPH-MCNC: 0.31 G/DL (ref 0.15–0.47)
ALPHA1 GLOB SERPL ELPH-MCNC: 4.7 % (ref 1.8–7)
ALPHA2 GLOB SERPL ELPH-MCNC: 0.64 G/DL (ref 0.42–1.04)
ALPHA2 GLOB SERPL ELPH-MCNC: 9.9 % (ref 5.9–14.9)
BETA GLOB ABNORMAL SERPL ELPH-MCNC: 0.38 G/DL (ref 0.31–0.57)
BETA1 GLOB SERPL ELPH-MCNC: 5.8 % (ref 4.7–7.7)
BETA2 GLOB SERPL ELPH-MCNC: 4.9 % (ref 3.1–7.9)
BETA2+GAMMA GLOB SERPL ELPH-MCNC: 0.32 G/DL (ref 0.2–0.58)
GAMMA GLOB ABNORMAL SERPL ELPH-MCNC: 0.73 G/DL (ref 0.4–1.66)
GAMMA GLOB SERPL ELPH-MCNC: 11.2 % (ref 6.9–22.3)
IGG/ALB SER: 1.74 {RATIO} (ref 1.1–1.8)
PROT SERPL-MCNC: 6.5 G/DL (ref 6.4–8.4)

## 2025-07-09 PROCEDURE — 84165 PROTEIN E-PHORESIS SERUM: CPT | Performed by: STUDENT IN AN ORGANIZED HEALTH CARE EDUCATION/TRAINING PROGRAM

## 2025-07-10 ENCOUNTER — PROCEDURE VISIT (OUTPATIENT)
Dept: PODIATRY | Facility: CLINIC | Age: 55
End: 2025-07-10
Payer: MEDICARE

## 2025-07-10 VITALS — WEIGHT: 204 LBS | BODY MASS INDEX: 27.63 KG/M2 | HEIGHT: 72 IN

## 2025-07-10 DIAGNOSIS — M20.5X2 ACQUIRED HALLUX LIMITUS OF BOTH FEET: ICD-10-CM

## 2025-07-10 DIAGNOSIS — B35.1 ONYCHOMYCOSIS: Primary | ICD-10-CM

## 2025-07-10 DIAGNOSIS — E11.42 DIABETIC POLYNEUROPATHY ASSOCIATED WITH TYPE 2 DIABETES MELLITUS (HCC): ICD-10-CM

## 2025-07-10 DIAGNOSIS — M20.5X1 ACQUIRED HALLUX LIMITUS OF BOTH FEET: ICD-10-CM

## 2025-07-10 DIAGNOSIS — L85.9 HYPERKERATOSIS: ICD-10-CM

## 2025-07-10 PROCEDURE — 99213 OFFICE O/P EST LOW 20 MIN: CPT | Performed by: PODIATRIST

## 2025-07-10 PROCEDURE — 11056 PARNG/CUTG B9 HYPRKR LES 2-4: CPT | Performed by: PODIATRIST

## 2025-07-10 PROCEDURE — 11721 DEBRIDE NAIL 6 OR MORE: CPT | Performed by: PODIATRIST

## 2025-07-10 RX ORDER — AMMONIUM LACTATE 12 G/100G
CREAM TOPICAL DAILY
Qty: 385 G | Refills: 2 | Status: SHIPPED | OUTPATIENT
Start: 2025-07-10

## 2025-07-17 ENCOUNTER — TELEMEDICINE (OUTPATIENT)
Age: 55
End: 2025-07-17
Payer: MEDICARE

## 2025-07-17 VITALS — HEIGHT: 72 IN | BODY MASS INDEX: 27.63 KG/M2 | WEIGHT: 204 LBS

## 2025-07-17 DIAGNOSIS — K50.814 CROHN'S DISEASE OF BOTH SMALL AND LARGE INTESTINE WITH ABSCESS (HCC): Primary | ICD-10-CM

## 2025-07-17 DIAGNOSIS — K50.00 CROHN'S DISEASE OF SMALL INTESTINE WITHOUT COMPLICATIONS (HCC): ICD-10-CM

## 2025-07-17 PROCEDURE — 99214 OFFICE O/P EST MOD 30 MIN: CPT | Performed by: DIETITIAN, REGISTERED

## 2025-07-17 RX ORDER — SODIUM CHLORIDE, SODIUM LACTATE, POTASSIUM CHLORIDE, CALCIUM CHLORIDE 600; 310; 30; 20 MG/100ML; MG/100ML; MG/100ML; MG/100ML
125 INJECTION, SOLUTION INTRAVENOUS CONTINUOUS
OUTPATIENT
Start: 2025-07-17

## 2025-07-17 NOTE — TELEPHONE ENCOUNTER
PT  was seen today to discuss changing medication. Office note was not signed yet so unclear what med he is switching too. Will call when office note finished

## 2025-07-17 NOTE — PROGRESS NOTES
Virtual Regular Visit  Name: Benito Park      : 1970      MRN: [id number]  Encounter Provider: Nithin Cuadra PA-C  Encounter Date: 2025   Encounter department: [facility]    Gastroenterology Outpatient Follow-up - Crohn's Disease  Benito Park 54 y.o. male MRN: [id number]  Encounter: [id number]    Benito Park is a 54 y.o. male with Crohn's disease.    Symptom onset:    Diagnosis:  Crohns disease  Year of diagnosis:      IBD Summary: Anand his history of perianal abscess and fistula and chronic diarrhea.  He had CT evidence of ileitis in , but has had several normal colonoscopies and MRI enterography since.  There is no tissue diagnosis of Crohn's disease.  He was treated with adalimumab, azathioprine, ustekinumab, and vedolizumab with partial improvement in diarrhea at best.    CD Summary  Current Crohn's disease phenotype:  penetrating    Involved sites since disease onset   Esophagus: no   Stomach: no     Duodenum: no   Jejunum: no   Ileum: yes   Right colon: no   Transverse colon: no   Left colon: no   Rectum: no   Anus: yes     History of stricture  Esophagus: no   Stomach: no   Duodenum: no   Jejunum: no   Ileum: no   Right colon: no   Transverse colon: no   Left colon: no   Rectum: no   Anus: no     History of fistula other than perianal:  Intra-abdominal   abcess: no      Enteroenteric fistula: no      Enterocutaneous fistula: no      Enterovesical fistula: no      Other fistula: no            Surgical History  Number of IBD surgeries: 0      First IBD surgery:    Most Recent IBD surgery:    Esophageal:  0    Gastroduodenal:  0    Small bowel resection(s):  0    Ileocolonic resection(s): 0      Colonic resection(s):  0    Ileostomy or colostomy:  no previous ostomy    Complete colectomy:  No         Medications     Year last used Reason for discontinuation   Corticosteroids   never         Thiopurines prior            Methotrexate   never         Infliximab   never         Adalimumab prior   2021 MARYLU antibodies   Certolizumab   never         Golimumab   never         Natalizumab   never         Mesalamine   never         Sulfasalzine   never         Vedolizumab prior     TN     Ustekinumab prior   2022 [place]     Tofacitinib   never         Other biologic   never             Extraintestinal Manifestations    IBD-associated arthropathy No    Uveitis No    Oral aphthous ulcers No   Erythema nodosum No    Pyoderma gangrenosum No    Primary sclerosing cholangitis No    Thrombotic complications No          Cancer / Dysplasia History  History of IBD-associated dysplasia: none    Date of diagnosis (Year):     History of colorectal cancer: No   History of cervical dysplasia: No   History of skin cancer: none         Laboratory Data   Most recent (date) Result   PPD   unknown   Quanitferon gold 2/2/2024 negative   TPMT 2/2/2024 normal   Hepatitis A   unknown   HBsAb   unknown   HBcAb   unknown   HBsAg   unknown   HCV Ab   unknown       Imaging / Diagnostic Procedures   Most recent (date) Findings   Colonoscopy or sigmoidoscopy #1              Ulcers/Erosions              Strictures              Stricture Severity              Endoscopic Score Polo Score:    Rutgeert's:               Findings              Pathology      Colonoscopy or sigmoidoscopy #2 7/19/2024            Ulcers/Erosions  no erosions              Strictures  none              Stricture Severity  N/A           Endoscopic Score Polo Score:    Rugeert's:  N/A           Findings  Normal colon and TI.           Pathology      EGD       Small bowel follow-through       CT enterography       CT without enterography       MRI enterography 3/12/2024 (1) No findings of active IBD. (2) Angulation/distortion of TI and ICV valve suggesting probable chronic stricturing without obstructive upstream dilatation.   Capsule study       DEXA scan   Lowest Z-score:      CXR (for TB)      "      HPI:   Interval History:  7/17/2025 Follow up  Anand has now been off vedolizumab for several months.  He feels his diarrheal symptoms have worsened.  He has good days and bad days.  On a bad day, he has up to 4 loose, urgent BMs with associated leakage and incontinence.  He denies any nocturnal BMs, blood in the stool, or perianal symptoms.  He denies any abdominal pain but describes abdominal discomfort.  He has a 1-3 bad days per week.  He is eating a healthy diet and avoiding dietary triggers.  He is currently taking Imodium once nightly.  When he was on vedolizumab, he also had bad days, but felt they were not as frequent.    3/20/2025 Follow up  Since the last visit, we checked stool test.  Calprotectin was normal and infectious stool tests were also normal.  He then discontinued metformin and his diarrhea improved significantly.  He is still on vedolizumab 300 mg every 4 weeks.  He reports that he has \"good\" and \"bad\" days.  40% of days are good with 2-3 bowel movements, formed, without bleeding, but he still has urgency.  On bad days, he has 6-7 bowel movements, loose, still no bleeding, and severe urgency.  He says that things were much worse when he was on metformin.  He thinks the cheese steak, pizza, and fast food may be trigger.  Stress may be another trigger.  He takes Imodium once per day on bad days.  No perianal issues.  No longer on Imuran.    He still smokes.  He has a diabetic foot ulcer.  He has managed to lose 20 pounds by dieting.  He also got an implantable upper airway stimulator for sleep apnea.  (Vedo at [facility]).    Labs from 11/5/2024 show hemoglobin 14.8, elevated MCV to 102.  BMP normal with creatinine 0.9.  A1c improved to 6.2.  LFTs normal.  B12 low at 284.  Folate normal.    9/5/2024 Initial visit  Benito Park is establishing care with me for diagnosis of ileal and perianal Crohn's disease.  The patient was a poor historian and could not remember many details of his " history.  I have done extensive review of the medical record to put together his history.  This involved going back to 2019 and reading through many GI office notes, telephone encounters, colonoscopy reports, biopsy reports, and reviewing imaging.  I have spent at least 90 minutes reviewing his case and preparing this note.    It appears that the first sign of Crohn's disease was in 2019 when he developed a recurrent perianal abscess requiring several I&Ds.  He did not have bowel issues at the time.  In August 2020, he was hospitalized at [facility] with 3 days of severe abdominal pain and diarrhea.  CT from that admission, which I reviewed, showed a long segment of inflamed distal ileum and terminal ileum, as well as some inflammation of the cecum and ascending colon.  Colonoscopy was done during that admission but showed only mildly and locally edematous and petechial mucosa in the TI with normal biopsies.  Random biopsies from the colon were also normal.  There was a 15 mm adenoma in the ascending colon and a small inflammatory polyp in the rectum.  He then had MRI enterography in October 2020, which was normal and showed resolution of the previously seen ileitis.      However, he continued to have diarrhea and a decision was made to treat him with adalimumab starting in November 2020.  His diarrhea partially improved over the following several months, although he was also taking cholestyramine and his stools remained loose at times.  Another colonoscopy in August 2021 was normal with biopsies from the TI only showing focal active cryptitis.    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  He also needed another abscess drainage and seton placement for perianal fistula in November 2021.  According to the op report, there was active inflammation at the dentate line on anoscopy.  Apparently, he continued to have diarrhea so  cholestyramine was increased.  Another colonoscopy was done in March 2022, again normal except for seton.    In June 2022, he switched to vedolizumab because insurance stopped covering ustekinumab.  He remained on azathioprine.  Eventually, vedolizumab dosing was escalated to every 4 weeks because of worsening symptoms at around week 5 or 6.  Vedolizumab level was 53 mcg/mL on 2/2/2024.  I am not sure if this was a true trough.  Another colonoscopy was done in November 2023, again normal.  Another MRI enterography was done in March 2024, again normal.  Another colonoscopy in July 2024, again normal.  C. difficile in April negative.  No other recent infectious studies.    Despite these many normal studies, he complains of 6 loose to watery bowel movements per day.  He rarely sees blood in the stool.  He has moderate abdominal cramping.  He has severe urgency with less than 2 minutes time to get to the bathroom.  He rates his quality of life as poor.  He has joint pain.  He is a former smoker.  Celiac panel negative.  CMP from August 2024 was normal.    CRP has been persistently elevated, most recently 15.9 on 4/1/2024.  Calprotectin was only mildly elevated in April at 157.  Previously, calprotectin was 291 in February, and other values have been normal.  CBC from May 2024 was normal with hemoglobin 14.2 and mildly increased MCV 99 consistent with azathioprine use.    He has diabetes and is on metformin.  He remains on vedolizumab and azathioprine 50 mg daily.  He is also on pregabalin, Wellbutrin, and amitriptyline.    Recently, he was referred to colorectal surgery for consideration of terminal ileal resection because of ongoing diarrhea and questionable scarring on MRI enteroscopy.  He is seeing me for second opinion.    Benito reports the following symptoms over the last 7 days:    Stool Frequency >4 stools/day more than normal   Average stools per day: 4   Average liquid stools per day: 3   Consistency of  bowel: mostly or all liquid   Awakening from sleep to move bowels? No   Urgency moderate fecal urgency   Visible blood in stool? none   Abdominal pain? none   General Wellbeing? generally well     Benito also reports the following symptoms in the last month:    Leakage of stool while sleeping? Yes   Leakage of stool while awake? Yes       REVIEW OF SYSTEMS:  During the last 7 days, Benito experienced the following symptoms:  Unintentional Weight Loss (in the last month) No   Fever No   Eye irritation No   Mouth sores No   Sore throat Yes   Chest pain No   Shortness of breath No   Numbness or tingling in hands or feet Yes   Skin rash No   Pain or swelling in joints Yes   Bruising or bleeding Yes   Felt depressed or blue No   Fatigue Yes   Dysuria Yes   Please see HPI for additional pertinent review of systems; otherwise remainder of ROS was unremarkable    MEDICATIONS:  Current Medications[1]    ALLERGIES:  Allergies[2]    OBJECTIVE:  Ht 6' (1.829 m)   Wt 92.5 kg (204 lb)   BMI 27.67 kg/m²      PHYSICAL EXAM:    General Appearance:   Alert, cooperative, no distress   HEENT:   Normocephalic, atraumatic, anicteric.     Neck:  Symmetrical, trachea midline   Lungs:   Respirations unlabored    Extremities:  No cyanosis, clubbing or edema    Skin:  No jaundice, rashes, or lesions      Lab Results   Component Value Date    WBC 8.20 01/09/2025    HGB 16.3 01/09/2025    HCT 48.1 01/09/2025     (H) 01/09/2025     01/09/2025     Lab Results   Component Value Date     10/03/2016    SODIUM 140 07/08/2025    K 4.3 07/08/2025     07/08/2025    CO2 29 07/08/2025    ANIONGAP 23 (H) 02/20/2015    AGAP 7 07/08/2025    BUN 15 07/08/2025    CREATININE 0.85 07/08/2025    GLUC 166 (H) 05/17/2024    GLUF 156 (H) 07/08/2025    CALCIUM 9.5 07/08/2025    AST 29 07/08/2025    ALT 34 07/08/2025    ALKPHOS 97 07/08/2025    PROT 7.3 10/03/2016    TP 6.8 07/08/2025    TP 6.5 07/08/2025    BILITOT 0.8 10/03/2016     "TBILI 0.59 07/08/2025    EGFR 100 07/08/2025     Lab Results   Component Value Date    CRP 15.9 (H) 04/01/2024     Lab Results   Component Value Date    DROAIMLY93 687 07/08/2025     No results found for: \"FERRITIN\"    ASSESSMENT AND PLAN:    Benito has a history of penetrating Crohn’s disease diagnosed in 2020 with involvement in the following areas:      Ileum,     Anus, with perianal fistula . Surgical history includes no small bowel resections, no colon resections, and no prior ostomy. Currently not on medical therapy. My global assessment is that the clinical disease activity is currently mildly active.    The 6-point Polo score was 3 and the 9-point Polo score was 4.  The short CDAI was 86.      Anand has history of perianal abscess and fistula and chronic diarrhea.  He does not have tissue diagnosis of Crohn's disease.  He had 1 self-limited episode of ileitis based on CT scan, but multiple subsequent colonoscopies and small bowel imaging studies have shown terminal ileum with normal histology.  Calprotectin and infectious stool testing was negative.  His diarrhea had improved significantly after stopping metformin.    Anand has been treated with adalimumab, azathioprine, ustekinumab, and vedolizumab but never had tissue diagnosis from disease.  After last visit, vedolizumab was discontinued due to concern for lack of benefit and inadequate evidence of Crohn's.  Anand feels that diarrhea symptoms have worsened in frequency and severity since coming off vedolizumab.      1.  Repeat calprotectin.  2.  Schedule video capsule endoscopy.  3.  Will also discuss with Dr. Woodward the utility of scheduling colonoscopy for possible tissue diagnosis.  4.  Continue Imodium as needed.  Advised to increase to twice daily as needed.  5.  Continue vitamin B12 supplements.  6.  In the future, consider SIBO testing given diarrhea and B12 deficiency.    Follow-up in 3 months.     Health Maintenance Recommendations:  Vaccines & " Infections  COVID-19 vaccination and boosters are recommended. There is no evidence that the COVID-19 vaccine would cause an IBD flare.  Avoid live vaccines if on immunosuppressive therapy.  Yearly influenza vaccine (flu shot).  Pneumonia vaccines for patients on immunosuppression. These include Prevnar 20, followed by Pneumovax 23 at least 8 weeks later.  Shingrix vaccine (series of 2 injections) for al patients 65 and older. Patients on tofacitinib or upadacitinib should be vaccinated regardless of age.  If not immune to measles mumps or rubella, MMR vaccine is recommended. However, this is a live vaccine and should be given prior to immunosuppressive therapy.  HPV vaccination as per national guidelines.  Hepatitis A and B vaccinations if not previously vaccinated.  Testing for tuberculosis with QuantiFERON Gold blood test and/or chest xray prior to starting immunosuppressive medications, and then annually    Cancer screening  Dysplasia surveillance for colorectal cancer. Colonoscopy in all patients with extensive colitis (more than 1/3 of the colon involved) who had disease for at least 8 or more years.  Repeat colonoscopy approximately every 12-24 months.  In patients with concurrent primary sclerosing cholangitis, history of dysplasia, or family history of colon cancer, repeat colonoscopy annually.    Females: Pap smear annually for woman on immunosuppression.  Annual dermatologic/skin exam in all patients with IBD, especially those on immunosuppression with thiopurines or ZAIRE inhibitors.    Miscellaneous  DEXA scan, once off steroids for 3 months  Depression screening recommended annually  Routine dental and ophthalmology examinations    Problem List Items Addressed This Visit        Other    Crohn's disease (HCC) - Primary    Relevant Orders    Capsule endoscopy    Calprotectin,Fecal   Other Visit Diagnoses       Crohn's disease of small intestine without complications (HCC)        Relevant Orders    Capsule  endoscopy    Calprotectin,Fecal          Administrative Statements   Encounter provider Nithin Cuadra PA-C    The Patient is located at Home and in the following state in which I hold an active license [place].    The patient was identified by name and date of birth. Benito Park was informed that this is a telemedicine visit and that the visit is being conducted through the Epic Embedded platform. He agrees to proceed..  My office door was closed. No one else was in the room.  He acknowledged consent and understanding of privacy and security of the video platform. The patient has agreed to participate and understands they can discontinue the visit at any time.    I have spent a total time of 30 minutes in caring for this patient on the day of the visit/encounter including Instructions for management, Impressions, Counseling / Coordination of care, Documenting in the medical record, Reviewing/placing orders in the medical record (including tests, medications, and/or procedures), Obtaining or reviewing history  , and Communicating with other healthcare professionals , not including the time spent for establishing the audio/video connection.           [1]    Current Outpatient Medications:   •  acetaminophen (TYLENOL) 500 mg tablet  •  albuterol (PROVENTIL HFA,VENTOLIN HFA) 90 mcg/act inhaler  •  Alcohol Swabs 70 % PADS  •  ammonium lactate (LAC-HYDRIN) 12 % cream  •  atorvastatin (LIPITOR) 40 mg tablet  •  Blood Glucose Monitoring Suppl (OneTouch Verio Reflect) w/Device KIT  •  Cholecalciferol (RA Vitamin D-3) 1,000 units tablet  •  cyclobenzaprine (FLEXERIL) 10 mg tablet  •  DULoxetine (CYMBALTA) 30 mg delayed release capsule  •  escitalopram (LEXAPRO) 10 mg tablet  •  glucose blood (OneTouch Verio) test strip  •  lisinopril (ZESTRIL) 20 mg tablet  •  loperamide (IMODIUM) 2 mg capsule  •  LORazepam (ATIVAN) 1 mg tablet  •  naproxen (NAPROSYN) 500 mg tablet  •  omeprazole (PriLOSEC) 40 MG capsule  •  ondansetron  (ZOFRAN) 4 mg tablet  •  OneTouch Delica Lancets 33G MISC  •  pregabalin (LYRICA) 150 mg capsule  •  traZODone (DESYREL) 150 mg tablet  •  vitamin B-12 (VITAMIN B-12) 1,000 mcg tablet  •  vedolizumab (Entyvio) SOLR[2]  No Known Allergies

## 2025-07-23 ENCOUNTER — TELEPHONE (OUTPATIENT)
Dept: GASTROENTEROLOGY | Facility: CLINIC | Age: 55
End: 2025-07-23

## 2025-07-23 NOTE — TELEPHONE ENCOUNTER
Patient states that he had a Capsule Endoscopy and received a large bill  Patient was given the billing office to discuss with them.  [phone]

## 2025-07-24 ENCOUNTER — OFFICE VISIT (OUTPATIENT)
Dept: ENDOCRINOLOGY | Facility: HOSPITAL | Age: 55
End: 2025-07-24
Payer: MEDICARE

## 2025-07-24 ENCOUNTER — APPOINTMENT (OUTPATIENT)
Dept: LAB | Facility: HOSPITAL | Age: 55
End: 2025-07-24

## 2025-07-24 VITALS
WEIGHT: 204.8 LBS | SYSTOLIC BLOOD PRESSURE: 122 MMHG | BODY MASS INDEX: 27.74 KG/M2 | HEART RATE: 77 BPM | HEIGHT: 72 IN | DIASTOLIC BLOOD PRESSURE: 80 MMHG

## 2025-07-24 DIAGNOSIS — E78.2 MIXED HYPERLIPIDEMIA: ICD-10-CM

## 2025-07-24 DIAGNOSIS — I10 PRIMARY HYPERTENSION: ICD-10-CM

## 2025-07-24 DIAGNOSIS — E11.65 TYPE 2 DIABETES MELLITUS WITH HYPERGLYCEMIA, WITHOUT LONG-TERM CURRENT USE OF INSULIN (HCC): Primary | ICD-10-CM

## 2025-07-24 PROCEDURE — 99214 OFFICE O/P EST MOD 30 MIN: CPT | Performed by: PHYSICIAN ASSISTANT

## 2025-07-24 NOTE — PATIENT INSTRUCTIONS
Monitor diet and maintain physical activity.    Continue with metformin  mg twice a day.    Call with any concern or questions.    Follow up in 6 months with labs.

## 2025-07-24 NOTE — ASSESSMENT & PLAN NOTE
Previous lipid panel did show slightly elevated triglycerides.  Continue with atorvastatin 20 mg daily and lifestyle modifications to help improve levels.  Will continue to monitor over time.

## 2025-07-24 NOTE — PROGRESS NOTES
Name: Benito Park      : 1970      MRN: [id number]  Encounter Provider: Onesimo Vasquez PA-C  Encounter Date: 2025   Encounter department: [facility]    No chief complaint on file.  :  Assessment & Plan  Type 2 diabetes mellitus with hyperglycemia, without long-term current use of insulin (HCC)  Recent hemoglobin A1c remains stable.  She will be doing well at this time.  He will continue with metformin  mg daily.  Continue with lifestyle modifications to help improve glucose levels.  If there is any issues with glucose levels in between office visits, please contact the office.  Since he is doing extremely well we will extend out office visits to every 6 months.  Follow-up with labwork completed prior to visit.  Lab Results   Component Value Date    HGBA1C 6.5 (H) 2025       Orders:  •  Hemoglobin A1C; Future  •  Comprehensive metabolic panel; Future  •  Albumin / creatinine urine ratio; Future  •  Lipid panel; Future    Primary hypertension  Normotensive in the office today.  Kidney function remains stable with normal electrolytes.  Continue with lisinopril 20 mg daily.       Mixed hyperlipidemia  Previous lipid panel did show slightly elevated triglycerides.  Continue with atorvastatin 20 mg daily and lifestyle modifications to help improve levels.  Will continue to monitor over time.         History of Present Illness     Benito Park is a 54 y.o. male with type 2 diabetes diagnosed in  with hypertension and hyperlipidemia.  He is on oral agents at home and takes metformin  mg twice a day.  Has not been on any other diabetes medications in the past, but has been on a higher dose of metformin.  He denies any polyuria, polydipsia, nocturia and blurry vision.  He denies nephropathy, retinopathy, heart attack, stroke, and claudication but does admit to neuropathy.  In general he is doing well.  States that he is  staying physically active throughout the day.  Is eating a diabetic friendly diet and is minimizing the amount of carbs that he is eating.    Hypoglycemic episodes: No.     The patient's last eye exam was in September 2024.  The patient's last foot exam was in July 2025.    Blood Sugar/Glucometer/Pump/CGM review: No blood sugar logs present at today's office visit.  Does state that he does check every once in a while and is typically in the low 100s.    Current diabetic regimen:   Metformin 500 mg twice a day    Review of Systems   Constitutional:  Negative for activity change, appetite change, fatigue and unexpected weight change.   HENT:  Negative for trouble swallowing.    Eyes:  Negative for visual disturbance.   Respiratory:  Negative for chest tightness and shortness of breath.    Cardiovascular:  Negative for chest pain, palpitations and leg swelling.   Gastrointestinal:  Negative for abdominal pain, diarrhea, nausea and vomiting.   Endocrine: Negative for cold intolerance, heat intolerance, polydipsia, polyphagia and polyuria.   Genitourinary:  Negative for frequency.   Skin:  Negative for rash and wound.   Neurological:  Negative for dizziness, weakness, light-headedness, numbness and headaches.   Psychiatric/Behavioral:  Negative for dysphoric mood and sleep disturbance. The patient is not nervous/anxious.     as per HPI    Medical History Reviewed by provider this encounter:     .  Medications Ordered Prior to Encounter[1]   Social History[1]     Medical History Reviewed by provider this encounter:     .    Objective   /80   Pulse 77   Ht 6' (1.829 m)   Wt 92.9 kg (204 lb 12.8 oz)   BMI 27.78 kg/m²      Body mass index is 27.78 kg/m².  Wt Readings from Last 3 Encounters:   07/24/25 92.9 kg (204 lb 12.8 oz)   07/17/25 92.5 kg (204 lb)   07/10/25 92.5 kg (204 lb)     Physical Exam  Vitals and nursing note reviewed.   Constitutional:       General: He is not in acute distress.     Appearance:  Normal appearance. He is well-developed. He is not diaphoretic.     Eyes:      General: No scleral icterus.     Extraocular Movements: Extraocular movements intact.      Conjunctiva/sclera: Conjunctivae normal.      Pupils: Pupils are equal, round, and reactive to light.       Cardiovascular:      Rate and Rhythm: Normal rate and regular rhythm.      Pulses: Normal pulses.      Heart sounds: Normal heart sounds. No murmur heard.     No friction rub. No gallop.   Pulmonary:      Effort: Pulmonary effort is normal. No tachypnea, bradypnea or respiratory distress.      Breath sounds: Normal breath sounds. No wheezing.     Musculoskeletal:      Cervical back: Normal range of motion.      Right lower leg: No edema.      Left lower leg: No edema.   Lymphadenopathy:      Cervical: No cervical adenopathy.     Skin:     General: Skin is warm and dry.     Neurological:      Mental Status: He is alert and oriented to person, place, and time. Mental status is at baseline. He is not disoriented.      Motor: No abnormal muscle tone.      Gait: Gait normal.      Deep Tendon Reflexes: Reflexes are normal and symmetric.     Psychiatric:         Mood and Affect: Mood normal.         Behavior: Behavior normal.         Thought Content: Thought content normal.       Last Eye Exam: 09/18/2024  Last Foot Exam: 07/10/2025  Health Maintenance   Topic Date Due   • Diabetic Foot Exam  07/10/2026   • Diabetic Eye Exam  09/18/2026         Labs: I have reviewed pertinent labs including:   Lab Results   Component Value Date    HGBA1C 6.5 (H) 07/08/2025    HGBA1C 6.2 07/08/2025    HGBA1C 6.4 (H) 01/09/2025      Lab Results   Component Value Date    CREATININE 0.85 07/08/2025    CREATININE 0.88 01/09/2025    CREATININE 0.88 01/09/2025    BUN 15 07/08/2025     10/03/2016    K 4.3 07/08/2025     07/08/2025    CO2 29 07/08/2025      GFR, Calculated   Date Value Ref Range Status   12/12/2019 90 >60 mL/min/1.73m2 Final     Comment:      mL/min per 1.73 square meters                                            Normal Function or Mild Renal    Disease (if clinically at risk):  >or=60  Moderately Decreased:                30-59  Severely Decreased:                  15-29  Renal Failure:                         <15                                            -American GFR: multiply reported GFR by 1.16    Please note that the eGFR is based on the CKD-EPI calculation, and is not intended to be used for drug dosing.     eGFR   Date Value Ref Range Status   07/08/2025 100 ml/min/1.73sq m Final      Cholesterol   Date Value Ref Range Status   10/03/2016 208 (H) 125 - 200 mg/dL Final     HDL   Date Value Ref Range Status   10/03/2016 76 > OR = 40 mg/dL Final     HDL, Direct   Date Value Ref Range Status   08/08/2024 36 (L) >=40 mg/dL Final     Triglycerides   Date Value Ref Range Status   08/08/2024 176 (H) See Comment mg/dL Final     Comment:     Triglyceride:     0-9Y            <75mg/dL     10Y-17Y         <90 mg/dL       >=18Y     Normal          <150 mg/dL     Borderline High 150-199 mg/dL     High            200-499 mg/dL        Very High       >499 mg/dL    Specimen collection should occur prior to Metamizole administration due to the potential for falsely depressed results.   10/03/2016 95 <150 mg/dL Final      ALT   Date Value Ref Range Status   07/08/2025 34 7 - 52 U/L Final     Comment:     Specimen collection should occur prior to Sulfasalazine administration due to the potential for falsely depressed results.    11/11/2020 41 9 - 46 U/L Final   11/26/2019 50 <56 U/L Final     AST   Date Value Ref Range Status   07/08/2025 29 13 - 39 U/L Final   11/11/2020 25 10 - 40 U/L Final   11/26/2019 30 <41 U/L Final     Alkaline Phosphatase   Date Value Ref Range Status   07/08/2025 97 34 - 104 U/L Final   11/11/2020 74 36 - 130 U/L Final   11/26/2019 89 35 - 120 U/L Final     Total Bilirubin   Date Value Ref Range Status   10/03/2016 0.8 0.2 - 1.2  mg/dL Final        Patient Instructions   Monitor diet and maintain physical activity.    Continue with metformin  mg twice a day.    Call with any concern or questions.    Follow up in 6 months with labs.    Discussed with the patient and all questioned fully answered. He will call me if any problems arise.           [1]  Current Outpatient Medications on File Prior to Visit   Medication Sig Dispense Refill   • acetaminophen (TYLENOL) 500 mg tablet Take 500 mg by mouth every 6 (six) hours as needed for mild pain     • albuterol (PROVENTIL HFA,VENTOLIN HFA) 90 mcg/act inhaler Inhale 2 puffs every 6 (six) hours as needed for wheezing 6.7 g 2   • Alcohol Swabs 70 % PADS May substitute brand based on insurance coverage. Check glucose TID. 100 each 0   • ammonium lactate (LAC-HYDRIN) 12 % cream Apply topically daily 385 g 2   • atorvastatin (LIPITOR) 40 mg tablet Take 1 tablet (40 mg total) by mouth every evening 90 tablet 3   • Blood Glucose Monitoring Suppl (OneTouch Verio Reflect) w/Device KIT May substitute brand based on insurance coverage. Check glucose TID. 1 kit 0   • Cholecalciferol (RA Vitamin D-3) 1,000 units tablet Take 2 tablets (2,000 Units total) by mouth daily 180 tablet 2   • cyclobenzaprine (FLEXERIL) 10 mg tablet TAKE 1 TABLET BY MOUTH EVERYDAY AT BEDTIME 90 tablet 2   • DULoxetine (CYMBALTA) 30 mg delayed release capsule Take 1 capsule (30 mg total) by mouth daily 90 capsule 3   • escitalopram (LEXAPRO) 10 mg tablet Take 1 tablet (10 mg total) by mouth daily 90 tablet 3   • glucose blood (OneTouch Verio) test strip May substitute brand based on insurance coverage. Check glucose TID. 100 each 5   • lisinopril (ZESTRIL) 20 mg tablet Take 1 tablet (20 mg total) by mouth daily 90 tablet 3   • loperamide (IMODIUM) 2 mg capsule Take 1 capsule (2 mg total) by mouth daily at bedtime 60 capsule 3   • LORazepam (ATIVAN) 1 mg tablet Take 1 tablet (1 mg total) by mouth every 8 (eight) hours as needed for  anxiety 60 tablet 3   • naproxen (NAPROSYN) 500 mg tablet Take 1 tablet (500 mg total) by mouth 2 (two) times a day with meals 180 tablet 3   • omeprazole (PriLOSEC) 40 MG capsule Take 1 capsule (40 mg total) by mouth daily 90 capsule 3   • ondansetron (ZOFRAN) 4 mg tablet Take 1 tablet (4 mg total) by mouth every 8 (eight) hours as needed for nausea or vomiting 20 tablet 1   • OneTouch Delica Lancets 33G MISC May substitute brand based on insurance coverage. Check glucose TID. 100 each 5   • pregabalin (LYRICA) 150 mg capsule Take 1 capsule (150 mg total) by mouth 3 (three) times a day 270 capsule 5   • traZODone (DESYREL) 150 mg tablet Take 1 HS 90 tablet 3   • vitamin B-12 (VITAMIN B-12) 1,000 mcg tablet Take 1 tablet (1,000 mcg total) by mouth daily 90 tablet 3   • vedolizumab (Entyvio) SOLR Inject 300 mg into a catheter in a vein every 4 weeks (Patient not taking: Reported on 2025)       No current facility-administered medications on file prior to visit.   [1]  Social History  Tobacco Use   • Smoking status: Former     Current packs/day: 0.00     Average packs/day: 0.5 packs/day for 30.0 years (15.0 ttl pk-yrs)     Types: Cigarettes     Start date: 2019     Quit date: 2021     Years since quittin.4     Passive exposure: Never   • Smokeless tobacco: Never   Vaping Use   • Vaping status: Every Day   • Substances: THC   Substance and Sexual Activity   • Alcohol use: Yes     Alcohol/week: 3.0 standard drinks of alcohol     Types: 3 Cans of beer per week     Comment: social   • Drug use: Yes     Frequency: 7.0 times per week     Types: Marijuana     Comment: Medical marijuana-vapes, bedtime   • Sexual activity: Yes     Partners: Female     Comment: defer

## 2025-07-24 NOTE — ASSESSMENT & PLAN NOTE
Normotensive in the office today.  Kidney function remains stable with normal electrolytes.  Continue with lisinopril 20 mg daily.

## 2025-07-24 NOTE — ASSESSMENT & PLAN NOTE
Recent hemoglobin A1c remains stable.  She will be doing well at this time.  He will continue with metformin  mg daily.  Continue with lifestyle modifications to help improve glucose levels.  If there is any issues with glucose levels in between office visits, please contact the office.  Since he is doing extremely well we will extend out office visits to every 6 months.  Follow-up with labwork completed prior to visit.  Lab Results   Component Value Date    HGBA1C 6.5 (H) 07/08/2025       Orders:  •  Hemoglobin A1C; Future  •  Comprehensive metabolic panel; Future  •  Albumin / creatinine urine ratio; Future  •  Lipid panel; Future

## 2025-07-28 ENCOUNTER — APPOINTMENT (OUTPATIENT)
Dept: LAB | Facility: HOSPITAL | Age: 55
End: 2025-07-28
Payer: MEDICARE

## 2025-07-28 DIAGNOSIS — K50.814 CROHN'S DISEASE OF BOTH SMALL AND LARGE INTESTINE WITH ABSCESS (HCC): ICD-10-CM

## 2025-07-28 DIAGNOSIS — K50.00 CROHN'S DISEASE OF SMALL INTESTINE WITHOUT COMPLICATIONS (HCC): ICD-10-CM

## 2025-07-28 PROCEDURE — 83993 ASSAY FOR CALPROTECTIN FECAL: CPT

## 2025-07-30 ENCOUNTER — TELEPHONE (OUTPATIENT)
Dept: SLEEP CENTER | Facility: CLINIC | Age: 55
End: 2025-07-30

## 2025-07-30 ENCOUNTER — RESULTS FOLLOW-UP (OUTPATIENT)
Age: 55
End: 2025-07-30

## 2025-07-30 LAB — CALPROTECTIN STL-MCNC: 106 ÂΜG/G

## 2025-08-06 ENCOUNTER — TELEPHONE (OUTPATIENT)
Age: 55
End: 2025-08-06

## 2025-08-06 ENCOUNTER — PREP FOR PROCEDURE (OUTPATIENT)
Age: 55
End: 2025-08-06

## 2025-08-06 DIAGNOSIS — Z83.719 FAMILY HISTORY OF COLONIC POLYPS: Primary | ICD-10-CM

## 2025-08-22 ENCOUNTER — TELEPHONE (OUTPATIENT)
Dept: SLEEP CENTER | Facility: CLINIC | Age: 55
End: 2025-08-22

## 2025-08-22 ENCOUNTER — TELEPHONE (OUTPATIENT)
Age: 55
End: 2025-08-22

## 2025-08-26 PROBLEM — G47.34 NOCTURNAL HYPOXEMIA: Status: ACTIVE | Noted: 2025-08-26

## (undated) DEVICE — OCCLUSIVE GAUZE STRIP,3% BISMUTH TRIBROMOPHENATE IN PETROLATUM BLEND: Brand: XEROFORM

## (undated) DEVICE — 3M™ STERI-STRIP™ COMPOUND BENZOIN TINCTURE 40 BAGS/CARTON 4 CARTONS/CASE C1544: Brand: 3M™ STERI-STRIP™

## (undated) DEVICE — IV CATH INTROCAN 18G X 1 1/4 SAFETY

## (undated) DEVICE — PAD GROUNDING ADULT

## (undated) DEVICE — GLOVE INDICATOR PI UNDERGLOVE SZ 7.5 BLUE

## (undated) DEVICE — POOLE SUCTION HANDLE: Brand: CARDINAL HEALTH

## (undated) DEVICE — STRL ALLENTOWN HYSTEROSCOPY PK: Brand: CARDINAL HEALTH

## (undated) DEVICE — STERILE BETHLEHEM PLASTIC HAND: Brand: CARDINAL HEALTH

## (undated) DEVICE — RX COUDÉ® EPIDURAL NEEDLE 14G TW X 4.0": Brand: EPIMED

## (undated) DEVICE — SPONGE PVP SCRUB WING STERILE

## (undated) DEVICE — GLOVE INDICATOR PI UNDERGLOVE SZ 6.5 BLUE

## (undated) DEVICE — EXOFIN PRECISION PEN HIGH VISCOSITY TOPICAL SKIN ADHESIVE: Brand: EXOFIN PRECISION PEN, 1G

## (undated) DEVICE — PROBE 8225401 5PK SD-SD BIPOL STIM ROHS

## (undated) DEVICE — CUFF TOURNIQUET 18 X 4 IN QUICK CONNECT DISP 1 BLADDER

## (undated) DEVICE — SUT SILK 3-0 RB-1 CV-23 18IN C053D

## (undated) DEVICE — NEUROSTIM MULTILEAD TRIAL CABLE

## (undated) DEVICE — CERVICAL COLLAR SOFT MED

## (undated) DEVICE — SUT PROLENE 4-0 PC-3 18 IN 8634G

## (undated) DEVICE — PROXIMATE SKIN STAPLERS (35 WIDE) CONTAINS 35 STAINLESS STEEL STAPLES (FIXED HEAD): Brand: PROXIMATE

## (undated) DEVICE — GLOVE SRG BIOGEL 8.5

## (undated) DEVICE — 3M™ IOBAN™ 2 ANTIMICROBIAL INCISE DRAPE 6650EZ: Brand: IOBAN™ 2

## (undated) DEVICE — GLOVE SRG BIOGEL 7

## (undated) DEVICE — PROGRAMMER PATIENT PROCLAIM

## (undated) DEVICE — DRAPE C-ARMOUR

## (undated) DEVICE — SYRINGE 10ML LL

## (undated) DEVICE — TUBING SUCTION 5MM X 12 FT

## (undated) DEVICE — UTILITY MARKER,BLACK WITH LABELS: Brand: DEVON

## (undated) DEVICE — GLOVE INDICATOR PI UNDERGLOVE SZ 8.5 BLUE

## (undated) DEVICE — VIAL DECANTER

## (undated) DEVICE — GLOVE SRG BIOGEL 7.5

## (undated) DEVICE — GLOVE INDICATOR PI UNDERGLOVE SZ 7 BLUE

## (undated) DEVICE — BINDER ABDOMINAL 30-45 IN

## (undated) DEVICE — ELECTRODE BLADE MOD E-Z CLEAN  2.75IN 7CM -0012AM

## (undated) DEVICE — ARM SLING: Brand: DEROYAL

## (undated) DEVICE — ADHESIVE SKIN HIGH VISCOSITY EXOFIN 1ML

## (undated) DEVICE — GAUZE SPONGES,USP TYPE VII GAUZE, 12 PLY: Brand: CURITY

## (undated) DEVICE — SUT MONOCRYL 4-0 PS-2 27 IN Y426H

## (undated) DEVICE — Device

## (undated) DEVICE — THE SLEEP REMOTE IS AN EXTERNAL DEVICE THAT IS ABOUT THE SIZE OF A CELL PHONE AND IS USED BY THE PATIENT TO ACTIVATE THE INSPIRE SYSTEM BEFORE THEY GO TO SLEEP. THE PATIENT PLACES THE REMOTE OVER THE IMPLANTED IPG SITE, AND USES PUSH BUTTONS ON TOP OF THE PROGRAMMER TO: A.) TURN THE THERAPY ON OR OFF, B.) TEMPORARILY SUSPEND THERAPY, OR C.) MAKE ADJUSTMENTS TO THE STIMULATION STRENGTH (WITHIN LIMITS SET BY THE PHYSICIAN). THE PATIENT’S SLEEP REMOTE ALSO ALLOWS PATIENTS TO CHECK THE STATUS OF THE IPG BATTERY. THERE ARE BOTH VISUAL AND AUDIO INDICATORS THAT PROVIDE INFORMATION ON THE IPG STATUS (THERAPY ON/OFF, THERAPY PAUSED, STIMULATION LEVEL AND BATTERY STATUS). THE INSPIRE SLEEP REMOTE WILL ONLY COMMUNICATE WITH THE INSPIRE IPG.: Brand: INSPIRE

## (undated) DEVICE — WET SKIN PREP TRAY: Brand: MEDLINE INDUSTRIES, INC.

## (undated) DEVICE — 3M™ DURAPORE™ SURGICAL TAPE 1538-3, 3 INCH X 10 YARD (7,5CM X 9,1M), 4 ROLLS/BOX: Brand: 3M™ DURAPORE™

## (undated) DEVICE — SYRINGE EPI 8ML LUER SLIP LOSS OF RESISTANCE PLASTIC PERFIX

## (undated) DEVICE — GLOVE SRG BIOGEL 8

## (undated) DEVICE — TIBURON SPLIT SHEET: Brand: CONVERTORS

## (undated) DEVICE — PREMIUM DRY TRAY LF: Brand: MEDLINE INDUSTRIES, INC.

## (undated) DEVICE — GLOVE INDICATOR PI UNDERGLOVE SZ 8 BLUE

## (undated) DEVICE — BULB SYRINGE, IRRIGATION WITH PROTECTIVE CAP, 60 CC, INDIVIDUALLY WRAPPED: Brand: DOVER

## (undated) DEVICE — ANTIBACTERIAL UNDYED BRAIDED (POLYGLACTIN 910), SYNTHETIC ABSORBABLE SUTURE: Brand: COATED VICRYL

## (undated) DEVICE — GLOVE SRG BIOGEL ECLIPSE 7

## (undated) DEVICE — DISPOSABLE BRIEF/UNDERWEAR

## (undated) DEVICE — STERI DRAPE 1000 NON-STERILE ROLL

## (undated) DEVICE — POV-IOD SOLUTION 4OZ BT

## (undated) DEVICE — PENCIL ELECTROSURG E-Z CLEAN -0035H

## (undated) DEVICE — SPONGE STICK WITH PVP-I: Brand: KENDALL

## (undated) DEVICE — 3M™ STERI-STRIP™ REINFORCED ADHESIVE SKIN CLOSURES, R1547, 1/2 IN X 4 IN (12 MM X 100 MM), 6 STRIPS/ENVELOPE: Brand: 3M™ STERI-STRIP™

## (undated) DEVICE — CORD BIPOLAR 12FT REUSEABLE

## (undated) DEVICE — 10FR FRAZIER SUCTION HANDLE: Brand: CARDINAL HEALTH

## (undated) DEVICE — GLOVE SRG BIOGEL 6.5

## (undated) DEVICE — 3M™ TEGADERM™ TRANSPARENT FILM DRESSING FRAME STYLE, 1626W, 4 IN X 4-3/4 IN (10 CM X 12 CM), 50/CT 4CT/CASE: Brand: 3M™ TEGADERM™

## (undated) DEVICE — PROVE COVER: Brand: UNBRANDED

## (undated) DEVICE — SUT VICRYL PLUS 0 UR-6 27IN VCP603H

## (undated) DEVICE — GOWN ,SIRUS ,NONREINFORCED 4XL: Brand: MEDLINE

## (undated) DEVICE — LUBRICANT SURGILUBE TUBE 4 OZ  FLIP TOP

## (undated) DEVICE — ARTHROSCOPY FLOOR MAT

## (undated) DEVICE — SKIN MARKER DUAL TIP WITH RULER CAP, FLEXIBLE RULER AND LABELS: Brand: DEVON

## (undated) DEVICE — DISPOSABLE EQUIPMENT COVER: Brand: SMALL TOWEL DRAPE

## (undated) DEVICE — DRAPE C-ARM X-RAY

## (undated) DEVICE — ELECTRODE 8227304 5PK PRASS PR 18MM ROHS

## (undated) DEVICE — SUT SILK 2-0 SH 30 IN K833H

## (undated) DEVICE — CATHETER 8591-38 PASSER,38CM

## (undated) DEVICE — BASIC SINGLE BASIN 2-LF: Brand: MEDLINE INDUSTRIES, INC.

## (undated) DEVICE — NEEDLE 25G X 1 1/2

## (undated) DEVICE — ALL PURPOSE SPONGES,NONWOVEN, 4 PLY: Brand: CURITY

## (undated) DEVICE — INTENDED FOR TISSUE SEPARATION, AND OTHER PROCEDURES THAT REQUIRE A SHARP SURGICAL BLADE TO PUNCTURE OR CUT.: Brand: BARD-PARKER SAFETY BLADES SIZE 10, STERILE

## (undated) DEVICE — BETHLEHEM UNIVERSAL MINOR GEN: Brand: CARDINAL HEALTH

## (undated) DEVICE — INTENDED FOR TISSUE SEPARATION, AND OTHER PROCEDURES THAT REQUIRE A SHARP SURGICAL BLADE TO PUNCTURE OR CUT.: Brand: BARD-PARKER SAFETY BLADES SIZE 15, STERILE

## (undated) DEVICE — PACK UNIVERSAL NECK

## (undated) DEVICE — ALCOHOL PREP, 2 PLY, LARGE, MADE IN USA, SATURATED WITH 70% ISOPROPYL ALCOHOL, FOR EXTERNAL USE ONLY: Brand: WEBCOL

## (undated) DEVICE — ANTIBACTERIAL VIOLET BRAIDED (POLYGLACTIN 910), SYNTHETIC ABSORBABLE SUTURE: Brand: COATED VICRYL

## (undated) DEVICE — NEEDLE 18 G X 1 1/2

## (undated) DEVICE — 3000CC GUARDIAN II: Brand: GUARDIAN

## (undated) DEVICE — ASCOPE 4 RHINOLARYNGO SLIM: Brand: ASCOPE™ 4 RHINOLARYNGO SLIM

## (undated) DEVICE — IODOFORM PACKING STRIP: Brand: CURITY

## (undated) DEVICE — PREP SURGICAL PURPREP 26ML

## (undated) DEVICE — PLUMEPEN PRO 10FT

## (undated) DEVICE — NEEDLE HYPO 22G X 1-1/2 IN

## (undated) DEVICE — 3M™ TEGADERM™ TRANSPARENT FILM DRESSING FRAME STYLE, 1624W, 2-3/8 IN X 2-3/4 IN (6 CM X 7 CM), 100/CT 4CT/CASE: Brand: 3M™ TEGADERM™

## (undated) DEVICE — GAUZE SPONGES,16 PLY: Brand: CURITY

## (undated) DEVICE — ELECTRODE BLADE MOD E-Z CLEAN 2.5IN 6.4CM -0012M

## (undated) DEVICE — SPONGE SCRUB 4 PCT CHLORHEXIDINE

## (undated) DEVICE — SUT VICRYL 3-0 SH 27 IN J416H